# Patient Record
Sex: MALE | Race: WHITE | Employment: UNEMPLOYED | URBAN - METROPOLITAN AREA
[De-identification: names, ages, dates, MRNs, and addresses within clinical notes are randomized per-mention and may not be internally consistent; named-entity substitution may affect disease eponyms.]

---

## 2021-03-26 ENCOUNTER — HOSPITAL ENCOUNTER (INPATIENT)
Age: 56
LOS: 12 days | Discharge: ANOTHER HOSPITAL | DRG: 862 | End: 2021-04-07
Attending: INTERNAL MEDICINE | Admitting: SURGERY
Payer: MEDICAID

## 2021-03-26 DIAGNOSIS — Z86.718 HISTORY OF DEEP VENOUS THROMBOSIS (DVT) OF DISTAL VEIN OF RIGHT LOWER EXTREMITY: ICD-10-CM

## 2021-03-26 DIAGNOSIS — I73.9 PERIPHERAL ARTERY DISEASE (HCC): Chronic | ICD-10-CM

## 2021-03-26 DIAGNOSIS — Z79.4 TYPE 2 DIABETES MELLITUS WITHOUT COMPLICATION, WITH LONG-TERM CURRENT USE OF INSULIN (HCC): Chronic | ICD-10-CM

## 2021-03-26 DIAGNOSIS — K59.00 CONSTIPATION, UNSPECIFIED CONSTIPATION TYPE: ICD-10-CM

## 2021-03-26 DIAGNOSIS — E11.9 TYPE 2 DIABETES MELLITUS WITHOUT COMPLICATION, WITHOUT LONG-TERM CURRENT USE OF INSULIN (HCC): Chronic | ICD-10-CM

## 2021-03-26 DIAGNOSIS — Z78.9 IMPAIRED MOBILITY AND ADLS: ICD-10-CM

## 2021-03-26 DIAGNOSIS — Z79.82 LONG TERM CURRENT USE OF ASPIRIN: Chronic | ICD-10-CM

## 2021-03-26 DIAGNOSIS — I11.0 HYPERTENSIVE HEART DISEASE WITH CHRONIC SYSTOLIC CONGESTIVE HEART FAILURE (HCC): Chronic | ICD-10-CM

## 2021-03-26 DIAGNOSIS — E78.2 MIXED HYPERLIPIDEMIA: Chronic | ICD-10-CM

## 2021-03-26 DIAGNOSIS — I42.9 CARDIOMYOPATHY, UNSPECIFIED TYPE (HCC): Chronic | ICD-10-CM

## 2021-03-26 DIAGNOSIS — Z89.511 STATUS POST BELOW KNEE AMPUTATION OF RIGHT LOWER EXTREMITY (HCC): Primary | ICD-10-CM

## 2021-03-26 DIAGNOSIS — E78.5 HYPERLIPIDEMIA, UNSPECIFIED HYPERLIPIDEMIA TYPE: Chronic | ICD-10-CM

## 2021-03-26 DIAGNOSIS — Z98.890 HISTORY OF EMBOLECTOMY: ICD-10-CM

## 2021-03-26 DIAGNOSIS — Z79.01 ANTICOAGULATED BY ANTICOAGULATION TREATMENT: Chronic | ICD-10-CM

## 2021-03-26 DIAGNOSIS — I10 ESSENTIAL HYPERTENSION: Chronic | ICD-10-CM

## 2021-03-26 DIAGNOSIS — Z74.09 IMPAIRED MOBILITY AND ADLS: ICD-10-CM

## 2021-03-26 DIAGNOSIS — Z79.899 ON STATIN THERAPY DUE TO RISK OF FUTURE CARDIOVASCULAR EVENT: Chronic | ICD-10-CM

## 2021-03-26 DIAGNOSIS — I25.2 HISTORY OF MYOCARDIAL INFARCTION: ICD-10-CM

## 2021-03-26 DIAGNOSIS — I50.22 HYPERTENSIVE HEART DISEASE WITH CHRONIC SYSTOLIC CONGESTIVE HEART FAILURE (HCC): Chronic | ICD-10-CM

## 2021-03-26 DIAGNOSIS — I50.22 CHRONIC SYSTOLIC HEART FAILURE (HCC): Chronic | ICD-10-CM

## 2021-03-26 DIAGNOSIS — E11.9 TYPE 2 DIABETES MELLITUS WITHOUT COMPLICATION, WITH LONG-TERM CURRENT USE OF INSULIN (HCC): Chronic | ICD-10-CM

## 2021-03-26 DIAGNOSIS — I70.229 CRITICAL ISCHEMIA OF LOWER EXTREMITY (HCC): ICD-10-CM

## 2021-03-26 DIAGNOSIS — I25.10 CORONARY ARTERY DISEASE INVOLVING NATIVE CORONARY ARTERY OF NATIVE HEART, ANGINA PRESENCE UNSPECIFIED: Chronic | ICD-10-CM

## 2021-03-26 PROBLEM — I51.89 GRADE I DIASTOLIC DYSFUNCTION: Status: ACTIVE | Noted: 2021-03-24

## 2021-03-26 PROBLEM — Z91.199 HISTORY OF NONCOMPLIANCE WITH MEDICAL TREATMENT: Status: ACTIVE | Noted: 2021-03-18

## 2021-03-26 PROBLEM — F32.9 MAJOR DEPRESSIVE DISORDER: Status: ACTIVE | Noted: 2021-03-24

## 2021-03-26 PROBLEM — Z20.822 COVID-19 RULED OUT BY LABORATORY TESTING: Status: ACTIVE | Noted: 2021-03-22

## 2021-03-26 PROBLEM — F32.9 MAJOR DEPRESSIVE DISORDER: Status: ACTIVE | Noted: 2021-03-25

## 2021-03-26 PROBLEM — D62 ACUTE BLOOD LOSS AS CAUSE OF POSTOPERATIVE ANEMIA: Status: ACTIVE | Noted: 2021-03-22

## 2021-03-26 LAB
GLUCOSE BLD STRIP.AUTO-MCNC: 195 MG/DL (ref 70–110)
GLUCOSE BLD STRIP.AUTO-MCNC: 204 MG/DL (ref 70–110)

## 2021-03-26 PROCEDURE — 74011250637 HC RX REV CODE- 250/637: Performed by: INTERNAL MEDICINE

## 2021-03-26 PROCEDURE — 77030040393 HC DRSG OPTIFOAM GENT MDII -B

## 2021-03-26 PROCEDURE — 65310000000 HC RM PRIVATE REHAB

## 2021-03-26 PROCEDURE — 99223 1ST HOSP IP/OBS HIGH 75: CPT | Performed by: INTERNAL MEDICINE

## 2021-03-26 PROCEDURE — 82962 GLUCOSE BLOOD TEST: CPT

## 2021-03-26 PROCEDURE — 74011636637 HC RX REV CODE- 636/637: Performed by: INTERNAL MEDICINE

## 2021-03-26 RX ORDER — INSULIN GLARGINE 100 [IU]/ML
26 INJECTION, SOLUTION SUBCUTANEOUS
Status: DISCONTINUED | OUTPATIENT
Start: 2021-03-26 | End: 2021-03-28

## 2021-03-26 RX ORDER — ATORVASTATIN CALCIUM 80 MG/1
80 TABLET, FILM COATED ORAL DAILY
Status: ON HOLD | COMMUNITY
End: 2021-04-28 | Stop reason: CLARIF

## 2021-03-26 RX ORDER — METHOCARBAMOL 500 MG/1
500 TABLET, FILM COATED ORAL 3 TIMES DAILY
Status: DISCONTINUED | OUTPATIENT
Start: 2021-03-26 | End: 2021-04-07 | Stop reason: HOSPADM

## 2021-03-26 RX ORDER — DULOXETIN HYDROCHLORIDE 30 MG/1
30 CAPSULE, DELAYED RELEASE ORAL DAILY
Status: DISCONTINUED | OUTPATIENT
Start: 2021-03-27 | End: 2021-04-07 | Stop reason: HOSPADM

## 2021-03-26 RX ORDER — ZOLPIDEM TARTRATE 5 MG/1
5 TABLET ORAL
Status: DISCONTINUED | OUTPATIENT
Start: 2021-03-26 | End: 2021-04-07 | Stop reason: HOSPADM

## 2021-03-26 RX ORDER — ACETAMINOPHEN 325 MG/1
650 TABLET ORAL 3 TIMES DAILY
Status: DISCONTINUED | OUTPATIENT
Start: 2021-03-27 | End: 2021-04-07 | Stop reason: HOSPADM

## 2021-03-26 RX ORDER — MAGNESIUM SULFATE 100 %
4 CRYSTALS MISCELLANEOUS AS NEEDED
Status: DISCONTINUED | OUTPATIENT
Start: 2021-03-26 | End: 2021-04-07 | Stop reason: HOSPADM

## 2021-03-26 RX ORDER — GUAIFENESIN 100 MG/5ML
81 LIQUID (ML) ORAL DAILY
Status: ON HOLD | COMMUNITY
End: 2021-04-28 | Stop reason: CLARIF

## 2021-03-26 RX ORDER — GABAPENTIN 100 MG/1
100 CAPSULE ORAL 2 TIMES DAILY
Status: DISCONTINUED | OUTPATIENT
Start: 2021-03-26 | End: 2021-04-07 | Stop reason: HOSPADM

## 2021-03-26 RX ORDER — DEXTROSE 50 % IN WATER (D50W) INTRAVENOUS SYRINGE
25-50 AS NEEDED
Status: DISCONTINUED | OUTPATIENT
Start: 2021-03-26 | End: 2021-04-07 | Stop reason: HOSPADM

## 2021-03-26 RX ORDER — ACETAMINOPHEN 325 MG/1
650 TABLET ORAL
Status: ON HOLD | COMMUNITY
End: 2021-04-28 | Stop reason: CLARIF

## 2021-03-26 RX ORDER — AMOXICILLIN AND CLAVULANATE POTASSIUM 875; 125 MG/1; MG/1
1 TABLET, FILM COATED ORAL 2 TIMES DAILY WITH MEALS
Status: COMPLETED | OUTPATIENT
Start: 2021-03-26 | End: 2021-04-05

## 2021-03-26 RX ORDER — AMOXICILLIN AND CLAVULANATE POTASSIUM 875; 125 MG/1; MG/1
1 TABLET, FILM COATED ORAL EVERY 12 HOURS
COMMUNITY
End: 2021-04-15

## 2021-03-26 RX ORDER — GABAPENTIN 100 MG/1
100 CAPSULE ORAL 2 TIMES DAILY
COMMUNITY
End: 2021-04-15

## 2021-03-26 RX ORDER — METFORMIN HYDROCHLORIDE 500 MG/1
500 TABLET ORAL 2 TIMES DAILY WITH MEALS
Status: DISCONTINUED | OUTPATIENT
Start: 2021-03-27 | End: 2021-04-07 | Stop reason: HOSPADM

## 2021-03-26 RX ORDER — LISINOPRIL 5 MG/1
5 TABLET ORAL DAILY
COMMUNITY
End: 2021-04-15

## 2021-03-26 RX ORDER — FUROSEMIDE 40 MG/1
40 TABLET ORAL DAILY
COMMUNITY
End: 2021-04-15

## 2021-03-26 RX ORDER — ACETAMINOPHEN 325 MG/1
650 TABLET ORAL
Status: DISCONTINUED | OUTPATIENT
Start: 2021-03-26 | End: 2021-03-26

## 2021-03-26 RX ORDER — CARVEDILOL 6.25 MG/1
6.25 TABLET ORAL 2 TIMES DAILY WITH MEALS
COMMUNITY
End: 2021-04-15

## 2021-03-26 RX ORDER — GUAIFENESIN 100 MG/5ML
81 LIQUID (ML) ORAL
Status: DISCONTINUED | OUTPATIENT
Start: 2021-03-27 | End: 2021-04-07 | Stop reason: HOSPADM

## 2021-03-26 RX ORDER — BLOOD-GLUCOSE METER
KIT MISCELLANEOUS SEE ADMIN INSTRUCTIONS
Status: ON HOLD | COMMUNITY
End: 2021-04-28 | Stop reason: CLARIF

## 2021-03-26 RX ORDER — OXYCODONE HYDROCHLORIDE 10 MG/1
10 TABLET ORAL
Status: ON HOLD | COMMUNITY
End: 2021-04-28 | Stop reason: CLARIF

## 2021-03-26 RX ORDER — LISINOPRIL 5 MG/1
5 TABLET ORAL DAILY
Status: DISCONTINUED | OUTPATIENT
Start: 2021-03-27 | End: 2021-04-07 | Stop reason: HOSPADM

## 2021-03-26 RX ORDER — FUROSEMIDE 40 MG/1
40 TABLET ORAL DAILY
Status: DISCONTINUED | OUTPATIENT
Start: 2021-03-27 | End: 2021-04-07 | Stop reason: HOSPADM

## 2021-03-26 RX ORDER — DOCUSATE SODIUM 100 MG/1
100 CAPSULE, LIQUID FILLED ORAL DAILY
Status: ON HOLD | COMMUNITY
End: 2021-04-28 | Stop reason: CLARIF

## 2021-03-26 RX ORDER — INSULIN LISPRO 100 [IU]/ML
INJECTION, SOLUTION INTRAVENOUS; SUBCUTANEOUS
Status: DISCONTINUED | OUTPATIENT
Start: 2021-03-26 | End: 2021-04-07 | Stop reason: HOSPADM

## 2021-03-26 RX ORDER — BISACODYL 5 MG
10 TABLET, DELAYED RELEASE (ENTERIC COATED) ORAL
Status: DISCONTINUED | OUTPATIENT
Start: 2021-03-26 | End: 2021-04-07 | Stop reason: HOSPADM

## 2021-03-26 RX ORDER — ZOLPIDEM TARTRATE 5 MG/1
5 TABLET ORAL
Status: ON HOLD | COMMUNITY
End: 2021-04-28 | Stop reason: CLARIF

## 2021-03-26 RX ORDER — OXYCODONE HYDROCHLORIDE 5 MG/1
5-10 TABLET ORAL
Status: DISCONTINUED | OUTPATIENT
Start: 2021-03-26 | End: 2021-04-07 | Stop reason: HOSPADM

## 2021-03-26 RX ORDER — ACETAMINOPHEN 325 MG/1
650 TABLET ORAL
Status: DISCONTINUED | OUTPATIENT
Start: 2021-03-26 | End: 2021-04-07 | Stop reason: HOSPADM

## 2021-03-26 RX ORDER — CALCIUM CARB/MAGNESIUM CARB 311-232MG
5 TABLET ORAL
Status: DISCONTINUED | OUTPATIENT
Start: 2021-03-26 | End: 2021-04-07 | Stop reason: HOSPADM

## 2021-03-26 RX ORDER — EZETIMIBE 10 MG/1
TABLET ORAL
Status: ON HOLD | COMMUNITY
End: 2021-04-28 | Stop reason: CLARIF

## 2021-03-26 RX ORDER — EZETIMIBE 10 MG/1
10 TABLET ORAL
Status: DISCONTINUED | OUTPATIENT
Start: 2021-03-26 | End: 2021-04-07 | Stop reason: HOSPADM

## 2021-03-26 RX ORDER — DOCUSATE SODIUM 100 MG/1
100 CAPSULE, LIQUID FILLED ORAL
Status: DISCONTINUED | OUTPATIENT
Start: 2021-03-27 | End: 2021-03-30

## 2021-03-26 RX ORDER — DULOXETIN HYDROCHLORIDE 30 MG/1
30 CAPSULE, DELAYED RELEASE ORAL DAILY
Status: ON HOLD | COMMUNITY
End: 2021-04-28 | Stop reason: CLARIF

## 2021-03-26 RX ORDER — ALOGLIPTIN 25 MG/1
25 TABLET, FILM COATED ORAL DAILY
Status: DISCONTINUED | OUTPATIENT
Start: 2021-03-27 | End: 2021-04-07 | Stop reason: HOSPADM

## 2021-03-26 RX ORDER — ATORVASTATIN CALCIUM 40 MG/1
80 TABLET, FILM COATED ORAL DAILY
Status: DISCONTINUED | OUTPATIENT
Start: 2021-03-27 | End: 2021-04-07 | Stop reason: HOSPADM

## 2021-03-26 RX ORDER — INSULIN GLARGINE 100 [IU]/ML
26 INJECTION, SOLUTION SUBCUTANEOUS
COMMUNITY
End: 2021-04-15

## 2021-03-26 RX ORDER — CARVEDILOL 6.25 MG/1
6.25 TABLET ORAL 2 TIMES DAILY WITH MEALS
Status: DISCONTINUED | OUTPATIENT
Start: 2021-03-26 | End: 2021-03-27

## 2021-03-26 RX ADMIN — INSULIN LISPRO 4 UNITS: 100 INJECTION, SOLUTION INTRAVENOUS; SUBCUTANEOUS at 17:18

## 2021-03-26 RX ADMIN — INSULIN GLARGINE 26 UNITS: 100 INJECTION, SOLUTION SUBCUTANEOUS at 20:56

## 2021-03-26 RX ADMIN — GABAPENTIN 100 MG: 100 CAPSULE ORAL at 17:17

## 2021-03-26 RX ADMIN — Medication 5 MG: at 20:55

## 2021-03-26 RX ADMIN — METHOCARBAMOL TABLETS 500 MG: 500 TABLET, COATED ORAL at 20:56

## 2021-03-26 RX ADMIN — EZETIMIBE 10 MG: 10 TABLET ORAL at 20:55

## 2021-03-26 RX ADMIN — CARVEDILOL 6.25 MG: 6.25 TABLET, FILM COATED ORAL at 17:17

## 2021-03-26 RX ADMIN — OXYCODONE HYDROCHLORIDE 5 MG: 5 TABLET ORAL at 17:41

## 2021-03-26 RX ADMIN — AMOXICILLIN AND CLAVULANATE POTASSIUM 1 TABLET: 875; 125 TABLET, FILM COATED ORAL at 20:56

## 2021-03-26 NOTE — ROUTINE PROCESS
SHIFT CHANGE NOTE FOR Marlene Kaplan Bedside and Verbal shift change report given to Gabrielle See RN (oncoming nurse) by Jose F Darden RN 
 (offgoing nurse). Report included the following information SBAR, Kardex, MAR and Recent Results. Situation: 
 Code Status: Full Code Reason for Admission: R BKA Hospital Day: 0 Problem List:  
Hospital Problems  Never Reviewed Codes Class Noted POA Peripheral artery disease (HCC) (Chronic) ICD-10-CM: I73.9 ICD-9-CM: 443.9  Unknown Yes Type 2 diabetes mellitus, with long-term current use of insulin (HCC) (Chronic) ICD-10-CM: E11.9, Z79.4 ICD-9-CM: 250.00, V58.67  Unknown Yes Overview Signed 3/26/2021  5:49 PM by Tereso Busch MD  
  HbA1c (3/6/2021) = 12.9 Factor V deficiency (HCC) (Chronic) ICD-10-CM: J73.4 ICD-9-CM: 286. 3  Unknown Yes Essential hypertension (Chronic) ICD-10-CM: I10 
ICD-9-CM: 401.9  Unknown Yes Long term current use of aspirin (Chronic) ICD-10-CM: A35.87 ICD-9-CM: V58.66  Unknown Yes On statin therapy due to risk of future cardiovascular event (Chronic) ICD-10-CM: O51.032 ICD-9-CM: V58.69  Unknown Yes Overview Signed 3/26/2021  4:34 PM by Tereso Busch MD  
  On Atorvastatin History of deep venous thrombosis (DVT) of distal vein of right lower extremity ICD-10-CM: X61.343 ICD-9-CM: V12.51  Unknown Yes Anticoagulated by anticoagulation treatment (Chronic) ICD-10-CM: Z79.01 
ICD-9-CM: V58.61  Unknown Yes Overview Signed 3/26/2021  4:36 PM by Tereso Busch MD  
  On Rivaroxaban Hyperlipidemia (Chronic) ICD-10-CM: I46.0 ICD-9-CM: 272.4  Unknown Yes History of embolectomy ICD-10-CM: Z98.890 ICD-9-CM: V45.89  Unknown Yes Overview Signed 3/26/2021  5:39 PM by Tereso Busch MD  
  S/P Right lower extremity arterial embolectomy * (Principal) Status post below knee amputation of right lower extremity ([facility] [address] ICD-10-CM: Y60.214 ICD-9-CM: V49.75  3/22/2021 Yes Overview Signed 3/26/2021  5:43 PM by Leann Bruner MD  
  S/P Right below-the-knee amputation (3/22/2021 - Dr. Wynne Opitz) Impaired mobility and ADLs ICD-10-CM: Z74.09, Z78.9 ICD-9-CM: V49.89  3/22/2021 Yes Critical ischemia of lower extremity ICD-10-CM: I99.8 ICD-9-CM: 459.9  3/22/2021 Yes Overview Signed 3/26/2021  5:42 PM by Leann Bruner MD  
  Right COVID-19 ruled out by laboratory testing ICD-10-CM: Z20.822 ICD-9-CM: V71.83  3/22/2021 Yes Overview Signed 3/26/2021  5:47 PM by Leann Bruner MD  
  SARS-CoV-2 ([facility]) (3/22/2021): Not detected Acute blood loss as cause of postoperative anemia ICD-10-CM: D62 
ICD-9-CM: 285.1  3/22/2021 Yes Background: 
 Past Medical History:  
Past Medical History:  
Diagnosis Date  Acute blood loss as cause of postoperative anemia 3/22/2021  Anticoagulated by anticoagulation treatment On Rivaroxaban  Coronary artery disease involving native coronary artery of native heart  COVID-19 ruled out by laboratory testing 3/22/2021 SARS-CoV-2 ([facility]) (3/22/2021): Not detected  Critical ischemia of lower extremity 3/22/2021 Right  Essential hypertension  Factor V deficiency ([address]  History of deep venous thrombosis (DVT) of distal vein of right lower extremity  History of epilepsy  History of head injury  History of myocardial infarction  Hyperlipidemia  Long term current use of aspirin  Migraine headache  Mitral valve prolapse  Obstructive sleep apnea  On statin therapy due to risk of future cardiovascular event On Atorvastatin  Peripheral artery disease ([address]  Type 2 diabetes mellitus, with long-term current use of insulin ([address] HbA1c (3/6/2021) = 95.3  
 Umbilical hernia  Wears glasses Patient taking anticoagulants yes  Patient has a defibrillator: no  
 Assessment: 
 Changes in Assessment throughout shift: none  Patient has central line: no Reasons if yes: Insertion date: Last dressing date: 
 Patient has Ellis Cath: no Reasons if yes: Insertion date: 
 
 Last Vitals: 
  
Vitals:  
 03/26/21 1558 03/26/21 1716 BP: 93/62 105/67 Pulse: 90 82 Resp: 18 18 Temp: 97.9 °F (36.6 °C) SpO2: 99% 96% Weight: 84.8 kg (187 lb) Height: 6' 2\" (1.88 m)  PAIN Pain Assessment Pain Intensity 1: 6 (03/26/21 1605) Pain Intensity 1: 2 (12/29/14 1105) Pain Location 1: Leg Pain Location 1: Abdomen Pain Intervention(s) 1: Medication (see MAR) Patient Stated Pain Goal: 2 Patient Stated Pain Goal: 0 
o Intervention effective: yes  
o Other actions taken for pain:  
 
 Skin Assessment Skin color Skin Color: Appropriate for ethnicity Condition/Temperature Skin Condition/Temp: Dry, Warm Integrity Skin Integrity: Abrasion(Buttocks, and scab at LLE) Turgor Turgor: Non-tenting Weekly Pressure Ulcer Documentation  Pressure  Injury Documentation: No Pressure Injury Noted-Pressure Ulcer Prevention Initiated Wound Prevention & Protection Methods Orientation of wound Orientation of Wound Prevention: Posterior Location of Prevention Location of Wound Prevention: Buttocks, Sacrum/Coccyx Dressing Present Dressing Present : Yes Dressing Status Dressing Status: Intact Wound Offloading Wound Offloading (Prevention Methods): Bed, pressure redistribution/air, Hydrocolloids, Repositioning, Pillows  INTAKE/OUPUT Date 03/25/21 0700 - 03/26/21 0659(Not Admitted) 03/26/21 0700 - 03/27/21 4048 Shift [id number] 8915-8689 24 Hour Total 3749-8980 5982-0044 24 Hour Total  
INTAKE Shift Total(mL/kg) OUTPUT Urine Urine Occurrence(s)    0 x  0 x Stool Stool Occurrence(s)    0 x  0 x Shift Total(mL/kg) NET Weight (kg)    84.8 84.8 84.8 Recommendations: 1.  Patient needs and requests: none at this time 2. Diet: card. Const. Carb 1800k 3. Pending tests/procedures: OT/PT 4. Functional Level/Equipment: walker, assist x 1 
 
5. Estimated Discharge Date: TBD Posted on Whiteboard in Patients Room: yes HEALS Safety Check A safety check occurred in the patient's room between off going nurse and oncoming nurse listed above. The safety check included the below items Area Items H High Alert Medications - Verify all high alert medication drips (heparin, PCA, etc.) E Equipment - Suction is set up for ALL patients (with faraz) - Red plugs utilized for all equipment (IV pumps, etc.) - WOWs wiped down at end of shift. 
- Room stocked with oxygen, suction, and other unit-specific supplies A Alarms - Bed alarm is set for fall risk patients - Ensure chair alarm is in place and activated if patient is up in a chair L Lines - Check IV for any infiltration - Ellis bag is empty if patient has a Ellis - Tubing and IV bags are labeled Cool Danitza Safety - Room is clean, patient is clean, and equipment is clean. - Hallways are clear from equipment besides carts. - Fall bracelet on for fall risk patients - Ensure room is clear and free of clutter - Suction is set up for ALL patients (with faraz) - Hallways are clear from equipment besides carts. - Isolation precautions followed, supplies available outside room, sign posted

## 2021-03-26 NOTE — ROUTINE PROCESS
Vincent Riggs is a 60 y.o.  male admitted on 3/26/2021 from [facility]. Report received from [facility]. Transportation was provided by ambulance, and the patient was transferred to his room via stretcher. Patient was assisted to bed by assist of 2. The patient was oriented to his room, and to the unit rules such as to call for help prior to an attempt at mobility. Four eyes nurse skin assessment was performed by Yoni Grover and Azra Walters LPN. Skin problems noted: NO Thomas Friend

## 2021-03-26 NOTE — H&P
[address]  [address] [place] [address], [address]     INPATIENT REHABILITATION  HISTORY AND PHYSICAL    Name: Marlene Kelly CSN: [id number]   Age: 60 y.o. MRN: [id number]   Sex: male Admit Date: 3/26/2021     PCP: None; Patient was assigned to [facility]      Primary Rehab Impairment Category (TINA): Amputation, lower extremity    Impairment Group Label: Unilateral lower extremity below the knee    Etiologic Diagnosis:   1. Critical ischemia of the right lower extremity  2. History of right lower extremity arterial embolectomy       Subjective:     Patient seen and examined. History of the Present Illness: The patient is a 33-year-old White male with multiple medical comorbidities who was admitted to [facility] on 3/22/2021 for an elective right below-the-knee amputation. WBC count (3/9/2021) = 14.0  Hgb/Hct (3/9/2021) = 11.7/37.5  BUN/Creatinine (3/9/2021) = 17/1.6     On 3/18/2021, the patient was seen at the office of Vascular Surgery (Dr. Humaira Tavarez) for a wound check after undergoing a right lower extremity arterial embolectomy last 3/5/2021. The right foot was noted to be ischemic and Dr. Leslee Mahmood recommended a right below-the-knee amputation. On 3/22/2021, the patient was admitted to [facility] under the service of Vascular Surgery (Dr. Humaira Tavarez). On 3/22/2021, the patient underwent a Right below-the-knee amputation done by Dr. Humaira Tavarez. The patient tolerated the procedure well with no intraoperative complications. The patient developed acute postoperative blood loss anemia but no blood transfusion was given. Rivaroxaban was used for DVT prophylaxis. Internal Medicine consult (Dr. Maxine Khan) was called on 3/22/2021 for medical management.     WBC count (3/23/2021) = 10.7  Hgb/Hct (3/23/2021) = 10.7/34.2  BUN/Creatinine (3/23/2021) = 16/1.2     Physical Medicine and Rehabilitation consult (Dr. Stephon Ortega) was called on 3/23/2021 for evaluation of rehabilitation needs. Dr. Chelsie Madden recommended IPR but since insurance does not contract with [facility], she recommended to consider [employer]. Cardiology consult (Dr. Abril Patterson) was called on 3/24/2021 for evaluation and comanagement. 2D echocardiogram (3/24/2021) showed EF 37%; global hypokinesis of left ventricle; grade I diastolic dysfunction    Psychiatry consult (Dr. Abdiel Gomez) was called on 3/24/2021 for evaluation and comanagement. Patient was diagnosed with major depressive disorder, severe, recurrent, without psychotic features and Caregiver burnout. The patient was determined to have the capacity to make decisions on his own. Patient was started on Duloxetine 30 mg PO once daily. WBC count (3/26/2021) = 13.2  Hgb/Hct (3/26/2021) = 10.1/31.3  BUN/Creatinine (3/26/2021) = 16/1.1     Pain was controlled with Morphine IV PRN and Oxycodone PO PRN. The patient had remained hemodynamically stable but due to the above events, the patient was noted to be generally weak and with impaired mobility and ADLs. Patient was felt to be a good candidate for acute inpatient rehabilitation. Upon evaluation by Physical Therapy and Occupational Therapy, the patient was recommended for acute inpatient rehabilitation. The patient was discharged and was subsequently admitted to the [facility] for intensive rehabilitation to help recover strength, function and mobility.       Past Medical History:  Past Medical History:   Diagnosis Date    Acute blood loss as cause of postoperative anemia 3/22/2021    Anticoagulated by anticoagulation treatment     On Rivaroxaban    Cardiomyopathy ([facility] [address] 12/17/2015    2D echocardiogram (3/24/2021) showed EF 37%; global hypokinesis of left ventricle; grade I diastolic dysfunction; 2D echocardiogram (12/17/2015) showed EF 35%; genealized hypokinesis more focally severe of the inferior and posterior segments; mild mitral regurgitation; trace tricuspud regurgitation; normal pulmonary artery pressure    Chronic systolic heart failure (HCC)     2D echocardiogram (3/24/2021) showed EF 37%; global hypokinesis of left ventricle; grade I diastolic dysfunction; 2D echocardiogram (12/17/2015) showed EF 35%; genealized hypokinesis more focally severe of the inferior and posterior segments; mild mitral regurgitation; trace tricuspud regurgitation; normal pulmonary artery pressure    Coronary artery disease involving native coronary artery of native heart     COVID-19 ruled out by laboratory testing 3/22/2021    SARS-CoV-2 ([employer] [facility] [address]) (3/22/2021):  Not detected     Critical ischemia of lower extremity 3/22/2021    Right    Factor V Leiden mutation ([facility]     Grade I diastolic dysfunction 52/09/7309    2D echocardiogram (3/24/2021) showed EF 37%; global hypokinesis of left ventricle; grade I diastolic dysfunction    History of deep venous thrombosis (DVT) of distal vein of right lower extremity     History of epilepsy     History of head injury     History of myocardial infarction     History of noncompliance with medical treatment 3/18/2021    Hypertensive heart disease with chronic systolic congestive heart failure ([facility] [address]     2D echocardiogram (3/24/2021) showed EF 37%; global hypokinesis of left ventricle; grade I diastolic dysfunction; 2D echocardiogram (12/17/2015) showed EF 35%; genealized hypokinesis more focally severe of the inferior and posterior segments; mild mitral regurgitation; trace tricuspud regurgitation; normal pulmonary artery pressure    Long term current use of aspirin     Major depressive disorder 03/24/2021    On Duloxetine    Migraine headache     Mitral valve prolapse     Mixed hyperlipidemia     Lipid profile (7/25/2018) showed , , HDL 27,     Obstructive sleep apnea     On statin therapy due to risk of future cardiovascular event     On Atorvastatin    Peripheral artery disease (HCC)     Type 2 diabetes mellitus, without long-term current use of insulin (HCC)     HbA1c (3/6/2021) = 97.6    Umbilical hernia     Wears glasses        Past Surgical History:  Past Surgical History:   Procedure Laterality Date    HX BELOW KNEE AMPUTATION Right 2021    S/P Right below-the-knee amputation (3/22/2021 - Dr. Ayah Whitney)    HX EMBOLECTOMY Right 2021    S/P Right lower extremity arterial embolectomy    HX IMPLANTABLE CARDIOVERTER DEFIBRILLATOR         Allergies: Allergies   Allergen Reactions    Nitroglycerin Other (comments)      BP drops rapidly when he takes SL Nitro          [place] Diarrhea and Rash    Cat Dander Cough    Codeine Rash    [place] Rash       Social History: The patient is single, lives with his mother in a 1-story house with a ramp in [place], [place]. He denied any tobacco, alcohol or illicit drug use. He has been unemployed since 3/2018 and has been the primary caregiver for his mother since then. Family History: Mother is alive. Father is . Family History   Problem Relation Age of Onset    Bleeding Prob Mother         Factor V Leiden mutation    Diabetes Mother     Stroke Mother     Hypertension Mother     Dementia Mother     Seizures Mother        Home Medications (from the H&P dated 3/22/2021 prepared by Dr. Herman Gibson): Outpatient Medications Marked as Taking for the 3/22/21 encounter [facility] Encounter)   Medication Sig Dispense Refill    acetaminophen (TYLENOL) 325 mg PO TABS Take 2 Tabs by Mouth Every 4 Hours As Needed. (Patient taking differently: Take 2 Tabs by Mouth Every 4 Hours As Needed for Pain. Indications: pain)    aspirin 81 mg PO CHEW Take 1 Tab by Mouth Once a Day. 30 Tab 2    atorvastatin (LIPITOR) 80 mg PO TABS Take 1 Tab by Mouth Once a Day for 30 days.  30 Tab 0    Blood Sugar Diagnostic (FREESTYLE LITE STRIPS) Misc STRP 1 Each by Misc. (Non-Drug; Combo Route) route 4 Times a Day Before Meals & at Bedtime for 30 days. Monitor blood sugar before meals & bedtime 3 Box 0    ezetimibe (ZETIA) 10 mg PO TABS Take 1 Tab by Mouth Every Night at Bedtime for 30 days. 30 Tab 0    furosemide (LASIX) 40 mg PO TABS Take 1 Tab by Mouth Once a Day for 30 days. 30 Tab 0    insulin glargine (LANTUS U-100 INSULIN) 100 unit/mL SC SOLN Inject 26 Units beneath the skin Once a Day. overstock (Patient taking differently: Inject 26 Units beneath the skin Every Night at Bedtime. overstock) 1 Vial 0    Insulin Pen Needles, Disposable, 31 gauge x 3/16\" Misc Ndle 1 Units by Combination route Every Night at Bedtime. 1 Box 11    lisinopriL (PRINIVIL) 5 mg PO TABS Take 1 Tab by Mouth Once a Day for 30 days. 30 Tab 0    oxyCODONE 10 mg PO TABS Take 10 mg by Mouth Every 6 Hours As Needed. 40 Tab 0    rivaroxaban (XARELTO) 20 mg PO TABS Take 1 Tab by Mouth Once a Day for 180 days. 30 Tab 5    silver sulfADIAZINE (SILVADENE) 1 % Top CREA Use 1 Application to affected area Twice Daily. 1 Bottle 0    SITagliptin (JANUVIA) 100 mg PO TABS Take 1 Tab by Mouth Once a Day. 30 Tab 3    zolpidem (AMBIEN) 5 mg PO TABS Take 1 Tab by Mouth nightly as needed. 30 Tab 0       Home Medications ( [x] Verbally confirmed with patient    [] From medication bottles brought by patient     [] From list provided by patient): None; Patient stated that he had not been taking any medications since he was unemployed in 3/2018; he had been prescribed medication over the past 2-3 years but he does not take them      Transfer Medications (from the Discharge Summary prepared by M. Ashlet Behm, PA-C):  Prior to Admission Medications   Prescriptions Last Dose Informant Patient Reported? Taking? DULoxetine (Cymbalta) 30 mg capsule   Yes Yes   Sig: Take 30 mg by mouth daily. SITagliptin (Januvia) 100 mg tablet   Yes Yes   Sig: Take 100 mg by mouth daily.    acetaminophen (TYLENOL) 325 mg tablet Yes Yes   Sig: Take 650 mg by mouth every four (4) hours as needed for Pain.   amoxicillin-clavulanate (AUGMENTIN) 875-125 mg per tablet   Yes Yes   Sig: Take 1 Tab by mouth every twelve (12) hours. For 10 doses   aspirin 81 mg chewable tablet   Yes Yes   Sig: Take 81 mg by mouth daily. atorvastatin (LIPITOR) 80 mg tablet   Yes Yes   Sig: Take 80 mg by mouth daily. For 30 days   carvediloL (COREG) 6.25 mg tablet   Yes Yes   Sig: Take 6.25 mg by mouth two (2) times daily (with meals). docusate sodium (Colace) 100 mg capsule   Yes Yes   Sig: Take 100 mg by mouth daily. ezetimibe (ZETIA) 10 mg tablet   Yes Yes   Sig: Take  by mouth nightly. furosemide (LASIX) 40 mg tablet   Yes Yes   Sig: Take 40 mg by mouth daily. For 30 days   gabapentin (NEURONTIN) 100 mg capsule   Yes Yes   Sig: Take 100 mg by mouth two (2) times a day. glucose blood VI test strips (FreeStyle Lite Strips) strip   Yes Yes   Sig: by Does Not Apply route See Admin Instructions. insulin glargine (LANTUS) 100 unit/mL injection   Yes Yes   Si Units by SubCUTAneous route nightly. lisinopriL (PRINIVIL, ZESTRIL) 5 mg tablet   Yes Yes   Sig: Take 5 mg by mouth daily. For 30 days   oxyCODONE IR (ROXICODONE) 10 mg tab immediate release tablet   Yes Yes   Sig: Take 10 mg by mouth every six (6) hours as needed for Pain.   rivaroxaban (XARELTO) 20 mg tab tablet   Yes Yes   Sig: Take 20 mg by mouth daily. For 180 days   zolpidem (AMBIEN) 5 mg tablet   Yes Yes   Sig: Take 5 mg by mouth nightly as needed for Sleep. Facility-Administered Medications: None       Review Of Systems:   CONSTITUTIONAL: No weight loss. EYES: No blurred vision and no eye discharge. ENT: No nasal discharge. No ear pain. CARDIOVASCULAR: No chest pain and no diaphoresis. RESPIRATORY: No cough, no hemoptysis. GI: No vomiting, no diarrhea   : No urinary frequency and no dysuria.    MUSCULOSKELETAL: Significant for acute postoperative musculoskeletal pain.  SKIN: No rashes. NEURO: No dizziness, no numbness. ENDOCRINE: No polyphagia and no polydipsia. HEMATOLOGY: Significant for acute postoperative blood loss anemia. Objective:     Vital Signs:  Patient Vitals for the past 24 hrs:   BP Temp Pulse Resp SpO2 Height Weight   03/26/21 2100 108/66 97 °F (36.1 °C) 80 18 99 %     03/26/21 1716 105/67  82 18 96 %     03/26/21 1558 93/62 97.9 °F (36.6 °C) 90 18 99 % 6' 2\" (1.88 m) 84.8 kg (187 lb)        Body mass index is 24.01 kg/m². Physical Examination:  GENERAL SURVEY: Patient is awake, alert, oriented x 3, laying comfortably on the bed, not in acute respiratory distress. HEENT: pink palpebral conjunctivae, anicteric sclerae, no nasoaural discharge, moist oral mucosa  NECK: supple, no jugular venous distention, no palpable lymph nodes  CHEST/LUNGS: symmetrical chest expansion, good air entry, clear breath sounds  HEART: adynamic precordium, good S1 S2, no S3, regular rhythm, no murmurs  ABDOMEN: flat, bowel sounds appreciated, soft, non-tender  EXTREMITIES: (+) right BKA stump in  and clamshell; pink nailbeds, no edema, full and equal pulses, no calf tenderness   NEUROLOGICAL EXAM: The patient is awake, alert and oriented x3, able to answer questions fairly appropriately, able to follow 1 and 2 step commands. Able to tell time from the wall clock. Cranial nerves II-XII are grossly intact. No gross sensory deficit. Motor strength is 4+/5 on BUE, 4/5 on the RLE, 4 to 4+/5 on the LLE.     Incision(s): covered, clean, dry, and intact       Current Medications:  Current Facility-Administered Medications   Medication Dose Route Frequency    bisacodyL (DULCOLAX) tablet 10 mg  10 mg Oral Q48H PRN    insulin lispro (HUMALOG) injection   SubCUTAneous TIDAC    amoxicillin-clavulanate (AUGMENTIN) 875-125 mg per tablet 1 Tab  1 Tab Oral BID WITH MEALS    [START ON 3/27/2021] L. acidophilus,casei,rhamnosus (BIO-K PLUS) capsule 1 Cap  1 Cap Oral DAILY    [START ON 3/27/2021] atorvastatin (LIPITOR) tablet 80 mg  80 mg Oral DAILY    [START ON 3/27/2021] aspirin chewable tablet 81 mg  81 mg Oral DAILY WITH BREAKFAST    carvediloL (COREG) tablet 6.25 mg  6.25 mg Oral BID WITH MEALS    [START ON 3/27/2021] docusate sodium (COLACE) capsule 100 mg  100 mg Oral DAILY AFTER BREAKFAST    [START ON 3/27/2021] DULoxetine (CYMBALTA) capsule 30 mg  30 mg Oral DAILY    ezetimibe (ZETIA) tablet 10 mg  10 mg Oral QHS    [START ON 3/27/2021] furosemide (LASIX) tablet 40 mg  40 mg Oral DAILY    gabapentin (NEURONTIN) capsule 100 mg  100 mg Oral BID    insulin glargine (LANTUS) injection 26 Units  26 Units SubCUTAneous QHS    [START ON 3/27/2021] lisinopriL (PRINIVIL, ZESTRIL) tablet 5 mg  5 mg Oral DAILY    oxyCODONE IR (ROXICODONE) tablet 5-10 mg  5-10 mg Oral Q4H PRN    [START ON 3/27/2021] acetaminophen (TYLENOL) tablet 650 mg  650 mg Oral TID    methocarbamoL (ROBAXIN) tablet 500 mg  500 mg Oral TID    [START ON 3/27/2021] rivaroxaban (XARELTO) tablet 20 mg  20 mg Oral DAILY WITH BREAKFAST    [START ON 3/27/2021] alogliptin (NESINA) tablet 25 mg  25 mg Oral DAILY    melatonin (rapid dissolve) tablet 5 mg  5 mg Oral QHS    zolpidem (AMBIEN) tablet 5 mg  5 mg Oral QHS PRN    glucose chewable tablet 16 g  4 Tab Oral PRN    glucagon (GLUCAGEN) injection 1 mg  1 mg IntraMUSCular PRN    dextrose (D50W) injection syrg 12.5-25 g  25-50 mL IntraVENous PRN    acetaminophen (TYLENOL) tablet 650 mg  650 mg Oral Q4H PRN       Assessment:     Primary Rehabilitation Diagnosis  1. Impaired Mobility and ADLs  2. S/P Right below-the-knee amputation (3/22/2021 - Dr. Alli Mark)  3. History of critical ischemia of the right lower extremity  4.  History of right lower extremity arterial embolectomy     Comorbidities  Patient Active Problem List   Diagnosis Code    Status post below knee amputation of right lower extremity (HCC) Z89.511    Impaired mobility and ADLs Z74.09, Z78.9    Critical ischemia of lower extremity I99.8    Peripheral artery disease (HCC) I73.9    Coronary artery disease involving native coronary artery of native heart I25.10    Type 2 diabetes mellitus, without long-term current use of insulin (HCC) E11.9    History of epilepsy Z86.69    Factor V Leiden mutation ([facility] D68.51    Migraine headache G43.909    Wears glasses X61.4    Umbilical hernia O10.6    Obstructive sleep apnea G47.33    ICD (implantable cardioverter-defibrillator) in place Z95.810    Mitral valve prolapse I34.1    History of head injury Z87.828    Hypertensive heart disease with chronic systolic congestive heart failure (HCC) I11.0, I50.22    History of myocardial infarction I25.2    Long term current use of aspirin Z79.82    On statin therapy due to risk of future cardiovascular event Z79.899    History of deep venous thrombosis (DVT) of distal vein of right lower extremity Z86.718    Anticoagulated by anticoagulation treatment Z79.01    Mixed hyperlipidemia E78.2    History of embolectomy Z98.890    COVID-19 ruled out by laboratory testing Z20.822    Acute blood loss as cause of postoperative anemia D62    Cardiomyopathy ([facility] I42.9    Chronic systolic heart failure (HCC) I50.22    History of noncompliance with medical treatment Z91.19    Major depressive disorder F32.9    Grade I diastolic dysfunction B13.1       Willingness to participate in the program: Good      Rehabilitation Potential: Good      Plan:     1. Medical Issues being followed closely:    [x]  Fall and safety precautions     [x]  Wound Care     [x]  Bowel and Bladder Function     [x]  Fluid Electrolyte and Nutrition Balance     [x]  Pain Control      2.  Issues that 24 hour rehabilitation nursing is following:    [x]  Fall and safety precautions     [x]  Wound Care     [x]  Bowel and Bladder Function     [x]  Fluid Electrolyte and Nutrition Balance     [x]  Pain Control      [x]  Assistance with and education on in-room safety with transfers to and from the bed, wheelchair, toilet and shower. 3. Acute rehabilitation plan of care:    [x]  Patient to be evaluated and treated by:           [x]  Physical Therapy           [x]  Occupational Therapy           []  Speech Therapy     []  Hold Rehab until further notice     5. Medications:    [x]  MAR Reviewed     [x]  Continue Present Medications     6. DVT Prophylaxis:      []  Enoxaparin     []  Unfractionated Heparin     []  Warfarin     [x]  NOAC     []  NEREYDA Stockings     []  Sequential Compression Device     []  None     7. Code status    [x]  Full code     []  Partial code     []  Do not intubate     []  Do not resuscitate     8. Rehabilitation program and expectations from patient, as well as medical issues discussed with the patient. MEDICAL PLAN:  > S/P Right below-the-knee amputation (3/22/2021 - Dr. Shakila Hernandez); History of critical ischemia of the right lower extremity;  History of right lower extremity arterial embolectomy   > Staples to be removed on 4/19/2021   > Amoxicillin-Clavulanate 875-125 1 tab PO BID with meals x 10 days   > Bio K Plus 1 cap PO once daily    > Acute Postoperative Blood Loss Anemia   > Hgb/Hct (3/26/2021) = 10.1/31.3   > CBC and Anemia work-up in AM    > Coronary artery disease; Peripheral artery disease    > Aspirin 81 mg PO once daily with breakfast   > Atorvastatin 80 mg PO once daily   > Carvedilol 6.25 mg PO BID with meals (8AM, 5PM)    > Hypertensive heart disease with chronic systolic heart failure; Cardiomyopathy; Chronic systolic heart failure; Grade I diastolic dysfunction   > 2D echocardiogram (12/17/2015) showed EF 35%; genealized hypokinesis more focally severe of the inferior and posterior segments; mild mitral regurgitation; trace tricuspud regurgitation; normal pulmonary artery pressure   > 2D echocardiogram (3/24/2021) showed EF 37%; global hypokinesis of left ventricle; grade I diastolic dysfunction   > Carvedilol 6.25 mg PO BID with meals (8AM, 5PM)   > Furosemide 40 mg PO once daily (9AM)   > Lisinopril 5 mg PO once daily (9AM)    > Factor V Leiden mutation; History of deep venous thrombosis (DVT) of right lower extremity; Anticoagulated on Rivaroxaban   > Rivaroxaban 20 mg PO once daily with breakfast    > Major depressive disorder   > Duloxetine 30 mg PO once daily    > Mixed hyperlipidemia   > Lipid profile (7/25/2018) showed , , HDL 27,    > Lipid profile in AM   > Atorvastatin 80 mg PO once daily   > Ezetimibe 10 mg PO q HS    > Type 2 diabetes mellitus, poorly controlled, without long-term current use of insulin   > HbA1c (3/6/2021) = 12.9    > Alogliptin 25 mg PO once daily   > Metformin 500 mg PO BID with meals   > Insulin glargine 26 units SC q HS   > Insulin lispro sliding scale SC TID AC only     > Difficulty sleeping   > Melatonin 5 mg PO q HS   > Zolpidem 5 mg PO q HS PRN for sleep    > COVID-19 ruled out by laboratory testing   > SARS-CoV-2 ([facility]) (3/22/2021): Not detected    > Analgesia   > Acetaminophen 650 mg PO TID (8AM, 12PM, 4PM)   > Acetaminophen 650 mg PO q 4 hr PRN for pain level 4/10 or lesser (from 8PM to 4AM only)   > Duloxetine 30 mg PO once daily   > Gabapentin 100 mg PO BID   > Methocarbamol 500 mg PO TID   > Oxycodone 5-10 mg PO q 4 hr PRN for pain level 5/10 or greater     > Diet:   > Specifications: Cardiac, diabetic, consistent carb 1800 kcal   > Solids (consistency): Regular    > Liquids (consistency): Thin    > Fluid restriction: None      PRECAUTIONS:   1. Safety/fall precautions. 2. Deep venous thrombosis precautions. POTENTIAL BARRIERS TO DISCHARGE:   1. Risk for falls. 2. 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 3. Family limited in ability to provide constant care. Personal Protective Equipment was used while interacting with patient including: goggles and KN95 face mask.  Patient was using a surgical mask. Total clinical care time is 75 minutes, including review of chart including all labs, radiology, past medical history, and discussion with patient. Greater than 50% of my time was spent in coordination of care and counseling.       Signed:    Van Kim MD    March 26, 2021

## 2021-03-27 LAB
ANION GAP SERPL CALC-SCNC: 5 MMOL/L (ref 3–18)
BASOPHILS # BLD: 0 K/UL (ref 0–0.1)
BASOPHILS NFR BLD: 0 % (ref 0–2)
BUN SERPL-MCNC: 16 MG/DL (ref 7–18)
BUN/CREAT SERPL: 14 (ref 12–20)
CALCIUM SERPL-MCNC: 9 MG/DL (ref 8.5–10.1)
CHLORIDE SERPL-SCNC: 101 MMOL/L (ref 100–111)
CHOLEST SERPL-MCNC: 135 MG/DL
CO2 SERPL-SCNC: 30 MMOL/L (ref 21–32)
CREAT SERPL-MCNC: 1.17 MG/DL (ref 0.6–1.3)
DIFFERENTIAL METHOD BLD: ABNORMAL
EOSINOPHIL # BLD: 0.4 K/UL (ref 0–0.4)
EOSINOPHIL NFR BLD: 4 % (ref 0–5)
ERYTHROCYTE [DISTWIDTH] IN BLOOD BY AUTOMATED COUNT: 14.3 % (ref 11.6–14.5)
FERRITIN SERPL-MCNC: 835 NG/ML (ref 8–388)
GLUCOSE BLD STRIP.AUTO-MCNC: 101 MG/DL (ref 70–110)
GLUCOSE BLD STRIP.AUTO-MCNC: 116 MG/DL (ref 70–110)
GLUCOSE BLD STRIP.AUTO-MCNC: 138 MG/DL (ref 70–110)
GLUCOSE BLD STRIP.AUTO-MCNC: 141 MG/DL (ref 70–110)
GLUCOSE BLD STRIP.AUTO-MCNC: 167 MG/DL (ref 70–110)
GLUCOSE SERPL-MCNC: 133 MG/DL (ref 74–99)
HCT VFR BLD AUTO: 33.9 % (ref 36–48)
HDLC SERPL-MCNC: 35 MG/DL (ref 40–60)
HDLC SERPL: 3.9 {RATIO} (ref 0–5)
HGB BLD-MCNC: 10.9 G/DL (ref 13–16)
IRON SATN MFR SERPL: 12 % (ref 20–50)
IRON SERPL-MCNC: 23 UG/DL (ref 50–175)
LDLC SERPL CALC-MCNC: 63 MG/DL (ref 0–100)
LIPID PROFILE,FLP: ABNORMAL
LYMPHOCYTES # BLD: 1.4 K/UL (ref 0.9–3.6)
LYMPHOCYTES NFR BLD: 11 % (ref 21–52)
MAGNESIUM SERPL-MCNC: 2 MG/DL (ref 1.6–2.6)
MCH RBC QN AUTO: 26.7 PG (ref 24–34)
MCHC RBC AUTO-ENTMCNC: 32.2 G/DL (ref 31–37)
MCV RBC AUTO: 82.9 FL (ref 74–97)
MONOCYTES # BLD: 1.2 K/UL (ref 0.05–1.2)
MONOCYTES NFR BLD: 10 % (ref 3–10)
NEUTS SEG # BLD: 9.3 K/UL (ref 1.8–8)
NEUTS SEG NFR BLD: 75 % (ref 40–73)
PLATELET # BLD AUTO: 545 K/UL (ref 135–420)
PMV BLD AUTO: 10.3 FL (ref 9.2–11.8)
POTASSIUM SERPL-SCNC: 4 MMOL/L (ref 3.5–5.5)
RBC # BLD AUTO: 4.09 M/UL (ref 4.7–5.5)
SODIUM SERPL-SCNC: 136 MMOL/L (ref 136–145)
TIBC SERPL-MCNC: 200 UG/DL (ref 250–450)
TRIGL SERPL-MCNC: 185 MG/DL (ref ?–150)
VLDLC SERPL CALC-MCNC: 37 MG/DL
WBC # BLD AUTO: 12.3 K/UL (ref 4.6–13.2)

## 2021-03-27 PROCEDURE — 36415 COLL VENOUS BLD VENIPUNCTURE: CPT

## 2021-03-27 PROCEDURE — 80061 LIPID PANEL: CPT

## 2021-03-27 PROCEDURE — 97110 THERAPEUTIC EXERCISES: CPT

## 2021-03-27 PROCEDURE — 97535 SELF CARE MNGMENT TRAINING: CPT

## 2021-03-27 PROCEDURE — 74011250637 HC RX REV CODE- 250/637: Performed by: INTERNAL MEDICINE

## 2021-03-27 PROCEDURE — 83540 ASSAY OF IRON: CPT

## 2021-03-27 PROCEDURE — 74011636637 HC RX REV CODE- 636/637: Performed by: INTERNAL MEDICINE

## 2021-03-27 PROCEDURE — 85025 COMPLETE CBC W/AUTO DIFF WBC: CPT

## 2021-03-27 PROCEDURE — 82728 ASSAY OF FERRITIN: CPT

## 2021-03-27 PROCEDURE — 97542 WHEELCHAIR MNGMENT TRAINING: CPT

## 2021-03-27 PROCEDURE — 97530 THERAPEUTIC ACTIVITIES: CPT

## 2021-03-27 PROCEDURE — 80048 BASIC METABOLIC PNL TOTAL CA: CPT

## 2021-03-27 PROCEDURE — 82962 GLUCOSE BLOOD TEST: CPT

## 2021-03-27 PROCEDURE — 65310000000 HC RM PRIVATE REHAB

## 2021-03-27 PROCEDURE — 97166 OT EVAL MOD COMPLEX 45 MIN: CPT

## 2021-03-27 PROCEDURE — 83735 ASSAY OF MAGNESIUM: CPT

## 2021-03-27 PROCEDURE — 97162 PT EVAL MOD COMPLEX 30 MIN: CPT

## 2021-03-27 RX ORDER — CARVEDILOL 6.25 MG/1
3.12 TABLET ORAL 2 TIMES DAILY WITH MEALS
Status: DISCONTINUED | OUTPATIENT
Start: 2021-03-27 | End: 2021-04-07 | Stop reason: HOSPADM

## 2021-03-27 RX ADMIN — METFORMIN HYDROCHLORIDE 500 MG: 500 TABLET ORAL at 17:05

## 2021-03-27 RX ADMIN — OXYCODONE HYDROCHLORIDE 5 MG: 5 TABLET ORAL at 23:42

## 2021-03-27 RX ADMIN — CARVEDILOL 6.25 MG: 6.25 TABLET, FILM COATED ORAL at 09:22

## 2021-03-27 RX ADMIN — ATORVASTATIN CALCIUM 80 MG: 40 TABLET, FILM COATED ORAL at 09:22

## 2021-03-27 RX ADMIN — INSULIN LISPRO 2 UNITS: 100 INJECTION, SOLUTION INTRAVENOUS; SUBCUTANEOUS at 12:46

## 2021-03-27 RX ADMIN — INSULIN GLARGINE 26 UNITS: 100 INJECTION, SOLUTION SUBCUTANEOUS at 21:26

## 2021-03-27 RX ADMIN — OXYCODONE HYDROCHLORIDE 10 MG: 5 TABLET ORAL at 17:04

## 2021-03-27 RX ADMIN — RIVAROXABAN 20 MG: 20 TABLET, FILM COATED ORAL at 09:22

## 2021-03-27 RX ADMIN — METHOCARBAMOL TABLETS 500 MG: 500 TABLET, COATED ORAL at 20:10

## 2021-03-27 RX ADMIN — ALOGLIPTIN 25 MG: 25 TABLET, FILM COATED ORAL at 09:22

## 2021-03-27 RX ADMIN — ACETAMINOPHEN 650 MG: 325 TABLET ORAL at 07:52

## 2021-03-27 RX ADMIN — EZETIMIBE 10 MG: 10 TABLET ORAL at 21:27

## 2021-03-27 RX ADMIN — Medication 5 MG: at 21:27

## 2021-03-27 RX ADMIN — METHOCARBAMOL TABLETS 500 MG: 500 TABLET, COATED ORAL at 09:22

## 2021-03-27 RX ADMIN — GABAPENTIN 100 MG: 100 CAPSULE ORAL at 17:05

## 2021-03-27 RX ADMIN — AMOXICILLIN AND CLAVULANATE POTASSIUM 1 TABLET: 875; 125 TABLET, FILM COATED ORAL at 17:00

## 2021-03-27 RX ADMIN — ACETAMINOPHEN 650 MG: 325 TABLET ORAL at 12:18

## 2021-03-27 RX ADMIN — FUROSEMIDE 40 MG: 40 TABLET ORAL at 09:22

## 2021-03-27 RX ADMIN — OXYCODONE HYDROCHLORIDE 10 MG: 5 TABLET ORAL at 12:17

## 2021-03-27 RX ADMIN — Medication 1 CAPSULE: at 09:22

## 2021-03-27 RX ADMIN — ASPIRIN 81 MG CHEWABLE TABLET 81 MG: 81 TABLET CHEWABLE at 09:22

## 2021-03-27 RX ADMIN — DULOXETINE HYDROCHLORIDE 30 MG: 30 CAPSULE, DELAYED RELEASE ORAL at 09:22

## 2021-03-27 RX ADMIN — METHOCARBAMOL TABLETS 500 MG: 500 TABLET, COATED ORAL at 15:08

## 2021-03-27 RX ADMIN — LISINOPRIL 5 MG: 5 TABLET ORAL at 09:22

## 2021-03-27 RX ADMIN — GABAPENTIN 100 MG: 100 CAPSULE ORAL at 09:22

## 2021-03-27 RX ADMIN — AMOXICILLIN AND CLAVULANATE POTASSIUM 1 TABLET: 875; 125 TABLET, FILM COATED ORAL at 09:22

## 2021-03-27 RX ADMIN — ACETAMINOPHEN 650 MG: 325 TABLET ORAL at 17:05

## 2021-03-27 RX ADMIN — OXYCODONE HYDROCHLORIDE 10 MG: 5 TABLET ORAL at 07:52

## 2021-03-27 RX ADMIN — METFORMIN HYDROCHLORIDE 500 MG: 500 TABLET ORAL at 09:22

## 2021-03-27 NOTE — ROUTINE PROCESS
SHIFT CHANGE NOTE FOR Don[facility] Pain Bedside and Verbal shift change report given to Elizabeth Proctor, RN (oncoming nurse) by Nathan Tavares RN 
 (offgoing nurse). Report included the following information SBAR, Kardex, MAR and Recent Results. Situation: 
 Code Status: Full Code Reason for Admission: R BKA Hospital Day: 1 Problem List:  
Hospital Problems  Never Reviewed Codes Class Noted POA Peripheral artery disease (HCC) (Chronic) ICD-10-CM: I73.9 ICD-9-CM: 443.9  Unknown Yes Type 2 diabetes mellitus, without long-term current use of insulin (HCC) (Chronic) ICD-10-CM: E11.9 ICD-9-CM: 250.00  Unknown Yes Overview Signed 3/26/2021  5:49 PM by Tom Jaffe MD  
  HbA1c (3/6/2021) = 12.9 Factor V Leiden mutation ([facility] [address] (Chronic) ICD-10-CM: F07.86 
ICD-9-CM: 289.81  Unknown Yes Hypertensive heart disease with chronic systolic congestive heart failure (HCC) (Chronic) ICD-10-CM: I11.0, I50.22 ICD-9-CM: 402.91, 428.22  Unknown Yes Overview Signed 3/26/2021 11:06 PM by Tom Jaffe MD  
  2D echocardiogram (12/17/2015) showed EF 35%; genealized hypokinesis more focally severe of the inferior and posterior segments; mild mitral regurgitation; trace tricuspud regurgitation; normal pulmonary artery pressure Long term current use of aspirin (Chronic) ICD-10-CM: D98.13 ICD-9-CM: V58.66  Unknown Yes On statin therapy due to risk of future cardiovascular event (Chronic) ICD-10-CM: K81.365 ICD-9-CM: V58.69  Unknown Yes Overview Signed 3/26/2021  4:34 PM by Tom Jaffe MD  
  On Atorvastatin History of deep venous thrombosis (DVT) of distal vein of right lower extremity ICD-10-CM: X69.293 ICD-9-CM: V12.51  Unknown Yes Anticoagulated by anticoagulation treatment (Chronic) ICD-10-CM: Z79.01 
ICD-9-CM: V58.61  Unknown Yes Overview Signed 3/26/2021  4:36 PM by Tom Jaffe MD  
  On Rivaroxaban  Mixed hyperlipidemia (Chronic) ICD-10-CM: L24.7 ICD-9-CM: 272.2  Unknown Yes Overview Signed 3/26/2021 11:07 PM by Nata Barragan MD  
  Lipid profile (7/25/2018) showed , , HDL 27,  History of embolectomy ICD-10-CM: Z98.890 ICD-9-CM: V45.89  Unknown Yes Overview Signed 3/26/2021  5:39 PM by Nata Barragan MD  
  S/P Right lower extremity arterial embolectomy Major depressive disorder ICD-10-CM: F32.9 ICD-9-CM: 296.20  3/24/2021 Yes Overview Signed 3/26/2021 11:31 PM by Nata Barragan MD  
  On Duloxetine Grade I diastolic dysfunction FKU-35-KM: I51.9 ICD-9-CM: 429.9  3/24/2021 Yes Overview Signed 3/26/2021 11:48 PM by Nata Barragan MD  
  2D echocardiogram (3/24/2021) showed EF 37%; global hypokinesis of left ventricle; grade I diastolic dysfunction * (Principal) Status post below knee amputation of right lower extremity ([address] ICD-10-CM: I24.362 ICD-9-CM: V49.75  3/22/2021 Yes Overview Signed 3/26/2021  5:43 PM by Nata Barragan MD  
  S/P Right below-the-knee amputation (3/22/2021 - Dr. Gustavo Medina) Impaired mobility and ADLs ICD-10-CM: Z74.09, Z78.9 ICD-9-CM: V49.89  3/22/2021 Yes Critical ischemia of lower extremity ICD-10-CM: I99.8 ICD-9-CM: 459.9  3/22/2021 Yes Overview Signed 3/26/2021  5:42 PM by Nata Barargan MD  
  Right COVID-19 ruled out by laboratory testing ICD-10-CM: Z20.822 ICD-9-CM: V71.83  3/22/2021 Yes Overview Signed 3/26/2021  5:47 PM by Nata Barragan MD  
  SARS-CoV-2 ([address] [place] [address]) (3/22/2021): Not detected Acute blood loss as cause of postoperative anemia ICD-10-CM: D62 
ICD-9-CM: 285.1  3/22/2021 Yes History of noncompliance with medical treatment ICD-10-CM: Z91.19 ICD-9-CM: V15.81  3/18/2021 Yes Background: 
 Past Medical History:  
Past Medical History:  
Diagnosis Date  Acute blood loss as cause of postoperative anemia 3/22/2021  Anticoagulated by anticoagulation treatment On Rivaroxaban  Cardiomyopathy ([facility] [address] 12/17/2015 2D echocardiogram (3/24/2021) showed EF 37%; global hypokinesis of left ventricle; grade I diastolic dysfunction; 2D echocardiogram (12/17/2015) showed EF 35%; genealized hypokinesis more focally severe of the inferior and posterior segments; mild mitral regurgitation; trace tricuspud regurgitation; normal pulmonary artery pressure  Chronic systolic heart failure ([address] 2D echocardiogram (3/24/2021) showed EF 37%; global hypokinesis of left ventricle; grade I diastolic dysfunction; 2D echocardiogram (12/17/2015) showed EF 35%; genealized hypokinesis more focally severe of the inferior and posterior segments; mild mitral regurgitation; trace tricuspud regurgitation; normal pulmonary artery pressure  Coronary artery disease involving native coronary artery of native heart  COVID-19 ruled out by laboratory testing 3/22/2021 SARS-CoV-2 ([facility]) (3/22/2021): Not detected  Critical ischemia of lower extremity 3/22/2021 Right  Factor V Leiden mutation ([facility] [address]  Grade I diastolic dysfunction 16/90/7670  
 2D echocardiogram (3/24/2021) showed EF 37%; global hypokinesis of left ventricle; grade I diastolic dysfunction  History of deep venous thrombosis (DVT) of distal vein of right lower extremity  History of epilepsy  History of head injury  History of myocardial infarction  History of noncompliance with medical treatment 3/18/2021  Hypertensive heart disease with chronic systolic congestive heart failure ([address] 2D echocardiogram (3/24/2021) showed EF 37%; global hypokinesis of left ventricle; grade I diastolic dysfunction; 2D echocardiogram (12/17/2015) showed EF 35%; genealized hypokinesis more focally severe of the inferior and posterior segments; mild mitral regurgitation; trace tricuspud regurgitation; normal pulmonary artery pressure  Long term current use of aspirin  Major depressive disorder 03/24/2021 On Duloxetine  Migraine headache  Mitral valve prolapse  Mixed hyperlipidemia Lipid profile (7/25/2018) showed , , HDL 27,   Obstructive sleep apnea  On statin therapy due to risk of future cardiovascular event On Atorvastatin  Peripheral artery disease ([facility] [address]  Type 2 diabetes mellitus, without long-term current use of insulin ([facility] HbA1c (3/6/2021) = 18.8  
 Umbilical hernia  Wears glasses Patient taking anticoagulants yes Patient has a defibrillator: no  
 
Assessment: 
 Changes in Assessment throughout shift: none  Patient has central line: no Reasons if yes: Insertion date: Last dressing date: 
 Patient has Ellis Cath: no Reasons if yes: Insertion date: 
 
 Last Vitals: 
  
Vitals:  
 03/26/21 1558 03/26/21 1716 03/26/21 2100 BP: 93/62 105/67 108/66 Pulse: 90 82 80 Resp: 18 18 18 Temp: 97.9 °F (36.6 °C)  97 °F (36.1 °C) SpO2: 99% 96% 99% Weight: 84.8 kg (187 lb) Height: 6' 2\" (1.88 m)  PAIN Pain Assessment Pain Intensity 1: 0 (03/27/21 0415) Pain Intensity 1: 2 (12/29/14 1105) Pain Location 1: Leg Pain Location 1: Abdomen Pain Intervention(s) 1: Medication (see MAR) Patient Stated Pain Goal: 0 Patient Stated Pain Goal: 0 
o Intervention effective: yes  
o Other actions taken for pain:  
 
 Skin Assessment Skin color Skin Color: Appropriate for ethnicity Condition/Temperature Skin Condition/Temp: Dry, Warm Integrity Skin Integrity: Abrasion(buttocks/scab lle) Turgor Turgor: Non-tenting Weekly Pressure Ulcer Documentation  Pressure  Injury Documentation: No Pressure Injury Noted-Pressure Ulcer Prevention Initiated Wound Prevention & Protection Methods Orientation of wound Orientation of Wound Prevention: Posterior Location of Prevention Location of Wound Prevention: Sacrum/Coccyx, Buttocks Dressing Present Dressing Present : Yes Dressing Status Dressing Status: Intact Wound Offloading Wound Offloading (Prevention Methods): Bed, pressure reduction mattress, Elevate heels, Pillows, Foam silicone  INTAKE/OUPUT Date 03/26/21 0700 - 03/27/21 0374 03/27/21 0700 - 03/28/21 3453 Shift [id number] [id number] 24 Hour Total [id number] 3522-6781 24 Hour Total  
INTAKE Shift Total(mL/kg) OUTPUT Urine(mL/kg/hr)  200(0.2) 200(0.1) Urine Voided  200 200 Urine Occurrence(s) 0 x 2 x 2 x Emesis/NG output Emesis Occurrence(s)  0 x 0 x Stool Stool Occurrence(s) 0 x 1 x 1 x Shift Total(mL/kg)  200(2.4) 200(2.4) NET  -200 -200 Weight (kg) 84.8 84.8 84.8 84.8 84.8 84.8 Recommendations: 1. Patient needs and requests: none at this time 2. Diet: card. Const. Carb 1800k 3. Pending tests/procedures: OT/PT 4. Functional Level/Equipment: walker, assist x 1 
 
5. Estimated Discharge Date: TBD Posted on Whiteboard in Patients Room: yes HEALS Safety Check A safety check occurred in the patient's room between off going nurse and oncoming nurse listed above. The safety check included the below items Area Items H High Alert Medications - Verify all high alert medication drips (heparin, PCA, etc.) E Equipment - Suction is set up for ALL patients (with yanker) - Red plugs utilized for all equipment (IV pumps, etc.) - WOWs wiped down at end of shift. 
- Room stocked with oxygen, suction, and other unit-specific supplies A Alarms - Bed alarm is set for fall risk patients - Ensure chair alarm is in place and activated if patient is up in a chair L Lines - Check IV for any infiltration - Ellis bag is empty if patient has a Ellis - Tubing and IV bags are labeled June Ramirez Safety - Room is clean, patient is clean, and equipment is clean. - Hallways are clear from equipment besides carts. - Fall bracelet on for fall risk patients - Ensure room is clear and free of clutter - Suction is set up for ALL patients (with farza) - Hallways are clear from equipment besides carts. - Isolation precautions followed, supplies available outside room, sign posted

## 2021-03-27 NOTE — PROGRESS NOTES
[address]  [address] [place] [address], [address]     INPATIENT REHABILITATION  DAILY PROGRESS NOTE     Date: 3/27/2021    Name: Pinky Ledezma Age / Sex: 60 y.o. / male   CSN: [id number] MRN: [id number]   [facility] Date: 3/26/2021 Length of Stay: 1 days     Primary Rehab Diagnosis: Impaired Mobility and ADLs secondary to:  1. S/P Right below-the-knee amputation (3/22/2021 - Dr. Kirstina López)  2. History of critical ischemia of the right lower extremity  3. History of right lower extremity arterial embolectomy       Subjective:     No new issues or problems reported. Blood pressure on the low side of normal.  Blood glucose controlled.       Objective:     Vital Signs:  Patient Vitals for the past 24 hrs:   BP Temp Pulse Resp SpO2 Height Weight   21 0750 111/73 97.9 °F (36.6 °C) 80 18 99 %     21 2100 108/66 97 °F (36.1 °C) 80 18 99 %     21 1716 105/67  82 18 96 %     21 1558 93/62 97.9 °F (36.6 °C) 90 18 99 % 6' 2\" (1.88 m) 84.8 kg (187 lb)        Current Medications:  Current Facility-Administered Medications   Medication Dose Route Frequency    bisacodyL (DULCOLAX) tablet 10 mg  10 mg Oral Q48H PRN    insulin lispro (HUMALOG) injection   SubCUTAneous TIDAC    amoxicillin-clavulanate (AUGMENTIN) 875-125 mg per tablet 1 Tab  1 Tab Oral BID WITH MEALS    L. acidophilus,casei,rhamnosus (BIO-K PLUS) capsule 1 Cap  1 Cap Oral DAILY    atorvastatin (LIPITOR) tablet 80 mg  80 mg Oral DAILY    aspirin chewable tablet 81 mg  81 mg Oral DAILY WITH BREAKFAST    carvediloL (COREG) tablet 6.25 mg  6.25 mg Oral BID WITH MEALS    docusate sodium (COLACE) capsule 100 mg  100 mg Oral DAILY AFTER BREAKFAST    DULoxetine (CYMBALTA) capsule 30 mg  30 mg Oral DAILY    ezetimibe (ZETIA) tablet 10 mg  10 mg Oral QHS    furosemide (LASIX) tablet 40 mg  40 mg Oral DAILY    gabapentin (NEURONTIN) capsule 100 mg  100 mg Oral BID    insulin glargine (LANTUS) injection 26 Units  26 Units SubCUTAneous QHS    lisinopriL (PRINIVIL, ZESTRIL) tablet 5 mg  5 mg Oral DAILY    oxyCODONE IR (ROXICODONE) tablet 5-10 mg  5-10 mg Oral Q4H PRN    acetaminophen (TYLENOL) tablet 650 mg  650 mg Oral TID    methocarbamoL (ROBAXIN) tablet 500 mg  500 mg Oral TID    rivaroxaban (XARELTO) tablet 20 mg  20 mg Oral DAILY WITH BREAKFAST    alogliptin (NESINA) tablet 25 mg  25 mg Oral DAILY    melatonin (rapid dissolve) tablet 5 mg  5 mg Oral QHS    zolpidem (AMBIEN) tablet 5 mg  5 mg Oral QHS PRN    glucose chewable tablet 16 g  4 Tab Oral PRN    glucagon (GLUCAGEN) injection 1 mg  1 mg IntraMUSCular PRN    dextrose (D50W) injection syrg 12.5-25 g  25-50 mL IntraVENous PRN    acetaminophen (TYLENOL) tablet 650 mg  650 mg Oral Q4H PRN    metFORMIN (GLUCOPHAGE) tablet 500 mg  500 mg Oral BID WITH MEALS       Allergies: Allergies   Allergen Reactions    Nitroglycerin Other (comments)      BP drops rapidly when he takes SL Nitro          [place] Diarrhea and Rash    Cat Dander Cough    Codeine Rash    [place] Rash       Lab/Data Review:  Recent Results (from the past 24 hour(s))   GLUCOSE, POC    Collection Time: 03/26/21  5:09 PM   Result Value Ref Range    Glucose (POC) 204 (H) 70 - 110 mg/dL   GLUCOSE, POC    Collection Time: 03/26/21  8:37 PM   Result Value Ref Range    Glucose (POC) 195 (H) 70 - 110 mg/dL   CBC WITH AUTOMATED DIFF    Collection Time: 03/27/21  6:32 AM   Result Value Ref Range    WBC 12.3 4.6 - 13.2 K/uL    RBC 4.09 (L) 4.70 - 5.50 M/uL    HGB 10.9 (L) 13.0 - 16.0 g/dL    HCT 33.9 (L) 36.0 - 48.0 %    MCV 82.9 74.0 - 97.0 FL    MCH 26.7 24.0 - 34.0 PG    MCHC 32.2 31.0 - 37.0 g/dL    RDW 14.3 11.6 - 14.5 %    PLATELET 647 (H) 297 - 420 K/uL    MPV 10.3 9.2 - 11.8 FL    NEUTROPHILS 75 (H) 40 - 73 %    LYMPHOCYTES 11 (L) 21 - 52 %    MONOCYTES 10 3 - 10 %    EOSINOPHILS 4 0 - 5 %    BASOPHILS 0 0 - 2 %    ABS. NEUTROPHILS 9.3 (H) 1.8 - 8.0 K/UL    ABS. LYMPHOCYTES 1.4 0.9 - 3.6 K/UL    ABS. MONOCYTES 1.2 0.05 - 1.2 K/UL    ABS. EOSINOPHILS 0.4 0.0 - 0.4 K/UL    ABS. BASOPHILS 0.0 0.0 - 0.1 K/UL    DF AUTOMATED     FERRITIN    Collection Time: 03/27/21  6:32 AM   Result Value Ref Range    Ferritin 835 (H) 8 - 388 NG/ML   IRON PROFILE    Collection Time: 03/27/21  6:32 AM   Result Value Ref Range    Iron 23 (L) 50 - 175 ug/dL    TIBC 200 (L) 250 - 450 ug/dL    Iron % saturation 12 (L) 20 - 50 %   MAGNESIUM    Collection Time: 03/27/21  6:32 AM   Result Value Ref Range    Magnesium 2.0 1.6 - 2.6 mg/dL   METABOLIC PANEL, BASIC    Collection Time: 03/27/21  6:32 AM   Result Value Ref Range    Sodium 136 136 - 145 mmol/L    Potassium 4.0 3.5 - 5.5 mmol/L    Chloride 101 100 - 111 mmol/L    CO2 30 21 - 32 mmol/L    Anion gap 5 3.0 - 18 mmol/L    Glucose 133 (H) 74 - 99 mg/dL    BUN 16 7.0 - 18 MG/DL    Creatinine 1.17 0.6 - 1.3 MG/DL    BUN/Creatinine ratio 14 12 - 20      GFR est AA >60 >60 ml/min/1.73m2    GFR est non-AA >60 >60 ml/min/1.73m2    Calcium 9.0 8.5 - 10.1 MG/DL   LIPID PANEL    Collection Time: 03/27/21  6:32 AM   Result Value Ref Range    LIPID PROFILE          Cholesterol, total 135 <200 MG/DL    Triglyceride 185 (H) <150 MG/DL    HDL Cholesterol 35 (L) 40 - 60 MG/DL    LDL, calculated 63 0 - 100 MG/DL    VLDL, calculated 37 MG/DL    CHOL/HDL Ratio 3.9 0 - 5.0     GLUCOSE, POC    Collection Time: 03/27/21  7:43 AM   Result Value Ref Range    Glucose (POC) 141 (H) 70 - 110 mg/dL       Estimated Glomerular Filtration Rate:  On admission, estimated GFR, based on a Creatinine of 1.17 mg/dl:   Using CKD-EPI = 68.8 mL/min/1.73m2   Using MDRD = 68.3 mL/min/1.73m2       Assessment:     Primary Rehab Diagnosis  1. Impaired Mobility and ADLs  2. S/P Right below-the-knee amputation (3/22/2021 - Dr. Lyssa Kate)  3. History of critical ischemia of the right lower extremity  4.  History of right lower extremity arterial embolectomy     Comorbidities  Patient Active Problem List   Diagnosis Code    Status post below knee amputation of right lower extremity ([facility]) Z89.511    Impaired mobility and ADLs Z74.09, Z78.9    Critical ischemia of lower extremity I99.8    Peripheral artery disease (HCC) I73.9    Coronary artery disease involving native coronary artery of native heart I25.10    Type 2 diabetes mellitus, without long-term current use of insulin ([facility]) E11.9    History of epilepsy Z86.69    Factor V Leiden mutation ([facility] D68.51    Migraine headache G43.909    Wears glasses C52.4    Umbilical hernia D84.4    Obstructive sleep apnea G47.33    ICD (implantable cardioverter-defibrillator) in place Z95.810    Mitral valve prolapse I34.1    History of head injury Z87.828    Hypertensive heart disease with chronic systolic congestive heart failure (HCC) I11.0, I50.22    History of myocardial infarction I25.2    Long term current use of aspirin Z79.82    On statin therapy due to risk of future cardiovascular event Z79.899    History of deep venous thrombosis (DVT) of distal vein of right lower extremity Z86.718    Anticoagulated by anticoagulation treatment Z79.01    Mixed hyperlipidemia E78.2    History of embolectomy Z98.890    COVID-19 ruled out by laboratory testing Z20.822    Acute blood loss as cause of postoperative anemia D62    Cardiomyopathy ([facility] I42.9    Chronic systolic heart failure (HCC) I50.22    History of noncompliance with medical treatment Z91.19    Major depressive disorder F32.9    Grade I diastolic dysfunction B26.6        Plan:     1. Justification for continued stay: Good progression towards established rehabilitation goals. 2. Medical Issues being followed closely:    [x]  Fall and safety precautions     [x]  Wound Care     [x]  Bowel and Bladder Function     [x]  Fluid Electrolyte and Nutrition Balance     [x]  Pain Control      3.  Issues that 24 hour rehabilitation nursing is following:    [x]  Fall and safety precautions     [x]  Wound Care     [x]  Bowel and Bladder Function     [x]  Fluid Electrolyte and Nutrition Balance     [x]  Pain Control      [x]  Assistance with and education on in-room safety with transfers to and from the bed, wheelchair, toilet and shower. 4. Acute rehabilitation plan of care:    [x]  Continue current care and rehab. [x]  Physical Therapy           [x]  Occupational Therapy           []  Speech Therapy     []  Hold Rehab until further notice     5. Medications:    [x]  MAR Reviewed     [x]  Continue Present Medications     6. DVT Prophylaxis:      []  Lovenox     []  Arixtra       []  Unfractionated Heparin     []  Coumadin     [x]  NOAC     []  NEREYDA Stockings     []  Sequential Compression Device     []  None     7. Code status    [x]  Full code     []  Partial code     []  Do not intubate     []  Do not resuscitate     8. Orders:   > S/P Right below-the-knee amputation (3/22/2021 - Dr. Neno Hernandez); History of critical ischemia of the right lower extremity;  History of right lower extremity arterial embolectomy   > Staples to be removed on 4/19/2021   > Continue:    > Amoxicillin-Clavulanate 875-125 1 tab PO BID with meals x 10 days    > Bio K Plus 1 cap PO once daily    > Acute Postoperative Blood Loss Anemia   > Hgb/Hct (3/26/2021) = 10.1/31.3   > Hgb/Hct (3/27/2021, on admission) = 10.9/33.9   > Anemia work-up showed serum iron 23, TIBC 200, iron % saturation 12, ferritin 835   > FeSO4 325 mg PO once daily with breakfast    > Ascorbic Acid 250 mg PO once daily with breakfast (to enhance the absorption of the FeSO4)     > Coronary artery disease; Peripheral artery disease    > Continue:    > Aspirin 81 mg PO once daily with breakfast    > Atorvastatin 80 mg PO once daily    > Carvedilol 6.25 mg PO BID with meals (8AM, 5PM)    > Hypertensive heart disease with chronic systolic heart failure; Cardiomyopathy; Chronic systolic heart failure; Grade I diastolic dysfunction   > 2D echocardiogram (12/17/2015) showed EF 35%; genealized hypokinesis more focally severe of the inferior and posterior segments; mild mitral regurgitation; trace tricuspud regurgitation; normal pulmonary artery pressure   > 2D echocardiogram (3/24/2021) showed EF 37%; global hypokinesis of left ventricle; grade I diastolic dysfunction   > Hold:    > Furosemide 40 mg PO once daily (9AM)    > Lisinopril 5 mg PO once daily (9AM)   > Decrease Carvedilol from 6.25 mg to 3.125 mg PO BID with meals (8AM, 5PM)    > Factor V Leiden mutation; History of deep venous thrombosis (DVT) of right lower extremity; Anticoagulated on Rivaroxaban   > Continue Rivaroxaban 20 mg PO once daily with breakfast    > Major depressive disorder   > Continue Duloxetine 30 mg PO once daily    > Mixed hyperlipidemia   > Lipid profile (7/25/2018) showed , , HDL 27,    > Lipid profile (3/27/2021) showed , , HDL 35, LDL 63   > Continue:    > Atorvastatin 80 mg PO once daily    > Ezetimibe 10 mg PO q HS    > Type 2 diabetes mellitus, poorly controlled, without long-term current use of insulin   > HbA1c (3/6/2021) = 12.9    > Start Metformin 500 mg PO BID with meals   > Continue:    > Alogliptin 25 mg PO once daily    > Insulin glargine 26 units SC q HS    > Insulin lispro sliding scale SC TID AC only     > Difficulty sleeping   > Continue:    > Melatonin 5 mg PO q HS    > Zolpidem 5 mg PO q HS PRN for sleep    > COVID-19 ruled out by laboratory testing   > SARS-CoV-2 ([facility]) (3/22/2021):  Not detected    > Analgesia   > Start Acetaminophen 650 mg PO TID (8AM, 12PM, 4PM)   > Continue:    > Acetaminophen 650 mg PO q 4 hr PRN for pain level 4/10 or lesser (from 8PM to 4AM only)    > Duloxetine 30 mg PO once daily    > Gabapentin 100 mg PO BID    > Methocarbamol 500 mg PO TID    > Oxycodone 5-10 mg PO q 4 hr PRN for pain level 5/10 or greater     > Diet:   > Specifications: Cardiac, diabetic, consistent carb 1800 kcal   > Solids (consistency): Regular    > Liquids (consistency):  Thin    > Fluid restriction: None      Signed:    Shahla Acevedo MD    March 27, 2021

## 2021-03-27 NOTE — PROGRESS NOTES
Problem: Mobility Impaired (Adult and Pediatric)  Goal: *Therapy Goal (Edit Goal, Insert Text)  Description: Physical Therapy Short Term Goals  Initiated 3/27/2021 and to be accomplished within 7 day(s) on 4/2/2021:  1. Patient will move from supine to sit and sit to supine , scoot up and down, and roll side to side in bed with independence. 2.  Patient will transfer from bed to chair and chair to bed with standby assistance using the least restrictive device. 3.  Patient will perform sit to stand with stand by assistance. 4.  Patient will ambulate with contact guard assist for 50 feet with the least restrictive device. 5.  Patient will perform w/c mobility for at least 250 ft at SBA level over even surfaces. Physical Therapy Long Term Goals  Initiated 3/27/2021 and to be accomplished within 14 day(s) on 4/9/2021:  1. Patient will transfer from bed to chair and chair to bed with modified independence using the least restrictive device. 2.  Patient will perform sit to stand with modified independence. 3.  Patient will ambulate with modified independence for 50 feet with the least restrictive device. 4.  Patient will perform w/c mobility for at least 250 ft distances, reporting <3/10 modified Sylvia RPE, at modified independent level over even surfaces and around doorways, obstacles, narrow spaces. 5.  Patient will ascend/descend w/c ramp with supervision for ease of entry/exit of home. Outcome: Progressing Towards Goal   PHYSICAL THERAPY EVALUATION    Patient: Yanet Hoff (89 y.o. male)  Date: 3/27/2021  Diagnosis: Status post below knee amputation of right lower extremity ([facility]) [Z89.511] Status post below knee amputation of right lower extremity ([facility] [address]  Precautions: Fall  Chart, physical therapy assessment, plan of care and goals were reviewed. Time in:1135  Time out:1236  Time In: 1330  Time Out: 1408    Patient seen for: Initial evaluation; Balance activities; Patient education;Transfer training;Gait training    Pain:  Patient reporting 7/10 pain initially, nurse notified and nurse provided pain medication during first session. Patient identified with name and : yes    SUBJECTIVE:     Patient stated I am doing fine.  Patient appropriate with therapist, able to recount circumstances leading up to current hospitalization. Patient reporting, \"It all started with a cough,\" which ultimately led to him seeking further medical care when he was having pain and difficulty with walking. Reporting that while in the hospital for a \"blood clot in the spleen,\" patient observed that his right foot was starting to turn purple. Patient reporting that he returned home to \"get things in order\" prior to coming into hospital to have amputation per his own decision, but states, \"I regret that, I should have had this done sooner because I was really hurting at home. \" Patient pleasant and cooperative with therapist.     Patient's Goal for Physical Therapy: Patient reports he wants to be able to take care of himself and \"get his life back. \"Patient educated regarding goals of therapy for his IRF stay and that he would continue to have therapy and consultation with prosthetist upon discharge.      OBJECTIVE DATA SUMMARY:     Past Medical History:   Diagnosis Date    Acute blood loss as cause of postoperative anemia 3/22/2021    Anticoagulated by anticoagulation treatment     On Rivaroxaban    Cardiomyopathy ([facility] [address] 2015    2D echocardiogram (3/24/2021) showed EF 37%; global hypokinesis of left ventricle; grade I diastolic dysfunction; 2D echocardiogram (2015) showed EF 35%; genealized hypokinesis more focally severe of the inferior and posterior segments; mild mitral regurgitation; trace tricuspud regurgitation; normal pulmonary artery pressure    Chronic systolic heart failure (HCC)     2D echocardiogram (3/24/2021) showed EF 37%; global hypokinesis of left ventricle; grade I diastolic dysfunction; 2D echocardiogram (12/17/2015) showed EF 35%; genealized hypokinesis more focally severe of the inferior and posterior segments; mild mitral regurgitation; trace tricuspud regurgitation; normal pulmonary artery pressure    Coronary artery disease involving native coronary artery of native heart     COVID-19 ruled out by laboratory testing 3/22/2021    SARS-CoV-2 ([facility]2000, [facility]) (3/22/2021):  Not detected     Critical ischemia of lower extremity 3/22/2021    Right    [facility]iden [facility]     Grade I diastolic dysfunction 05/71/6530    2D echocardiogram (3/24/2021) showed EF 37%; global hypokinesis of left ventricle; grade I diastolic dysfunction    History of deep venous thrombosis (DVT) of distal vein of right lower extremity     History of epilepsy     History of head injury     History of myocardial infarction     History of noncompliance with medical treatment 3/18/2021    Hypertensive heart disease with chronic systolic congestive heart failure ([facility] [address]     2D echocardiogram (3/24/2021) showed EF 37%; global hypokinesis of left ventricle; grade I diastolic dysfunction; 2D echocardiogram (12/17/2015) showed EF 35%; genealized hypokinesis more focally severe of the inferior and posterior segments; mild mitral regurgitation; trace tricuspud regurgitation; normal pulmonary artery pressure    Long term current use of aspirin     Major depressive disorder 03/24/2021    On Duloxetine    Migraine headache     Mitral valve prolapse     Mixed hyperlipidemia     Lipid profile (7/25/2018) showed , , HDL 27,     Obstructive sleep apnea     On statin therapy due to risk of future cardiovascular event     On Atorvastatin    Peripheral artery disease ([facility] [address]     Type 2 diabetes mellitus, without long-term current use of insulin ([facility])     HbA1c (3/9/0126) = 07.4    Umbilical hernia     Wears glasses      Past Surgical History:   Procedure Laterality Date    HX BELOW KNEE AMPUTATION Right 03/22/2021 S/P Right below-the-knee amputation (3/22/2021 - Dr. Dorota Ochoa)    HX EMBOLECTOMY Right 03/09/2021    S/P Right lower extremity arterial embolectomy    HX IMPLANTABLE CARDIOVERTER DEFIBRILLATOR  2013       Problem List:    Decreased strength B LE  [x]     Decreased strength trunk/core  [x]     Decreased AROM   [x]     Decreased PROM  [x]    Decreased endurance  [x]     Decreased balance sitting  [x]     Decreased balance standing  [x]     Pain   [x]     Slow ambulation velocity  [x]    Decreased coordination  []    Decreased safety awareness  [x]      Functional Limitations:   Decreased independence with bed mobility  [x]     Decreased independence with functional transfers  [x]     Decreased independence with ambulation  [x]     Decreased independence with stair negotiation  [x]       Previous Functional Level: Patient reports that prior to initial hospitalization, he was able to perform bed mobility, transfers, gait without AD independently. States he drove and was primary caregiver for his mother, who uses a w/c, hospital bed, w/c ramp. Just prior to his amputation, he was using a walker for limited mobility. Reports that he does not have right peripheral vision due to \"blood clot in the brain\" ~ 3 months ago.      Home Environment: Home Environment: Private residence  # Steps to Enter: 0  Wheelchair Ramp: Yes  One/Two Story Residence: One story  Living Alone: No  Support Systems: Family member(s), Parent  Patient Expects to be Discharged to[de-identified] Private residence  Current DME Used/Available at Home: gloria Ortiz, [address] [employer] chair  Tub or Shower Type: Tub/Shower combination    Barriers to Learning/Limitations: yes;  sensory deficits-vision/hearing/speech and physical  Compensate with: visual, verbal, tactile, kinesthetic cues/model         Outcome Measures: Functional measures     MMT Initial Assessment   Right Lower Extremity Left Lower Extremity   Hip Flexion (NT) 4-   Knee Extension (NT) 4+   Knee Flexion (NT) 4+   Ankle Dorsiflexion (N/A) 4+   0/5 No palpable muscle contraction  1/5 Palpable muscle contraction, no joint movement  2-/5 Less than full range of motion in gravity eliminated position  2/5 Able to complete full range of motion in gravity eliminated position  2+/5 Able to initiate movement against gravity  3-/5 More than half but not full range of motion against gravity  3/5 Able to complete full range of motion against gravity  3+/5 Completes full range of motion against gravity with minimal resistance  4-/5 Completes full range of motion against gravity with minimal-moderate resistance  4/5 Completes full range of motion against gravity with moderate resistance  4+/5 Completes full range of motion against gravity with moderate-maximum resistance  5/5 Completes full range of motion against gravity with maximum resistance        GROSS ASSESSMENT Initial Assessment 3/27/2021   AROM Generally decreased, functional(slightly decreased knee extension, hip ext right limb)   Strength Generally decreased, functional(right residual limb decreased s/p BKA; left slightly impaire)   Coordination Within functional limits   Tone Normal   Sensation Impaired(phantom sensation and pain right residual limb)   PROM Generally decreased, functional(slightly decreased knee extension, hip ext right limb)       POSTURE Initial Assessment 3/27/2021   Posture (WDL) Exceptions to [facility]   Posture Assessment Forward head       BALANCE Initial Assessment 3/27/2021    Sitting - Static: Good (unsupported)  Sitting - Dynamic: Fair (occasional)  Standing - Static: Fair(needing UE support steadying surface)  Standing - Dynamic : Impaired    Needing at least one UE support of steadying surface for standing balance and Cg/min A for steadying support.         BED/CHAIR/WHEELCHAIR TRANSFERS Initial Assessment 3/27/2021   Rolling Right 6 (Modified independent)   Rolling Left 6 (Modified independent)   Supine to Sit 5 (Supervision)   Sit to Stand Minimal assistance   Sit to Supine 5 (Supervision)   Transfer Assist Score 4 (Minimal assistance)   Transfer Type Other   Comments    Bed mobility performed with head of bed flat and without railings. Needing cues for safety with supine<->sit, needing min A for getting into prone position to allow for better hip extension of right residual limb. Transfers using stand step technique (hopping on right LE) using RW for safety. Needing steadying support of trunk, cues intermittently for safe hand placement. Car Transfer Not tested(to be further assessed)   Car Type N/A       WHEELCHAIR MOBILITY/MANAGEMENT Initial Assessment 3/27/2021   Able to Propel 250 feet(needing extra time)   Assist Level 4(min A for steering initially, using bilat UE and left LE)   Curbs/ramps assistance required 0 (Not tested)   Wheelchair set up assistance required 3 (Moderate assistance)   Wheelchair management Manages left brake, Manages right brake(needing brake extender)   Comments Cues for safety with steering straight. WALKING INDEPENDENCE Initial Assessment 3/27/2021   Assistive device Walker, rolling(rigid dressing right residual limb)   Ambulation assistance - level surface 4 (Minimal assistance)   Distance 20 Feet (ft)   Comments  Patient fatiguing quickly, only performing 20 ft before needing rest.    Ambulation assistance - unlevel surface (NT)       GAIT Initial Assessment 3/27/2021   Gait Description (WDL) Exceptions to WDL   Gait Abnormalities (\"hop to\" pattern s/p right BKA, slow gait speed)       STEPS/STAIRS Initial Assessment 3/27/2021   Steps/Stairs ambulated 0   Rail Use (NT)   Assistance Level 0 (Not tested)(NT due to safety concern)   Comments  NT due to safety concern   Curbs/Ramps (NT)       Therapeutic Exercises:   Prone lying for hip flexor stretch, cues for extending knee fully as well in prone. Patient performing knee flex right residual limb 2 x 10 reps.  Patient denied any difficulty with breathing and no increased pain indicated during prone positioning and exercises. Patient then came up into sitting position, and after ~ 2 minutes of sitting, patient indicated change in his vision. Reporting that he was having double vision and his left visual field was Nydia. \" Nurse notified by other staff member while PT stayed with patient to monitor him. Patient reporting that as he continued to sit, symptoms were resolving. Denied other s/s including headache, chest pain, shortness of breath, or dizziness/lightheadedness. Nursing staff came to assess patient and BP taken in sitting (BP 98/65, HR 84 bpm). Patient assisted back to room and back in supine in bed. Patient reported that all his visual symptoms had resolved. ASSESSMENT :  Based on the objective data described above, the patient presents with decreased strength, endurance, balance, pain, impaired sensation leading to difficulty participating in safe functional mobility. Patient will benefit from skilled intervention to address the above impairments. Patient's rehabilitation potential is considered to be Good  Factors which may influence rehabilitation potential include:   []         None noted  []         Mental ability/status  [x]         Medical condition  [x]         Home/family situation and support systems  [x]         Safety awareness  [x]         Pain tolerance/management  []         Other:      PLAN :  See STG and LTG above. Patient's goals to be updated as appropriate. Order received from MD for physical therapy services and chart reviewed. Pt to be seen 5 times per week for 3 hours of total therapy per day for 2 weeks. Thank you for the referral.    Pt would benefit from skilled physical therapy in order to improve independent functional mobility within the home.  Interventions may include range of motion (AROM, PROM B LE/trunk), motor function (B LE/trunk strengthening/coordination), activity tolerance (vitals, oxygen saturation levels), bed mobility training, balance activities, gait training (progressive ambulation program), wheelchair management and functional transfer training. Discharge Recommendations: Home Health  Further Equipment Recommendations for Discharge: RW, wheelchair most likely will be required at discharge for endurance. Activity Tolerance:   Fair initially, then poor with visual changes and lower BP observed. Please refer to the flowsheet for vital signs taken during this treatment. After treatment:   Patient left supine in bed with active bed alarm set, call button within reach, verbal handoff to nursing staff. COMMUNICATION/EDUCATION:   [x]         Fall prevention education was provided and the patient/caregiver indicated understanding. [x]         Patient/family have participated as able in goal setting and plan of care. [x]         Patient/family agree to work toward stated goals and plan of care. []         Patient understands intent and goals of therapy, but is neutral about his/her participation. []         Patient is unable to participate in goal setting and plan of care.     Thank you for this referral.  Analy Ellsworth, PT  3/27/2021

## 2021-03-27 NOTE — CONSULTS
Comprehensive Nutrition Assessment    Type and Reason for Visit: Initial, Positive nutrition screen, Consult    Nutrition Recommendations/Plan:  - Increase to 2200 kcal diet and add Magic Cup, ERICK once daily  - Monitor and encourage po intake as tolerated. Nutrition Assessment:  Admitted to ARU s/p hospitalization requiring right BKA on 3/22/21. Pt reports fair appetite & described 50% intake of breakfast today. Anticipate improvement in po intake throughout admission. Malnutrition Assessment:  Malnutrition Status: At risk for malnutrition (specify)(reported 40# wt loss but also s/p recent right BKA)      Nutrition History and Allergies: PMHx- chronic systolic heart failure, CAD, DVT, epilepsy, MI, major depressive disorder, HLD, PAD & DM. Pt reports a 40# wt loss x month but also underwent right BKA 3/22/21 also contributing to weight loss. Reports fair po intake usual intake of 2 meals/day with occasional snack- but variable intake over the past month. Allergy to almond and strawberry. Estimated Daily Nutrient Needs:  Energy (kcal): 7103-3955; Weight Used for Energy Requirements: Current(85 kg)  Protein (g): ; Weight Used for Protein Requirements: Current(0.8-1.2)  Fluid (ml/day): 4839-9760; Method Used for Fluid Requirements: 1 ml/kcal    Nutrition Related Findings:  BM 3/26-bowel regimen. Lasix, lantus, SSI, metformin. Recent BG levels 133-195 mg/dL      Wounds:    Surgical incision       Current Nutrition Therapies:  DIET CARDIAC Regular; Consistent Carb 1800kcal    Anthropometric Measures:  · Height:  6' 2\" (188 cm)  · Current Body Wt:  84.8 kg (186 lb 15.2 oz)   · Admission Body Wt:  186 lb 15.2 oz    · Usual Body Wt:  99.8 kg (220 lb)     · Ideal Body Wt:  190 lbs:  98.4 %   · Adjusted Body Weight:  198; Weight Adjustment for: Amputation   · Adjusted BMI:  25.4    · BMI Category:   Overweight (BMI 25.0-29.9)(adjusted BMI)       Nutrition Diagnosis:   · Predicted inadequate energy intake related to (appetite and insufficient calorie diet) as evidenced by intake 51-75%, weight loss    Nutrition Interventions:   Food and/or Nutrient Delivery: Start oral nutrition supplement, Modify current diet  Nutrition Education and Counseling: Education not indicated  Coordination of Nutrition Care: Continue to monitor while inpatient    Goals:  PO nutrition intake will meet >75% of patient estimated nutritional needs within the next 7 days.        Nutrition Monitoring and Evaluation:   Behavioral-Environmental Outcomes: None identified  Food/Nutrient Intake Outcomes: Food and nutrient intake, Supplement intake  Physical Signs/Symptoms Outcomes: Biochemical data, Nutrition focused physical findings    Discharge Planning:    Continue current diet     Electronically signed by Megan Oh RD on 3/27/2021 at 12:03 PM    Contact: [phone]

## 2021-03-27 NOTE — PROGRESS NOTES
Problem: Self Care Deficits Care Plan (Adult)  Goal: *Therapy Goal (Edit Goal, Insert Text)  Description: Occupational Therapy Goals   Long Term Goals  Initiated  and to be accomplished within 2 week(s)  1. Pt will perform self-feeding with mod I.  2. Pt will perform grooming with mod I.  3. Pt will perform UB bathing with mod I.  4. Pt will perform LB bathing with mod I.  5. Pt will perform tub/shower transfer with mod I.   6. Pt will perform UB dressing with mod I.  7. Pt will perform LB dressing with mod I.  8. Pt will perform toileting task with mod I.  9. Pt will perform toilet transfer with mod I. Short Term Goals   Initiated 3/27/2021 and to be accomplished within 7 day(s)  1. Pt will perform UB bathing with CGA. 2. Pt will perform LB bathing with CGA. 3. Pt will perform tub/shower transfer with CGA. 4. Pt will perform LB dressing with CGA. 5. Pt will perform toileting task with CGA. 6. Pt will perform toilet transfer with CGA. Outcome: Progressing Towards Goal    OCCUPATIONAL THERAPY EVALUATION    Patient: Eulis Comas 60 y.o.   Date: 3/27/2021  Primary Diagnosis: Status post below knee amputation of right lower extremity ([facility] [address] [Z89.511]    Patient identified with name and : Yes    Past Medical History:   Past Medical History:   Diagnosis Date    Acute blood loss as cause of postoperative anemia 3/22/2021    Anticoagulated by anticoagulation treatment     On Rivaroxaban    Cardiomyopathy ([address] 2015    2D echocardiogram (3/24/2021) showed EF 37%; global hypokinesis of left ventricle; grade I diastolic dysfunction; 2D echocardiogram (2015) showed EF 35%; genealized hypokinesis more focally severe of the inferior and posterior segments; mild mitral regurgitation; trace tricuspud regurgitation; normal pulmonary artery pressure    Chronic systolic heart failure ([address]     2D echocardiogram (3/24/2021) showed EF 37%; global hypokinesis of left ventricle; grade I diastolic dysfunction; 2D echocardiogram (12/17/2015) showed EF 35%; genealized hypokinesis more focally severe of the inferior and posterior segments; mild mitral regurgitation; trace tricuspud regurgitation; normal pulmonary artery pressure    Coronary artery disease involving native coronary artery of native heart     COVID-19 ruled out by laboratory testing 3/22/2021    SARS-CoV-2 ([facility]) (3/22/2021):  Not detected     Critical ischemia of lower extremity 3/22/2021    Right    Factor V Leiden mutation ([facility]     Grade I diastolic dysfunction 61/37/7556    2D echocardiogram (3/24/2021) showed EF 37%; global hypokinesis of left ventricle; grade I diastolic dysfunction    History of deep venous thrombosis (DVT) of distal vein of right lower extremity     History of epilepsy     History of head injury     History of myocardial infarction     History of noncompliance with medical treatment 3/18/2021    Hypertensive heart disease with chronic systolic congestive heart failure ([facility]     2D echocardiogram (3/24/2021) showed EF 37%; global hypokinesis of left ventricle; grade I diastolic dysfunction; 2D echocardiogram (12/17/2015) showed EF 35%; genealized hypokinesis more focally severe of the inferior and posterior segments; mild mitral regurgitation; trace tricuspud regurgitation; normal pulmonary artery pressure    Long term current use of aspirin     Major depressive disorder 03/24/2021    On Duloxetine    Migraine headache     Mitral valve prolapse     Mixed hyperlipidemia     Lipid profile (7/25/2018) showed , , HDL 27,     Obstructive sleep apnea     On statin therapy due to risk of future cardiovascular event     On Atorvastatin    Peripheral artery disease ([facility]     Type 2 diabetes mellitus, without long-term current use of insulin ([facility])     HbA1c (4/7/3791) = 99.5    Umbilical hernia     Wears glasses      Precautions: Fall    Time In: 800  Time Out[de-identified] 900    Pain:  Pt reports 7/10 pain or discomfort prior to treatment. Pt reports 5/10 pain or discomfort post treatment. Timed medication schedule and received prior to OT eval beginning. SUBJECTIVE:   Patient stated I just received my pain medication before you came in.    OBJECTIVE DATA SUMMARY:   OT evaluation completed - patient completed ADLs sitting edge of bed with basin bathing. Please see below for details. []  Right hand dominant   [x]  Left hand dominant    Therapy Diagnosis:   Difficulty with ADLs  [x]     Difficulty with functional transfers  [x]     Difficulty with ambulation  [x]     Difficulty with IADLs  [x]       Problem List:    Decreased strength B UE  [x]     Decreased strength trunk/core  [x]     Decreased AROM   []     Decreased endurance  [x]     Decreased balance sitting  []     Decreased balance standing  [x]     Pain   [x]     Decreased PROM  []       Functional Limitations:   Decreased independence with ADL  [x]     Decreased independence with functional transfers  [x]     Decreased independence with ambulation  [x]     Decreased independence with IADL  [x]       Previous Functional Level:   Patient resides with his mother and completes all ADLs/IADLs independently. Patient reports that his mother is in a SNF currently and will be in respite care until he is able to return home and care for her.     Home Environment: Home Situation  Home Environment: Private residence  # Steps to Enter: 0  Wheelchair Ramp: Yes  One/Two Story Residence: One story  Living Alone: No  Support Systems: Family member(s), Parent  Patient Expects to be Discharged to[de-identified] Private residence  Current DME Used/Available at Home: Uri Cantu, [address] bed, Shower chair  Tub or Shower Type: Tub/Shower combination    Barriers to Learning/Limitations: None  Compensate with: visual, verbal, tactile, kinesthetic cues/model    Outcome Measures:      MMT Initial Assessment   Right Upper Extremity  Left Upper Extremity    UE AROM WFL, 4/5 WFL, 4/5   Shoulder flexion     Shoulder extension     Shoulder ABDuction     Shoulder ADDUction     Elbow Flexion     Elbow Extension     Wrist Extension/Flexion          0/5 No palpable muscle contraction  1/5 Palpable muscle contraction, no joint movement  2-/5 Less than full range of motion in gravity eliminated position  2/5 Able to complete full range of motion in gravity eliminated position  2+/5 Able to initiate movement against gravity  3-/5 More than half but not full range of motion against gravity  3/5 Able to complete full range of motion against gravity  3+/5 Completes full range of motion against gravity with minimal resistance  4-/5 Completes full range of motion against gravity with minimal resistance  4/5 Completes full range of motion against gravity with moderate resistance  5/5 Completes full range of motion against gravity with maximum resistance    Sensation: WFL  Coordination: WFL    FIM SCORES Initial Assessment   Bladder - level of assist     Bladder - accident frequency score     Bowel - level of assist     Bowel - accident frequency score     Please see IRC Interdisciplinary Eval: Coordination/Balance Section for details regarding FIM score description.     COGNITION/PERCEPTION Initial Assessment   Reading Status     Writing Status     Arousal/Alertness     Orientation Level Oriented X4   Visual Fields     Praxis     Body Scheme       COMPREHENSION MODE Initial Assessment   Primary Mode of Comprehension Auditory   Hearing Aide None   Corrective Lenses     Score 7     EXPRESSION Initial Assessment   Primary Mode of Expression Verbal   Score 7   Comments       SOCIAL INTERACTION/PRAGMATICS Initial Assessment   Score 7   Comments       PROBLEM SOLVING Initial Assessment   Score 7   Comments       MEMORY Initial Assessment   Score 7   Comments       EATING Initial Assessment   Functional Level 5   Comments Set up of breakfast tray, requires no assist for feeding or opening packages/containers     GROOMING Initial Assessment   Functional Level 5    Oral Hygiene FIM:5   Tasks completed by patient Washed face   Comments Patient sitting EOB with basin bath and wash cloth, completes washing face without need for assist.     BATHING Initial Assessment   Functional Level 4   Body parts patient bathed Buttocks, Lower leg and foot, left   Comments Patient sitting EOB with basin bathing. Requires assistance for washing L LE and when in standing, due to decreased balance, requires assist for washing buttocks, back of thighs. TUB/SHOWER TRANSFER INDEPENDENCE Initial Assessment   Score 4   Comments Requires CGA due to decreased balance. UPPER BODY DRESSING/UNDRESSING Initial Assessment   Functional Level 5   Items applied/Steps completed Pullover (4 steps)   Comments Handed shirt and patient is able to don without difficulty while sitting EOB. LOWER BODY DRESSING/UNDRESSING Initial Assessment   Functional Level 4    Sock and/or Shoe Management FIM:4   Items applied/Steps completed Elastic waist pants (3 steps), Sock, left (1 step)   Comments Patient is able to don sock, pants however when standing requires min A for pulling pants over hips due to decreased balance. TOILETING Initial Assessment   Functional Level 4   Comments Requires assist for clothing management when pulling pants over hips, patient able to complete toileting hygiene and pulling pants down. TOILET TRANSFER INDEPENDENCE Initial Assessment   Transfer score 4   Comments Requires CGA due to decreased balance.      INSTRUMENTAL ADL Initial Assessment (PLOF)   Meal preparation Independent   Homemaking Independent   Medicine Management Independent   Financial Management Independent        ASSESSMENT :  Based on the objective data described above, the patient presents with decreased B UE strength, decreased trunk strength, decreased endurance, decreased standing balance, pain that inhibits his ability to complete ADLs/IADLs at his functional baseline. Pt would benefit from skilled occupational therapy in order to improve independent functional mobility/ADLs,/IADLs within the home. Interventions may include range of motion (AROM, PROM B UE), motor function (B UE/ strengthening/coordination), activity tolerance (vitals, oxygen saturation levels), balance training, ADL/IADL training and functional transfer training. Please see IRC; Interdisciplinary Eval, Care Plan, and Patient Education for further information regarding occupational therapy examination and plan of care. PLAN :  Recommendations and Planned Interventions:  [x]               Self Care Training                   [x]        Therapeutic Activities  [x]               Functional Mobility Training    []        Cognitive Retraining  [x]               Therapeutic Exercises            [x]        Endurance Activities  [x]               Balance Training                     [x]        Neuromuscular Re-Education  []               Visual/Perceptual Training      [x]   Home Safety Training  [x]               Patient Education                    [x]        Family Training/Education  []               Other (comment):    Frequency/Duration: Patient will be followed by occupational therapy 5 times a week to address goals. Discharge Recommendations: TBD  Further Equipment Recommendations for Discharge: TBD     Please refer to the flow sheet for vital signs taken during this treatment. After treatment:   [x] Patient left in no apparent distress sitting up in chair  [] Patient left in no apparent distress in bed  [x] Call bell left within reach  [] Nursing notified  [] Caregiver present  [] Bed alarm activated    COMMUNICATION/EDUCATION:   [] Home safety education was provided and the patient/caregiver indicated understanding. [] Patient/family have participated as able in goal setting and plan of care.   [x] Patient/family agree to work toward stated goals and plan of care.  [] Patient understands intent and goals of therapy, but is neutral about his/her participation. [] Patient is unable to participate in goal setting and plan of care. Order received from MD for occupational therapy services and chart reviewed. Pt to be seen 5 times per week for 3 hours of total therapy per day for 2 weeks. Thank you for the referral.    Thank you for this referral.  Yakov Rose.  Nino, OTR/L

## 2021-03-27 NOTE — ROUTINE PROCESS
SHIFT CHANGE NOTE FOR Trinh Arroyo Bedside and Verbal shift change report given to Latasha Acevedo RN (oncoming nurse) by Ledon Meckel, RN (offgoing nurse). Report included the following information SBAR, Kardex, MAR and Recent Results. Situation: 
 Code Status: Full Code Reason for Admission: R BKA Hospital Day: 1 Problem List:  
Hospital Problems  Date Reviewed: 3/27/2021 Codes Class Noted POA Peripheral artery disease (HCC) (Chronic) ICD-10-CM: I73.9 ICD-9-CM: 443.9  Unknown Yes Type 2 diabetes mellitus, without long-term current use of insulin (HCC) (Chronic) ICD-10-CM: E11.9 ICD-9-CM: 250.00  Unknown Yes Overview Signed 3/26/2021  5:49 PM by Edy Bobo MD  
  HbA1c (3/6/2021) = 12.9 Factor V Leiden mutation ([facility] [address] (Chronic) ICD-10-CM: E73.90 
ICD-9-CM: 289.81  Unknown Yes Hypertensive heart disease with chronic systolic congestive heart failure (HCC) (Chronic) ICD-10-CM: I11.0, I50.22 ICD-9-CM: 402.91, 428.22  Unknown Yes Overview Signed 3/26/2021 11:06 PM by Edy Bobo MD  
  2D echocardiogram (12/17/2015) showed EF 35%; genealized hypokinesis more focally severe of the inferior and posterior segments; mild mitral regurgitation; trace tricuspud regurgitation; normal pulmonary artery pressure Long term current use of aspirin (Chronic) ICD-10-CM: Z55.70 ICD-9-CM: V58.66  Unknown Yes On statin therapy due to risk of future cardiovascular event (Chronic) ICD-10-CM: C10.104 ICD-9-CM: V58.69  Unknown Yes Overview Signed 3/26/2021  4:34 PM by Edy Bobo MD  
  On Atorvastatin History of deep venous thrombosis (DVT) of distal vein of right lower extremity ICD-10-CM: P69.312 ICD-9-CM: V12.51  Unknown Yes Anticoagulated by anticoagulation treatment (Chronic) ICD-10-CM: Z79.01 
ICD-9-CM: V58.61  Unknown Yes Overview Signed 3/26/2021  4:36 PM by Edy Bobo MD  
  On Rivaroxaban  Mixed hyperlipidemia (Chronic) ICD-10-CM: Z98.2 ICD-9-CM: 272.2  Unknown Yes Overview Signed 3/26/2021 11:07 PM by Edy Bobo MD  
  Lipid profile (7/25/2018) showed , , HDL 27,  History of embolectomy ICD-10-CM: Z98.890 ICD-9-CM: V45.89  Unknown Yes Overview Signed 3/26/2021  5:39 PM by Edy Bobo MD  
  S/P Right lower extremity arterial embolectomy Major depressive disorder ICD-10-CM: F32.9 ICD-9-CM: 296.20  3/24/2021 Yes Overview Signed 3/26/2021 11:31 PM by Edy Bobo MD  
  On Duloxetine Grade I diastolic dysfunction SHEILA-63-FP: I51.9 ICD-9-CM: 429.9  3/24/2021 Yes Overview Signed 3/26/2021 11:48 PM by Edy Bobo MD  
  2D echocardiogram (3/24/2021) showed EF 37%; global hypokinesis of left ventricle; grade I diastolic dysfunction * (Principal) Status post below knee amputation of right lower extremity ([address] ICD-10-CM: Z25.653 ICD-9-CM: V49.75  3/22/2021 Yes Overview Signed 3/26/2021  5:43 PM by Edy Bobo MD  
  S/P Right below-the-knee amputation (3/22/2021 - Dr. Shakila Hernandez) Impaired mobility and ADLs ICD-10-CM: Z74.09, Z78.9 ICD-9-CM: V49.89  3/22/2021 Yes Critical ischemia of lower extremity ICD-10-CM: I99.8 ICD-9-CM: 459.9  3/22/2021 Yes Overview Signed 3/26/2021  5:42 PM by Edy Bobo MD  
  Right COVID-19 ruled out by laboratory testing ICD-10-CM: Z20.822 ICD-9-CM: V71.83  3/22/2021 Yes Overview Signed 3/26/2021  5:47 PM by Edy Bobo MD  
  SARS-CoV-2 ([address] [place] [address]) (3/22/2021): Not detected Acute blood loss as cause of postoperative anemia ICD-10-CM: D62 
ICD-9-CM: 285.1  3/22/2021 Yes History of noncompliance with medical treatment ICD-10-CM: Z91.19 ICD-9-CM: V15.81  3/18/2021 Yes Background: 
 Past Medical History:  
Past Medical History:  
Diagnosis Date  Acute blood loss as cause of postoperative anemia 3/22/2021  Anticoagulated by anticoagulation treatment On Rivaroxaban  Cardiomyopathy ([facility] [address] 12/17/2015 2D echocardiogram (3/24/2021) showed EF 37%; global hypokinesis of left ventricle; grade I diastolic dysfunction; 2D echocardiogram (12/17/2015) showed EF 35%; genealized hypokinesis more focally severe of the inferior and posterior segments; mild mitral regurgitation; trace tricuspud regurgitation; normal pulmonary artery pressure  Chronic systolic heart failure ([address] 2D echocardiogram (3/24/2021) showed EF 37%; global hypokinesis of left ventricle; grade I diastolic dysfunction; 2D echocardiogram (12/17/2015) showed EF 35%; genealized hypokinesis more focally severe of the inferior and posterior segments; mild mitral regurgitation; trace tricuspud regurgitation; normal pulmonary artery pressure  Coronary artery disease involving native coronary artery of native heart  COVID-19 ruled out by laboratory testing 3/22/2021 SARS-CoV-2 ([facility]) (3/22/2021): Not detected  Critical ischemia of lower extremity 3/22/2021 Right  Factor V Leiden mutation ([facility] [address]  Grade I diastolic dysfunction 10/50/7398  
 2D echocardiogram (3/24/2021) showed EF 37%; global hypokinesis of left ventricle; grade I diastolic dysfunction  History of deep venous thrombosis (DVT) of distal vein of right lower extremity  History of epilepsy  History of head injury  History of myocardial infarction  History of noncompliance with medical treatment 3/18/2021  Hypertensive heart disease with chronic systolic congestive heart failure ([address]  2D echocardiogram (3/24/2021) showed EF 37%; global hypokinesis of left ventricle; grade I diastolic dysfunction; 2D echocardiogram (12/17/2015) showed EF 35%; genealized hypokinesis more focally severe of the inferior and posterior segments; mild mitral regurgitation; trace tricuspud regurgitation; normal pulmonary artery pressure  Long term current use of aspirin  Major depressive disorder 03/24/2021 On Duloxetine  Migraine headache  Mitral valve prolapse  Mixed hyperlipidemia Lipid profile (7/25/2018) showed , , HDL 27,   Obstructive sleep apnea  On statin therapy due to risk of future cardiovascular event On Atorvastatin  Peripheral artery disease ([facility]  Type 2 diabetes mellitus, without long-term current use of insulin ([facility] HbA1c (3/6/2021) = 48.8  
 Umbilical hernia  Wears glasses Patient taking anticoagulants yes Patient has a defibrillator: no  
 
Assessment: 
 Changes in Assessment throughout shift: Episode of grayed vision on L side, returned to bed, MD aware, orders received, symptoms resolved.  Patient has central line: no Reasons if yes: Insertion date: Last dressing date: 
 Patient has Ellis Cath: no Reasons if yes: Insertion date: 
 
 Last Vitals: 
  
Vitals:  
 03/27/21 0750 03/27/21 1157 03/27/21 1404 03/27/21 1704 BP: 111/73  98/65 100/65 Pulse: 80  84 80 Resp: 18  16 18 Temp: 97.9 °F (36.6 °C)  97.8 °F (36.6 °C) 97.9 °F (36.6 °C) SpO2: 99%  98% 97% Weight:      
Height:  6' 2\" (1.88 m)  PAIN Pain Assessment Pain Intensity 1: 0 (03/27/21 1805) Pain Intensity 1: 2 (12/29/14 1105) Pain Location 1: Leg Pain Location 1: Abdomen Pain Intervention(s) 1: Medication (see MAR) Patient Stated Pain Goal: 0 Patient Stated Pain Goal: 0 
o Intervention effective: yes  
o Other actions taken for pain:  
 
 Skin Assessment Skin color Skin Color: Appropriate for ethnicity Condition/Temperature Skin Condition/Temp: Dry, Warm Integrity Skin Integrity: Abrasion(buttocks/scab lle) Turgor Turgor: Non-tenting Weekly Pressure Ulcer Documentation  Pressure  Injury Documentation: No Pressure Injury Noted-Pressure Ulcer Prevention Initiated Wound Prevention & Protection Methods Orientation of wound Orientation of Wound Prevention: Posterior Location of Prevention Location of Wound Prevention: Buttocks, Sacrum/Coccyx Dressing Present Dressing Present : Yes Dressing Status Dressing Status: Intact Wound Offloading Wound Offloading (Prevention Methods): Bed, pressure redistribution/air, Bed, pressure reduction mattress, Wheelchair  INTAKE/OUPUT Date 03/26/21 0700 - 03/27/21 9965 03/27/21 0700 - 03/28/21 3451 Shift 0700-1859 1900-0659 24 Hour Total 0700-1859 1900-0659 24 Hour Total  
INTAKE Shift Total(mL/kg) OUTPUT Urine(mL/kg/hr)  200(0.2) 200(0.1) 400  400 Urine Voided  200 200 400  400 Urine Occurrence(s) 0 x 2 x 2 x 3 x  3 x Emesis/NG output Emesis Occurrence(s)  0 x 0 x Stool Stool Occurrence(s) 0 x 1 x 1 x 0 x  0 x Shift Total(mL/kg)  200(2.4) 200(2.4) 400(4.7)  400(4.7) NET  -200 -200 -400  -400 Weight (kg) 84.8 84.8 84.8 84.8 84.8 84.8 Recommendations: 1. Patient needs and requests: water, pain medication 2. Diet: card. Const. Carb 1800k 3. Pending tests/procedures: OT/PT 4. Functional Level/Equipment: walker, assist x 1 
 
5. Estimated Discharge Date: TBD Posted on Whiteboard in Patients Room: yes HEALS Safety Check A safety check occurred in the patient's room between off going nurse and oncoming nurse listed above. The safety check included the below items Area Items H High Alert Medications - Verify all high alert medication drips (heparin, PCA, etc.) E Equipment - Suction is set up for ALL patients (with yanker) - Red plugs utilized for all equipment (IV pumps, etc.) - WOWs wiped down at end of shift. 
- Room stocked with oxygen, suction, and other unit-specific supplies A Alarms - Bed alarm is set for fall risk patients - Ensure chair alarm is in place and activated if patient is up in a chair L Lines - Check IV for any infiltration - Ellis bag is empty if patient has a Ellis - Tubing and IV bags are labeled Zach Bishop Safety - Room is clean, patient is clean, and equipment is clean. - Hallways are clear from equipment besides carts. - Fall bracelet on for fall risk patients - Ensure room is clear and free of clutter - Suction is set up for ALL patients (with yanker) - Hallways are clear from equipment besides carts. - Isolation precautions followed, supplies available outside room, sign posted

## 2021-03-28 LAB
GLUCOSE BLD STRIP.AUTO-MCNC: 157 MG/DL (ref 70–110)
GLUCOSE BLD STRIP.AUTO-MCNC: 80 MG/DL (ref 70–110)
GLUCOSE BLD STRIP.AUTO-MCNC: 89 MG/DL (ref 70–110)
GLUCOSE BLD STRIP.AUTO-MCNC: 96 MG/DL (ref 70–110)

## 2021-03-28 PROCEDURE — 65310000000 HC RM PRIVATE REHAB

## 2021-03-28 PROCEDURE — 74011250637 HC RX REV CODE- 250/637: Performed by: INTERNAL MEDICINE

## 2021-03-28 PROCEDURE — 82962 GLUCOSE BLOOD TEST: CPT

## 2021-03-28 PROCEDURE — 74011636637 HC RX REV CODE- 636/637: Performed by: INTERNAL MEDICINE

## 2021-03-28 RX ORDER — INSULIN GLARGINE 100 [IU]/ML
10 INJECTION, SOLUTION SUBCUTANEOUS
Status: DISCONTINUED | OUTPATIENT
Start: 2021-03-29 | End: 2021-03-28

## 2021-03-28 RX ORDER — GLIMEPIRIDE 1 MG/1
1 TABLET ORAL
Status: DISCONTINUED | OUTPATIENT
Start: 2021-03-28 | End: 2021-03-29

## 2021-03-28 RX ORDER — INSULIN GLARGINE 100 [IU]/ML
15 INJECTION, SOLUTION SUBCUTANEOUS
Status: DISCONTINUED | OUTPATIENT
Start: 2021-03-28 | End: 2021-03-28

## 2021-03-28 RX ADMIN — ACETAMINOPHEN 650 MG: 325 TABLET ORAL at 08:13

## 2021-03-28 RX ADMIN — DULOXETINE HYDROCHLORIDE 30 MG: 30 CAPSULE, DELAYED RELEASE ORAL at 08:12

## 2021-03-28 RX ADMIN — DOCUSATE SODIUM 100 MG: 100 CAPSULE, LIQUID FILLED ORAL at 08:13

## 2021-03-28 RX ADMIN — METHOCARBAMOL TABLETS 500 MG: 500 TABLET, COATED ORAL at 19:38

## 2021-03-28 RX ADMIN — CARVEDILOL 3.12 MG: 6.25 TABLET, FILM COATED ORAL at 08:12

## 2021-03-28 RX ADMIN — ACETAMINOPHEN 650 MG: 325 TABLET ORAL at 12:07

## 2021-03-28 RX ADMIN — METHOCARBAMOL TABLETS 500 MG: 500 TABLET, COATED ORAL at 08:13

## 2021-03-28 RX ADMIN — ATORVASTATIN CALCIUM 80 MG: 40 TABLET, FILM COATED ORAL at 08:13

## 2021-03-28 RX ADMIN — OXYCODONE HYDROCHLORIDE 10 MG: 5 TABLET ORAL at 12:07

## 2021-03-28 RX ADMIN — METFORMIN HYDROCHLORIDE 500 MG: 500 TABLET ORAL at 17:31

## 2021-03-28 RX ADMIN — METFORMIN HYDROCHLORIDE 500 MG: 500 TABLET ORAL at 08:11

## 2021-03-28 RX ADMIN — ZOLPIDEM TARTRATE 5 MG: 5 TABLET ORAL at 21:24

## 2021-03-28 RX ADMIN — GABAPENTIN 100 MG: 100 CAPSULE ORAL at 08:13

## 2021-03-28 RX ADMIN — AMOXICILLIN AND CLAVULANATE POTASSIUM 1 TABLET: 875; 125 TABLET, FILM COATED ORAL at 08:00

## 2021-03-28 RX ADMIN — ALOGLIPTIN 25 MG: 25 TABLET, FILM COATED ORAL at 08:13

## 2021-03-28 RX ADMIN — METHOCARBAMOL TABLETS 500 MG: 500 TABLET, COATED ORAL at 14:15

## 2021-03-28 RX ADMIN — AMOXICILLIN AND CLAVULANATE POTASSIUM 1 TABLET: 875; 125 TABLET, FILM COATED ORAL at 17:31

## 2021-03-28 RX ADMIN — EZETIMIBE 10 MG: 10 TABLET ORAL at 21:24

## 2021-03-28 RX ADMIN — INSULIN LISPRO 2 UNITS: 100 INJECTION, SOLUTION INTRAVENOUS; SUBCUTANEOUS at 12:07

## 2021-03-28 RX ADMIN — OXYCODONE HYDROCHLORIDE 10 MG: 5 TABLET ORAL at 17:31

## 2021-03-28 RX ADMIN — ACETAMINOPHEN 650 MG: 325 TABLET ORAL at 17:31

## 2021-03-28 RX ADMIN — Medication 1 CAPSULE: at 08:13

## 2021-03-28 RX ADMIN — GABAPENTIN 100 MG: 100 CAPSULE ORAL at 17:32

## 2021-03-28 RX ADMIN — ASPIRIN 81 MG CHEWABLE TABLET 81 MG: 81 TABLET CHEWABLE at 08:13

## 2021-03-28 RX ADMIN — OXYCODONE HYDROCHLORIDE 10 MG: 5 TABLET ORAL at 07:40

## 2021-03-28 RX ADMIN — OXYCODONE HYDROCHLORIDE 5 MG: 5 TABLET ORAL at 23:05

## 2021-03-28 RX ADMIN — RIVAROXABAN 20 MG: 20 TABLET, FILM COATED ORAL at 08:13

## 2021-03-28 RX ADMIN — Medication 5 MG: at 21:24

## 2021-03-28 RX ADMIN — GLIMEPIRIDE 1 MG: 1 TABLET ORAL at 10:32

## 2021-03-28 NOTE — ROUTINE PROCESS
SHIFT CHANGE NOTE FOR Maryln Bence Bedside and Verbal shift change report given to Arturo Eden (oncoming nurse) by No Yates, [facility] (offgoing nurse). Report included the following information SBAR, Kardex, MAR and Recent Results. Situation: 
 Code Status: Full Code Reason for Admission: R BKA Hospital Day: 2 Problem List:  
Hospital Problems  Date Reviewed: 3/27/2021 Codes Class Noted POA Peripheral artery disease (HCC) (Chronic) ICD-10-CM: I73.9 ICD-9-CM: 443.9  Unknown Yes Type 2 diabetes mellitus, without long-term current use of insulin (HCC) (Chronic) ICD-10-CM: E11.9 ICD-9-CM: 250.00  Unknown Yes Overview Signed 3/26/2021  5:49 PM by Pardeep Fenton MD  
  HbA1c (3/6/2021) = 12.9 Factor V Leiden mutation ([facility] [address] (Chronic) ICD-10-CM: L61.18 
ICD-9-CM: 289.81  Unknown Yes Hypertensive heart disease with chronic systolic congestive heart failure (HCC) (Chronic) ICD-10-CM: I11.0, I50.22 ICD-9-CM: 402.91, 428.22  Unknown Yes Overview Signed 3/26/2021 11:06 PM by Pardeep Fenton MD  
  2D echocardiogram (12/17/2015) showed EF 35%; genealized hypokinesis more focally severe of the inferior and posterior segments; mild mitral regurgitation; trace tricuspud regurgitation; normal pulmonary artery pressure Long term current use of aspirin (Chronic) ICD-10-CM: Z75.79 ICD-9-CM: V58.66  Unknown Yes On statin therapy due to risk of future cardiovascular event (Chronic) ICD-10-CM: N43.360 ICD-9-CM: V58.69  Unknown Yes Overview Signed 3/26/2021  4:34 PM by Pardeep Fenton MD  
  On Atorvastatin History of deep venous thrombosis (DVT) of distal vein of right lower extremity ICD-10-CM: I06.887 ICD-9-CM: V12.51  Unknown Yes Anticoagulated by anticoagulation treatment (Chronic) ICD-10-CM: Z79.01 
ICD-9-CM: V58.61  Unknown Yes Overview Signed 3/26/2021  4:36 PM by Pardeep Fenton MD  
  On Rivaroxaban  Mixed hyperlipidemia (Chronic) ICD-10-CM: Q19.8 ICD-9-CM: 272.2  Unknown Yes Overview Signed 3/26/2021 11:07 PM by Florentino Mead MD  
  Lipid profile (7/25/2018) showed , , HDL 27,  History of embolectomy ICD-10-CM: Z98.890 ICD-9-CM: V45.89  Unknown Yes Overview Signed 3/26/2021  5:39 PM by Florentino eMad MD  
  S/P Right lower extremity arterial embolectomy Major depressive disorder ICD-10-CM: F32.9 ICD-9-CM: 296.20  3/24/2021 Yes Overview Signed 3/26/2021 11:31 PM by Florentino Mead MD  
  On Duloxetine Grade I diastolic dysfunction [id number]-[id number]: I51.9 ICD-9-CM: 429.9  3/24/2021 Yes Overview Signed 3/26/2021 11:48 PM by Florentino Mead MD  
  2D echocardiogram (3/24/2021) showed EF 37%; global hypokinesis of left ventricle; grade I diastolic dysfunction * (Principal) Status post below knee amputation of right lower extremity ([address] ICD-10-CM: E01.361 ICD-9-CM: V49.75  3/22/2021 Yes Overview Signed 3/26/2021  5:43 PM by Florentino Mead MD  
  S/P Right below-the-knee amputation (3/22/2021 - Dr. Salinas Roberson) Impaired mobility and ADLs ICD-10-CM: Z74.09, Z78.9 ICD-9-CM: V49.89  3/22/2021 Yes Critical ischemia of lower extremity ICD-10-CM: I99.8 ICD-9-CM: 459.9  3/22/2021 Yes Overview Signed 3/26/2021  5:42 PM by Florentino Mead MD  
  Right COVID-19 ruled out by laboratory testing ICD-10-CM: Z20.822 ICD-9-CM: V71.83  3/22/2021 Yes Overview Signed 3/26/2021  5:47 PM by Florentino Mead MD  
  SARS-CoV-2 ([address] [place] [address] OF [place]) (3/22/2021): Not detected Acute blood loss as cause of postoperative anemia ICD-10-CM: D62 
ICD-9-CM: 285.1  3/22/2021 Yes History of noncompliance with medical treatment ICD-10-CM: Z91.19 ICD-9-CM: V15.81  3/18/2021 Yes Background: 
 Past Medical History:  
Past Medical History:  
Diagnosis Date  Acute blood loss as cause of postoperative anemia 3/22/2021  Anticoagulated by anticoagulation treatment On Rivaroxaban  Cardiomyopathy ([address] 12/17/2015 2D echocardiogram (3/24/2021) showed EF 37%; global hypokinesis of left ventricle; grade I diastolic dysfunction; 2D echocardiogram (12/17/2015) showed EF 35%; genealized hypokinesis more focally severe of the inferior and posterior segments; mild mitral regurgitation; trace tricuspud regurgitation; normal pulmonary artery pressure  Chronic systolic heart failure ([address] 2D echocardiogram (3/24/2021) showed EF 37%; global hypokinesis of left ventricle; grade I diastolic dysfunction; 2D echocardiogram (12/17/2015) showed EF 35%; genealized hypokinesis more focally severe of the inferior and posterior segments; mild mitral regurgitation; trace tricuspud regurgitation; normal pulmonary artery pressure  Coronary artery disease involving native coronary artery of native heart  COVID-19 ruled out by laboratory testing 3/22/2021 SARS-CoV-2 ([address] [place] [address]) (3/22/2021): Not detected  Critical ischemia of lower extremity 3/22/2021 Right  Factor V Leiden mutation ([facility] [address]  Grade I diastolic dysfunction 55/57/3672  
 2D echocardiogram (3/24/2021) showed EF 37%; global hypokinesis of left ventricle; grade I diastolic dysfunction  History of deep venous thrombosis (DVT) of distal vein of right lower extremity  History of epilepsy  History of head injury  History of myocardial infarction  History of noncompliance with medical treatment 3/18/2021  Hypertensive heart disease with chronic systolic congestive heart failure ([address]  2D echocardiogram (3/24/2021) showed EF 37%; global hypokinesis of left ventricle; grade I diastolic dysfunction; 2D echocardiogram (12/17/2015) showed EF 35%; genealized hypokinesis more focally severe of the inferior and posterior segments; mild mitral regurgitation; trace tricuspud regurgitation; normal pulmonary artery pressure  Long term current use of aspirin  Major depressive disorder 03/24/2021 On Duloxetine  Migraine headache  Mitral valve prolapse  Mixed hyperlipidemia Lipid profile (7/25/2018) showed , , HDL 27,   Obstructive sleep apnea  On statin therapy due to risk of future cardiovascular event On Atorvastatin  Peripheral artery disease ([facility] [address]  Type 2 diabetes mellitus, without long-term current use of insulin ([facility] HbA1c (3/6/2021) = 87.6  
 Umbilical hernia  Wears glasses Patient taking anticoagulants yes Patient has a defibrillator: no  
 
Assessment: 
 Changes in Assessment throughout shift: Episode of grayed vision on L side, returned to bed, MD aware, orders received, symptoms resolved.  Patient has central line: no Reasons if yes: Insertion date: Last dressing date: 
 Patient has Ellis Cath: no Reasons if yes: Insertion date: 
 
 Last Vitals: 
  
Vitals:  
 03/27/21 1157 03/27/21 1404 03/27/21 1704 03/27/21 2100 BP:  98/65 100/65 103/65 Pulse:  84 80 80 Resp:  16 18 18 Temp:  97.8 °F (36.6 °C) 97.9 °F (36.6 °C) (!) 96.7 °F (35.9 °C) SpO2:  98% 97% 97% Weight:      
Height: 6' 2\" (1.88 m)  PAIN Pain Assessment Pain Intensity 1: 0 (03/28/21 0400) Pain Intensity 1: 2 (12/29/14 1105) Pain Location 1: Leg Pain Location 1: Abdomen Pain Intervention(s) 1: Medication (see MAR) Pain Intervention(s) 1: Medication (see MAR) Patient Stated Pain Goal: 0 Patient Stated Pain Goal: 0 
o Intervention effective: yes  
o Other actions taken for pain:  
 
 Skin Assessment Skin color Skin Color: Appropriate for ethnicity Condition/Temperature Skin Condition/Temp: Dry, Warm Integrity Skin Integrity: Intact Turgor Turgor: Non-tenting Weekly Pressure Ulcer Documentation  Pressure  Injury Documentation: No Pressure Injury Noted-Pressure Ulcer Prevention Initiated Wound Prevention & Protection Methods Orientation of wound Orientation of Wound Prevention: Posterior Location of Prevention Location of Wound Prevention: Buttocks, Sacrum/Coccyx Dressing Present Dressing Present : Yes Dressing Status Dressing Status: Intact Wound Offloading Wound Offloading (Prevention Methods): Bed, pressure redistribution/air  INTAKE/OUPUT Date 03/27/21 0700 - 03/28/21 5757 03/28/21 0700 - 03/29/21 9349 Shift 0700-1859 1900-0659 24 Hour Total 0700-1859 1900-0659 24 Hour Total  
INTAKE Shift Total(mL/kg) OUTPUT Urine(mL/kg/hr) 400(0.4)  400(0.2) Urine Voided 400  400 Urine Occurrence(s) 3 x 1 x 4 x Stool Stool Occurrence(s) 0 x 0 x 0 x Shift Total(mL/kg) 400(4.7)  400(4.7) NET -400  -400 Weight (kg) 84.8 84.8 84.8 84.8 84.8 84.8 Recommendations: 1. Patient needs and requests: water, pain medication 2. Diet: card. Const. Carb 1800k 3. Pending tests/procedures: OT/PT 4. Functional Level/Equipment: walker, assist x 1 
 
5. Estimated Discharge Date: TBD Posted on Whiteboard in Patients Room: yes HEALS Safety Check A safety check occurred in the patient's room between off going nurse and oncoming nurse listed above. The safety check included the below items Area Items H High Alert Medications - Verify all high alert medication drips (heparin, PCA, etc.) E Equipment - Suction is set up for ALL patients (with yanker) - Red plugs utilized for all equipment (IV pumps, etc.) - WOWs wiped down at end of shift. 
- Room stocked with oxygen, suction, and other unit-specific supplies A Alarms - Bed alarm is set for fall risk patients - Ensure chair alarm is in place and activated if patient is up in a chair L Lines - Check IV for any infiltration - Ellis bag is empty if patient has a Ellis - Tubing and IV bags are labeled Génesis Kimball Safety - Room is clean, patient is clean, and equipment is clean.  
- Hallways are clear from equipment besides carts. - Fall bracelet on for fall risk patients - Ensure room is clear and free of clutter - Suction is set up for ALL patients (with faraz) - Hallways are clear from equipment besides carts. - Isolation precautions followed, supplies available outside room, sign posted

## 2021-03-28 NOTE — ROUTINE PROCESS
SHIFT CHANGE NOTE FOR Rowan Quiroz Bedside and Verbal shift change report given to Eveline Dunn RN (oncoming nurse) by Francy Lovelace RN (offgoing nurse). Report included the following information SBAR, Kardex, MAR and Recent Results. Situation: 
 Code Status: Full Code Reason for Admission: R BKA Hospital Day: 2 Problem List:  
Hospital Problems  Date Reviewed: 3/28/2021 Codes Class Noted POA Peripheral artery disease (HCC) (Chronic) ICD-10-CM: I73.9 ICD-9-CM: 443.9  Unknown Yes Type 2 diabetes mellitus, without long-term current use of insulin (HCC) (Chronic) ICD-10-CM: E11.9 ICD-9-CM: 250.00  Unknown Yes Overview Signed 3/26/2021  5:49 PM by Gabriella Payne MD  
  HbA1c (3/6/2021) = 12.9 Factor V Leiden mutation ([facility] [address] (Chronic) ICD-10-CM: V32.96 
ICD-9-CM: 289.81  Unknown Yes Hypertensive heart disease with chronic systolic congestive heart failure (HCC) (Chronic) ICD-10-CM: I11.0, I50.22 ICD-9-CM: 402.91, 428.22  Unknown Yes Overview Signed 3/26/2021 11:06 PM by Gabriella Payne MD  
  2D echocardiogram (12/17/2015) showed EF 35%; genealized hypokinesis more focally severe of the inferior and posterior segments; mild mitral regurgitation; trace tricuspud regurgitation; normal pulmonary artery pressure Long term current use of aspirin (Chronic) ICD-10-CM: P91.76 ICD-9-CM: V58.66  Unknown Yes On statin therapy due to risk of future cardiovascular event (Chronic) ICD-10-CM: K80.106 ICD-9-CM: V58.69  Unknown Yes Overview Signed 3/26/2021  4:34 PM by Gabriella Payne MD  
  On Atorvastatin History of deep venous thrombosis (DVT) of distal vein of right lower extremity ICD-10-CM: T44.519 ICD-9-CM: V12.51  Unknown Yes Anticoagulated by anticoagulation treatment (Chronic) ICD-10-CM: Z79.01 
ICD-9-CM: V58.61  Unknown Yes Overview Signed 3/26/2021  4:36 PM by Gabriella Payne MD  
  On Rivaroxaban  Mixed hyperlipidemia (Chronic) ICD-10-CM: F05.4 ICD-9-CM: 272.2  Unknown Yes Overview Signed 3/26/2021 11:07 PM by Gabriella Payne MD  
  Lipid profile (7/25/2018) showed , , HDL 27,  History of embolectomy ICD-10-CM: Z98.890 ICD-9-CM: V45.89  Unknown Yes Overview Signed 3/26/2021  5:39 PM by Gabriella Payne MD  
  S/P Right lower extremity arterial embolectomy Major depressive disorder ICD-10-CM: F32.9 ICD-9-CM: 296.20  3/24/2021 Yes Overview Signed 3/26/2021 11:31 PM by Gabriella Payne MD  
  On Duloxetine Grade I diastolic dysfunction ABL-74-DESTINY: I51.9 ICD-9-CM: 429.9  3/24/2021 Yes Overview Signed 3/26/2021 11:48 PM by Gabriella Payne MD  
  2D echocardiogram (3/24/2021) showed EF 37%; global hypokinesis of left ventricle; grade I diastolic dysfunction * (Principal) Status post below knee amputation of right lower extremity ([address] ICD-10-CM: L11.496 ICD-9-CM: V49.75  3/22/2021 Yes Overview Signed 3/26/2021  5:43 PM by Gabriella Payne MD  
  S/P Right below-the-knee amputation (3/22/2021 - Dr. Marilee Garay) Impaired mobility and ADLs ICD-10-CM: Z74.09, Z78.9 ICD-9-CM: V49.89  3/22/2021 Yes Critical ischemia of lower extremity ICD-10-CM: I99.8 ICD-9-CM: 459.9  3/22/2021 Yes Overview Signed 3/26/2021  5:42 PM by Gabriella Payne MD  
  Right COVID-19 ruled out by laboratory testing ICD-10-CM: Z20.822 ICD-9-CM: V71.83  3/22/2021 Yes Overview Signed 3/26/2021  5:47 PM by Gabriella Payne MD  
  SARS-CoV-2 ([address] [place], [place] [address]) (3/22/2021): Not detected Acute blood loss as cause of postoperative anemia ICD-10-CM: D62 
ICD-9-CM: 285.1  3/22/2021 Yes History of noncompliance with medical treatment ICD-10-CM: Z91.19 ICD-9-CM: V15.81  3/18/2021 Yes Background: 
 Past Medical History:  
Past Medical History:  
Diagnosis Date  Acute blood loss as cause of postoperative anemia 3/22/2021  Anticoagulated by anticoagulation treatment On Rivaroxaban  Cardiomyopathy ([address] 12/17/2015 2D echocardiogram (3/24/2021) showed EF 37%; global hypokinesis of left ventricle; grade I diastolic dysfunction; 2D echocardiogram (12/17/2015) showed EF 35%; genealized hypokinesis more focally severe of the inferior and posterior segments; mild mitral regurgitation; trace tricuspud regurgitation; normal pulmonary artery pressure  Chronic systolic heart failure ([address] 2D echocardiogram (3/24/2021) showed EF 37%; global hypokinesis of left ventricle; grade I diastolic dysfunction; 2D echocardiogram (12/17/2015) showed EF 35%; genealized hypokinesis more focally severe of the inferior and posterior segments; mild mitral regurgitation; trace tricuspud regurgitation; normal pulmonary artery pressure  Coronary artery disease involving native coronary artery of native heart  COVID-19 ruled out by laboratory testing 3/22/2021 SARS-CoV-2 (Artemio [facility]) (3/22/2021): Not detected  Critical ischemia of lower extremity 3/22/2021 Right  Factor V Leiden mutation ([address]  Grade I diastolic dysfunction 65/87/9497  
 2D echocardiogram (3/24/2021) showed EF 37%; global hypokinesis of left ventricle; grade I diastolic dysfunction  History of deep venous thrombosis (DVT) of distal vein of right lower extremity  History of epilepsy  History of head injury  History of myocardial infarction  History of noncompliance with medical treatment 3/18/2021  Hypertensive heart disease with chronic systolic congestive heart failure ([address]  2D echocardiogram (3/24/2021) showed EF 37%; global hypokinesis of left ventricle; grade I diastolic dysfunction; 2D echocardiogram (12/17/2015) showed EF 35%; genealized hypokinesis more focally severe of the inferior and posterior segments; mild mitral regurgitation; trace tricuspud regurgitation; normal pulmonary artery pressure  Long term current use of aspirin  Major depressive disorder 03/24/2021 On Duloxetine  Migraine headache  Mitral valve prolapse  Mixed hyperlipidemia Lipid profile (7/25/2018) showed , , HDL 27,   Obstructive sleep apnea  On statin therapy due to risk of future cardiovascular event On Atorvastatin  Per[facility] ([facility]  Type 2 diabetes mellitus, without long-term current use of insulin ([facility] HbA1c (3/6/2021) = 45.2  
 Umbilical hernia  Wears glasses Patient taking anticoagulants yes Patient has a defibrillator: no  
 
Assessment: 
 Changes in Assessment throughout shift: Patient served almonds for dinner, patient recognized food on tray and notified nursing staff, did not eat.  Patient has central line: no Reasons if yes: Insertion date: Last dressing date: 
 Patient has Ellis Cath: no Reasons if yes: Insertion date: 
 
 Last Vitals: 
  
Vitals:  
 03/27/21 1704 03/27/21 2100 03/28/21 0733 03/28/21 1554 BP: 100/65 103/65 111/71 106/71 Pulse: 80 80 76 83 Resp: 18 18 16 17 Temp: 97.9 °F (36.6 °C) (!) 96.7 °F (35.9 °C) 97.1 °F (36.2 °C) 97 °F (36.1 °C) SpO2: 97% 97% 98% 99% Weight:      
Height:      
 
 
 PAIN Pain Assessment Pain Intensity 1: 7 (03/28/21 1733) Pain Intensity 1: 2 (12/29/14 1105) Pain Location 1: Leg Pain Location 1: Abdomen Pain Intervention(s) 1: Medication (see MAR) Pain Intervention(s) 1: Medication (see MAR) Patient Stated Pain Goal: 3 Patient Stated Pain Goal: 0 
o Intervention effective: yes  
o Other actions taken for pain:  
 
 Skin Assessment Skin color Skin Color: Appropriate for ethnicity Condition/Temperature Skin Condition/Temp: Dry, Warm Integrity Skin Integrity: Intact Turgor Turgor: Non-tenting Weekly Pressure Ulcer Documentation  Pressure  Injury Documentation: No Pressure Injury Noted-Pressure Ulcer Prevention Initiated Wound Prevention & Protection Methods Orientation of wound Orientation of Wound Prevention: Posterior Location of Prevention Location of Wound Prevention: Buttocks, Sacrum/Coccyx Dressing Present Dressing Present : Yes Dressing Status Dressing Status: Intact Wound Offloading Wound Offloading (Prevention Methods): Bed, pressure redistribution/air, Bed, pressure reduction mattress, Wheelchair  INTAKE/OUPUT Date 03/27/21 0700 - 03/28/21 7093 03/28/21 0700 - 03/29/21 9892 Shift 0700-1859 1900-0659 24 Hour Total 0700-1859 1900-0659 24 Hour Total  
INTAKE Shift Total(mL/kg) OUTPUT Urine(mL/kg/hr) 400(0.4)  400(0.2) Urine Voided 400  400 Urine Occurrence(s) 3 x 1 x 4 x 2 x  2 x Stool Stool Occurrence(s) 0 x 0 x 0 x 0 x  0 x Shift Total(mL/kg) 400(4.7)  400(4.7) NET -400  -400 Weight (kg) 84.8 84.8 84.8 84.8 84.8 84.8 Recommendations: 1. Patient needs and requests: water, pain medication 2. Diet: card. Const. Carb 1800k 3. Pending tests/procedures: OT/PT 4. Functional Level/Equipment: walker, assist x 1 
 
5. Estimated Discharge Date: TBD Posted on Whiteboard in Patients Room: yes HEALS Safety Check A safety check occurred in the patient's room between off going nurse and oncoming nurse listed above. The safety check included the below items Area Items H High Alert Medications - Verify all high alert medication drips (heparin, PCA, etc.) E Equipment - Suction is set up for ALL patients (with yanker) - Red plugs utilized for all equipment (IV pumps, etc.) - WOWs wiped down at end of shift. 
- Room stocked with oxygen, suction, and other unit-specific supplies A Alarms - Bed alarm is set for fall risk patients - Ensure chair alarm is in place and activated if patient is up in a chair L Lines - Check IV for any infiltration - Ellis bag is empty if patient has a Ellis - Tubing and IV bags are labeled Paige Yuri Safety  - Room is clean, patient is clean, and equipment is clean. - Hallways are clear from equipment besides carts. - Fall bracelet on for fall risk patients - Ensure room is clear and free of clutter - Suction is set up for ALL patients (with faraz) - Hallways are clear from equipment besides carts. - Isolation precautions followed, supplies available outside room, sign posted

## 2021-03-28 NOTE — PROGRESS NOTES
[address]  [address] [place] [address], [address]     INPATIENT REHABILITATION  DAILY PROGRESS NOTE     Date: 3/28/2021    Name: Amber Allen Age / Sex: 60 y.o. / male   CSN: [id number] MRN: [id number]   [facility] Date: 3/26/2021 Length of Stay: 2 days     Primary Rehab Diagnosis: Impaired Mobility and ADLs secondary to:  1. S/P Right below-the-knee amputation (3/22/2021 - Dr. Harlan Guerra)  2. History of critical ischemia of the right lower extremity  3. History of right lower extremity arterial embolectomy       Subjective:     No new issues or problems reported. Blood pressure on the low side of normal.  Blood glucose controlled.       Objective:     Vital Signs:  Patient Vitals for the past 24 hrs:   BP Temp Pulse Resp SpO2 Height   03/28/21 0733 111/71 97.1 °F (36.2 °C) 76 16 98 %    03/27/21 2100 103/65 (!) 96.7 °F (35.9 °C) 80 18 97 %    03/27/21 1704 100/65 97.9 °F (36.6 °C) 80 18 97 %    03/27/21 1404 98/65 97.8 °F (36.6 °C) 84 16 98 %    03/27/21 1157      6' 2\" (1.88 m)        Current Medications:  Current Facility-Administered Medications   Medication Dose Route Frequency    carvediloL (COREG) tablet 3.125 mg  3.125 mg Oral BID WITH MEALS    bisacodyL (DULCOLAX) tablet 10 mg  10 mg Oral Q48H PRN    insulin lispro (HUMALOG) injection   SubCUTAneous TIDAC    amoxicillin-clavulanate (AUGMENTIN) 875-125 mg per tablet 1 Tab  1 Tab Oral BID WITH MEALS    L. acidophilus,casei,rhamnosus (BIO-K PLUS) capsule 1 Cap  1 Cap Oral DAILY    atorvastatin (LIPITOR) tablet 80 mg  80 mg Oral DAILY    aspirin chewable tablet 81 mg  81 mg Oral DAILY WITH BREAKFAST    docusate sodium (COLACE) capsule 100 mg  100 mg Oral DAILY AFTER BREAKFAST    DULoxetine (CYMBALTA) capsule 30 mg  30 mg Oral DAILY    ezetimibe (ZETIA) tablet 10 mg  10 mg Oral QHS    [Held by provider] furosemide (LASIX) tablet 40 mg  40 mg Oral DAILY    gabapentin (NEURONTIN) capsule 100 mg  100 mg Oral BID    insulin glargine (LANTUS) injection 26 Units  26 Units SubCUTAneous QHS    [Held by provider] lisinopriL (PRINIVIL, ZESTRIL) tablet 5 mg  5 mg Oral DAILY    oxyCODONE IR (ROXICODONE) tablet 5-10 mg  5-10 mg Oral Q4H PRN    acetaminophen (TYLENOL) tablet 650 mg  650 mg Oral TID    methocarbamoL (ROBAXIN) tablet 500 mg  500 mg Oral TID    rivaroxaban (XARELTO) tablet 20 mg  20 mg Oral DAILY WITH BREAKFAST    alogliptin (NESINA) tablet 25 mg  25 mg Oral DAILY    melatonin (rapid dissolve) tablet 5 mg  5 mg Oral QHS    zolpidem (AMBIEN) tablet 5 mg  5 mg Oral QHS PRN    glucose chewable tablet 16 g  4 Tab Oral PRN    glucagon (GLUCAGEN) injection 1 mg  1 mg IntraMUSCular PRN    dextrose (D50W) injection syrg 12.5-25 g  25-50 mL IntraVENous PRN    acetaminophen (TYLENOL) tablet 650 mg  650 mg Oral Q4H PRN    metFORMIN (GLUCOPHAGE) tablet 500 mg  500 mg Oral BID WITH MEALS       Allergies: Allergies   Allergen Reactions    Nitroglycerin Other (comments)      BP drops rapidly when he takes SL Nitro          [place] Diarrhea and Rash    Cat Dander Cough    Codeine Rash    Howell Rash       Lab/Data Review:  Recent Results (from the past 24 hour(s))   GLUCOSE, POC    Collection Time: 03/27/21 12:11 PM   Result Value Ref Range    Glucose (POC) 167 (H) 70 - 110 mg/dL   GLUCOSE, POC    Collection Time: 03/27/21  2:26 PM   Result Value Ref Range    Glucose (POC) 101 70 - 110 mg/dL   GLUCOSE, POC    Collection Time: 03/27/21  4:46 PM   Result Value Ref Range    Glucose (POC) 116 (H) 70 - 110 mg/dL   GLUCOSE, POC    Collection Time: 03/27/21  9:03 PM   Result Value Ref Range    Glucose (POC) 138 (H) 70 - 110 mg/dL   GLUCOSE, POC    Collection Time: 03/28/21  7:26 AM   Result Value Ref Range    Glucose (POC) 96 70 - 110 mg/dL       Assessment:     Primary Rehab Diagnosis  1. Impaired Mobility and ADLs  2. S/P Right below-the-knee amputation (3/22/2021 - Dr. Venessa Marrufo)  3.  History of critical ischemia of the right lower extremity  4. History of right lower extremity arterial embolectomy     Comorbidities  Patient Active Problem List   Diagnosis Code    Status post below knee amputation of right lower extremity (HCC) Z89.511    Impaired mobility and ADLs Z74.09, Z78.9    Critical ischemia of lower extremity I99.8    Peripheral artery disease ([facility]) I73.9    Coronary artery disease involving native coronary artery of native heart I25.10    Type 2 diabetes mellitus, without long-term current use of insulin ([facility]) E11.9    History of epilepsy Z86.69    Factor V Leiden mutation ([facility] D68.51    Migraine headache G43.909    Wears glasses N14.1    Umbilical hernia B59.4    Obstructive sleep apnea G47.33    ICD (implantable cardioverter-defibrillator) in place Z95.810    Mitral valve prolapse I34.1    History of head injury Z87.828    Hypertensive heart disease with chronic systolic congestive heart failure (HCC) I11.0, I50.22    History of myocardial infarction I25.2    Long term current use of aspirin Z79.82    On statin therapy due to risk of future cardiovascular event Z79.899    History of deep venous thrombosis (DVT) of distal vein of right lower extremity Z86.718    Anticoagulated by anticoagulation treatment Z79.01    Mixed hyperlipidemia E78.2    History of embolectomy Z98.890    COVID-19 ruled out by laboratory testing Z20.822    Acute blood loss as cause of postoperative anemia D62    Cardiomyopathy ([facility] I42.9    Chronic systolic heart failure (HCC) I50.22    History of noncompliance with medical treatment Z91.19    Major depressive disorder F32.9    Grade I diastolic dysfunction B63.8        Plan:     1. Justification for continued stay: Good progression towards established rehabilitation goals.     2. Medical Issues being followed closely:    [x]  Fall and safety precautions     [x]  Wound Care     [x]  Bowel and Bladder Function     [x]  Fluid Electrolyte and Nutrition Balance     [x]  Pain Control      3. Issues that 24 hour rehabilitation nursing is following:    [x]  Fall and safety precautions     [x]  Wound Care     [x]  Bowel and Bladder Function     [x]  Fluid Electrolyte and Nutrition Balance     [x]  Pain Control      [x]  Assistance with and education on in-room safety with transfers to and from the bed, wheelchair, toilet and shower. 4. Acute rehabilitation plan of care:    [x]  Continue current care and rehab. [x]  Physical Therapy           [x]  Occupational Therapy           []  Speech Therapy     []  Hold Rehab until further notice     5. Medications:    [x]  MAR Reviewed     [x]  Continue Present Medications     6. DVT Prophylaxis:      []  Lovenox     []  Arixtra       []  Unfractionated Heparin     []  Coumadin     [x]  NOAC     []  NEREYDA Stockings     []  Sequential Compression Device     []  None     7. Code status    [x]  Full code     []  Partial code     []  Do not intubate     []  Do not resuscitate     8. Orders:   > S/P Right below-the-knee amputation (3/22/2021 - Dr. Humaira Tavarez); History of critical ischemia of the right lower extremity;  History of right lower extremity arterial embolectomy   > Staples to be removed on 4/19/2021   > Continue:    > Amoxicillin-Clavulanate 875-125 1 tab PO BID with meals x 10 days (STOP DATE: 4/5/2021)    > Bio K Plus 1 cap PO once daily    > Acute Postoperative Blood Loss Anemia   > Hgb/Hct (3/26/2021) = 10.1/31.3   > Hgb/Hct (3/27/2021, on admission) = 10.9/33.9   > Anemia work-up showed serum iron 23, TIBC 200, iron % saturation 12, ferritin 835   > No iron supplement for now    > Coronary artery disease; Peripheral artery disease    > Continue:    > Aspirin 81 mg PO once daily with breakfast    > Atorvastatin 80 mg PO once daily    > Carvedilol 6.25 mg PO BID with meals (8AM, 5PM)    > Hypertensive heart disease with chronic systolic heart failure; Cardiomyopathy; Chronic systolic heart failure; Grade I diastolic dysfunction   > 2D echocardiogram (12/17/2015) showed EF 35%; genealized hypokinesis more focally severe of the inferior and posterior segments; mild mitral regurgitation; trace tricuspud regurgitation; normal pulmonary artery pressure   > 2D echocardiogram (3/24/2021) showed EF 37%; global hypokinesis of left ventricle; grade I diastolic dysfunction   > On 3/27/2021, decreased Carvedilol from 6.25 mg to 3.125 mg PO BID with meals (8AM, 5PM)   > Hold:    > Furosemide 40 mg PO once daily (9AM)    > Lisinopril 5 mg PO once daily (9AM)   > Continue Carvedilol 3.125 mg PO BID with meals (8AM, 5PM)    > Factor V Leiden mutation; History of deep venous thrombosis (DVT) of right lower extremity; Anticoagulated on Rivaroxaban   > Continue Rivaroxaban 20 mg PO once daily with breakfast    > Major depressive disorder   > Continue Duloxetine 30 mg PO once daily    > Mixed hyperlipidemia   > Lipid profile (7/25/2018) showed , , HDL 27,    > Lipid profile (3/27/2021) showed , , HDL 35, LDL 63   > Continue:    > Atorvastatin 80 mg PO once daily    > Ezetimibe 10 mg PO q HS    > Type 2 diabetes mellitus, poorly controlled, without long-term current use of insulin   > HbA1c (3/6/2021) = 12.9    > On 3/27/2021, started Metformin 500 mg PO BID with meals   > Start Glimepiride 1 mg PO once daily   > Decrease Insulin glargine from 26 units to 15 units SC q HS --> Discontinue   > Continue:    > Alogliptin 25 mg PO once daily    > Metformin 500 mg PO BID with meals    > Insulin lispro sliding scale SC TID AC only     > Difficulty sleeping   > Continue:    > Melatonin 5 mg PO q HS    > Zolpidem 5 mg PO q HS PRN for sleep    > COVID-19 ruled out by laboratory testing   > SARS-CoV-2 ([employer] m2000[facility]) (3/22/2021):  Not detected    > Analgesia   > On 3/27/2021, started Acetaminophen 650 mg PO TID (8AM, 12PM, 4PM)   > Continue:    > Acetaminophen 650 mg PO TID (8AM, 12PM, 4PM)    > Acetaminophen 650 mg PO q 4 hr PRN for pain level 4/10 or lesser (from 8PM to 4AM only)    > Duloxetine 30 mg PO once daily    > Gabapentin 100 mg PO BID    > Methocarbamol 500 mg PO TID    > Oxycodone 5-10 mg PO q 4 hr PRN for pain level 5/10 or greater     > Diet:   > Specifications: Cardiac, diabetic, consistent carb 1800 kcal   > Solids (consistency): Regular    > Liquids (consistency):  Thin    > Fluid restriction: None      Signed:    Lucinda Grier MD    March 28, 2021

## 2021-03-28 NOTE — ROUTINE PROCESS
Mr. Geralynn Cushing is allergic to almonds, allergy noted in chart, on menu with \"nuts\". He selected cranberry, spinach salad for dinner (almonds not listed as ingredient). Salad arrived with almonds. Patient was able to identify almonds and notify staff. Salad removed from room, alternate option provided. Kitchen staff attempted to reach via phone, not answering. Spoke with dietary aide when trays collected for evening.

## 2021-03-28 NOTE — REHAB NOTE
[facility] PHYSICAL REHABILITATION  [address] [place] [address] 99 OF CARE    Name: Charisma Srivastava CSN: [id number]   Age: 60 y.o. MRN: [id number]   Sex: male Admit Date: 3/26/2021     Primary Rehab Diagnosis  1. Impaired Mobility and ADLs  2. S/P Right below-the-knee amputation (3/22/2021 - Dr. Ronan Bro)  3. History of critical ischemia of the right lower extremity  4.  History of right lower extremity arterial embolectomy     Comorbidities  Patient Active Problem List   Diagnosis Code    Status post below knee amputation of right lower extremity (HCC) Z89.511    Impaired mobility and ADLs Z74.09, Z78.9    Critical ischemia of lower extremity I99.8    Peripheral artery disease (HCC) I73.9    Coronary artery disease involving native coronary artery of native heart I25.10    Type 2 diabetes mellitus, without long-term current use of insulin ([facility]) E11.9    History of epilepsy Z86.69    Factor V Leiden mutation ([facility] D68.51    Migraine headache G43.909    Wears glasses G99.6    Umbilical hernia T57.9    Obstructive sleep apnea G47.33    ICD (implantable cardioverter-defibrillator) in place Z95.810    Mitral valve prolapse I34.1    History of head injury Z87.828    Hypertensive heart disease with chronic systolic congestive heart failure (HCC) I11.0, I50.22    History of myocardial infarction I25.2    Long term current use of aspirin Z79.82    On statin therapy due to risk of future cardiovascular event Z79.899    History of deep venous thrombosis (DVT) of distal vein of right lower extremity Z86.718    Anticoagulated by anticoagulation treatment Z79.01    Mixed hyperlipidemia E78.2    History of embolectomy Z98.890    COVID-19 ruled out by laboratory testing Z20.822    Acute blood loss as cause of postoperative anemia D62    Cardiomyopathy ([facility] I42.9    Chronic systolic heart failure (HCC) I50.22    History of noncompliance with medical treatment Z91.19    Major depressive disorder F32.9    Grade I diastolic dysfunction L35.9          ANTICIPATED INTERVENTIONS THAT SUPPORT THE MEDICAL NECESSITY OF THIS ADMISSION:    I. Physical Therapy              A. Intensity: 1.5 hour per day              B. Frequency: 5 times per week              C. Duration: 2 weeks              D. Long Term Goals:    1. Patient will transfer from bed to chair and chair to bed with modified independence using the least restrictive device. 2.  Patient will perform sit to stand with modified independence. 3.  Patient will ambulate with modified independence for 50 feet with the least restrictive device. 4.  Patient will perform w/c mobility for at least 250 ft distances, reporting <3/10 modified Sylvia RPE, at modified independent level over even surfaces and around doorways, obstacles, narrow spaces. 5.  Patient will ascend/descend w/c ramp with supervision for ease of entry/exit of home. E. Interventions: Interventions may include range of motion (AROM, PROM B LE/trunk), motor function (B LE/trunk strengthening/coordination), activity tolerance (vitals, oxygen saturation levels), bed mobility training, balance activities, gait training (progressive ambulation program), wheelchair management and functional transfer training. II. Occupational Therapy  21 . Intensity: 1.5 hour per day              B. Frequency: 5 times per week              C. Duration: 2 weeks              D. Long Term Goals:    1.  Pt will perform self-feeding with mod I.    2. Pt will perform grooming with mod I.    3. Pt will perform UB bathing with mod I.    4. Pt will perform LB bathing with mod I.    5. Pt will perform tub/shower transfer with mod I.     6. Pt will perform UB dressing with mod I.    7. Pt will perform LB dressing with mod I.    8. Pt will perform toileting task with mod I.    9. Pt will perform toilet transfer with mod I.   E. Interventions: Interventions may include range of motion (AROM, PROM B UE), motor function (B UE/ strengthening/coordination), activity tolerance (vitals, oxygen saturation levels), balance training, ADL/IADL training and functional transfer training. PHYSICIAN'S ASSESSMENT OF FINDINGS:    Are the established goals sufficient for achieving the optimal level of function? [x]  Yes      []  No    What changes would you recommend to the goals as written? None      Are the interventions noted sufficient for achieving the optimal level of function? [x]  Yes      []  No    What changes would you recommend to the interventions noted? If therapy staff is unable to provide 3 hr of total therapy per day in 5 days due to medical issues or decreased patient tolerance, may modify treatment schedule to 15 hr/week.       Estimated length of stay: 2 weeks      Medical rehabilitation prognosis:    []  Excellent     [x]  Good     []  Fair     []  Guarded       Discharge Destination:     [x]  Home     []  [address]     []  [facility]     []  [address]     []  [place]     []  Other:       Signed:    Laura Baldwin MD    March 28, 2021

## 2021-03-29 LAB
ANION GAP SERPL CALC-SCNC: 9 MMOL/L (ref 3–18)
BUN SERPL-MCNC: 20 MG/DL (ref 7–18)
BUN/CREAT SERPL: 16 (ref 12–20)
CALCIUM SERPL-MCNC: 8.8 MG/DL (ref 8.5–10.1)
CHLORIDE SERPL-SCNC: 101 MMOL/L (ref 100–111)
CO2 SERPL-SCNC: 27 MMOL/L (ref 21–32)
CREAT SERPL-MCNC: 1.27 MG/DL (ref 0.6–1.3)
GLUCOSE BLD STRIP.AUTO-MCNC: 476 MG/DL (ref 70–110)
GLUCOSE BLD STRIP.AUTO-MCNC: 67 MG/DL (ref 70–110)
GLUCOSE BLD STRIP.AUTO-MCNC: 69 MG/DL (ref 70–110)
GLUCOSE BLD STRIP.AUTO-MCNC: 73 MG/DL (ref 70–110)
GLUCOSE BLD STRIP.AUTO-MCNC: 74 MG/DL (ref 70–110)
GLUCOSE BLD STRIP.AUTO-MCNC: 95 MG/DL (ref 70–110)
GLUCOSE BLD STRIP.AUTO-MCNC: 96 MG/DL (ref 70–110)
GLUCOSE BLD STRIP.AUTO-MCNC: 98 MG/DL (ref 70–110)
GLUCOSE SERPL-MCNC: 75 MG/DL (ref 74–99)
HCT VFR BLD AUTO: 33.9 % (ref 36–48)
HGB BLD-MCNC: 10.7 G/DL (ref 13–16)
POTASSIUM SERPL-SCNC: 4.8 MMOL/L (ref 3.5–5.5)
SODIUM SERPL-SCNC: 137 MMOL/L (ref 136–145)

## 2021-03-29 PROCEDURE — 82962 GLUCOSE BLOOD TEST: CPT

## 2021-03-29 PROCEDURE — 97110 THERAPEUTIC EXERCISES: CPT

## 2021-03-29 PROCEDURE — 97530 THERAPEUTIC ACTIVITIES: CPT

## 2021-03-29 PROCEDURE — 36415 COLL VENOUS BLD VENIPUNCTURE: CPT

## 2021-03-29 PROCEDURE — 80048 BASIC METABOLIC PNL TOTAL CA: CPT

## 2021-03-29 PROCEDURE — 85018 HEMOGLOBIN: CPT

## 2021-03-29 PROCEDURE — 65310000000 HC RM PRIVATE REHAB

## 2021-03-29 PROCEDURE — 97112 NEUROMUSCULAR REEDUCATION: CPT

## 2021-03-29 PROCEDURE — 74011250637 HC RX REV CODE- 250/637: Performed by: INTERNAL MEDICINE

## 2021-03-29 PROCEDURE — 97116 GAIT TRAINING THERAPY: CPT

## 2021-03-29 PROCEDURE — 99232 SBSQ HOSP IP/OBS MODERATE 35: CPT | Performed by: INTERNAL MEDICINE

## 2021-03-29 RX ADMIN — OXYCODONE HYDROCHLORIDE 10 MG: 5 TABLET ORAL at 10:56

## 2021-03-29 RX ADMIN — METHOCARBAMOL TABLETS 500 MG: 500 TABLET, COATED ORAL at 13:55

## 2021-03-29 RX ADMIN — ZOLPIDEM TARTRATE 5 MG: 5 TABLET ORAL at 21:16

## 2021-03-29 RX ADMIN — AMOXICILLIN AND CLAVULANATE POTASSIUM 1 TABLET: 875; 125 TABLET, FILM COATED ORAL at 08:27

## 2021-03-29 RX ADMIN — GABAPENTIN 100 MG: 100 CAPSULE ORAL at 17:34

## 2021-03-29 RX ADMIN — METFORMIN HYDROCHLORIDE 500 MG: 500 TABLET ORAL at 08:25

## 2021-03-29 RX ADMIN — Medication 5 MG: at 21:17

## 2021-03-29 RX ADMIN — ACETAMINOPHEN 650 MG: 325 TABLET ORAL at 08:24

## 2021-03-29 RX ADMIN — METFORMIN HYDROCHLORIDE 500 MG: 500 TABLET ORAL at 17:33

## 2021-03-29 RX ADMIN — CARVEDILOL 3.12 MG: 6.25 TABLET, FILM COATED ORAL at 17:33

## 2021-03-29 RX ADMIN — ALOGLIPTIN 25 MG: 25 TABLET, FILM COATED ORAL at 08:27

## 2021-03-29 RX ADMIN — METHOCARBAMOL TABLETS 500 MG: 500 TABLET, COATED ORAL at 19:37

## 2021-03-29 RX ADMIN — RIVAROXABAN 20 MG: 20 TABLET, FILM COATED ORAL at 08:26

## 2021-03-29 RX ADMIN — EZETIMIBE 10 MG: 10 TABLET ORAL at 21:17

## 2021-03-29 RX ADMIN — AMOXICILLIN AND CLAVULANATE POTASSIUM 1 TABLET: 875; 125 TABLET, FILM COATED ORAL at 17:33

## 2021-03-29 RX ADMIN — ACETAMINOPHEN 650 MG: 325 TABLET ORAL at 15:35

## 2021-03-29 RX ADMIN — ACETAMINOPHEN 650 MG: 325 TABLET ORAL at 12:10

## 2021-03-29 RX ADMIN — GLIMEPIRIDE 1 MG: 1 TABLET ORAL at 08:28

## 2021-03-29 RX ADMIN — DULOXETINE HYDROCHLORIDE 30 MG: 30 CAPSULE, DELAYED RELEASE ORAL at 08:29

## 2021-03-29 RX ADMIN — OXYCODONE HYDROCHLORIDE 5 MG: 5 TABLET ORAL at 06:22

## 2021-03-29 RX ADMIN — ASPIRIN 81 MG CHEWABLE TABLET 81 MG: 81 TABLET CHEWABLE at 08:25

## 2021-03-29 RX ADMIN — METHOCARBAMOL TABLETS 500 MG: 500 TABLET, COATED ORAL at 08:25

## 2021-03-29 RX ADMIN — ATORVASTATIN CALCIUM 80 MG: 40 TABLET, FILM COATED ORAL at 08:29

## 2021-03-29 RX ADMIN — GABAPENTIN 100 MG: 100 CAPSULE ORAL at 08:27

## 2021-03-29 RX ADMIN — Medication 1 CAPSULE: at 08:29

## 2021-03-29 RX ADMIN — CARVEDILOL 3.12 MG: 6.25 TABLET, FILM COATED ORAL at 08:26

## 2021-03-29 RX ADMIN — DOCUSATE SODIUM 100 MG: 100 CAPSULE, LIQUID FILLED ORAL at 08:28

## 2021-03-29 RX ADMIN — OXYCODONE HYDROCHLORIDE 5 MG: 5 TABLET ORAL at 16:10

## 2021-03-29 NOTE — PROGRESS NOTES
Problem: Mobility Impaired (Adult and Pediatric)  Goal: *Therapy Goal (Edit Goal, Insert Text)  Description: Physical Therapy Short Term Goals  Initiated 3/27/2021 and to be accomplished within 7 day(s) on 4/2/2021:  1. Patient will move from supine to sit and sit to supine , scoot up and down, and roll side to side in bed with independence. 2.  Patient will transfer from bed to chair and chair to bed with standby assistance using the least restrictive device. 3.  Patient will perform sit to stand with stand by assistance. 4.  Patient will ambulate with contact guard assist for 50 feet with the least restrictive device. 5.  Patient will perform w/c mobility for at least 250 ft at SBA level over even surfaces. Physical Therapy Long Term Goals  Initiated 3/27/2021 and to be accomplished within 14 day(s) on 4/9/2021:  1. Patient will transfer from bed to chair and chair to bed with modified independence using the least restrictive device. 2.  Patient will perform sit to stand with modified independence. 3.  Patient will ambulate with modified independence for 50 feet with the least restrictive device. 4.  Patient will perform w/c mobility for at least 250 ft distances, reporting <3/10 modified Sylvia RPE, at modified independent level over even surfaces and around doorways, obstacles, narrow spaces. 5.  Patient will ascend/descend w/c ramp with supervision for ease of entry/exit of home. Outcome: Progressing Towards Goal    PHYSICAL THERAPY TREATMENT    Patient: Karl Bridges (51 y.o. male)  Date: 3/29/2021  Diagnosis: Status post below knee amputation of right lower extremity ([facility] [Z89.511] Status post below knee amputation of right lower extremity ([facility]  Precautions: Fall  Chart, physical therapy assessment, plan of care and goals were reviewed. Time In:0930  Time Out:1100    Patient seen for: Gait training;Patient education; Therapeutic exercise;Transfer training; Wheelchair mobility    Pain:  Pt pain was reported as 6/10 pre-treatment. Pt pain was reported as 8/10 post-treatment. Intervention: pt asked RN for pain meds at completion of session    Patient identified with name and : YES    SUBJECTIVE:      Patient reports he got a good workout today and would like pain meds. OBJECTIVE DATA SUMMARY:    Objective:     GROSS ASSESSMENT Daily Assessment     AROM: Generally decreased, functional  PROM: Generally decreased, functional  Strength: Generally decreased, functional  Coordination: Within functional limits  Tone: Normal  Sensation: Impaired      BED/MAT MOBILITY Daily Assessment    Patient is Mod (I) with rolling and SPV for supine<>sit requiring education and demo of supine to sidelying to sit and reverse as he struggled to sit up without rocking for momentum and pulling on bed rails with UEs. Rolling Right : 6 (Modified independent)  Rolling Left : 6 (Modified independent)  Supine to Sit : 5 (Supervision)  Sit to Supine : 5 (Supervision)      TRANSFERS Daily Assessment    Patient demonstrates sit to stands and stand step/hop transfer from bed<>wc and w/c<>mat with S for safety. Transfer Type: Other  Other: stand step with RW  Transfer Assistance : 5 (Supervision/setup)  Sit to Stand Assistance: Supervision  Car Transfers: (NT)  Car Type: (NT)        GAIT Daily Assessment    Gait Description (WDL) Exceptions to WDL    Gait Abnormalities (\"hop to\" pattern s/p right BKA, slow gait speed)    Assistive device Gait belt;Walker, rolling    Ambulation assistance - level surface 5 (Supervision/setup)    Distance 20 Feet (ft)    Ambulation assistance- uneven surface (NT)    Comments Patient ambulates with hop to pattern using RW with S for balance and safety. He ambulated 15 ft x 2, 20 feet x 2 and 25 ft today with seated rest breaks between trials d/t fatigue.  He demonstrates decreased step length/clearance and slow pace, requiring VCs not to hop too close to the front bar of the RW for safety. BALANCE Daily Assessment     Sitting - Static: Good (unsupported)  Sitting - Dynamic: Fair (occasional)  Standing - Static: Fair  Standing - Dynamic : Impaired        WHEELCHAIR MOBILITY Daily Assessment    Patient propels his w/c using BUEs and left LE with S. Able to Propel (ft): 250 feet  Functional Level: 5  Curbs/Ramps Assist Required (FIM Score): 0 (Not tested)  Wheelchair Setup Assist Required : 5 (Supervision/setup)  Wheelchair Management: Manages left brake;Manages right brake(with brake extenders)        THERAPEUTIC EXERCISES Daily Assessment    Patient performed supine and seated therex to increase core, glut and BLE strength to improve transfers and ambulation. He required Min A for sidelying therex to prevent posterior roll. Patient educated on prone lying to prevent hip flexor and knee flexion contractures. Prone Lying x 5'  Prone knee flexion (right) 2 x 10 Extremity: Right  Exercise Type #1: Supine lower extremity strengthening(bridging, GS, TrA, right hip ext, right hip/knee flex, SAQ)  Sets Performed: 2  Reps Performed: 10  Level of Assist: Minimal assistance  Exercise Type #2: Seated lower extremity strengthening(LAQ, Hip Add Squeeze, RTB Hip Abd)  Sets Performed: 2  Reps Performed: 10  Level of Assist: Supervision  Exercise Type #3: (Left Sidelying: right hip ext, right hip ext with abd)  Sets Performed: 2  Reps Performed: 10  Level of Assist: Minimal assistance         ASSESSMENT:  Patient is making good progress towards his goals, having progressed from [address] A to S already with sit to stands and transfers. He requires moderate cuing for form with therex and safety cuing during ambulation. He demonstrates decreased activity tolerance overall and requires frequent short rest breaks.    Progression toward goals:  [x]      Improving appropriately and progressing toward goals  []      Improving slowly and progressing toward goals  []      Not making progress toward goals and plan of care will be adjusted      PLAN:  Patient continues to benefit from skilled intervention to address the above impairments. Continue treatment per established plan of care. Discharge Recommendations:  Home Health  Further Equipment Recommendations for Discharge:  rolling walker, wheelchair 18 inch       Estimated Discharge Date: 4/8/21    Activity Tolerance:   Fair  Please refer to the flowsheet for vital signs taken during this treatment. After treatment:   Patient left sitting in his w/c in his room, all needs met and call bell in reach.       Ayana December, [email or web address]

## 2021-03-29 NOTE — CONSULTS
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 (HCC) Z89.511    Impaired mobility and ADLs Z74.09, Z78.9    Critical ischemia of lower extremity I99.8    Peripheral artery disease (HCC) I73.9    Coronary artery disease involving native coronary artery of native heart I25.10    Type 2 diabetes mellitus, without long-term current use of insulin (HCC) E11.9    History of epilepsy Z86.69    Factor V Leiden mutation ([facility] D68.51    Migraine headache G43.909    Wears glasses I11.0    Umbilical hernia N56.2    Obstructive sleep apnea G47.33    ICD (implantable cardioverter-defibrillator) in place Z95.810    Mitral valve prolapse I34.1    History of head injury Z87.828    Hypertensive heart disease with chronic systolic congestive heart failure (HCC) I11.0, I50.22    History of myocardial infarction I25.2    Long term current use of aspirin Z79.82    On statin therapy due to risk of future cardiovascular event Z79.899    History of deep venous thrombosis (DVT) of distal vein of right lower extremity Z86.718    Anticoagulated by anticoagulation treatment Z79.01    Mixed hyperlipidemia E78.2    History of embolectomy Z98.890    COVID-19 ruled out by laboratory testing Z20.822    Acute blood loss as cause of postoperative anemia D62    Cardiomyopathy ([facility] I42.9    Chronic systolic heart failure (HCC) I50.22    History of noncompliance with medical treatment Z91.19    Major depressive disorder F32.9    Grade I diastolic dysfunction F87.0       Patient was just diagnosed (with hospitalization for amputation) with Major Depressive Disorder without Psychosis and has begun Cymbalta, which will continue for him on ARU as per Dr. Rachelle Blanton.   He also reports history of significant sleep disturbance and is currently Rx Melatonin and just recently Ambien, though he still describes seemingly disturbance sleep pattern. There is no report of substance use nor dependency for him. OBJECTIVE  GENERAL OBSERVATIONS  Willingness to participate in program: [x] good   [] fair [] indifferent [] poor    General Appearance:  Patient appears casually and appropriately dressed for rehabilitation and sitting upright in wheelchair in bedroom, not appearing to be in any distress. He is able to voluntarily move all extremities. Sensory Impairments:  Patient is wearing eyeglasses for correction. He presents with satisfactory auditory reception and comprehension and responds to all inquiry with intelligibility. Patient acknowledges incurring a head injury at age [de-identified] old resulting in skull fracture, supposedly with resolution; and record  Indicates history for epilepsy.     M[employer]on Affiliation:  Unknown    Admission Assessment  Discharge Status   [x] alert  [] lethargic  [] difficult to arouse  [] fluctuating  [] other: Level of Consciousness [x] alert  [] lethargic  [] difficult to arouse  [] fluctuating  [] other:   [x] person  [x] place  [x] time  [x] situation Oriented [x] person  [x] place  [x] time  [x] situation   [x] within normal limits  [] impaired       [] mild        [] moderate        [] severe Attention [x] within normal limits  [] impaired       [] mild        [] moderate        [] severe   [x] within normal limits  [] impaired       [] mild        [] moderate        [] severe Memory [x] within normal limits  [] impaired       [] mild        [] moderate        [] severe   [x] appropriate to situation  [] depressed  [] anxious  [] angry   [] fearful  [] emotionally labile  [x] other: Patient seems somewhat guarded and possibly withholding; but, overall, he is responsive to inquiry and maintains calm and composure Mood [] appropriate to situation  [] depressed  [x] anxious  [] angry   [] fearful  [] emotionally labile  [x] other: Patient is obviously stressed by circumstances unfolding but not forthcoming in his discussion about the extent of his feeling upset   [x] appropriate  [] flat  [] inappropriate to content of speech Affect [] appropriate  [x] flat  [] inappropriate to content of speech   [x] appropriate  [] aggressive/agitated  [] withdrawn  [] inappropriate  [x] other: Patient is cooperative in spite of presenting as guarded in what he reveals Behavior [] appropriate  [] aggressive/agitated  [x] withdrawn  [] inappropriate  [] other:   [] good  [x] limited  [] denial  [] none Insight Into Illness [] good  [x] limited  [] denial  [] none   [x] intact  [] impaired       [] mild        [] moderate        [] severe       Describe:  Cognition [x] intact  [] impaired       [] mild        [] moderate        [] severe       Describe:    [x] coping  [] demonstrates poor adjustment  [] undetermined       As evidenced by: He admits to stress with traumatic loss of limb but is not presenting with symptoms of acute emotional distress, and certainly not symptoms of Major Depressive Disorder that are creating gross limits for him Patient Adjustment to Disability [] coping  [x] demonstrates poor adjustment  [] undetermined       As evidenced by: Patient is obviously stressed by acute medical circumstances that have interrupted his planned to discharge to home, actually this day   [] coping  [] demonstrates poor adjustment  [x] undetermined      As evidenced by: Not available on evaluation Family Adjustment to Disability [] coping  [] demonstrates poor adjustment  [x] undetermined      As evidenced by:      ASSESSMENT  Clinical Impression:  Patient is a 60year old, single, unemployed,  male. He has been residing in [facility] that is one level with ramp in place and has been primary caretaker for his mother.  Patient has a sister in [place] and another in [facility], and the latter has only recently become involved in assisting with their mother's care; however, at this time, parent is in SNF. Patient reports that she is S/P LE amputation herself and has suffered multiple strokes. Patient acknowledges that he had not been medically attentive nor compliant and was surprised by sudden decision that he required the amputation. In all fairness, with recent history of employment problems and insurance difficulties, patient was also burdened by worry about same, contributing to his non compliance. Parenthetically, insurance issues were initially related to his problems in long term employment with [employer] which eventually resulted in him ending a more than twenty year career with the company. He now seems to be motivated to improve his post surgery situation and is aware that he will be a candidate for a prosthesis. He was informed that prosthetic would not be provided until he was further along in his residual limb recovery. Emotionally, patient was seen by Psychiatry during recent hospitalization prior to transfer to ARU and diagnosed with Major Depressive Disorder without Psychosis, and begun on Cymbalta for stabilization. \"Caregiver Burnout\" was identified as a reason for much of his despair. This Rx will continue for him on ARU per Dr. Elizabeth Meng, and patient will be monitored for emotional and/or behavioral difficulties that might occur for him on ARU. At this time, while somewhat reserved and withholding, even guarded in his engagement, there was no evidence of acute emotional distress nor lability. Cognitively, patient seems to be functioning at his baseline of ability. He presented as alert and oriented, able to understand all inquiry, respond appropriately, and his immediate attention, concentration and carry over of information were all successful. Nonetheless, he will benefit from cues, prompts, repetition, redirection and reinforcement to maximize his efforts on ARU.         Patient Strengths:  Alert, oriented, cooperative and seemingly motivated to improve his status and regain independence    Patient Preferences:  Patient presumably expects to return to residence, though what he will be able to provide to his parent is uncertain at this time    Rehab Potential:  Patient has good potential and hopefully will maintain a high degree of motivation    Educational Needs: Under each heading list the specific items in which the patient or family will need education/training.  Example: hip precautions, use of walker, ADL equipment, neglect, judgment, adjustment, etc.     Special considerations or accommodations for teaching:  [x] Yes     [] No     [] NA  If Yes, explain: Mood instability and uncertain, sustained motivation Discharge Status    Completed Demonstrated/ Verbalized Understanding    Yes No Yes No   Info regarding disability: Limited insight  [x] [] [x] []   Adjustment: Forced dependency [x] [] [x] []   Cognition:  [] [] [] []   Other: Situational stressors and depression [x] [] [x] []   Other: [] [] [] []   If education not completed, explain: [] [] [] []     PLAN  Problem: Limited insight about all recovery  Long Term Goal: Maximize insight about recovery  Intervention: Patient education  At Discharge  LTG Achieved: [x] Yes [] No If not achieved, explain:    Problem: Dependency versus independence  Long Term Goal: Understand potential for return to independence  Intervention: Patient education and support   At Discharge  LTG Achieved: [x] Yes [] No If not achieved, explain:    Problem: Major Depressive Disorder  Long Term Goal: Maintain mood stability in recovery  Intervention: Rx and support   At Discharge  LTG Achieved: [x] Yes [] No If not achieved, explain:    Problem: Impaired self concept and self esteem  Long Term Goal: Improve self esteem and self concept  Intervention: Support  and ego support  At Discharge  LTG Achieved: [] Yes [x] No If not achieved, explain: Patient suffered setback with need for acute medical transfer for debridement of stump    Problem:   Long Term Goal:   Intervention:   At Discharge  LTG Achieved: [] Yes [] No If not achieved, explain:    Andria Fletcher, THE [facility]  3/29/2021 4:06 PM    DISCHARGE STATUS    Clinical Impressions: Patient's scheduled discharge to home was planned for this date, but emergent stump infection and need for cleaning and debridement obviously has caused cancellation of discharge and, instead, transfer to acute for medical care. Patient obviously stressed by immediate circumstances. While not appearing to be entirely, emotionally overwhelmed, he is stressed and appearing more withdrawn prior to transfer. Patient encouraged to persevere and focus on the efficacy of needed medical intervention for near term stability and further recovery.     Follow-up Services Recommended Purpose                 Andria Fletcher, PHD  Discharge Date/Time:

## 2021-03-29 NOTE — ROUTINE PROCESS
SHIFT CHANGE NOTE FOR Osmani Rousseau Bedside and Verbal shift change report given to Jannie Tinoco RN (oncoming nurse) by Kristian Braden RN (offgoing nurse). Report included the following information SBAR, Kardex, MAR and Recent Results. Situation: 
 Code Status: Full Code Reason for Admission: R BKA Hospital Day: 3 Problem List:  
Hospital Problems  Date Reviewed: 3/29/2021 Codes Class Noted POA Peripheral artery disease (HCC) (Chronic) ICD-10-CM: I73.9 ICD-9-CM: 443.9  Unknown Yes Type 2 diabetes mellitus, without long-term current use of insulin (HCC) (Chronic) ICD-10-CM: E11.9 ICD-9-CM: 250.00  Unknown Yes Overview Signed 3/26/2021  5:49 PM by Leann Bruner MD  
  HbA1c (3/6/2021) = 12.9 Factor V Leiden mutation ([facility] (Chronic) ICD-10-CM: N43.57 
ICD-9-CM: 289.81  Unknown Yes Hypertensive heart disease with chronic systolic congestive heart failure (HCC) (Chronic) ICD-10-CM: I11.0, I50.22 ICD-9-CM: 402.91, 428.22  Unknown Yes Overview Signed 3/26/2021 11:06 PM by Leann Bruner MD  
  2D echocardiogram (12/17/2015) showed EF 35%; genealized hypokinesis more focally severe of the inferior and posterior segments; mild mitral regurgitation; trace tricuspud regurgitation; normal pulmonary artery pressure Long term current use of aspirin (Chronic) ICD-10-CM: Y78.63 ICD-9-CM: V58.66  Unknown Yes On statin therapy due to risk of future cardiovascular event (Chronic) ICD-10-CM: W55.695 ICD-9-CM: V58.69  Unknown Yes Overview Signed 3/26/2021  4:34 PM by Leann Bruner MD  
  On Atorvastatin History of deep venous thrombosis (DVT) of distal vein of right lower extremity ICD-10-CM: N35.102 ICD-9-CM: V12.51  Unknown Yes Anticoagulated by anticoagulation treatment (Chronic) ICD-10-CM: Z79.01 
ICD-9-CM: V58.61  Unknown Yes Overview Signed 3/26/2021  4:36 PM by Leann Bruner MD  
  On Rivaroxaban  Mixed hyperlipidemia (Chronic) ICD-10-CM: B51.8 ICD-9-CM: 272.2  Unknown Yes Overview Signed 3/26/2021 11:07 PM by Tereso Busch MD  
  Lipid profile (7/25/2018) showed , , HDL 27,  History of embolectomy ICD-10-CM: Z98.890 ICD-9-CM: V45.89  Unknown Yes Overview Signed 3/26/2021  5:39 PM by Tereso Busch MD  
  S/P Right lower extremity arterial embolectomy Major depressive disorder ICD-10-CM: F32.9 ICD-9-CM: 296.20  3/24/2021 Yes Overview Signed 3/26/2021 11:31 PM by Tereso Busch MD  
  On Duloxetine Grade I diastolic dysfunction YEK-82-OH: I51.9 ICD-9-CM: 429.9  3/24/2021 Yes Overview Signed 3/26/2021 11:48 PM by Tereso Busch MD  
  2D echocardiogram (3/24/2021) showed EF 37%; global hypokinesis of left ventricle; grade I diastolic dysfunction * (Principal) Status post below knee amputation of right lower extremity ([address] ICD-10-CM: Y57.118 ICD-9-CM: V49.75  3/22/2021 Yes Overview Signed 3/26/2021  5:43 PM by Tereso Busch MD  
  S/P Right below-the-knee amputation (3/22/2021 - Dr. Chriss Min) Impaired mobility and ADLs ICD-10-CM: Z74.09, Z78.9 ICD-9-CM: V49.89  3/22/2021 Yes Critical ischemia of lower extremity ICD-10-CM: I99.8 ICD-9-CM: 459.9  3/22/2021 Yes Overview Signed 3/26/2021  5:42 PM by Tereso Busch MD  
  Right COVID-19 ruled out by laboratory testing ICD-10-CM: Z20.822 ICD-9-CM: V71.83  3/22/2021 Yes Overview Signed 3/26/2021  5:47 PM by Tereso Busch MD  
  SARS-CoV-2 ([address] [place] [address]) (3/22/2021): Not detected Acute blood loss as cause of postoperative anemia ICD-10-CM: D62 
ICD-9-CM: 285.1  3/22/2021 Yes History of noncompliance with medical treatment ICD-10-CM: Z91.19 ICD-9-CM: V15.81  3/18/2021 Yes Background: 
 Past Medical History:  
Past Medical History:  
Diagnosis Date  Acute blood loss as cause of postoperative anemia 3/22/2021  Anticoagulated by anticoagulation treatment On Rivaroxaban  Cardiomyopathy ([address] 12/17/2015 2D echocardiogram (3/24/2021) showed EF 37%; global hypokinesis of left ventricle; grade I diastolic dysfunction; 2D echocardiogram (12/17/2015) showed EF 35%; genealized hypokinesis more focally severe of the inferior and posterior segments; mild mitral regurgitation; trace tricuspud regurgitation; normal pulmonary artery pressure  Chronic systolic heart failure ([address] 2D echocardiogram (3/24/2021) showed EF 37%; global hypokinesis of left ventricle; grade I diastolic dysfunction; 2D echocardiogram (12/17/2015) showed EF 35%; genealized hypokinesis more focally severe of the inferior and posterior segments; mild mitral regurgitation; trace tricuspud regurgitation; normal pulmonary artery pressure  Coronary artery disease involving native coronary artery of native heart  COVID-19 ruled out by laboratory testing 3/22/2021 SARS-CoV-2 ([place]) (3/22/2021): Not detected  Critical ischemia of lower extremity 3/22/2021 Right  Factor V Leiden mutation ([address]  Grade I diastolic dysfunction 15/93/1975  
 2D echocardiogram (3/24/2021) showed EF 37%; global hypokinesis of left ventricle; grade I diastolic dysfunction  History of deep venous thrombosis (DVT) of distal vein of right lower extremity  History of epilepsy  History of head injury  History of myocardial infarction  History of noncompliance with medical treatment 3/18/2021  Hypertensive heart disease with chronic systolic congestive heart failure ([address]  2D echocardiogram (3/24/2021) showed EF 37%; global hypokinesis of left ventricle; grade I diastolic dysfunction; 2D echocardiogram (12/17/2015) showed EF 35%; genealized hypokinesis more focally severe of the inferior and posterior segments; mild mitral regurgitation; trace tricuspud regurgitation; normal pulmonary artery pressure  Long term current use of aspirin  Major depressive disorder 03/24/2021 On Duloxetine  Migraine headache  Mitral valve prolapse  Mixed hyperlipidemia Lipid profile (7/25/2018) showed , , HDL 27,   Obstructive sleep apnea  On statin therapy due to risk of future cardiovascular event On Atorvastatin  Peripheral artery disease ([facility]  Type 2 diabetes mellitus, without long-term current use of insulin ([facility] HbA1c (3/6/2021) = 88.0  
 Umbilical hernia  Wears glasses Patient taking anticoagulants yes Patient has a defibrillator: no  
 
Assessment: 
 Changes in Assessment throughout shift: Patient served almonds for dinner, patient recognized food on tray and notified nursing staff, did not eat.  Patient has central line: no Reasons if yes: Insertion date: Last dressing date: 
 Patient has Ellis Cath: no Reasons if yes: Insertion date: 
 
 Last Vitals: 
  
Vitals:  
 03/28/21 1554 03/28/21 2100 03/29/21 0728 03/29/21 1556 BP: 106/71 109/72 123/81 116/73 Pulse: 83 85 94 89 Resp: 17 18 14 16 Temp: 97 °F (36.1 °C) 97.3 °F (36.3 °C) 99.4 °F (37.4 °C) 97.5 °F (36.4 °C) SpO2: 99% 97% 97% 96% Weight:      
Height:      
 
 
 PAIN Pain Assessment Pain Intensity 1: 6 (03/29/21 1710) Pain Intensity 1: 2 (12/29/14 1105) Pain Location 1: Leg Pain Location 1: Abdomen Pain Intervention(s) 1: Medication (see MAR) Pain Intervention(s) 1: Medication (see MAR) Patient Stated Pain Goal: 0 Patient Stated Pain Goal: 0 
o Intervention effective: yes  
o Other actions taken for pain:  
 
 Skin Assessment Skin color Skin Color: Appropriate for ethnicity Condition/Temperature Skin Condition/Temp: Warm, Dry Integrity Skin Integrity: Incision (comment) Turgor Turgor: Non-tenting Weekly Pressure Ulcer Documentation  Pressure  Injury Documentation: No Pressure Injury Noted-Pressure Ulcer Prevention Initiated Wound Prevention & Protection Methods Orientation of wound Orientation of Wound Prevention: Posterior Location of Prevention Location of Wound Prevention: Buttocks, Heel, Sacrum/Coccyx Dressing Present Dressing Present : Yes Dressing Status Dressing Status: Intact Wound Offloading Wound Offloading (Prevention Methods): Adaptive equipment, Bed, pressure reduction mattress, Chair cushion, Elevate heels, Pillows, Repositioning, Turning, Walker, Wheelchair  INTAKE/OUPUT Date 03/28/21 0700 - 03/29/21 2652 03/29/21 0700 - 03/30/21 0073 Shift 0700-1859 1900-0659 24 Hour Total 0700-1859 1900-0659 24 Hour Total  
INTAKE Shift Total(mL/kg) OUTPUT Urine(mL/kg/hr) Urine Occurrence(s) 3 x 2 x 5 x 2 x  2 x Stool Stool Occurrence(s) 0 x 0 x 0 x 0 x  0 x Shift Total(mL/kg) NET Weight (kg) 84.8 84.8 84.8 84.8 84.8 84.8 Recommendations: 1. Patient needs and requests: water, pain medication 2. Diet: card. Const. Carb 1800k 3. Pending tests/procedures: OT/PT 4. Functional Level/Equipment: walker, assist x 1 
 
5. Estimated Discharge Date: TBD Posted on Whiteboard in Patients Room: yes HEALS Safety Check A safety check occurred in the patient's room between off going nurse and oncoming nurse listed above. The safety check included the below items Area Items H High Alert Medications - Verify all high alert medication drips (heparin, PCA, etc.) E Equipment - Suction is set up for ALL patients (with yanker) - Red plugs utilized for all equipment (IV pumps, etc.) - WOWs wiped down at end of shift. 
- Room stocked with oxygen, suction, and other unit-specific supplies A Alarms - Bed alarm is set for fall risk patients - Ensure chair alarm is in place and activated if patient is up in a chair L Lines - Check IV for any infiltration - Ellis bag is empty if patient has a Ellis - Tubing and IV bags are labeled Trudi Brittany Safety  - Room is clean, patient is clean, and equipment is clean. - Hallways are clear from equipment besides carts. - Fall bracelet on for fall risk patients - Ensure room is clear and free of clutter - Suction is set up for ALL patients (with faraz) - Hallways are clear from equipment besides carts. - Isolation precautions followed, supplies available outside room, sign posted

## 2021-03-29 NOTE — INTERDISCIPLINARY ROUNDS
[address] 
[address] [place] [address] [address] INPATIENT REHABILITATION 
DISCHARGE RECOMMENDATION SHEET Date: 3/29/2021 Name: Jonathan Tabares Age / Sex: 60 y.o. / male CSN: [id number] MRN: [id number] Admit Date: 3/26/2021 Discharge Date: 4/8/2021 WHEELCHAIR WALKING AIDS FOLLOW-UP SERVICES Height  []  Straight cane  [] DMV Handicap Placard  
 []  #14 ½ arnulfo height  []  Forearm crutches OUTPATIENT []  Arnulfo height  []  Axillary crutches  []  PT  
 [x]  Standard height  []  LBQC  []  OT  
 []  Reclining high back  []  SBQC  []  ST Weight  HOME HEALTH [x]  Standard weight WALKERS  [x]  PT  
 []  Lightweight  []  Standard height  [x]  OT  
 []  High-strength lightweight  []  Small adult  []  ST  
 []  Heavy Duty   []  Tall walker  []  Nursing  
 []  Patient is unable to self-propel a standard weight wheelchair  [x]  Rolling walker with 5 single fixed wheels  [x]  Wound care Location of wound: 
Specify needs: right leg incision assessment/wound care. [place] out 4/19/21 []  Anticoagulation management Width  []  Bariatric walker  []  Diabetes management  
 []  Narrow (16)   []  Insulin management  
 [x]  Standard (18) ACCESSORIES  []  No insulin management  
 []  Wide (20)  []  Rear sure glide brakes  []  BP management  
 []  Other  []  10 rear wheel brake  []  CHF Telehealth Arms  []  Tall leg extensions  []  Post-CABG care/monitoring  
 [x]  Standard  [x]  Other: right residual limb support  []  Colostomy care  
 []  Desk/Tray   []  Tube feeding  
 []  Removable BATHROOM EQUIPMENT  []  PICC line care Foot/Leg Rests  []  Bedside commode  []  Ellis catheter care [x]  Removable & right residual limb support  []  Extra wide bedside commode  []  Other:   
 []  Elevating  [x]  3:1 commode   []  Medical Social Worker    Other  [x]  Tub transfer bench  []  Aide  
 [x]  Brake extensions  []  Shower chair   
 []  Padded gloves  X  grab bar installed on wall next to toilet and inside tub-shower unit   
 []  Antitippers CUSHIONS OTHER [x]  Foam cushion  []  Seat belt   
 []  Gel cushion  []  Gait belt   
 []  Priscella Man II  []  Transfer board - Size:   
 []  Roho  []  Oxygen   
 []  Other  []  CPM   
  []  [facility] [address]  []  Ramp   
 []  Hospital bed  []  Hip kit   
 []  Special mattress  []  Reacher   
 []  Trapeze bar Preferred Local Pharmacy (not mail-order): [address] Physical Therapy Brittany Hairston, PT, DPT Occupational Therapy Aurora Hoffmann MS OTR/L Speech-Language Therapy Social Work Linnea Garcia, MSW Darrell Centeno, RN BSN

## 2021-03-29 NOTE — PROGRESS NOTES
Problem: Self Care Deficits Care Plan (Adult)  Goal: *Therapy Goal (Edit Goal, Insert Text)  Description: Occupational Therapy Goals   Long Term Goals  Initiated  and to be accomplished within 2 week(s)  1. Pt will perform self-feeding with mod I.  2. Pt will perform grooming with mod I.  3. Pt will perform UB bathing with mod I.  4. Pt will perform LB bathing with mod I.  5. Pt will perform tub/shower transfer with mod I.   6. Pt will perform UB dressing with mod I.  7. Pt will perform LB dressing with mod I.  8. Pt will perform toileting task with mod I.  9. Pt will perform toilet transfer with mod I. Short Term Goals   Initiated 3/27/2021 and to be accomplished within 7 day(s)  1. Pt will perform UB bathing with CGA. 2. Pt will perform LB bathing with CGA. 3. Pt will perform tub/shower transfer with CGA. 4. Pt will perform LB dressing with CGA. 5. Pt will perform toileting task with CGA. 6. Pt will perform toilet transfer with CGA. Outcome: Progressing Towards Goal   Occupational Therapy TREATMENT    Patient: Yanet Hoff   60 y.o. Patient identified with name and : yes    Date: 3/29/2021    First Tx Session  Time In: 5470  Time Out: 3673    Diagnosis: Status post below knee amputation of right lower extremity [facility]) [Z89.511]   Precautions: Fall  Chart, occupational therapy assessment, plan of care, and goals were reviewed. Pain:  Pt reports 6/10 pain or discomfort prior to treatment. Pt reports 6/10 pain or discomfort post treatment. Intervention Provided: nursing notified and pain meds administered      SUBJECTIVE:   Patient stated I live with my mom, I make all the meals.     OBJECTIVE DATA SUMMARY:     THERAPEUTIC ACTIVITY Daily Assessment         THERAPEUTIC EXERCISE Daily Assessment         IADL Daily Assessment         NMRE Daily Assessment         FEEDING/EATING Daily Assessment          GROOMING Daily Assessment          UPPER BODY BATHING Daily Assessment LOWER BODY BATHING Daily Assessment          TOILETING Daily Assessment          UPPER BODY DRESSING Daily Assessment          LOWER BODY DRESSING Daily Assessment          MOBILITY/TRANSFERS Daily Assessment            ASSESSMENT:  First tx session: Patient participated well with UE THERE EX to increase UB strength and functional activity tolerance for improved ADL performance and functional transfers. Patient will benefit from [employer]aband HEP for d/c home. Patient self propelled w/c to and from rehab gym using BUEs, pt sitting up in w/c at end of tx session to eat lunch w/ Mod I, call bell within reach & pt verbalized understanding to utilize for assist e.g. functional transfers in order to prevent falls. Second tx session: supine to sit edge of bed w/ Supv. Functional transfer: SBA sit to stand and SPT using RW from bed -> w/c. Toilet transfer: pt selfpropelled w/c into bathroom, SBA using grab bar for sit to stand and SPT from w/c <-> toilet, CGA clothing mgmt in stance using wall grab bar, supv toilet hygiene while seated on toilet. Washing hands Mod I seated w/c level at sink. Tub-shower transfer: following demonstration, pt SBA with return demonstration from w/c level using RW for SPT w/c <-> tub transfer bench, scooting in & out using grab bar. Functional transfer: SBA sit to stand from w/c and SPT using RW, vc's for hand placement for reaching back for bed surface with poor carryover with fast descent, pt able to self correct with trunk flexion to prevent posterior lean. Patient resting in bed, call bell within reach & pt verbalized understanding to utilize for assist e.g. functional transfers in order to prevent falls.      Second tx session:   Progression toward goals:  [x]          Improving appropriately and progressing toward goals  []          Improving slowly and progressing toward goals  []          Not making progress toward goals and plan of care will be adjusted     PLAN:  Patient continues to benefit from skilled intervention to address the above impairments. Continue treatment per established plan of care. Discharge Recommendations:  Home Health   Further Equipment Recommendations for Discharge: grab bar on wall next to toilet and inside tub-shower unit, tub transfer bench, rolling walker, wheelchair      Activity Tolerance:  fair  Estimated LOS: 2 weeks     Please refer to the flowsheet for vital signs taken during this treatment. After treatment:   [x]  Patient left in no apparent distress sitting up in chair   []  Patient left in no apparent distress in bed  [x]  Call bell left within reach  [x]  Nursing notified  []  Caregiver present  [x]  wheelchair alarm activated    COMMUNICATION/EDUCATION:   [x] Home safety education was provided and the patient/caregiver indicated understanding. [x] Patient/family have participated as able in goal setting and plan of care. [x] Patient/family agree to work toward stated goals and plan of care. [] Patient understands intent and goals of therapy, but is neutral about his/her participation. [] Patient is unable to participate in goal setting and plan of care.       Nina Brown

## 2021-03-29 NOTE — INTERDISCIPLINARY ROUNDS
[address] 
[address] [place] [address], [address] INPATIENT REHABILITATION 
PRE-TEAM CONFERENCE SUMMARY Date of Conference: 3/30/2021 Patient Information:  
 
  
Name: Concha Wu Age / Sex: 60 y.o. / male CSN: [id number] MRN: [id number] Admit Date: 3/26/2021 Length of Stay: 3 days Primary Rehab Diagnosis 1. Impaired Mobility and ADLs 2. S/P Right below-the-knee amputation (3/22/2021 - Dr. Ayah Whitney) 3. History of critical ischemia of the right lower extremity 4. History of right lower extremity arterial embolectomy  
 
Comorbidities Patient Active Problem List  
Diagnosis Code  Status post below knee amputation of right lower extremity ([facility] Z89.511  Impaired mobility and ADLs Z74.09, Z78.9  Critical ischemia of lower extremity I99.8  Peripheral artery disease (HCC) I73.9  Coronary artery disease involving native coronary artery of native heart I25.10  Type 2 diabetes mellitus, without long-term current use of insulin (HCC) E11.9  History of epilepsy Z80.75  
 Factor V Leiden mutation ([facility] D68.51  
 Migraine headache G43.909  Wears glasses Z97.3  Umbilical hernia B20.2  Obstructive sleep apnea G47.33  
 ICD (implantable cardioverter-defibrillator) in place Z95.810  
 Mitral valve prolapse I34.1  History of head injury Z87.828  
 Hypertensive heart disease with chronic systolic congestive heart failure (HCC) I11.0, I50.22  
 History of myocardial infarction I25.2  Long term current use of aspirin Z79.82  
 On statin therapy due to risk of future cardiovascular event Z79.899  
 History of deep venous thrombosis (DVT) of distal vein of right lower extremity Z86.718  Anticoagulated by anticoagulation treatment Z79.01  
 Mixed hyperlipidemia E78.2  History of embolectomy Z98.890  
 COVID-19 ruled out by laboratory testing Z20.822  Acute blood loss as cause of postoperative anemia D62  
 Cardiomyopathy ([facility] I42.9  Chronic systolic heart failure (HCC) I50.22  
 History of noncompliance with medical treatment Z91.19  
 Major depressive disorder F32.9  Grade I diastolic dysfunction V47.6 Therapy: FIM SCORES Initial Assessment Weekly Progress Assessment 3/29/2021 Eating Functional Level: 5 Comments: Set up of breakfast tray, requires no assist for feeding or opening packages/containers  6 Mod I Swallowing Grooming 5  5 SBA oral hygiene Bathing 4  4 min A UB & LB Upper Body Dressing Functional Level: 5 Items Applied/Steps Completed: Pullover (4 steps) Comments: Handed shirt and patient is able to don without difficulty while sitting EOB. 5 SBA UB w/ pullover shirt Lower Body Dressing Functional Level: 4 Items Applied/Steps Completed: Elastic waist pants (3 steps), Sock, left (1 step) Comments: Patient is able to don sock, pants however when standing requires min A for pulling pants over hips due to decreased balance. 4 Min A Toileting Functional Level: 4 Comments: Requires assist for clothing management when pulling pants over hips, patient able to complete toileting hygiene and pulling pants down. 4.5 clothing mgmt in stance using wall grab bar 5 supv toilet hygiene seated Bladder 0 0 Bowel  0 0 Toilet Transfer [place] Toilet Transfer Score: 4 Comments: Requires CGA due to decreased balance.  5 SBA from w/c level using grab bar Tub/Shower Transfer [place] Tub or Shower Type: Tub/Shower combination Tub/Shower Transfer Score: 4 Comments: Requires CGA due to decreased balance.  5 SBA from w/c level using RW for SPT from w/c <-> tub transfer bench, use of grab bar for scooting and elevating LLE in/out of tub Comprehension Primary Mode of Comprehension: Auditory Score: 7 Auditory 6 Expression Primary Mode of Expression: Verbal 
Score: 7 Verbal 
6 Social Interaction Score: 7 6 Problem Solving Score: 7 5 Memory Score: 7 6  
 
FIM SCORES Initial Assessment Weekly Progress Assessment 3/29/2021 Bed/Chair/Wheelchair Transfers Transfer Type: Other Other: step step with RW Transfer Assistance : 4 (Minimal assistance) Sit to Stand Assistance: Minimal assistance Car Transfers: Not tested(to be further assessed) Car Type: N/A Transfer Type: Other Other: stand step with RW Transfer Assistance : 5 (Supervision/setup) Sit to Stand Assistance: Supervision Car Transfers: (NT) Car Type: (NT) Bed Mobility Rolling Right 6 (Modified independent) Rolling Left 6 (Modified independent) Supine to Sit 5 (Supervision) Sit to Stand Minimal assistance Sit to Supine 5 (Supervision) Rolling Right 
 6 (Modified independent) Rolling Left 
 6 (Modified independent) Supine to Sit 
 5 (Supervision) Sit to Stand Supervision Sit to Supine 
 5 (Supervision) Locomotion (W/C) Able to Propel (ft): 250 feet(needing extra time) Functional Level: 4(min A for steering initially, using bilat UE and left LE) Curbs/Ramps Assist Required (FIM Score): 0 (Not tested) Wheelchair Setup Assist Required : 3 (Moderate assistance) Wheelchair Management: Manages left brake, Manages right brake(needing brake extender) Able to Propel (ft): 250 feet Functional Level: 5 Curbs/Ramps Assist Required (FIM Score): 0 (Not tested) Wheelchair Setup Assist Required : 5 (Supervision/setup) Wheelchair Management: Manages left brake;Manages right brake(with brake extenders) Locomotion (W/C distance) 250 feet 250 feet Locomotion (Walk) 4 (Minimal assistance) 5 (Supervision/setup) Gait belt;Walker, rolling Locomotion (Walk dist.) 20 Feet (ft) 20 Feet (ft) Steps/Stairs Steps/Stairs Ambulated (#): 0 Level of Assist : 0 (Not tested)(NT due to safety concern) Rail Use: (NT) Steps/Stairs Ambulated (#): 0 Level of Assist : 0 (Not tested)(NT due to safety concern) Rail Use: (NT)  
 
 
 
Nursing:  
 
Neuro:   AAA&O x   4 Respiratory:   [x] WNL [] O2 LPM:  
Other: 
Peripheral Vascular:   [] TEDS present   [] Edema present ____ Grade Cardiac:   [x] WNL   [] Other Genitourinary:   [x] continent   [] incontinent   [] nguyen  Abdominal __3/27/21_ LBM 
GI: _Diabetic/Cardiac_ Diet Nectar_ Liquids _____ tube feeds Musculoskeletal: ____ ROM Transfers _W/C, Gait belt_ Assistive Device Used _Mod assist x 2_ Level of Assistance Skin Integumentary:   [x] Intact   [] Not Intact   Fall/aspiration,infection/skin Preventative Measures/Details: Chair/bed alarm, hourly rounds, call bell, pt education, modified diet, vital signs. Pain: [x] Controlled   [] Not Controlled   Pain Meds:   [] Scheduled   [x] PRN Registered Dietitian / Nutrition:  
No data found. Pt reported good appetite, improving and good meal intake, eating ~90% of meals. Tolerating diet. Did not try the magic cup supplement yet. Denied having any nutrition concerns at time of visit Supplements:          [x] Yes: Magic Cup ERICK once daily   [] No     
Amount of supplement consumed:  Hasn't tried it yet No intake or output data in the 24 hours ending 03/29/21 1306 Last bowel movement: 3/27 Interdisciplinary Team Goals: 1. Discipline  Physical Therapy Goal  Patient will ambulate 50 feet with S using RW and no safety cuing. Barrier  Decreased activity tolerance, decreased BLE strength Intervention  TE, gait training with repetition and patient education Goal written by:   JULIANO Blanton 2. Discipline  Occupational Therapy Goal  increase strength and balance for increased independence with ADLs and functional transfers Barrier  pain in residual R AKA, decreased strength, balance and functional activity tolerance Intervention  THERE EX, THERE ACT, NMR and ADL retraining Goal written by:  Bailee Hopkins MS OTR/L  
 
3. Discipline  Speech Therapy Goal    
 Barrier Intervention Goal written by: 4. Discipline  Nursing Goal  R leg incision will heal without s/s of infection. Pt will be able to verbalize s/s of infection by discharge. Barrier  New amputee. Diabetes. Intervention  Pt education regarding s/s of infection. Wound assessments and wound care. Education on diabetes management. Goal written by:  Mark Alston RN BSN  
 
5. Discipline  Clinical Psychology Goal  Address increased dependency post amputation Barrier  Emotional sequelae secondary to loss of limb Intervention  Support  and patient education Goal written by:  Gerry Alonso, PhD  
 
6. Discipline  Nutrition / Dietetics Goal  - PO nutrition intake will meet >75% of patient estimated nutritional needs within the next 7 days. Barrier  appetite, now improving Intervention  continue po diet and nutrition supplements Goal written by:  Charisma Pressley RD Disposition / Discharge Planning: Follow-up services:  [x] Physical Therapy           
 [] Occupational Therapy     
 [] Speech Therapy         
 [] Skilled Nursing    
 [] Medical Social Worker 
 [] Aide        [] Outpatient      [] vs 
 [x] Home Health  [] vs 
     [] to progress to outpatient 
     [] with 24-hour supervision 
     [] with 24-hour assistance 
 [] [place] [facility] [place] [facility]:  18 inch w/c with residual limb support and RW, grab bar installed on wall next to toilet and inside tub-shower unit, tub transfer bench, 3 in 1 commmode Estimated discharge date:  4/8/2021 Discharge Location:  [] Home  [] versus  
 [] [facility] [] [address] [place] [address] [] Other:   
  
 
 
Electronic Signatures:  
 
 Signature Date Signed Physical Therapist 
  JULIANO Love, PT, DPT  3/29/2021 
 3/29/2021 Occupational Therapist 
  Mookie Li MS OTR/L  3/29/2021 Speech Therapist 
      
Recreational Therapist 
  Henrry Ramirez, [address] 3/29/2021 Nursing   Mark Alston, [employer] BSN 3/29/2021 Dietitian Enrique Blank RD   3/29/2021 Clinical Psychologist 
  Jose L Peacock, PhD  3/30/2021 Physician 
  Vikki Helton MD   3/29/2021  DARNELL Pinzon  3/29/2021 Opportunity to share with family/caregiver[] YES [] NO 
 
Relationship to patient____________________________________________________ The above information has been reviewed with the patient in a language that they can understand. Opportunity for comments and questions has been provided and a signed attestation has been scanned into the \"media tab\" of the EMR. Patient Signature: ______________________________________________________ Date Signed: __________________________________________________________

## 2021-03-29 NOTE — PROGRESS NOTES
Sw attempted to reach pt's friend, Elle Manzano ([phone]), regarding dc planning and family education. Sw left a message requesting a return call. Sahil will follow.

## 2021-03-29 NOTE — PROGRESS NOTES
[x] Psychology  [] Social Work [] Recreational Therapy    INTERVENTION  UNITS/TIME OF SERVICE   Assessment  March 29, 2021   Supportive Counseling    Orientation    Discharge Planning    Resource Linkage              Progress/Current Status    Patient seen for Psychological Evaluation as requested on admission to ARU by Dr. Andrea Marshall. Patient found sitting quietly and upright in wheelchair in bedroom and not appearing to be in any distress. He was immediately responsive to my contacts but proved to be somewhat reserved during interview, sometimes guarded and not elaborating information unless pressed. He reports that decision for amputation was sudden and unexpected and, naturally, he is only beginning to understand the ramifications for himself. 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 He has been Rx Cymbalta and this will continue on ARU as per Dr. Andrea Marshall. He also reports significant sleep disturbance in spite of medication for same. Patient will be monitored for emotional and/or behavioral difficulties while on ARU and be encouraged to identify realistic goals for himself in order to minimize frustration, as well as to persevere in his therapy effort. He is already aware of a tentative date for discharge next week.     Arely Campbell, [facility] 3/29/2021 4:01 PM

## 2021-03-29 NOTE — ROUTINE PROCESS
SHIFT CHANGE NOTE FOR Jovanni Huaann Bedside and Verbal shift change report given to Laurie Osman RN (oncoming nurse) by Oscar Osler, RN (offgoing nurse). Report included the following information SBAR, Kardex, MAR and Recent Results. Situation: 
 Code Status: Full Code Reason for Admission: R BKA Hospital Day: 3 Problem List:  
Hospital Problems  Date Reviewed: 3/28/2021 Codes Class Noted POA Peripheral artery disease (HCC) (Chronic) ICD-10-CM: I73.9 ICD-9-CM: 443.9  Unknown Yes Type 2 diabetes mellitus, without long-term current use of insulin (HCC) (Chronic) ICD-10-CM: E11.9 ICD-9-CM: 250.00  Unknown Yes Overview Signed 3/26/2021  5:49 PM by Courtney Umanzor MD  
  HbA1c (3/6/2021) = 12.9 Factor V Leiden mutation ([facility] [address] (Chronic) ICD-10-CM: I08.00 
ICD-9-CM: 289.81  Unknown Yes Hypertensive heart disease with chronic systolic congestive heart failure (HCC) (Chronic) ICD-10-CM: I11.0, I50.22 ICD-9-CM: 402.91, 428.22  Unknown Yes Overview Signed 3/26/2021 11:06 PM by Courtney Umanzor MD  
  2D echocardiogram (12/17/2015) showed EF 35%; genealized hypokinesis more focally severe of the inferior and posterior segments; mild mitral regurgitation; trace tricuspud regurgitation; normal pulmonary artery pressure Long term current use of aspirin (Chronic) ICD-10-CM: O86.20 ICD-9-CM: V58.66  Unknown Yes On statin therapy due to risk of future cardiovascular event (Chronic) ICD-10-CM: W35.485 ICD-9-CM: V58.69  Unknown Yes Overview Signed 3/26/2021  4:34 PM by Courtney Umanzor MD  
  On Atorvastatin History of deep venous thrombosis (DVT) of distal vein of right lower extremity ICD-10-CM: N35.318 ICD-9-CM: V12.51  Unknown Yes Anticoagulated by anticoagulation treatment (Chronic) ICD-10-CM: Z79.01 
ICD-9-CM: V58.61  Unknown Yes Overview Signed 3/26/2021  4:36 PM by Courtney Umanzor MD  
  On Rivaroxaban  Mixed hyperlipidemia (Chronic) ICD-10-CM: J78.0 ICD-9-CM: 272.2  Unknown Yes Overview Signed 3/26/2021 11:07 PM by Lashell Guillermo MD  
  Lipid profile (7/25/2018) showed , , HDL 27,  History of embolectomy ICD-10-CM: Z98.890 ICD-9-CM: V45.89  Unknown Yes Overview Signed 3/26/2021  5:39 PM by Lashell Guillermo MD  
  S/P Right lower extremity arterial embolectomy Major depressive disorder ICD-10-CM: F32.9 ICD-9-CM: 296.20  3/24/2021 Yes Overview Signed 3/26/2021 11:31 PM by Lashell Guillermo MD  
  On Duloxetine Grade I diastolic dysfunction KPN-31-YO: I51.9 ICD-9-CM: 429.9  3/24/2021 Yes Overview Signed 3/26/2021 11:48 PM by Lashell Guillermo MD  
  2D echocardiogram (3/24/2021) showed EF 37%; global hypokinesis of left ventricle; grade I diastolic dysfunction * (Principal) Status post below knee amputation of right lower extremity ([address] ICD-10-CM: N80.362 ICD-9-CM: V49.75  3/22/2021 Yes Overview Signed 3/26/2021  5:43 PM by Lashell Guillermo MD  
  S/P Right below-the-knee amputation (3/22/2021 - Dr. Ayana Basilio) Impaired mobility and ADLs ICD-10-CM: Z74.09, Z78.9 ICD-9-CM: V49.89  3/22/2021 Yes Critical ischemia of lower extremity ICD-10-CM: I99.8 ICD-9-CM: 459.9  3/22/2021 Yes Overview Signed 3/26/2021  5:42 PM by Lashell Guillermo MD  
  Right COVID-19 ruled out by laboratory testing ICD-10-CM: Z20.822 ICD-9-CM: V71.83  3/22/2021 Yes Overview Signed 3/26/2021  5:47 PM by Lashell Guillermo MD  
  SARS-CoV-2 ([address] [place] [address]) (3/22/2021): Not detected Acute blood loss as cause of postoperative anemia ICD-10-CM: D62 
ICD-9-CM: 285.1  3/22/2021 Yes History of noncompliance with medical treatment ICD-10-CM: Z91.19 ICD-9-CM: V15.81  3/18/2021 Yes Background: 
 Past Medical History:  
Past Medical History:  
Diagnosis Date  Acute blood loss as cause of postoperative anemia 3/22/2021  Anticoagulated by anticoagulation treatment On Rivaroxaban  Cardiomyopathy ([address] 12/17/2015 2D echocardiogram (3/24/2021) showed EF 37%; global hypokinesis of left ventricle; grade I diastolic dysfunction; 2D echocardiogram (12/17/2015) showed EF 35%; genealized hypokinesis more focally severe of the inferior and posterior segments; mild mitral regurgitation; trace tricuspud regurgitation; normal pulmonary artery pressure  Chronic systolic heart failure ([address] 2D echocardiogram (3/24/2021) showed EF 37%; global hypokinesis of left ventricle; grade I diastolic dysfunction; 2D echocardiogram (12/17/2015) showed EF 35%; genealized hypokinesis more focally severe of the inferior and posterior segments; mild mitral regurgitation; trace tricuspud regurgitation; normal pulmonary artery pressure  Coronary artery disease involving native coronary artery of native heart  COVID-19 ruled out by laboratory testing 3/22/2021 [facility] (3/22/2021): Not detected  Critical ischemia of lower extremity 3/22/2021 Right  Factor V Leiden mutation ([facility] [address]  Grade I diastolic dysfunction 40/67/3505  
 2D echocardiogram (3/24/2021) showed EF 37%; global hypokinesis of left ventricle; grade I diastolic dysfunction  History of deep venous thrombosis (DVT) of distal vein of right lower extremity  History of epilepsy  History of head injury  History of myocardial infarction  History of noncompliance with medical treatment 3/18/2021  Hypertensive heart disease with chronic systolic congestive heart failure ([address]  2D echocardiogram (3/24/2021) showed EF 37%; global hypokinesis of left ventricle; grade I diastolic dysfunction; 2D echocardiogram (12/17/2015) showed EF 35%; genealized hypokinesis more focally severe of the inferior and posterior segments; mild mitral regurgitation; trace tricuspud regurgitation; normal pulmonary artery pressure  Long term current use of aspirin  Major depressive disorder 03/24/2021 On Duloxetine  Migraine headache  Mitral valve prolapse  Mixed hyperlipidemia Lipid profile (7/25/2018) showed , , HDL 27,   Obstructive sleep apnea  On statin therapy due to risk of future cardiovascular event On Atorvastatin  Peripheral artery disease [facility]  Type 2 diabetes mellitus, without long-term current use of insulin ([facility] HbA1c (3/6/2021) = 36.7  
 Umbilical hernia  Wears glasses Patient taking anticoagulants yes Patient has a defibrillator: no  
 
Assessment: 
 Changes in Assessment throughout shift: Patient served almonds for dinner, patient recognized food on tray and notified nursing staff, did not eat.  Patient has central line: no Reasons if yes: Insertion date: Last dressing date: 
 Patient has Ellis Cath: no Reasons if yes: Insertion date: 
 
 Last Vitals: 
  
Vitals:  
 03/27/21 2100 03/28/21 0733 03/28/21 1554 03/28/21 2100 BP: 103/65 111/71 106/71 109/72 Pulse: 80 76 83 85 Resp: 18 16 17 18 Temp: (!) 96.7 °F (35.9 °C) 97.1 °F (36.2 °C) 97 °F (36.1 °C) 97.3 °F (36.3 °C) SpO2: 97% 98% 99% 97% Weight:      
Height:      
 
 
 PAIN Pain Assessment Pain Intensity 1: 7 (03/29/21 0720) Pain Intensity 1: 2 (12/29/14 1105) Pain Location 1: Leg Pain Location 1: Abdomen Pain Intervention(s) 1: Medication (see MAR) Pain Intervention(s) 1: Medication (see MAR) Patient Stated Pain Goal: 0 Patient Stated Pain Goal: 0 
o Intervention effective: yes  
o Other actions taken for pain:  
 
 Skin Assessment Skin color Skin Color: Appropriate for ethnicity Condition/Temperature Skin Condition/Temp: Dry, Warm Integrity Skin Integrity: Intact Turgor Turgor: Non-tenting Weekly Pressure Ulcer Documentation  Pressure  Injury Documentation: No Pressure Injury Noted-Pressure Ulcer Prevention Initiated Wound Prevention & Protection Methods Orientation of wound Orientation of Wound Prevention: Posterior Location of Prevention Location of Wound Prevention: Buttocks, Sacrum/Coccyx Dressing Present Dressing Present : Yes Dressing Status Dressing Status: Intact Wound Offloading Wound Offloading (Prevention Methods): Bed, pressure redistribution/air  INTAKE/OUPUT Date 03/28/21 0700 - 03/29/21 7416 03/29/21 0700 - 03/30/21 6084 Shift 0700-1859 1900-0659 24 Hour Total 0700-1859 1900-0659 24 Hour Total  
INTAKE Shift Total(mL/kg) OUTPUT Urine(mL/kg/hr) Urine Occurrence(s) 3 x 2 x 5 x Stool Stool Occurrence(s) 0 x 0 x 0 x Shift Total(mL/kg) NET Weight (kg) 84.8 84.8 84.8 84.8 84.8 84.8 Recommendations: 1. Patient needs and requests: water, pain medication 2. Diet: card. Const. Carb 1800k 3. Pending tests/procedures: OT/PT 4. Functional Level/Equipment: walker, assist x 1 
 
5. Estimated Discharge Date: TBD Posted on Whiteboard in Patients Room: yes HEALS Safety Check A safety check occurred in the patient's room between off going nurse and oncoming nurse listed above. The safety check included the below items Area Items H High Alert Medications - Verify all high alert medication drips (heparin, PCA, etc.) E Equipment - Suction is set up for ALL patients (with yanker) - Red plugs utilized for all equipment (IV pumps, etc.) - WOWs wiped down at end of shift. 
- Room stocked with oxygen, suction, and other unit-specific supplies A Alarms - Bed alarm is set for fall risk patients - Ensure chair alarm is in place and activated if patient is up in a chair L Lines - Check IV for any infiltration - Ellis bag is empty if patient has a Ellis - Tubing and IV bags are labeled Veleta Handsome Safety - Room is clean, patient is clean, and equipment is clean. - Hallways are clear from equipment besides carts.   
- Fall bracelet on for fall risk patients - Ensure room is clear and free of clutter - Suction is set up for ALL patients (with sendyker) - Hallways are clear from equipment besides carts. - Isolation precautions followed, supplies available outside room, sign posted

## 2021-03-29 NOTE — PROGRESS NOTES
Blood sugar - 80. Belvie Ross notified and Lantus was discontinued. Judah crackers and peanut butter offered but patient refused. Also refused juice but ate a pudding cup.

## 2021-03-29 NOTE — PROGRESS NOTES
SW received a referral. DANIELLE reviewed chart. SW met with pt. at bedside. SW introduced self and dicussed role. SW engaged pt in the initial SW assessment. Pt A&Ox4. Pt resides in a [address] [facility]. With a handicap ramp to enter. Pt lives with his mother, whom he is the primary caregiver for. His mother is currently in SNF. He does have a family friend named Filipe Navarrete, whom he names as the point of contact for participation in family education. Her number is [phone]. Pt has access to some DME because f his mother. He does own his own Juliet Castellani, and shower chair. Pt prior to admission was independent with ADL's, and IADL's. He currently wasn't receiving any care upon admission. He has a hx with [facility] as a caregiver due to support of his mother. He does name them as [address] shall home care is recommended. Pt pharmacy is [facility] [address]. He had a new provider PCP appt for tomorrow with Dr. Derrick Mcgee. DANIELLE coordinated with admin to assist with reschedule follow up. SW educated pt on the dcp process, codes, and team conferences. Family education was discussed at length, and the need to update his  had been reviewed and discussed. DANIELLE provided contact info. DANIELLE will continue to follow.      Brandi PATRICK, LCSW, CCM  Doctoral Candidate, Doctor of Social Work

## 2021-03-30 PROBLEM — K59.00 CONSTIPATION: Status: ACTIVE | Noted: 2021-03-29

## 2021-03-30 LAB
GLUCOSE BLD STRIP.AUTO-MCNC: 139 MG/DL (ref 70–110)
GLUCOSE BLD STRIP.AUTO-MCNC: 148 MG/DL (ref 70–110)
GLUCOSE BLD STRIP.AUTO-MCNC: 87 MG/DL (ref 70–110)
GLUCOSE BLD STRIP.AUTO-MCNC: 96 MG/DL (ref 70–110)

## 2021-03-30 PROCEDURE — 97535 SELF CARE MNGMENT TRAINING: CPT

## 2021-03-30 PROCEDURE — 65310000000 HC RM PRIVATE REHAB

## 2021-03-30 PROCEDURE — 99232 SBSQ HOSP IP/OBS MODERATE 35: CPT | Performed by: INTERNAL MEDICINE

## 2021-03-30 PROCEDURE — 97116 GAIT TRAINING THERAPY: CPT

## 2021-03-30 PROCEDURE — 97110 THERAPEUTIC EXERCISES: CPT

## 2021-03-30 PROCEDURE — 97530 THERAPEUTIC ACTIVITIES: CPT

## 2021-03-30 PROCEDURE — 97542 WHEELCHAIR MNGMENT TRAINING: CPT

## 2021-03-30 PROCEDURE — 82962 GLUCOSE BLOOD TEST: CPT

## 2021-03-30 PROCEDURE — 74011250637 HC RX REV CODE- 250/637: Performed by: INTERNAL MEDICINE

## 2021-03-30 RX ORDER — POLYETHYLENE GLYCOL 3350 17 G/17G
17 POWDER, FOR SOLUTION ORAL DAILY
Status: DISCONTINUED | OUTPATIENT
Start: 2021-03-31 | End: 2021-04-07 | Stop reason: HOSPADM

## 2021-03-30 RX ORDER — ONDANSETRON 4 MG/1
4 TABLET, FILM COATED ORAL
Status: DISCONTINUED | OUTPATIENT
Start: 2021-03-30 | End: 2021-04-07 | Stop reason: HOSPADM

## 2021-03-30 RX ORDER — AMOXICILLIN 250 MG
2 CAPSULE ORAL
Status: DISCONTINUED | OUTPATIENT
Start: 2021-03-30 | End: 2021-04-07 | Stop reason: HOSPADM

## 2021-03-30 RX ADMIN — METFORMIN HYDROCHLORIDE 500 MG: 500 TABLET ORAL at 08:15

## 2021-03-30 RX ADMIN — GABAPENTIN 100 MG: 100 CAPSULE ORAL at 08:22

## 2021-03-30 RX ADMIN — EZETIMIBE 10 MG: 10 TABLET ORAL at 21:52

## 2021-03-30 RX ADMIN — Medication 5 MG: at 21:52

## 2021-03-30 RX ADMIN — OXYCODONE HYDROCHLORIDE 10 MG: 5 TABLET ORAL at 13:24

## 2021-03-30 RX ADMIN — DOCUSATE SODIUM 100 MG: 100 CAPSULE, LIQUID FILLED ORAL at 08:20

## 2021-03-30 RX ADMIN — AMOXICILLIN AND CLAVULANATE POTASSIUM 1 TABLET: 875; 125 TABLET, FILM COATED ORAL at 08:00

## 2021-03-30 RX ADMIN — METHOCARBAMOL TABLETS 500 MG: 500 TABLET, COATED ORAL at 08:15

## 2021-03-30 RX ADMIN — DOCUSATE SODIUM 50MG AND SENNOSIDES 8.6MG 2 TABLET: 8.6; 5 TABLET, FILM COATED ORAL at 17:10

## 2021-03-30 RX ADMIN — ONDANSETRON HYDROCHLORIDE 4 MG: 4 TABLET, FILM COATED ORAL at 15:10

## 2021-03-30 RX ADMIN — CARVEDILOL 3.12 MG: 6.25 TABLET, FILM COATED ORAL at 17:09

## 2021-03-30 RX ADMIN — METHOCARBAMOL TABLETS 500 MG: 500 TABLET, COATED ORAL at 21:52

## 2021-03-30 RX ADMIN — BISACODYL 10 MG: 5 TABLET, COATED ORAL at 15:09

## 2021-03-30 RX ADMIN — CARVEDILOL 3.12 MG: 6.25 TABLET, FILM COATED ORAL at 08:12

## 2021-03-30 RX ADMIN — RIVAROXABAN 20 MG: 20 TABLET, FILM COATED ORAL at 08:18

## 2021-03-30 RX ADMIN — ALOGLIPTIN 25 MG: 25 TABLET, FILM COATED ORAL at 08:17

## 2021-03-30 RX ADMIN — AMOXICILLIN AND CLAVULANATE POTASSIUM 1 TABLET: 875; 125 TABLET, FILM COATED ORAL at 17:09

## 2021-03-30 RX ADMIN — DULOXETINE HYDROCHLORIDE 30 MG: 30 CAPSULE, DELAYED RELEASE ORAL at 08:18

## 2021-03-30 RX ADMIN — GABAPENTIN 100 MG: 100 CAPSULE ORAL at 17:11

## 2021-03-30 RX ADMIN — METHOCARBAMOL TABLETS 500 MG: 500 TABLET, COATED ORAL at 13:17

## 2021-03-30 RX ADMIN — ACETAMINOPHEN 650 MG: 325 TABLET ORAL at 08:19

## 2021-03-30 RX ADMIN — METFORMIN HYDROCHLORIDE 500 MG: 500 TABLET ORAL at 17:10

## 2021-03-30 RX ADMIN — ACETAMINOPHEN 650 MG: 325 TABLET ORAL at 15:10

## 2021-03-30 RX ADMIN — OXYCODONE HYDROCHLORIDE 5 MG: 5 TABLET ORAL at 03:14

## 2021-03-30 RX ADMIN — ATORVASTATIN CALCIUM 80 MG: 40 TABLET, FILM COATED ORAL at 08:17

## 2021-03-30 RX ADMIN — OXYCODONE HYDROCHLORIDE 5 MG: 5 TABLET ORAL at 08:16

## 2021-03-30 RX ADMIN — OXYCODONE HYDROCHLORIDE 10 MG: 5 TABLET ORAL at 21:52

## 2021-03-30 RX ADMIN — ASPIRIN 81 MG CHEWABLE TABLET 81 MG: 81 TABLET CHEWABLE at 08:15

## 2021-03-30 RX ADMIN — ACETAMINOPHEN 650 MG: 325 TABLET ORAL at 11:47

## 2021-03-30 RX ADMIN — Medication 1 CAPSULE: at 09:00

## 2021-03-30 NOTE — PROGRESS NOTES
[address]  [address] [place] [address], [address]     INPATIENT REHABILITATION  DAILY PROGRESS NOTE     Date: 3/30/2021    Name: Debra Arriola Age / Sex: 60 y.o. / male   CSN: [id number] MRN: [id number]   [facility] Date: 3/26/2021 Length of Stay: 4 days     Primary Rehab Diagnosis: Impaired Mobility and ADLs secondary to:  1. S/P Right below-the-knee amputation (3/22/2021 - Dr. Marilee Garay)  2. History of critical ischemia of the right lower extremity  3. History of right lower extremity arterial embolectomy       Subjective:     Patient seen and examined. Blood pressure controlled. Blood glucose on the lower side of normal. Team conference was held at bedside this PM.     Patient's Complaint:   Constipation  Nausea    Pain Control: stable, mild-to-moderate joint symptoms intermittently, reasonably well controlled by current meds      Objective:     Vital Signs:  Patient Vitals for the past 24 hrs:   BP Temp Pulse Resp SpO2   03/30/21 0744 121/81 98.9 °F (37.2 °C) 100 16 94 %   03/29/21 2100 117/72 97.5 °F (36.4 °C) 93 18 97 %   03/29/21 1556 116/73 97.5 °F (36.4 °C) 89 16 96 %        Physical Examination:  GENERAL SURVEY: Patient is awake, alert, oriented x 3, laying comfortably on the bed, not in acute respiratory distress. HEENT: pink palpebral conjunctivae, anicteric sclerae, no nasoaural discharge, moist oral mucosa  NECK: supple, no jugular venous distention, no palpable lymph nodes  CHEST/LUNGS: symmetrical chest expansion, good air entry, clear breath sounds  HEART: adynamic precordium, good S1 S2, no S3, regular rhythm, no murmurs  ABDOMEN: flat, bowel sounds appreciated, soft, non-tender  EXTREMITIES: (+) right BKA stump in  and clamshell; pink nailbeds, no edema, full and equal pulses, no calf tenderness   NEUROLOGICAL EXAM: The patient is awake, alert and oriented x3, able to answer questions fairly appropriately, able to follow 1 and 2 step commands.   Able to tell time from the wall clock. Cranial nerves II-XII are grossly intact. No gross sensory deficit.   Motor strength is 4+/5 on BUE, 4/5 on the RLE, 4 to 4+/5 on the LLE.     Incision(s): covered, clean, dry, and intact       Current Medications:  Current Facility-Administered Medications   Medication Dose Route Frequency    carvediloL (COREG) tablet 3.125 mg  3.125 mg Oral BID WITH MEALS    bisacodyL (DULCOLAX) tablet 10 mg  10 mg Oral Q48H PRN    insulin lispro (HUMALOG) injection   SubCUTAneous TIDAC    amoxicillin-clavulanate (AUGMENTIN) 875-125 mg per tablet 1 Tab  1 Tab Oral BID WITH MEALS    L. acidophilus,casei,rhamnosus (BIO-K PLUS) capsule 1 Cap  1 Cap Oral DAILY    atorvastatin (LIPITOR) tablet 80 mg  80 mg Oral DAILY    aspirin chewable tablet 81 mg  81 mg Oral DAILY WITH BREAKFAST    docusate sodium (COLACE) capsule 100 mg  100 mg Oral DAILY AFTER BREAKFAST    DULoxetine (CYMBALTA) capsule 30 mg  30 mg Oral DAILY    ezetimibe (ZETIA) tablet 10 mg  10 mg Oral QHS    [Held by provider] furosemide (LASIX) tablet 40 mg  40 mg Oral DAILY    gabapentin (NEURONTIN) capsule 100 mg  100 mg Oral BID    [Held by provider] lisinopriL (PRINIVIL, ZESTRIL) tablet 5 mg  5 mg Oral DAILY    oxyCODONE IR (ROXICODONE) tablet 5-10 mg  5-10 mg Oral Q4H PRN    acetaminophen (TYLENOL) tablet 650 mg  650 mg Oral TID    methocarbamoL (ROBAXIN) tablet 500 mg  500 mg Oral TID    rivaroxaban (XARELTO) tablet 20 mg  20 mg Oral DAILY WITH BREAKFAST    alogliptin (NESINA) tablet 25 mg  25 mg Oral DAILY    melatonin (rapid dissolve) tablet 5 mg  5 mg Oral QHS    zolpidem (AMBIEN) tablet 5 mg  5 mg Oral QHS PRN    glucose chewable tablet 16 g  4 Tab Oral PRN    glucagon (GLUCAGEN) injection 1 mg  1 mg IntraMUSCular PRN    dextrose (D50W) injection syrg 12.5-25 g  25-50 mL IntraVENous PRN    acetaminophen (TYLENOL) tablet 650 mg  650 mg Oral Q4H PRN    metFORMIN (GLUCOPHAGE) tablet 500 mg  500 mg Oral BID WITH MEALS       Allergies:   Allergies   Allergen Reactions    Nitroglycerin Other (comments)      BP drops rapidly when he takes SL Nitro          Flint Diarrhea and Rash    Cat Dander Cough    Codeine Rash    Strawberry Rash       Functional Progress:    OCCUPATIONAL THERAPY    ON ADMISSION MOST RECENT   Eating  Functional Level: 5   Eating  Functional Level: 5     Grooming  Functional Level: 5   Grooming  Functional Level: 5     Bathing  Functional Level: 4   Bathing  Functional Level: 4     Upper Body Dressing  Functional Level: 5   Upper Body Dressing  Functional Level: 5     Lower Body Dressing  Functional Level: 4   Lower Body Dressing  Functional Level: 4     Toileting  Functional Level: 4   Toileting  Functional Level: 5     Toilet Transfers  Toilet Transfer Score: 4   Toilet Transfers  Toilet Transfer Score: 4     Tub /Shower Transfers  Tub/Shower Transfer Score: 4   Tub/Shower Transfers  Tub/Shower Transfer Score: 4       Legend:   7 - Independent   6 - Modified Independent   5 - Standby Assistance / Supervision / Set-up   4 - Minimum Assistance / Contact Guard Assistance   3 - Moderate Assistance   2 - Maximum Assistance   1 - Total Assistance / Dependent       Lab/Data Review:  Recent Results (from the past 24 hour(s))   GLUCOSE, POC    Collection Time: 03/29/21  4:35 PM   Result Value Ref Range    Glucose (POC) 69 (L) 70 - 110 mg/dL   GLUCOSE, POC    Collection Time: 03/29/21  5:20 PM   Result Value Ref Range    Glucose (POC) 74 70 - 110 mg/dL   GLUCOSE, POC    Collection Time: 03/29/21  8:55 PM   Result Value Ref Range    Glucose (POC) 476 (HH) 70 - 110 mg/dL   GLUCOSE, POC    Collection Time: 03/29/21  8:57 PM   Result Value Ref Range    Glucose (POC) 95 70 - 110 mg/dL   GLUCOSE, POC    Collection Time: 03/29/21  8:58 PM   Result Value Ref Range    Glucose (POC) 98 70 - 110 mg/dL   GLUCOSE, POC    Collection Time: 03/30/21  8:08 AM   Result Value Ref Range    Glucose (POC) 87 70 - 110 mg/dL Assessment:     Primary Rehab Diagnosis  1. Impaired Mobility and ADLs  2. S/P Right below-the-knee amputation (3/22/2021 - Dr. Marilee Garay)  3. History of critical ischemia of the right lower extremity  4. History of right lower extremity arterial embolectomy     Comorbidities  Patient Active Problem List   Diagnosis Code    Status post below knee amputation of right lower extremity (HCC) Z89.511    Impaired mobility and ADLs Z74.09, Z78.9    Critical ischemia of lower extremity I99.8    Peripheral artery disease ([facility]) I73.9    Coronary artery disease involving native coronary artery of native heart I25.10    Type 2 diabetes mellitus, without long-term current use of insulin ([facility]) E11.9    History of epilepsy Z86.69    Factor V Leiden mutation ([facility] D68.51    Migraine headache G43.909    Wears glasses T56.6    Umbilical hernia I32.8    Obstructive sleep apnea G47.33    ICD (implantable cardioverter-defibrillator) in place Z95.810    Mitral valve prolapse I34.1    History of head injury Z87.828    Hypertensive heart disease with chronic systolic congestive heart failure (HCC) I11.0, I50.22    History of myocardial infarction I25.2    Long term current use of aspirin Z79.82    On statin therapy due to risk of future cardiovascular event Z79.899    History of deep venous thrombosis (DVT) of distal vein of right lower extremity Z86.718    Anticoagulated by anticoagulation treatment Z79.01    Mixed hyperlipidemia E78.2    History of embolectomy Z98.890    COVID-19 ruled out by laboratory testing Z20.822    Acute blood loss as cause of postoperative anemia D62    Cardiomyopathy ([facility] I42.9    Chronic systolic heart failure (HCC) I50.22    History of noncompliance with medical treatment Z91.19    Major depressive disorder F32.9    Grade I diastolic dysfunction Z71.0        Plan:     1. Justification for continued stay: Good progression towards established rehabilitation goals.     2. Medical Issues being followed closely:    [x]  Fall and safety precautions     [x]  Wound Care     [x]  Bowel and Bladder Function     [x]  Fluid Electrolyte and Nutrition Balance     [x]  Pain Control      3. Issues that 24 hour rehabilitation nursing is following:    [x]  Fall and safety precautions     [x]  Wound Care     [x]  Bowel and Bladder Function     [x]  Fluid Electrolyte and Nutrition Balance     [x]  Pain Control      [x]  Assistance with and education on in-room safety with transfers to and from the bed, wheelchair, toilet and shower. 4. Acute rehabilitation plan of care:    [x]  Continue current care and rehab. [x]  Physical Therapy           [x]  Occupational Therapy           []  Speech Therapy     []  Hold Rehab until further notice     5. Medications:    [x]  MAR Reviewed     [x]  Continue Present Medications     6. DVT Prophylaxis:      []  Enoxaparin     []  Unfractionated Heparin     []  Warfarin     [x]  NOAC     []  NEREYDA Stockings     []  Sequential Compression Device     []  None     7. Code status    [x]  Full code     []  Partial code     []  Do not intubate     []  Do not resuscitate     8. Orders:   > S/P Right below-the-knee amputation (3/22/2021 - Dr. Alli Mark); History of critical ischemia of the right lower extremity;  History of right lower extremity arterial embolectomy   > Staples to be removed on 4/19/2021   > Continue:    > Amoxicillin-Clavulanate 875-125 1 tab PO BID with meals x 10 days (STOP DATE: 4/5/2021)    > Bio K Plus 1 cap PO once daily    > Acute Postoperative Blood Loss Anemia   > Hgb/Hct (3/26/2021) = 10.1/31.3   > Hgb/Hct (3/27/2021, on admission) = 10.9/33.9   > Anemia work-up showed serum iron 23, TIBC 200, iron % saturation 12, ferritin 835   > Hgb/Hct (3/29/2021) = 10.7/33.9    > No iron supplement for now    > Coronary artery disease; Peripheral artery disease    > Continue:    > Aspirin 81 mg PO once daily with breakfast    > Atorvastatin 80 mg PO once daily    > Carvedilol 6.25 mg PO BID with meals (8AM, 5PM)    > Hypertensive heart disease with chronic systolic heart failure; Cardiomyopathy; Chronic systolic heart failure; Grade I diastolic dysfunction   > 2D echocardiogram (12/17/2015) showed EF 35%; genealized hypokinesis more focally severe of the inferior and posterior segments; mild mitral regurgitation; trace tricuspud regurgitation; normal pulmonary artery pressure   > 2D echocardiogram (3/24/2021) showed EF 37%; global hypokinesis of left ventricle; grade I diastolic dysfunction   > On 3/27/2021, decreased Carvedilol from 6.25 mg to 3.125 mg PO BID with meals (8AM, 5PM)   > On 3/28/2021, held:    > Furosemide 40 mg PO once daily (9AM)    > Lisinopril 5 mg PO once daily (9AM)   > Continue Carvedilol 3.125 mg PO BID with meals (8AM, 5PM)    > Factor V Leiden mutation; History of deep venous thrombosis (DVT) of right lower extremity;  Anticoagulated on Rivaroxaban   > Continue Rivaroxaban 20 mg PO once daily with breakfast    > Major depressive disorder   > Continue Duloxetine 30 mg PO once daily    > Mixed hyperlipidemia   > Lipid profile (7/25/2018) showed , , HDL 27,    > Lipid profile (3/27/2021) showed , , HDL 35, LDL 63   > Continue:    > Atorvastatin 80 mg PO once daily    > Ezetimibe 10 mg PO q HS    > Type 2 diabetes mellitus, poorly controlled, without long-term current use of insulin   > HbA1c (3/6/2021) = 12.9    > On 3/27/2021, started Metformin 500 mg PO BID with meals   > On 3/28/2021:    > Started Glimepiride 1 mg PO once daily    > Decreased Insulin glargine from 26 units to 15 units SC q HS --> Discontinued   > On 3/29/2021, discontinued Glimepiride 1 mg PO once daily   > Discontinue Alogliptin 25 mg PO once daily   > Continue:    > Metformin 500 mg PO BID with meals    > Insulin lispro sliding scale SC TID AC only     > Difficulty sleeping   > Continue:    > Melatonin 5 mg PO q HS    > Zolpidem 5 mg PO q HS PRN for sleep    > Constipation; Nausea   > Discontinue Docusate sodium 100 mg PO BID   > Start:    > Pericolace 2 tabs PO once daily after dinner    > Polyethylene glycol 17 grams in 8 oz water PO once daily    > Ondansetron 4 mg PO TID PRN for nausea/vomiting   > Continue Bisacodyl 10 mg PO q 48 hr PRN for constipation    > COVID-19 ruled out by laboratory testing   > SARS-CoV-2 ([employer]) (3/22/2021): Not detected    > Analgesia   > On 3/27/2021, started Acetaminophen 650 mg PO TID (8AM, 12PM, 4PM)   > Continue:    > Acetaminophen 650 mg PO TID (8AM, 12PM, 4PM)    > Acetaminophen 650 mg PO q 4 hr PRN for pain level 4/10 or lesser (from 8PM to 4AM only)    > Duloxetine 30 mg PO once daily    > Gabapentin 100 mg PO BID    > Methocarbamol 500 mg PO TID    > Oxycodone 5-10 mg PO q 4 hr PRN for pain level 5/10 or greater     > Diet:   > Specifications: Cardiac, diabetic, consistent carb 1800 kcal   > Solids (consistency): Regular    > Liquids (consistency): Thin    > Fluid restriction: None      9. Personal Protective Equipment was used while interacting with patient including: goggles and KN95 face mask. Patient was using a surgical mask. 10. Patient's progress in rehabilitation and medical issues discussed with the patient. All questions answered to the best of my ability. Care plan discussed with patient and nurse. 11. Total clinical care time is 30 minutes, including review of chart including all labs, radiology, past medical history, and discussion with patient and family. Greater than 50% of my time was spent in coordination of care and counseling.       Signed:    Kiya Sherman MD    March 30, 2021

## 2021-03-30 NOTE — PROGRESS NOTES
Problem: Self Care Deficits Care Plan (Adult)  Goal: *Therapy Goal (Edit Goal, Insert Text)  Description: Occupational Therapy Goals   Long Term Goals  Initiated  and to be accomplished within 2 week(s)  1. Pt will perform self-feeding with mod I.  2. Pt will perform grooming with mod I.  3. Pt will perform UB bathing with mod I.  4. Pt will perform LB bathing with mod I.  5. Pt will perform tub/shower transfer with mod I.   6. Pt will perform UB dressing with mod I.  7. Pt will perform LB dressing with mod I.  8. Pt will perform toileting task with mod I.  9. Pt will perform toilet transfer with mod I. Short Term Goals   Initiated 3/27/2021 and to be accomplished within 7 day(s)  1. Pt will perform UB bathing with CGA. 2. Pt will perform LB bathing with CGA. 3. Pt will perform tub/shower transfer with CGA. 4. Pt will perform LB dressing with CGA. 5. Pt will perform toileting task with CGA. 6. Pt will perform toilet transfer with CGA.       3/30/2021 1247 by Austyn Chaudhari  Outcome: Progressing Towards Goal    Occupational Therapy TREATMENT    Patient: [facility]   60 y.o. Patient identified with name and : yes    Date: 3/30/2021    First Tx Session  Time In: 1030  Time Out: 36    Second Tx Session  Time In: 1430  Time Out: 1500    Diagnosis: Status post below knee amputation of right lower extremity ([facility] [Z89.511]   Precautions: Fall  Chart, occupational therapy assessment, plan of care, and goals were reviewed. Pain:  Pt reports 0/10 pain or discomfort prior to treatment. Pt reports 0/10 pain or discomfort post treatment. Intervention Provided:       SUBJECTIVE:   Patient stated The washer is in the boiler room with one step down to enter and the dryer is in the kitchen and my sister can help out sometimes.     OBJECTIVE DATA SUMMARY:     THERAPEUTIC ACTIVITY Daily Assessment    Balloon batting with 2# wrist weights reaching overhead and out of base of support while seated w/c level to increase UB and core strength, sitting balance and functional activity tolerance for improved independence with ADL and IADL routines, functional transfers, pt tolerated well with rest breaks as needed. THERAPEUTIC EXERCISE Daily Assessment    SciFit seated w/c level with upright posture, BUEs at level 1 resistance x 10 mins, no rest breaks. Green Theraband seated w/c level with good form x 15 reps x 2 sets for shoulder horizontal abduction, shoulder flexion and triceps extension, pt tolerated well and agreeable to Theraband HEP to be initiated in upcoming OT tx session with visual handout to increase accuracy with carryover. IADL Daily Assessment         NMRE Daily Assessment         FEEDING/EATING Daily Assessment          GROOMING Daily Assessment          UPPER BODY BATHING Daily Assessment          LOWER BODY BATHING Daily Assessment          TOILETING Daily Assessment          UPPER BODY DRESSING Daily Assessment          LOWER BODY DRESSING Daily Assessment          MOBILITY/TRANSFERS Daily Assessment            ASSESSMENT:  First tx session: pt mod I self propelling w/c w/ BUEs to & from rehab gym. Patient reports kitchen at home large enough to accommodate w/c for meal prep. Patient agreeable to participate in laundry tasks for instruction for energy conservation and adaptive approach e.g. seated w/c level, use of reacher for item retrieval and in stance with RW. Patient reports  The washer is in the boiler room with one step down to enter and the dryer is in the kitchen and my sister can help out sometimes.  Patient sitting up in w/c to eat lunch at end of tx session, call bell within reach & pt verbalized understanding to utilize for assist e.g. functional transfers in order to prevent falls, w/c alarm intact.    Second tx session: Bed mobility: Supv supine to sit edge of bed, SBA sit to stand from bed surface, one loss of balance w/ pt able to correct using RW during SPT from bed -> w/c. Mod I self propelling w/c into bathroom, SBA toilet transfer from w/c level using grab bar, noted increase effort w/ sit to stand from toilet using grab bar with BUEs-issued 3 in 1 commode by rehab tech to grade easier, clothing mgmt w/ SBA using grab bar and leaning against wall, Supv hygiene seated on commode. UE Theraband (green) x 15 reps x 2 sets for shoulder abduction and bicep curls with good form and tolerance, nursing notified of pt report of nausea. Patient sitting up in w/c at end of tx session, call bell within reach & pt verbalized understanding to utilize for assist e.g. functional transfers in order to prevent falls, w/c alarm intact. Progression toward goals:  [x]          Improving appropriately and progressing toward goals  []          Improving slowly and progressing toward goals  []          Not making progress toward goals and plan of care will be adjusted     PLAN:  Patient continues to benefit from skilled intervention to address the above impairments. Continue treatment per established plan of care. Discharge Recommendations:  Home Health  Further Equipment Recommendations for Discharge:  transfer bench, rolling walker, wheelchair, 3 in 1 commode and grab bars on wall next to toilet and inside tub-shower unit     Activity Tolerance:  fair  Estimated LOS: 1 week    Please refer to the flowsheet for vital signs taken during this treatment. After treatment:   [x]  Patient left in no apparent distress sitting up in w/c  []  Patient left in no apparent distress in bed  [x]  Call bell left within reach  [x]  Nursing notified  []  Caregiver present  [x]  w/c alarm activated    COMMUNICATION/EDUCATION:   [x] Home safety education was provided and the patient/caregiver indicated understanding. [x] Patient/family have participated as able in goal setting and plan of care. [x] Patient/family agree to work toward stated goals and plan of care.   [] Patient understands intent and goals of therapy, but is neutral about his/her participation. [] Patient is unable to participate in goal setting and plan of care.       Jeannie Conteh

## 2021-03-30 NOTE — PROGRESS NOTES
Problem: Mobility Impaired (Adult and Pediatric)  Goal: *Therapy Goal (Edit Goal, Insert Text)  Description: Physical Therapy Short Term Goals  Initiated 3/27/2021 and to be accomplished within 7 day(s) on 2021:  1. Patient will move from supine to sit and sit to supine , scoot up and down, and roll side to side in bed with independence. 2.  Patient will transfer from bed to chair and chair to bed with standby assistance using the least restrictive device. 3.  Patient will perform sit to stand with stand by assistance. 4.  Patient will ambulate with contact guard assist for 50 feet with the least restrictive device. 5.  Patient will perform w/c mobility for at least 250 ft at SBA level over even surfaces. Physical Therapy Long Term Goals  Initiated 3/27/2021 and to be accomplished within 14 day(s) on 2021:  1. Patient will transfer from bed to chair and chair to bed with modified independence using the least restrictive device. 2.  Patient will perform sit to stand with modified independence. 3.  Patient will ambulate with modified independence for 50 feet with the least restrictive device. 4.  Patient will perform w/c mobility for at least 250 ft distances, reporting <3/10 modified Sylvia RPE, at modified independent level over even surfaces and around doorways, obstacles, narrow spaces. 5.  Patient will ascend/descend w/c ramp with supervision for ease of entry/exit of home. Outcome: Progressing Towards Goal   PHYSICAL THERAPY TREATMENT    Patient: Pinky Ledezma (61 y.o. male)  Date: 3/30/2021  Diagnosis: Status post below knee amputation of right lower extremity ([facility]) [Z89.511] Status post below knee amputation of right lower extremity ([facility] [address]  Precautions: Fall  Chart, physical therapy assessment, plan of care and goals were reviewed. Time CW:2931  Time Out:1055    Patient seen for: Gait training;Patient education; Therapeutic exercise;Transfer training; Wheelchair mobility    Pain:  Pt pain was reported as 5/10, R residual limb, pre-treatment. Pt pain was reported as 6/10 post-treatment. Intervention: pt had received pain meds prior to session; repositioning    Patient identified with name and : yes    SUBJECTIVE:      Pt agreeable to participate in PT. Pt stated \"I'm tired today and my energy level seems less today. I'm sorry, but I'll try to do what I can\"    OBJECTIVE DATA SUMMARY:    Objective:     GROSS ASSESSMENT Daily Assessment     AROM: Generally decreased, functional(slightly decreased knee extension, hip ext right limb)  PROM: Generally decreased, functional(slightly decreased knee extension, hip ext right limb)  Strength: Generally decreased, functional(right residual limb decreased s/p BKA; left slightly impaire)  Coordination: Within functional limits  Tone: Normal  Sensation: Impaired(phantom sensation and pain right residual limb)      BED/MAT MOBILITY Daily Assessment     Rolling Right : 6 (Modified independent)(on flat mat table w/out side rails)  Rolling Left : 6 (Modified independent)  Supine to Sit : 5 (Supervision)  Sit to Supine : 5 (Supervision)  Performed on flat mat table, without siderails, supervision for safety and energy conservation cues. TRANSFERS Daily Assessment     Transfer Type:  Other  Other: stand step w/RW  Transfer Assistance : 5 (Stand-by assistance)  Sit to Stand Assistance: Stand-by assistance  Car Transfers: (CGA into car; SBA out of car to w/c; with RW)  Car Type: car simulator in rehab        GAIT Daily Assessment    Gait Description (WDL) Exceptions to WDL    Gait Abnormalities Decreased step clearance(\"hop to\" pattern s/p right BKA, slow gait speed)    Assistive device Walker, rolling;Gait belt; rigid dressing R residual limb    Ambulation assistance - level surface 4 (Contact guard assistance)/Stand-by assistance    Distance 25 Feet (ft)(x2 trials)    Ambulation assistance- uneven surface (NT)    Comments Seated rest break between trials; varied from SBA to [address] today due to increased fatigue noted by pt today. STEPS/STAIRS Daily Assessment     Steps/Stairs ambulated 1(2\" curb platform, w/RW support; x 2 reps)    Assistance Required 4 (Contact guard assistance)    Rail Use (RW and gait belt)    Comments  Pt fatigued and declined further repetitions today. CGA to manage RW and for balance during hopping due to R BKA. Curbs/Ramps (NT)        BALANCE Daily Assessment     Sitting - Static: Good (unsupported)  Sitting - Dynamic: Fair (occasional)  Standing - Static: Fair(w/RW support)  Standing - Dynamic : Impaired        WHEELCHAIR MOBILITY Daily Assessment     Able to Propel (ft): 250 feet(extra time due to slow pace)  Functional Level: 5  Curbs/Ramps Assist Required (FIM Score): 0 (Not tested)  Wheelchair Setup Assist Required : 5 (Supervision/setup)  Wheelchair Management: Manages left brake;Manages right brake;Manages right footrest        THERAPEUTIC EXERCISES Daily Assessment     Extremity: Both(AROM against gravity)  Exercise Type #1: Supine lower extremity strengthening(SLR, bridges)  Sets Performed: 2  Reps Performed: 15  Level of Assist: Stand-by assistance  Exercise Type #2: Other (comment)(side-lying: hip abduction, hip extension (gravity eliminated)  Sets Performed: 2  Reps Performed: 15  Level of Assist: Contact guard assistance  Exercise Type #3: Other (comment)(prone: knee flex; positional stretch to hip flexors x 10 min)  Sets Performed: 2  Reps Performed: 15  Level of Assist: Contact guard assistance         ASSESSMENT:  Pt tolerated session with slower pace and frequent rest breaks. Pt stated he was \"tired today and my endurance is lower today\" but was willing to work. Due to increased fatigue level today, pt required SBA during transfers and CGA/SBA during gait using a hop technique. Pt able to initiate car transfer training today also. Pt cooperative and motivated with PT.    Progression toward goals:  [x]      Improving appropriately and progressing toward goals  []      Improving slowly and progressing toward goals  []      Not making progress toward goals and plan of care will be adjusted      PLAN:  Patient continues to benefit from skilled intervention to address the above impairments. Continue treatment per established plan of care. Discharge Recommendations:  Home Health  Further Equipment Recommendations for Discharge:  rolling walker and wheelchair       Estimated Discharge Date: 4/8/21    Activity Tolerance:   Fair +; pt without c/o pain during session, or c/o nausea, dizziness, or SOB. Pt did require occasional rest breaks  Please refer to the flowsheet for vital signs taken during this treatment. After treatment:   Patient left sitting up in w/c, propelling back to his room under his own power.        Livier Iniguez, PT

## 2021-03-30 NOTE — ROUTINE PROCESS
SHIFT CHANGE NOTE FOR Julissa Burn Bedside and Verbal shift change report given to Darlene Eagle RN (oncoming nurse) by Bishop Rosi RN (offgoing nurse). Report included the following information SBAR, Kardex, MAR and Recent Results. Situation: 
 Code Status: Full Code Reason for Admission: R BKA Hospital Day: 4 Problem List:  
Hospital Problems  Date Reviewed: 3/30/2021 Codes Class Noted POA Constipation ICD-10-CM: K59.00 ICD-9-CM: 564.00  3/29/2021 Yes Peripheral artery disease (HCC) (Chronic) ICD-10-CM: I73.9 ICD-9-CM: 443.9  Unknown Yes Type 2 diabetes mellitus, without long-term current use of insulin (HCC) (Chronic) ICD-10-CM: E11.9 ICD-9-CM: 250.00  Unknown Yes Overview Signed 3/26/2021  5:49 PM by Shelley Peacock MD  
  HbA1c (3/6/2021) = 12.9 Factor V Leiden mutation ([facility] (Chronic) ICD-10-CM: V30.97 
ICD-9-CM: 289.81  Unknown Yes Hypertensive heart disease with chronic systolic congestive heart failure (HCC) (Chronic) ICD-10-CM: I11.0, I50.22 ICD-9-CM: 402.91, 428.22  Unknown Yes Overview Signed 3/26/2021 11:06 PM by Shelley Peacock MD  
  2D echocardiogram (12/17/2015) showed EF 35%; genealized hypokinesis more focally severe of the inferior and posterior segments; mild mitral regurgitation; trace tricuspud regurgitation; normal pulmonary artery pressure Long term current use of aspirin (Chronic) ICD-10-CM: D87.67 ICD-9-CM: V58.66  Unknown Yes On statin therapy due to risk of future cardiovascular event (Chronic) ICD-10-CM: T61.864 ICD-9-CM: V58.69  Unknown Yes Overview Signed 3/26/2021  4:34 PM by Shelley Peacock MD  
  On Atorvastatin History of deep venous thrombosis (DVT) of distal vein of right lower extremity ICD-10-CM: J05.880 ICD-9-CM: V12.51  Unknown Yes Anticoagulated by anticoagulation treatment (Chronic) ICD-10-CM: Z79.01 
ICD-9-CM: V58.61  Unknown Yes  Overview Signed 3/26/2021 4:36 PM by Edy Bobo MD  
  On Rivaroxaban Mixed hyperlipidemia (Chronic) ICD-10-CM: C09.7 ICD-9-CM: 272.2  Unknown Yes Overview Signed 3/26/2021 11:07 PM by Edy Bobo MD  
  Lipid profile (7/25/2018) showed , , HDL 27,  History of embolectomy ICD-10-CM: Z98.890 ICD-9-CM: V45.89  Unknown Yes Overview Signed 3/26/2021  5:39 PM by Edy Bobo MD  
  S/P Right lower extremity arterial embolectomy Major depressive disorder ICD-10-CM: F32.9 ICD-9-CM: 296.20  3/24/2021 Yes Overview Signed 3/26/2021 11:31 PM by Edy Bobo MD  
  On Duloxetine Grade I diastolic dysfunction TUB-36-WY: I51.9 ICD-9-CM: 429.9  3/24/2021 Yes Overview Signed 3/26/2021 11:48 PM by Edy Bobo MD  
  2D echocardiogram (3/24/2021) showed EF 37%; global hypokinesis of left ventricle; grade I diastolic dysfunction * (Principal) Status post below knee amputation of right lower extremity ([address] ICD-10-CM: V60.291 ICD-9-CM: V49.75  3/22/2021 Yes Overview Signed 3/26/2021  5:43 PM by Edy Bobo MD  
  S/P Right below-the-knee amputation (3/22/2021 - Dr. Shakila Hernandez) Impaired mobility and ADLs ICD-10-CM: Z74.09, Z78.9 ICD-9-CM: V49.89  3/22/2021 Yes Critical ischemia of lower extremity ICD-10-CM: I99.8 ICD-9-CM: 459.9  3/22/2021 Yes Overview Signed 3/26/2021  5:42 PM by Edy Bobo MD  
  Right COVID-19 ruled out by laboratory testing ICD-10-CM: Z20.822 ICD-9-CM: V71.83  3/22/2021 Yes Overview Signed 3/26/2021  5:47 PM by Edy Bobo MD  
  SARS-CoV-2 ([facility]) (3/22/2021): Not detected Acute blood loss as cause of postoperative anemia ICD-10-CM: D62 
ICD-9-CM: 285.1  3/22/2021 Yes History of noncompliance with medical treatment ICD-10-CM: Z91.19 ICD-9-CM: V15.81  3/18/2021 Yes Background: 
 Past Medical History:  
Past Medical History:  
Diagnosis Date  Acute blood loss as cause of postoperative anemia 3/22/2021  Anticoagulated by anticoagulation treatment On Rivaroxaban  Cardiomyopathy ([address] 12/17/2015 2D echocardiogram (3/24/2021) showed EF 37%; global hypokinesis of left ventricle; grade I diastolic dysfunction; 2D echocardiogram (12/17/2015) showed EF 35%; genealized hypokinesis more focally severe of the inferior and posterior segments; mild mitral regurgitation; trace tricuspud regurgitation; normal pulmonary artery pressure  Chronic systolic heart failure ([address] 2D echocardiogram (3/24/2021) showed EF 37%; global hypokinesis of left ventricle; grade I diastolic dysfunction; 2D echocardiogram (12/17/2015) showed EF 35%; genealized hypokinesis more focally severe of the inferior and posterior segments; mild mitral regurgitation; trace tricuspud regurgitation; normal pulmonary artery pressure  Coronary artery disease involving native coronary artery of native heart  COVID-19 ruled out by laboratory testing 3/22/2021 [facility] (3/22/2021[facility] Not detected  Critical ischemia of lower extremity 3/22/2021 Right  Factor V Leiden mutation ([facility] [address]  Grade I diastolic dysfunction 06/94/9254  
 2D echocardiogram (3/24/2021) showed EF 37%; global hypokinesis of left ventricle; grade I diastolic dysfunction  History of deep venous thrombosis (DVT) of distal vein of right lower extremity  History of epilepsy  History of head injury  History of myocardial infarction  History of noncompliance with medical treatment 3/18/2021  Hypertensive heart disease with chronic systolic congestive heart failure ([address]  2D echocardiogram (3/24/2021) showed EF 37%; global hypokinesis of left ventricle; grade I diastolic dysfunction; 2D echocardiogram (12/17/2015) showed EF 35%; genealized hypokinesis more focally severe of the inferior and posterior segments; mild mitral regurgitation; trace tricuspud regurgitation; normal pulmonary artery pressure  Long term current use of aspirin  Major depressive disorder 03/24/2021 On Duloxetine  Migraine headache  Mitral valve prolapse  Mixed hyperlipidemia Lipid profile (7/25/2018) showed , , HDL 27,   Obstructive sleep apnea  On statin therapy due to risk of future cardiovascular event On Atorvastatin  Peripheral artery disease ([facility] [address]  Type 2 diabetes mellitus, without long-term current use of insulin ([facility] [address] HbA1c (3/6/2021) = 43.4  
 Umbilical hernia  Wears glasses Patient taking anticoagulants yes Patient has a defibrillator: no  
 
Assessment: 
 Changes in Assessment throughout shift: Patient served almonds for dinner, patient recognized food on tray and notified nursing staff, did not eat.  Patient has central line: no Reasons if yes: Insertion date: Last dressing date: 
 Patient has Ellis Cath: no Reasons if yes: Insertion date: 
 
 Last Vitals: 
  
Vitals:  
 03/29/21 1556 03/29/21 2100 03/30/21 0744 03/30/21 1528 BP: 116/73 117/72 121/81 111/68 Pulse: 89 93 100 96 Resp: 16 18 16 20 Temp: 97.5 °F (36.4 °C) 97.5 °F (36.4 °C) 98.9 °F (37.2 °C) 99 °F (37.2 °C) SpO2: 96% 97% 94% 98% Weight:      
Height:      
 
 
 PAIN Pain Assessment Pain Intensity 1: 6 (03/30/21 1528) Pain Intensity 1: 2 (12/29/14 1105) Pain Location 1: Leg Pain Location 1: Abdomen Pain Intervention(s) 1: Medication (see MAR) Pain Intervention(s) 1: Medication (see MAR) Patient Stated Pain Goal: 0 Patient Stated Pain Goal: 0 
o Intervention effective: yes  
o Other actions taken for pain:  
 
 Skin Assessment Skin color Skin Color: Appropriate for ethnicity Condition/Temperature Skin Condition/Temp: Warm, Dry Integrity Skin Integrity: Incision (comment) Turgor Turgor: Non-tenting Weekly Pressure Ulcer Documentation  Pressure  Injury Documentation: No Pressure Injury Noted-Pressure Ulcer Prevention Initiated Wound Prevention & Protection Methods Orientation of wound Orientation of Wound Prevention: Posterior Location of Prevention Location of Wound Prevention: Buttocks, Heel, Sacrum/Coccyx Dressing Present Dressing Present : Yes Dressing Status Dressing Status: Intact Wound Offloading Wound Offloading (Prevention Methods): Adaptive equipment, Bed, pressure reduction mattress, Chair cushion, Elevate heels, Pillows, Repositioning, Turning, Wheelchair, [place]  INTAKE/OUPUT Date 03/29/21 0700 - 03/30/21 3437 03/30/21 0700 - 03/31/21 8692 Shift [id number] 1379-1372 24 Hour Total [id number] 3319-5350 24 Hour Total  
INTAKE  
P.O.    360  360  
  P. O.    360  360 Shift Total(mL/kg)    360(4.2)  360(4.2) OUTPUT Urine(mL/kg/hr)  1525(1.5) 1525(0.7) Urine Voided  1525 1525 Urine Occurrence(s) 2 x 3 x 5 x 1 x  1 x Stool Stool Occurrence(s) 0 x 0 x 0 x 1 x  1 x Shift Total(mL/kg)  [id number]) [id number]) NET  [phone]  526 Weight (kg) 84.8 84.8 84.8 84.8 84.8 84.8 Recommendations: 1. Patient needs and requests: water, pain medication 2. Diet: card. Const. Carb 1800k 3. Pending tests/procedures: OT/PT 4. Functional Level/Equipment: walker, assist x 1 
 
5. Estimated Discharge Date: TBD Posted on Whiteboard in Patients Room: yes HEALS Safety Check A safety check occurred in the patient's room between off going nurse and oncoming nurse listed above. The safety check included the below items Area Items H High Alert Medications - Verify all high alert medication drips (heparin, PCA, etc.) E Equipment - Suction is set up for ALL patients (with yanker) - Red plugs utilized for all equipment (IV pumps, etc.) - WOWs wiped down at end of shift. 
- Room stocked with oxygen, suction, and other unit-specific supplies A Alarms - Bed alarm is set for fall risk patients - Ensure chair alarm is in place and activated if patient is up in a chair L Lines - Check IV for any infiltration - Ellis bag is empty if patient has a Ellis - Tubing and IV bags are labeled Avonne Olszewski Safety - Room is clean, patient is clean, and equipment is clean. - Hallways are clear from equipment besides carts. - Fall bracelet on for fall risk patients - Ensure room is clear and free of clutter - Suction is set up for ALL patients (with faraz) - Hallways are clear from equipment besides carts. - Isolation precautions followed, supplies available outside room, sign posted

## 2021-03-30 NOTE — ROUTINE PROCESS
SHIFT CHANGE NOTE FOR Carolyn Wolfe Bedside and Verbal shift change report given to Pastor Duncan RN (oncoming nurse) by Billy Savage RN (offgoing nurse). Report included the following information SBAR, Kardex, MAR and Recent Results. Situation: 
 Code Status: Full Code Reason for Admission: R BKA Hospital Day: 4 Problem List:  
Hospital Problems  Date Reviewed: 3/29/2021 Codes Class Noted POA Peripheral artery disease (HCC) (Chronic) ICD-10-CM: I73.9 ICD-9-CM: 443.9  Unknown Yes Type 2 diabetes mellitus, without long-term current use of insulin (HCC) (Chronic) ICD-10-CM: E11.9 ICD-9-CM: 250.00  Unknown Yes Overview Signed 3/26/2021  5:49 PM by Carlos Barajas MD  
  HbA1c (3/6/2021) = 12.9 Factor V Leiden mutation ([facility] [address] (Chronic) ICD-10-CM: S54.03 
ICD-9-CM: 289.81  Unknown Yes Hypertensive heart disease with chronic systolic congestive heart failure (HCC) (Chronic) ICD-10-CM: I11.0, I50.22 ICD-9-CM: 402.91, 428.22  Unknown Yes Overview Signed 3/26/2021 11:06 PM by Carlos Barajas MD  
  2D echocardiogram (12/17/2015) showed EF 35%; genealized hypokinesis more focally severe of the inferior and posterior segments; mild mitral regurgitation; trace tricuspud regurgitation; normal pulmonary artery pressure Long term current use of aspirin (Chronic) ICD-10-CM: L97.09 ICD-9-CM: V58.66  Unknown Yes On statin therapy due to risk of future cardiovascular event (Chronic) ICD-10-CM: Z70.549 ICD-9-CM: V58.69  Unknown Yes Overview Signed 3/26/2021  4:34 PM by Carlos Barajas MD  
  On Atorvastatin History of deep venous thrombosis (DVT) of distal vein of right lower extremity ICD-10-CM: I48.216 ICD-9-CM: V12.51  Unknown Yes Anticoagulated by anticoagulation treatment (Chronic) ICD-10-CM: Z79.01 
ICD-9-CM: V58.61  Unknown Yes Overview Signed 3/26/2021  4:36 PM by Carlos Barajas MD  
  On Rivaroxaban  Mixed hyperlipidemia (Chronic) ICD-10-CM: P14.9 ICD-9-CM: 272.2  Unknown Yes Overview Signed 3/26/2021 11:07 PM by Last Garcia MD  
  Lipid profile (7/25/2018) showed , , HDL 27,  History of embolectomy ICD-10-CM: Z98.890 ICD-9-CM: V45.89  Unknown Yes Overview Signed 3/26/2021  5:39 PM by Last Garcia MD  
  S/P Right lower extremity arterial embolectomy Major depressive disorder ICD-10-CM: F32.9 ICD-9-CM: 296.20  3/24/2021 Yes Overview Signed 3/26/2021 11:31 PM by Last Garcia MD  
  On Duloxetine Grade I diastolic dysfunction GSH-01-LP: I51.9 ICD-9-CM: 429.9  3/24/2021 Yes Overview Signed 3/26/2021 11:48 PM by Last Garcia MD  
  2D echocardiogram (3/24/2021) showed EF 37%; global hypokinesis of left ventricle; grade I diastolic dysfunction * (Principal) Status post below knee amputation of right lower extremity ([address] ICD-10-CM: L19.483 ICD-9-CM: V49.75  3/22/2021 Yes Overview Signed 3/26/2021  5:43 PM by Last Garcia MD  
  S/P Right below-the-knee amputation (3/22/2021 - Dr. Sulma Hernandez) Impaired mobility and ADLs ICD-10-CM: Z74.09, Z78.9 ICD-9-CM: V49.89  3/22/2021 Yes Critical ischemia of lower extremity ICD-10-CM: I99.8 ICD-9-CM: 459.9  3/22/2021 Yes Overview Signed 3/26/2021  5:42 PM by Last Garcia MD  
  Right COVID-19 ruled out by laboratory testing ICD-10-CM: Z20.822 ICD-9-CM: V71.83  3/22/2021 Yes Overview Signed 3/26/2021  5:47 PM by Last Garcia MD  
  SARS-CoV-2 ([facility]) (3/22/2021): Not detected Acute blood loss as cause of postoperative anemia ICD-10-CM: D62 
ICD-9-CM: 285.1  3/22/2021 Yes History of noncompliance with medical treatment ICD-10-CM: Z91.19 ICD-9-CM: V15.81  3/18/2021 Yes Background: 
 Past Medical History:  
Past Medical History:  
Diagnosis Date  Acute blood loss as cause of postoperative anemia 3/22/2021  Anticoagulated by anticoagulation treatment On Rivaroxaban  Cardiomyopathy ([address] 12/17/2015 2D echocardiogram (3/24/2021) showed EF 37%; global hypokinesis of left ventricle; grade I diastolic dysfunction; 2D echocardiogram (12/17/2015) showed EF 35%; genealized hypokinesis more focally severe of the inferior and posterior segments; mild mitral regurgitation; trace tricuspud regurgitation; normal pulmonary artery pressure  Chronic systolic heart failure ([address] 2D echocardiogram (3/24/2021) showed EF 37%; global hypokinesis of left ventricle; grade I diastolic dysfunction; 2D echocardiogram (12/17/2015) showed EF 35%; genealized hypokinesis more focally severe of the inferior and posterior segments; mild mitral regurgitation; trace tricuspud regurgitation; normal pulmonary artery pressure  Coronary artery disease involving native coronary artery of native heart  COVID-19 ruled out by laboratory testing 3/22/2021 SARS-CoV-2 ([facility]) (3/22/2021): Not detected  Critical ischemia of lower extremity 3/22/2021 Right  Factor V Leiden mutation ([facility] [address]  Grade I diastolic dysfunction 35/09/3422  
 2D echocardiogram (3/24/2021) showed EF 37%; global hypokinesis of left ventricle; grade I diastolic dysfunction  History of deep venous thrombosis (DVT) of distal vein of right lower extremity  History of epilepsy  History of head injury  History of myocardial infarction  History of noncompliance with medical treatment 3/18/2021  Hypertensive heart disease with chronic systolic congestive heart failure ([address]  2D echocardiogram (3/24/2021) showed EF 37%; global hypokinesis of left ventricle; grade I diastolic dysfunction; 2D echocardiogram (12/17/2015) showed EF 35%; genealized hypokinesis more focally severe of the inferior and posterior segments; mild mitral regurgitation; trace tricuspud regurgitation; normal pulmonary artery pressure  Long term current use of aspirin  Major depressive disorder 03/24/2021 On Duloxetine  Migraine headache  Mitral valve prolapse  Mixed hyperlipidemia Lipid profile (7/25/2018) showed , , HDL 27,   Obstructive sleep apnea  On statin therapy due to risk of future cardiovascular event On Atorvastatin  Peripheral artery disease ([facility] [address]  Type 2 diabetes mellitus, without long-term current use of insulin ([facility] HbA1c (3/6/2021) = 98.8  
 Umbilical hernia  Wears glasses Patient taking anticoagulants yes Patient has a defibrillator: no  
 
Assessment: 
 Changes in Assessment throughout shift: Patient served almonds for dinner, patient recognized food on tray and notified nursing staff, did not eat.  Patient has central line: no Reasons if yes: Insertion date: Last dressing date: 
 Patient has Ellis Cath: no Reasons if yes: Insertion date: 
 
 Last Vitals: 
  
Vitals:  
 03/28/21 2100 03/29/21 0728 03/29/21 1556 03/29/21 2100 BP: 109/72 123/81 116/73 117/72 Pulse: 85 94 89 93 Resp: 18 14 16 18 Temp: 97.3 °F (36.3 °C) 99.4 °F (37.4 °C) 97.5 °F (36.4 °C) 97.5 °F (36.4 °C) SpO2: 97% 97% 96% 97% Weight:      
Height:      
 
 
 PAIN Pain Assessment Pain Intensity 1: 6 (03/30/21 0415) Pain Intensity 1: 2 (12/29/14 1105) Pain Location 1: Leg Pain Location 1: Abdomen Pain Intervention(s) 1: Medication (see MAR) Pain Intervention(s) 1: Medication (see MAR) Patient Stated Pain Goal: 0 Patient Stated Pain Goal: 0 
o Intervention effective: yes  
o Other actions taken for pain:  
 
 Skin Assessment Skin color Skin Color: Appropriate for ethnicity Condition/Temperature Skin Condition/Temp: Dry, Warm Integrity Skin Integrity: Incision (comment) Turgor Turgor: Non-tenting Weekly Pressure Ulcer Documentation  Pressure  Injury Documentation: No Pressure Injury Noted-Pressure Ulcer Prevention Initiated Wound Prevention & Protection Methods Orientation of wound Orientation of Wound Prevention: Posterior Location of Prevention Location of Wound Prevention: Buttocks, Sacrum/Coccyx Dressing Present Dressing Present : Yes Dressing Status Dressing Status: Intact Wound Offloading Wound Offloading (Prevention Methods): Bed, pressure redistribution/air  INTAKE/OUPUT Date 03/29/21 0700 - 03/30/21 1236 03/30/21 0700 - 03/31/21 1858 Shift 0700-1859 1900-0659 24 Hour Total 0700-1859 1900-0659 24 Hour Total  
INTAKE Shift Total(mL/kg) OUTPUT Urine(mL/kg/hr)  1525(1.5) 1525(0.7) Urine Voided  1525 1525 Urine Occurrence(s) 2 x 3 x 5 x Stool Stool Occurrence(s) 0 x 0 x 0 x Shift Total(mL/kg)  [id number]) [id number]) NET  -4410 -8651 Weight (kg) 84.8 84.8 84.8 84.8 84.8 84.8 Recommendations: 1. Patient needs and requests: water, pain medication 2. Diet: card. Const. Carb 1800k 3. Pending tests/procedures: OT/PT 4. Functional Level/Equipment: walker, assist x 1 
 
5. Estimated Discharge Date: TBD Posted on Whiteboard in Patients Room: yes HEALS Safety Check A safety check occurred in the patient's room between off going nurse and oncoming nurse listed above. The safety check included the below items Area Items H High Alert Medications - Verify all high alert medication drips (heparin, PCA, etc.) E Equipment - Suction is set up for ALL patients (with yanker) - Red plugs utilized for all equipment (IV pumps, etc.) - WOWs wiped down at end of shift. 
- Room stocked with oxygen, suction, and other unit-specific supplies A Alarms - Bed alarm is set for fall risk patients - Ensure chair alarm is in place and activated if patient is up in a chair L Lines - Check IV for any infiltration - Ellis bag is empty if patient has a Ellis - Tubing and IV bags are labeled Trudi Brittany Safety  - Room is clean, patient is clean, and equipment is clean. 
- Hallways are clear from equipment besides carts. - Fall bracelet on for fall risk patients - Ensure room is clear and free of clutter - Suction is set up for ALL patients (with faraz) - Hallways are clear from equipment besides carts. - Isolation precautions followed, supplies available outside room, sign posted

## 2021-03-30 NOTE — PROGRESS NOTES
Sahil called pt's support caregiver, Para Surekha ([phone]), to schedule family education. Ms Savage Benitez states that she will check her work schedule and call back with her availability. Sahil will follow.

## 2021-03-31 LAB
GLUCOSE BLD STRIP.AUTO-MCNC: 134 MG/DL (ref 70–110)
GLUCOSE BLD STRIP.AUTO-MCNC: 141 MG/DL (ref 70–110)
GLUCOSE BLD STRIP.AUTO-MCNC: 141 MG/DL (ref 70–110)
GLUCOSE BLD STRIP.AUTO-MCNC: 158 MG/DL (ref 70–110)

## 2021-03-31 PROCEDURE — 97110 THERAPEUTIC EXERCISES: CPT

## 2021-03-31 PROCEDURE — 97116 GAIT TRAINING THERAPY: CPT

## 2021-03-31 PROCEDURE — 82962 GLUCOSE BLOOD TEST: CPT

## 2021-03-31 PROCEDURE — 97530 THERAPEUTIC ACTIVITIES: CPT

## 2021-03-31 PROCEDURE — 74011250637 HC RX REV CODE- 250/637: Performed by: INTERNAL MEDICINE

## 2021-03-31 PROCEDURE — 65310000000 HC RM PRIVATE REHAB

## 2021-03-31 PROCEDURE — 97535 SELF CARE MNGMENT TRAINING: CPT

## 2021-03-31 PROCEDURE — 99232 SBSQ HOSP IP/OBS MODERATE 35: CPT | Performed by: INTERNAL MEDICINE

## 2021-03-31 RX ADMIN — ATORVASTATIN CALCIUM 80 MG: 40 TABLET, FILM COATED ORAL at 08:00

## 2021-03-31 RX ADMIN — Medication 5 MG: at 21:11

## 2021-03-31 RX ADMIN — CARVEDILOL 3.12 MG: 6.25 TABLET, FILM COATED ORAL at 07:59

## 2021-03-31 RX ADMIN — OXYCODONE HYDROCHLORIDE 10 MG: 5 TABLET ORAL at 17:36

## 2021-03-31 RX ADMIN — AMOXICILLIN AND CLAVULANATE POTASSIUM 1 TABLET: 875; 125 TABLET, FILM COATED ORAL at 07:58

## 2021-03-31 RX ADMIN — ACETAMINOPHEN 650 MG: 325 TABLET ORAL at 08:00

## 2021-03-31 RX ADMIN — ASPIRIN 81 MG CHEWABLE TABLET 81 MG: 81 TABLET CHEWABLE at 07:59

## 2021-03-31 RX ADMIN — RIVAROXABAN 20 MG: 20 TABLET, FILM COATED ORAL at 08:00

## 2021-03-31 RX ADMIN — POLYETHYLENE GLYCOL 3350 17 G: 17 POWDER, FOR SOLUTION ORAL at 09:12

## 2021-03-31 RX ADMIN — METHOCARBAMOL TABLETS 500 MG: 500 TABLET, COATED ORAL at 13:32

## 2021-03-31 RX ADMIN — ACETAMINOPHEN 650 MG: 325 TABLET ORAL at 11:48

## 2021-03-31 RX ADMIN — DOCUSATE SODIUM 50MG AND SENNOSIDES 8.6MG 2 TABLET: 8.6; 5 TABLET, FILM COATED ORAL at 17:17

## 2021-03-31 RX ADMIN — METHOCARBAMOL TABLETS 500 MG: 500 TABLET, COATED ORAL at 21:11

## 2021-03-31 RX ADMIN — EZETIMIBE 10 MG: 10 TABLET ORAL at 21:11

## 2021-03-31 RX ADMIN — METFORMIN HYDROCHLORIDE 500 MG: 500 TABLET ORAL at 07:59

## 2021-03-31 RX ADMIN — GABAPENTIN 100 MG: 100 CAPSULE ORAL at 17:18

## 2021-03-31 RX ADMIN — ALOGLIPTIN 25 MG: 25 TABLET, FILM COATED ORAL at 08:00

## 2021-03-31 RX ADMIN — ACETAMINOPHEN 650 MG: 325 TABLET ORAL at 16:13

## 2021-03-31 RX ADMIN — GABAPENTIN 100 MG: 100 CAPSULE ORAL at 08:00

## 2021-03-31 RX ADMIN — DULOXETINE HYDROCHLORIDE 30 MG: 30 CAPSULE, DELAYED RELEASE ORAL at 08:00

## 2021-03-31 RX ADMIN — AMOXICILLIN AND CLAVULANATE POTASSIUM 1 TABLET: 875; 125 TABLET, FILM COATED ORAL at 16:13

## 2021-03-31 RX ADMIN — CARVEDILOL 3.12 MG: 6.25 TABLET, FILM COATED ORAL at 16:13

## 2021-03-31 RX ADMIN — METHOCARBAMOL TABLETS 500 MG: 500 TABLET, COATED ORAL at 08:00

## 2021-03-31 RX ADMIN — METFORMIN HYDROCHLORIDE 500 MG: 500 TABLET ORAL at 16:14

## 2021-03-31 RX ADMIN — Medication 1 CAPSULE: at 08:00

## 2021-03-31 RX ADMIN — OXYCODONE HYDROCHLORIDE 10 MG: 5 TABLET ORAL at 07:57

## 2021-03-31 RX ADMIN — OXYCODONE HYDROCHLORIDE 10 MG: 5 TABLET ORAL at 13:32

## 2021-03-31 NOTE — PROGRESS NOTES
Problem: Mobility Impaired (Adult and Pediatric)  Goal: *Therapy Goal (Edit Goal, Insert Text)  Description: Physical Therapy Short Term Goals  Initiated 3/27/2021 and to be accomplished within 7 day(s) on 4/2/2021:  1. Patient will move from supine to sit and sit to supine , scoot up and down, and roll side to side in bed with independence. 2.  Patient will transfer from bed to chair and chair to bed with standby assistance using the least restrictive device. 3.  Patient will perform sit to stand with stand by assistance. 4.  Patient will ambulate with contact guard assist for 50 feet with the least restrictive device. 5.  Patient will perform w/c mobility for at least 250 ft at SBA level over even surfaces. Physical Therapy Long Term Goals  Initiated 3/27/2021 and to be accomplished within 14 day(s) on 4/9/2021:  1. Patient will transfer from bed to chair and chair to bed with modified independence using the least restrictive device. 2.  Patient will perform sit to stand with modified independence. 3.  Patient will ambulate with modified independence for 50 feet with the least restrictive device. 4.  Patient will perform w/c mobility for at least 250 ft distances, reporting <3/10 modified Sylvia RPE, at modified independent level over even surfaces and around doorways, obstacles, narrow spaces. 5.  Patient will ascend/descend w/c ramp with supervision for ease of entry/exit of home. Outcome: Progressing Towards Goal    PHYSICAL THERAPY TREATMENT    Patient: Mila Parkinson (83 y.o. male)  Date: 3/31/2021  Diagnosis: Status post below knee amputation of right lower extremity ([facility]) [Z89.511] Status post below knee amputation of right lower extremity ([facility] [address]  Precautions: Fall  Chart, physical therapy assessment, plan of care and goals were reviewed. Time In:0930  Time Out:1100    Patient seen for: Balance activities;Gait training;Patient education; Therapeutic exercise;Transfer training; Wheelchair mobility; Other (see progress notes)    Pain:  Pt pain was reported as 6/10 pre-treatment. Pt pain was reported as 7/10 post-treatment. Intervention: N/A    Patient identified with name and : YES    SUBJECTIVE:      I am tired today. I feel dumb and weak, re: fatigue and requiring rest breaks. OBJECTIVE DATA SUMMARY:    Objective:     GROSS ASSESSMENT Daily Assessment     AROM: Generally decreased, functional  PROM: Generally decreased, functional  Strength: Generally decreased, functional  Coordination: Within functional limits  Tone: Normal  Sensation: Impaired      BED/MAT MOBILITY Daily Assessment    Patient performed bed mobility on mat in gym with Mod I.  Rolling Right : 6 (Modified independent)  Rolling Left : 6 (Modified independent)  Supine to Sit : 6 (Modified independent)  Sit to Supine : 6 (Modified independent)      TRANSFERS Daily Assessment    Patient demonstrates sit to stands and squat pivot transfers w/o AD at SBA level. He requires S for stand hop with RW transfer due to VCs for hand placement on RW. He required CGA for car transfer d/t near LOB with left SLS with only 1UE on RW while attempting to open the car simulator door. . Transfer Type: Other  Other: stand step with RW, squat pivot w/o AD  Transfer Assistance : 5 (Supervision/setup)(with RW and SBA with squat pivot)  Sit to Stand Assistance: Stand-by assistance  Car Transfers: (CGA)  Car Type: (car simulator)        GAIT Daily Assessment    Gait Description (WDL) Exceptions to WDL    Gait Abnormalities Decreased step clearance    Assistive device Gait belt;Walker, rolling    Ambulation assistance - level surface 4 (Contact guard assistance)(/SBA depending on level of fatigue)    Distance (20 ft x 2)    Ambulation assistance- uneven surface (NT)    Comments Patient performed gait training at end of session and was moderately fatigued.  He ambulated 20 feet x 2 trials with RW and varying between CGA/SBA before requesting to terminate due to fatigue. Minimal cuing for shorter hop to prevent getting too close to front bar of RW for safety. STEPS/STAIRS Daily Assessment     Steps/Stairs ambulated (2\" curb x 2)    Assistance Required 4 (Minimal assistance)(/CGA)    Rail Use (RW)    Comments Patient negotiated 2: curb x 2 with RW and CGA for balance and safety. Curbs/Ramps (2\" curb with RW - see stair training)        BALANCE Daily Assessment     Sitting - Static: Good (unsupported)  Sitting - Dynamic: Fair (occasional)  Standing - Static: Fair  Standing - Dynamic : Impaired        WHEELCHAIR MOBILITY Daily Assessment    Patient propels his w/c with BUE use at slow pace with 2 minimal pathway deviations to right hitting objects in his environment. Patient report he has a blind spot on his right side due to visual impairments. Able to Propel (ft): 263 feet(room<>gym)  Functional Level: 5  Curbs/Ramps Assist Required (FIM Score): 0 (Not tested)  Wheelchair Setup Assist Required : 5 (Supervision/setup)  Wheelchair Management: Manages left brake;Manages right brake(right residual limb rest)        THERAPEUTIC EXERCISES Daily Assessment    Patient performed supine, sidelying and prone therex to increase strength and ROM to allow him to perform functional ADLs and improve quality and safety of transfers and ambulation. Extremity: Right  Exercise Type #1: Supine lower extremity strengthening(SLR, bridge, QS, SAQ, hip ext w QS, windshield wiper)  Sets Performed: 2  Reps Performed: 15  Level of Assist: Supervision  Exercise Type #2: (Left SL: hip abd, hip ext)  Sets Performed: 2  Reps Performed: 15  Level of Assist: Supervision  Exercise Type #3: (prone: hip flexor S x 10', knee flexion, hip extension)  Sets Performed: 2  Reps Performed: 15  Level of Assist: Supervision         ASSESSMENT:  Patient is progressing slowly towards his goals voicing some frustration at his level of fatigue and weakness.  Education and encouragement provided on severity of hi recovery and need for patience as it takes time to regain strength and endurance. Patient requires frequent rest breaks, with prolonged breaks required after stair/curb training. Progression toward goals:  [x]      Improving appropriately and progressing toward goals  []      Improving slowly and progressing toward goals  []      Not making progress toward goals and plan of care will be adjusted      PLAN:  Patient continues to benefit from skilled intervention to address the above impairments. Continue treatment per established plan of care. Discharge Recommendations:  Home Health  Further Equipment Recommendations for Discharge:  rolling walker and wheelchair 18 inch      Estimated Discharge Date: 4/8/2021    Activity Tolerance:   Fair, seated rest breaks. Please refer to the flowsheet for vital signs taken during this treatment. After treatment:   Patient left semi-reclined in bed, all needs met and call [place] in reach.       JULIANO Sweet

## 2021-03-31 NOTE — ROUTINE PROCESS
SHIFT CHANGE NOTE FOR Maryln Bence Bedside and Verbal shift change report given to Nicole Mello RN (oncoming nurse) by Viola Jackson RN (offgoing nurse). Report included the following information SBAR, Kardex, MAR and Recent Results. Situation: 
 Code Status: Full Code Reason for Admission: R BKA Hospital Day: 5 Problem List:  
Hospital Problems  Date Reviewed: 3/30/2021 Codes Class Noted POA Constipation ICD-10-CM: K59.00 ICD-9-CM: 564.00  3/29/2021 Yes Peripheral artery disease (HCC) (Chronic) ICD-10-CM: I73.9 ICD-9-CM: 443.9  Unknown Yes Type 2 diabetes mellitus, without long-term current use of insulin (HCC) (Chronic) ICD-10-CM: E11.9 ICD-9-CM: 250.00  Unknown Yes Overview Signed 3/26/2021  5:49 PM by Pardeep Fenton MD  
  HbA1c (3/6/2021) = 12.9 Factor V Leiden mutation ([facility] (Chronic) ICD-10-CM: W98.20 
ICD-9-CM: 289.81  Unknown Yes Hypertensive heart disease with chronic systolic congestive heart failure (HCC) (Chronic) ICD-10-CM: I11.0, I50.22 ICD-9-CM: 402.91, 428.22  Unknown Yes Overview Signed 3/26/2021 11:06 PM by Pardeep Fenton MD  
  2D echocardiogram (12/17/2015) showed EF 35%; genealized hypokinesis more focally severe of the inferior and posterior segments; mild mitral regurgitation; trace tricuspud regurgitation; normal pulmonary artery pressure Long term current use of aspirin (Chronic) ICD-10-CM: W29.36 ICD-9-CM: V58.66  Unknown Yes On statin therapy due to risk of future cardiovascular event (Chronic) ICD-10-CM: T10.118 ICD-9-CM: V58.69  Unknown Yes Overview Signed 3/26/2021  4:34 PM by Pardeep Fenton MD  
  On Atorvastatin History of deep venous thrombosis (DVT) of distal vein of right lower extremity ICD-10-CM: K70.858 ICD-9-CM: V12.51  Unknown Yes Anticoagulated by anticoagulation treatment (Chronic) ICD-10-CM: Z79.01 
ICD-9-CM: V58.61  Unknown Yes  Overview Signed 3/26/2021  4:36 PM by Last Garcia MD  
  On Rivaroxaban Mixed hyperlipidemia (Chronic) ICD-10-CM: H99.3 ICD-9-CM: 272.2  Unknown Yes Overview Signed 3/26/2021 11:07 PM by Last Garcia MD  
  Lipid profile (7/25/2018) showed , , HDL 27,  History of embolectomy ICD-10-CM: Z98.890 ICD-9-CM: V45.89  Unknown Yes Overview Signed 3/26/2021  5:39 PM by Last Garcia MD  
  S/P Right lower extremity arterial embolectomy Major depressive disorder ICD-10-CM: F32.9 ICD-9-CM: 296.20  3/24/2021 Yes Overview Signed 3/26/2021 11:31 PM by Last Garcia MD  
  On Duloxetine Grade I diastolic dysfunction RGK-69-OP: I51.9 ICD-9-CM: 429.9  3/24/2021 Yes Overview Signed 3/26/2021 11:48 PM by Last Garcia MD  
  2D echocardiogram (3/24/2021) showed EF 37%; global hypokinesis of left ventricle; grade I diastolic dysfunction * (Principal) Status post below knee amputation of right lower extremity ([address] ICD-10-CM: G42.502 ICD-9-CM: V49.75  3/22/2021 Yes Overview Signed 3/26/2021  5:43 PM by Last Garcia MD  
  S/P Right below-the-knee amputation (3/22/2021 - Dr. Sulma Hernandez) Impaired mobility and ADLs ICD-10-CM: Z74.09, Z78.9 ICD-9-CM: V49.89  3/22/2021 Yes Critical ischemia of lower extremity ICD-10-CM: I99.8 ICD-9-CM: 459.9  3/22/2021 Yes Overview Signed 3/26/2021  5:42 PM by Last Garcia MD  
  Right COVID-19 ruled out by laboratory testing ICD-10-CM: Z20.822 ICD-9-CM: V71.83  3/22/2021 Yes Overview Signed 3/26/2021  5:47 PM by Last Garcia MD  
  SARS-CoV-2 ([address] [place] [address]) (3/22/2021): Not detected Acute blood loss as cause of postoperative anemia ICD-10-CM: D62 
ICD-9-CM: 285.1  3/22/2021 Yes History of noncompliance with medical treatment ICD-10-CM: Z91.19 ICD-9-CM: V15.81  3/18/2021 Yes Background: 
 Past Medical History:  
Past Medical History:  
Diagnosis Date  Acute blood loss as cause of postoperative anemia 3/22/2021  Anticoagulated by anticoagulation treatment On Rivaroxaban  Cardiomyopathy ([address] 12/17/2015 2D echocardiogram (3/24/2021) showed EF 37%; global hypokinesis of left ventricle; grade I diastolic dysfunction; 2D echocardiogram (12/17/2015) showed EF 35%; genealized hypokinesis more focally severe of the inferior and posterior segments; mild mitral regurgitation; trace tricuspud regurgitation; normal pulmonary artery pressure  Chronic systolic heart failure ([address] 2D echocardiogram (3/24/2021) showed EF 37%; global hypokinesis of left ventricle; grade I diastolic dysfunction; 2D echocardiogram (12/17/2015) showed EF 35%; genealized hypokinesis more focally severe of the inferior and posterior segments; mild mitral regurgitation; trace tricuspud regurgitation; normal pulmonary artery pressure  Coronary artery disease involving native coronary artery of native heart  COVID-19 ruled out by laboratory testing 3/22/2021 SARS-CoV-2 ([address] [place] [address]) (3/22/2021): Not detected  Critical ischemia of lower extremity 3/22/2021 Right  Factor V Leiden mutation ([facility]  Grade I diastolic dysfunction 79/74/1236  
 2D echocardiogram (3/24/2021) showed EF 37%; global hypokinesis of left ventricle; grade I diastolic dysfunction  History of deep venous thrombosis (DVT) of distal vein of right lower extremity  History of epilepsy  History of head injury  History of myocardial infarction  History of noncompliance with medical treatment 3/18/2021  Hypertensive heart disease with chronic systolic congestive heart failure ([address]  2D echocardiogram (3/24/2021) showed EF 37%; global hypokinesis of left ventricle; grade I diastolic dysfunction; 2D echocardiogram (12/17/2015) showed EF 35%; genealized hypokinesis more focally severe of the inferior and posterior segments; mild mitral regurgitation; trace tricuspud regurgitation; normal pulmonary artery pressure  Long term current use of aspirin  Major depressive disorder 03/24/2021 On Duloxetine  Migraine headache  Mitral valve prolapse  Mixed hyperlipidemia Lipid profile (7/25/2018) showed , , HDL 27,   Obstructive sleep apnea  On statin therapy due to risk of future cardiovascular event On Atorvastatin  Peripheral artery disease ([facility]  Type 2 diabetes mellitus, without long-term current use of insulin ([facility] HbA1c (3/6/2021) = 53.1  
 Umbilical hernia  Wears glasses Patient taking anticoagulants yes Patient has a defibrillator: no  
 
Assessment: 
 Changes in Assessment throughout shift: Patient served almonds for dinner, patient recognized food on tray and notified nursing staff, did not eat.  Patient has central line: no Reasons if yes: Insertion date: Last dressing date: 
 Patient has Ellis Cath: no Reasons if yes: Insertion date: 
 
 Last Vitals: 
  
Vitals:  
 03/30/21 0744 03/30/21 1528 03/30/21 2200 03/31/21 6992 BP: 121/81 111/68 122/72 118/74 Pulse: 100 96 94 92 Resp: 16 20 18 16 Temp: 98.9 °F (37.2 °C) 99 °F (37.2 °C) 98.5 °F (36.9 °C) 98.1 °F (36.7 °C) SpO2: 94% 98% 98% 98% Weight:      
Height:      
 
 
 PAIN Pain Assessment Pain Intensity 1: 7 (03/31/21 0736) Pain Intensity 1: 2 (12/29/14 1105) Pain Location 1: Leg Pain Location 1: Abdomen Pain Intervention(s) 1: Medication (see MAR) Pain Intervention(s) 1: Medication (see MAR) Patient Stated Pain Goal: 0 Patient Stated Pain Goal: 0 
o Intervention effective: yes  
o Other actions taken for pain:  
 
 Skin Assessment Skin color Skin Color: Appropriate for ethnicity Condition/Temperature Skin Condition/Temp: Dry Integrity Skin Integrity: Incision (comment) Turgor Turgor: Non-tenting Weekly Pressure Ulcer Documentation  Pressure  Injury Documentation: No Pressure Injury Noted-Pressure Ulcer Prevention Initiated Wound Prevention & Protection Methods Orientation of wound Orientation of Wound Prevention: Posterior Location of Prevention Location of Wound Prevention: Buttocks, Sacrum/Coccyx Dressing Present Dressing Present : Yes Dressing Status Dressing Status: Intact Wound Offloading Wound Offloading (Prevention Methods): Adaptive equipment, Bed, pressure reduction mattress, Chair cushion, Elevate heels, Pillows, Repositioning, Turning, Wheelchair, [place] Resides  INTAKE/OUPUT Date 03/30/21 0700 - 03/31/21 2894 03/31/21 0700 - 04/01/21 4819 Shift [id number] 5824-2543 24 Hour Total 2294-5550 9585-1165 24 Hour Total  
INTAKE  
P.O. 360  360     
  P. O. 360  360 Shift Total(mL/kg) 360(4.2)  360(4.2) OUTPUT Urine(mL/kg/hr) Urine Occurrence(s) 1 x 5 x 6 x 1 x  1 x Emesis/NG output Emesis Occurrence(s) 0 x 0 x 0 x 0 x  0 x Stool Stool Occurrence(s) 1 x 1 x 2 x 0 x  0 x Shift Total(mL/kg)   360 Weight (kg) 84.8 84.8 84.8 84.8 84.8 84.8 Recommendations: 1. Patient needs and requests: water, pain medication 2. Diet: card. Const. Carb 1800k 3. Pending tests/procedures: OT/PT 4. Functional Level/Equipment: walker, assist x 1 
 
5. Estimated Discharge Date: TBD Posted on Whiteboard in Patients Room: yes HEALS Safety Check A safety check occurred in the patient's room between off going nurse and oncoming nurse listed above. The safety check included the below items Area Items H High Alert Medications - Verify all high alert medication drips (heparin, PCA, etc.) E Equipment - Suction is set up for ALL patients (with yanker) - Red plugs utilized for all equipment (IV pumps, etc.) - WOWs wiped down at end of shift. 
- Room stocked with oxygen, suction, and other unit-specific supplies A Alarms - Bed alarm is set for fall risk patients - Ensure chair alarm is in place and activated if patient is up in a chair L Lines - Check IV for any infiltration - Ellis bag is empty if patient has a Ellis - Tubing and IV bags are labeled Haja Noble Safety - Room is clean, patient is clean, and equipment is clean. - Hallways are clear from equipment besides carts. - Fall bracelet on for fall risk patients - Ensure room is clear and free of clutter - Suction is set up for ALL patients (with sendyker) - Hallways are clear from equipment besides carts. - Isolation precautions followed, supplies available outside room, sign posted

## 2021-03-31 NOTE — PROGRESS NOTES
Problem: Self Care Deficits Care Plan (Adult)  Goal: *Therapy Goal (Edit Goal, Insert Text)  Description: Occupational Therapy Goals   Long Term Goals  Initiated  and to be accomplished within 2 week(s)  1. Pt will perform self-feeding with mod I.  2. Pt will perform grooming with mod I.  3. Pt will perform UB bathing with mod I.  4. Pt will perform LB bathing with mod I.  5. Pt will perform tub/shower transfer with mod I.   6. Pt will perform UB dressing with mod I.  7. Pt will perform LB dressing with mod I.  8. Pt will perform toileting task with mod I.  9. Pt will perform toilet transfer with mod I. Short Term Goals   Initiated 3/27/2021 and to be accomplished within 7 day(s)  1. Pt will perform UB bathing with CGA. 2. Pt will perform LB bathing with CGA. 3. Pt will perform tub/shower transfer with CGA. 4. Pt will perform LB dressing with CGA. 5. Pt will perform toileting task with CGA. 6. Pt will perform toilet transfer with CGA. Outcome: Progressing Towards Goal    Occupational Therapy TREATMENT    Patient: Mila Parkinson   60 y.o. Patient identified with name and : yes    Date: 3/31/2021    First Tx Session  Time In: 0800  Time Out[de-identified] 9001    Second Tx Session  Time In: 9395  Time Out[de-identified] 8273    Diagnosis: Status post below knee amputation of right lower extremity ([facility] [Z89.511]   Precautions: Fall  Chart, occupational therapy assessment, plan of care, and goals were reviewed. Pain:  First Tx session: Pt reports 7/10 pain or discomfort prior to treatment. Pt reports 5/10 pain or discomfort post treatment. Second tx session: Pt reports 7/10 pain or discomfort prior to treatment. Pt reports 7/10 pain or discomfort post treatment. Intervention Provided: N/A      SUBJECTIVE:   Patient stated I am in pain but I got medication from the nurse earlier.   \"I'm just tired, physical therapy took it out of me. \"    OBJECTIVE DATA SUMMARY:     THERAPEUTIC ACTIVITY Daily Assessment         THERAPEUTIC EXERCISE Daily Assessment    Green Theraband seated w/c level with good form x 15 reps x 2 sets for shoulder horizontal abduction, shoulder flexion and triceps extension. 2# wrist weights on BUE while playing a card game seated edge of bed for core stability and UE strengthening for functional transfers. 2# weighted dowel bicep curls, chest press, and shoulder flexion 2 sets of 10 seated edge of bed for core stability and UE strengthening for functional transfers. GROOMING Daily Assessment    Grooming  Grooming Assistance : 7 (Independent)  Comments: Washing face seated at w/c level. Brushing teeth seated at w/c level. UPPER BODY BATHING Daily Assessment    Upper Body Bathing  Bathing Assistance, Upper: 6 (Modified independent)  Position Performed: Other (comment)(Seated w/c level at sink. )  Comments: pt requested help to wash back. LOWER BODY BATHING Daily Assessment    Lower Body Bathing  Bathing Assistance, Lower : 7 (Independent)  Adaptive Equipment: Long handled sponge; Other(comment)(w/c )  Position Performed: Other (comment)(seated w/c level in shower room. )  Adaptive Equipment: Long handled sponge       UPPER BODY DRESSING Daily Assessment    Upper Body Dressing   Dressing Assistance : 7 (Independent)  Comments: pt don/doffed shirt independently seated w/c level. LOWER BODY DRESSING Daily Assessment    Lower Body Dressing   Position Performed: Seated edge of bed  Adaptive Equipment Used: Sock aid  Don/Doff Anti-Embolic Stockings: 4 (Minimal assistance)  Comments: pt used sock aid to don/doff sock. MOBILITY/TRANSFERS Daily Assessment     Transfer edge of bed to w/c stand pivot and CGA. ASSESSMENT:  Patient benefits from education on adaptive devices to increase ability to complete ADL's with independence.  Patient participated in and tolerated UE THERE EX , wrist weights, and weighted dowel exercises to increase UB strength  for improved ADL performance and functional transfers. Progression toward goals:  [x]          Improving appropriately and progressing toward goals  []          Improving slowly and progressing toward goals  []          Not making progress toward goals and plan of care will be adjusted     PLAN:  Patient continues to benefit from skilled intervention to address the above impairments. Continue treatment per established plan of care. Discharge Recommendations: Home Health  Further Equipment Recommendations for Discharge: grab bar on wall next to toilet and inside tub-shower unit, tub transfer bench, rolling walker, wheelchair      Activity Tolerance:  Fair       Estimated LOS: 1 week. Please refer to the flowsheet for vital signs taken during this treatment. After treatment:   [x]  Patient left in no apparent distress sitting up in wheel chair   []  Patient left in no apparent distress in bed  [x]  Call bell left within reach  []  Nursing notified  []  Caregiver present  [x]  w/c alarm activated    COMMUNICATION/EDUCATION:   [x] Home safety education was provided and the patient/caregiver indicated understanding. [x] Patient/family have participated as able in goal setting and plan of care. [] Patient/family agree to work toward stated goals and plan of care. [] Patient understands intent and goals of therapy, but is neutral about his/her participation. [] Patient is unable to participate in goal setting and plan of care.       SEBASTIEN Smyth/DOMINIC

## 2021-03-31 NOTE — PROGRESS NOTES
[address]  [address] [place] [address], [address]     INPATIENT REHABILITATION  DAILY PROGRESS NOTE     Date: 3/31/2021    Name: Ynu Johnston Age / Sex: 60 y.o. / male   CSN: [id number] MRN: [id number]   [facility] Date: 3/26/2021 Length of Stay: 5 days     Primary Rehab Diagnosis: Impaired Mobility and ADLs secondary to:  1. S/P Right below-the-knee amputation (3/22/2021 - Dr. Leopoldo Atkinson)  2. History of critical ischemia of the right lower extremity  3. History of right lower extremity arterial embolectomy       Subjective:     Patient seen and examined. Blood pressure controlled. Blood glucose controlled. Patient's Complaint:   No significant medical complaints    Pain Control: stable, mild-to-moderate joint symptoms intermittently, reasonably well controlled by current meds      Objective:     Vital Signs:  Patient Vitals for the past 24 hrs:   BP Temp Pulse Resp SpO2   03/31/21 0736 118/74 98.1 °F (36.7 °C) 92 16 98 %   03/30/21 2200 122/72 98.5 °F (36.9 °C) 94 18 98 %   03/30/21 1528 111/68 99 °F (37.2 °C) 96 20 98 %        Physical Examination:  GENERAL SURVEY: Patient is awake, alert, oriented x 3, laying comfortably on the bed, not in acute respiratory distress. HEENT: pink palpebral conjunctivae, anicteric sclerae, no nasoaural discharge, moist oral mucosa  NECK: supple, no jugular venous distention, no palpable lymph nodes  CHEST/LUNGS: symmetrical chest expansion, good air entry, clear breath sounds  HEART: adynamic precordium, good S1 S2, no S3, regular rhythm, no murmurs  ABDOMEN: flat, bowel sounds appreciated, soft, non-tender  EXTREMITIES: (+) right BKA stump in  and clamshell; pink nailbeds, no edema, full and equal pulses, no calf tenderness   NEUROLOGICAL EXAM: The patient is awake, alert and oriented x3, able to answer questions fairly appropriately, able to follow 1 and 2 step commands. Able to tell time from the wall clock.   Cranial nerves II-XII are grossly intact. No gross sensory deficit.   Motor strength is 4+/5 on BUE, 4/5 on the RLE, 4 to 4+/5 on the LLE.     Incision(s): covered, clean, dry, and intact       Current Medications:  Current Facility-Administered Medications   Medication Dose Route Frequency    polyethylene glycol (MIRALAX) packet 17 g  17 g Oral DAILY    ondansetron hcl (ZOFRAN) tablet 4 mg  4 mg Oral TID PRN    senna-docusate (PERICOLACE) 8.6-50 mg per tablet 2 Tab  2 Tab Oral PCD    carvediloL (COREG) tablet 3.125 mg  3.125 mg Oral BID WITH MEALS    bisacodyL (DULCOLAX) tablet 10 mg  10 mg Oral Q48H PRN    insulin lispro (HUMALOG) injection   SubCUTAneous TIDAC    amoxicillin-clavulanate (AUGMENTIN) 875-125 mg per tablet 1 Tab  1 Tab Oral BID WITH MEALS    L. acidophilus,casei,rhamnosus (BIO-K PLUS) capsule 1 Cap  1 Cap Oral DAILY    atorvastatin (LIPITOR) tablet 80 mg  80 mg Oral DAILY    aspirin chewable tablet 81 mg  81 mg Oral DAILY WITH BREAKFAST    DULoxetine (CYMBALTA) capsule 30 mg  30 mg Oral DAILY    ezetimibe (ZETIA) tablet 10 mg  10 mg Oral QHS    [Held by provider] furosemide (LASIX) tablet 40 mg  40 mg Oral DAILY    gabapentin (NEURONTIN) capsule 100 mg  100 mg Oral BID    [Held by provider] lisinopriL (PRINIVIL, ZESTRIL) tablet 5 mg  5 mg Oral DAILY    oxyCODONE IR (ROXICODONE) tablet 5-10 mg  5-10 mg Oral Q4H PRN    acetaminophen (TYLENOL) tablet 650 mg  650 mg Oral TID    methocarbamoL (ROBAXIN) tablet 500 mg  500 mg Oral TID    rivaroxaban (XARELTO) tablet 20 mg  20 mg Oral DAILY WITH BREAKFAST    alogliptin (NESINA) tablet 25 mg  25 mg Oral DAILY    melatonin (rapid dissolve) tablet 5 mg  5 mg Oral QHS    zolpidem (AMBIEN) tablet 5 mg  5 mg Oral QHS PRN    glucose chewable tablet 16 g  4 Tab Oral PRN    glucagon (GLUCAGEN) injection 1 mg  1 mg IntraMUSCular PRN    dextrose (D50W) injection syrg 12.5-25 g  25-50 mL IntraVENous PRN    acetaminophen (TYLENOL) tablet 650 mg  650 mg Oral Q4H PRN    metFORMIN (GLUCOPHAGE) tablet 500 mg  500 mg Oral BID WITH MEALS       Allergies:   Allergies   Allergen Reactions    Nitroglycerin Other (comments)      BP drops rapidly when he takes SL Nitro          [place] Diarrhea and Rash    Cat Dander Cough    Codeine Rash    Strawberry Rash       Functional Progress:    PHYSICAL THERAPY    ON ADMISSION MOST RECENT   Wheelchair Mobility/Management  Able to Propel (ft): 250 feet(needing extra time)  Functional Level: 4(min A for steering initially, using bilat UE and left LE)  Curbs/Ramps Assist Required (FIM Score): 0 (Not tested)  Wheelchair Setup Assist Required : 3 (Moderate assistance)  Wheelchair Management: Manages left brake, Manages right brake(needing brake extender) Wheelchair Mobility/Management  Able to Propel (ft): 263 feet(room<>gym)  Functional Level: 5  Curbs/Ramps Assist Required (FIM Score): 0 (Not tested)  Wheelchair Setup Assist Required : 5 (Supervision/setup)  Wheelchair Management: Manages left brake, Manages right brake(right residual limb rest)     Gait  Amount of Assistance: 4 (Minimal assistance)  Distance (ft): 20 Feet (ft)  Assistive Device: Walker, rolling(rigid dressing right residual limb) Gait  Amount of Assistance: 4 (Contact guard assistance)  Distance (ft): 25 Feet (ft)(x2 trials)  Assistive Device: Gait belt, Walker, rolling     Balance-Sitting/Standing  Sitting - Static: Good (unsupported)  Sitting - Dynamic: Fair (occasional)  Standing - Static: Fair(needing UE support steadying surface)  Standing - Dynamic : Impaired Balance-Sitting/Standing  Sitting - Static: Good (unsupported)  Sitting - Dynamic: Fair (occasional)  Standing - Static: Fair  Standing - Dynamic : Impaired     Bed/Mat Mobility  Rolling Right : 6 (Modified independent)  Rolling Left : 6 (Modified independent)  Supine to Sit : 5 (Supervision)  Sit to Supine : 5 (Supervision) Bed/Mat Mobility  Rolling Right : 6 (Modified independent)  Rolling Left : 6 (Modified independent)  Supine to Sit : 5 (Supervision)  Sit to Supine : 5 (Supervision)     Transfers  Transfer Type: Other  Other: step step with RW  Transfer Assistance : 4 (Minimal assistance)  Sit to Stand Assistance: Minimal assistance  Car Transfers: Not tested(to be further assessed)  Car Type: N/A Transfers  Transfer Type: Other  Other: stand step with RW, squat pivot w/o AD  Transfer Assistance : 5 (Stand-by assistance)  Sit to Stand Assistance: Stand-by assistance  Car Transfers: (CGA into car; SBA out of car to w/c; with RW)  Car Type: (car simulator)     Steps or Stairs  Steps/Stairs Ambulated (#): 0  Level of Assist : 0 (Not tested)(NT due to safety concern)  Rail Use: (NT) Steps or Stairs  Steps/Stairs Ambulated (#): (2\" curb)  Level of Assist : 4 (Contact guard assistance)  Rail Use: (RW)         Lab/Data Review:  Recent Results (from the past 24 hour(s))   GLUCOSE, POC    Collection Time: 03/30/21 11:55 AM   Result Value Ref Range    Glucose (POC) 96 70 - 110 mg/dL   GLUCOSE, POC    Collection Time: 03/30/21  4:55 PM   Result Value Ref Range    Glucose (POC) 139 (H) 70 - 110 mg/dL   GLUCOSE, POC    Collection Time: 03/30/21  9:20 PM   Result Value Ref Range    Glucose (POC) 148 (H) 70 - 110 mg/dL   GLUCOSE, POC    Collection Time: 03/31/21  7:41 AM   Result Value Ref Range    Glucose (POC) 141 (H) 70 - 110 mg/dL       Assessment:     Primary Rehab Diagnosis  1. Impaired Mobility and ADLs  2. S/P Right below-the-knee amputation (3/22/2021 - Dr. Alli Mark)  3. History of critical ischemia of the right lower extremity  4.  History of right lower extremity arterial embolectomy     Comorbidities  Patient Active Problem List   Diagnosis Code    Status post below knee amputation of right lower extremity (HCC) Z89.511    Impaired mobility and ADLs Z74.09, Z78.9    Critical ischemia of lower extremity I99.8    Peripheral artery disease (HCC) I73.9    Coronary artery disease involving native coronary artery of native heart I25.10    Type 2 diabetes mellitus, without long-term current use of insulin (HCC) E11.9    History of epilepsy Z86.69    Factor V Leiden mutation ([facility] D68.51    Migraine headache G43.909    Wears glasses T90.7    Umbilical hernia H82.4    Obstructive sleep apnea G47.33    ICD (implantable cardioverter-defibrillator) in place Z95.810    Mitral valve prolapse I34.1    History of head injury Z87.828    Hypertensive heart disease with chronic systolic congestive heart failure (HCC) I11.0, I50.22    History of myocardial infarction I25.2    Long term current use of aspirin Z79.82    On statin therapy due to risk of future cardiovascular event Z79.899    History of deep venous thrombosis (DVT) of distal vein of right lower extremity Z86.718    Anticoagulated by anticoagulation treatment Z79.01    Mixed hyperlipidemia E78.2    History of embolectomy Z98.890    COVID-19 ruled out by laboratory testing Z20.822    Acute blood loss as cause of postoperative anemia D62    Cardiomyopathy ([facility] I42.9    Chronic systolic heart failure (HCC) I50.22    History of noncompliance with medical treatment Z91.19    Major depressive disorder F32.9    Grade I diastolic dysfunction C63.3    Constipation K59.00        Plan:     1. Justification for continued stay: Good progression towards established rehabilitation goals. 2. Medical Issues being followed closely:    [x]  Fall and safety precautions     [x]  Wound Care     [x]  Bowel and Bladder Function     [x]  Fluid Electrolyte and Nutrition Balance     [x]  Pain Control      3. Issues that 24 hour rehabilitation nursing is following:    [x]  Fall and safety precautions     [x]  Wound Care     [x]  Bowel and Bladder Function     [x]  Fluid Electrolyte and Nutrition Balance     [x]  Pain Control      [x]  Assistance with and education on in-room safety with transfers to and from the bed, wheelchair, toilet and shower.       4. Acute rehabilitation plan of care:    [x]  Continue current care and rehab. [x]  Physical Therapy           [x]  Occupational Therapy           []  Speech Therapy     []  Hold Rehab until further notice     5. Medications:    [x]  MAR Reviewed     [x]  Continue Present Medications     6. DVT Prophylaxis:      []  Enoxaparin     []  Unfractionated Heparin     []  Warfarin     [x]  NOAC     []  NEREYDA Stockings     []  Sequential Compression Device     []  None     7. Code status    [x]  Full code     []  Partial code     []  Do not intubate     []  Do not resuscitate     8. Orders:   > S/P Right below-the-knee amputation (3/22/2021 - Dr. Mitchell Ferris); History of critical ischemia of the right lower extremity;  History of right lower extremity arterial embolectomy   > Staples to be removed on 4/19/2021   > Continue:    > Amoxicillin-Clavulanate 875-125 1 tab PO BID with meals x 10 days (STOP DATE: 4/5/2021)    > Bio K Plus 1 cap PO once daily    > Acute Postoperative Blood Loss Anemia   > Hgb/Hct (3/26/2021) = 10.1/31.3   > Hgb/Hct (3/27/2021, on admission) = 10.9/33.9   > Anemia work-up showed serum iron 23, TIBC 200, iron % saturation 12, ferritin 835   > Hgb/Hct (3/29/2021) = 10.7/33.9    > No iron supplement for now    > Coronary artery disease; Peripheral artery disease    > Continue:    > Aspirin 81 mg PO once daily with breakfast    > Atorvastatin 80 mg PO once daily    > Carvedilol 6.25 mg PO BID with meals (8AM, 5PM)    > Hypertensive heart disease with chronic systolic heart failure; Cardiomyopathy; Chronic systolic heart failure; Grade I diastolic dysfunction   > 2D echocardiogram (12/17/2015) showed EF 35%; genealized hypokinesis more focally severe of the inferior and posterior segments; mild mitral regurgitation; trace tricuspud regurgitation; normal pulmonary artery pressure   > 2D echocardiogram (3/24/2021) showed EF 37%; global hypokinesis of left ventricle; grade I diastolic dysfunction   > On 3/27/2021, decreased Carvedilol from 6.25 mg to 3.125 mg PO BID with meals (8AM, 5PM)   > On 3/28/2021, held:    > Furosemide 40 mg PO once daily (9AM)    > Lisinopril 5 mg PO once daily (9AM)   > Continue Carvedilol 3.125 mg PO BID with meals (8AM, 5PM)    > Factor V Leiden mutation; History of deep venous thrombosis (DVT) of right lower extremity;  Anticoagulated on Rivaroxaban   > Continue Rivaroxaban 20 mg PO once daily with breakfast    > Major depressive disorder   > Continue Duloxetine 30 mg PO once daily    > Mixed hyperlipidemia   > Lipid profile (7/25/2018) showed , , HDL 27,    > Lipid profile (3/27/2021) showed , , HDL 35, LDL 63   > Continue:    > Atorvastatin 80 mg PO once daily    > Ezetimibe 10 mg PO q HS    > Type 2 diabetes mellitus, poorly controlled, without long-term current use of insulin   > HbA1c (3/6/2021) = 12.9    > On 3/27/2021, started Metformin 500 mg PO BID with meals   > On 3/28/2021:    > Started Glimepiride 1 mg PO once daily    > Decreased Insulin glargine from 26 units to 15 units SC q HS --> Discontinued   > On 3/29/2021, discontinued Glimepiride 1 mg PO once daily   > On 3/30/2021, discontinued Alogliptin 25 mg PO once daily   > Continue:    > Metformin 500 mg PO BID with meals    > Insulin lispro sliding scale SC TID AC only     > Difficulty sleeping   > Continue:    > Melatonin 5 mg PO q HS    > Zolpidem 5 mg PO q HS PRN for sleep    > Constipation; Nausea   > On 3/30/2021:    > Discontinued Docusate sodium 100 mg PO BID    > Started:     > Pericolace 2 tabs PO once daily after dinner     > Polyethylene glycol 17 grams in 8 oz water PO once daily     > Ondansetron 4 mg PO TID PRN for nausea/vomiting   > Continue:    > Pericolace 2 tabs PO once daily after dinner    > Polyethylene glycol 17 grams in 8 oz water PO once daily    > Ondansetron 4 mg PO TID PRN for nausea/vomiting    > Bisacodyl 10 mg PO q 48 hr PRN for constipation    > COVID-19 ruled out by laboratory testing   > SARS-CoV-2 ([facility] m2000, [facility]) (3/22/2021): Not detected    > Analgesia   > On 3/27/2021, started Acetaminophen 650 mg PO TID (8AM, 12PM, 4PM)   > Continue:    > Acetaminophen 650 mg PO TID (8AM, 12PM, 4PM)    > Acetaminophen 650 mg PO q 4 hr PRN for pain level 4/10 or lesser (from 8PM to 4AM only)    > Duloxetine 30 mg PO once daily    > Gabapentin 100 mg PO BID    > Methocarbamol 500 mg PO TID    > Oxycodone 5-10 mg PO q 4 hr PRN for pain level 5/10 or greater     > Diet:   > Specifications: Cardiac, diabetic, consistent carb 1800 kcal   > Solids (consistency): Regular    > Liquids (consistency): Thin    > Fluid restriction: None      9. Personal Protective Equipment was used while interacting with patient including: goggles and KN95 face mask. Patient was using a surgical mask. 10. Patient's progress in rehabilitation and medical issues discussed with the patient. All questions answered to the best of my ability. Care plan discussed with patient and nurse. 11. Total clinical care time is 30 minutes, including review of chart including all labs, radiology, past medical history, and discussion with patient and family. Greater than 50% of my time was spent in coordination of care and counseling.       Signed:    Marbin Centeno MD    March 31, 2021

## 2021-03-31 NOTE — ROUTINE PROCESS
SHIFT CHANGE NOTE FOR [facility] Bedside and Verbal shift change report given to Cheri Mtz RN (oncoming nurse) by Raghavendra Lala (offgoing nurse). Report included the following information SBAR, Kardex, MAR and Recent Results. Situation: 
 Code Status: Full Code Reason for Admission: R BKA Hospital Day: 5 Problem List:  
Hospital Problems  Date Reviewed: 3/30/2021 Codes Class Noted POA Constipation ICD-10-CM: K59.00 ICD-9-CM: 564.00  3/29/2021 Yes Peripheral artery disease (HCC) (Chronic) ICD-10-CM: I73.9 ICD-9-CM: 443.9  Unknown Yes Type 2 diabetes mellitus, without long-term current use of insulin (HCC) (Chronic) ICD-10-CM: E11.9 ICD-9-CM: 250.00  Unknown Yes Overview Signed 3/26/2021  5:49 PM by Carlos Barajas MD  
  HbA1c (3/6/2021) = 12.9 Factor V Leiden mutation ([facility] (Chronic) ICD-10-CM: X80.79 
ICD-9-CM: 289.81  Unknown Yes Hypertensive heart disease with chronic systolic congestive heart failure (HCC) (Chronic) ICD-10-CM: I11.0, I50.22 ICD-9-CM: 402.91, 428.22  Unknown Yes Overview Signed 3/26/2021 11:06 PM by Carlos Barajas MD  
  2D echocardiogram (12/17/2015) showed EF 35%; genealized hypokinesis more focally severe of the inferior and posterior segments; mild mitral regurgitation; trace tricuspud regurgitation; normal pulmonary artery pressure Long term current use of aspirin (Chronic) ICD-10-CM: B91.52 ICD-9-CM: V58.66  Unknown Yes On statin therapy due to risk of future cardiovascular event (Chronic) ICD-10-CM: T74.678 ICD-9-CM: V58.69  Unknown Yes Overview Signed 3/26/2021  4:34 PM by Carlos Barajas MD  
  On Atorvastatin History of deep venous thrombosis (DVT) of distal vein of right lower extremity ICD-10-CM: L45.877 ICD-9-CM: V12.51  Unknown Yes Anticoagulated by anticoagulation treatment (Chronic) ICD-10-CM: Z79.01 
ICD-9-CM: V58.61  Unknown Yes  Overview Signed 3/26/2021  4:36 PM by Star Posada MD  
  On Rivaroxaban Mixed hyperlipidemia (Chronic) ICD-10-CM: H89.3 ICD-9-CM: 272.2  Unknown Yes Overview Signed 3/26/2021 11:07 PM by Star Posada MD  
  Lipid profile (7/25/2018) showed , , HDL 27,  History of embolectomy ICD-10-CM: Z98.890 ICD-9-CM: V45.89  Unknown Yes Overview Signed 3/26/2021  5:39 PM by Star Posada MD  
  S/P Right lower extremity arterial embolectomy Major depressive disorder ICD-10-CM: F32.9 ICD-9-CM: 296.20  3/24/2021 Yes Overview Signed 3/26/2021 11:31 PM by Star Posada MD  
  On Duloxetine Grade I diastolic dysfunction FZM-10-IV: I51.9 ICD-9-CM: 429.9  3/24/2021 Yes Overview Signed 3/26/2021 11:48 PM by Star Posada MD  
  2D echocardiogram (3/24/2021) showed EF 37%; global hypokinesis of left ventricle; grade I diastolic dysfunction * (Principal) Status post below knee amputation of right lower extremity ([address] ICD-10-CM: V35.289 ICD-9-CM: V49.75  3/22/2021 Yes Overview Signed 3/26/2021  5:43 PM by Star Posada MD  
  S/P Right below-the-knee amputation (3/22/2021 - Dr. Jenny Kramer) Impaired mobility and ADLs ICD-10-CM: Z74.09, Z78.9 ICD-9-CM: V49.89  3/22/2021 Yes Critical ischemia of lower extremity ICD-10-CM: I99.8 ICD-9-CM: 459.9  3/22/2021 Yes Overview Signed 3/26/2021  5:42 PM by Star Posada MD  
  Right COVID-19 ruled out by laboratory testing ICD-10-CM: Z20.822 ICD-9-CM: V71.83  3/22/2021 Yes Overview Signed 3/26/2021  5:47 PM by Star Posada MD  
  SARS-CoV-2 ([address] [place] [address]) (3/22/2021): Not detected Acute blood loss as cause of postoperative anemia ICD-10-CM: D62 
ICD-9-CM: 285.1  3/22/2021 Yes History of noncompliance with medical treatment ICD-10-CM: Z91.19 ICD-9-CM: V15.81  3/18/2021 Yes Background: 
 Past Medical History:  
Past Medical History: Diagnosis Date  Acute blood loss as cause of postoperative anemia 3/22/2021  Anticoagulated by anticoagulation treatment On Rivaroxaban  Cardiomyopathy ([facility] 12/17/2015 2D echocardiogram (3/24/2021) showed EF 37%; global hypokinesis of left ventricle; grade I diastolic dysfunction; 2D echocardiogram (12/17/2015) showed EF 35%; genealized hypokinesis more focally severe of the inferior and posterior segments; mild mitral regurgitation; trace tricuspud regurgitation; normal pulmonary artery pressure  Chronic systolic heart failure ([address] 2D echocardiogram (3/24/2021) showed EF 37%; global hypokinesis of left ventricle; grade I diastolic dysfunction; 2D echocardiogram (12/17/2015) showed EF 35%; genealized hypokinesis more focally severe of the inferior and posterior segments; mild mitral regurgitation; trace tricuspud regurgitation; normal pulmonary artery pressure  Coronary artery disease involving native coronary artery of native heart  COVID-19 ruled out by laboratory testing 3/22/2021 [facility] (3/22/[facility]21): Not detected  Critical ischemia of lower extremity 3/22/2021 Right  Factor V Leiden mutation ([facility]  Grade I diastolic dysfunction 29/10/0883  
 2D echocardiogram (3/24/2021) showed EF 37%; global hypokinesis of left ventricle; grade I diastolic dysfunction  History of deep venous thrombosis (DVT) of distal vein of right lower extremity  History of epilepsy  History of head injury  History of myocardial infarction  History of noncompliance with medical treatment 3/18/2021  Hypertensive heart disease with chronic systolic congestive heart failure ([facility]  2D echocardiogram (3/24/2021) showed EF 37%; global hypokinesis of left ventricle; grade I diastolic dysfunction; 2D echocardiogram (12/17/2015) showed EF 35%; genealized hypokinesis more focally severe of the inferior and posterior segments; mild mitral regurgitation; trace tricuspud regurgitation; normal pulmonary artery pressure  Long term current use of aspirin  Major depressive disorder 03/24/2021 On Duloxetine  Migraine headache  Mitral valve prolapse  Mixed hyperlipidemia Lipid profile (7/25/2018) showed , , HDL 27,   Obstructive sleep apnea  On statin therapy due to risk of future cardiovascular event On Atorvastatin  Peripheral artery disease ([facility]  Type 2 diabetes mellitus, without long-term current use of insulin ([facility] HbA1c (3/6/2021) = 57.4  
 Umbilical hernia  Wears glasses Patient taking anticoagulants yes Patient has a defibrillator: no  
 
Assessment: 
 Changes in Assessment throughout shift: Patient served almonds for dinner, patient recognized food on tray and notified nursing staff, did not eat.  Patient has central line: no Reasons if yes: Insertion date: Last dressing date: 
 Patient has Ellis Cath: no Reasons if yes: Insertion date: 
 
 Last Vitals: 
  
Vitals:  
 03/30/21 0744 03/30/21 1528 03/30/21 2200 03/31/21 8231 BP: 121/81 111/68 122/72 118/74 Pulse: 100 96 94 92 Resp: 16 20 18 16 Temp: 98.9 °F (37.2 °C) 99 °F (37.2 °C) 98.5 °F (36.9 °C) 98.1 °F (36.7 °C) SpO2: 94% 98% 98% 98% Weight:      
Height:      
 
 
 PAIN Pain Assessment Pain Intensity 1: 7 (03/31/21 0907) Pain Intensity 1: 2 (12/29/14 1105) Pain Location 1: Leg Pain Location 1: Abdomen Pain Intervention(s) 1: Medication (see MAR) Pain Intervention(s) 1: Medication (see MAR) Patient Stated Pain Goal: 0 Patient Stated Pain Goal: 0 
o Intervention effective: yes  
o Other actions taken for pain:  
 
 Skin Assessment Skin color Skin Color: Appropriate for ethnicity Condition/Temperature Skin Condition/Temp: Dry, Warm Integrity Skin Integrity: Incision (comment) Turgor Turgor: Non-tenting Weekly Pressure Ulcer Documentation  Pressure  Injury Documentation: No Pressure Injury Noted-Pressure Ulcer Prevention Initiated Wound Prevention & Protection Methods Orientation of wound Orientation of Wound Prevention: Posterior Location of Prevention Location of Wound Prevention: Sacrum/Coccyx Dressing Present Dressing Present : Yes Dressing Status Dressing Status: Intact Wound Offloading Wound Offloading (Prevention Methods): Bed, pressure redistribution/air, Hydrocolloids, Pillows, Repositioning  INTAKE/OUPUT Date 03/30/21 0700 - 03/31/21 8082 03/31/21 0700 - 04/01/21 8733 Shift 0700-1859 1900-0659 24 Hour Total 0700-1859 1900-0659 24 Hour Total  
INTAKE  
P.O[address] 360  360     
  P. O. 360  360 Shift Total(mL/kg) 360(4.2)  360(4.2) OUTPUT Urine(mL/kg/hr) Urine Occurrence(s) 1 x 5 x 6 x 2 x  2 x Emesis/NG output Emesis Occurrence(s) 0 x 0 x 0 x 0 x  0 x Stool Stool Occurrence(s) 1 x 1 x 2 x 0 x  0 x Shift Total(mL/kg)   360 Weight (kg) 84.8 84.8 84.8 84.8 84.8 84.8 Recommendations: 1. Patient needs and requests: water, pain medication 2. Diet: card. Const. Carb 1800k 3. Pending tests/procedures: OT/PT 4. Functional Level/Equipment: walker, assist x 1 
 
5. Estimated Discharge Date: TBD Posted on Whiteboard in Patients Room: yes HEALS Safety Check A safety check occurred in the patient's room between off going nurse and oncoming nurse listed above. The safety check included the below items Area Items H High Alert Medications - Verify all high alert medication drips (heparin, PCA, etc.) E Equipment - Suction is set up for ALL patients (with yanker) - Red plugs utilized for all equipment (IV pumps, etc.) - WOWs wiped down at end of shift. 
- Room stocked with oxygen, suction, and other unit-specific supplies A Alarms - Bed alarm is set for fall risk patients - Ensure chair alarm is in place and activated if patient is up in a chair L Lines - Check IV for any infiltration - Ellis bag is empty if patient has a Ellis - Tubing and IV bags are labeled Veleta Handsome Safety - Room is clean, patient is clean, and equipment is clean. - Hallways are clear from equipment besides carts. - Fall bracelet on for fall risk patients - Ensure room is clear and free of clutter - Suction is set up for ALL patients (with yanker) - Hallways are clear from equipment besides carts. - Isolation precautions followed, supplies available outside room, sign posted

## 2021-04-01 LAB
GLUCOSE BLD STRIP.AUTO-MCNC: 104 MG/DL (ref 70–110)
GLUCOSE BLD STRIP.AUTO-MCNC: 117 MG/DL (ref 70–110)
GLUCOSE BLD STRIP.AUTO-MCNC: 134 MG/DL (ref 70–110)
GLUCOSE BLD STRIP.AUTO-MCNC: 145 MG/DL (ref 70–110)
HCT VFR BLD AUTO: 33.2 % (ref 36–48)
HGB BLD-MCNC: 10.4 G/DL (ref 13–16)

## 2021-04-01 PROCEDURE — 97530 THERAPEUTIC ACTIVITIES: CPT

## 2021-04-01 PROCEDURE — 82962 GLUCOSE BLOOD TEST: CPT

## 2021-04-01 PROCEDURE — 36415 COLL VENOUS BLD VENIPUNCTURE: CPT

## 2021-04-01 PROCEDURE — 2709999900 HC NON-CHARGEABLE SUPPLY

## 2021-04-01 PROCEDURE — 99232 SBSQ HOSP IP/OBS MODERATE 35: CPT | Performed by: EMERGENCY MEDICINE

## 2021-04-01 PROCEDURE — 97116 GAIT TRAINING THERAPY: CPT

## 2021-04-01 PROCEDURE — 97110 THERAPEUTIC EXERCISES: CPT

## 2021-04-01 PROCEDURE — 74011250637 HC RX REV CODE- 250/637: Performed by: INTERNAL MEDICINE

## 2021-04-01 PROCEDURE — 85018 HEMOGLOBIN: CPT

## 2021-04-01 PROCEDURE — 65310000000 HC RM PRIVATE REHAB

## 2021-04-01 RX ADMIN — OXYCODONE HYDROCHLORIDE 10 MG: 5 TABLET ORAL at 06:34

## 2021-04-01 RX ADMIN — METFORMIN HYDROCHLORIDE 500 MG: 500 TABLET ORAL at 08:01

## 2021-04-01 RX ADMIN — ASPIRIN 81 MG CHEWABLE TABLET 81 MG: 81 TABLET CHEWABLE at 08:01

## 2021-04-01 RX ADMIN — METHOCARBAMOL TABLETS 500 MG: 500 TABLET, COATED ORAL at 13:21

## 2021-04-01 RX ADMIN — OXYCODONE HYDROCHLORIDE 5 MG: 5 TABLET ORAL at 20:15

## 2021-04-01 RX ADMIN — AMOXICILLIN AND CLAVULANATE POTASSIUM 1 TABLET: 875; 125 TABLET, FILM COATED ORAL at 08:01

## 2021-04-01 RX ADMIN — CARVEDILOL 3.12 MG: 6.25 TABLET, FILM COATED ORAL at 08:01

## 2021-04-01 RX ADMIN — DOCUSATE SODIUM 50MG AND SENNOSIDES 8.6MG 2 TABLET: 8.6; 5 TABLET, FILM COATED ORAL at 17:13

## 2021-04-01 RX ADMIN — ACETAMINOPHEN 650 MG: 325 TABLET ORAL at 17:08

## 2021-04-01 RX ADMIN — ATORVASTATIN CALCIUM 80 MG: 40 TABLET, FILM COATED ORAL at 08:01

## 2021-04-01 RX ADMIN — GABAPENTIN 100 MG: 100 CAPSULE ORAL at 17:08

## 2021-04-01 RX ADMIN — OXYCODONE HYDROCHLORIDE 10 MG: 5 TABLET ORAL at 13:21

## 2021-04-01 RX ADMIN — DULOXETINE HYDROCHLORIDE 30 MG: 30 CAPSULE, DELAYED RELEASE ORAL at 08:01

## 2021-04-01 RX ADMIN — ALOGLIPTIN 25 MG: 25 TABLET, FILM COATED ORAL at 08:01

## 2021-04-01 RX ADMIN — EZETIMIBE 10 MG: 10 TABLET ORAL at 21:21

## 2021-04-01 RX ADMIN — ACETAMINOPHEN 650 MG: 325 TABLET ORAL at 12:19

## 2021-04-01 RX ADMIN — METFORMIN HYDROCHLORIDE 500 MG: 500 TABLET ORAL at 17:08

## 2021-04-01 RX ADMIN — RIVAROXABAN 20 MG: 20 TABLET, FILM COATED ORAL at 08:02

## 2021-04-01 RX ADMIN — AMOXICILLIN AND CLAVULANATE POTASSIUM 1 TABLET: 875; 125 TABLET, FILM COATED ORAL at 17:08

## 2021-04-01 RX ADMIN — METHOCARBAMOL TABLETS 500 MG: 500 TABLET, COATED ORAL at 08:02

## 2021-04-01 RX ADMIN — METHOCARBAMOL TABLETS 500 MG: 500 TABLET, COATED ORAL at 20:11

## 2021-04-01 RX ADMIN — GABAPENTIN 100 MG: 100 CAPSULE ORAL at 08:01

## 2021-04-01 RX ADMIN — ACETAMINOPHEN 650 MG: 325 TABLET ORAL at 08:01

## 2021-04-01 RX ADMIN — ZOLPIDEM TARTRATE 5 MG: 5 TABLET ORAL at 21:21

## 2021-04-01 RX ADMIN — Medication 1 CAPSULE: at 08:01

## 2021-04-01 RX ADMIN — Medication 5 MG: at 21:21

## 2021-04-01 RX ADMIN — CARVEDILOL 3.12 MG: 6.25 TABLET, FILM COATED ORAL at 17:09

## 2021-04-01 NOTE — ROUTINE PROCESS
[facility] and Verbal shift change report given to [address] [place] [address] [address], RN (oncoming nurse) by Mary Lou Montana RN (offgoing nurse). Report included the following information SBAR, Kardex, MAR and Recent Results. Situation: 
 Code Status: Full Code Reason for Admission: R BKA Hospital Day: 6 Problem List:  
Hospital Problems  Date Reviewed: 3/31/2021 Codes Class Noted POA Constipation ICD-10-CM: K59.00 ICD-9-CM: 564.00  3/29/2021 Yes Peripheral artery disease (HCC) (Chronic) ICD-10-CM: I73.9 ICD-9-CM: 443.9  Unknown Yes Type 2 diabetes mellitus, without long-term current use of insulin (HCC) (Chronic) ICD-10-CM: E11.9 ICD-9-CM: 250.00  Unknown Yes Overview Signed 3/26/2021  5:49 PM by Lashell Guillermo MD  
  HbA1c (3/6/2021) = 12.9 Factor V Leiden mutation ([facility] (Chronic) ICD-10-CM: E89.10 
ICD-9-CM: 289.81  Unknown Yes Hypertensive heart disease with chronic systolic congestive heart failure (HCC) (Chronic) ICD-10-CM: I11.0, I50.22 ICD-9-CM: 402.91, 428.22  Unknown Yes Overview Signed 3/26/2021 11:06 PM by Lashell Guillermo MD  
  2D echocardiogram (12/17/2015) showed EF 35%; genealized hypokinesis more focally severe of the inferior and posterior segments; mild mitral regurgitation; trace tricuspud regurgitation; normal pulmonary artery pressure Long term current use of aspirin (Chronic) ICD-10-CM: A84.71 ICD-9-CM: V58.66  Unknown Yes On statin therapy due to risk of future cardiovascular event (Chronic) ICD-10-CM: M46.647 ICD-9-CM: V58.69  Unknown Yes Overview Signed 3/26/2021  4:34 PM by Lashell Guillermo MD  
  On Atorvastatin History of deep venous thrombosis (DVT) of distal vein of right lower extremity ICD-10-CM: V46.451 ICD-9-CM: V12.51  Unknown Yes Anticoagulated by anticoagulation treatment (Chronic) ICD-10-CM: Z79.01 
ICD-9-CM: V58.61  Unknown Yes  Overview Signed 3/26/2021  4:36 PM by Last Garcia MD  
  On Rivaroxaban Mixed hyperlipidemia (Chronic) ICD-10-CM: Z06.5 ICD-9-CM: 272.2  Unknown Yes Overview Signed 3/26/2021 11:07 PM by Last Garcia MD  
  Lipid profile (7/25/2018) showed , , HDL 27,  History of embolectomy ICD-10-CM: Z98.890 ICD-9-CM: V45.89  Unknown Yes Overview Signed 3/26/2021  5:39 PM by Last Garcia MD  
  S/P Right lower extremity arterial embolectomy Major depressive disorder ICD-10-CM: F32.9 ICD-9-CM: 296.20  3/24/2021 Yes Overview Signed 3/26/2021 11:31 PM by Last Garcia MD  
  On Duloxetine Grade I diastolic dysfunction XJF-30-CW: I51.9 ICD-9-CM: 429.9  3/24/2021 Yes Overview Signed 3/26/2021 11:48 PM by Last Garcia MD  
  2D echocardiogram (3/24/2021) showed EF 37%; global hypokinesis of left ventricle; grade I diastolic dysfunction * (Principal) Status post below knee amputation of right lower extremity ([facility] ICD-10-CM: C53.506 ICD-9-CM: V49.75  3/22/2021 Yes Overview Signed 3/26/2021  5:43 PM by Last Garcia MD  
  S/P Right below-the-knee amputation (3/22/2021 - Dr. Sulma Hernandez) Impaired mobility and ADLs ICD-10-CM: Z74.09, Z78.9 ICD-9-CM: V49.89  3/22/2021 Yes Critical ischemia of lower extremity ([address] ICD-10-CM: Z49.448 ICD-9-CM: 459.9  3/22/2021 Yes Overview Signed 3/26/2021  5:42 PM by Last Garcia MD  
  Right COVID-19 ruled out by laboratory testing ICD-10-CM: Z20.822 ICD-9-CM: V01.79  3/22/2021 Yes Overview Signed 3/26/2021  5:47 PM by Last Garcia MD  
  SARS-CoV-2 ([address] [place] [address]) (3/22/2021): Not detected Acute blood loss as cause of postoperative anemia ICD-10-CM: D62 
ICD-9-CM: 285.1  3/22/2021 Yes History of noncompliance with medical treatment ICD-10-CM: Z91.19 ICD-9-CM: V15.81  3/18/2021 Yes Background: 
 Past Medical History:  
Past Medical History:  
Diagnosis Date  Acute blood loss as cause of postoperative anemia 3/22/2021  Anticoagulated by anticoagulation treatment On Rivaroxaban  Cardiomyopathy ([address] 12/17/2015 2D echocardiogram (3/24/2021) showed EF 37%; global hypokinesis of left ventricle; grade I diastolic dysfunction; 2D echocardiogram (12/17/2015) showed EF 35%; genealized hypokinesis more focally severe of the inferior and posterior segments; mild mitral regurgitation; trace tricuspud regurgitation; normal pulmonary artery pressure  Chronic systolic heart failure ([address] 2D echocardiogram (3/24/2021) showed EF 37%; global hypokinesis of left ventricle; grade I diastolic dysfunction; 2D echocardiogram (12/17/2015) showed EF 35%; genealized hypokinesis more focally severe of the inferior and posterior segments; mild mitral regurgitation; trace tricuspud regurgitation; normal pulmonary artery pressure  Coronary artery disease involving native coronary artery of native heart  COVID-19 ruled out by laboratory testing 3/22/2021 SARS-CoV-2 ([address] [place] [address]) (3/22/2021): Not detected  Critical ischemia of lower extremity 3/22/2021 Right  Factor V Leiden mutation ([facility]  Grade I diastolic dysfunction 21/86/3814  
 2D echocardiogram (3/24/2021) showed EF 37%; global hypokinesis of left ventricle; grade I diastolic dysfunction  History of deep venous thrombosis (DVT) of distal vein of right lower extremity  History of epilepsy  History of head injury  History of myocardial infarction  History of noncompliance with medical treatment 3/18/2021  Hypertensive heart disease with chronic systolic congestive heart failure ([address]  2D echocardiogram (3/24/2021) showed EF 37%; global hypokinesis of left ventricle; grade I diastolic dysfunction; 2D echocardiogram (12/17/2015) showed EF 35%; genealized hypokinesis more focally severe of the inferior and posterior segments; mild mitral regurgitation; trace tricuspud regurgitation; normal pulmonary artery pressure  Long term current use of aspirin  Major depressive disorder 03/24/2021 On Duloxetine  Migraine headache  Mitral valve prolapse  Mixed hyperlipidemia Lipid profile (7/25/2018) showed , , HDL 27,   Obstructive sleep apnea  On statin therapy due to risk of future cardiovascular event On Atorvastatin  Peripheral artery disease ([facility]  Type 2 diabetes mellitus, without long-term current use of insulin ([facility] HbA1c (3/6/2021) = 51.6  
 Umbilical hernia  Wears glasses Patient taking anticoagulants yes Patient has a defibrillator: no  
 
Assessment: 
 Changes in Assessment throughout shift: Patient served almonds for dinner, patient recognized food on tray and notified nursing staff, did not eat.  Patient has central line: no Reasons if yes: Insertion date: Last dressing date: 
 Patient has Ellis Cath: no Reasons if yes: Insertion date: 
 
 Last Vitals: 
  
Vitals:  
 03/30/21 2200 03/31/21 0736 03/31/21 1544 03/31/21 2024 BP: 122/72 118/74 121/78 115/71 Pulse: 94 92 86 82 Resp: 18 16 14 18 Temp: 98.5 °F (36.9 °C) 98.1 °F (36.7 °C) 97.3 °F (36.3 °C) 97 °F (36.1 °C) SpO2: 98% 98% 95% 97% Weight:      
Height:      
 
 
 PAIN Pain Assessment Pain Intensity 1: 0 (04/01/21 0401) Pain Intensity 1: 2 (12/29/14 1105) Pain Location 1: Leg Pain Location 1: Abdomen Pain Intervention(s) 1: Medication (see MAR) Pain Intervention(s) 1: Medication (see MAR) Patient Stated Pain Goal: 0 Patient Stated Pain Goal: 0 
o Intervention effective: yes  
o Other actions taken for pain:  
 
 Skin Assessment Skin color Skin Color: Appropriate for ethnicity Condition/Temperature Skin Condition/Temp: Dry, Warm Integrity Skin Integrity: Incision (comment) Turgor Turgor: Non-tenting Weekly Pressure Ulcer Documentation  Pressure  Injury Documentation: No Pressure Injury Noted-Pressure Ulcer Prevention Initiated Wound Prevention & Protection Methods Orientation of wound Orientation of Wound Prevention: Posterior Location of Prevention Location of Wound Prevention: Sacrum/Coccyx Dressing Present Dressing Present : Yes Dressing Status Dressing Status: Intact Wound Offloading Wound Offloading (Prevention Methods): Bed, pressure reduction mattress, Chair cushion, Pillows, Walker, Wheelchair  INTAKE/OUPUT Date 03/31/21 0700 - 04/01/21 5032 04/01/21 0700 - 04/02/21 9966 Shift 0700-1859 1900-0659 24 Hour Total 0700-1859 1900-0659 24 Hour Total  
INTAKE Shift Total(mL/kg) OUTPUT Urine(mL/kg/hr) Urine Occurrence(s) 4 x 1 x 5 x Emesis/NG output Emesis Occurrence(s) 0 x 0 x 0 x Stool Stool Occurrence(s) 1 x 0 x 1 x Shift Total(mL/kg) NET Weight (kg) 84.8 84.8 84.8 84.8 84.8 84.8 Recommendations: 1. Patient needs and requests: water, pain medication 2. Diet: card. Const. Carb 1800k 3. Pending tests/procedures: OT/PT 4. Functional Level/Equipment: walker, assist x 1 
 
5. Estimated Discharge Date: TBD Posted on Whiteboard in Patients Room: yes HEALS Safety Check A safety check occurred in the patient's room between off going nurse and oncoming nurse listed above. The safety check included the below items Area Items H High Alert Medications - Verify all high alert medication drips (heparin, PCA, etc.) E Equipment - Suction is set up for ALL patients (with yanker) - Red plugs utilized for all equipment (IV pumps, etc.) - WOWs wiped down at end of shift. 
- Room stocked with oxygen, suction, and other unit-specific supplies A Alarms - Bed alarm is set for fall risk patients - Ensure chair alarm is in place and activated if patient is up in a chair L Lines - Check IV for any infiltration - Ellis bag is empty if patient has a Ellis - Tubing and IV bags are labeled Ashleigh Glass Safety - Room is clean, patient is clean, and equipment is clean. - Hallways are clear from equipment besides carts. - Fall bracelet on for fall risk patients - Ensure room is clear and free of clutter - Suction is set up for ALL patients (with sendyker) - Hallways are clear from equipment besides carts. - Isolation precautions followed, supplies available outside room, sign posted

## 2021-04-01 NOTE — PROGRESS NOTES
conducted an initial consultation and Spiritual Assessment for Cuba Munson, who is a 60 y. o.,male. Patients Primary Language is: [place]. According to the patients EMR Yazidism Affiliation is: No preference. The reason the Patient came to the hospital is:   Patient Active Problem List    Diagnosis Date Noted    Constipation 03/29/2021    Peripheral artery disease ([facility]     Coronary artery disease involving native coronary artery of native heart     Type 2 diabetes mellitus, without long-term current use of insulin ([address]     History of epilepsy     Factor V Leiden mutation ([facility]     Migraine headache     Wears glasses     Umbilical hernia     Obstructive sleep apnea     ICD (implantable cardioverter-defibrillator) in place     Mitral valve prolapse     History of head injury     Hypertensive heart disease with chronic systolic congestive heart failure (HCC)     History of myocardial infarction     Long term current use of aspirin     On statin therapy due to risk of future cardiovascular event     History of deep venous thrombosis (DVT) of distal vein of right lower extremity     Anticoagulated by anticoagulation treatment     Mixed hyperlipidemia     History of embolectomy     Chronic systolic heart failure (HCC)     Major depressive disorder 03/24/2021    Grade I diastolic dysfunction 17/78/6008    Status post below knee amputation of right lower extremity ([address] 03/22/2021    Impaired mobility and ADLs 03/22/2021    Critical ischemia of lower extremity ([facility] 03/22/2021    COVID-19 ruled out by laboratory testing 03/22/2021    Acute blood loss as cause of postoperative anemia 03/22/2021    History of noncompliance with medical treatment 03/18/2021    Cardiomyopathy ([facility] 12/17/2015        The  provided the following Interventions:  Initiated a relationship of care and support with patient in room 176 of the rehab unit.   Found patient in a wheelchair just inside the door to his room. Provided information about Spiritual Care Services. Offered prayer and assurance of continued prayers on patients behalf. The following outcomes were achieved:  Patient shared limited information about his medical narrative and beliefs. Patient processed feeling about current hospitalization. Patient expressed gratitude for pastoral care visit. Assessment:  Patient does not have any Samaritan/cultural needs that will affect patients preferences in health care. There are no further spiritual or [employer]aritan issues which require Spiritual Care Services interventions at this time. Plan:  Chaplains will continue to follow and will provide pastoral care on an as needed/requested basis    . Jaqueline Gaspar   Spiritual Care   [phone]

## 2021-04-01 NOTE — ROUTINE PROCESS
SHIFT CHANGE NOTE FOR Osmani Rousseau Bedside and Verbal shift change report given to Marlys Ryan RN (oncoming nurse) by Dee Ralph (offgoing nurse). Report included the following information SBAR, Kardex, MAR and Recent Results. Situation: 
 Code Status: Full Code Reason for Admission: R BKA Hospital Day: 6 Problem List:  
Hospital Problems  Date Reviewed: 3/31/2021 Codes Class Noted POA Constipation ICD-10-CM: K59.00 ICD-9-CM: 564.00  3/29/2021 Yes Peripheral artery disease (HCC) (Chronic) ICD-10-CM: I73.9 ICD-9-CM: 443.9  Unknown Yes Type 2 diabetes mellitus, without long-term current use of insulin (HCC) (Chronic) ICD-10-CM: E11.9 ICD-9-CM: 250.00  Unknown Yes Overview Signed 3/26/2021  5:49 PM by Leann Bruner MD  
  HbA1c (3/6/2021) = 12.9 Factor V Leiden mutation ([facility] [address] (Chronic) ICD-10-CM: K47.52 
ICD-9-CM: 289.81  Unknown Yes Hypertensive heart disease with chronic systolic congestive heart failure (HCC) (Chronic) ICD-10-CM: I11.0, I50.22 ICD-9-CM: 402.91, 428.22  Unknown Yes Overview Signed 3/26/2021 11:06 PM by Leann Bruner MD  
  2D echocardiogram (12/17/2015) showed EF 35%; genealized hypokinesis more focally severe of the inferior and posterior segments; mild mitral regurgitation; trace tricuspud regurgitation; normal pulmonary artery pressure Long term current use of aspirin (Chronic) ICD-10-CM: S75.23 ICD-9-CM: V58.66  Unknown Yes On statin therapy due to risk of future cardiovascular event (Chronic) ICD-10-CM: I00.408 ICD-9-CM: V58.69  Unknown Yes Overview Signed 3/26/2021  4:34 PM by Leann Bruner MD  
  On Atorvastatin History of deep venous thrombosis (DVT) of distal vein of right lower extremity ICD-10-CM: X02.335 ICD-9-CM: V12.51  Unknown Yes Anticoagulated by anticoagulation treatment (Chronic) ICD-10-CM: Z79.01 
ICD-9-CM: V58.61  Unknown Yes  Overview Signed 3/26/2021  4:36 PM by Courtney Umanzor MD  
  On Rivaroxaban Mixed hyperlipidemia (Chronic) ICD-10-CM: X90.7 ICD-9-CM: 272.2  Unknown Yes Overview Signed 3/26/2021 11:07 PM by Courtney Umanzor MD  
  Lipid profile (7/25/2018) showed , , HDL 27,  History of embolectomy ICD-10-CM: Z98.890 ICD-9-CM: V45.89  Unknown Yes Overview Signed 3/26/2021  5:39 PM by Courtney Umanzor MD  
  S/P Right lower extremity arterial embolectomy Major depressive disorder ICD-10-CM: F32.9 ICD-9-CM: 296.20  3/24/2021 Yes Overview Signed 3/26/2021 11:31 PM by Courtney Umanzor MD  
  On Duloxetine Grade I diastolic dysfunction CPF-13-GM: I51.9 ICD-9-CM: 429.9  3/24/2021 Yes Overview Signed 3/26/2021 11:48 PM by Courtney Umanzor MD  
  2D echocardiogram (3/24/2021) showed EF 37%; global hypokinesis of left ventricle; grade I diastolic dysfunction * (Principal) Status post below knee amputation of right lower extremity ([facility] [address] ICD-10-CM: W86.267 ICD-9-CM: V49.75  3/22/2021 Yes Overview Signed 3/26/2021  5:43 PM by Courtney Umanzor MD  
  S/P Right below-the-knee amputation (3/22/2021 - Dr. Janis Brian) Impaired mobility and ADLs ICD-10-CM: Z74.09, Z78.9 ICD-9-CM: V49.89  3/22/2021 Yes Critical ischemia of lower extremity ([facility] [address] ICD-10-CM: F68.033 ICD-9-CM: 459.9  3/22/2021 Yes Overview Signed 3/26/2021  5:42 PM by Courtney Umanozr MD  
  Right COVID-19 ruled out by laboratory testing ICD-10-CM: Z20.822 ICD-9-CM: V01.79  3/22/2021 Yes Overview Signed 3/26/2021  5:47 PM by Courtney Umanzor MD  
  SARS-CoV-2 ([facility]) (3/22/2021): Not detected Acute blood loss as cause of postoperative anemia ICD-10-CM: D62 
ICD-9-CM: 285.1  3/22/2021 Yes History of noncompliance with medical treatment ICD-10-CM: Z91.19 ICD-9-CM: V15.81  3/18/2021 Yes Background: 
 Past Medical History:  
Past Medical History:  
Diagnosis Date  Acute blood loss as cause of postoperative anemia 3/22/2021  Anticoagulated by anticoagulation treatment On Rivaroxaban  Cardiomyopathy ([facility] [address] 12/17/2015 2D echocardiogram (3/24/2021) showed EF 37%; global hypokinesis of left ventricle; grade I diastolic dysfunction; 2D echocardiogram (12/17/2015) showed EF 35%; genealized hypokinesis more focally severe of the inferior and posterior segments; mild mitral regurgitation; trace tricuspud regurgitation; normal pulmonary artery pressure  Chronic systolic heart failure ([address] 2D echocardiogram (3/24/2021) showed EF 37%; global hypokinesis of left ventricle; grade I diastolic dysfunction; 2D echocardiogram (12/17/2015) showed EF 35%; genealized hypokinesis more focally severe of the inferior and posterior segments; mild mitral regurgitation; trace tricuspud regurgitation; normal pulmonary artery pressure  Coronary artery disease involving native coronary artery of native heart  COVID-19 ruled out by laboratory testing 3/22/2021 SARS[facility]CoV-2 ([facility] (3/22/2021): Not detected  Critical ischemia of lower extremity 3/22/2021 Right  Factor V Leiden mutation ([facility] [address]  Grade I diastolic dysfunction 88/22/3150  
 2D echocardiogram (3/24/2021) showed EF 37%; global hypokinesis of left ventricle; grade I diastolic dysfunction  History of deep venous thrombosis (DVT) of distal vein of right lower extremity  History of epilepsy  History of head injury  History of myocardial infarction  History of noncompliance with medical treatment 3/18/2021  Hypertensive heart disease with chronic systolic congestive heart failure ([address]  2D echocardiogram (3/24/2021) showed EF 37%; global hypokinesis of left ventricle; grade I diastolic dysfunction; 2D echocardiogram (12/17/2015) showed EF 35%; genealized hypokinesis more focally severe of the inferior and posterior segments; mild mitral regurgitation; trace tricuspud regurgitation; normal pulmonary artery pressure  Long term current use of aspirin  Major depressive disorder 03/24/2021 On Duloxetine  Migraine headache  Mitral valve prolapse  Mixed hyperlipidemia Lipid profile (7/25/2018) showed , , HDL 27,   Obstructive sleep apnea  On statin therapy due to risk of future cardiovascular event On Atorvastatin  Peripheral artery disease ([facility] [address]  Type 2 diabetes mellitus, without long-term current use of insulin ([facility] [address] HbA1c (3/6/2021) = 72.7  
 Umbilical hernia  Wears glasses Patient taking anticoagulants yes Patient has a defibrillator: no  
 
Assessment: 
 Changes in Assessment throughout shift: Patient served almonds for dinner, patient recognized food on tray and notified nursing staff, did not eat.  Patient has central line: no Reasons if yes: Insertion date: Last dressing date: 
 Patient has Ellis Cath: no Reasons if yes: Insertion date: 
 
 Last Vitals: 
  
Vitals:  
 03/31/21 6391 03/31/21 1544 03/31/21 2024 04/01/21 5827 BP: 118/74 121/78 115/71 123/82 Pulse: 92 86 82 91 Resp: 16 14 18 20 Temp: 98.1 °F (36.7 °C) 97.3 °F (36.3 °C) 97 °F (36.1 °C) 98.6 °F (37 °C) SpO2: 98% 95% 97% 96% Weight:      
Height:      
 
 
 PAIN Pain Assessment Pain Intensity 1: 7 (04/01/21 1317) Pain Intensity 1: 2 (12/29/14 1105) Pain Location 1: Leg Pain Location 1: Abdomen Pain Intervention(s) 1: Medication (see MAR) Pain Intervention(s) 1: Medication (see MAR) Patient Stated Pain Goal: 0 Patient Stated Pain Goal: 0 
o Intervention effective: yes  
o Other actions taken for pain:  
 
 Skin Assessment Skin color Skin Color: Appropriate for ethnicity Condition/Temperature Skin Condition/Temp: Dry, Warm Integrity Skin Integrity: Incision (comment) Turgor Turgor: Non-tenting Weekly Pressure Ulcer Documentation  Pressure  Injury Documentation: No Pressure Injury Noted-Pressure Ulcer Prevention Initiated Wound Prevention & Protection Methods Orientation of wound Orientation of Wound Prevention: Posterior Location of Prevention Location of Wound Prevention: Sacrum/Coccyx Dressing Present Dressing Present : Yes Dressing Status Dressing Status: Intact Wound Offloading Wound Offloading (Prevention Methods): Bed, pressure redistribution/air  INTAKE/OUPUT Date 03/31/21 0700 - 04/01/21 5172 04/01/21 0700 - 04/02/21 7058 Shift 0700-1859 1900-0659 24 Hour Total 0700-1859 1900-0659 24 Hour Total  
INTAKE Shift Total(mL/kg) OUTPUT Urine(mL/kg/hr) Urine Occurrence(s) 4 x 1 x 5 x 1 x  1 x Emesis/NG output Emesis Occurrence(s) 0 x 0 x 0 x Stool Stool Occurrence(s) 1 x 1 x 2 x 1 x  1 x Shift Total(mL/kg) NET Weight (kg) 84.8 84.8 84.8 84.8 84.8 84.8 Recommendations: 1. Patient needs and requests: water, pain medication 2. Diet: card. Const. Carb 1800k 3. Pending tests/procedures: OT/PT 4. Functional Level/Equipment: walker, assist x 1 
 
5. Estimated Discharge Date: TBD Posted on Whiteboard in Patients Room: yes HEALS Safety Check A safety check occurred in the patient's room between off going nurse and oncoming nurse listed above. The safety check included the below items Area Items H High Alert Medications - Verify all high alert medication drips (heparin, PCA, etc.) E Equipment - Suction is set up for ALL patients (with yanker) - Red plugs utilized for all equipment (IV pumps, etc.) - WOWs wiped down at end of shift. 
- Room stocked with oxygen, suction, and other unit-specific supplies A Alarms - Bed alarm is set for fall risk patients - Ensure chair alarm is in place and activated if patient is up in a chair L Lines - Check IV for any infiltration - Ellis bag is empty if patient has a Ellis - Tubing and IV bags are labeled Haja Noble Safety  - Room is clean, patient is clean, and equipment is clean. - Hallways are clear from equipment besides carts. - Fall bracelet on for fall risk patients - Ensure room is clear and free of clutter - Suction is set up for ALL patients (with faraz) - Hallways are clear from equipment besides carts. - Isolation precautions followed, supplies available outside room, sign posted

## 2021-04-01 NOTE — PROGRESS NOTES
Problem: Mobility Impaired (Adult and Pediatric)  Goal: *Therapy Goal (Edit Goal, Insert Text)  Description: Physical Therapy Short Term Goals  Initiated 3/27/2021 and to be accomplished within 7 day(s) on 4/2/2021:  1. Patient will move from supine to sit and sit to supine , scoot up and down, and roll side to side in bed with independence. 2.  Patient will transfer from bed to chair and chair to bed with standby assistance using the least restrictive device. 3.  Patient will perform sit to stand with stand by assistance. 4.  Patient will ambulate with contact guard assist for 50 feet with the least restrictive device. 5.  Patient will perform w/c mobility for at least 250 ft at SBA level over even surfaces. Physical Therapy Long Term Goals  Initiated 3/27/2021 and to be accomplished within 14 day(s) on 4/9/2021:  1. Patient will transfer from bed to chair and chair to bed with modified independence using the least restrictive device. 2.  Patient will perform sit to stand with modified independence. 3.  Patient will ambulate with modified independence for 50 feet with the least restrictive device. 4.  Patient will perform w/c mobility for at least 250 ft distances, reporting <3/10 modified Sylvia RPE, at modified independent level over even surfaces and around doorways, obstacles, narrow spaces. 5.  Patient will ascend/descend w/c ramp with supervision for ease of entry/exit of home. Outcome: Progressing Towards Goal   PHYSICAL THERAPY TREATMENT    Patient: Jonathan Tabares (87 y.o. male)  Date: 4/1/2021  Diagnosis: Status post below knee amputation of right lower extremity ([facility] [address] [Z89.511] Status post below knee amputation of right lower extremity ([facility] [address]  Precautions: Fall  Chart, physical therapy assessment, plan of care and goals were reviewed. Time ZE:0215  Time Out:1105    Patient seen for: Gait training; Therapeutic exercise;Transfer training; Wheelchair mobility; Patient education;Balance activities    Pain:  Pt pain was reported as 6/10, R residual limb, pre-treatment. Pt pain was reported as 6/10 post-treatment. Intervention: pt had received pain meds from nursing prior to session; repositioning; mobility    Patient identified with name and : yes    SUBJECTIVE:      Pt agreeable to participate in PT. Pt stated \"I'm tired still, but I'm trying to work through it. Simone Borrego I have to do what I need to do. \"    Pt reports he lives alone in his apt and would \"have a little difficulty getting around in the w/c in some areas. \"    OBJECTIVE DATA SUMMARY:    Objective:     BED/MAT MOBILITY Daily Assessment     Rolling Right : 6 (Modified independent)  Rolling Left : 6 (Modified independent)  Supine to Sit : 6 (Modified independent)  Sit to Supine : 6 (Modified independent)      TRANSFERS Daily Assessment     Transfer Type: Other  Other: stand step w/RW; squat pivot w/out AD(x3 reps with each method)  Transfer Assistance : 5 (Stand-by assistance)(S for safety cues with squat pivot; SBA w/RW as pt fatigues)  Sit to Stand Assistance: Stand-by assistance(cues for safety; vc's for increased control w/descent)  Toilet transfer: SBA with use of grab bar; CGA during clothing management. GAIT Daily Assessment    Gait Description (WDL) Exceptions to WDL    Gait Abnormalities Decreased step clearance(\"hop\" technique due to R BKA; slow gait speed)    Assistive device Gait belt;Walker, rolling    Ambulation assistance - level surface 4 (Contact guard assistance)(SBA majority of time, but CGA at times when fatigued)    Distance 70 Feet (ft)(x 2 trials; at beginning of session when not fatigued)    Ambulation assistance- uneven surface (NT)    Comments Pt requires w/c follow during gait for safety due to fatigue; notes being \"Short of breath\" after gait training.          STEPS/STAIRS Daily Assessment     Steps/Stairs ambulated (4\" curb platform, with RW support; x 2 trials)    Assistance Required  4 (Minimal assistance)(1 episode of LOB posterior w/min A to correct when moving toward edge of curb to begin descending)    Rail Use (RW)    Comments  Steadying balance needed at trunk during transition of foot from floor to top of curb; as well as steadying of RW to prevent it from sliding forward. Curbs/Ramps (4\" curb w/RW- see stair training)        BALANCE Daily Assessment     Sitting - Static: Good (unsupported)  Sitting - Dynamic: Good (unsupported)  Standing - Static: Fair(with RW support)  Standing - Dynamic : Impaired        WHEELCHAIR MOBILITY Daily Assessment     Able to Propel (ft): 260 feet(level surface)  Functional Level: 5(pt rated SOB as 5/10 on modified Sylvia dyspnea scale)  Curbs/Ramps Assist Required (FIM Score): 0 (Not tested)(raining outside)  Wheelchair Setup Assist Required : 5 (Supervision/setup)  Wheelchair Management: Manages left brake;Manages right brake(manages right residual limb rest)        THERAPEUTIC EXERCISES Daily Assessment     Extremity: Both  Exercise Type #1: Supine lower extremity strengthening(AROM: SLR, hip abd, SAQ's)  Sets Performed: 2  Reps Performed: 15  Level of Assist: Supervision  Exercise Type #2: Other (comment)(side-lying: hip abd against gravity; hip ext)  Sets Performed: 2  Reps Performed: 15  Level of Assist: Supervision  Exercise Type #3: Other (comment)(prone: hip flexor stretch x 5 min; knee flex)  Sets Performed: 2  Reps Performed: 15  Level of Assist: Supervision           ASSESSMENT:  Pt continues to make steady progress, but continues to need SBA/CGA for safety and fatigues with longer periods of activity. Pt expresses a Sylvia rating of 5/10 on Modified Dyspnea Scale after w/c mobility of 260 ft.; and expresses SOB after gait training for 70 ft. Pt tolerance varies from 20-70 ft depending on level of fatigue. Pt would benefit from a wheelchair for mobility at time of d/c  due to balance impairments and decreased activity tolerance.   Pt is able to lore/doff rigid residual limb clam shell with modified independence (increased time). Pt would benefit from w/c training outside on uneven surfaces and negotiation of ramps in w/c. Progression toward goals:  []      Improving appropriately and progressing toward goals  [x]      Improving slowly and progressing toward goals  []      Not making progress toward goals and plan of care will be adjusted      PLAN:  Patient continues to benefit from skilled intervention to address the above impairments. Continue treatment per established plan of care. Discharge Recommendations:  Home Health, with supervision  Further Equipment Recommendations for Discharge:  rolling walker, wheelchair 18 inch      Estimated Discharge Date:4/8/21    Activity Tolerance:   Fair, pt without c/o's of increased pain, dizziness, or nausea. Pt does get SOB with exertion and needs rest breaks frequently  Please refer to the flowsheet for vital signs taken during this treatment.     After treatment:   Patient left sitting up in w/c in his room with call light and bedside table in reach      Ammy Bronson, PT

## 2021-04-01 NOTE — PROGRESS NOTES
Problem: Self Care Deficits Care Plan (Adult)  Goal: *Therapy Goal (Edit Goal, Insert Text)  Description: Occupational Therapy Goals   Long Term Goals  Initiated  and to be accomplished within 2 week(s)  1. Pt will perform self-feeding with mod I.  2. Pt will perform grooming with mod I.  3. Pt will perform UB bathing with mod I.  4. Pt will perform LB bathing with mod I.  5. Pt will perform tub/shower transfer with mod I.   6. Pt will perform UB dressing with mod I.  7. Pt will perform LB dressing with mod I.  8. Pt will perform toileting task with mod I.  9. Pt will perform toilet transfer with mod I. Short Term Goals   Initiated 3/27/2021 and to be accomplished within 7 day(s)  1. Pt will perform UB bathing with CGA. 2. Pt will perform LB bathing with CGA. 3. Pt will perform tub/shower transfer with CGA. 4. Pt will perform LB dressing with CGA. 5. Pt will perform toileting task with CGA. 6. Pt will perform toilet transfer with CGA. Outcome: Progressing Towards Goal  Goal: Interventions  Outcome: Progressing Towards Goal   Occupational Therapy TREATMENT    Patient: Tita Stockton   60 y.o. Patient identified with name and : yes    Date: 2021    First Tx Session  Time In: 1112  Time Out[de-identified] 200    Second Tx Session  Time In:   Time Out[de-identified]     Diagnosis: Status post below knee amputation of right lower extremity ([facility] [address] [Z89.511]   Precautions: Fall  Chart, occupational therapy assessment, plan of care, and goals were reviewed. Pain:  Pt reports 0/10 pain or discomfort prior to treatment. Pt reports 0/10 pain or discomfort post treatment. Intervention Provided:       SUBJECTIVE:   Patient stated I worked nights at [employer].     OBJECTIVE DATA SUMMARY:     THERAPEUTIC ACTIVITY Daily Assessment    Pt engaged in functional reach activity with 2# free weights grasping pegs and securing to vertical surface overhead. Pt secured 100 pegs to peg board with supervision and RB's prn. THERAPEUTIC EXERCISE Daily Assessment    Pt engaged in BUE strengthening using 3# dowel in all planes 6x15 reps, 2 sets with purpose of maximizing BUE strength and activity tolerance for self cares and functional mobility. Pt completed 10 continuous minutes on power  to increase pt activity tolerance and BUE strength. MOBILITY/TRANSFERS Daily Assessment     Pt engaged in transfer training from w/c with CGA (bed transfer, shower bench transfer and toilet transfer) for increased safety. ASSESSMENT:  Pt is making progress toward goals however demonstrates decreased strength and activity tolerance with functional tasks, mobility and self cares. Pt reported having episode of incontinence earlier this date while finishing PT and not getting to toilet in time. OTR provided education for use of bathroom in therapy gym if increased urgency and consulted with nursing aid for assistance with laundry. Progression toward goals:  [x]          Improving appropriately and progressing toward goals  []          Improving slowly and progressing toward goals  []          Not making progress toward goals and plan of care will be adjusted     PLAN:  Patient continues to benefit from skilled intervention to address the above impairments. Continue treatment per established plan of care. Discharge Recommendations:  Home Health  Further Equipment Recommendations for Discharge:  N/A     Activity Tolerance:  fair      Estimated LOS:TBD    Please refer to the flowsheet for vital signs taken during this treatment. After treatment:   []  Patient left in no apparent distress sitting up in chair   [x]  Patient left in no apparent distress in bed  [x]  Call bell left within reach  []  Nursing notified  []  Caregiver present  []  Bed alarm activated    COMMUNICATION/EDUCATION:   [x] Home safety education was provided and the patient/caregiver indicated understanding.   [] Patient/family have participated as able in goal setting and plan of care. [x] Patient/family agree to work toward stated goals and plan of care. [] Patient understands intent and goals of therapy, but is neutral about his/her participation. [] Patient is unable to participate in goal setting and plan of care.       Donis Zabala, OT

## 2021-04-02 LAB
APPEARANCE UR: CLEAR
BACTERIA URNS QL MICRO: NEGATIVE /HPF
BILIRUB UR QL: NEGATIVE
COLOR UR: YELLOW
EPITH CASTS URNS QL MICRO: NEGATIVE /LPF (ref 0–5)
GLUCOSE BLD STRIP.AUTO-MCNC: 117 MG/DL (ref 70–110)
GLUCOSE BLD STRIP.AUTO-MCNC: 136 MG/DL (ref 70–110)
GLUCOSE BLD STRIP.AUTO-MCNC: 143 MG/DL (ref 70–110)
GLUCOSE BLD STRIP.AUTO-MCNC: 155 MG/DL (ref 70–110)
GLUCOSE UR STRIP.AUTO-MCNC: NEGATIVE MG/DL
HGB UR QL STRIP: ABNORMAL
KETONES UR QL STRIP.AUTO: NEGATIVE MG/DL
LEUKOCYTE ESTERASE UR QL STRIP.AUTO: NEGATIVE
NITRITE UR QL STRIP.AUTO: NEGATIVE
PH UR STRIP: 5.5 [PH] (ref 5–8)
PROT UR STRIP-MCNC: 300 MG/DL
RBC #/AREA URNS HPF: NEGATIVE /HPF (ref 0–5)
SP GR UR REFRACTOMETRY: 1.02 (ref 1–1.03)
UROBILINOGEN UR QL STRIP.AUTO: 0.2 EU/DL (ref 0.2–1)
WBC URNS QL MICRO: NORMAL /HPF (ref 0–4)

## 2021-04-02 PROCEDURE — 81001 URINALYSIS AUTO W/SCOPE: CPT

## 2021-04-02 PROCEDURE — 99232 SBSQ HOSP IP/OBS MODERATE 35: CPT | Performed by: EMERGENCY MEDICINE

## 2021-04-02 PROCEDURE — 82962 GLUCOSE BLOOD TEST: CPT

## 2021-04-02 PROCEDURE — 87086 URINE CULTURE/COLONY COUNT: CPT

## 2021-04-02 PROCEDURE — 65310000000 HC RM PRIVATE REHAB

## 2021-04-02 PROCEDURE — 97530 THERAPEUTIC ACTIVITIES: CPT

## 2021-04-02 PROCEDURE — 97110 THERAPEUTIC EXERCISES: CPT

## 2021-04-02 PROCEDURE — 2709999900 HC NON-CHARGEABLE SUPPLY

## 2021-04-02 PROCEDURE — 74011636637 HC RX REV CODE- 636/637: Performed by: INTERNAL MEDICINE

## 2021-04-02 PROCEDURE — 74011250637 HC RX REV CODE- 250/637: Performed by: INTERNAL MEDICINE

## 2021-04-02 PROCEDURE — 97535 SELF CARE MNGMENT TRAINING: CPT

## 2021-04-02 PROCEDURE — 97542 WHEELCHAIR MNGMENT TRAINING: CPT

## 2021-04-02 PROCEDURE — 97116 GAIT TRAINING THERAPY: CPT

## 2021-04-02 RX ADMIN — AMOXICILLIN AND CLAVULANATE POTASSIUM 1 TABLET: 875; 125 TABLET, FILM COATED ORAL at 08:31

## 2021-04-02 RX ADMIN — Medication 5 MG: at 21:09

## 2021-04-02 RX ADMIN — DOCUSATE SODIUM 50MG AND SENNOSIDES 8.6MG 2 TABLET: 8.6; 5 TABLET, FILM COATED ORAL at 17:22

## 2021-04-02 RX ADMIN — POLYETHYLENE GLYCOL 3350 17 G: 17 POWDER, FOR SOLUTION ORAL at 08:34

## 2021-04-02 RX ADMIN — METFORMIN HYDROCHLORIDE 500 MG: 500 TABLET ORAL at 16:12

## 2021-04-02 RX ADMIN — GABAPENTIN 100 MG: 100 CAPSULE ORAL at 17:22

## 2021-04-02 RX ADMIN — ASPIRIN 81 MG CHEWABLE TABLET 81 MG: 81 TABLET CHEWABLE at 08:31

## 2021-04-02 RX ADMIN — RIVAROXABAN 20 MG: 20 TABLET, FILM COATED ORAL at 08:32

## 2021-04-02 RX ADMIN — AMOXICILLIN AND CLAVULANATE POTASSIUM 1 TABLET: 875; 125 TABLET, FILM COATED ORAL at 16:12

## 2021-04-02 RX ADMIN — INSULIN LISPRO 2 UNITS: 100 INJECTION, SOLUTION INTRAVENOUS; SUBCUTANEOUS at 17:11

## 2021-04-02 RX ADMIN — OXYCODONE HYDROCHLORIDE 5 MG: 5 TABLET ORAL at 06:43

## 2021-04-02 RX ADMIN — METFORMIN HYDROCHLORIDE 500 MG: 500 TABLET ORAL at 08:31

## 2021-04-02 RX ADMIN — DULOXETINE HYDROCHLORIDE 30 MG: 30 CAPSULE, DELAYED RELEASE ORAL at 08:32

## 2021-04-02 RX ADMIN — ACETAMINOPHEN 650 MG: 325 TABLET ORAL at 16:13

## 2021-04-02 RX ADMIN — METHOCARBAMOL TABLETS 500 MG: 500 TABLET, COATED ORAL at 13:23

## 2021-04-02 RX ADMIN — ATORVASTATIN CALCIUM 80 MG: 40 TABLET, FILM COATED ORAL at 08:31

## 2021-04-02 RX ADMIN — METHOCARBAMOL TABLETS 500 MG: 500 TABLET, COATED ORAL at 19:36

## 2021-04-02 RX ADMIN — ACETAMINOPHEN 650 MG: 325 TABLET ORAL at 12:18

## 2021-04-02 RX ADMIN — ALOGLIPTIN 25 MG: 25 TABLET, FILM COATED ORAL at 08:32

## 2021-04-02 RX ADMIN — GABAPENTIN 100 MG: 100 CAPSULE ORAL at 08:32

## 2021-04-02 RX ADMIN — OXYCODONE HYDROCHLORIDE 10 MG: 5 TABLET ORAL at 13:23

## 2021-04-02 RX ADMIN — Medication 1 CAPSULE: at 08:33

## 2021-04-02 RX ADMIN — EZETIMIBE 10 MG: 10 TABLET ORAL at 21:09

## 2021-04-02 RX ADMIN — CARVEDILOL 3.12 MG: 6.25 TABLET, FILM COATED ORAL at 08:31

## 2021-04-02 RX ADMIN — ACETAMINOPHEN 650 MG: 325 TABLET ORAL at 08:32

## 2021-04-02 RX ADMIN — CARVEDILOL 3.12 MG: 6.25 TABLET, FILM COATED ORAL at 16:13

## 2021-04-02 RX ADMIN — METHOCARBAMOL TABLETS 500 MG: 500 TABLET, COATED ORAL at 08:31

## 2021-04-02 NOTE — ROUTINE PROCESS
SHIFT CHANGE NOTE FOR [facility] Bedside and Verbal shift change report given to Shawna Billy RN (oncoming nurse) by Shelby Gaspar RN (offgoing nurse). Report included the following information SBAR, Kardex, MAR and Recent Results. Situation: 
 Code Status: Full Code Reason for Admission: R BKA Hospital Day: 7 Problem List:  
Hospital Problems  Date Reviewed: 3/31/2021 Codes Class Noted POA Constipation ICD-10-CM: K59.00 ICD-9-CM: 564.00  3/29/2021 Yes Peripheral artery disease (HCC) (Chronic) ICD-10-CM: I73.9 ICD-9-CM: 443.9  Unknown Yes Type 2 diabetes mellitus, without long-term current use of insulin (HCC) (Chronic) ICD-10-CM: E11.9 ICD-9-CM: 250.00  Unknown Yes Overview Signed 3/26/2021  5:49 PM by Roque Moscoso MD  
  HbA1c (3/6/2021) = 12.9 [facility] Leiden mutation ([facility] (Chronic) ICD-10-CM: Y06.61 
ICD-9-CM: 289.81  Unknown Yes Hypertensive heart disease with chronic systolic congestive heart failure (HCC) (Chronic) ICD-10-CM: I11.0, I50.22 ICD-9-CM: 402.91, 428.22  Unknown Yes Overview Signed 3/26/2021 11:06 PM by Roque Moscoso MD  
  2D echocardiogram (12/17/2015) showed EF 35%; genealized hypokinesis more focally severe of the inferior and posterior segments; mild mitral regurgitation; trace tricuspud regurgitation; normal pulmonary artery pressure Long term current use of aspirin (Chronic) ICD-10-CM: B60.17 ICD-9-CM: V58.66  Unknown Yes On statin therapy due to risk of future cardiovascular event (Chronic) ICD-10-CM: G23.050 ICD-9-CM: V58.69  Unknown Yes Overview Signed 3/26/2021  4:34 PM by Roque Moscoso MD  
  On Atorvastatin History of deep venous thrombosis (DVT) of distal vein of right lower extremity ICD-10-CM: D39.818 ICD-9-CM: V12.51  Unknown Yes Anticoagulated by anticoagulation treatment (Chronic) ICD-10-CM: Z79.01 
ICD-9-CM: V58.61  Unknown Yes  Overview Signed 3/26/2021  4:36 PM by Florentino Mead MD  
  On Rivaroxaban Mixed hyperlipidemia (Chronic) ICD-10-CM: O41.9 ICD-9-CM: 272.2  Unknown Yes Overview Signed 3/26/2021 11:07 PM by Florentino Mead MD  
  Lipid profile (7/25/2018) showed , , HDL 27,  History of embolectomy ICD-10-CM: Z98.890 ICD-9-CM: V45.89  Unknown Yes Overview Signed 3/26/2021  5:39 PM by Florentino Mead MD  
  S/P Right lower extremity arterial embolectomy Major depressive disorder ICD-10-CM: F32.9 ICD-9-CM: 296.20  3/24/2021 Yes Overview Signed 3/26/2021 11:31 PM by Florentino Mead MD  
  On Duloxetine Grade I diastolic dysfunction NZN-30-IR: I51.9 ICD-9-CM: 429.9  3/24/2021 Yes Overview Signed 3/26/2021 11:48 PM by Florentino Mead MD  
  2D echocardiogram (3/24/2021) showed EF 37%; global hypokinesis of left ventricle; grade I diastolic dysfunction * (Principal) Status post below knee amputation of right lower extremity ([address] ICD-10-CM: H31.025 ICD-9-CM: V49.75  3/22/2021 Yes Overview Signed 3/26/2021  5:43 PM by Florentino Mead MD  
  S/P Right below-the-knee amputation (3/22/2021 - Dr. Salinas Roberson) Impaired mobility and ADLs ICD-10-CM: Z74.09, Z78.9 ICD-9-CM: V49.89  3/22/2021 Yes Critical ischemia of lower extremity ([address] ICD-10-CM: K17.213 ICD-9-CM: 459.9  3/22/2021 Yes Overview Signed 3/26/2021  5:42 PM by Florentino Mead MD  
  Right COVID-19 ruled out by laboratory testing ICD-10-CM: Z20.822 ICD-9-CM: V01.79  3/22/2021 Yes Overview Signed 3/26/2021  5:47 PM by Florentino Mead MD  
  SARS-CoV-2 ([address] [place] [address]) (3/22/2021): Not detected Acute blood loss as cause of postoperative anemia ICD-10-CM: D62 
ICD-9-CM: 285.1  3/22/2021 Yes History of noncompliance with medical treatment ICD-10-CM: Z91.19 ICD-9-CM: V15.81  3/18/2021 Yes Background: 
 Past Medical History:  
Past Medical History:  
Diagnosis Date  Acute blood loss as cause of postoperative anemia 3/22/2021  Anticoagulated by anticoagulation treatment On Rivaroxaban  Cardiomyopathy ([address] 12/17/2015 2D echocardiogram (3/24/2021) showed EF 37%; global hypokinesis of left ventricle; grade I diastolic dysfunction; 2D echocardiogram (12/17/2015) showed EF 35%; genealized hypokinesis more focally severe of the inferior and posterior segments; mild mitral regurgitation; trace tricuspud regurgitation; normal pulmonary artery pressure  Chronic systolic heart failure ([address] 2D echocardiogram (3/24/2021) showed EF 37%; global hypokinesis of left ventricle; grade I diastolic dysfunction; 2D echocardiogram (12/17/2015) showed EF 35%; genealized hypokinesis more focally severe of the inferior and posterior segments; mild mitral regurgitation; trace tricuspud regurgitation; normal pulmonary artery pressure  Coronary artery disease involving native coronary artery of native heart  COVID-19 ruled out by laboratory testing 3/22/2021 SARS-CoV-2 ([facility]) (3/22/2021): Not detected  Critical ischemia of lower extremity 3/22/2021 Right  Factor V Leiden mutation ([facility]  Grade I diastolic dysfunction 63/86/4897  
 2D echocardiogram (3/24/2021) showed EF 37%; global hypokinesis of left ventricle; grade I diastolic dysfunction  History of deep venous thrombosis (DVT) of distal vein of right lower extremity  History of epilepsy  History of head injury  History of myocardial infarction  History of noncompliance with medical treatment 3/18/2021  Hypertensive heart disease with chronic systolic congestive heart failure ([address]  2D echocardiogram (3/24/2021) showed EF 37%; global hypokinesis of left ventricle; grade I diastolic dysfunction; 2D echocardiogram (12/17/2015) showed EF 35%; genealized hypokinesis more focally severe of the inferior and posterior segments; mild mitral regurgitation; trace tricuspud regurgitation; normal pulmonary artery pressure  Long term current use of aspirin  Major depressive disorder 03/24/2021 On Duloxetine  Migraine headache  Mitral valve prolapse  Mixed hyperlipidemia Lipid profile (7/25/2018) showed , , HDL 27,   Obstructive sleep apnea  On statin therapy due to risk of future cardiovascular event On Atorvastatin  Peripheral artery disease ([facility]  Type 2 diabetes mellitus, without long-term current use of insulin ([facility] HbA1c (3/6/2021) = 86.9  
 Umbilical hernia  Wears glasses Patient taking anticoagulants yes Patient has a defibrillator: no  
 
Assessment: 
 Changes in Assessment throughout shift: Patient served almonds for dinner, patient recognized food on tray and notified nursing staff, did not eat.  Patient has central line: no Reasons if yes: Insertion date: Last dressing date: 
 Patient has Ellis Cath: no Reasons if yes: Insertion date: 
 
 Last Vitals: 
  
Vitals:  
 04/01/21 1514 04/01/21 1707 04/01/21 2109 04/02/21 4804 BP: 124/82 117/78 114/73 134/84 Pulse: 97 97 92 94 Resp: 19 16 18 19 Temp: 98 °F (36.7 °C)  97.1 °F (36.2 °C) 98.9 °F (37.2 °C) SpO2: 97% 97% 98% 97% Weight:      
Height:      
 
 
 PAIN Pain Assessment Pain Intensity 1: 7 (04/02/21 0643) Pain Intensity 1: 2 (12/29/14 1105) Pain Location 1: Leg Pain Location 1: Abdomen Pain Intervention(s) 1: Medication (see MAR) Pain Intervention(s) 1: Medication (see MAR) Patient Stated Pain Goal: 0 Patient Stated Pain Goal: 0 
o Intervention effective: yes  
o Other actions taken for pain:  
 
 Skin Assessment Skin color Skin Color: Appropriate for ethnicity Condition/Temperature Skin Condition/Temp: Dry, Warm Integrity Skin Integrity: Incision (comment) Turgor Turgor: Non-tenting Weekly Pressure Ulcer Documentation  Pressure  Injury Documentation: No Pressure Injury Noted-Pressure Ulcer Prevention Initiated Wound Prevention & Protection Methods Orientation of wound Orientation of Wound Prevention: Posterior Location of Prevention Location of Wound Prevention: Buttocks, Sacrum/Coccyx Dressing Present Dressing Present : Yes Dressing Status Dressing Status: Intact Wound Offloading Wound Offloading (Prevention Methods): Bed, pressure redistribution/air  INTAKE/OUPUT Date 04/01/21 0700 - 04/02/21 7476 04/02/21 0700 - 04/03/21 2899 Shift 0700-1859 1900-0659 24 Hour Total 0700-1859 1900-0659 24 Hour Total  
INTAKE Shift Total(mL/kg) OUTPUT Urine(mL/kg/hr)  250(0.2) 250(0.1) Urine Voided  250 250 Urine Occurrence(s) 1 x 2 x 3 x Stool Stool Occurrence(s) 1 x 0 x 1 x Shift Total(mL/kg)  250(2.9) 250(2.9) NET  -250 -250 Weight (kg) 84.8 84.8 84.8 84.8 84.8 84.8 Recommendations: 1. Patient needs and requests: water, pain medication 2. Diet: card. Const. Carb 1800k 3. Pending tests/procedures: OT/PT 4. Functional Level/Equipment: walker, assist x 1 
 
5. Estimated Discharge Date: TBD Posted on Whiteboard in Patients Room: yes HEALS Safety Check A safety check occurred in the patient's room between off going nurse and oncoming nurse listed above. The safety check included the below items Area Items H High Alert Medications - Verify all high alert medication drips (heparin, PCA, etc.) E Equipment - Suction is set up for ALL patients (with yanker) - Red plugs utilized for all equipment (IV pumps, etc.) - WOWs wiped down at end of shift. 
- Room stocked with oxygen, suction, and other unit-specific supplies A Alarms - Bed alarm is set for fall risk patients - Ensure chair alarm is in place and activated if patient is up in a chair L Lines - Check IV for any infiltration - Ellis bag is empty if patient has a Ellis - Tubing and IV bags are labeled [facility] Safety  - Room is clean, patient is clean, and equipment is clean. - Hallways are clear from equipment besides carts. - Fall bracelet on for fall risk patients - Ensure room is clear and free of clutter - Suction is set up for ALL patients (with faraz) - Hallways are clear from equipment besides carts. - Isolation precautions followed, supplies available outside room, sign posted

## 2021-04-02 NOTE — PROGRESS NOTES
SHIFT CHANGE NOTE FOR [place]    Bedside and Verbal shift change report given to [id number] Eliseo Young (oncoming nurse) by Susana Simms (offgoing nurse). Report included the following information SBAR, Kardex, MAR and Recent Results.     Situation:   Code Status: Full Code   Reason for Admission: [address] Day: 7   Problem List:   Hospital Problems  Date Reviewed: 3/31/2021          Codes Class Noted POA    Constipation ICD-10-CM: K59.00  ICD-9-CM: 564.00  3/29/2021 Yes        Peripheral artery disease (HCC) (Chronic) ICD-10-CM: I73.9  ICD-9-CM: 443.9  Unknown Yes        Type 2 diabetes mellitus, without long-term current use of insulin (HCC) (Chronic) ICD-10-CM: E11.9  ICD-9-CM: 250.00  Unknown Yes    Overview Signed 3/26/2021  5:49 PM by Lilly Noriega MD     HbA1c (3/6/2021) = 12.9             Factor V Leiden mutation ([facility] (Chronic) ICD-10-CM: M72.35  ICD-9-CM: 289.81  Unknown Yes        Hypertensive heart disease with chronic systolic congestive heart failure (HCC) (Chronic) ICD-10-CM: I11.0, I50.22  ICD-9-CM: 402.91, 428.22  Unknown Yes    Overview Signed 3/26/2021 11:06 PM by Lilly Noriega MD     2D echocardiogram (12/17/2015) showed EF 35%; genealized hypokinesis more focally severe of the inferior and posterior segments; mild mitral regurgitation; trace tricuspud regurgitation; normal pulmonary artery pressure             Long term current use of aspirin (Chronic) ICD-10-CM: M57.34  ICD-9-CM: V58.66  Unknown Yes        On statin therapy due to risk of future cardiovascular event (Chronic) ICD-10-CM: Z39.623  ICD-9-CM: V58.69  Unknown Yes    Overview Signed 3/26/2021  4:34 PM by Lilly Noriega MD     On Atorvastatin             History of deep venous thrombosis (DVT) of distal vein of right lower extremity ICD-10-CM: Z86.718  ICD-9-CM: V12.51  Unknown Yes        Anticoagulated by anticoagulation treatment (Chronic) ICD-10-CM: Z79.01  ICD-9-CM: V58.61  Unknown Yes    Overview Signed 3/26/2021  4:36 PM by Lakesha Mary Rivera MD     On Rivaroxaban             Mixed hyperlipidemia (Chronic) ICD-10-CM: E78.2  ICD-9-CM: 272.2  Unknown Yes    Overview Signed 3/26/2021 11:07 PM by Tita Alvarez MD     Lipid profile (7/25/2018) showed , , HDL 27,              History of embolectomy ICD-10-CM: Z98.890  ICD-9-CM: V45.89  Unknown Yes    Overview Signed 3/26/2021  5:39 PM by Tita Alvarez MD     S/P Right lower extremity arterial embolectomy             Major depressive disorder ICD-10-CM: F32.9  ICD-9-CM: 296.20  3/24/2021 Yes    Overview Signed 3/26/2021 11:31 PM by Tita Alvarez MD     On Duloxetine             Grade I diastolic dysfunction U-84-TR: I51.9  ICD-9-CM: 429.9  3/24/2021 Yes    Overview Signed 3/26/2021 11:48 PM by Tita Alvarez MD     2D echocardiogram (3/24/2021) showed EF 37%; global hypokinesis of left ventricle; grade I diastolic dysfunction             * (Principal) Status post below knee amputation of right lower extremity ([facility] ICD-10-CM: Z89.511  ICD-9-CM: V49.75  3/22/2021 Yes    Overview Signed 3/26/2021  5:43 PM by Tita Alvarez MD     S/P Right below-the-knee amputation (3/22/2021 - Dr. Ana Luisa Marshall)             Impaired mobility and ADLs ICD-10-CM: Z74.09, Z78.9  ICD-9-CM: V49.89  3/22/2021 Yes        Critical ischemia of lower extremity ([address] ICD-10-CM: T79.497  ICD-9-CM: 459.9  3/22/2021 Yes    Overview Signed 3/26/2021  5:42 PM by Tita Alvarez MD     Right             COVID-19 ruled out by laboratory testing ICD-10-CM: Z20.822  ICD-9-CM: V01.79  3/22/2021 Yes    Overview Signed 3/26/2021  5:47 PM by Tita Alvarez MD     SARS-CoV-2 ([address] [place] [address]) (3/22/2021):  Not detected               Acute blood loss as cause of postoperative anemia ICD-10-CM: D62  ICD-9-CM: 285.1  3/22/2021 Yes        History of noncompliance with medical treatment ICD-10-CM: Z91.19  ICD-9-CM: V15.81  3/18/2021 Yes              Background:   Past Medical History:   Past Medical History: Diagnosis Date    Acute blood loss as cause of postoperative anemia 3/22/2021    Anticoagulated by anticoagulation treatment     On Rivaroxaban    Cardiomyopathy ([facility] 12/17/2015    2D echocardiogram (3/24/2021) showed EF 37%; global hypokinesis of left ventricle; grade I diastolic dysfunction; 2D echocardiogram (12/17/2015) showed EF 35%; genealized hypokinesis more focally severe of the inferior and posterior segments; mild mitral regurgitation; trace tricuspud regurgitation; normal pulmonary artery pressure    Chronic systolic heart failure (HCC)     2D echocardiogram (3/24/2021) showed EF 37%; global hypokinesis of left ventricle; grade I diastolic dysfunction; 2D echocardiogram (12/17/2015) showed EF 35%; genealized hypokinesis more focally severe of the inferior and posterior segments; mild mitral regurgitation; trace tricuspud regurgitation; normal pulmonary artery pressure    Coronary artery disease involving native coronary artery of native heart     COVID-19 ruled out by laboratory testing 3/22/2021    SARS-CoV-2 ([email or web address]) (3/22/2021):  Not detected     Critical ischemia of lower extremity 3/22/2021    Right    [facility]     Grade I diastolic dysfunction 83/93/5597    2D echocardiogram (3/24/2021) showed EF 37%; global hypokinesis of left ventricle; grade I diastolic dysfunction    History of deep venous thrombosis (DVT) of distal vein of right lower extremity     History of epilepsy     History of head injury     History of myocardial infarction     History of noncompliance with medical treatment 3/18/2021    Hypertensive heart disease with chronic systolic congestive heart failure ([facility]     2D echocardiogram (3/24/2021) showed EF 37%; global hypokinesis of left ventricle; grade I diastolic dysfunction; 2D echocardiogram (12/17/2015) showed EF 35%; genealized hypokinesis more focally severe of the inferior and posterior segments; mild mitral regurgitation; trace tricuspud regurgitation; normal pulmonary artery pressure    Long term current use of aspirin     Major depressive disorder 03/24/2021    On Duloxetine    Migraine headache     Mitral valve prolapse     Mixed hyperlipidemia     Lipid profile (7/25/2018) showed , , HDL 27,     Obstructive sleep apnea     On statin therapy due to risk of future cardiovascular event     On Atorvastatin    Peripheral artery disease ([facility]     Type 2 diabetes mellitus, without long-term current use of insulin ([facility])     HbA1c (3/6/2021) = 80.4    Umbilical hernia     Wears glasses       Patient taking anticoagulants yes    Patient has a defibrillator: no no  Assessment:  Changes in Assessment throughout shift: none   Patient has central line: no Reasons if yes: date:na Last dressing date:na  Patient has Ellis Cath: no Reasons na ast Vitals:     Vitals:    04/01/21 1707 04/01/21 2109 04/02/21 0712 04/02/21 1605   BP: 117/78 114/73 134/84 118/77   Pulse: 97 92 94 93   Resp: 16 18 19 16   Temp:  97.1 °F (36.2 °C) 98.9 °F (37.2 °C) 97.5 °F (36.4 °C)   SpO2: 97% 98% 97% 96%   Weight:       Height:            PAIN    Pain Assessment    Pain Intensity 1: 0 (04/02/21 1603) Pain Intensity 1: 2 (12/29/14 1105)    Pain Location 1: Leg Pain Location 1: Abdomen    Pain Intervention(s) 1: Medication (see MAR) Pain Intervention(s) 1: Medication (see MAR)  Patient Stated Pain Goal: 0 Patient Stated Pain Goal: 0  o Intervention effective:  yes   o Other actions taken for pain: pain med     Skin Assessment  Skin color Skin Color: Appropriate for ethnicity  Condition/Temperature Skin Condition/Temp: Dry, Warm  Integrity Skin Integrity: Incision (comment)  Turgor Turgor: Non-tenting  Weekly Pressure Ulcer Documentation  Pressure  Injury Documentation: No Pressure Injury Noted-Pressure Ulcer Prevention Initiated  Wound Prevention & Protection Methods  Orientation of wound Orientation of Wound Prevention: Posterior  Location of Prevention Location of Wound Prevention: Buttocks, Sacrum/Coccyx  Dressing Present Dressing Present : Yes  Dressing Status Dressing Status: Intact  Wound Offloading Wound Offloading (Prevention Methods): Bed, pressure reduction mattress     INTAKE/OUPUT  Date 04/01/21 0700 - 04/02/21 0659 04/02/21 0700 - 04/03/21 0659   Shift 0700-1859 2477-7859 24 Hour Total 0700-1859 1900-0659 24 Hour Total   INTAKE   Shift Total(mL/kg)         OUTPUT   Urine(mL/kg/hr)  250(0.2) 250(0.1)        Urine Voided  250 250        Urine Occurrence(s) 1 x 2 x 3 x 0 x  0 x   Emesis/NG output           Emesis Occurrence(s)    0 x  0 x   Stool           Stool Occurrence(s) 1 x 0 x 1 x 0 x  0 x   Shift Total(mL/kg)  250(2.9) 250(2.9)      NET  -250 -250      Weight (kg) 84.8 84.8 84.8 84.8 84.8 84.8       Recommendations:  1. Patient needs and requests: none @ present    2. Diet: cardiac     3. Pending tests/procedures: labs     4. Functional Level/Equipment: up with assist/WC    5. Estimated Discharge Date: none yet Posted on Whiteboard in Patients Room: [facility] Safety Check    A safety check occurred in the patient's room between off going nurse and oncoming nurse listed above. The safety check included the below items  Area Items   H  High Alert Medications - Verify all high alert medication drips (heparin, PCA, etc.)   E  Equipment - Suction is set up for ALL patients (with yanker)  - Red plugs utilized for all equipment (IV pumps, etc.)  - WOWs wiped down at end of shift.  - Room stocked with oxygen, suction, and other unit-specific supplies   A  Alarms - Bed alarm is set for fall risk patients  - Ensure chair alarm is in place and activated if patient is up in a chair   L  Lines - Check IV for any infiltration  - Ellis bag is empty if patient has a Ellis   - Tubing and IV bags are labeled   S  Safety   - Room is clean, patient is clean, and equipment is clean.   - Hallways are clear from equipment besides carts.   - Fall bracelet on for fall risk patients  - Ensure room is clear and free of clutter  - Suction is set up for ALL patients (with faraz)  - Hallways are clear from equipment besides carts. - Isolation precautions followed, supplies available outside room, sign posted   .

## 2021-04-02 NOTE — PROGRESS NOTES
DANIELLE spoke to pt family friend Tato Lawson for an in person discharge family education for Tue at 10:30 am. SW rounded on pt and provided updates on the discharge plan to include the discharge date. DANIELLE will continue to follow.      WILNER RobertsW, [facility]  Doctoral Candidate, Doctor of Social Work

## 2021-04-02 NOTE — ROUTINE PROCESS
SHIFT CHANGE NOTE FOR Roz Fong Bedside and Verbal shift change report given to UMU Perdomo, RN (oncoming nurse) by Elise aCstillo RN (offgoing nurse). Report included the following information SBAR, Kardex, MAR and Recent Results. Situation: 
 Code Status: Full Code Reason for Admission: R BKA Hospital Day: 7 Problem List:  
Hospital Problems  Date Reviewed: 3/31/2021 Codes Class Noted POA Constipation ICD-10-CM: K59.00 ICD-9-CM: 564.00  3/29/2021 Yes Peripheral artery disease (HCC) (Chronic) ICD-10-CM: I73.9 ICD-9-CM: 443.9  Unknown Yes Type 2 diabetes mellitus, without long-term current use of insulin (HCC) (Chronic) ICD-10-CM: E11.9 ICD-9-CM: 250.00  Unknown Yes Overview Signed 3/26/2021  5:49 PM by Keyur Hamm MD  
  HbA1c (3/6/2021) = 12.9 Factor V Leiden mutation ([facility] (Chronic) ICD-10-CM: O13.80 
ICD-9-CM: 289.81  Unknown Yes Hypertensive heart disease with chronic systolic congestive heart failure (HCC) (Chronic) ICD-10-CM: I11.0, I50.22 ICD-9-CM: 402.91, 428.22  Unknown Yes Overview Signed 3/26/2021 11:06 PM by Keyur Hamm MD  
  2D echocardiogram (12/17/2015) showed EF 35%; genealized hypokinesis more focally severe of the inferior and posterior segments; mild mitral regurgitation; trace tricuspud regurgitation; normal pulmonary artery pressure Long term current use of aspirin (Chronic) ICD-10-CM: Y75.53 ICD-9-CM: V58.66  Unknown Yes On statin therapy due to risk of future cardiovascular event (Chronic) ICD-10-CM: U41.763 ICD-9-CM: V58.69  Unknown Yes Overview Signed 3/26/2021  4:34 PM by Keyur Hamm MD  
  On Atorvastatin History of deep venous thrombosis (DVT) of distal vein of right lower extremity ICD-10-CM: F95.728 ICD-9-CM: V12.51  Unknown Yes Anticoagulated by anticoagulation treatment (Chronic) ICD-10-CM: Z79.01 
ICD-9-CM: V58.61  Unknown Yes  Overview Signed 3/26/2021  4:36 PM by Analy Poole MD  
  On Rivaroxaban Mixed hyperlipidemia (Chronic) ICD-10-CM: J21.0 ICD-9-CM: 272.2  Unknown Yes Overview Signed 3/26/2021 11:07 PM by Analy Poole MD  
  Lipid profile (7/25/2018) showed , , HDL 27,  History of embolectomy ICD-10-CM: Z98.890 ICD-9-CM: V45.89  Unknown Yes Overview Signed 3/26/2021  5:39 PM by Analy Poole MD  
  S/P Right lower extremity arterial embolectomy Major depressive disorder ICD-10-CM: F32.9 ICD-9-CM: 296.20  3/24/2021 Yes Overview Signed 3/26/2021 11:31 PM by Analy Poole MD  
  On Duloxetine Grade I diastolic dysfunction OIE-83-ZW: I51.9 ICD-9-CM: 429.9  3/24/2021 Yes Overview Signed 3/26/2021 11:48 PM by Analy Poole MD  
  2D echocardiogram (3/24/2021) showed EF 37%; global hypokinesis of left ventricle; grade I diastolic dysfunction * (Principal) Status post below knee amputation of right lower extremity ([facility] ICD-10-CM: A65.848 ICD-9-CM: V49.75  3/22/2021 Yes Overview Signed 3/26/2021  5:43 PM by Analy Poole MD  
  S/P Right below-the-knee amputation (3/22/2021 - Dr. Alli Mark) Impaired mobility and ADLs ICD-10-CM: Z74.09, Z78.9 ICD-9-CM: V49.89  3/22/2021 Yes Critical ischemia of lower extremity ([facility] ICD-10-CM: H93.283 ICD-9-CM: 459.9  3/22/2021 Yes Overview Signed 3/26/2021  5:42 PM by Analy Poole MD  
  Right COVID-19 ruled out by laboratory testing ICD-10-CM: Z20.822 ICD-9-CM: V01.79  3/22/2021 Yes Overview Signed 3/26/2021  5:47 PM by Analy Poole MD  
  SARS-CoV-2 ([address] [place] [address]) (3/22/2021): Not detected Acute blood loss as cause of postoperative anemia ICD-10-CM: D62 
ICD-9-CM: 285.1  3/22/2021 Yes History of noncompliance with medical treatment ICD-10-CM: Z91.19 ICD-9-CM: V15.81  3/18/2021 Yes Background: 
 Past Medical History:  
Past Medical History:  
Diagnosis Date  Acute blood loss as cause of postoperative anemia 3/22/2021  Anticoagulated by anticoagulation treatment On Rivaroxaban  Cardiomyopathy ([address] 12/17/2015 2D echocardiogram (3/24/2021) showed EF 37%; global hypokinesis of left ventricle; grade I diastolic dysfunction; 2D echocardiogram (12/17/2015) showed EF 35%; genealized hypokinesis more focally severe of the inferior and posterior segments; mild mitral regurgitation; trace tricuspud regurgitation; normal pulmonary artery pressure  Chronic systolic heart failure ([address] 2D echocardiogram (3/24/2021) showed EF 37%; global hypokinesis of left ventricle; grade I diastolic dysfunction; 2D echocardiogram (12/17/2015) showed EF 35%; genealized hypokinesis more focally severe of the inferior and posterior segments; mild mitral regurgitation; trace tricuspud regurgitation; normal pulmonary artery pressure  Coronary artery disease involving native coronary artery of native heart  COVID-19 ruled out by laboratory testing 3/22/2021 SARS-CoV-2 ([address] [place] [address]) (3/22/2021): Not detected  Critical ischemia of lower extremity 3/22/2021 Right  Factor V Leiden mutation ([facility]  Grade I diastolic dysfunction 62/21/0402  
 2D echocardiogram (3/24/2021) showed EF 37%; global hypokinesis of left ventricle; grade I diastolic dysfunction  History of deep venous thrombosis (DVT) of distal vein of right lower extremity  History of epilepsy  History of head injury  History of myocardial infarction  History of noncompliance with medical treatment 3/18/2021  Hypertensive heart disease with chronic systolic congestive heart failure ([address]  2D echocardiogram (3/24/2021) showed EF 37%; global hypokinesis of left ventricle; grade I diastolic dysfunction; 2D echocardiogram (12/17/2015) showed EF 35%; genealized hypokinesis more focally severe of the inferior and posterior segments; mild mitral regurgitation; trace tricuspud regurgitation; normal pulmonary artery pressure  Long term current use of aspirin  Major depressive disorder 03/24/2021 On Duloxetine  Migraine headache  Mitral valve prolapse  Mixed hyperlipidemia Lipid profile (7/25/2018) showed , , HDL 27,   Obstructive sleep apnea  On statin therapy due to risk of future cardiovascular event On Atorvastatin  Peripheral artery disease ([facility]  Type 2 diabetes mellitus, without long-term current use of insulin ([facility] HbA1c (3/6/2021) = 38.3  
 Umbilical hernia  Wears glasses Patient taking anticoagulants yes Patient has a defibrillator: no  
 
Assessment: 
 Changes in Assessment throughout shift: Patient served almonds for dinner, patient recognized food on tray and notified nursing staff, did not eat.  Patient has central line: no Reasons if yes: Insertion date: Last dressing date: 
 Patient has Ellis Cath: no Reasons if yes: Insertion date: 
 
 Last Vitals: 
  
Vitals:  
 04/01/21 1514 04/01/21 1707 04/01/21 2109 04/02/21 1167 BP: 124/82 117/78 114/73 134/84 Pulse: 97 97 92 94 Resp: 19 16 18 19 Temp: 98 °F (36.7 °C)  97.1 °F (36.2 °C) 98.9 °F (37.2 °C) SpO2: 97% 97% 98% 97% Weight:      
Height:      
 
 
 PAIN Pain Assessment Pain Intensity 1: 0 (04/02/21 1144) Pain Intensity 1: 2 (12/29/14 1105) Pain Location 1: Leg Pain Location 1: Abdomen Pain Intervention(s) 1: Medication (see MAR) Pain Intervention(s) 1: Medication (see MAR) Patient Stated Pain Goal: 0 Patient Stated Pain Goal: 0 
o Intervention effective: yes  
o Other actions taken for pain:  
 
 Skin Assessment Skin color Skin Color: Appropriate for ethnicity Condition/Temperature Skin Condition/Temp: Dry, Warm Integrity Skin Integrity: Incision (comment) Turgor Turgor: Non-tenting Weekly Pressure Ulcer Documentation  Pressure  Injury Documentation: No Pressure Injury Noted-Pressure Ulcer Prevention Initiated Wound Prevention & Protection Methods Orientation of wound Orientation of Wound Prevention: Posterior Location of Prevention Location of Wound Prevention: Buttocks, Sacrum/Coccyx Dressing Present Dressing Present : Yes Dressing Status Dressing Status: Intact Wound Offloading Wound Offloading (Prevention Methods): Bed, pressure reduction mattress  INTAKE/OUPUT Date 04/01/21 0700 - 04/02/21 6860 04/02/21 0700 - 04/03/21 7957 Shift 0700-1859 1900-0659 24 Hour Total 0700-1859 1900-0659 24 Hour Total  
INTAKE Shift Total(mL/kg) OUTPUT Urine(mL/kg/hr)  250(0.2) 250(0.1) Urine Voided  250 250 Urine Occurrence(s) 1 x 2 x 3 x 0 x  0 x Stool Stool Occurrence(s) 1 x 0 x 1 x 0 x  0 x Shift Total(mL/kg)  250(2.9) 250(2.9) NET  -250 -250 Weight (kg) 84.8 84.8 84.8 84.8 84.8 84.8 Recommendations: 1. Patient needs and requests: water, pain medication 2. Diet: card. Const. Carb 1800k 3. Pending tests/procedures: OT/PT 4. Functional Level/Equipment: walker, assist x 1 
 
5. Estimated Discharge Date: TBD Posted on Whiteboard in Patients Room: yes HEALS Safety Check A safety check occurred in the patient's room between off going nurse and oncoming nurse listed above. The safety check included the below items Area Items H High Alert Medications - Verify all high alert medication drips (heparin, PCA, etc.) E Equipment - Suction is set up for ALL patients (with yanker) - Red plugs utilized for all equipment (IV pumps, etc.) - WOWs wiped down at end of shift. 
- Room stocked with oxygen, suction, and other unit-specific supplies A Alarms - Bed alarm is set for fall risk patients - Ensure chair alarm is in place and activated if patient is up in a chair L Lines - Check IV for any infiltration - Ellis bag is empty if patient has a Ellis - Tubing and IV bags are labeled Bibi Factor Safety  - Room is clean, patient is clean, and equipment is clean. - Hallways are clear from equipment besides carts. - Fall bracelet on for fall risk patients - Ensure room is clear and free of clutter - Suction is set up for ALL patients (with faraz) - Hallways are clear from equipment besides carts. - Isolation precautions followed, supplies available outside room, sign posted

## 2021-04-02 NOTE — PROGRESS NOTES
[address]  [address] [place] [address], [address]     INPATIENT REHABILITATION  DAILY PROGRESS NOTE     Date: 4/2/2021    Name: Myrna Granado Age / Sex: 60 y.o. / male   CSN: [id number] MRN: [id number]   [facility] Date: 3/26/2021 Length of Stay: 7 days     Primary Rehab Diagnosis: Impaired Mobility and ADLs secondary to:  1. S/P Right below-the-knee amputation (3/22/2021 - Dr. Lesly Eaton)  2. History of critical ischemia of the right lower extremity  3. History of right lower extremity arterial embolectomy       Subjective:     Patient is sitting in bed in no apparent distress, awake and alert. Patient complains of some urinary frequency. No fevers. No abdominal pain.   No dysuria    Objective:     Vital Signs:  Patient Vitals for the past 24 hrs:   BP Temp Pulse Resp SpO2   04/02/21 1605 118/77 97.5 °F (36.4 °C) 93 16 96 %   04/02/21 0712 134/84 98.9 °F (37.2 °C) 94 19 97 %   04/01/21 2109 114/73 97.1 °F (36.2 °C) 92 18 98 %        Physical Examination:  General:  Awake, alert  Cardiovascular:  S1S2+, RRR  Pulmonary:  CTA b/l  GI:  Soft, BS+, NT, ND  Extremities: Right BKA noted        Current Medications:  Current Facility-Administered Medications   Medication Dose Route Frequency    polyethylene glycol (MIRALAX) packet 17 g  17 g Oral DAILY    ondansetron hcl (ZOFRAN) tablet 4 mg  4 mg Oral TID PRN    senna-docusate (PERICOLACE) 8.6-50 mg per tablet 2 Tab  2 Tab Oral PCD    carvediloL (COREG) tablet 3.125 mg  3.125 mg Oral BID WITH MEALS    bisacodyL (DULCOLAX) tablet 10 mg  10 mg Oral Q48H PRN    insulin lispro (HUMALOG) injection   SubCUTAneous TIDAC    amoxicillin-clavulanate (AUGMENTIN) 875-125 mg per tablet 1 Tab  1 Tab Oral BID WITH MEALS    L. acidophilus,casei,rhamnosus (BIO-K PLUS) capsule 1 Cap  1 Cap Oral DAILY    atorvastatin (LIPITOR) tablet 80 mg  80 mg Oral DAILY    aspirin chewable tablet 81 mg  81 mg Oral DAILY WITH BREAKFAST    DULoxetine (CYMBALTA) capsule 30 mg  30 mg Oral DAILY    ezetimibe (ZETIA) tablet 10 mg  10 mg Oral QHS    [Held by provider] furosemide (LASIX) tablet 40 mg  40 mg Oral DAILY    gabapentin (NEURONTIN) capsule 100 mg  100 mg Oral BID    [Held by provider] lisinopriL (PRINIVIL, ZESTRIL) tablet 5 mg  5 mg Oral DAILY    oxyCODONE IR (ROXICODONE) tablet 5-10 mg  5-10 mg Oral Q4H PRN    acetaminophen (TYLENOL) tablet 650 mg  650 mg Oral TID    methocarbamoL (ROBAXIN) tablet 500 mg  500 mg Oral TID    rivaroxaban (XARELTO) tablet 20 mg  20 mg Oral DAILY WITH BREAKFAST    alogliptin (NESINA) tablet 25 mg  25 mg Oral DAILY    melatonin (rapid dissolve) tablet 5 mg  5 mg Oral QHS    zolpidem (AMBIEN) tablet 5 mg  5 mg Oral QHS PRN    glucose chewable tablet 16 g  4 Tab Oral PRN    glucagon (GLUCAGEN) injection 1 mg  1 mg IntraMUSCular PRN    dextrose (D50W) injection syrg 12.5-25 g  25-50 mL IntraVENous PRN    acetaminophen (TYLENOL) tablet 650 mg  650 mg Oral Q4H PRN    metFORMIN (GLUCOPHAGE) tablet 500 mg  500 mg Oral BID WITH MEALS       Allergies:   Allergies   Allergen Reactions    Nitroglycerin Other (comments)      BP drops rapidly when he takes SL Nitro          Kevil Diarrhea and Rash    Cat Dander Cough    Codeine Rash    Strawberry Rash       Functional Progress:    PHYSICAL THERAPY    ON ADMISSION MOST RECENT   Wheelchair Mobility/Management  Able to Propel (ft): 250 feet(needing extra time)  Functional Level: 4(min A for steering initially, using bilat UE and left LE)  Curbs/Ramps Assist Required (FIM Score): 0 (Not tested)  Wheelchair Setup Assist Required : 3 (Moderate assistance)  Wheelchair Management: Manages left brake, Manages right brake(needing brake extender) Wheelchair Mobility/Management  Able to Propel (ft): 263 feet(level surface)  Functional Level: 5(pt rated SOB as 4/10 on Modified Sylvia dyspnea scale)  Curbs/Ramps Assist Required (FIM Score): 0 (Not tested)(too cold outside)  Wheelchair Setup Assist Required : 5 (Supervision/setup)  Wheelchair Management: Manages left brake, Manages right brake(manages right residual limb rest)     Gait  Amount of Assistance: 4 (Minimal assistance)  Distance (ft): 20 Feet (ft)  Assistive Device: Walker, rolling(rigid dressing right residual limb) Gait  Amount of Assistance: 5 (Stand-by assistance)  Distance (ft): 50 Feet (ft)(x2 trials; but rated dyspnea as 6/10 on Modified Sylvia scale)  Assistive Device: Gait belt, Walker, rolling     Balance-Sitting/Standing  Sitting - Static: Good (unsupported)  Sitting - Dynamic: Fair (occasional)  Standing - Static: Fair(needing UE support steadying surface)  Standing - Dynamic : Impaired Balance-Sitting/Standing  Sitting - Static: Good (unsupported)  Sitting - Dynamic: Good (unsupported)  Standing - Static: Fair(w/RW support)  Standing - Dynamic : Impaired     Bed/Mat Mobility  Rolling Right : 6 (Modified independent)  Rolling Left : 6 (Modified independent)  Supine to Sit : 5 (Supervision)  Sit to Supine : 5 (Supervision) Bed/Mat Mobility  Rolling Right : 7 (Independent)  Rolling Left : 7 (Independent)  Supine to Sit : 6 (Modified independent)  Sit to Supine : 6 (Modified independent)     Transfers  Transfer Type: Other  Other: step step with RW  Transfer Assistance : 4 (Minimal assistance)  Sit to Stand Assistance: Minimal assistance  Car Transfers: Not tested(to be further assessed)  Car Type: N/A Transfers  Transfer Type:  Other  Other: lateral squat pivot; stand step w/RW  Transfer Assistance : 5 (Stand-by assistance)(supervision squat pivot; SBA with RW)  Sit to Stand Assistance: Stand-by assistance(vc's for hand placement at times & for control w/descent)  Car Transfers: (CGA)  Car Type: (car simulator)     Steps or Stairs  Steps/Stairs Ambulated (#): 0  Level of Assist : 0 (Not tested)(NT due to safety concern)  Rail Use: (NT) Steps or Stairs  Steps/Stairs Ambulated (#): (4\" curb platform, with RW support; x 2 trials)  Level of Assist : 4 (Minimal assistance)(1 episode of LOB posterior w/min A to correct)  Rail Use: (RW)         Lab/Data Review:  Recent Results (from the past 24 hour(s))   GLUCOSE, POC    Collection Time: 04/01/21  8:20 PM   Result Value Ref Range    Glucose (POC) 134 (H) 70 - 110 mg/dL   GLUCOSE, POC    Collection Time: 04/02/21  7:14 AM   Result Value Ref Range    Glucose (POC) 117 (H) 70 - 110 mg/dL   GLUCOSE, POC    Collection Time: 04/02/21 11:40 AM   Result Value Ref Range    Glucose (POC) 136 (H) 70 - 110 mg/dL   GLUCOSE, POC    Collection Time: 04/02/21  4:57 PM   Result Value Ref Range    Glucose (POC) 155 (H) 70 - 110 mg/dL       Assessment:     Primary Rehab Diagnosis  1. Impaired Mobility and ADLs  2. S/P Right below-the-knee amputation (3/22/2021 - Dr. Shanon Jackson)  3. History of critical ischemia of the right lower extremity  4.  History of right lower extremity arterial embolectomy     Comorbidities  Patient Active Problem List   Diagnosis Code    Status post below knee amputation of right lower extremity (HCC) Z89.511    Impaired mobility and ADLs Z74.09, Z78.9    Critical ischemia of lower extremity ([facility]) I70.229    Peripheral artery disease ([facility]) I73.9    Coronary artery disease involving native coronary artery of native heart I25.10    Type 2 diabetes mellitus, without long-term current use of insulin ([facility]) E11.9    History of epilepsy Z86.69    [facility] Leiden [facility] D68.51    Migraine headache G43.909    Wears glasses B97.8    Umbilical hernia Z74.2    Obstructive sleep apnea G47.33    ICD (implantable cardioverter-defibrillator) in place Z95.810    Mitral valve prolapse I34.1    History of head injury Z87.828    Hypertensive heart disease with chronic systolic congestive heart failure (HCC) I11.0, I50.22    History of myocardial infarction I25.2    Long term current use of aspirin Z79.82    On statin therapy due to risk of future cardiovascular event Z79.899    History of deep venous thrombosis (DVT) of distal vein of right lower extremity Z86.718    Anticoagulated by anticoagulation treatment Z79.01    Mixed hyperlipidemia E78.2    History of embolectomy Z98.890    COVID-19 ruled out by laboratory testing Z20.822    Acute blood loss as cause of postoperative anemia D62    Cardiomyopathy ([facility] I42.9    Chronic systolic heart failure (HCC) I50.22    History of noncompliance with medical treatment Z91.19    Major depressive disorder F32.9    Grade I diastolic dysfunction Y82.4    Constipation K59.00        Plan:     1. Justification for continued stay: Good progression towards established rehabilitation goals. 2. Medical Issues being followed closely:    [x]  Fall and safety precautions     [x]  Wound Care     [x]  Bowel and Bladder Function     [x]  Fluid Electrolyte and Nutrition Balance     [x]  Pain Control      3. Issues that 24 hour rehabilitation nursing is following:    [x]  Fall and safety precautions     [x]  Wound Care     [x]  Bowel and Bladder Function     [x]  Fluid Electrolyte and Nutrition Balance     [x]  Pain Control      [x]  Assistance with and education on in-room safety with transfers to and from the bed, wheelchair, toilet and shower. 4. Acute rehabilitation plan of care:    [x]  Continue current care and rehab. [x]  Physical Therapy           [x]  Occupational Therapy           []  Speech Therapy     []  Hold Rehab until further notice     5. Medications:    [x]  MAR Reviewed     [x]  Continue Present Medications     6. DVT Prophylaxis:      []  Enoxaparin     []  Unfractionated Heparin     []  Warfarin     [x]  NOAC     []  NEREYDA Stockings     []  Sequential Compression Device     []  None     7. Code status    [x]  Full code     []  Partial code     []  Do not intubate     []  Do not resuscitate     8. Orders:   > S/P Right below-the-knee amputation (3/22/2021 - Dr. Felicia Cooper);  History of critical ischemia of the right lower extremity; History of right lower extremity arterial embolectomy   > Staples to be removed on 4/19/2021   > Continue Augmentin, continue probiotic  > Acute Postoperative Blood Loss Anemia   > Monitor  > Coronary artery disease; Peripheral artery disease    > On aspirin, statin, beta-blocker    > Hypertensive heart disease with chronic systolic heart failure; Cardiomyopathy; Chronic systolic heart failure; Grade I diastolic dysfunction   On Coreg    > Factor V Leiden mutation; History of deep venous thrombosis (DVT) of right lower extremity; Anticoagulated on Rivaroxaban   Continue Xarelto    > Major depressive disorder   > Continue Duloxetine 30 mg PO once daily    > Mixed hyperlipidemia   On atorvastatin and ezetimibe    > Type 2 diabetes mellitus, poorly controlled, without long-term current use of insulin   On alogliptin and Metformin and sliding scale insulin     > Difficulty sleeping   > Continue:    > Melatonin 5 mg PO q HS    > Zolpidem 5 mg PO q HS PRN for sleep    > Constipation; Bowel regimen  > COVID-19 ruled out by laboratory testing   > SARS-CoV-2 ([facility]) (3/22/2021): Not detected    > Analgesia   > Continue Tylenol, duloxetine, gabapentin, Robaxin, oxycodone as needed    > Diet:   Cardiac diabetic, regular consistency, thin liquids  Patient is complaining of some increased urinary frequency.   Will check UA and C&S  Discussed with patient      Signed:    Jamal Dominique MD      April 2, 2021

## 2021-04-02 NOTE — PROGRESS NOTES
[facility] PHYSICAL REHABILITATION  [address] [place] [address], [address]     INPATIENT REHABILITATION  DAILY PROGRESS NOTE     Date: 2021    Name: Pinky Ledezma Age / Sex: 60 y.o. / male   CSN: [id number] MRN: [id number]   [facility] Date: 3/26/2021 Length of Stay: 6 days     Primary Rehab Diagnosis: Impaired Mobility and ADLs secondary to:  1. S/P Right below-the-knee amputation (3/22/2021 - Dr. Kristina López)  2. History of critical ischemia of the right lower extremity  3.  History of right lower extremity arterial embolectomy       Subjective:   I personally saw and evaluated this patient on 2021  Patient is laying in bed in no apparent distress, awake and alert      Objective:     Vital Signs:  Patient Vitals for the past 24 hrs:   BP Temp Pulse Resp SpO2   21 1707 117/78  97 16 97 %   21 1514 124/82 98 °F (36.7 °C) 97 19 97 %   21 0759 123/82 98.6 °F (37 °C) 91 20 96 %        Physical Examination:  General:  Awake, alert  Cardiovascular:  S1S2+, RRR  Pulmonary:  CTA b/l  GI:  Soft, BS+, NT, ND  Extremities: Right BKA noted      Current Medications:  Current Facility-Administered Medications   Medication Dose Route Frequency    polyethylene glycol (MIRALAX) packet 17 g  17 g Oral DAILY    ondansetron hcl (ZOFRAN) tablet 4 mg  4 mg Oral TID PRN    senna-docusate (PERICOLACE) 8.6-50 mg per tablet 2 Tab  2 Tab Oral PCD    carvediloL (COREG) tablet 3.125 mg  3.125 mg Oral BID WITH MEALS    bisacodyL (DULCOLAX) tablet 10 mg  10 mg Oral Q48H PRN    insulin lispro (HUMALOG) injection   SubCUTAneous TIDAC    amoxicillin-clavulanate (AUGMENTIN) 875-125 mg per tablet 1 Tab  1 Tab Oral BID WITH MEALS    L. acidophilus,casei,rhamnosus (BIO-K PLUS) capsule 1 Cap  1 Cap Oral DAILY    atorvastatin (LIPITOR) tablet 80 mg  80 mg Oral DAILY    aspirin chewable tablet 81 mg  81 mg Oral DAILY WITH BREAKFAST    DULoxetine (CYMBALTA) capsule 30 mg  30 mg Oral DAILY    ezetimibe (ZETIA) tablet 10 mg  10 mg Oral QHS    [Held by provider] furosemide (LASIX) tablet 40 mg  40 mg Oral DAILY    gabapentin (NEURONTIN) capsule 100 mg  100 mg Oral BID    [Held by provider] lisinopriL (PRINIVIL, ZESTRIL) tablet 5 mg  5 mg Oral DAILY    oxyCODONE IR (ROXICODONE) tablet 5-10 mg  5-10 mg Oral Q4H PRN    acetaminophen (TYLENOL) tablet 650 mg  650 mg Oral TID    methocarbamoL (ROBAXIN) tablet 500 mg  500 mg Oral TID    rivaroxaban (XARELTO) tablet 20 mg  20 mg Oral DAILY WITH BREAKFAST    alogliptin (NESINA) tablet 25 mg  25 mg Oral DAILY    melatonin (rapid dissolve) tablet 5 mg  5 mg Oral QHS    zolpidem (AMBIEN) tablet 5 mg  5 mg Oral QHS PRN    glucose chewable tablet 16 g  4 Tab Oral PRN    glucagon (GLUCAGEN) injection 1 mg  1 mg IntraMUSCular PRN    dextrose (D50W) injection syrg 12.5-25 g  25-50 mL IntraVENous PRN    acetaminophen (TYLENOL) tablet 650 mg  650 mg Oral Q4H PRN    metFORMIN (GLUCOPHAGE) tablet 500 mg  500 mg Oral BID WITH MEALS       Allergies:   Allergies   Allergen Reactions    Nitroglycerin Other (comments)      BP drops rapidly when he takes SL Nitro          [place] Diarrhea and Rash    Cat Dander Cough    Codeine Rash    Strawberry Rash       Functional Progress:    PHYSICAL THERAPY    ON ADMISSION MOST RECENT   Wheelchair Mobility/Management  Able to Propel (ft): 250 feet(needing extra time)  Functional Level: 4(min A for steering initially, using bilat UE and left LE)  Curbs/Ramps Assist Required (FIM Score): 0 (Not tested)  Wheelchair Setup Assist Required : 3 (Moderate assistance)  Wheelchair Management: Manages left brake, Manages right brake(needing brake extender) Wheelchair Mobility/Management  Able to Propel (ft): 260 feet(level surface)  Functional Level: 5(pt rated SOB as 5/10 on modified Sylvia dyspnea scale)  Curbs/Ramps Assist Required (FIM Score): 0 (Not tested)(raining outside)  Wheelchair Setup Assist Required : 5 (Supervision/setup)  Wheelchair Management: Manages left brake, Manages right brake(manages right residual limb rest)     Gait  Amount of Assistance: 4 (Minimal assistance)  Distance (ft): 20 Feet (ft)  Assistive Device: Walker, rolling(rigid dressing right residual limb) Gait  Amount of Assistance: 4 (Contact guard assistance)(SBA majority of time, but CGA at times when fatigued)  Distance (ft): 70 Feet (ft)(x 2 trials; at beginning of session when not fatigued)  Assistive Device: Gait belt, Walker, rolling     Balance-Sitting/Standing  Sitting - Static: Good (unsupported)  Sitting - Dynamic: Fair (occasional)  Standing - Static: Fair(needing UE support steadying surface)  Standing - Dynamic : Impaired Balance-Sitting/Standing  Sitting - Static: Good (unsupported)  Sitting - Dynamic: Good (unsupported)  Standing - Static: Fair(with RW support)  Standing - Dynamic : Impaired     Bed/Mat Mobility  Rolling Right : 6 (Modified independent)  Rolling Left : 6 (Modified independent)  Supine to Sit : 5 (Supervision)  Sit to Supine : 5 (Supervision) Bed/Mat Mobility  Rolling Right : 6 (Modified independent)  Rolling Left : 6 (Modified independent)  Supine to Sit : 6 (Modified independent)  Sit to Supine : 6 (Modified independent)     Transfers  Transfer Type: Other  Other: step step with RW  Transfer Assistance : 4 (Minimal assistance)  Sit to Stand Assistance: Minimal assistance  Car Transfers: Not tested(to be further assessed)  Car Type: N/A Transfers  Transfer Type:  Other  Other: stand step w/RW; squat pivot w/out AD(x3 reps with each method)  Transfer Assistance : 5 (Stand-by assistance)(S for safety cues with squat pivot; SBA w/RW as pt fatigues)  Sit to Stand Assistance: Stand-by assistance(cues for safety; vc's for increased control w/descent)  Car Transfers: (CGA)  Car Type: (car simulator)     Steps or Stairs  Steps/Stairs Ambulated (#): 0  Level of Assist : 0 (Not tested)(NT due to safety concern)  Rail Use: (NT) Steps or Stairs  Steps/Stairs Ambulated (#): (4\" curb platform, with RW support; x 2 trials)  Level of Assist : 4 (Minimal assistance)(1 episode of LOB posterior w/min A to correct)  Rail Use: (RW)         Lab/Data Review:  Recent Results (from the past 24 hour(s))   HGB & HCT    Collection Time: 04/01/21  4:50 AM   Result Value Ref Range    HGB 10.4 (L) 13.0 - 16.0 g/dL    HCT 33.2 (L) 36.0 - 48.0 %   GLUCOSE, POC    Collection Time: 04/01/21  7:19 AM   Result Value Ref Range    Glucose (POC) 117 (H) 70 - 110 mg/dL   GLUCOSE, POC    Collection Time: 04/01/21 11:40 AM   Result Value Ref Range    Glucose (POC) 104 70 - 110 mg/dL   GLUCOSE, POC    Collection Time: 04/01/21  4:36 PM   Result Value Ref Range    Glucose (POC) 145 (H) 70 - 110 mg/dL   GLUCOSE, POC    Collection Time: 04/01/21  8:20 PM   Result Value Ref Range    Glucose (POC) 134 (H) 70 - 110 mg/dL       Assessment:     Primary Rehab Diagnosis  1. Impaired Mobility and ADLs  2. S/P Right below-the-knee amputation (3/22/2021 - Dr. Shakila Hernandez)  3. History of critical ischemia of the right lower extremity  4.  History of right lower extremity arterial embolectomy     Comorbidities  Patient Active Problem List   Diagnosis Code    Status post below knee amputation of right lower extremity ([facility]) Z89.511    Impaired mobility and ADLs Z74.09, Z78.9    Critical ischemia of lower extremity ([facility]) I70.229    Peripheral artery disease ([facility]) I73.9    Coronary artery disease involving native coronary artery of native heart I25.10    Type 2 diabetes mellitus, without long-term current use of insulin ([facility]) E11.9    History of epilepsy Z86.69    Factor V Leiden mutation ([facility] D68.51    Migraine headache G43.909    Wears glasses K58.7    Umbilical hernia E19.2    Obstructive sleep apnea G47.33    ICD (implantable cardioverter-defibrillator) in place Z95.810    Mitral valve prolapse I34.1    History of head injury Z87.828    Hypertensive heart disease with chronic systolic congestive heart failure (HCC) I11.0, I50.22    History of myocardial infarction I25.2    Long term current use of aspirin Z79.82    On statin therapy due to risk of future cardiovascular event Z79.899    History of deep venous thrombosis (DVT) of distal vein of right lower extremity Z86.718    Anticoagulated by anticoagulation treatment Z79.01    Mixed hyperlipidemia E78.2    History of embolectomy Z98.890    COVID-19 ruled out by laboratory testing Z20.822    Acute blood loss as cause of postoperative anemia D62    Cardiomyopathy ([address] I42.9    Chronic systolic heart failure (HCC) I50.22    History of noncompliance with medical treatment Z91.19    Major depressive disorder F32.9    Grade I diastolic dysfunction C68.9    Constipation K59.00        Plan:     1. Justification for continued stay: Good progression towards established rehabilitation goals. 2. Medical Issues being followed closely:    [x]  Fall and safety precautions     [x]  Wound Care     [x]  Bowel and Bladder Function     [x]  Fluid Electrolyte and Nutrition Balance     [x]  Pain Control      3. Issues that 24 hour rehabilitation nursing is following:    [x]  Fall and safety precautions     [x]  Wound Care     [x]  Bowel and Bladder Function     [x]  Fluid Electrolyte and Nutrition Balance     [x]  Pain Control      [x]  Assistance with and education on in-room safety with transfers to and from the bed, wheelchair, toilet and shower. 4. Acute rehabilitation plan of care:    [x]  Continue current care and rehab. [x]  Physical Therapy           [x]  Occupational Therapy           []  Speech Therapy     []  Hold Rehab until further notice     5. Medications:    [x]  MAR Reviewed     [x]  Continue Present Medications     6. DVT Prophylaxis:      []  Enoxaparin     []  Unfractionated Heparin     []  Warfarin     [x]  NOAC     []  NEREYDA Stockings     []  Sequential Compression Device     []  None     7.  Code status    [x]  Full code []  Partial code     []  Do not intubate     []  Do not resuscitate     8. Orders:   > S/P Right below-the-knee amputation (3/22/2021 - Dr. Ronan Bro); History of critical ischemia of the right lower extremity; History of right lower extremity arterial embolectomy   > Staples to be removed on 4/19/2021   > On Augmentinstop date is April 5, 2021. Continue probiotics    > Acute Postoperative Blood Loss Anemia   > Monitor  > Coronary artery disease; Peripheral artery disease    > On aspirin, statin, beta-blocker    > Hypertensive heart disease with chronic systolic heart failure; Cardiomyopathy; Chronic systolic heart failure; Grade I diastolic dysfunction   On Coreg    > Factor V Leiden mutation; History of deep venous thrombosis (DVT) of right lower extremity; Anticoagulated on Rivaroxaban   On Xarelto    > Major depressive disorder   > Continue Duloxetine 30 mg PO once daily    > Mixed hyperlipidemia   On atorvastatin and ezetimibe    > Type 2 diabetes mellitus, poorly controlled, without long-term current use of insulin   Continue Metformin, alogliptin and sliding scale insulin     > Difficulty sleeping   > Continue:    > Melatonin 5 mg PO q HS    > Zolpidem 5 mg PO q HS PRN for sleep    > Constipation;    Continue bowel regimen    > COVID-19 ruled out by laboratory testing   > SARS-CoV-2 ([facility]) (3/22/2021): Not detected    > Analgesia   > Continue Tylenol, duloxetine, gabapentin, Robaxin, oxycodone as needed    > Diet:   > Specifications: Cardiac, diabetic, consistent carb 1800 kcal   > Solids (consistency): Regular    > Liquids (consistency):  Thin    > Fluid restriction: None      Discussed with patient      Signed:    Catarino Platt MD      April 1, 2021

## 2021-04-02 NOTE — PROGRESS NOTES
Problem: Mobility Impaired (Adult and Pediatric)  Goal: *Therapy Goal (Edit Goal, Insert Text)  Description: Physical Therapy Short Term Goals  Initiated 3/27/2021, reviewed 4/2/21, and to be accomplished within 7 day(s) on 4/2/2021:  1. Patient will move from supine to sit and sit to supine , scoot up and down, and roll side to side in bed with independence. (met 4/2/21)  2. Patient will transfer from bed to chair and chair to bed with standby assistance using the least restrictive device. (Sup lateral squat pivot; SBA stand step w/RW)  3.  Patient will perform sit to stand with stand by assistance. (met 4/2/21)  4. Patient will ambulate with contact guard assist for 50 feet with the least restrictive device. (met 4/2/21)  5. Patient will perform w/c mobility for at least 250 ft at SBA level over even surfaces. (met 4/2/21)    Physical Therapy Long Term Goals  Initiated 3/27/2021, updated 4/2/21 and to be accomplished within 14 day(s) on 4/9/2021:  1. Patient will transfer from bed to chair and chair to bed with modified independence using the least restrictive device. 2.  Patient will perform sit to stand with modified independence. 3.  Patient will ambulate with modified independence for 50 feet with the least restrictive device. 4.  Patient will perform w/c mobility for at least 250 ft distances, reporting <3/10 modified Sylvia RPE, at modified independent level over even surfaces and around doorways, obstacles, narrow spaces. 5.  Patient will ascend/descend w/c ramp with supervision for ease of entry/exit of home. Outcome: Progressing Towards Goal   PHYSICAL THERAPY WEEKLY PROGRESS NOTE    Patient: Karl Bridges (44 y.o. male)  Date: 4/2/2021  Diagnosis: Status post below knee amputation of right lower extremity ([facility]) [Z89.511] Status post below knee amputation of right lower extremity ([address]  Precautions: Fall  Chart, physical therapy assessment, plan of care and goals were reviewed.     Time in: 905  Time out: 935  Patient seen for: Transfers training, bed mobility training, gait training; pt education    Time in:1145  Time out:1245  Patient seen for: Gait training;Patient education; Therapeutic exercise;Transfer training; Wheelchair mobility      Pain:  Pt pain was reported as  7/10 in R residual limb, pre-treatment. Pt pain was reported as 7/10 same location, post-treatment. Patient identified with name and : yes    SUBJECTIVE:     Pt stated he was having a \"rough morning\" and \"had peed on myself 3 times last night, I couldn't make it to the toilet in time\"  Pt states he can use a w/c in his house, except to get in the bathroom, and that the bathroom is too narrow to use a walker either. OBJECTIVE DATA SUMMARY:   During am session: PT spoke with pt about home environment and ability to use a wheelchair in the home. Pt explained and isabella a picture of bathroom set up and limitations to using a w/c or walker in bathroom. Pt states he has a standard height toilet and no grab bar by toilet. Also spoke with nursing manager about concerns regarding new onset of urinary urgency and incontinence yesterday and overnight. During pm session: Pt spoke with OT and full-time PT staff to try to brain-storm a safe solution to using toilet at home as pt lives alone.      GROSS ASSESSMENT Weekly Progress Assessment 2021   AROM Generally decreased, functional(slightly decreased knee extension, hip ext right limb)   Strength Generally decreased, functional   Coordination Within functional limits   Tone Normal   Sensation Impaired(phantom sensation and pain right residual limb)   PROM Generally decreased, functional(slightly decreased knee extension, hip ext right limb)       POSTURE Weekly Progress Assessment 2021   Posture (WDL) Exceptions to [facility]   Posture Assessment Forward head       BALANCE Weekly Progress Assessment 2021    Sitting - Static: Good (unsupported)  Sitting - Dynamic: Good (unsupported)  Standing - Static: Fair(w/RW support)  Standing - Dynamic : Impaired     BED/CHAIR/WHEELCHAIR TRANSFERS Initial Assessment Weekly Progress Assessment 4/2/2021   Rolling Right 6 (Modified independent) 7 (Independent)   Rolling Left 6 (Modified independent) 7 (Independent)   Supine to Sit 5 (Supervision) 6 (Modified independent)   Sit to Stand Minimal assistance Stand-by assistance(vc's for hand placement at times & for control w/descent)   Sit to Supine 5 (Supervision) 6 (Modified independent)   Transfer Type Other Other   Transfer Assistance Needed 4 (Minimal assistance) 5 (Stand-by assistance)(supervision squat pivot; SBA with RW)   Comments     pt demonstrates ability to set-up w/c for transfers and perform squat pivot with supervision safely; pt's stand step transfers with RW vary from SBA to CGA depending on level of fatigue. Pt is able to lore/doff rigid residual limb clam shell independently. Pt with 1 episode of LOB today requiring min A to prevent fall and pt  reached for furniture for support.      Car Transfer Not tested(to be further assessed)  CGA for balance while managing door and pivoting with RW   Car Type N/A  car simulator in rehab       [facility] MOBILITY/MANAGEMENT Initial Assessment Weekly Progress Assessment 4/2/2021   Able to Propel (dist) 250 feet(needing extra time) 263 feet(level surface)   Assistance Required 4(min A for steering initially, using bilat UE and left LE) 6 (modified independent), extra time   Curbs/ramps assistance required 0 (Not tested) 0 (Not tested)(too cold, or raining outside)   Wheelchair set up assistance required 3 (Moderate assistance)  5 (supervision)   Wheelchair management Manages left brake, Manages right brake(needing brake extender) Manages left brake;Manages right brake(manages right residual limb rest)   Comments  Pt able to negotiate this distance with B UE's and L LE, but pt fatigued after this and reported a 4/10 (\"somewhat severe\") SOB/exertion on Modified Sylvia dyspnea scale     WALKING INDEPENDENCE Initial Assessment Weekly Progress Assessment 4/2/2021   Assistive device Walker, rolling(rigid dressing right residual limb) Gait belt;Walker, rolling   Ambulation assistance - level surface 4 (Minimal assistance) 5 (Stand-by assistance)   Distance 20 Feet (ft) 50 Feet (ft)(x2 trials; but rated dyspnea as 6/10 on Modified Sylvia scale)   Comments   Pt varies from 20 ft the other day at end of session when fatigued, to up to 70 ft at beginning of session when not fatigued. However, pt requires SBA to CGA depending on fatigue level. Pt also notes being \"winded\" after gait this distance and rates it as a 6/10 (almost very severe) on the Modified Sylvia dyspnea scale. Ambulation assistance - unlevel surfaces (NT) (NT)       GAIT Weekly Progress Assessment 4/2/2021   Gait Description (WDL) Exceptions to WDL   Gait Abnormalities Decreased step clearance(\"hop\" technique due to R BKA; slow gait speed)     STEPS/STAIRS Initial Assessment Weekly Progress Assessment 4/2/2021   Steps/Stairs ambulated 0  1 (2\" and then 4\" curb platform w/RW support, x 2 trials)   Assistance Required   4 (Minimal assistance)(1 episode of LOB posterior w/min A to correct)   Rail Use (NT)  (RW)   Comments     pt needs Kobe to CGA to help with balance during transition of foot up curb as well as assistance to steady RW for support   Curbs/Ramps (NT) (4\" curb w/RW- see stair training)       Therapeutic Exercise:  Pt participates in therex with B LE's on mat table in supine, side-lying and prone positions  AROM; 2 sets x 15 reps each, with supervision. Supine: SLR, hip abd (windshield wipers), quad sets  Side-lying: hip abd, hip extension  Prone: hip flexor stretch x 5 to 10 mins; knee flexion against gravity; hip extension    ASSESSMENT:  Pt is progressing well and has met all STG's set at eval.  Pt has new onset of urinary urgency which was reported to nursing and MD to assess. Pt will be living alone again upon d/c home. Recommend w/c for mobility due to ongoing balance concerns as pt fatigues and also decreased activity tolerance as noted by scores reported on modified Sylvia dyspnea scale after gait training and longer w/c mobility distances. However, pt notes he can't use w/c or walker in his bathroom. This was discussed with OT and full-time PT staff member to brain-storm a safe recommendation for toileting at home. Pt will also need to continue with PT to address w/c mobility outside on uneven surfaces and negotiating ramp to enter/exit home. Progression toward goals:  [x]      Improving appropriately and progressing toward goals  []      Improving slowly and progressing toward goals  []      Not making progress toward goals and plan of care will be adjusted     PLAN:  Patient continues to benefit from skilled intervention to address the above impairments. Continue treatment per established plan of care. Discharge Recommendations:  Home Health  Further Equipment Recommendations for Discharge:  rolling walker and wheelchair (18\")     Estimated Discharge Date: 4/8/21    Activity Tolerance:   Fair +; pt with ongoing pain issues, SOB with exertion, and needs rest breaks during session; no c/o dizziness or nausea. Please refer to the flowsheet for vital signs taken during this treatment.   After treatment:   [] Patient left in no apparent distress in bed  [x] Patient left in no apparent distress sitting up in chair  [] Patient left in no apparent distress in w/c mobilizing under own power  [] Patient left in no apparent distress dining area  [] Patient left in no apparent distress mobilizing under own power  [x] Call bell left within reach  [] Nursing notified  [] Caregiver present  [x] w/c alarm activated       Arely Ovalle, PT  4/2/2021

## 2021-04-02 NOTE — PROGRESS NOTES
Patient with new onset urine urgency and incontinence. Elias Garcia RN aware. Dr. Courtney Caal was notified.      [facility] MSN, RN, [address] [place] [address] 71  [phone]

## 2021-04-03 LAB
GLUCOSE BLD STRIP.AUTO-MCNC: 108 MG/DL (ref 70–110)
GLUCOSE BLD STRIP.AUTO-MCNC: 121 MG/DL (ref 70–110)
GLUCOSE BLD STRIP.AUTO-MCNC: 122 MG/DL (ref 70–110)
GLUCOSE BLD STRIP.AUTO-MCNC: 125 MG/DL (ref 70–110)

## 2021-04-03 PROCEDURE — 65310000000 HC RM PRIVATE REHAB

## 2021-04-03 PROCEDURE — 82962 GLUCOSE BLOOD TEST: CPT

## 2021-04-03 PROCEDURE — 77030040392 HC DRSG OPTIFOAM MDII -A

## 2021-04-03 PROCEDURE — 74011250637 HC RX REV CODE- 250/637: Performed by: INTERNAL MEDICINE

## 2021-04-03 RX ADMIN — METFORMIN HYDROCHLORIDE 500 MG: 500 TABLET ORAL at 16:54

## 2021-04-03 RX ADMIN — METHOCARBAMOL TABLETS 500 MG: 500 TABLET, COATED ORAL at 13:31

## 2021-04-03 RX ADMIN — ACETAMINOPHEN 650 MG: 325 TABLET ORAL at 08:04

## 2021-04-03 RX ADMIN — POLYETHYLENE GLYCOL 3350 17 G: 17 POWDER, FOR SOLUTION ORAL at 08:04

## 2021-04-03 RX ADMIN — ATORVASTATIN CALCIUM 80 MG: 40 TABLET, FILM COATED ORAL at 08:04

## 2021-04-03 RX ADMIN — EZETIMIBE 10 MG: 10 TABLET ORAL at 21:15

## 2021-04-03 RX ADMIN — OXYCODONE HYDROCHLORIDE 5 MG: 5 TABLET ORAL at 04:42

## 2021-04-03 RX ADMIN — ACETAMINOPHEN 650 MG: 325 TABLET ORAL at 16:54

## 2021-04-03 RX ADMIN — ALOGLIPTIN 25 MG: 25 TABLET, FILM COATED ORAL at 08:05

## 2021-04-03 RX ADMIN — ASPIRIN 81 MG CHEWABLE TABLET 81 MG: 81 TABLET CHEWABLE at 08:04

## 2021-04-03 RX ADMIN — OXYCODONE HYDROCHLORIDE 5 MG: 5 TABLET ORAL at 13:38

## 2021-04-03 RX ADMIN — CARVEDILOL 3.12 MG: 6.25 TABLET, FILM COATED ORAL at 08:04

## 2021-04-03 RX ADMIN — DULOXETINE HYDROCHLORIDE 30 MG: 30 CAPSULE, DELAYED RELEASE ORAL at 09:00

## 2021-04-03 RX ADMIN — AMOXICILLIN AND CLAVULANATE POTASSIUM 1 TABLET: 875; 125 TABLET, FILM COATED ORAL at 16:54

## 2021-04-03 RX ADMIN — METHOCARBAMOL TABLETS 500 MG: 500 TABLET, COATED ORAL at 21:15

## 2021-04-03 RX ADMIN — GABAPENTIN 100 MG: 100 CAPSULE ORAL at 17:03

## 2021-04-03 RX ADMIN — METFORMIN HYDROCHLORIDE 500 MG: 500 TABLET ORAL at 08:05

## 2021-04-03 RX ADMIN — Medication 5 MG: at 21:15

## 2021-04-03 RX ADMIN — METHOCARBAMOL TABLETS 500 MG: 500 TABLET, COATED ORAL at 08:04

## 2021-04-03 RX ADMIN — AMOXICILLIN AND CLAVULANATE POTASSIUM 1 TABLET: 875; 125 TABLET, FILM COATED ORAL at 08:04

## 2021-04-03 RX ADMIN — ACETAMINOPHEN 650 MG: 325 TABLET ORAL at 12:15

## 2021-04-03 RX ADMIN — CARVEDILOL 3.12 MG: 6.25 TABLET, FILM COATED ORAL at 16:55

## 2021-04-03 RX ADMIN — RIVAROXABAN 20 MG: 20 TABLET, FILM COATED ORAL at 08:05

## 2021-04-03 RX ADMIN — GABAPENTIN 100 MG: 100 CAPSULE ORAL at 08:04

## 2021-04-03 RX ADMIN — Medication 1 CAPSULE: at 09:00

## 2021-04-03 RX ADMIN — OXYCODONE HYDROCHLORIDE 10 MG: 5 TABLET ORAL at 09:29

## 2021-04-03 NOTE — PROGRESS NOTES
SHIFT CHANGE NOTE FOR [facility]    Bedside and Verbal shift change report given to [facility] Morales (oncoming nurse) by Jose F Darden RN  (offgoing nurse). Report included the following information SBAR, Kardex, MAR and Recent Results.     Situation:   Code Status: Full Code   Reason for Admission: Asif Radford Day: 8   Problem List:   Hospital Problems  Date Reviewed: 3/31/2021          Codes Class Noted POA    Constipation ICD-10-CM: K59.00  ICD-9-CM: 564.00  3/29/2021 Yes        Peripheral artery disease (HCC) (Chronic) ICD-10-CM: I73.9  ICD-9-CM: 443.9  Unknown Yes        Type 2 diabetes mellitus, without long-term current use of insulin (HCC) (Chronic) ICD-10-CM: E11.9  ICD-9-CM: 250.00  Unknown Yes    Overview Signed 3/26/2021  5:49 PM by Tereso Busch MD     HbA1c (3/6/2021) = 12.9             [facility] (Chronic) ICD-10-CM: J36.97  ICD-9-CM: 289.81  Unknown Yes        Hypertensive heart disease with chronic systolic congestive heart failure (HCC) (Chronic) ICD-10-CM: I11.0, I50.22  ICD-9-CM: 402.91, 428.22  Unknown Yes    Overview Signed 3/26/2021 11:06 PM by Tereso Busch MD     2D echocardiogram (12/17/2015) showed EF 35%; genealized hypokinesis more focally severe of the inferior and posterior segments; mild mitral regurgitation; trace tricuspud regurgitation; normal pulmonary artery pressure             Long term current use of aspirin (Chronic) ICD-10-CM: N56.81  ICD-9-CM: V58.66  Unknown Yes        On statin therapy due to risk of future cardiovascular event (Chronic) ICD-10-CM: X45.509  ICD-9-CM: V58.69  Unknown Yes    Overview Signed 3/26/2021  4:34 PM by Tereso Busch MD     On Atorvastatin             History of deep venous thrombosis (DVT) of distal vein of right lower extremity ICD-10-CM: Z86.718  ICD-9-CM: V12.51  Unknown Yes        Anticoagulated by anticoagulation treatment (Chronic) ICD-10-CM: Z79.01  ICD-9-CM: V58.61  Unknown Yes    Overview Signed 3/26/2021  4:36 PM by Kitty Calderón MD     On Rivaroxaban             Mixed hyperlipidemia (Chronic) ICD-10-CM: E78.2  ICD-9-CM: 272.2  Unknown Yes    Overview Signed 3/26/2021 11:07 PM by Kitty Calderón MD     Lipid profile (7/25/2018) showed , , HDL 27,              History of embolectomy ICD-10-CM: Z98.890  ICD-9-CM: V45.89  Unknown Yes    Overview Signed 3/26/2021  5:39 PM by Kitty Calderón MD     S/P Right lower extremity arterial embolectomy             Major depressive disorder ICD-10-CM: F32.9  ICD-9-CM: 296.20  3/24/2021 Yes    Overview Signed 3/26/2021 11:31 PM by Kitty Calderón MD     On Duloxetine             Grade I diastolic dysfunction [id number]-[id number]: I51.9  ICD-9-CM: 429.9  3/24/2021 Yes    Overview Signed 3/26/2021 11:48 PM by Kitty Calderón MD     2D echocardiogram (3/24/2021) showed EF 37%; global hypokinesis of left ventricle; grade I diastolic dysfunction             * (Principal) Status post below knee amputation of right lower extremity ([facility] ICD-10-CM: Z89.511  ICD-9-CM: V49.75  3/22/2021 Yes    Overview Signed 3/26/2021  5:43 PM by Kitty Calderón MD     S/P Right below-the-knee amputation (3/22/2021 - Dr. Lesly Eaton)             Impaired mobility and ADLs ICD-10-CM: Z74.09, Z78.9  ICD-9-CM: V49.89  3/22/2021 Yes        Critical ischemia of lower extremity ([address] ICD-10-CM: N33.090  ICD-9-CM: 459.9  3/22/2021 Yes    Overview Signed 3/26/2021  5:42 PM by Kitty Calderón MD     Right             COVID-19 ruled out by laboratory testing ICD-10-CM: Z20.822  ICD-9-CM: V01.79  3/22/2021 Yes    Overview Signed 3/26/2021  5:47 PM by Kitty Calderón MD     SARS-CoV-2 ([facility]) (3/22/2021):  Not detected               Acute blood loss as cause of postoperative anemia ICD-10-CM: D62  ICD-9-CM: 285.1  3/22/2021 Yes        History of noncompliance with medical treatment ICD-10-CM: Z91.19  ICD-9-CM: V15.81  3/18/2021 Yes              Background:   Past Medical History:   Past Medical History:   Diagnosis Date    Acute blood loss as cause of postoperative anemia 3/22/2021    Anticoagulated by anticoagulation treatment     On Rivaroxaban    Cardiomyopathy ([facility] 12/17/2015    2D echocardiogram (3/24/2021) showed EF 37%; global hypokinesis of left ventricle; grade I diastolic dysfunction; 2D echocardiogram (12/17/2015) showed EF 35%; genealized hypokinesis more focally severe of the inferior and posterior segments; mild mitral regurgitation; trace tricuspud regurgitation; normal pulmonary artery pressure    Chronic systolic heart failure (HCC)     2D echocardiogram (3/24/2021) showed EF 37%; global hypokinesis of left ventricle; grade I diastolic dysfunction; 2D echocardiogram (12/17/2015) showed EF 35%; genealized hypokinesis more focally severe of the inferior and posterior segments; mild mitral regurgitation; trace tricuspud regurgitation; normal pulmonary artery pressure    Coronary artery disease involving native coronary artery of native heart     COVID-19 ruled out by laboratory testing 3/22/2021    SARS-CoV-2 ([facility]) (3/22/2021):  Not detected     Critical ischemia of lower extremity 3/22/2021    Right    [facility]     Grade I diastolic dysfunction 56/57/7587    2D echocardiogram (3/24/2021) showed EF 37%; global hypokinesis of left ventricle; grade I diastolic dysfunction    History of deep venous thrombosis (DVT) of distal vein of right lower extremity     History of epilepsy     History of head injury     History of myocardial infarction     History of noncompliance with medical treatment 3/18/2021    Hypertensive heart disease with chronic systolic congestive heart failure ([facility] [facility]     2D echocardiogram (3/24/2021) showed EF 37%; global hypokinesis of left ventricle; grade I diastolic dysfunction; 2D echocardiogram (12/17/2015) showed EF 35%; genealized hypokinesis more focally severe of the inferior and posterior segments; mild mitral regurgitation; trace tricuspud regurgitation; normal pulmonary artery pressure    Long term current use of aspirin     Major depressive disorder 03/24/2021    On Duloxetine    Migraine headache     Mitral valve prolapse     Mixed hyperlipidemia     Lipid profile (7/25/2018) showed , , HDL 27,     Obstructive sleep apnea     On statin therapy due to risk of future cardiovascular event     On Atorvastatin    Peripheral artery disease ([facility] [facility]     Type 2 diabetes mellitus, without long-term current use of insulin ([facility])     HbA1c (3/6/2021) = 09.4    Umbilical hernia     Wears glasses       Patient taking anticoagulants yes    Patient has a defibrillator: no no  Assessment:  Changes in Assessment throughout shift: none   Patient has central line: no Reasons if yes: date:na Last dressing date:na  Patient has Ellis Cath: no Reasons na ast Vitals:     Vitals:    04/02/21 0712 04/02/21 1605 04/02/21 2106 04/03/21 0732   BP: 134/84 118/77 126/83 124/80   Pulse: 94 93 86 97   Resp: 19 16 18 17   Temp: 98.9 °F (37.2 °C) 97.5 °F (36.4 °C) 97.3 °F (36.3 °C) 97.8 °F (36.6 °C)   SpO2: 97% 96% 97% 96%   Weight:       Height:            PAIN    Pain Assessment    Pain Intensity 1: 5 (04/03/21 1358) Pain Intensity 1: 2 (12/29/14 1105)    Pain Location 1: Leg Pain Location 1: Abdomen    Pain Intervention(s) 1: Medication (see MAR) Pain Intervention(s) 1: Medication (see MAR)  Patient Stated Pain Goal: 0 Patient Stated Pain Goal: 0  o Intervention effective:  yes   o Other actions taken for pain: pain med     Skin Assessment  Skin color Skin Color: Appropriate for ethnicity  Condition/Temperature Skin Condition/Temp: Dry, Warm  Integrity Skin Integrity: Incision (comment)  Turgor Turgor: Non-tenting  Weekly Pressure Ulcer Documentation  Pressure  Injury Documentation: No Pressure Injury Noted-Pressure Ulcer Prevention Initiated  Wound Prevention & Protection Methods  Orientation of wound Orientation of Wound Prevention: Posterior  Location of Prevention Location of Wound Prevention: Sacrum/Coccyx  Dressing Present Dressing Present : Changed  Dressing Status Dressing Status: Changed  Wound Offloading Wound Offloading (Prevention Methods): Bed, pressure redistribution/air, Hydrocolloids, Repositioning     INTAKE/OUPUT  Date 04/02/21 0700 - 04/03/21 0659 04/03/21 0700 - 04/04/21 0659   Shift 0700-1859 1900-0659 24 Hour Total 0700-1859 1900-0659 24 Hour Total   INTAKE   Shift Total(mL/kg)         OUTPUT   Urine(mL/kg/hr)  1950(1.9) 1950(1)        Urine Voided  1950 1950        Urine Occurrence(s) 0 x 3 x 3 x 1 x  1 x   Emesis/NG output           Emesis Occurrence(s) 0 x  0 x      Stool           Stool Occurrence(s) 0 x 0 x 0 x 0 x  0 x   Shift Total(mL/kg)  1950(23) 1950(23)      NET  -1950 -1950      Weight (kg) 84.8 84.8 84.8 84.8 84.8 84.8       Recommendations:  1. Patient needs and requests: none @ present    2. Diet: cardiac     3. Pending tests/procedures: labs     4. Functional Level/Equipment: up with assist/WC    5. Estimated Discharge Date: none yet Posted on Whiteboard in Patients Room: [facility] Safety Check    A safety check occurred in the patient's room between off going nurse and oncoming nurse listed above.     The safety check included the below items  Area Items   H  High Alert Medications - Verify all high alert medication drips (heparin, PCA, etc.)   E  Equipment - Suction is set up for ALL patients (with yanker)  - Red plugs utilized for all equipment (IV pumps, etc.)  - WOWs wiped down at end of shift.  - Room stocked with oxygen, suction, and other unit-specific supplies   A  Alarms - Bed alarm is set for fall risk patients  - Ensure chair alarm is in place and activated if patient is up in a chair   L  Lines - Check IV for any infiltration  - Ellis bag is empty if patient has a Ellis   - Tubing and IV bags are labeled   S  Safety   - Room is clean, patient is clean, and equipment is clean.  - Hallways are clear from equipment besides carts. - Fall bracelet on for fall risk patients  - Ensure room is clear and free of clutter  - Suction is set up for ALL patients (with faraz)  - Hallways are clear from equipment besides carts. - Isolation precautions followed, supplies available outside room, sign posted   .

## 2021-04-03 NOTE — PROGRESS NOTES
Progress Note    Patient: Mil Silva MRN: [id number]  CSN: [id number]    YOB: 1965  Age: 60 y.o. Sex: male    DOA: 3/26/2021 LOS:  LOS: 8 days                    Subjective:     Primary Rehab Diagnosis: Impaired Mobility and ADLs secondary to:  1. S/P Right below-the-knee amputation (3/22/2021 - Dr. Gustavo Medina)  2. History of critical ischemia of the right lower extremity  3. History of right lower extremity arterial embolectomy      Pt seen and examined discussed with pt   Review of systems  General: No fevers or chills. Cardiovascular: No chest pain or pressure. No palpitations. Pulmonary: No shortness of breath, cough or wheeze. Gastrointestinal: No abdominal pain, nausea, vomiting or diarrhea. Genitourinary: No urinary frequency, urgency, hesitancy or dysuria. Musculoskeletal:Rt AKA amputation pain med help his sx   Neurologic: No headache, generalized weakness    Objective:     Physical Exam:  Visit Vitals  /80 (BP 1 Location: Left upper arm, BP Patient Position: Sitting)   Pulse 97   Temp 97.8 °F (36.6 °C)   Resp 17   Ht 6' 2\" (1.88 m)   Wt 84.8 kg (187 lb)   SpO2 96%   BMI 24.01 kg/m²        General:         Alert,no acute distress    HEENT: NC, Atraumatic. PERRLA, anicteric sclerae. Lungs: CTA Bilaterally. No Wheezing/Rhonchi/Rales. Heart:  Regular  rhythm,  No murmur, No Rubs, No Gallops  Abdomen: Soft, Non distended, Non tender.  reducible umbilical hernia   Extremities: Rt AKA   Psych:           Not anxious or agitated. Neurologic:  CN 2-12 grossly intact, Alert and oriented X 3. No acute neurological                                 Deficits,     Intake and Output:  Current Shift:  No intake/output data recorded. Last three shifts:  04/01 1901 - 04/03 0700  In: -   Out: 2200 [Urine:2200]    Labs: Results:       Chemistry No results for input(s): GLU, NA, K, CL, CO2, BUN, CREA, CA, AGAP, BUCR, TBIL, AP, TP, ALB, GLOB, AGRAT in the last 72 hours.     No lab exists for component: GPT   CBC w/Diff Recent Labs     04/01/21  0450   HGB 10.4*   HCT 33.2*      Cardiac Enzymes No results for input(s): CPK, CKND1, TENZIN in the last 72 hours. No lab exists for component: CKRMB, TROIP   Coagulation No results for input(s): PTP, INR, APTT, INREXT in the last 72 hours. Lipid Panel Lab Results   Component Value Date/Time    Cholesterol, total 135 03/27/2021 06:32 AM    HDL Cholesterol 35 (L) 03/27/2021 06:32 AM    LDL, calculated 63 03/27/2021 06:32 AM    VLDL, calculated 37 03/27/2021 06:32 AM    Triglyceride 185 (H) 03/27/2021 06:32 AM    CHOL/HDL Ratio 3.9 03/27/2021 06:32 AM      BNP No results for input(s): BNPP in the last 72 hours. Liver Enzymes No results for input(s): TP, ALB, TBIL, AP in the last 72 hours.     No lab exists for component: SGOT, GPT, DBIL   Thyroid Studies No results found for: T4, T3U, TSH, TSHEXT       Procedures/imaging: see electronic medical records for all procedures/Xrays and details which were not copied into this note but were reviewed prior to creation of Plan    Medications:   Current Facility-Administered Medications   Medication Dose Route Frequency    polyethylene glycol (MIRALAX) packet 17 g  17 g Oral DAILY    ondansetron hcl (ZOFRAN) tablet 4 mg  4 mg Oral TID PRN    senna-docusate (PERICOLACE) 8.6-50 mg per tablet 2 Tab  2 Tab Oral PCD    carvediloL (COREG) tablet 3.125 mg  3.125 mg Oral BID WITH MEALS    bisacodyL (DULCOLAX) tablet 10 mg  10 mg Oral Q48H PRN    insulin lispro (HUMALOG) injection   SubCUTAneous TIDAC    amoxicillin-clavulanate (AUGMENTIN) 875-125 mg per tablet 1 Tab  1 Tab Oral BID WITH MEALS    L. acidophilus,casei,rhamnosus (BIO-K PLUS) capsule 1 Cap  1 Cap Oral DAILY    atorvastatin (LIPITOR) tablet 80 mg  80 mg Oral DAILY    aspirin chewable tablet 81 mg  81 mg Oral DAILY WITH BREAKFAST    DULoxetine (CYMBALTA) capsule 30 mg  30 mg Oral DAILY    ezetimibe (ZETIA) tablet 10 mg  10 mg Oral QHS    [Held by provider] furosemide (LASIX) tablet 40 mg  40 mg Oral DAILY    gabapentin (NEURONTIN) capsule 100 mg  100 mg Oral BID    [Held by provider] lisinopriL (PRINIVIL, ZESTRIL) tablet 5 mg  5 mg Oral DAILY    oxyCODONE IR (ROXICODONE) tablet 5-10 mg  5-10 mg Oral Q4H PRN    acetaminophen (TYLENOL) tablet 650 mg  650 mg Oral TID    methocarbamoL (ROBAXIN) tablet 500 mg  500 mg Oral TID    rivaroxaban (XARELTO) tablet 20 mg  20 mg Oral DAILY WITH BREAKFAST    alogliptin (NESINA) tablet 25 mg  25 mg Oral DAILY    melatonin (rapid dissolve) tablet 5 mg  5 mg Oral QHS    zolpidem (AMBIEN) tablet 5 mg  5 mg Oral QHS PRN    glucose chewable tablet 16 g  4 Tab Oral PRN    glucagon (GLUCAGEN) injection 1 mg  1 mg IntraMUSCular PRN    dextrose (D50W) injection syrg 12.5-25 g  25-50 mL IntraVENous PRN    acetaminophen (TYLENOL) tablet 650 mg  650 mg Oral Q4H PRN    metFORMIN (GLUCOPHAGE) tablet 500 mg  500 mg Oral BID WITH MEALS       Assessment/Plan     Principal Problem:    Status post below knee amputation of right lower extremity (HCC) (3/22/2021)      Overview: S/P Right below-the-knee amputation (3/22/2021 - Dr. Felicia Cooper)    Active Problems:    Impaired mobility and ADLs (3/22/2021)      Critical ischemia of lower extremity ([address] (3/22/2021)      Overview: Right      Peripheral artery disease ([facility]) ()      Type 2 diabetes mellitus, without long-term current use of insulin ([facility]) ()      Overview: HbA1c (3/6/2021) = 12.9      Factor V Leiden mutation ([facility]) ()      Hypertensive heart disease with chronic systolic congestive heart failure ([facility]) ()      Overview: 2D echocardiogram (12/17/2015) showed EF 35%; genealized hypokinesis more       focally severe of the inferior and posterior segments; mild mitral       regurgitation; trace tricuspud regurgitation; normal pulmonary artery       pressure      Long term current use of aspirin ()      On statin therapy due to risk of future cardiovascular event () Overview: On Atorvastatin      History of deep venous thrombosis (DVT) of distal vein of right lower extremity ()      Anticoagulated by anticoagulation treatment ()      Overview: On Rivaroxaban      Mixed hyperlipidemia ()      Overview: Lipid profile (7/25/2018) showed , , HDL 27,       History of embolectomy ()      Overview: S/P Right lower extremity arterial embolectomy      COVID-19 ruled out by laboratory testing (3/22/2021)      Overview: SARS-CoV-2 ([facility] [address]) (3/22/2021): Not detected            Acute blood loss as cause of postoperative anemia (3/22/2021)      History of noncompliance with medical treatment (3/18/2021)      Major depressive disorder (3/24/2021)      Overview: On Duloxetine      Grade I diastolic dysfunction (9/84/6820)      Overview: 2D echocardiogram (3/24/2021) showed EF 37%; global hypokinesis of left       ventricle; grade I diastolic dysfunction      Constipation (3/29/2021)      Plan  S/P Right below-the-knee amputation (3/22/2021 - Dr. Jeramie Mast); History of critical ischemia of the right lower extremity; History of right lower extremity   Staples to be removed on 4/19/2021  Amoxicillin-Clavulanate 875-125 1 tab PO BID with meals   Bio K Plus 1 cap PO once daily    Acute Postoperative Blood Loss Anemia  Anemia work-up showed serum iron 23, TIBC 200, iron % saturation 12, ferritin 835  No iron supplement for now    Coronary artery disease; Peripheral artery disease   Aspirin 81 mg PO once daily with breakfast   Atorvastatin 80 mg PO once daily   Carvedilol 6.25 mg PO BID with meals     Hypertensive heart disease with chronic systolic heart failure; Cardiomyopathy; Chronic systolic heart failure; Grade I diastolic dysfunction  Furosemide 40 mg PO once daily    Lisinopril 5 mg PO once daily  Continue Carvedilol 3.125 mg PO BID with meals     Factor V Leiden mutation; History of deep venous thrombosis (DVT) of right lower extremity;  Anticoagulated on Rivaroxaban  Rivaroxaban 20 mg PO once daily with breakfast    Major depressive disorder  Continue Duloxetine 30 mg PO once daily    Kuldeep Ritter MD  4/3/2021 2:08 PM

## 2021-04-03 NOTE — PROGRESS NOTES
SHIFT CHANGE NOTE FOR [facility]    Bedside and Verbal shift change report given to UMU Estrada (oncoming nurse) by Pancho Motley (offgoing nurse). Report included the following information SBAR, Kardex, MAR and Recent Results.     Situation:   Code Status: Full Code   Reason for Admission: Asif Radford Day: 8   Problem List:   Hospital Problems  Date Reviewed: 3/31/2021          Codes Class Noted POA    Constipation ICD-10-CM: K59.00  ICD-9-CM: 564.00  3/29/2021 Yes        Peripheral artery disease (HCC) (Chronic) ICD-10-CM: I73.9  ICD-9-CM: 443.9  Unknown Yes        Type 2 diabetes mellitus, without long-term current use of insulin (HCC) (Chronic) ICD-10-CM: E11.9  ICD-9-CM: 250.00  Unknown Yes    Overview Signed 3/26/2021  5:49 PM by Tereso Busch MD     HbA1c (3/6/2021) = 12.9             Factor V Leiden mutation ([facility] (Chronic) ICD-10-CM: J97.72  ICD-9-CM: 289.81  Unknown Yes        Hypertensive heart disease with chronic systolic congestive heart failure (HCC) (Chronic) ICD-10-CM: I11.0, I50.22  ICD-9-CM: 402.91, 428.22  Unknown Yes    Overview Signed 3/26/2021 11:06 PM by Tereso Busch MD     2D echocardiogram (12/17/2015) showed EF 35%; genealized hypokinesis more focally severe of the inferior and posterior segments; mild mitral regurgitation; trace tricuspud regurgitation; normal pulmonary artery pressure             Long term current use of aspirin (Chronic) ICD-10-CM: N76.45  ICD-9-CM: V58.66  Unknown Yes        On statin therapy due to risk of future cardiovascular event (Chronic) ICD-10-CM: U51.095  ICD-9-CM: V58.69  Unknown Yes    Overview Signed 3/26/2021  4:34 PM by Tereso Busch MD     On Atorvastatin             History of deep venous thrombosis (DVT) of distal vein of right lower extremity ICD-10-CM: Z86.718  ICD-9-CM: V12.51  Unknown Yes        Anticoagulated by anticoagulation treatment (Chronic) ICD-10-CM: Z79.01  ICD-9-CM: V58.61  Unknown Yes    Overview Signed 3/26/2021  4:36 PM by Royal Samuels MD     On Rivaroxaban             Mixed hyperlipidemia (Chronic) ICD-10-CM: E78.2  ICD-9-CM: 272.2  Unknown Yes    Overview Signed 3/26/2021 11:07 PM by Royal Samuels MD     Lipid profile (7/25/2018) showed , , HDL 27,              History of embolectomy ICD-10-CM: Z98.890  ICD-9-CM: V45.89  Unknown Yes    Overview Signed 3/26/2021  5:39 PM by Royal Samuels MD     S/P Right lower extremity arterial embolectomy             Major depressive disorder ICD-10-CM: F32.9  ICD-9-CM: 296.20  3/24/2021 Yes    Overview Signed 3/26/2021 11:31 PM by Royal Samuels MD     On Duloxetine             Grade I diastolic dysfunction [id number]: I51.9  ICD-9-CM: 429.9  3/24/2021 Yes    Overview Signed 3/26/2021 11:48 PM by Royal Samuels MD     2D echocardiogram (3/24/2021) showed EF 37%; global hypokinesis of left ventricle; grade I diastolic dysfunction             * (Principal) Status post below knee amputation of right lower extremity ([facility] ICD-10-CM: Z89.511  ICD-9-CM: V49.75  3/22/2021 Yes    Overview Signed 3/26/2021  5:43 PM by Royal Samuels MD     S/P Right below-the-knee amputation (3/22/2021 - Dr. Jeramie Mast)             Impaired mobility and ADLs ICD-10-CM: Z74.09, Z78.9  ICD-9-CM: V49.89  3/22/2021 Yes        Critical ischemia of lower extremity ([facility] ICD-10-CM: D93.566  ICD-9-CM: 459.9  3/22/2021 Yes    Overview Signed 3/26/2021  5:42 PM by Royal Samuels MD     Right             COVID-19 ruled out by laboratory testing ICD-10-CM: Z20.822  ICD-9-CM: V01.79  3/22/2021 Yes    Overview Signed 3/26/2021  5:47 PM by Royal Samuels MD     SARS-CoV-2 ([address] [place] [address]) (3/22/2021):  Not detected               Acute blood loss as cause of postoperative anemia ICD-10-CM: D62  ICD-9-CM: 285.1  3/22/2021 Yes        History of noncompliance with medical treatment ICD-10-CM: Z91.19  ICD-9-CM: V15.81  3/18/2021 Yes              Background:   Past Medical History:   Past Medical History:   Diagnosis Date    Acute blood loss as cause of postoperative anemia 3/22/2021    Anticoagulated by anticoagulation treatment     On Rivaroxaban    Cardiomyopathy ([facility] 12/17/2015    2D echocardiogram (3/24/2021) showed EF 37%; global hypokinesis of left ventricle; grade I diastolic dysfunction; 2D echocardiogram (12/17/2015) showed EF 35%; genealized hypokinesis more focally severe of the inferior and posterior segments; mild mitral regurgitation; trace tricuspud regurgitation; normal pulmonary artery pressure    Chronic systolic heart failure (HCC)     2D echocardiogram (3/24/2021) showed EF 37%; global hypokinesis of left ventricle; grade I diastolic dysfunction; 2D echocardiogram (12/17/2015) showed EF 35%; genealized hypokinesis more focally severe of the inferior and posterior segments; mild mitral regurgitation; trace tricuspud regurgitation; normal pulmonary artery pressure    Coronary artery disease involving native coronary artery of native heart     COVID-19 ruled out by laboratory testing 3/22/2021    SARS-CoV-2 ([employer] m2000, [facility]) (3/22/2021):  Not detected     Critical ischemia of lower extremity 3/22/2021    Right    [facility]     Grade I diastolic dysfunction 20/69/2452    2D echocardiogram (3/24/2021) showed EF 37%; global hypokinesis of left ventricle; grade I diastolic dysfunction    History of deep venous thrombosis (DVT) of distal vein of right lower extremity     History of epilepsy     History of head injury     History of myocardial infarction     History of noncompliance with medical treatment 3/18/2021    Hypertensive heart disease with chronic systolic congestive heart failure ([facility]     2D echocardiogram (3/24/2021) showed EF 37%; global hypokinesis of left ventricle; grade I diastolic dysfunction; 2D echocardiogram (12/17/2015) showed EF 35%; genealized hypokinesis more focally severe of the inferior and posterior segments; mild mitral regurgitation; trace tricuspud regurgitation; normal pulmonary artery pressure    Long term current use of aspirin     Major depressive disorder 03/24/2021    On Duloxetine    Migraine headache     Mitral valve prolapse     Mixed hyperlipidemia     Lipid profile (7/25/2018) showed , , HDL 27,     Obstructive sleep apnea     On statin therapy due to risk of future cardiovascular event     On Atorvastatin    Peripheral artery disease ([facility]     Type 2 diabetes mellitus, without long-term current use of insulin ([facility])     HbA1c (3/6/2021) = 22.8    Umbilical hernia     Wears glasses       Patient taking anticoagulants yes    Patient has a defibrillator: no no  Assessment:  Changes in Assessment throughout shift: none   Patient has central line: no Reasons if yes: date:na Last dressing date:na  Patient has Ellis Cath: no Reasons na ast Vitals:     Vitals:    04/01/21 2109 04/02/21 0712 04/02/21 1605 04/02/21 2106   BP: 114/73 134/84 118/77 126/83   Pulse: 92 94 93 86   Resp: 18 19 16 18   Temp: 97.1 °F (36.2 °C) 98.9 °F (37.2 °C) 97.5 °F (36.4 °C) 97.3 °F (36.3 °C)   SpO2: 98% 97% 96% 97%   Weight:       Height:            PAIN    Pain Assessment    Pain Intensity 1: 6 (04/03/21 0540) Pain Intensity 1: 2 (12/29/14 1105)    Pain Location 1: Leg Pain Location 1: Abdomen    Pain Intervention(s) 1: Medication (see MAR) Pain Intervention(s) 1: Medication (see MAR)  Patient Stated Pain Goal: 0 Patient Stated Pain Goal: 0  o Intervention effective:  yes   o Other actions taken for pain: pain med     Skin Assessment  Skin color Skin Color: Appropriate for ethnicity  Condition/Temperature Skin Condition/Temp: Dry, Warm  Integrity Skin Integrity: Incision (comment)  Turgor Turgor: Non-tenting  Weekly Pressure Ulcer Documentation  Pressure  Injury Documentation: No Pressure Injury Noted-Pressure Ulcer Prevention Initiated  Wound Prevention & Protection Methods  Orientation of wound Orientation of Wound Prevention: Posterior  Location of Prevention Location of Wound Prevention: Buttocks, Sacrum/Coccyx  Dressing Present Dressing Present : Yes  Dressing Status Dressing Status: Intact  Wound Offloading Wound Offloading (Prevention Methods): Bed, pressure redistribution/air     INTAKE/OUPUT  Date 04/02/21 0700 - 04/03/21 0659 04/03/21 0700 - 04/04/21 0659   Shift 9100-2573 9843-8513 24 Hour Total 0700-1859 1900-0659 24 Hour Total   INTAKE   Shift Total(mL/kg)         OUTPUT   Urine(mL/kg/hr)  1950(1.9) 1950(1)        Urine Voided  1950 1950        Urine Occurrence(s) 0 x 3 x 3 x      Emesis/NG output           Emesis Occurrence(s) 0 x  0 x      Stool           Stool Occurrence(s) 0 x 0 x 0 x      Shift Total(mL/kg)  1950(23) 1950(23)      NET  -1950 -1950      Weight (kg) 84.8 84.8 84.8 84.8 84.8 84.8       Recommendations:  1. Patient needs and requests: none @ present    2. Diet: cardiac     3. Pending tests/procedures: labs     4. Functional Level/Equipment: up with assist/WC    5. Estimated Discharge Date: none yet Posted on Whiteboard in Patients Room: no     [facility] Safety Check    A safety check occurred in the patient's room between off going nurse and oncoming nurse listed above. The safety check included the below items  Area Items   H  High Alert Medications - Verify all high alert medication drips (heparin, PCA, etc.)   E  Equipment - Suction is set up for ALL patients (with yanker)  - Red plugs utilized for all equipment (IV pumps, etc.)  - WOWs wiped down at end of shift.  - Room stocked with oxygen, suction, and other unit-specific supplies   A  Alarms - Bed alarm is set for fall risk patients  - Ensure chair alarm is in place and activated if patient is up in a chair   L  Lines - Check IV for any infiltration  - Ellis bag is empty if patient has a Ellis   - Tubing and IV bags are labeled   S  Safety   - Room is clean, patient is clean, and equipment is clean.   - Hallways are clear from equipment besides carts. - Fall bracelet on for fall risk patients  - Ensure room is clear and free of clutter  - Suction is set up for ALL patients (with faraz)  - Hallways are clear from equipment besides carts. - Isolation precautions followed, supplies available outside room, sign posted   .

## 2021-04-04 LAB
BACTERIA SPEC CULT: NORMAL
GLUCOSE BLD STRIP.AUTO-MCNC: 119 MG/DL (ref 70–110)
GLUCOSE BLD STRIP.AUTO-MCNC: 123 MG/DL (ref 70–110)
GLUCOSE BLD STRIP.AUTO-MCNC: 147 MG/DL (ref 70–110)
GLUCOSE BLD STRIP.AUTO-MCNC: 157 MG/DL (ref 70–110)
SERVICE CMNT-IMP: NORMAL

## 2021-04-04 PROCEDURE — 74011250637 HC RX REV CODE- 250/637: Performed by: INTERNAL MEDICINE

## 2021-04-04 PROCEDURE — 82962 GLUCOSE BLOOD TEST: CPT

## 2021-04-04 PROCEDURE — 65310000000 HC RM PRIVATE REHAB

## 2021-04-04 PROCEDURE — 74011636637 HC RX REV CODE- 636/637: Performed by: INTERNAL MEDICINE

## 2021-04-04 RX ADMIN — CARVEDILOL 3.12 MG: 6.25 TABLET, FILM COATED ORAL at 16:22

## 2021-04-04 RX ADMIN — METFORMIN HYDROCHLORIDE 500 MG: 500 TABLET ORAL at 08:00

## 2021-04-04 RX ADMIN — Medication 1 CAPSULE: at 08:00

## 2021-04-04 RX ADMIN — OXYCODONE HYDROCHLORIDE 10 MG: 5 TABLET ORAL at 08:00

## 2021-04-04 RX ADMIN — METHOCARBAMOL TABLETS 500 MG: 500 TABLET, COATED ORAL at 14:02

## 2021-04-04 RX ADMIN — ALOGLIPTIN 25 MG: 25 TABLET, FILM COATED ORAL at 08:01

## 2021-04-04 RX ADMIN — METFORMIN HYDROCHLORIDE 500 MG: 500 TABLET ORAL at 16:22

## 2021-04-04 RX ADMIN — CARVEDILOL 3.12 MG: 6.25 TABLET, FILM COATED ORAL at 08:01

## 2021-04-04 RX ADMIN — METHOCARBAMOL TABLETS 500 MG: 500 TABLET, COATED ORAL at 20:49

## 2021-04-04 RX ADMIN — Medication 5 MG: at 20:49

## 2021-04-04 RX ADMIN — ACETAMINOPHEN 650 MG: 325 TABLET ORAL at 08:57

## 2021-04-04 RX ADMIN — GABAPENTIN 100 MG: 100 CAPSULE ORAL at 08:01

## 2021-04-04 RX ADMIN — ATORVASTATIN CALCIUM 80 MG: 40 TABLET, FILM COATED ORAL at 08:00

## 2021-04-04 RX ADMIN — ACETAMINOPHEN 650 MG: 325 TABLET ORAL at 11:42

## 2021-04-04 RX ADMIN — ASPIRIN 81 MG CHEWABLE TABLET 81 MG: 81 TABLET CHEWABLE at 08:00

## 2021-04-04 RX ADMIN — RIVAROXABAN 20 MG: 20 TABLET, FILM COATED ORAL at 08:01

## 2021-04-04 RX ADMIN — ACETAMINOPHEN 650 MG: 325 TABLET ORAL at 16:23

## 2021-04-04 RX ADMIN — OXYCODONE HYDROCHLORIDE 10 MG: 5 TABLET ORAL at 03:50

## 2021-04-04 RX ADMIN — GABAPENTIN 100 MG: 100 CAPSULE ORAL at 17:21

## 2021-04-04 RX ADMIN — POLYETHYLENE GLYCOL 3350 17 G: 17 POWDER, FOR SOLUTION ORAL at 08:05

## 2021-04-04 RX ADMIN — AMOXICILLIN AND CLAVULANATE POTASSIUM 1 TABLET: 875; 125 TABLET, FILM COATED ORAL at 08:00

## 2021-04-04 RX ADMIN — INSULIN LISPRO 2 UNITS: 100 INJECTION, SOLUTION INTRAVENOUS; SUBCUTANEOUS at 11:42

## 2021-04-04 RX ADMIN — METHOCARBAMOL TABLETS 500 MG: 500 TABLET, COATED ORAL at 08:01

## 2021-04-04 RX ADMIN — AMOXICILLIN AND CLAVULANATE POTASSIUM 1 TABLET: 875; 125 TABLET, FILM COATED ORAL at 16:22

## 2021-04-04 RX ADMIN — OXYCODONE HYDROCHLORIDE 10 MG: 5 TABLET ORAL at 13:03

## 2021-04-04 RX ADMIN — DULOXETINE HYDROCHLORIDE 30 MG: 30 CAPSULE, DELAYED RELEASE ORAL at 08:01

## 2021-04-04 RX ADMIN — EZETIMIBE 10 MG: 10 TABLET ORAL at 20:49

## 2021-04-04 NOTE — PROGRESS NOTES
SHIFT CHANGE NOTE FOR [facility]    Bedside and Verbal shift change report given to Morales RN (oncoming nurse) by Donnell Bolden RN  (offgoing nurse). Report included the following information SBAR, Kardex, MAR and Recent Results.     Situation:   Code Status: Full Code   Reason for Admission: Asif Radford Day: 9   Problem List:   Hospital Problems  Date Reviewed: 3/31/2021          Codes Class Noted POA    Constipation ICD-10-CM: K59.00  ICD-9-CM: 564.00  3/29/2021 Yes        Peripheral artery disease (HCC) (Chronic) ICD-10-CM: I73.9  ICD-9-CM: 443.9  Unknown Yes        Type 2 diabetes mellitus, without long-term current use of insulin (HCC) (Chronic) ICD-10-CM: E11.9  ICD-9-CM: 250.00  Unknown Yes    Overview Signed 3/26/2021  5:49 PM by Meghna aNva MD     HbA1c (3/6/2021) = 12.9             [facility] Leiden [facility] (Chronic) ICD-10-CM: J03.87  ICD-9-CM: 289.81  Unknown Yes        Hypertensive heart disease with chronic systolic congestive heart failure (HCC) (Chronic) ICD-10-CM: I11.0, I50.22  ICD-9-CM: 402.91, 428.22  Unknown Yes    Overview Signed 3/26/2021 11:06 PM by Meghna Nava MD     2D echocardiogram (12/17/2015) showed EF 35%; genealized hypokinesis more focally severe of the inferior and posterior segments; mild mitral regurgitation; trace tricuspud regurgitation; normal pulmonary artery pressure             Long term current use of aspirin (Chronic) ICD-10-CM: F94.60  ICD-9-CM: V58.66  Unknown Yes        On statin therapy due to risk of future cardiovascular event (Chronic) ICD-10-CM: R14.783  ICD-9-CM: V58.69  Unknown Yes    Overview Signed 3/26/2021  4:34 PM by Meghna Nava MD     On Atorvastatin             History of deep venous thrombosis (DVT) of distal vein of right lower extremity ICD-10-CM: Z86.718  ICD-9-CM: V12.51  Unknown Yes        Anticoagulated by anticoagulation treatment (Chronic) ICD-10-CM: Z79.01  ICD-9-CM: V58.61  Unknown Yes    Overview Signed 3/26/2021  4:36 PM by Naun Vee MD     On Rivaroxaban             Mixed hyperlipidemia (Chronic) ICD-10-CM: E78.2  ICD-9-CM: 272.2  Unknown Yes    Overview Signed 3/26/2021 11:07 PM by Naun Vee MD     Lipid profile (7/25/2018) showed , , HDL 27,              History of embolectomy ICD-10-CM: Z98.890  ICD-9-CM: V45.89  Unknown Yes    Overview Signed 3/26/2021  5:39 PM by Naun Vee MD     S/P Right lower extremity arterial embolectomy             Major depressive disorder ICD-10-CM: F32.9  ICD-9-CM: 296.20  3/24/2021 Yes    Overview Signed 3/26/2021 11:31 PM by Naun Vee MD     On Duloxetine             Grade I diastolic dysfunction [id number]: I51.9  ICD-9-CM: 429.9  3/24/2021 Yes    Overview Signed 3/26/2021 11:48 PM by Naun Vee MD     2D echocardiogram (3/24/2021) showed EF 37%; global hypokinesis of left ventricle; grade I diastolic dysfunction             * (Principal) Status post below knee amputation of right lower extremity ([facility] ICD-10-CM: Z89.511  ICD-9-CM: V49.75  3/22/2021 Yes    Overview Signed 3/26/2021  5:43 PM by Naun Vee MD     S/P Right below-the-knee amputation (3/22/2021 - Dr. Verenice Kern)             Impaired mobility and ADLs ICD-10-CM: Z74.09, Z78.9  ICD-9-CM: V49.89  3/22/2021 Yes        Critical ischemia of lower extremity ([address] ICD-10-CM: Q59.152  ICD-9-CM: 459.9  3/22/2021 Yes    Overview Signed 3/26/2021  5:42 PM by Naun Vee MD     Right             COVID-19 ruled out by laboratory testing ICD-10-CM: Z20.822  ICD-9-CM: V01.79  3/22/2021 Yes    Overview Signed 3/26/2021  5:47 PM by Naun Vee MD     SARS-CoV-2 ([address] [place] [address]) (3/22/2021):  Not detected               Acute blood loss as cause of postoperative anemia ICD-10-CM: D62  ICD-9-CM: 285.1  3/22/2021 Yes        History of noncompliance with medical treatment ICD-10-CM: Z91.19  ICD-9-CM: V15.81  3/18/2021 Yes              Background:   Past Medical History:   Past Medical History:   Diagnosis Date    Acute blood loss as cause of postoperative anemia 3/22/2021    Anticoagulated by anticoagulation treatment     On Rivaroxaban    Cardiomyopathy ([facility] 12/17/2015    2D echocardiogram (3/24/2021) showed EF 37%; global hypokinesis of left ventricle; grade I diastolic dysfunction; 2D echocardiogram (12/17/2015) showed EF 35%; genealized hypokinesis more focally severe of the inferior and posterior segments; mild mitral regurgitation; trace tricuspud regurgitation; normal pulmonary artery pressure    Chronic systolic heart failure (HCC)     2D echocardiogram (3/24/2021) showed EF 37%; global hypokinesis of left ventricle; grade I diastolic dysfunction; 2D echocardiogram (12/17/2015) showed EF 35%; genealized hypokinesis more focally severe of the inferior and posterior segments; mild mitral regurgitation; trace tricuspud regurgitation; normal pulmonary artery pressure    Coronary artery disease involving native coronary artery of native heart     COVID-19 ruled out by laboratory testing 3/22/2021    SARS-CoV-2 ([employer] [facility]2000, [facility]) (3/22/2021):  Not detected     Critical ischemia of lower extremity 3/22/2021    Right    [facility]     Grade I diastolic dysfunction 20/68/1724    2D echocardiogram (3/24/2021) showed EF 37%; global hypokinesis of left ventricle; grade I diastolic dysfunction    History of deep venous thrombosis (DVT) of distal vein of right lower extremity     History of epilepsy     History of head injury     History of myocardial infarction     History of noncompliance with medical treatment 3/18/2021    Hypertensive heart disease with chronic systolic congestive heart failure ([facility]     2D echocardiogram (3/24/2021) showed EF 37%; global hypokinesis of left ventricle; grade I diastolic dysfunction; 2D echocardiogram (12/17/2015) showed EF 35%; genealized hypokinesis more focally severe of the inferior and posterior segments; mild mitral regurgitation; trace tricuspud regurgitation; normal pulmonary artery pressure    Long term current use of aspirin     Major depressive disorder 03/24/2021    On Duloxetine    Migraine headache     Mitral valve prolapse     Mixed hyperlipidemia     Lipid profile (7/25/2018) showed , , HDL 27,     Obstructive sleep apnea     On statin therapy due to risk of future cardiovascular event     On Atorvastatin    Peripheral artery disease ([facility]     Type 2 diabetes mellitus, without long-term current use of insulin ([facility])     HbA1c (3/6/2021) = 71.0    Umbilical hernia     Wears glasses       Patient taking anticoagulants yes    Patient has a defibrillator: no no  Assessment:  Changes in Assessment throughout shift: none   Patient has central line: no Reasons if yes: date:na Last dressing date:na  Patient has Ellis Cath: no Reasons na ast Vitals:     Vitals:    04/03/21 1534 04/03/21 1649 04/03/21 2118 04/04/21 0745   BP: 116/76 119/80 124/80 123/75   Pulse: 86 88 91 97   Resp: 18 18 18 18   Temp: 97.5 °F (36.4 °C)  97 °F (36.1 °C) 97 °F (36.1 °C)   SpO2: 98% 98% 98% 98%   Weight:       Height:            PAIN    Pain Assessment    Pain Intensity 1: 7 (04/04/21 0746) Pain Intensity 1: 2 (12/29/14 1105)    Pain Location 1: Leg Pain Location 1: Abdomen    Pain Intervention(s) 1: Medication (see MAR) Pain Intervention(s) 1: Medication (see MAR)  Patient Stated Pain Goal: 0 Patient Stated Pain Goal: 0  o Intervention effective:  yes   o Other actions taken for pain: pain med     Skin Assessment  Skin color Skin Color: Appropriate for ethnicity  Condition/Temperature Skin Condition/Temp: Dry, Warm  Integrity Skin Integrity: Incision (comment)  Turgor Turgor: Non-tenting  Weekly Pressure Ulcer Documentation  Pressure  Injury Documentation: No Pressure Injury Noted-Pressure Ulcer Prevention Initiated  Wound Prevention & Protection Methods  Orientation of wound Orientation of Wound Prevention: Posterior  Location of Prevention Location of Wound Prevention: Buttocks, Sacrum/Coccyx  Dressing Present Dressing Present : Changed  Dressing Status Dressing Status: Changed  Wound Offloading Wound Offloading (Prevention Methods): Bed, pressure redistribution/air     INTAKE/OUPUT  Date 04/03/21 0700 - 04/04/21 0659 04/04/21 0700 - 04/05/21 0659   Shift 3413-1823 1112-0440 24 Hour Total 8765-0119 9050-1266 24 Hour Total   INTAKE   Shift Total(mL/kg)         OUTPUT   Urine(mL/kg/hr)  1250(1.2) 1250(0.6)        Urine Voided  1250 1250        Urine Occurrence(s) 2 x 5 x 7 x 0 x  0 x   Stool           Stool Occurrence(s) 0 x 0 x 0 x 0 x  0 x   Shift Total(mL/kg)  1250(14.7) 1250(14.7)      NET  -1250 -1250      Weight (kg) 84.8 84.8 84.8 84.8 84.8 84.8       Recommendations:  1. Patient needs and requests: pain medication given    2. Diet: cardiac     3. Pending tests/procedures: labs     4. Functional Level/Equipment: up with assist/WC    5. Estimated Discharge Date: none yet Posted on Whiteboard in Patients Room: no     [facility] Safety Check    A safety check occurred in the patient's room between off going nurse and oncoming nurse listed above. The safety check included the below items  Area Items   H  High Alert Medications - Verify all high alert medication drips (heparin, PCA, etc.)   E  Equipment - Suction is set up for ALL patients (with yanker)  - Red plugs utilized for all equipment (IV pumps, etc.)  - WOWs wiped down at end of shift.  - Room stocked with oxygen, suction, and other unit-specific supplies   A  Alarms - Bed alarm is set for fall risk patients  - Ensure chair alarm is in place and activated if patient is up in a chair   L  Lines - Check IV for any infiltration  - Ellis bag is empty if patient has a Ellsi   - Tubing and IV bags are labeled   S  Safety   - Room is clean, patient is clean, and equipment is clean. - Hallways are clear from equipment besides carts.    - Fall bracelet on for fall risk patients  - Ensure room is clear and free of clutter  - Suction is set up for ALL patients (with yanker)  - Hallways are clear from equipment besides carts. - Isolation precautions followed, supplies available outside room, sign posted   .

## 2021-04-04 NOTE — PROGRESS NOTES
Progress Note    Patient: Vincent Riggs MRN: [id number]  CSN: [id number]    YOB: 1965  Age: 60 y.o. Sex: male    DOA: 3/26/2021 LOS:  LOS: 9 days                    Subjective:     Primary Rehab Diagnosis: Impaired Mobility and ADLs secondary to:  1. S/P Right below-the-knee amputation (3/22/2021 - Dr. Ayana Basilio)  2. History of critical ischemia of the right lower extremity  3. History of right lower extremity arterial embolectomy        Review of systems  General: No fevers or chills. Cardiovascular: No chest pain or pressure. Pulmonary: No shortness of breath, cough or wheeze. Gastrointestinal: No abdominal pain, nausea, vomiting or diarrhea. Genitourinary: No dysuria. Musculoskeletal:Rt AKA amputation pain med help his sx   Neurologic: No headache, generalized weakness    Objective:     Physical Exam:  Visit Vitals  /75 (BP 1 Location: Left upper arm, BP Patient Position: At rest;Supine)   Pulse 97   Temp 97 °F (36.1 °C)   Resp 18   Ht 6' 2\" (1.88 m)   Wt 84.8 kg (187 lb)   SpO2 98%   BMI 24.01 kg/m²        General:         Alert,no acute distress    HEENT: NC, Atraumatic. Lungs: CTA Bilaterally. No Wheezing/Rhonchi/Rales. Heart:  Regular  rhythm,  No murmur, No Rubs, No Gallops  Abdomen: Soft, Non distended, Non tender.  reducible umbilical hernia   Extremities: Rt AKA   Psych:           Not anxious or agitated. Neurologic:  CN 2-12 grossly intact, Alert and oriented X 3. Intake and Output:  Current Shift:  No intake/output data recorded. Last three shifts:  04/02 1901 - 04/04 0700  In: -   Out: 3200 [Urine:3200]    Labs: Results:       Chemistry No results for input(s): GLU, NA, K, CL, CO2, BUN, CREA, CA, AGAP, BUCR, TBIL, AP, TP, ALB, GLOB, AGRAT in the last 72 hours. No lab exists for component: GPT   CBC w/Diff No results for input(s): WBC, RBC, HGB, HCT, PLT, GRANS, LYMPH, EOS, HGBEXT, HCTEXT, PLTEXT, HGBEXT, HCTEXT, PLTEXT in the last 72 hours.    Cardiac Enzymes No results for input(s): CPK, CKND1, TENZIN in the last 72 hours. No lab exists for component: CKRMB, TROIP   Coagulation No results for input(s): PTP, INR, APTT, INREXT, INREXT in the last 72 hours. Lipid Panel Lab Results   Component Value Date/Time    Cholesterol, total 135 03/27/2021 06:32 AM    HDL Cholesterol 35 (L) 03/27/2021 06:32 AM    LDL, calculated 63 03/27/2021 06:32 AM    VLDL, calculated 37 03/27/2021 06:32 AM    Triglyceride 185 (H) 03/27/2021 06:32 AM    CHOL/HDL Ratio 3.9 03/27/2021 06:32 AM      BNP No results for input(s): BNPP in the last 72 hours. Liver Enzymes No results for input(s): TP, ALB, TBIL, AP in the last 72 hours.     No lab exists for component: SGOT, GPT, DBIL   Thyroid Studies No results found for: T4, T3U, TSH, TSHEXT, TSHEXT       Procedures/imaging: see electronic medical records for all procedures/Xrays and details which were not copied into this note but were reviewed prior to creation of Plan    Medications:   Current Facility-Administered Medications   Medication Dose Route Frequency    polyethylene glycol (MIRALAX) packet 17 g  17 g Oral DAILY    ondansetron hcl (ZOFRAN) tablet 4 mg  4 mg Oral TID PRN    senna-docusate (PERICOLACE) 8.6-50 mg per tablet 2 Tab  2 Tab Oral PCD    carvediloL (COREG) tablet 3.125 mg  3.125 mg Oral BID WITH MEALS    bisacodyL (DULCOLAX) tablet 10 mg  10 mg Oral Q48H PRN    insulin lispro (HUMALOG) injection   SubCUTAneous TIDAC    amoxicillin-clavulanate (AUGMENTIN) 875-125 mg per tablet 1 Tab  1 Tab Oral BID WITH MEALS    L. acidophilus,casei,rhamnosus (BIO-K PLUS) capsule 1 Cap  1 Cap Oral DAILY    atorvastatin (LIPITOR) tablet 80 mg  80 mg Oral DAILY    aspirin chewable tablet 81 mg  81 mg Oral DAILY WITH BREAKFAST    DULoxetine (CYMBALTA) capsule 30 mg  30 mg Oral DAILY    ezetimibe (ZETIA) tablet 10 mg  10 mg Oral QHS    [Held by provider] furosemide (LASIX) tablet 40 mg  40 mg Oral DAILY    gabapentin (NEURONTIN) capsule 100 mg  100 mg Oral BID    [Held by provider] lisinopriL (PRINIVIL, ZESTRIL) tablet 5 mg  5 mg Oral DAILY    oxyCODONE IR (ROXICODONE) tablet 5-10 mg  5-10 mg Oral Q4H PRN    acetaminophen (TYLENOL) tablet 650 mg  650 mg Oral TID    methocarbamoL (ROBAXIN) tablet 500 mg  500 mg Oral TID    rivaroxaban (XARELTO) tablet 20 mg  20 mg Oral DAILY WITH BREAKFAST    alogliptin (NESINA) tablet 25 mg  25 mg Oral DAILY    melatonin (rapid dissolve) tablet 5 mg  5 mg Oral QHS    zolpidem (AMBIEN) tablet 5 mg  5 mg Oral QHS PRN    glucose chewable tablet 16 g  4 Tab Oral PRN    glucagon (GLUCAGEN) injection 1 mg  1 mg IntraMUSCular PRN    dextrose (D50W) injection syrg 12.5-25 g  25-50 mL IntraVENous PRN    acetaminophen (TYLENOL) tablet 650 mg  650 mg Oral Q4H PRN    metFORMIN (GLUCOPHAGE) tablet 500 mg  500 mg Oral BID WITH MEALS       Assessment/Plan     Principal Problem:    Status post below knee amputation of right lower extremity (HCC) (3/22/2021)      Overview: S/P Right below-the-knee amputation (3/22/2021 - Dr. Neno Hernandez)    Active Problems:    Impaired mobility and ADLs (3/22/2021)      Critical ischemia of lower extremity ([address] (3/22/2021)      Overview: Right      Peripheral artery disease (HCC) ()      Type 2 diabetes mellitus, without long-term current use of insulin ([facility]) ()      Overview: HbA1c (3/6/2021) = 12.9      Factor V Leiden mutation ([facility]) ()      Hypertensive heart disease with chronic systolic congestive heart failure ([facility]) ()      Overview: 2D echocardiogram (12/17/2015) showed EF 35%; genealized hypokinesis more       focally severe of the inferior and posterior segments; mild mitral       regurgitation; trace tricuspud regurgitation; normal pulmonary artery       pressure      Long term current use of aspirin ()      On statin therapy due to risk of future cardiovascular event ()      Overview:  On Atorvastatin      History of deep venous thrombosis (DVT) of distal vein of right lower extremity ()      Anticoagulated by anticoagulation treatment ()      Overview: On Rivaroxaban      Mixed hyperlipidemia ()      Overview: Lipid profile (7/25/2018) showed , , HDL 27,       History of embolectomy ()      Overview: S/P Right lower extremity arterial embolectomy      COVID-19 ruled out by laboratory testing (3/22/2021)      Overview: SARS-CoV-2 ([facility] m2000, [facility]) (3/22/2021): Not detected            Acute blood loss as cause of postoperative anemia (3/22/2021)      History of noncompliance with medical treatment (3/18/2021)      Major depressive disorder (3/24/2021)      Overview: On Duloxetine      Grade I diastolic dysfunction (1/63/0659)      Overview: 2D echocardiogram (3/24/2021) showed EF 37%; global hypokinesis of left       ventricle; grade I diastolic dysfunction      Constipation (3/29/2021)      Plan  S/P Right below-the-knee amputation (3/22/2021 - Dr. Wynne Opitz); History of critical ischemia of the right lower extremity; History of right lower extremity   Staples to be removed on 4/19/2021  Amoxicillin-Clavulanate 875-125 1 tab PO BID with meals   Bio K Plus 1 cap PO once daily    Acute Postoperative Blood Loss Anemia  Anemia work-up showed serum iron 23, TIBC 200, iron % saturation 12, ferritin 835  No iron supplement for now    Coronary artery disease; Peripheral artery disease   Aspirin 81 mg PO once daily with breakfast   Atorvastatin 80 mg PO once daily   Carvedilol 6.25 mg PO BID with meals     Hypertensive heart disease with chronic systolic heart failure; Cardiomyopathy; Chronic systolic heart failure; Grade I diastolic dysfunction  Furosemide 40 mg PO once daily    Lisinopril 5 mg PO once daily  Continue Carvedilol 3.125 mg PO BID with meals     Factor V Leiden mutation; History of deep venous thrombosis (DVT) of right lower extremity;  Anticoagulated on Rivaroxaban  Rivaroxaban 20 mg PO once daily with breakfast    Major depressive disorder  Continue Duloxetine 30 mg PO once daily    Kuldeep Ritter MD  4/4/2021 3:11PM

## 2021-04-04 NOTE — PROGRESS NOTES
[facility]    Bedside and Verbal shift change report given to Cyn Ruelas RN (oncoming nurse) by Tawana Duverney RN  (offgoing nurse). Report included the following information SBAR, Kardex, MAR and Recent Results.     Situation:   Code Status: Full Code   Reason for Admission: Asif Radford Day: 9   Problem List:   Hospital Problems  Date Reviewed: 3/31/2021          Codes Class Noted POA    Constipation ICD-10-CM: K59.00  ICD-9-CM: 564.00  3/29/2021 Yes        Peripheral artery disease (HCC) (Chronic) ICD-10-CM: I73.9  ICD-9-CM: 443.9  Unknown Yes        Type 2 diabetes mellitus, without long-term current use of insulin (HCC) (Chronic) ICD-10-CM: E11.9  ICD-9-CM: 250.00  Unknown Yes    Overview Signed 3/26/2021  5:49 PM by Luzma Small MD     HbA1c (3/6/2021) = 12.9             Factor V Leiden mutation ([facility] (Chronic) ICD-10-CM: R30.72  ICD-9-CM: 289.81  Unknown Yes        Hypertensive heart disease with chronic systolic congestive heart failure (HCC) (Chronic) ICD-10-CM: I11.0, I50.22  ICD-9-CM: 402.91, 428.22  Unknown Yes    Overview Signed 3/26/2021 11:06 PM by Luzma Small MD     2D echocardiogram (12/17/2015) showed EF 35%; genealized hypokinesis more focally severe of the inferior and posterior segments; mild mitral regurgitation; trace tricuspud regurgitation; normal pulmonary artery pressure             Long term current use of aspirin (Chronic) ICD-10-CM: U04.81  ICD-9-CM: V58.66  Unknown Yes        On statin therapy due to risk of future cardiovascular event (Chronic) ICD-10-CM: I84.791  ICD-9-CM: V58.69  Unknown Yes    Overview Signed 3/26/2021  4:34 PM by Luzma Small MD     On Atorvastatin             History of deep venous thrombosis (DVT) of distal vein of right lower extremity ICD-10-CM: Z86.718  ICD-9-CM: V12.51  Unknown Yes        Anticoagulated by anticoagulation treatment (Chronic) ICD-10-CM: Z79.01  ICD-9-CM: V58.61  Unknown Yes    Overview Signed 3/26/2021  4:36 PM by Vivi Qiu MD     On Rivaroxaban             Mixed hyperlipidemia (Chronic) ICD-10-CM: E78.2  ICD-9-CM: 272.2  Unknown Yes    Overview Signed 3/26/2021 11:07 PM by Vivi Qiu MD     Lipid profile (7/25/2018) showed , , HDL 27,              History of embolectomy ICD-10-CM: Z98.890  ICD-9-CM: V45.89  Unknown Yes    Overview Signed 3/26/2021  5:39 PM by Vivi Qiu MD     S/P Right lower extremity arterial embolectomy             Major depressive disorder ICD-10-CM: F32.9  ICD-9-CM: 296.20  3/24/2021 Yes    Overview Signed 3/26/2021 11:31 PM by Vivi Qiu MD     On Duloxetine             Grade I diastolic dysfunction RVP-02-AT: I51.9  ICD-9-CM: 429.9  3/24/2021 Yes    Overview Signed 3/26/2021 11:48 PM by Vivi Qiu MD     2D echocardiogram (3/24/2021) showed EF 37%; global hypokinesis of left ventricle; grade I diastolic dysfunction             * (Principal) Status post below knee amputation of right lower extremity ([facility] ICD-10-CM: Z89.511  ICD-9-CM: V49.75  3/22/2021 Yes    Overview Signed 3/26/2021  5:43 PM by Vivi Qiu MD     S/P Right below-the-knee amputation (3/22/2021 - Dr. Brooklyn Campos)             Impaired mobility and ADLs ICD-10-CM: Z74.09, Z78.9  ICD-9-CM: V49.89  3/22/2021 Yes        Critical ischemia of lower extremity ([address] ICD-10-CM: U69.300  ICD-9-CM: 459.9  3/22/2021 Yes    Overview Signed 3/26/2021  5:42 PM by Vivi Qiu MD     Right             COVID-19 ruled out by laboratory testing ICD-10-CM: Z20.822  ICD-9-CM: V01.79  3/22/2021 Yes    Overview Signed 3/26/2021  5:47 PM by Vivi Qiu MD     SARS-CoV-2 ([address] [place] [address]) (3/22/2021):  Not detected               Acute blood loss as cause of postoperative anemia ICD-10-CM: D62  ICD-9-CM: 285.1  3/22/2021 Yes        History of noncompliance with medical treatment ICD-10-CM: Z91.19  ICD-9-CM: V15.81  3/18/2021 Yes              Background:   Past Medical History:   Past Medical History:   Diagnosis Date    Acute blood loss as cause of postoperative anemia 3/22/2021    Anticoagulated by anticoagulation treatment     On Rivaroxaban    Cardiomyopathy ([facility] 12/17/2015    2D echocardiogram (3/24/2021) showed EF 37%; global hypokinesis of left ventricle; grade I diastolic dysfunction; 2D echocardiogram (12/17/2015) showed EF 35%; genealized hypokinesis more focally severe of the inferior and posterior segments; mild mitral regurgitation; trace tricuspud regurgitation; normal pulmonary artery pressure    Chronic systolic heart failure (HCC)     2D echocardiogram (3/24/2021) showed EF 37%; global hypokinesis of left ventricle; grade I diastolic dysfunction; 2D echocardiogram (12/17/2015) showed EF 35%; genealized hypokinesis more focally severe of the inferior and posterior segments; mild mitral regurgitation; trace tricuspud regurgitation; normal pulmonary artery pressure    Coronary artery disease involving native coronary artery of native heart     COVID-19 ruled out by laboratory testing 3/22/2021    SARS-CoV-2 ([employer] m2000, [facility]) (3/22/2021):  Not detected     Critical ischemia of lower extremity 3/22/2021    Right    [facility]     Grade I diastolic dysfunction 91/06/3149    2D echocardiogram (3/24/2021) showed EF 37%; global hypokinesis of left ventricle; grade I diastolic dysfunction    History of deep venous thrombosis (DVT) of distal vein of right lower extremity     History of epilepsy     History of head injury     History of myocardial infarction     History of noncompliance with medical treatment 3/18/2021    Hypertensive heart disease with chronic systolic congestive heart failure ([facility]     2D echocardiogram (3/24/2021) showed EF 37%; global hypokinesis of left ventricle; grade I diastolic dysfunction; 2D echocardiogram (12/17/2015) showed EF 35%; genealized hypokinesis more focally severe of the inferior and posterior segments; mild mitral regurgitation; trace tricuspud regurgitation; normal pulmonary artery pressure    Long term current use of aspirin     Major depressive disorder 03/24/2021    On Duloxetine    Migraine headache     Mitral valve prolapse     Mixed hyperlipidemia     Lipid profile (7/25/2018) showed , , HDL 27,     Obstructive sleep apnea     On statin therapy due to risk of future cardiovascular event     On Atorvastatin    Peripheral artery disease ([facility]     Type 2 diabetes mellitus, without long-term current use of insulin ([facility])     HbA1c (3/6/2021) = 46.3    Umbilical hernia     Wears glasses       Patient taking anticoagulants yes    Patient has a defibrillator: no no  Assessment:  Changes in Assessment throughout shift: none   Patient has central line: no Reasons if yes: date:na Last dressing date:na  Patient has Ellis Cath: no Reasons na ast Vitals:     Vitals:    04/03/21 0732 04/03/21 1534 04/03/21 1649 04/03/21 2118   BP: 124/80 116/76 119/80 124/80   Pulse: 97 86 88 91   Resp: 17 18 18 18   Temp: 97.8 °F (36.6 °C) 97.5 °F (36.4 °C)  97 °F (36.1 °C)   SpO2: 96% 98% 98% 98%   Weight:       Height:            PAIN    Pain Assessment    Pain Intensity 1: 0 (04/04/21 0430) Pain Intensity 1: 2 (12/29/14 1105)    Pain Location 1: Leg Pain Location 1: Abdomen    Pain Intervention(s) 1: Medication (see MAR) Pain Intervention(s) 1: Medication (see MAR)  Patient Stated Pain Goal: 0 Patient Stated Pain Goal: 0  o Intervention effective:  yes   o Other actions taken for pain: pain med     Skin Assessment  Skin color Skin Color: Appropriate for ethnicity  Condition/Temperature Skin Condition/Temp: Dry, Warm  Integrity Skin Integrity: Incision (comment)  Turgor Turgor: Non-tenting  Weekly Pressure Ulcer Documentation  Pressure  Injury Documentation: No Pressure Injury Noted-Pressure Ulcer Prevention Initiated  Wound Prevention & Protection Methods  Orientation of wound Orientation of Wound Prevention: Posterior  Location of Prevention Location of Wound Prevention: Buttocks, Sacrum/Coccyx  Dressing Present Dressing Present : Changed  Dressing Status Dressing Status: Changed  Wound Offloading Wound Offloading (Prevention Methods): Bed, pressure redistribution/air     INTAKE/OUPUT  Date 04/03/21 0700 - 04/04/21 0659 04/04/21 0700 - 04/05/21 0659   Shift 9535-0325 7337-5933 24 Hour Total 8043-6173 6167-2992 24 Hour Total   INTAKE   Shift Total(mL/kg)         OUTPUT   Urine(mL/kg/hr)  1250 1250        Urine Voided  1250 1250        Urine Occurrence(s) 2 x 4 x 6 x      Stool           Stool Occurrence(s) 0 x 0 x 0 x      Shift Total(mL/kg)  1250(14.7) 1250(14.7)      NET  -1250 -1250      Weight (kg) 84.8 84.8 84.8 84.8 84.8 84.8       Recommendations:  1. Patient needs and requests: pain medication given    2. Diet: cardiac     3. Pending tests/procedures: labs     4. Functional Level/Equipment: up with assist/WC    5. Estimated Discharge Date: none yet Posted on Whiteboard in Patients Room: no     [facility] Safety Check    A safety check occurred in the patient's room between off going nurse and oncoming nurse listed above. The safety check included the below items  Area Items   H  High Alert Medications - Verify all high alert medication drips (heparin, PCA, etc.)   E  Equipment - Suction is set up for ALL patients (with yanker)  - Red plugs utilized for all equipment (IV pumps, etc.)  - WOWs wiped down at end of shift.  - Room stocked with oxygen, suction, and other unit-specific supplies   A  Alarms - Bed alarm is set for fall risk patients  - Ensure chair alarm is in place and activated if patient is up in a chair   L  Lines - Check IV for any infiltration  - Ellis bag is empty if patient has a Ellis   - Tubing and IV bags are labeled   S  Safety   - Room is clean, patient is clean, and equipment is clean. - Hallways are clear from equipment besides carts.    - Fall bracelet on for fall risk patients  - Ensure room is clear and free of clutter  - Suction is set up for ALL patients (with sendyker)  - Hallways are clear from equipment besides carts. - Isolation precautions followed, supplies available outside room, sign posted   .

## 2021-04-04 NOTE — PROGRESS NOTES
Problem: Falls - Risk of  Goal: *Absence of Falls  Description: Document Onrosalbae Gum Fall Risk and appropriate interventions in the flowsheet. Outcome: Progressing Towards Goal  Note: Fall Risk Interventions:  Mobility Interventions: Assess mobility with egress test, Bed/chair exit alarm, Patient to call before getting OOB, Utilize walker, cane, or other assistive device    Mentation Interventions: Adequate sleep, hydration, pain control, Bed/chair exit alarm, Door open when patient unattended, Familiar objects from home, Increase mobility, Update white board    Medication Interventions: Assess postural VS orthostatic hypotension, Bed/chair exit alarm, Patient to call before getting OOB, Teach patient to arise slowly    Elimination Interventions: Bed/chair exit alarm, Call light in reach, Patient to call for help with toileting needs, Stay With Me (per policy), Toilet paper/wipes in reach, Urinal in reach              Problem: Falls - Risk of  Goal: *Absence of Falls  Description: Document Marcie Gum Fall Risk and appropriate interventions in the flowsheet.   Outcome: Progressing Towards Goal  Note: Fall Risk Interventions:  Mobility Interventions: Assess mobility with egress test, Bed/chair exit alarm, Patient to call before getting OOB, Utilize walker, cane, or other assistive device    Mentation Interventions: Adequate sleep, hydration, pain control, Bed/chair exit alarm, Door open when patient unattended, Familiar objects from home, Increase mobility, Update white board    Medication Interventions: Assess postural VS orthostatic hypotension, Bed/chair exit alarm, Patient to call before getting OOB, Teach patient to arise slowly    Elimination Interventions: Bed/chair exit alarm, Call light in reach, Patient to call for help with toileting needs, Stay With Me (per policy), Toilet paper/wipes in reach, Urinal in reach              Problem: Patient Education: Go to Patient Education Activity  Goal: Patient/Family Education  Outcome: Progressing Towards Goal     Problem: Pain  Goal: *Control of Pain  Outcome: Progressing Towards Goal     Problem: Patient Education: Go to Patient Education Activity  Goal: Patient/Family Education  Outcome: Progressing Towards Goal     Problem: General Medical Care Plan  Goal: *Vital signs within specified parameters  Outcome: Progressing Towards Goal  Goal: *Labs within defined limits  Outcome: Progressing Towards Goal  Goal: *Absence of infection signs and symptoms  Outcome: Progressing Towards Goal  Goal: *Optimal pain control at patient's stated goal  Outcome: Progressing Towards Goal  Goal: *Skin integrity maintained  Outcome: Progressing Towards Goal  Goal: *Fluid volume balance  Outcome: Progressing Towards Goal  Goal: *Optimize nutritional status  Outcome: Progressing Towards Goal  Goal: *Anxiety reduced or absent  Outcome: Progressing Towards Goal     Problem: Patient Education: Go to Patient Education Activity  Goal: Patient/Family Education  Outcome: Progressing Towards Goal

## 2021-04-05 LAB
ANION GAP SERPL CALC-SCNC: 9 MMOL/L (ref 3–18)
BUN SERPL-MCNC: 17 MG/DL (ref 7–18)
BUN/CREAT SERPL: 17 (ref 12–20)
CALCIUM SERPL-MCNC: 9.4 MG/DL (ref 8.5–10.1)
CHLORIDE SERPL-SCNC: 99 MMOL/L (ref 100–111)
CO2 SERPL-SCNC: 23 MMOL/L (ref 21–32)
CREAT SERPL-MCNC: 0.98 MG/DL (ref 0.6–1.3)
GLUCOSE BLD STRIP.AUTO-MCNC: 144 MG/DL (ref 70–110)
GLUCOSE BLD STRIP.AUTO-MCNC: 154 MG/DL (ref 70–110)
GLUCOSE BLD STRIP.AUTO-MCNC: 160 MG/DL (ref 70–110)
GLUCOSE BLD STRIP.AUTO-MCNC: 161 MG/DL (ref 70–110)
GLUCOSE SERPL-MCNC: 123 MG/DL (ref 74–99)
HCT VFR BLD AUTO: 32.7 % (ref 36–48)
HGB BLD-MCNC: 10.5 G/DL (ref 13–16)
POTASSIUM SERPL-SCNC: 5 MMOL/L (ref 3.5–5.5)
SODIUM SERPL-SCNC: 131 MMOL/L (ref 136–145)

## 2021-04-05 PROCEDURE — 82962 GLUCOSE BLOOD TEST: CPT

## 2021-04-05 PROCEDURE — 97110 THERAPEUTIC EXERCISES: CPT

## 2021-04-05 PROCEDURE — 97530 THERAPEUTIC ACTIVITIES: CPT

## 2021-04-05 PROCEDURE — 36415 COLL VENOUS BLD VENIPUNCTURE: CPT

## 2021-04-05 PROCEDURE — 97535 SELF CARE MNGMENT TRAINING: CPT

## 2021-04-05 PROCEDURE — 2709999900 HC NON-CHARGEABLE SUPPLY

## 2021-04-05 PROCEDURE — 74011636637 HC RX REV CODE- 636/637: Performed by: INTERNAL MEDICINE

## 2021-04-05 PROCEDURE — 85018 HEMOGLOBIN: CPT

## 2021-04-05 PROCEDURE — 97542 WHEELCHAIR MNGMENT TRAINING: CPT

## 2021-04-05 PROCEDURE — 77030041076 HC DRSG AG OPTICELL MDII -A

## 2021-04-05 PROCEDURE — 80048 BASIC METABOLIC PNL TOTAL CA: CPT

## 2021-04-05 PROCEDURE — 99232 SBSQ HOSP IP/OBS MODERATE 35: CPT | Performed by: EMERGENCY MEDICINE

## 2021-04-05 PROCEDURE — 74011250637 HC RX REV CODE- 250/637: Performed by: INTERNAL MEDICINE

## 2021-04-05 PROCEDURE — 65310000000 HC RM PRIVATE REHAB

## 2021-04-05 PROCEDURE — 97116 GAIT TRAINING THERAPY: CPT

## 2021-04-05 RX ADMIN — METFORMIN HYDROCHLORIDE 500 MG: 500 TABLET ORAL at 17:09

## 2021-04-05 RX ADMIN — METHOCARBAMOL TABLETS 500 MG: 500 TABLET, COATED ORAL at 21:12

## 2021-04-05 RX ADMIN — METHOCARBAMOL TABLETS 500 MG: 500 TABLET, COATED ORAL at 13:27

## 2021-04-05 RX ADMIN — INSULIN LISPRO 2 UNITS: 100 INJECTION, SOLUTION INTRAVENOUS; SUBCUTANEOUS at 17:10

## 2021-04-05 RX ADMIN — INSULIN LISPRO 2 UNITS: 100 INJECTION, SOLUTION INTRAVENOUS; SUBCUTANEOUS at 12:30

## 2021-04-05 RX ADMIN — ACETAMINOPHEN 650 MG: 325 TABLET ORAL at 12:37

## 2021-04-05 RX ADMIN — Medication 5 MG: at 21:12

## 2021-04-05 RX ADMIN — EZETIMIBE 10 MG: 10 TABLET ORAL at 21:13

## 2021-04-05 RX ADMIN — METHOCARBAMOL TABLETS 500 MG: 500 TABLET, COATED ORAL at 08:10

## 2021-04-05 RX ADMIN — CARVEDILOL 3.12 MG: 6.25 TABLET, FILM COATED ORAL at 17:09

## 2021-04-05 RX ADMIN — DULOXETINE HYDROCHLORIDE 30 MG: 30 CAPSULE, DELAYED RELEASE ORAL at 08:10

## 2021-04-05 RX ADMIN — ACETAMINOPHEN 650 MG: 325 TABLET ORAL at 17:09

## 2021-04-05 RX ADMIN — OXYCODONE HYDROCHLORIDE 10 MG: 5 TABLET ORAL at 07:18

## 2021-04-05 RX ADMIN — Medication 1 CAPSULE: at 08:10

## 2021-04-05 RX ADMIN — CARVEDILOL 3.12 MG: 6.25 TABLET, FILM COATED ORAL at 08:10

## 2021-04-05 RX ADMIN — GABAPENTIN 100 MG: 100 CAPSULE ORAL at 17:09

## 2021-04-05 RX ADMIN — AMOXICILLIN AND CLAVULANATE POTASSIUM 1 TABLET: 875; 125 TABLET, FILM COATED ORAL at 08:10

## 2021-04-05 RX ADMIN — GABAPENTIN 100 MG: 100 CAPSULE ORAL at 08:10

## 2021-04-05 RX ADMIN — ALOGLIPTIN 25 MG: 25 TABLET, FILM COATED ORAL at 08:10

## 2021-04-05 RX ADMIN — OXYCODONE HYDROCHLORIDE 10 MG: 5 TABLET ORAL at 13:27

## 2021-04-05 RX ADMIN — DOCUSATE SODIUM 50MG AND SENNOSIDES 8.6MG 2 TABLET: 8.6; 5 TABLET, FILM COATED ORAL at 17:10

## 2021-04-05 RX ADMIN — ACETAMINOPHEN 650 MG: 325 TABLET ORAL at 08:10

## 2021-04-05 RX ADMIN — OXYCODONE HYDROCHLORIDE 10 MG: 5 TABLET ORAL at 18:30

## 2021-04-05 RX ADMIN — ASPIRIN 81 MG CHEWABLE TABLET 81 MG: 81 TABLET CHEWABLE at 08:10

## 2021-04-05 RX ADMIN — METFORMIN HYDROCHLORIDE 500 MG: 500 TABLET ORAL at 08:10

## 2021-04-05 RX ADMIN — ATORVASTATIN CALCIUM 80 MG: 40 TABLET, FILM COATED ORAL at 08:10

## 2021-04-05 RX ADMIN — RIVAROXABAN 20 MG: 20 TABLET, FILM COATED ORAL at 08:10

## 2021-04-05 NOTE — WOUND CARE
Physical Exam 
Musculoskeletal:  
     Legs: 
 
 
  
Focused assessment Rm 176 POA right stump incision, 2cm x 17cm x 0.2cm. wound bed with 5% slough, 10% eschar and 85% approximated. Moderate sanguinous drainage noted. Slight erythema noted but no increased tenderness or warmth. POA left lower extremity venous stasis ulcer, 2cm x 1.5cm. Scab covering opening, no active drainage noted. Epithelial tissue present surrounding wound. Patient reports wound is approx 3year old and the wound to the medial aspect has resolved, but started around the same time. POA stage 2 pressure injury. 2cm x 1cm x 0.2cm. Slough covered area to gluteal fold. No drainage noted. Periwound hyperpigmented and epithelialized, likely due to friction trauma to buttocks. Topical treatment protocol in place. Clean right stump with wound spray then apply Opticell Ag rope to incision line. Cover with dry dressing and wrap with kerlex. Apply stump sock to secure. Apply stump guard when out of bed. To left lower extremity; leave open to air, apply gripper socks. Silicone dressing to sacrum for stage 2 pressure injury. Change every 3 days and prn soilage. Care discussed with primary nurse, Linette SANZ. Care turned over to nursing staff at this time. Mariela Hugo RN, BSN, [address] [place] [address],[address]

## 2021-04-05 NOTE — PROGRESS NOTES
Problem: Self Care Deficits Care Plan (Adult)  Goal: *Therapy Goal (Edit Goal, Insert Text)  Description: Occupational Therapy Goals   Long Term Goals  Initiated  and to be accomplished within 2 week(s) (Updated  STGs=LTGs)  1. Pt will perform self-feeding with mod I.  2. Pt will perform grooming with mod I.  3. Pt will perform UB bathing with mod I.  4. Pt will perform LB bathing with mod I.  5. Pt will perform tub/shower transfer with mod I.   6. Pt will perform UB dressing with mod I.  7. Pt will perform LB dressing with mod I.  8. Pt will perform toileting task with mod I.  9. Pt will perform toilet transfer with mod I. Short Term Goals   Initiated 3/27/2021 and to be accomplished within 7 day(s)  1. Pt will perform UB bathing with CGA. ( 2021)  2. Pt will perform LB bathing with CGA. ( 2021)  3. Pt will perform tub/shower transfer with CGA. ( 2021)  4. Pt will perform LB dressing with CGA. ( 2021)  5. Pt will perform toileting task with CGA. ( 2021)  6. Pt will perform toilet transfer with CGA. ( 2021)       Outcome: Progressing Towards Goal   OT WEEKLY PROGRESS NOTE  Patient Jaci Moss   Time Spent With Patient  Time In: 1030  Time Out: 1153  Patient Seen For[de-identified] AM;ADLs  Time In: 4959  Time Out: 6648    Medical Diagnosis:  Status post below knee amputation of right lower extremity ([facility] [address] [Z89.511] Status post below knee amputation of right lower extremity ([facility])     Pain at start of tx:0/10 pain or discomfort. Pain at stop of tx:7/10 pain or discomfort. Patient identified with name and :yes  Subjective: Pt stated that he was feeling uneasy about going home soon. Using therapeutic use of self, assured pt he was making good progress with self-care and that he would probably feel much more comfortable in home environment and sleeping in his own bed. Objective: Pt participated in dexterity activity for carryover to self-care tasks.  Pt assembled ~25% of 100 piece puzzle, having difficulty staying focused on activity 2/2 increased feeling of uneasiness with returning home. For increased challenge during activity, 2# weights were attached to wrist. Pt tolerated weights for ~10 minutes before requesting that they be removed. Pt continues with activity without weights. Pt used PowerWeb (red) to perform fist press, flat-hand press and finger pulls (x10 each B hands).       Outcome Measures:      AROM: WFL BUE      COGNITION/PERCEPTION Initial Assessment Weekly Progress Assessment 4/5/2021   Premorbid Reading Status    Literate   Premorbid Writing Status       Arousal/Alertness       Orientation Level Oriented X4 Oriented X4   Visual Fields       Praxis       Body Scheme       COMPREHENSION MODE Initial Assessment Weekly Progress Assessment 4/5/2021   Primary Mode of Comprehension Auditory  Auditory   Hearing Aide None     Corrective Lenses       Score 7  7     EXPRESSION Initial Assessment Weekly Progress Assessment 4/5/2021   Primary Mode of Expression Verbal Verbal   Score 7 7   Comments         SOCIAL INTERACTION/ PRAGMATICS Initial Assessment Weekly Progress Assessment 4/5/2021   Score 7 7   Comments         PROBLEM SOLVING Initial Assessment Weekly Progress Assessment 4/5/2021   Score 7 7   Comments         MEMORY Initial Assessment Weekly Progress Assessment 4/5/2021   Score 7 7   Comments         EATING Initial Assessment Weekly Progress Assessment 4/5/2021   Functional Level 5 Feeding/Eating  Feeding/Eating Assistance: 6 (Modified independent)   Comments Set up of breakfast tray, requires no assist for feeding or opening packages/containers       GROOMING Initial Assessment Weekly Progress Assessment 4/5/2021   Functional Level 5 Grooming  Grooming Assistance : 6 (Modified independent)     Tasks completed by patient Washed face     Comments Patient sitting EOB with basin bath and wash cloth, completes washing face without need for assist. Comments: Pt performed oral hygiene seated w/c level at sink. Pt washed face, hair and beard seated on TTB in shower. BATHING Initial Assessment Weekly Progress Assessment 4/5/2021   Functional Level 4    Upper Body Bathing  Bathing Assistance, Upper: 6 (Modified independent)  Position Performed: Seated in chair  Adaptive Equipment: Tub bench    Lower Body Bathing  Bathing Assistance, Lower : 5 (Stand-by assistance)  Position Performed: Seated in chair  Adaptive Equipment: Tub bench     Body parts patient bathed Buttocks, Lower leg and foot, left     Comments Patient sitting EOB with basin bathing. Requires assistance for washing L LE and when in standing, due to decreased balance, requires assist for washing buttocks, back of thighs. Comments (UB): Pt washed all UB areas seated on TTB in shower. Comments (LB): Pt leaned forward to wash lower leg and foot. Pt leaned to side to wash buttocks. Right LE covered with plastic to allow to remain dry. TUB/SHOWER TRANSFER INDEPENDENCE Initial Assessment Weekly Progress Assessment 4/5/2021   Score 4 Functional Transfers    Tub or Shower Type: Shower  Amount of Assistance Required: 4 (Contact guard assistance)  Adaptive Equipment: Grab bars; Tub transfer bench   Comments Requires CGA due to decreased balance. UPPER BODY DRESSING/UNDRESSING Initial Assessment Weekly Progress Assessment 4/5/2021   Functional Level 5 Upper Body Dressing   Dressing Assistance : 6 (Modified independent)     Items applied/Steps completed Pullover (4 steps)     Comments Handed shirt and patient is able to don without difficulty while sitting EOB. Comments: Pt doffed/donned pullover shirt seated in chair.       LOWER BODY DRESSING/UNDRESSING Initial Assessment Weekly Progress Assessment 4/5/2021   Functional Level 4 Lower Body Dressing   Dressing Assistance : 4 (Contact guard assistance)  Leg Crossed Method Used: No  Position Performed: Seated in chair;Standing  Adaptive Equipment Used: Grab bar     Items applied/Steps completed Elastic waist pants (3 steps), Sock, left (1 step)     Comments Patient is able to don sock, pants however when standing requires min A for pulling pants over hips due to decreased balance. Comments: Pt leaned forward to lore sock. Pt threaded left foot into undergarment and shorts. Pt stood with CGA to pull up over hips/buttocks. TOILETING Initial Assessment Weekly Progress Assessment 4/5/2021   Functional Level 4 Toileting  Toileting Assistance (FIM Score): 4 (Minimal assistance)(CGA)   Comments Requires assist for clothing management when pulling pants over hips, patient able to complete toileting hygiene and pulling pants down. TOILET TRANSFER INDEPENDENCE Initial Assessment Weekly Progress Assessment 4/5/2021   Transfer score 4 Functional Transfers  Toilet Transfer : Stand pivot transfer without device  Amount of Assistance Required: 4 (Contact guard assistance)     Comments Requires CGA due to decreased balance. ASSESSMENT:  Pt making progress with independence in self-care tasks. Pt has achieved 6/6 STGs. Pt continues to fatigue easily during self-care tasks. Progression toward goals:  [x]          Improving appropriately and progressing toward goals  []          Improving slowly and progressing toward goals  []          Not making progress toward goals and plan of care will be adjusted     PLAN:  Patient continues to benefit from skilled intervention to address the above impairments. Continue treatment per established plan of care. Discharge Recommendations:  Home Health  Further Equipment Recommendations for Discharge:  bedside commode and transfer bench     Please refer to the flow sheet for vital signs taken during this treatment.   After treatment:   [x]  Patient left in no apparent distress sitting up in chair  []  Patient left in no apparent distress in bed  [x]  Call bell left within reach  []  Nursing notified  [] Caregiver present  []  Bed alarm activated    COMMUNICATION/EDUCATION:   [] Home safety education was provided and the patient/caregiver indicated understanding. [] Patient/family have participated as able in goal setting and plan of care. [x] Patient/family agree to work toward stated goals and plan of care. [] Patient understands intent and goals of therapy, but is neutral about his/her participation. [] Patient is unable to participate in goal setting and plan of care. Plan of Care: Please see Care Plan for updated STG/LTGs. Family Training:   To be scheduled  Estimated LOS: 4/7/2021    JARON Whitehead  4/5/2021

## 2021-04-05 NOTE — PROGRESS NOTES
Problem: Mobility Impaired (Adult and Pediatric)  Goal: *Therapy Goal (Edit Goal, Insert Text)  Description: Physical Therapy   Physical Therapy Short term goals = Long Term Goals  Initiated 3/27/2021, updated 21, and to be accomplished within 14 day(s) on 2021:  1. Patient will transfer from bed to chair and chair to bed with modified independence using the least restrictive device. 2.  Patient will perform sit to stand with modified independence. 3.  Patient will ambulate with modified independence for 50 feet with the least restrictive device. 4.  Patient will perform w/c mobility for at least 250 ft distances, reporting <3/10 modified Sylvia RPE, at modified independent level over even surfaces and around doorways, obstacles, narrow spaces. 5.  Patient will ascend/descend w/c ramp with supervision for ease of entry/exit of home. 2021 1354 by Martha Norton  Outcome: Progressing Towards Goal  2021 0906 by Martha Norton  Outcome: Progressing Towards Goal   PHYSICAL THERAPY TREATMENT    Patient: Marlene Kelly (68 y.o. male)  Date: 2021  Diagnosis: Status post below knee amputation of right lower extremity ([facility]) [Z89.511] Status post below knee amputation of right lower extremity ([facility] [address]  Precautions: Fall  Chart, physical therapy assessment, plan of care and goals were reviewed. Time In:0910  Time Out:1010    Patient seen for: AM;Gait training;Patient education; Therapeutic exercise;Transfer training; Wheelchair mobility    Pain:  Pt pain was reported as 7/10 pre-treatment. Pt pain was reported as 7/10 post-treatment. Intervention: moist heat applied to post knee for relief of increased pain; rest breaks    Patient identified with name and :yes    SUBJECTIVE:      Patient reports he is experiencing pain in the lower part of the right limb, below the knee. Reports increase in pain after stretches, elevated to 8-9/10 in the \"in the back kink of my knee\".     OBJECTIVE DATA SUMMARY:    Objective:     GROSS ASSESSMENT Daily Assessment     AROM: Generally decreased, functional  PROM: Generally decreased, functional  Strength: Generally decreased, functional  Coordination: Within functional limits  Tone: Normal  Sensation: Impaired      BED/MAT MOBILITY Daily Assessment     Rolling Right : 7 (Independent)  Rolling Left : 7 (Independent)  Supine to Sit : 6 (Modified independent)  Sit to Supine : 6 (Modified independent)      TRANSFERS Daily Assessment     Transfer Type: Other  Other: lateral squat pivot; stand step w/RW  Transfer Assistance : 5 (Stand-by assistance)  Sit to Stand Assistance: Contact guard assistance      BALANCE Daily Assessment     Sitting - Static: Good (unsupported)  Sitting - Dynamic: Good (unsupported)  Standing - Dynamic : Impaired        WHEELCHAIR MOBILITY Daily Assessment     Able to Propel (ft): (263)  Functional Level: (5)  Wheelchair Setup Assist Required : 5 (Supervision/setup)  Wheelchair Management: Manages left brake;Manages right brake;Manages right footrest      Therapeutic Exercise:   Seated: sit to stand 2x10  Supine: SLR, QS, bridges, hip abd (bilateral 2x15 each)  Sidelying: hip abd, ext (bilateral 2x15 each)  Prone: hip ext, knee flex (bilateral 2x15 each); prone lying for hip flex stretch 5 min    ASSESSMENT:  Patient demonstrates ability to transfer from w/c to bed with stand by assistance utilizing stand hop with CGA to SBA, no hesitation or LOB. Smooth transition with STS transfer and bed mobility mod I. Activity tolerance with STS decreased d/t increase in pain of posterior knee after prone lying. Progression toward goals:  [x]      Improving appropriately and progressing toward goals  []      Improving slowly and progressing toward goals  []      Not making progress toward goals and plan of care will be adjusted      PLAN:  Patient continues to benefit from skilled intervention to address the above impairments.   Continue treatment per established plan of care. Discharge Recommendations:  Home Health and Outpatient  Further Equipment Recommendations for Discharge:  gait belt, rolling walker, and wheelchair 18 inch      Estimated Discharge Date:04/07/2021    Activity Tolerance:   Initially tolerated well, however with increase in pain, tolerance began to decrease  Please refer to the flowsheet for vital signs taken during this treatment.     After treatment:   Patient left seated in w/c with call bell within reach      JULIANO Chen

## 2021-04-05 NOTE — INTERDISCIPLINARY ROUNDS
[address] 
[address] [place] [address], [address] INPATIENT REHABILITATION 
PRE-TEAM CONFERENCE SUMMARY Date of Conference: 4/6/2021 Patient Information:  
 
  
Name: Benigno Hilario Age / Sex: 60 y.o. / male CSN: [id number] MRN: [id number] Admit Date: 3/26/2021 Length of Stay: 10 days Primary Rehab Diagnosis: Impaired Mobility and ADLs secondary to: 
1. S/P Right below-the-knee amputation (3/22/2021 - Dr. Ruthe Phalen) 2. History of critical ischemia of the right lower extremity 3. History of right lower extremity arterial embolectomy  
  
Therapy: FIM SCORES Initial Assessment Weekly Progress Assessment 4/5/2021 Eating Functional Level: 5 Comments: Set up of breakfast tray, requires no assist for feeding or opening packages/containers Feeding/Eating Assistance: 6 (Modified independent) Swallowing Grooming 5 Grooming Assistance : 6 (Modified independent) Comments: Pt performed oral hygiene seated w/c level at sink. Pt washed face, hair and beard seated on TTB in shower. Bathing 4 Bathing Assistance, Upper: 6 (Modified independent) Position Performed: Seated in chair Adaptive Equipment: Tub bench Comments: Pt washed all UB areas seated on TTB ion shower. Bathing Assistance, Lower : 5 (Stand-by assistance) Position Performed: Seated in chair Adaptive Equipment: Tub bench Comments: Pt leaned forward to wash lower leg and foot. Pt leaned to side to wash buttocks. Upper Body Dressing Functional Level: 5 Items Applied/Steps Completed: Pullover (4 steps) Comments: Handed shirt and patient is able to don without difficulty while sitting EOB. Dressing Assistance : 6 (Modified independent) Comments: Pt doffed/donned pullover shirt seated in chair. Lower Body Dressing Functional Level: 4 Items Applied/Steps Completed: Elastic waist pants (3 steps), Sock, left (1 step) Comments: Patient is able to don sock, pants however when standing requires min A for pulling pants over hips due to decreased balance. Dressing Assistance : 4 (Contact guard assistance) Leg Crossed Method Used: No 
Position Performed: Seated in chair;Standing Adaptive Equipment Used: Grab bar Comments: Pt leaned forward to lore sock. Pt threaded left foot into undergarment and shorts. Pt stood with CGA to pull up over hips/buttocks. Toileting Functional Level: 4 Comments: Requires assist for clothing management when pulling pants over hips, patient able to complete toileting hygiene and pulling pants down. Toileting Assistance (FIM Score): 4 (Minimal assistance)(CGA) Bladder 0 0 Bowel  0 0 Toilet Transfer [place] Toilet Transfer Score: 4 Comments: Requires CGA due to decreased balance. 4 (Contact guard assistance) Tub/Shower Transfer [place] Tub or Shower Type: Tub/Shower combination Tub/Shower Transfer Score: 4 Comments: Requires CGA due to decreased balance. Shower 4 (Contact guard assistance) Comprehension Primary Mode of Comprehension: Auditory Score: 7  7 Expression Primary Mode of Expression: Verbal 
Score: 7   
 7 Social Interaction Score: 7  7 Problem Solving Score: 7  7 Memory Score: 7  7 FIM SCORES Initial Assessment Weekly Progress Assessment 4/5/2021 Bed/Chair/Wheelchair Transfers Transfer Type: Other Other: step step with RW Transfer Assistance : 4 (Minimal assistance) Sit to Stand Assistance: Minimal assistance Car Transfers: Not tested(to be further assessed) Car Type: N/A Transfer Type: Other Other: lateral squat pivot; stand step w/RW Transfer Assistance : 5 (Stand-by assistance) Bed Mobility Rolling Right 6 (Modified independent) Rolling Left 6 (Modified independent) Supine to Sit 5 (Supervision) Sit to Stand Minimal assistance Sit to Supine 5 (Supervision) Rolling Right 
 7 (Independent) Rolling Left 
 7 (Independent) Supine to Sit 
 6 (Modified independent) Sit to Stand CGA Sit to Supine 6 (Modified independent) Locomotion (W/C) Able to Propel (ft): 250 feet(needing extra time) Functional Level: 4(min A for steering initially, using bilat UE and left LE) Curbs/Ramps Assist Required (FIM Score): 0 (Not tested) Wheelchair Setup Assist Required : 3 (Moderate assistance) Wheelchair Management: Manages left brake, Manages right brake(needing brake extender) Function (5) Setup Assistance 
5 (Supervision/setup) Locomotion (W/C distance)   (263) Locomotion (Walk) 4 (Minimal assistance) NT today Locomotion (Walk dist.) 20 Feet (ft)  NT today Steps/Stairs Steps/Stairs Ambulated (#): 0 Level of Assist : 0 (Not tested)(NT due to safety concern) Rail Use: (NT)  NT  
 
 
 
Nursing:  
 
Neuro:   AAA&O x  4 Respiratory:   [x] WNL   [] O2 LPM:  
Other: 
Peripheral Vascular:   [] TEDS present   [] Edema present ____ Grade Cardiac:   [x] WNL   [] Other Genitourinary:   [x] continent   [] incontinent   [] nguyen  Abdominal _______ LBM 
GI: _Cardiac______ Diet __thin____ Liquids _____ tube feeds Musculoskeletal: _4x___ ROM Transfers __wheelchair___ Assistive Device Used __1__ Level of Assistance Skin Integumentary:   [] Intact   [x] Not Intact   ___silicone dressing_______Preventative Measures Details______________________________________________________________ Pain: [x] Controlled   [] Not Controlled   Pain Meds:   [x] Scheduled   [x] PRN Registered Dietitian / Nutrition:  
No data found. Pt reported good appetite/ meal intake usually, eating ~75% of most meals. Today fair intake, eating ~50% of meals. About to eat dinner. Tolerating diet. Dislikes Magic Cup supplements. Agreeable to trying Glucerna Shakes. Per RN, wound care nurse recommending pt receiving nutrition supplement due to pt s/p right BKA; verified that pt is receiving nutrition supplements already.   
 
 
Supplements:          [x] Yes: Magic Cup ERICK once daily    [] No     
Amount of supplement consumed:  0%, dislikes Intake/Output Summary (Last 24 hours) at 4/5/2021 1248 Last data filed at 4/5/2021 9104 Gross per 24 hour Intake  Output 300 ml Net -300 ml Last bowel movement: 4/4 Interdisciplinary Team Goals: 1. Discipline  Physical Therapy Goal  Patient will perform transfers at modified independent level using RW Barrier  increased pain in residual limb in standing, decreased strength, endurance, balance Intervention  TherEx, pain management Goal written by:   JULIANO Espinosa, PT, DPT  
 
2. Discipline  Occupational Therapy Goal  Pt to be Mod I in functional transfers. Barrier  Decreased activity tolerance; decreased standing balance Intervention  TherEx; transfer training Goal written by:  SEBASTIEN Nelson/DOMINIC [employer] MS OTR/L   
 
3. Discipline  Speech Therapy Goal    
 Barrier Intervention Goal written by: 4. Discipline  Nursing Goal  Pt will achieve timely wound healing and be free of infection. Barrier Diabetes; environmental exposures Intervention Monitor VS; assess wound for s/s of infection;maintain appropriate wound dressing as ordered. Goal written by: Trey Navarrete RN  
 
5. Discipline  Clinical Psychology Goal  Maintain mood stability and maximum treatment effort Barrier  Situational stress and frustration with circumstances Intervention  Support  and patient education Goal written by:  Eleonora Valero, PhD  
 
6. Discipline  Nutrition / Dietetics Goal  - PO nutrition intake will meet >75% of patient estimated nutritional needs within the next 7 days. Barrier  appetite Intervention  continue po diet. Modify supplement: change to Glucerna Shake BID. Encourage/ monitor po intake of meals and supplements Goal written by:  Kandis Escalante RD Disposition / Discharge Planning:   
 
Follow-up services:  [x] Physical Therapy [x] Occupational Therapy     
 [] Speech Therapy         
 [] Skilled Nursing    
 [] Medical Social Worker 
 [] Aide        [] Outpatient      [] vs 
 [x] Home Health  [] vs 
     [] to progress to outpatient [x] with 24-hour supervision 
     [x] with 24-hour assistance 
 [] [place] DME recommendations:  bedside commode w/ bedside commode liners, RW, w/c Estimated discharge date:    
Discharge Location:  [] Home  [] versus  
 [] [facility] [] [address] [] Other:   
  
 
 
Electronic Signatures:  
 
 Signature Date Signed Physical Therapist 
  Jaycob Thakkar LPTA Bonnie Parish, PT, DPT 4/5/2021 4/5/2021 Occupational Therapist 
  Janis García, CROWE/L 
Richelle Section MS OTR/L  4/5/2021 4/5/2021 Speech Therapist 
      
Recreational Therapist 
  Lamonte Hobbs, [address] 4/5/2021 Nursing Trey Navarrete RN 4/5/2021 Dietitian Kandis Escalante, [place]  4/5/2021 Clinical Psychologist 
  Eleonora Valero, PhD  4/5/2021 Physician Maurice Hanson MD  4/5/2021  Opportunity to share with family/caregiver[] YES [] NO 
 
Relationship to patient____________________________________________________ The above information has been reviewed with the patient in a language that they can understand. Opportunity for comments and questions has been provided and a signed attestation has been scanned into the \"media tab\" of the EMR. Patient Signature: ______________________________________________________ Date Signed: __________________________________________________________

## 2021-04-05 NOTE — PROGRESS NOTES
[facility]    Bedside and Verbal shift change report given to Mariposa Pitts RN (oncoming nurse) by Griffin Winters RN  (offgoing nurse). Report included the following information SBAR, Kardex, MAR and Recent Results.     Situation:   Code Status: Full Code   Reason for Admission: Asif Radford Day: 10   Problem List:   Hospital Problems  Date Reviewed: 3/31/2021          Codes Class Noted POA    Constipation ICD-10-CM: K59.00  ICD-9-CM: 564.00  3/29/2021 Yes        Peripheral artery disease (HCC) (Chronic) ICD-10-CM: I73.9  ICD-9-CM: 443.9  Unknown Yes        Type 2 diabetes mellitus, without long-term current use of insulin (HCC) (Chronic) ICD-10-CM: E11.9  ICD-9-CM: 250.00  Unknown Yes    Overview Signed 3/26/2021  5:49 PM by Royal Samuels MD     HbA1c (3/6/2021) = 12.9             [facility] (Chronic) ICD-10-CM: B06.61  ICD-9-CM: 289.81  Unknown Yes        Hypertensive heart disease with chronic systolic congestive heart failure (HCC) (Chronic) ICD-10-CM: I11.0, I50.22  ICD-9-CM: 402.91, 428.22  Unknown Yes    Overview Signed 3/26/2021 11:06 PM by Royal Samuels MD     2D echocardiogram (12/17/2015) showed EF 35%; genealized hypokinesis more focally severe of the inferior and posterior segments; mild mitral regurgitation; trace tricuspud regurgitation; normal pulmonary artery pressure             Long term current use of aspirin (Chronic) ICD-10-CM: J33.43  ICD-9-CM: V58.66  Unknown Yes        On statin therapy due to risk of future cardiovascular event (Chronic) ICD-10-CM: D94.260  ICD-9-CM: V58.69  Unknown Yes    Overview Signed 3/26/2021  4:34 PM by Royal Samuels MD     On Atorvastatin             History of deep venous thrombosis (DVT) of distal vein of right lower extremity ICD-10-CM: Z86.718  ICD-9-CM: V12.51  Unknown Yes        Anticoagulated by anticoagulation treatment (Chronic) ICD-10-CM: Z79.01  ICD-9-CM: V58.61  Unknown Yes    Overview Signed 3/26/2021  4:36 PM by Martha Hook MD     On Rivaroxaban             Mixed hyperlipidemia (Chronic) ICD-10-CM: E78.2  ICD-9-CM: 272.2  Unknown Yes    Overview Signed 3/26/2021 11:07 PM by Martha Hook MD     Lipid profile (7/25/2018) showed , , HDL 27,              History of embolectomy ICD-10-CM: Z98.890  ICD-9-CM: V45.89  Unknown Yes    Overview Signed 3/26/2021  5:39 PM by Martha Hook MD     S/P Right lower extremity arterial embolectomy             Major depressive disorder ICD-10-CM: F32.9  ICD-9-CM: 296.20  3/24/2021 Yes    Overview Signed 3/26/2021 11:31 PM by Martha Hook MD     On Duloxetine             Grade I diastolic dysfunction RNT-37-YP: I51.9  ICD-9-CM: 429.9  3/24/2021 Yes    Overview Signed 3/26/2021 11:48 PM by Martha Hook MD     2D echocardiogram (3/24/2021) showed EF 37%; global hypokinesis of left ventricle; grade I diastolic dysfunction             * (Principal) Status post below knee amputation of right lower extremity ([address] ICD-10-CM: Z89.511  ICD-9-CM: V49.75  3/22/2021 Yes    Overview Signed 3/26/2021  5:43 PM by Martha Hook MD     S/P Right below-the-knee amputation (3/22/2021 - Dr. Neno Hernandez)             Impaired mobility and ADLs ICD-10-CM: Z74.09, Z78.9  ICD-9-CM: V49.89  3/22/2021 Yes        Critical ischemia of lower extremity ([address] ICD-10-CM: L16.132  ICD-9-CM: 459.9  3/22/2021 Yes    Overview Signed 3/26/2021  5:42 PM by Martha Hook MD     Right             COVID-19 ruled out by laboratory testing ICD-10-CM: Z20.822  ICD-9-CM: V01.79  3/22/2021 Yes    Overview Signed 3/26/2021  5:47 PM by Martha Hook MD     SARS-CoV-2 ([address] [place] [address]) (3/22/2021):  Not detected               Acute blood loss as cause of postoperative anemia ICD-10-CM: D62  ICD-9-CM: 285.1  3/22/2021 Yes        History of noncompliance with medical treatment ICD-10-CM: Z91.19  ICD-9-CM: V15.81  3/18/2021 Yes              Background:   Past Medical History:   Past Medical History:   Diagnosis Date    Acute blood loss as cause of postoperative anemia 3/22/2021    Anticoagulated by anticoagulation treatment     On Rivaroxaban    Cardiomyopathy ([facility] 12/17/2015    2D echocardiogram (3/24/2021) showed EF 37%; global hypokinesis of left ventricle; grade I diastolic dysfunction; 2D echocardiogram (12/17/2015) showed EF 35%; genealized hypokinesis more focally severe of the inferior and posterior segments; mild mitral regurgitation; trace tricuspud regurgitation; normal pulmonary artery pressure    Chronic systolic heart failure (HCC)     2D echocardiogram (3/24/2021) showed EF 37%; global hypokinesis of left ventricle; grade I diastolic dysfunction; 2D echocardiogram (12/17/2015) showed EF 35%; genealized hypokinesis more focally severe of the inferior and posterior segments; mild mitral regurgitation; trace tricuspud regurgitation; normal pulmonary artery pressure    Coronary artery disease involving native coronary artery of native heart     COVID-19 ruled out by laboratory testing 3/22/2021    SARS-CoV-2 ([employer] [facility]2000, [facility]) (3/22/2021):  Not detected     Critical ischemia of lower extremity 3/22/2021    Right    [facility]     Grade I diastolic dysfunction 15/44/9156    2D echocardiogram (3/24/2021) showed EF 37%; global hypokinesis of left ventricle; grade I diastolic dysfunction    History of deep venous thrombosis (DVT) of distal vein of right lower extremity     History of epilepsy     History of head injury     History of myocardial infarction     History of noncompliance with medical treatment 3/18/2021    Hypertensive heart disease with chronic systolic congestive heart failure ([facility]     2D echocardiogram (3/24/2021) showed EF 37%; global hypokinesis of left ventricle; grade I diastolic dysfunction; 2D echocardiogram (12/17/2015) showed EF 35%; genealized hypokinesis more focally severe of the inferior and posterior segments; mild mitral regurgitation; trace tricuspud regurgitation; normal pulmonary artery pressure    Long term current use of aspirin     Major depressive disorder 03/24/2021    On Duloxetine    Migraine headache     Mitral valve prolapse     Mixed hyperlipidemia     Lipid profile (7/25/2018) showed , , HDL 27,     Obstructive sleep apnea     On statin therapy due to risk of future cardiovascular event     On Atorvastatin    Peripheral artery disease ([facility]     Type 2 diabetes mellitus, without long-term current use of insulin ([facility])     HbA1c (3/6/2021) = 00.6    Umbilical hernia     Wears glasses       Patient taking anticoagulants yes    Patient has a defibrillator: no no  Assessment:  Changes in Assessment throughout shift: none   Patient has central line: no Reasons if yes: date:na Last dressing date:na  Patient has Ellis Cath: no Reasons na ast Vitals:     Vitals:    04/03/21 2118 04/04/21 0745 04/04/21 1539 04/04/21 2100   BP: 124/80 123/75 119/77 121/78   Pulse: 91 97 92 89   Resp: 18 18 16 18   Temp: 97 °F (36.1 °C) 97 °F (36.1 °C) 97.3 °F (36.3 °C) 97.4 °F (36.3 °C)   SpO2: 98% 98% 96% 99%   Weight:       Height:            PAIN    Pain Assessment    Pain Intensity 1: 0 (04/05/21 0400) Pain Intensity 1: 2 (12/29/14 1105)    Pain Location 1: Leg Pain Location 1: Abdomen    Pain Intervention(s) 1: Medication (see MAR) Pain Intervention(s) 1: Medication (see MAR)  Patient Stated Pain Goal: 0 Patient Stated Pain Goal: 0  o Intervention effective:  yes   o Other actions taken for pain: pain med     Skin Assessment  Skin color Skin Color: Appropriate for ethnicity  Condition/Temperature Skin Condition/Temp: Dry, Warm  Integrity Skin Integrity: Incision (comment)  Turgor Turgor: Non-tenting  Weekly Pressure Ulcer Documentation  Pressure  Injury Documentation: No Pressure Injury Noted-Pressure Ulcer Prevention Initiated  Wound Prevention & Protection Methods  Orientation of wound Orientation of Wound Prevention: Posterior  Location of Prevention Location of Wound Prevention: Sacrum/Coccyx  Dressing Present Dressing Present : Yes  Dressing Status Dressing Status: Intact  Wound Offloading Wound Offloading (Prevention Methods): Bed, pressure redistribution/air     INTAKE/OUPUT  Date 04/04/21 0700 - 04/05/21 0659 04/05/21 0700 - 04/06/21 0659   Shift 1837-9602 7107-5809 24 Hour Total 0700-1859 1900-0659 24 Hour Total   INTAKE   Shift Total(mL/kg)         OUTPUT   Urine(mL/kg/hr)           Urine Occurrence(s) 1 x 2 x 3 x      Stool           Stool Occurrence(s) 1 x 0 x 1 x      Shift Total(mL/kg)         NET         Weight (kg) 84.8 84.8 84.8 84.8 84.8 84.8       Recommendations:  1. Patient needs and requests: none voice    2. Diet: cardiac     3. Pending tests/procedures: labs     4. Functional Level/Equipment: up with assist/WC    5. Estimated Discharge Date: none yet Posted on Whiteboard in Patients Room: [facility] Safety Check    A safety check occurred in the patient's room between off going nurse and oncoming nurse listed above. The safety check included the below items  Area Items   H  High Alert Medications - Verify all high alert medication drips (heparin, PCA, etc.)   E  Equipment - Suction is set up for ALL patients (with faraz)  - Red plugs utilized for all equipment (IV pumps, etc.)  - WOWs wiped down at end of shift.  - Room stocked with oxygen, suction, and other unit-specific supplies   A  Alarms - Bed alarm is set for fall risk patients  - Ensure chair alarm is in place and activated if patient is up in a chair   L  Lines - Check IV for any infiltration  - Ellis bag is empty if patient has a Ellis   - Tubing and IV bags are labeled   S  Safety   - Room is clean, patient is clean, and equipment is clean. - Hallways are clear from equipment besides carts.    - Fall bracelet on for fall risk patients  - Ensure room is clear and free of clutter  - Suction is set up for ALL patients (with faraz)  - Hallways are clear from equipment besides carts. - Isolation precautions followed, supplies available outside room, sign posted   .

## 2021-04-05 NOTE — PROGRESS NOTES
[address]  [address] [place] [address], [address]     INPATIENT REHABILITATION  DAILY PROGRESS NOTE     Date: 4/5/2021    Name: [facility] Age / Sex: 60 y.o. / male   CSN: [id number] MRN: [id number]   [facility] Date: 3/26/2021 Length of Stay: 10 days     Primary Rehab Diagnosis: Impaired Mobility and ADLs secondary to:  1. S/P Right below-the-knee amputation (3/22/2021 - Dr. Neno Hernandez)  2. History of critical ischemia of the right lower extremity  3. History of right lower extremity arterial embolectomy       Subjective:     Patient is sitting in bed in no apparent distress, awake and alert.   Denies any distress    Objective:     Vital Signs:  Patient Vitals for the past 24 hrs:   BP Temp Pulse Resp SpO2   04/05/21 1521 116/75 96.9 °F (36.1 °C) 95 16 98 %   04/05/21 0712 127/84 99.5 °F (37.5 °C) (!) 103 16 97 %   04/04/21 2100 121/78 97.4 °F (36.3 °C) 89 18 99 %        Physical Examination:  General:  Awake, alert  Cardiovascular:  S1S2+, RRR  Pulmonary:  CTA b/l  GI:  Soft, BS+, NT, ND  Extremities: Right BKA noted, pulses are 1+ left lower extremity          Current Medications:  Current Facility-Administered Medications   Medication Dose Route Frequency    polyethylene glycol (MIRALAX) packet 17 g  17 g Oral DAILY    ondansetron hcl (ZOFRAN) tablet 4 mg  4 mg Oral TID PRN    senna-docusate (PERICOLACE) 8.6-50 mg per tablet 2 Tab  2 Tab Oral PCD    carvediloL (COREG) tablet 3.125 mg  3.125 mg Oral BID WITH MEALS    bisacodyL (DULCOLAX) tablet 10 mg  10 mg Oral Q48H PRN    insulin lispro (HUMALOG) injection   SubCUTAneous TIDAC    L. acidophilus,casei,rhamnosus (BIO-K PLUS) capsule 1 Cap  1 Cap Oral DAILY    atorvastatin (LIPITOR) tablet 80 mg  80 mg Oral DAILY    aspirin chewable tablet 81 mg  81 mg Oral DAILY WITH BREAKFAST    DULoxetine (CYMBALTA) capsule 30 mg  30 mg Oral DAILY    ezetimibe (ZETIA) tablet 10 mg  10 mg Oral QHS    [Held by provider] furosemide (LASIX) tablet 40 mg  40 mg Oral DAILY    gabapentin (NEURONTIN) capsule 100 mg  100 mg Oral BID    [Held by provider] lisinopriL (PRINIVIL, ZESTRIL) tablet 5 mg  5 mg Oral DAILY    oxyCODONE IR (ROXICODONE) tablet 5-10 mg  5-10 mg Oral Q4H PRN    acetaminophen (TYLENOL) tablet 650 mg  650 mg Oral TID    methocarbamoL (ROBAXIN) tablet 500 mg  500 mg Oral TID    rivaroxaban (XARELTO) tablet 20 mg  20 mg Oral DAILY WITH BREAKFAST    alogliptin (NESINA) tablet 25 mg  25 mg Oral DAILY    melatonin (rapid dissolve) tablet 5 mg  5 mg Oral QHS    zolpidem (AMBIEN) tablet 5 mg  5 mg Oral QHS PRN    glucose chewable tablet 16 g  4 Tab Oral PRN    glucagon (GLUCAGEN) injection 1 mg  1 mg IntraMUSCular PRN    dextrose (D50W) injection syrg 12.5-25 g  25-50 mL IntraVENous PRN    acetaminophen (TYLENOL) tablet 650 mg  650 mg Oral Q4H PRN    metFORMIN (GLUCOPHAGE) tablet 500 mg  500 mg Oral BID WITH MEALS       Allergies:   Allergies   Allergen Reactions    Nitroglycerin Other (comments)      BP drops rapidly when he takes SL Nitro          [place] Diarrhea and Rash    Cat Dander Cough    Codeine Rash    Strawberry Rash       Functional Progress:    PHYSICAL THERAPY    ON ADMISSION MOST RECENT   Wheelchair Mobility/Management  Able to Propel (ft): 250 feet(needing extra time)  Functional Level: 4(min A for steering initially, using bilat UE and left LE)  Curbs/Ramps Assist Required (FIM Score): 0 (Not tested)  Wheelchair Setup Assist Required : 3 (Moderate assistance)  Wheelchair Management: Manages left brake, Manages right brake(needing brake extender) Wheelchair Mobility/Management  Able to Propel (ft): (263)  Functional Level: (5)  Curbs/Ramps Assist Required (FIM Score): 0 (Not tested)(too cold outside)  Wheelchair Setup Assist Required : 5 (Supervision/setup)  Wheelchair Management: Manages left brake, Manages right brake, Manages right footrest     Gait  Amount of Assistance: 4 (Minimal assistance)  Distance (ft): 20 Feet (ft)  Assistive Device: Walker, rolling(rigid dressing right residual limb) Gait  Amount of Assistance: 5 (Stand-by assistance)  Distance (ft): 50 Feet (ft)(x2 trials; but rated dyspnea as 6/10 on Modified Sylvia scale)  Assistive Device: Gait belt, Walker, rolling     Balance-Sitting/Standing  Sitting - Static: Good (unsupported)  Sitting - Dynamic: Fair (occasional)  Standing - Static: Fair(needing UE support steadying surface)  Standing - Dynamic : Impaired Balance-Sitting/Standing  Sitting - Static: Good (unsupported)  Sitting - Dynamic: Good (unsupported)  Standing - Static: Fair(w/RW support)  Standing - Dynamic : Impaired     Bed/Mat Mobility  Rolling Right : 6 (Modified independent)  Rolling Left : 6 (Modified independent)  Supine to Sit : 5 (Supervision)  Sit to Supine : 5 (Supervision) Bed/Mat Mobility  Rolling Right : 7 (Independent)  Rolling Left : 7 (Independent)  Supine to Sit : 6 (Modified independent)  Sit to Supine : 6 (Modified independent)     Transfers  Transfer Type: Other  Other: step step with RW  Transfer Assistance : 4 (Minimal assistance)  Sit to Stand Assistance: Minimal assistance  Car Transfers: Not tested(to be further assessed)  Car Type: N/A Transfers  Transfer Type:  Other  Other: lateral squat pivot; stand step w/RW  Transfer Assistance : 5 (Stand-by assistance)  Sit to Stand Assistance: Contact guard assistance  Car Transfers: (CGA)  Car Type: (car simulator)     Steps or Stairs  Steps/Stairs Ambulated (#): 0  Level of Assist : 0 (Not tested)(NT due to safety concern)  Rail Use: (NT) Steps or Stairs  Steps/Stairs Ambulated (#): (4\" curb platform, with RW support; x 2 trials)  Level of Assist : 4 (Minimal assistance)(1 episode of LOB posterior w/min A to correct)  Rail Use: (RW)         Lab/Data Review:  Recent Results (from the past 24 hour(s))   GLUCOSE, POC    Collection Time: 04/04/21  9:39 PM   Result Value Ref Range    Glucose (POC) 147 (H) 70 - 036 mg/dL   METABOLIC PANEL, BASIC    Collection Time: 04/05/21  4:27 AM   Result Value Ref Range    Sodium 131 (L) 136 - 145 mmol/L    Potassium 5.0 3.5 - 5.5 mmol/L    Chloride 99 (L) 100 - 111 mmol/L    CO2 23 21 - 32 mmol/L    Anion gap 9 3.0 - 18 mmol/L    Glucose 123 (H) 74 - 99 mg/dL    BUN 17 7.0 - 18 MG/DL    Creatinine 0.98 0.6 - 1.3 MG/DL    BUN/Creatinine ratio 17 12 - 20      GFR est AA >60 >60 ml/min/1.73m2    GFR est non-AA >60 >60 ml/min/1.73m2    Calcium 9.4 8.5 - 10.1 MG/DL   HGB & HCT    Collection Time: 04/05/21  4:27 AM   Result Value Ref Range    HGB 10.5 (L) 13.0 - 16.0 g/dL    HCT 32.7 (L) 36.0 - 48.0 %   GLUCOSE, POC    Collection Time: 04/05/21  7:38 AM   Result Value Ref Range    Glucose (POC) 144 (H) 70 - 110 mg/dL   GLUCOSE, POC    Collection Time: 04/05/21 11:31 AM   Result Value Ref Range    Glucose (POC) 160 (H) 70 - 110 mg/dL   GLUCOSE, POC    Collection Time: 04/05/21  4:28 PM   Result Value Ref Range    Glucose (POC) 161 (H) 70 - 110 mg/dL       Assessment:     Primary Rehab Diagnosis  1. Impaired Mobility and ADLs  2. S/P Right below-the-knee amputation (3/22/2021 - Dr. Shanon Jackson)  3. History of critical ischemia of the right lower extremity  4.  History of right lower extremity arterial embolectomy     Comorbidities  Patient Active Problem List   Diagnosis Code    Status post below knee amputation of right lower extremity (HCC) Z89.511    Impaired mobility and ADLs Z74.09, Z78.9    Critical ischemia of lower extremity ([facility]) I70.229    Peripheral artery disease ([facility]) I73.9    Coronary artery disease involving native coronary artery of native heart I25.10    Type 2 diabetes mellitus, without long-term current use of insulin ([facility]) E11.9    History of epilepsy Z86.69    Factor V Leiden mutation ([facility] D68.51    Migraine headache G43.909    Wears glasses Q69.6    Umbilical hernia J41.8    Obstructive sleep apnea G47.33    ICD (implantable cardioverter-defibrillator) in place Z95.810    Mitral valve prolapse I34.1    History of head injury Z87.828    Hypertensive heart disease with chronic systolic congestive heart failure (HCC) I11.0, I50.22    History of myocardial infarction I25.2    Long term current use of aspirin Z79.82    On statin therapy due to risk of future cardiovascular event Z79.899    History of deep venous thrombosis (DVT) of distal vein of right lower extremity Z86.718    Anticoagulated by anticoagulation treatment Z79.01    Mixed hyperlipidemia E78.2    History of embolectomy Z98.890    COVID-19 ruled out by laboratory testing Z20.822    Acute blood loss as cause of postoperative anemia D62    Cardiomyopathy ([facility] I42.9    Chronic systolic heart failure (HCC) I50.22    History of noncompliance with medical treatment Z91.19    Major depressive disorder F32.9    Grade I diastolic dysfunction X15.5    Constipation K59.00        Plan:     1. Justification for continued stay: Good progression towards established rehabilitation goals. 2. Medical Issues being followed closely:    [x]  Fall and safety precautions     [x]  Wound Care     [x]  Bowel and Bladder Function     [x]  Fluid Electrolyte and Nutrition Balance     [x]  Pain Control      3. Issues that 24 hour rehabilitation nursing is following:    [x]  Fall and safety precautions     [x]  Wound Care     [x]  Bowel and Bladder Function     [x]  Fluid Electrolyte and Nutrition Balance     [x]  Pain Control      [x]  Assistance with and education on in-room safety with transfers to and from the bed, wheelchair, toilet and shower. 4. Acute rehabilitation plan of care:    [x]  Continue current care and rehab. [x]  Physical Therapy           [x]  Occupational Therapy           []  Speech Therapy     []  Hold Rehab until further notice     5. Medications:    [x]  MAR Reviewed     [x]  Continue Present Medications     6.  DVT Prophylaxis:      []  Enoxaparin     []  Unfractionated Heparin     []  Warfarin     [x]  NOAC     []  NEREYDA Stockings     []  Sequential Compression Device     []  None     7. Code status    [x]  Full code     []  Partial code     []  Do not intubate     []  Do not resuscitate     8. Orders:   > S/P Right below-the-knee amputation (3/22/2021 - Dr. Neno Hernandez); History of critical ischemia of the right lower extremity; History of right lower extremity arterial embolectomy   > Staples to be removed on 4/19/2021   Complete course of Augmentin, probiotics  > Acute Postoperative Blood Loss Anemia   > Monitor  > Coronary artery disease; Peripheral artery disease    Continue aspirin, statin, beta-blocker    > Hypertensive heart disease with chronic systolic heart failure; Cardiomyopathy; Chronic systolic heart failure; Grade I diastolic dysfunction   Continue carvedilol    > Factor V Leiden mutation; History of deep venous thrombosis (DVT) of right lower extremity; Anticoagulated on Rivaroxaban   On Xarelto    > Major depressive disorder   > Continue Duloxetine 30 mg PO once daily    > Mixed hyperlipidemia   On Lipitor and Zetia    > Type 2 diabetes mellitus, poorly controlled, without long-term current use of insulin   Alogliptin, Metformin, sliding scale insulin     > Difficulty sleeping   > Continue:    > Melatonin 5 mg PO q HS    > Zolpidem 5 mg PO q HS PRN for sleep    > Constipation;    Continue bowel regimen    > COVID-19 ruled out by laboratory testing   > SARS-CoV-2 ([employer] [facility]2000, [facility]) (3/22/2021):  Not detected    > Analgesia   > Continue Tylenol, duloxetine, gabapentin, Robaxin, oxycodone as needed    Cussed with patient      Signed:    Ashleigh Steele MD      April 5, 2021

## 2021-04-06 ENCOUNTER — APPOINTMENT (OUTPATIENT)
Dept: GENERAL RADIOLOGY | Age: 56
DRG: 862 | End: 2021-04-06
Attending: EMERGENCY MEDICINE
Payer: MEDICAID

## 2021-04-06 LAB
ANION GAP SERPL CALC-SCNC: 12 MMOL/L (ref 3–18)
APPEARANCE UR: CLEAR
BACTERIA URNS QL MICRO: ABNORMAL /HPF
BASOPHILS # BLD: 0 K/UL (ref 0–0.1)
BASOPHILS NFR BLD: 0 % (ref 0–2)
BILIRUB UR QL: NEGATIVE
BUN SERPL-MCNC: 20 MG/DL (ref 7–18)
BUN/CREAT SERPL: 16 (ref 12–20)
CALCIUM SERPL-MCNC: 9.6 MG/DL (ref 8.5–10.1)
CHLORIDE SERPL-SCNC: 96 MMOL/L (ref 100–111)
CO2 SERPL-SCNC: 22 MMOL/L (ref 21–32)
COLOR UR: YELLOW
CREAT SERPL-MCNC: 1.26 MG/DL (ref 0.6–1.3)
DIFFERENTIAL METHOD BLD: ABNORMAL
EOSINOPHIL # BLD: 0 K/UL (ref 0–0.4)
EOSINOPHIL NFR BLD: 0 % (ref 0–5)
EPITH CASTS URNS QL MICRO: ABNORMAL /LPF (ref 0–5)
ERYTHROCYTE [DISTWIDTH] IN BLOOD BY AUTOMATED COUNT: 14.3 % (ref 11.6–14.5)
GLUCOSE BLD STRIP.AUTO-MCNC: 143 MG/DL (ref 70–110)
GLUCOSE BLD STRIP.AUTO-MCNC: 153 MG/DL (ref 70–110)
GLUCOSE BLD STRIP.AUTO-MCNC: 157 MG/DL (ref 70–110)
GLUCOSE BLD STRIP.AUTO-MCNC: 159 MG/DL (ref 70–110)
GLUCOSE SERPL-MCNC: 216 MG/DL (ref 74–99)
GLUCOSE UR STRIP.AUTO-MCNC: NEGATIVE MG/DL
GRAN CASTS URNS QL MICRO: ABNORMAL /LPF
HCT VFR BLD AUTO: 34.2 % (ref 36–48)
HGB BLD-MCNC: 10.8 G/DL (ref 13–16)
HGB UR QL STRIP: ABNORMAL
HYALINE CASTS URNS QL MICRO: ABNORMAL /LPF (ref 0–2)
KETONES UR QL STRIP.AUTO: NEGATIVE MG/DL
LACTATE SERPL-SCNC: 2.1 MMOL/L (ref 0.4–2)
LACTATE SERPL-SCNC: 2.1 MMOL/L (ref 0.4–2)
LEUKOCYTE ESTERASE UR QL STRIP.AUTO: NEGATIVE
LYMPHOCYTES # BLD: 0.9 K/UL (ref 0.9–3.6)
LYMPHOCYTES NFR BLD: 4 % (ref 21–52)
MCH RBC QN AUTO: 25.1 PG (ref 24–34)
MCHC RBC AUTO-ENTMCNC: 31.6 G/DL (ref 31–37)
MCV RBC AUTO: 79.5 FL (ref 74–97)
MONOCYTES # BLD: 1.4 K/UL (ref 0.05–1.2)
MONOCYTES NFR BLD: 6 % (ref 3–10)
NEUTS SEG # BLD: 21.3 K/UL (ref 1.8–8)
NEUTS SEG NFR BLD: 90 % (ref 40–73)
NITRITE UR QL STRIP.AUTO: NEGATIVE
PH UR STRIP: 5 [PH] (ref 5–8)
PLATELET # BLD AUTO: 661 K/UL (ref 135–420)
PLATELET COMMENTS,PCOM: ABNORMAL
PMV BLD AUTO: 10.1 FL (ref 9.2–11.8)
POTASSIUM SERPL-SCNC: 4.6 MMOL/L (ref 3.5–5.5)
PROT UR STRIP-MCNC: >1000 MG/DL
RBC # BLD AUTO: 4.3 M/UL (ref 4.35–5.65)
RBC #/AREA URNS HPF: ABNORMAL /HPF (ref 0–5)
RBC MORPH BLD: ABNORMAL
SODIUM SERPL-SCNC: 130 MMOL/L (ref 136–145)
SP GR UR REFRACTOMETRY: 1.02 (ref 1–1.03)
UROBILINOGEN UR QL STRIP.AUTO: 0.2 EU/DL (ref 0.2–1)
WBC # BLD AUTO: 23.6 K/UL (ref 4.6–13.2)
WBC URNS QL MICRO: ABNORMAL /HPF (ref 0–4)

## 2021-04-06 PROCEDURE — 83605 ASSAY OF LACTIC ACID: CPT

## 2021-04-06 PROCEDURE — 77030041076 HC DRSG AG OPTICELL MDII -A

## 2021-04-06 PROCEDURE — BW03ZZZ PLAIN RADIOGRAPHY OF CHEST: ICD-10-PCS | Performed by: INTERNAL MEDICINE

## 2021-04-06 PROCEDURE — 97530 THERAPEUTIC ACTIVITIES: CPT

## 2021-04-06 PROCEDURE — 74011000258 HC RX REV CODE- 258: Performed by: EMERGENCY MEDICINE

## 2021-04-06 PROCEDURE — 74011636637 HC RX REV CODE- 636/637: Performed by: INTERNAL MEDICINE

## 2021-04-06 PROCEDURE — 97535 SELF CARE MNGMENT TRAINING: CPT

## 2021-04-06 PROCEDURE — 87077 CULTURE AEROBIC IDENTIFY: CPT

## 2021-04-06 PROCEDURE — 87186 SC STD MICRODIL/AGAR DIL: CPT

## 2021-04-06 PROCEDURE — 71046 X-RAY EXAM CHEST 2 VIEWS: CPT

## 2021-04-06 PROCEDURE — 74011250636 HC RX REV CODE- 250/636: Performed by: EMERGENCY MEDICINE

## 2021-04-06 PROCEDURE — 81001 URINALYSIS AUTO W/SCOPE: CPT

## 2021-04-06 PROCEDURE — 99232 SBSQ HOSP IP/OBS MODERATE 35: CPT | Performed by: EMERGENCY MEDICINE

## 2021-04-06 PROCEDURE — 74011250637 HC RX REV CODE- 250/637: Performed by: INTERNAL MEDICINE

## 2021-04-06 PROCEDURE — 87040 BLOOD CULTURE FOR BACTERIA: CPT

## 2021-04-06 PROCEDURE — 36415 COLL VENOUS BLD VENIPUNCTURE: CPT

## 2021-04-06 PROCEDURE — 82962 GLUCOSE BLOOD TEST: CPT

## 2021-04-06 PROCEDURE — 87205 SMEAR GRAM STAIN: CPT

## 2021-04-06 PROCEDURE — 65310000000 HC RM PRIVATE REHAB

## 2021-04-06 PROCEDURE — 97542 WHEELCHAIR MNGMENT TRAINING: CPT

## 2021-04-06 PROCEDURE — 97110 THERAPEUTIC EXERCISES: CPT

## 2021-04-06 PROCEDURE — 2709999900 HC NON-CHARGEABLE SUPPLY

## 2021-04-06 PROCEDURE — 80048 BASIC METABOLIC PNL TOTAL CA: CPT

## 2021-04-06 PROCEDURE — 85025 COMPLETE CBC W/AUTO DIFF WBC: CPT

## 2021-04-06 RX ORDER — SODIUM CHLORIDE 9 MG/ML
75 INJECTION, SOLUTION INTRAVENOUS CONTINUOUS
Status: ACTIVE | OUTPATIENT
Start: 2021-04-06 | End: 2021-04-07

## 2021-04-06 RX ORDER — VANCOMYCIN 1.75 GRAM/500 ML IN 0.9 % SODIUM CHLORIDE INTRAVENOUS
1750 ONCE
Status: COMPLETED | OUTPATIENT
Start: 2021-04-06 | End: 2021-04-06

## 2021-04-06 RX ORDER — VANCOMYCIN HYDROCHLORIDE
1250 EVERY 12 HOURS
Status: DISCONTINUED | OUTPATIENT
Start: 2021-04-07 | End: 2021-04-07 | Stop reason: HOSPADM

## 2021-04-06 RX ADMIN — OXYCODONE HYDROCHLORIDE 5 MG: 5 TABLET ORAL at 19:28

## 2021-04-06 RX ADMIN — METHOCARBAMOL TABLETS 500 MG: 500 TABLET, COATED ORAL at 19:28

## 2021-04-06 RX ADMIN — INSULIN LISPRO 2 UNITS: 100 INJECTION, SOLUTION INTRAVENOUS; SUBCUTANEOUS at 11:59

## 2021-04-06 RX ADMIN — ATORVASTATIN CALCIUM 80 MG: 40 TABLET, FILM COATED ORAL at 08:23

## 2021-04-06 RX ADMIN — Medication 5 MG: at 20:44

## 2021-04-06 RX ADMIN — CARVEDILOL 3.12 MG: 6.25 TABLET, FILM COATED ORAL at 17:57

## 2021-04-06 RX ADMIN — DULOXETINE HYDROCHLORIDE 30 MG: 30 CAPSULE, DELAYED RELEASE ORAL at 08:27

## 2021-04-06 RX ADMIN — RIVAROXABAN 20 MG: 20 TABLET, FILM COATED ORAL at 08:26

## 2021-04-06 RX ADMIN — VANCOMYCIN HYDROCHLORIDE 1750 MG: 10 INJECTION, POWDER, LYOPHILIZED, FOR SOLUTION INTRAVENOUS at 17:01

## 2021-04-06 RX ADMIN — INSULIN LISPRO 2 UNITS: 100 INJECTION, SOLUTION INTRAVENOUS; SUBCUTANEOUS at 16:50

## 2021-04-06 RX ADMIN — PIPERACILLIN SODIUM AND TAZOBACTAM SODIUM 3.38 G: 3; .375 INJECTION, POWDER, LYOPHILIZED, FOR SOLUTION INTRAVENOUS at 12:55

## 2021-04-06 RX ADMIN — OXYCODONE HYDROCHLORIDE 10 MG: 5 TABLET ORAL at 02:07

## 2021-04-06 RX ADMIN — OXYCODONE HYDROCHLORIDE 10 MG: 5 TABLET ORAL at 15:12

## 2021-04-06 RX ADMIN — SODIUM CHLORIDE 75 ML/HR: 900 INJECTION, SOLUTION INTRAVENOUS at 11:10

## 2021-04-06 RX ADMIN — DOCUSATE SODIUM 50MG AND SENNOSIDES 8.6MG 2 TABLET: 8.6; 5 TABLET, FILM COATED ORAL at 17:15

## 2021-04-06 RX ADMIN — PIPERACILLIN SODIUM AND TAZOBACTAM SODIUM 3.38 G: 3; .375 INJECTION, POWDER, LYOPHILIZED, FOR SOLUTION INTRAVENOUS at 19:50

## 2021-04-06 RX ADMIN — ALOGLIPTIN 25 MG: 25 TABLET, FILM COATED ORAL at 08:24

## 2021-04-06 RX ADMIN — METFORMIN HYDROCHLORIDE 500 MG: 500 TABLET ORAL at 17:16

## 2021-04-06 RX ADMIN — ACETAMINOPHEN 650 MG: 325 TABLET ORAL at 11:20

## 2021-04-06 RX ADMIN — ACETAMINOPHEN 650 MG: 325 TABLET ORAL at 08:24

## 2021-04-06 RX ADMIN — EZETIMIBE 10 MG: 10 TABLET ORAL at 20:44

## 2021-04-06 RX ADMIN — METFORMIN HYDROCHLORIDE 500 MG: 500 TABLET ORAL at 08:25

## 2021-04-06 RX ADMIN — ACETAMINOPHEN 650 MG: 325 TABLET ORAL at 15:12

## 2021-04-06 RX ADMIN — OXYCODONE HYDROCHLORIDE 10 MG: 5 TABLET ORAL at 06:56

## 2021-04-06 RX ADMIN — GABAPENTIN 100 MG: 100 CAPSULE ORAL at 08:26

## 2021-04-06 RX ADMIN — POLYETHYLENE GLYCOL 3350 17 G: 17 POWDER, FOR SOLUTION ORAL at 08:22

## 2021-04-06 RX ADMIN — ASPIRIN 81 MG CHEWABLE TABLET 81 MG: 81 TABLET CHEWABLE at 08:24

## 2021-04-06 RX ADMIN — Medication 1 CAPSULE: at 08:27

## 2021-04-06 RX ADMIN — GABAPENTIN 100 MG: 100 CAPSULE ORAL at 18:00

## 2021-04-06 RX ADMIN — METHOCARBAMOL TABLETS 500 MG: 500 TABLET, COATED ORAL at 13:24

## 2021-04-06 RX ADMIN — METHOCARBAMOL TABLETS 500 MG: 500 TABLET, COATED ORAL at 08:25

## 2021-04-06 RX ADMIN — CARVEDILOL 3.12 MG: 6.25 TABLET, FILM COATED ORAL at 08:26

## 2021-04-06 NOTE — CONSULTS
History and Physical    Patient: Debra Arriola MRN: [id number]  SSN: [id number]    YOB: 1965  Age: 60 y.o. Sex: male      Subjective:      Debra Arriola is a 60 y.o. male who is seen in rehab after right BKA was noted to have drainage. Patient was sent to rehab on Augmentin and has finished his course of this. Now placed on Zosyn and Vancomycin. States that his pain is steady. He is currently afebrile. Past Medical History:   Diagnosis Date    Acute blood loss as cause of postoperative anemia 3/22/2021    Anticoagulated by anticoagulation treatment     On Rivaroxaban    [facility]2015    2D echocardiogram (3/24/2021) showed EF 37%; global hypokinesis of left ventricle; grade I diastolic dysfunction; 2D echocardiogram (12/17/2015) showed EF 35%; genealized hypokinesis more focally severe of the inferior and posterior segments; mild mitral regurgitation; trace tricuspud regurgitation; normal pulmonary artery pressure    Chronic systolic heart failure (HCC)     2D echocardiogram (3/24/2021) showed EF 37%; global hypokinesis of left ventricle; grade I diastolic dysfunction; 2D echocardiogram (12/17/2015) showed EF 35%; genealized hypokinesis more focally severe of the inferior and posterior segments; mild mitral regurgitation; trace tricuspud regurgitation; normal pulmonary artery pressure    Coronary artery disease involving native coronary artery of native heart     COVID-19 ruled out by laboratory testing 3/22/2021    SARS-CoV-2 ([facility]) (3/22/2021):  Not detected     Critical ischemia of lower extremity 3/22/2021    Right    Factor V Leiden mutation ([facility]     Grade I diastolic dysfunction 25/09/0468    2D echocardiogram (3/24/2021) showed EF 37%; global hypokinesis of left ventricle; grade I diastolic dysfunction    History of deep venous thrombosis (DVT) of distal vein of right lower extremity     History of epilepsy     History of head injury     History of myocardial infarction     History of noncompliance with medical treatment 3/18/2021    Hypertensive heart disease with chronic systolic congestive heart failure ([facility]     2D echocardiogram (3/24/2021) showed EF 37%; global hypokinesis of left ventricle; grade I diastolic dysfunction; 2D echocardiogram (12/17/2015) showed EF 35%; genealized hypokinesis more focally severe of the inferior and posterior segments; mild mitral regurgitation; trace tricuspud regurgitation; normal pulmonary artery pressure    Long term current use of aspirin     Major depressive disorder 03/24/2021    On Duloxetine    Migraine headache     Mitral valve prolapse     Mixed hyperlipidemia     Lipid profile (7/25/2018) showed , , HDL 27,     Obstructive sleep apnea     On statin therapy due to risk of future cardiovascular event     On Atorvastatin    Peripheral artery disease ([facility]     Type 2 diabetes mellitus, without long-term current use of insulin ([facility])     HbA1c (3/6/2021) = 89.7    Umbilical hernia     Wears glasses      Past Surgical History:   Procedure Laterality Date    HX BELOW KNEE AMPUTATION Right 03/22/2021    S/P Right below-the-knee amputation (3/22/2021 - Dr. Lyssa Kate)    HX EMBOLECTOMY Right 03/09/2021    S/P Right lower extremity arterial embolectomy    HX IMPLANTABLE CARDIOVERTER DEFIBRILLATOR  2013      Family History   Problem Relation Age of Onset    Bleeding Prob Mother         Factor V Leiden mutation    Diabetes Mother     Stroke Mother     Hypertension Mother     Dementia Mother     Seizures Mother      Social History     Tobacco Use    Smoking status: Not on file   Substance Use Topics    Alcohol use: Not on file      Prior to Admission medications    Medication Sig Start Date End Date Taking? Authorizing Provider   acetaminophen (TYLENOL) 325 mg tablet Take 650 mg by mouth every four (4) hours as needed for Pain.    Yes Provider, Historical amoxicillin-clavulanate (AUGMENTIN) 875-125 mg per tablet Take 1 Tab by mouth every twelve (12) hours. For 10 doses   Yes Provider, Historical   aspirin 81 mg chewable tablet Take 81 mg by mouth daily. Yes Provider, Historical   atorvastatin (LIPITOR) 80 mg tablet Take 80 mg by mouth daily. For 30 days   Yes Provider, Historical   carvediloL (COREG) 6.25 mg tablet Take 6.25 mg by mouth two (2) times daily (with meals). Yes Provider, Historical   docusate sodium (Colace) 100 mg capsule Take 100 mg by mouth daily. Yes Provider, Historical   DULoxetine (Cymbalta) 30 mg capsule Take 30 mg by mouth daily. Yes Provider, Historical   ezetimibe (ZETIA) 10 mg tablet Take  by mouth nightly. Yes Provider, Historical   glucose blood VI test strips (FreeStyle Lite Strips) strip by Does Not Apply route See Admin Instructions. Yes Provider, Historical   furosemide (LASIX) 40 mg tablet Take 40 mg by mouth daily. For 30 days   Yes Provider, Historical   gabapentin (NEURONTIN) 100 mg capsule Take 100 mg by mouth two (2) times a day. Yes Provider, Historical   insulin glargine (LANTUS) 100 unit/mL injection 26 Units by SubCUTAneous route nightly. Yes Provider, Historical   lisinopriL (PRINIVIL, ZESTRIL) 5 mg tablet Take 5 mg by mouth daily. For 30 days   Yes Provider, Historical   oxyCODONE IR (ROXICODONE) 10 mg tab immediate release tablet Take 10 mg by mouth every six (6) hours as needed for Pain. Yes Provider, Historical   rivaroxaban (XARELTO) 20 mg tab tablet Take 20 mg by mouth daily. For 180 days   Yes Provider, Historical   SITagliptin (Januvia) 100 mg tablet Take 100 mg by mouth daily. Yes Provider, Historical   zolpidem (AMBIEN) 5 mg tablet Take 5 mg by mouth nightly as needed for Sleep.    Yes Provider, Historical        Allergies   Allergen Reactions    Nitroglycerin Other (comments)      BP drops rapidly when he takes SL Nitro          Harmans Diarrhea and Rash    Cat Dander Cough    Codeine Rash  Strawberry Rash       Review of Systems:  Pertinent items are noted in the History of Present Illness. Objective:     Vitals:    04/05/21 1521 04/05/21 2119 04/06/21 0731 04/06/21 1502   BP: 116/75 118/75 128/83 119/73   Pulse: 95 92 (!) 104 93   Resp: 16 18 19 16   Temp: 96.9 °F (36.1 °C) 98.4 °F (36.9 °C) (!) 100.5 °F (38.1 °C) 97.7 °F (36.5 °C)   SpO2: 98% 99% 95% 97%   Weight:       Height:            Physical Exam:  GENERAL: alert, cooperative, no distress, appears stated age  EYE: negative  THROAT & NECK: normal and no erythema or exudates noted. LUNG: clear to auscultation bilaterally  HEART: regular rate and rhythm, S1, S2 normal, no murmur, click, rub or gallop  ABDOMEN: soft, non-tender. Bowel sounds normal. No masses,  no organomegaly  EXTREMITIES:  edema right below knee amputation stump. Mid-incision with erythema and purulent drainage. Medial incision with skin necrosis. Lateral incision with skin necrosis.      Assessment:     Hospital Problems  Date Reviewed: 3/31/2021          Codes Class Noted POA    Constipation ICD-10-CM: K59.00  ICD-9-CM: 564.00  3/29/2021 Yes        Peripheral artery disease (HCC) (Chronic) ICD-10-CM: I73.9  ICD-9-CM: 443.9  Unknown Yes        Type 2 diabetes mellitus, without long-term current use of insulin (HCC) (Chronic) ICD-10-CM: E11.9  ICD-9-CM: 250.00  Unknown Yes    Overview Signed 3/26/2021  5:49 PM by Mane Lew MD     HbA1c (3/6/2021) = 12.9             [facility] Leiden [facility] ([facility] (Chronic) ICD-10-CM: P26.67  ICD-9-CM: 289.81  Unknown Yes        Hypertensive heart disease with chronic systolic congestive heart failure (HCC) (Chronic) ICD-10-CM: I11.0, I50.22  ICD-9-CM: 402.91, 428.22  Unknown Yes    Overview Signed 3/26/2021 11:06 PM by Mane Lew MD     2D echocardiogram (12/17/2015) showed EF 35%; genealized hypokinesis more focally severe of the inferior and posterior segments; mild mitral regurgitation; trace tricuspud regurgitation; normal pulmonary artery pressure             Long term current use of aspirin (Chronic) ICD-10-CM: X28.57  ICD-9-CM: V58.66  Unknown Yes        On statin therapy due to risk of future cardiovascular event (Chronic) ICD-10-CM: T32.913  ICD-9-CM: V58.69  Unknown Yes    Overview Signed 3/26/2021  4:34 PM by Lashell Guillermo MD     On Atorvastatin             History of deep venous thrombosis (DVT) of distal vein of right lower extremity ICD-10-CM: U39.898  ICD-9-CM: V12.51  Unknown Yes        Anticoagulated by anticoagulation treatment (Chronic) ICD-10-CM: Z79.01  ICD-9-CM: V58.61  Unknown Yes    Overview Signed 3/26/2021  4:36 PM by Lashell Guillermo MD     On Rivaroxaban             Mixed hyperlipidemia (Chronic) ICD-10-CM: E78.2  ICD-9-CM: 272.2  Unknown Yes    Overview Signed 3/26/2021 11:07 PM by Lashell Guillermo MD     Lipid profile (7/25/2018) showed , , HDL 27,              History of embolectomy ICD-10-CM: Z98.890  ICD-9-CM: V45.89  Unknown Yes    Overview Signed 3/26/2021  5:39 PM by Lashell Guillermo MD     S/P Right lower extremity arterial embolectomy             Major depressive disorder ICD-10-CM: F32.9  ICD-9-CM: 296.20  3/24/2021 Yes    Overview Signed 3/26/2021 11:31 PM by Lashell Guillermo MD     On Duloxetine             Grade I diastolic dysfunction [id number]: I51.9  ICD-9-CM: 429.9  3/24/2021 Yes    Overview Signed 3/26/2021 11:48 PM by Lashell Guillermo MD     2D echocardiogram (3/24/2021) showed EF 37%; global hypokinesis of left ventricle; grade I diastolic dysfunction             * (Principal) Status post below knee amputation of right lower extremity ([facility] ICD-10-CM: Z89.511  ICD-9-CM: V49.75  3/22/2021 Yes    Overview Signed 3/26/2021  5:43 PM by Lashell Guillermo MD     S/P Right below-the-knee amputation (3/22/2021 - Dr. Ayana Basilio)             Impaired mobility and ADLs ICD-10-CM: Z74.09, Z78.9  ICD-9-CM: V49.89  3/22/2021 Yes        Critical ischemia of lower extremity [facility]) ICD-10-CM: O27.911  ICD-9-CM: 459.9  3/22/2021 Yes    Overview Signed 3/26/2021  5:42 PM by Meghna Nava MD     Right             COVID-19 ruled out by laboratory testing ICD-10-CM: Z20.822  ICD-9-CM: V01.79  3/22/2021 Yes    Overview Signed 3/26/2021  5:47 PM by Meghna Nava MD     SARS-CoV-2 ([facility]) (3/22/2021): Not detected               Acute blood loss as cause of postoperative anemia ICD-10-CM: D62  ICD-9-CM: 285.1  3/22/2021 Yes        History of noncompliance with medical treatment ICD-10-CM: Z91.19  ICD-9-CM: V15.81  3/18/2021 Yes            Infection of  Right below knee amputation stump. Plan: To OR on 4/7 for right below amputation washout and debridement with possible wound VAC placement. Procedure discussed with patient and he agrees to proceed. NPO after midnight except meds.      Signed By: Andrew Snider MD     April 6, 2021

## 2021-04-06 NOTE — PROGRESS NOTES
Problem: Mobility Impaired (Adult and Pediatric)  Goal: *Therapy Goal (Edit Goal, Insert Text)  Description: Physical Therapy   Physical Therapy Short term goals = Long Term Goals  Initiated 3/27/2021, updated 21, and to be accomplished within 14 day(s) on 2021:  1. Patient will transfer from bed to chair and chair to bed with modified independence using the least restrictive device. 2.  Patient will perform sit to stand with modified independence. 3.  Patient will ambulate with modified independence for 50 feet with the least restrictive device. 4.  Patient will perform w/c mobility for at least 250 ft distances, reporting <3/10 modified Sylvia RPE, at modified independent level over even surfaces and around doorways, obstacles, narrow spaces. 5.  Patient will ascend/descend w/c ramp with supervision for ease of entry/exit of home. Outcome: Progressing Towards Goal    PHYSICAL THERAPY TREATMENT    Patient: Can Bosch (4 y.o. male)  Date: 2021  Diagnosis: Status post below knee amputation of right lower extremity ([facility]) [Z89.511] Status post below knee amputation of right lower extremity ([facility]  Precautions: Fall  Chart, physical therapy assessment, plan of care and goals were reviewed. Time In:1130  Time Out:1210    Patient seen for: AM;Family training;Gait training;Patient education;Transfer training; Wheelchair mobility    Pain:  Pt pain was reported as 0/10 pre-treatment. Pt pain was reported as 0/10 post-treatment. Intervention: N/A    Patient identified with name and : YES    SUBJECTIVE:      Patient and his friend Tato Lawson present in his room for PT session and family training. Patient found to be in bed with new IV and reports he just found out he is septic and is getting an IV drip at noon. PTA spoke with pt's RN who states he can participate in therapy session. Patient agreeable to participate in limited session due to not feeling well.     Patient reports he is terrified to go home because he is fearful of falling. He will be home alone about 95% of the time. OBJECTIVE DATA SUMMARY:    Objective:     GROSS ASSESSMENT Daily Assessment     AROM: Generally decreased, functional  PROM: Generally decreased, functional  Strength: Generally decreased, functional  Coordination: Within functional limits  Tone: Normal  Sensation: Impaired      BED/MAT MOBILITY Daily Assessment    Patient remains Mod I - I with bed mobility. Rolling Right : 7 (Independent)  Rolling Left : 7 (Independent)  Supine to Sit : 6 (Modified independent)  Sit to Supine : 6 (Modified independent)      TRANSFERS Daily Assessment    Patient performed STS from bed and w/c with S and both SPT without AD and stand step with RW transfers with SBA. PTA assisted with managing IV tubing. Transfer Type: Other  Other: SPT without AD and stand step with RW  Transfer Assistance : 5 (Stand-by assistance)  Sit to Stand Assistance: Supervision  Car Transfers: Not tested  Car Type: (NT)      GAIT Daily Assessment    Gait Description (WDL) Exceptions to WDL    Gait Abnormalities Decreased step clearance    Assistive device Gait belt;Walker, rolling    Ambulation assistance - level surface 5 (Stand-by assistance)    Distance 20 Feet (ft)    Ambulation assistance- uneven surface (NT)    Comments Patient ambulated with step hop and RW with SBA 20 ft in his room demonstrating slightly decreased step clearance. BALANCE Daily Assessment     Sitting - Static: Good (unsupported)  Sitting - Dynamic: Good (unsupported)  Standing - Static: Poor;Constant support  Standing - Dynamic : Impaired      ASSESSMENT:  Patient and friend Inez Smith present for family training. Discussed safety concerns at home such as throw rugs, cords, pets, etc being cleared for patient to ambulate safely in his home. Demonstrated both SPT and stand step transfers as well as ambulation with RW. Educated on managing RW while using w/c d/t family concerns.  Educated on carrying cell phone with him and possibility of attaining a smart watch as well as techniques to assist if pt does fall at home, d/t pt and family concerns. Addressed concerns re: DME and Home Health, assured patient SW would be in contact with him to arrange all of this prior to discharge. Patient requires S/SBA for functional mobility at this time for balance and safety. Progression toward goals:  []      Improving appropriately and progressing toward goals  [x]      Improving slowly and progressing toward goals  []      Not making progress toward goals and plan of care will be adjusted      PLAN:  Patient continues to benefit from skilled intervention to address the above impairments. Continue treatment per established plan of care. Discharge Recommendations:  Home Health  Further Equipment Recommendations for Discharge:  rolling walker and wheelchair 18 inch (already ordered by DANIELLE)      Estimated Discharge Date: 4/7/21 - TBD d/t pt with new diagnosis of sepsis and requiring an IV    Activity Tolerance:   Fair +  Please refer to the flowsheet for vital signs taken during this treatment. After treatment:   Patient left sitting in w/c in his room, call [facility] in reach.       JULIANO Kee

## 2021-04-06 NOTE — H&P (VIEW-ONLY)
History and Physical    Patient: Yanet Hoff MRN: [id number]  SSN: [id number]    YOB: 1965  Age: 60 y.o. Sex: male      Subjective:      Yanet Hoff is a 60 y.o. male who is seen in rehab after right BKA was noted to have drainage. Patient was sent to rehab on Augmentin and has finished his course of this. Now placed on Zosyn and Vancomycin. States that his pain is steady. He is currently afebrile. Past Medical History:   Diagnosis Date    Acute blood loss as cause of postoperative anemia 3/22/2021    Anticoagulated by anticoagulation treatment     On Rivaroxaban    [facility]17[facility]2015    2D echocardiogram (3/24/2021) showed EF 37%; global hypokinesis of left ventricle; grade I diastolic dysfunction; 2D echocardiogram (12/17/2015) showed EF 35%; genealized hypokinesis more focally severe of the inferior and posterior segments; mild mitral regurgitation; trace tricuspud regurgitation; normal pulmonary artery pressure    Chronic systolic heart failure (HCC)     2D echocardiogram (3/24/2021) showed EF 37%; global hypokinesis of left ventricle; grade I diastolic dysfunction; 2D echocardiogram (12/17/2015) showed EF 35%; genealized hypokinesis more focally severe of the inferior and posterior segments; mild mitral regurgitation; trace tricuspud regurgitation; normal pulmonary artery pressure    Coronary artery disease involving native coronary artery of native heart     COVID-19 ruled out by laboratory testing 3/22/2021    SARS-CoV-2 ([place] m2000, [facility]) (3/22/2021):  Not detected     Critical ischemia of lower extremity 3/22/2021    Right    Factor V Leiden mutation ([facility]     Grade I diastolic dysfunction 30/53/7664    2D echocardiogram (3/24/2021) showed EF 37%; global hypokinesis of left ventricle; grade I diastolic dysfunction    History of deep venous thrombosis (DVT) of distal vein of right lower extremity     History of epilepsy     History of head injury     History of myocardial infarction     History of noncompliance with medical treatment 3/18/2021    Hypertensive heart disease with chronic systolic congestive heart failure ([facility]     2D echocardiogram (3/24/2021) showed EF 37%; global hypokinesis of left ventricle; grade I diastolic dysfunction; 2D echocardiogram (12/17/2015) showed EF 35%; genealized hypokinesis more focally severe of the inferior and posterior segments; mild mitral regurgitation; trace tricuspud regurgitation; normal pulmonary artery pressure    Long term current use of aspirin     Major depressive disorder 03/24/2021    On Duloxetine    Migraine headache     Mitral valve prolapse     Mixed hyperlipidemia     Lipid profile (7/25/2018) showed , , HDL 27,     Obstructive sleep apnea     On statin therapy due to risk of future cardiovascular event     On Atorvastatin    Peripheral artery disease ([facility]     Type 2 diabetes mellitus, without long-term current use of insulin ([facility])     HbA1c (3/6/2021) = 52.0    Umbilical hernia     Wears glasses      Past Surgical History:   Procedure Laterality Date    HX BELOW KNEE AMPUTATION Right 03/22/2021    S/P Right below-the-knee amputation (3/22/2021 - Dr. Lyssa Kate)    HX EMBOLECTOMY Right 03/09/2021    S/P Right lower extremity arterial embolectomy    HX IMPLANTABLE CARDIOVERTER DEFIBRILLATOR  2013      Family History   Problem Relation Age of Onset    Bleeding Prob Mother         Factor V Leiden mutation    Diabetes Mother     Stroke Mother     Hypertension Mother     Dementia Mother     Seizures Mother      Social History     Tobacco Use    Smoking status: Not on file   Substance Use Topics    Alcohol use: Not on file      Prior to Admission medications    Medication Sig Start Date End Date Taking? Authorizing Provider   acetaminophen (TYLENOL) 325 mg tablet Take 650 mg by mouth every four (4) hours as needed for Pain.    Yes Provider, Historical amoxicillin-clavulanate (AUGMENTIN) 875-125 mg per tablet Take 1 Tab by mouth every twelve (12) hours. For 10 doses   Yes Provider, Historical   aspirin 81 mg chewable tablet Take 81 mg by mouth daily. Yes Provider, Historical   atorvastatin (LIPITOR) 80 mg tablet Take 80 mg by mouth daily. For 30 days   Yes Provider, Historical   carvediloL (COREG) 6.25 mg tablet Take 6.25 mg by mouth two (2) times daily (with meals). Yes Provider, Historical   docusate sodium (Colace) 100 mg capsule Take 100 mg by mouth daily. Yes Provider, Historical   DULoxetine (Cymbalta) 30 mg capsule Take 30 mg by mouth daily. Yes Provider, Historical   ezetimibe (ZETIA) 10 mg tablet Take  by mouth nightly. Yes Provider, Historical   glucose blood VI test strips (FreeStyle Lite Strips) strip by Does Not Apply route See Admin Instructions. Yes Provider, Historical   furosemide (LASIX) 40 mg tablet Take 40 mg by mouth daily. For 30 days   Yes Provider, Historical   gabapentin (NEURONTIN) 100 mg capsule Take 100 mg by mouth two (2) times a day. Yes Provider, Historical   insulin glargine (LANTUS) 100 unit/mL injection 26 Units by SubCUTAneous route nightly. Yes Provider, Historical   lisinopriL (PRINIVIL, ZESTRIL) 5 mg tablet Take 5 mg by mouth daily. For 30 days   Yes Provider, Historical   oxyCODONE IR (ROXICODONE) 10 mg tab immediate release tablet Take 10 mg by mouth every six (6) hours as needed for Pain. Yes Provider, Historical   rivaroxaban (XARELTO) 20 mg tab tablet Take 20 mg by mouth daily. For 180 days   Yes Provider, Historical   SITagliptin (Januvia) 100 mg tablet Take 100 mg by mouth daily. Yes Provider, Historical   zolpidem (AMBIEN) 5 mg tablet Take 5 mg by mouth nightly as needed for Sleep.    Yes Provider, Historical        Allergies   Allergen Reactions    Nitroglycerin Other (comments)      BP drops rapidly when he takes SL Nitro          Rupert Diarrhea and Rash    Cat Dander Cough    Codeine Rash  Strawberry Rash       Review of Systems:  Pertinent items are noted in the History of Present Illness. Objective:     Vitals:    04/05/21 1521 04/05/21 2119 04/06/21 0731 04/06/21 1502   BP: 116/75 118/75 128/83 119/73   Pulse: 95 92 (!) 104 93   Resp: 16 18 19 16   Temp: 96.9 °F (36.1 °C) 98.4 °F (36.9 °C) (!) 100.5 °F (38.1 °C) 97.7 °F (36.5 °C)   SpO2: 98% 99% 95% 97%   Weight:       Height:            Physical Exam:  GENERAL: alert, cooperative, no distress, appears stated age  EYE: negative  THROAT & NECK: normal and no erythema or exudates noted. LUNG: clear to auscultation bilaterally  HEART: regular rate and rhythm, S1, S2 normal, no murmur, click, rub or gallop  ABDOMEN: soft, non-tender. Bowel sounds normal. No masses,  no organomegaly  EXTREMITIES:  edema right below knee amputation stump. Mid-incision with erythema and purulent drainage. Medial incision with skin necrosis. Lateral incision with skin necrosis.      Assessment:     Hospital Problems  Date Reviewed: 3/31/2021          Codes Class Noted POA    Constipation ICD-10-CM: K59.00  ICD-9-CM: 564.00  3/29/2021 Yes        Peripheral artery disease (HCC) (Chronic) ICD-10-CM: I73.9  ICD-9-CM: 443.9  Unknown Yes        Type 2 diabetes mellitus, without long-term current use of insulin (HCC) (Chronic) ICD-10-CM: E11.9  ICD-9-CM: 250.00  Unknown Yes    Overview Signed 3/26/2021  5:49 PM by Sai Cohn MD     HbA1c (3/6/2021) = 12.9             Factor V Leiden mutation ([facility] (Chronic) ICD-10-CM: P25.26  ICD-9-CM: 289.81  Unknown Yes        Hypertensive heart disease with chronic systolic congestive heart failure (HCC) (Chronic) ICD-10-CM: I11.0, I50.22  ICD-9-CM: 402.91, 428.22  Unknown Yes    Overview Signed 3/26/2021 11:06 PM by Sai Cohn MD     2D echocardiogram (12/17/2015) showed EF 35%; genealized hypokinesis more focally severe of the inferior and posterior segments; mild mitral regurgitation; trace tricuspud regurgitation; normal pulmonary artery pressure             Long term current use of aspirin (Chronic) ICD-10-CM: T99.73  ICD-9-CM: V58.66  Unknown Yes        On statin therapy due to risk of future cardiovascular event (Chronic) ICD-10-CM: I78.253  ICD-9-CM: V58.69  Unknown Yes    Overview Signed 3/26/2021  4:34 PM by Star Posada MD     On Atorvastatin             History of deep venous thrombosis (DVT) of distal vein of right lower extremity ICD-10-CM: A47.059  ICD-9-CM: V12.51  Unknown Yes        Anticoagulated by anticoagulation treatment (Chronic) ICD-10-CM: Z79.01  ICD-9-CM: V58.61  Unknown Yes    Overview Signed 3/26/2021  4:36 PM by Star Poasda MD     On Rivaroxaban             Mixed hyperlipidemia (Chronic) ICD-10-CM: E78.2  ICD-9-CM: 272.2  Unknown Yes    Overview Signed 3/26/2021 11:07 PM by Star Posada MD     Lipid profile (7/25/2018) showed , , HDL 27,              History of embolectomy ICD-10-CM: Z98.890  ICD-9-CM: V45.89  Unknown Yes    Overview Signed 3/26/2021  5:39 PM by Star Posada MD     S/P Right lower extremity arterial embolectomy             Major depressive disorder ICD-10-CM: F32.9  ICD-9-CM: 296.20  3/24/2021 Yes    Overview Signed 3/26/2021 11:31 PM by Star Posada MD     On Duloxetine             Grade I diastolic dysfunction BBI-12-PB: I51.9  ICD-9-CM: 429.9  3/24/2021 Yes    Overview Signed 3/26/2021 11:48 PM by Star Posada MD     2D echocardiogram (3/24/2021) showed EF 37%; global hypokinesis of left ventricle; grade I diastolic dysfunction             * (Principal) Status post below knee amputation of right lower extremity ([address] ICD-10-CM: Z89.511  ICD-9-CM: V49.75  3/22/2021 Yes    Overview Signed 3/26/2021  5:43 PM by Star Posada MD     S/P Right below-the-knee amputation (3/22/2021 - Dr. Jenny Kramer)             Impaired mobility and ADLs ICD-10-CM: Z74.09, Z78.9  ICD-9-CM: V49.89  3/22/2021 Yes        Critical ischemia of lower extremity [facility]) ICD-10-CM: J99.940  ICD-9-CM: 459.9  3/22/2021 Yes    Overview Signed 3/26/2021  5:42 PM by Kitty Calderón MD     Right             COVID-19 ruled out by laboratory testing ICD-10-CM: Z20.822  ICD-9-CM: V01.79  3/22/2021 Yes    Overview Signed 3/26/2021  5:47 PM by Kitty Calderón MD     SARS-CoV-2 ([place]) (3/22/2021): Not detected               Acute blood loss as cause of postoperative anemia ICD-10-CM: D62  ICD-9-CM: 285.1  3/22/2021 Yes        History of noncompliance with medical treatment ICD-10-CM: Z91.19  ICD-9-CM: V15.81  3/18/2021 Yes            Infection of  Right below knee amputation stump. Plan: To OR on 4/7 for right below amputation washout and debridement with possible wound VAC placement. Procedure discussed with patient and he agrees to proceed. NPO after midnight except meds.      Signed By: Mitch Schroeder MD     April 6, 2021

## 2021-04-06 NOTE — PROGRESS NOTES
[facility]    Bedside and Verbal shift change report given to Arcenio Gann LPN (oncoming nurse) by Rina Yusuf RN  (offgoing nurse). Report included the following information SBAR, Kardex, MAR and Recent Results.     Situation:   Code Status: Full Code   Reason for Admission: [address] Day: 11   Problem List:   Hospital Problems  Date Reviewed: 3/31/2021          Codes Class Noted POA    Constipation ICD-10-CM: K59.00  ICD-9-CM: 564.00  3/29/2021 Yes        Peripheral artery disease (HCC) (Chronic) ICD-10-CM: I73.9  ICD-9-CM: 443.9  Unknown Yes        Type 2 diabetes mellitus, without long-term current use of insulin (HCC) (Chronic) ICD-10-CM: E11.9  ICD-9-CM: 250.00  Unknown Yes    Overview Signed 3/26/2021  5:49 PM by Analy Poole MD     HbA1c (3/6/2021) = 12.9             Factor [facility] Leiden mutation ([facility] (Chronic) ICD-10-CM: Y95.14  ICD-9-CM: 289.81  Unknown Yes        Hypertensive heart disease with chronic systolic congestive heart failure (HCC) (Chronic) ICD-10-CM: I11.0, I50.22  ICD-9-CM: 402.91, 428.22  Unknown Yes    Overview Signed 3/26/2021 11:06 PM by Analy Poole MD     2D echocardiogram (12/17/2015) showed EF 35%; genealized hypokinesis more focally severe of the inferior and posterior segments; mild mitral regurgitation; trace tricuspud regurgitation; normal pulmonary artery pressure             Long term current use of aspirin (Chronic) ICD-10-CM: X29.90  ICD-9-CM: V58.66  Unknown Yes        On statin therapy due to risk of future cardiovascular event (Chronic) ICD-10-CM: N07.984  ICD-9-CM: V58.69  Unknown Yes    Overview Signed 3/26/2021  4:34 PM by Analy Poole MD     On Atorvastatin             History of deep venous thrombosis (DVT) of distal vein of right lower extremity ICD-10-CM: Z86.718  ICD-9-CM: V12.51  Unknown Yes        Anticoagulated by anticoagulation treatment (Chronic) ICD-10-CM: Z79.01  ICD-9-CM: V58.61  Unknown Yes    Overview Signed 3/26/2021  4:36 PM by Tom Jaffe MD     On Rivaroxaban             Mixed hyperlipidemia (Chronic) ICD-10-CM: E78.2  ICD-9-CM: 272.2  Unknown Yes    Overview Signed 3/26/2021 11:07 PM by Tom Jaffe MD     Lipid profile (7/25/2018) showed , , HDL 27,              History of embolectomy ICD-10-CM: Z98.890  ICD-9-CM: V45.89  Unknown Yes    Overview Signed 3/26/2021  5:39 PM by Tom Jaffe MD     S/P Right lower extremity arterial embolectomy             Major depressive disorder ICD-10-CM: F32.9  ICD-9-CM: 296.20  3/24/2021 Yes    Overview Signed 3/26/2021 11:31 PM by Tom Jaffe MD     On Duloxetine             Grade I diastolic dysfunction [id number]-[id number]: I51.9  ICD-9-CM: 429.9  3/24/2021 Yes    Overview Signed 3/26/2021 11:48 PM by Tom Jaffe MD     2D echocardiogram (3/24/2021) showed EF 37%; global hypokinesis of left ventricle; grade I diastolic dysfunction             * (Principal) Status post below knee amputation of right lower extremity ([facility] ICD-10-CM: Z89.511  ICD-9-CM: V49.75  3/22/2021 Yes    Overview Signed 3/26/2021  5:43 PM by Tom Jaffe MD     S/P Right below-the-knee amputation (3/22/2021 - Dr. Mirza Locke)             Impaired mobility and ADLs ICD-10-CM: Z74.09, Z78.9  ICD-9-CM: V49.89  3/22/2021 Yes        Critical ischemia of lower extremity ([facility] ICD-10-CM: V23.115  ICD-9-CM: 459.9  3/22/2021 Yes    Overview Signed 3/26/2021  5:42 PM by Tom Jaffe MD     Right             COVID-19 ruled out by laboratory testing ICD-10-CM: Z20.822  ICD-9-CM: V01.79  3/22/2021 Yes    Overview Signed 3/26/2021  5:47 PM by Tom Jaffe MD     SARS-CoV-2 ([address] [place] [address]) (3/22/2021):  Not detected               Acute blood loss as cause of postoperative anemia ICD-10-CM: D62  ICD-9-CM: 285.1  3/22/2021 Yes        History of noncompliance with medical treatment ICD-10-CM: Z91.19  ICD-9-CM: V15.81  3/18/2021 Yes              Background:   Past Medical History:   Past Medical History:   Diagnosis Date    Acute blood loss as cause of postoperative anemia 3/22/2021    Anticoagulated by anticoagulation treatment     On Rivaroxaban    Cardiomyopathy ([facility] 12/17/2015    2D echocardiogram (3/24/2021) showed EF 37%; global hypokinesis of left ventricle; grade I diastolic dysfunction; 2D echocardiogram (12/17/2015) showed EF 35%; genealized hypokinesis more focally severe of the inferior and posterior segments; mild mitral regurgitation; trace tricuspud regurgitation; normal pulmonary artery pressure    Chronic systolic heart failure (HCC)     2D echocardiogram (3/24/2021) showed EF 37%; global hypokinesis of left ventricle; grade I diastolic dysfunction; 2D echocardiogram (12/17/2015) showed EF 35%; genealized hypokinesis more focally severe of the inferior and posterior segments; mild mitral regurgitation; trace tricuspud regurgitation; normal pulmonary artery pressure    Coronary artery disease involving native coronary artery of native heart     COVID-19 ruled out by laboratory testing 3/22/2021    SARS-CoV-2 ([place] m2000, [facility]) (3/22/2021):  Not detected     Critical ischemia of lower extremity 3/22/2021    Right    [facility]     Grade I diastolic dysfunction 63/25/2066    2D echocardiogram (3/24/2021) showed EF 37%; global hypokinesis of left ventricle; grade I diastolic dysfunction    History of deep venous thrombosis (DVT) of distal vein of right lower extremity     History of epilepsy     History of head injury     History of myocardial infarction     History of noncompliance with medical treatment 3/18/2021    Hypertensive heart disease with chronic systolic congestive heart failure ([facility]     2D echocardiogram (3/24/2021) showed EF 37%; global hypokinesis of left ventricle; grade I diastolic dysfunction; 2D echocardiogram (12/17/2015) showed EF 35%; genealized hypokinesis more focally severe of the inferior and posterior segments; mild mitral regurgitation; trace tricuspud regurgitation; normal pulmonary artery pressure    Long term current use of aspirin     Major depressive disorder 03/24/2021    On Duloxetine    Migraine headache     Mitral valve prolapse     Mixed hyperlipidemia     Lipid profile (7/25/2018) showed , , HDL 27,     Obstructive sleep apnea     On statin therapy due to risk of future cardiovascular event     On Atorvastatin    Peripheral artery disease ([facility]     Type 2 diabetes mellitus, without long-term current use of insulin ([facility])     HbA1c (3/6/2021) = 93.5    Umbilical hernia     Wears glasses       Patient taking anticoagulants yes    Patient has a defibrillator: no no  Assessment:  Changes in Assessment throughout shift: none   Patient has central line: no Reasons if yes: date:na Last dressing date:na  Patient has Ellis Cath: no Reasons na ast Vitals:     Vitals:    04/04/21 2100 04/05/21 0712 04/05/21 1521 04/05/21 2119   BP: 121/78 127/84 116/75 118/75   Pulse: 89 (!) 103 95 92   Resp: 18 16 16 18   Temp: 97.4 °F (36.3 °C) 99.5 °F (37.5 °C) 96.9 °F (36.1 °C) 98.4 °F (36.9 °C)   SpO2: 99% 97% 98% 99%   Weight:       Height:            PAIN    Pain Assessment    Pain Intensity 1: 0 (04/06/21 0400) Pain Intensity 1: 2 (12/29/14 1105)    Pain Location 1: Leg Pain Location 1: Abdomen    Pain Intervention(s) 1: Medication (see MAR) Pain Intervention(s) 1: Medication (see MAR)  Patient Stated Pain Goal: 0 Patient Stated Pain Goal: 0  o Intervention effective:  yes   o Other actions taken for pain: pain med     Skin Assessment  Skin color Skin Color: Appropriate for ethnicity  Condition/Temperature Skin Condition/Temp: Dry, Warm  Integrity Skin Integrity: Incision (comment)  Turgor Turgor: Non-tenting  Weekly Pressure Ulcer Documentation  Pressure  Injury Documentation: No Pressure Injury Noted-Pressure Ulcer Prevention Initiated  Wound Prevention & Protection Methods  Orientation of wound Orientation of Wound Prevention: Posterior  Location of Prevention Location of Wound Prevention: Buttocks, Sacrum/Coccyx  Dressing Present Dressing Present : No  Dressing Status Dressing Status: Intact  Wound Offloading Wound Offloading (Prevention Methods): Bed, pressure redistribution/air     INTAKE/OUPUT  Date 04/05/21 0700 - 04/06/21 0659 04/06/21 0700 - 04/07/21 0659   Shift 0700-1859 1900-0659 24 Hour Total 0700-1859 1900-0659 24 Hour Total   INTAKE   Shift Total(mL/kg)         OUTPUT   Urine(mL/kg/hr) 300(0.3)  300        Urine Voided 300  300        Urine Occurrence(s) 3 x  3 x      Stool           Stool Occurrence(s) 0 x  0 x      Shift Total(mL/kg) 300(3.5)  300(3.5)      NET -300  -300      Weight (kg) 84.8 84.8 84.8 84.8 84.8 84.8       Recommendations:  1. Patient needs and requests: none voice    2. Diet: cardiac     3. Pending tests/procedures: labs     4. Functional Level/Equipment: up with assist/WC    5. Estimated Discharge Date: none yet Posted on Whiteboard in Patients Room: no     [facility]    A safety check occurred in the patient's room between off going nurse and oncoming nurse listed above. The safety check included the below items  Area Items   H  High Alert Medications - Verify all high alert medication drips (heparin, PCA, etc.)   E  Equipment - Suction is set up for ALL patients (with yanker)  - Red plugs utilized for all equipment (IV pumps, etc.)  - WOWs wiped down at end of shift.  - Room stocked with oxygen, suction, and other unit-specific supplies   A  Alarms - Bed alarm is set for fall risk patients  - Ensure chair alarm is in place and activated if patient is up in a chair   L  Lines - Check IV for any infiltration  - Ellis bag is empty if patient has a Ellis   - Tubing and IV bags are labeled   S  Safety   - Room is clean, patient is clean, and equipment is clean. - Hallways are clear from equipment besides carts.    - Fall bracelet on for fall risk patients  - Ensure room is clear and free of clutter  - Suction is set up for ALL patients (with faraz)  - Hallways are clear from equipment besides carts. - Isolation precautions followed, supplies available outside room, sign posted   .

## 2021-04-06 NOTE — PROGRESS NOTES
Problem: Self Care Deficits Care Plan (Adult)  Goal: *Therapy Goal (Edit Goal, Insert Text)  Description: Occupational Therapy Goals   Long Term Goals  Initiated  and to be accomplished within 2 week(s) (Updated  STGs=LTGs)  1. Pt will perform self-feeding with mod I.  2. Pt will perform grooming with mod I.  3. Pt will perform UB bathing with mod I.  4. Pt will perform LB bathing with mod I.  5. Pt will perform tub/shower transfer with mod I.   6. Pt will perform UB dressing with mod I.  7. Pt will perform LB dressing with mod I.  8. Pt will perform toileting task with mod I.  9. Pt will perform toilet transfer with mod I. Short Term Goals   Initiated 3/27/2021 and to be accomplished within 7 day(s)  1. Pt will perform UB bathing with CGA. ( 2021)  2. Pt will perform LB bathing with CGA. ( 2021)  3. Pt will perform tub/shower transfer with CGA. ( 2021)  4. Pt will perform LB dressing with CGA. ( 2021)  5. Pt will perform toileting task with CGA. ( 2021)  6. Pt will perform toilet transfer with CGA. ( 2021)       Outcome: Progressing Towards Goal   Occupational Therapy TREATMENT    Patient: Karl Bridges   60 y.o. Patient identified with name and : yes    Date: 2021    First Tx Session  Time In: 1030  Time Out[de-identified] 8010    Second Tx Session  Time In: 1330  Time Out[de-identified] 1430    Diagnosis: Status post below knee amputation of right lower extremity ([facility] [address] [Z89.511]   Precautions: Fall  Chart, occupational therapy assessment, plan of care, and goals were reviewed. Pain:  Pt reports 0/10 pain or discomfort prior to treatment. Pt reports 6/10 pain or discomfort post treatment. Intervention Provided: Nursing aware      SUBJECTIVE:   Patient stated [facility] discharge plan might be changing.     OBJECTIVE DATA SUMMARY:     THERAPEUTIC ACTIVITY Daily Assessment    During first session, family education performed with supportive friend present in pt room. Reviewed levels of assistance pt currently requires while performing self-care tasks. Educated friend on DME requirements. Provided information on receptacle bags available to place in [facility] for ease of pt to clean up until able to safely maneuver within bathroom in home environment. Educated friend to provide room throughout home that pt would need to maneuver with w/c. Friend verbally stated understanding of all information provided. THERAPEUTIC EXERCISE Daily Assessment    During second session, pt participated in BUE strengthening exercises for carryover to functional transfers. Pt used 3# dowel to perform shoulder press, chest press, bicep curls, forward rows, and backward rows (2 sets x 15 reps each exercise). Pt used 2# dowel to perform horizontal shoulder add-/abduction (2 sets x 15 reps). Pt used DigiFlex (red, green) to perform B hand strengthening exercises for carryover to functional tasks. Pt performed 2 sets x 20 reps each. ASSESSMENT:  Family education performed with friend during first treatment session. Pt demonstrates decreased BUE strength and decreased activity tolerance. Progression toward goals:  [x]          Improving appropriately and progressing toward goals  []          Improving slowly and progressing toward goals  []          Not making progress toward goals and plan of care will be adjusted     PLAN:  Patient continues to benefit from skilled intervention to address the above impairments. Continue treatment per established plan of care. Discharge Recommendations:  Home Health  Further Equipment Recommendations for Discharge:  bedside commode and transfer bench     Activity Tolerance:  Fair      Estimated LOS:4/7/2021    Please refer to the flowsheet for vital signs taken during this treatment.   After treatment:   []  Patient left in no apparent distress sitting up in chair   [x]  Patient left in no apparent distress in bed  [x]  Call bell left within reach  []  Nursing notified  []  Caregiver present  []  Bed alarm activated    COMMUNICATION/EDUCATION:   [] Home safety education was provided and the patient/caregiver indicated understanding. [] Patient/family have participated as able in goal setting and plan of care. [x] Patient/family agree to work toward stated goals and plan of care. [] Patient understands intent and goals of therapy, but is neutral about his/her participation. [] Patient is unable to participate in goal setting and plan of care.       SEBASTIEN iRos/DOMINIC

## 2021-04-06 NOTE — PROGRESS NOTES
[facility] PHYSICAL REHABILITATION  [address] [place] [address], [address]     INPATIENT REHABILITATION  DAILY PROGRESS NOTE     Date: 4/6/2021    Name: Mila Parkinson Age / Sex: 60 y.o. / male   CSN: [id number] MRN: [id number]   [facility] Date: 3/26/2021 Length of Stay: 11 days     Primary Rehab Diagnosis: Impaired Mobility and ADLs secondary to:  1. S/P Right below-the-knee amputation (3/22/2021 - Dr. Brooklyn Campos)  2. History of critical ischemia of the right lower extremity  3. History of right lower extremity arterial embolectomy       Subjective:     Patient is sitting in bed in no apparent distress, awake and alert    Objective:     Vital Signs:  Patient Vitals for the past 24 hrs:   BP Temp Pulse Resp SpO2   04/06/21 1502 119/73 97.7 °F (36.5 °C) 93 16 97 %   04/06/21 0731 128/83 (!) 100.5 °F (38.1 °C) (!) 104 19 95 %   04/05/21 2119 118/75 98.4 °F (36.9 °C) 92 18 99 %        Physical Examination:    General:  Awake, alert  Cardiovascular:  S1S2+, RRR  Pulmonary:  CTA b/l  GI:  Soft, BS+, NT, ND  Extremities: Right BKA noted            Current Medications:  Current Facility-Administered Medications   Medication Dose Route Frequency    0.9% sodium chloride infusion  75 mL/hr IntraVENous CONTINUOUS    piperacillin-tazobactam (ZOSYN) 3.375 g in 0.9% sodium chloride (MBP/ADV) 100 mL MBP  3.375 g IntraVENous Q6H    vancomycin (VANCOCIN) 1750 mg in  ml infusion  1,750 mg IntraVENous ONCE    [START ON 4/7/2021] vancomycin (VANCOCIN) 1250 mg in  ml infusion  1,250 mg IntraVENous Q12H    polyethylene glycol (MIRALAX) packet 17 g  17 g Oral DAILY    ondansetron hcl (ZOFRAN) tablet 4 mg  4 mg Oral TID PRN    senna-docusate (PERICOLACE) 8.6-50 mg per tablet 2 Tab  2 Tab Oral PCD    carvediloL (COREG) tablet 3.125 mg  3.125 mg Oral BID WITH MEALS    bisacodyL (DULCOLAX) tablet 10 mg  10 mg Oral Q48H PRN    insulin lispro (HUMALOG) injection   SubCUTAneous TIDAC    L. acidophilus,casei,rhamnosus (BIO-K PLUS) capsule 1 Cap  1 Cap Oral DAILY    atorvastatin (LIPITOR) tablet 80 mg  80 mg Oral DAILY    aspirin chewable tablet 81 mg  81 mg Oral DAILY WITH BREAKFAST    DULoxetine (CYMBALTA) capsule 30 mg  30 mg Oral DAILY    ezetimibe (ZETIA) tablet 10 mg  10 mg Oral QHS    [Held by provider] furosemide (LASIX) tablet 40 mg  40 mg Oral DAILY    gabapentin (NEURONTIN) capsule 100 mg  100 mg Oral BID    [Held by provider] lisinopriL (PRINIVIL, ZESTRIL) tablet 5 mg  5 mg Oral DAILY    oxyCODONE IR (ROXICODONE) tablet 5-10 mg  5-10 mg Oral Q4H PRN    acetaminophen (TYLENOL) tablet 650 mg  650 mg Oral TID    methocarbamoL (ROBAXIN) tablet 500 mg  500 mg Oral TID    rivaroxaban (XARELTO) tablet 20 mg  20 mg Oral DAILY WITH BREAKFAST    alogliptin (NESINA) tablet 25 mg  25 mg Oral DAILY    melatonin (rapid dissolve) tablet 5 mg  5 mg Oral QHS    zolpidem (AMBIEN) tablet 5 mg  5 mg Oral QHS PRN    glucose chewable tablet 16 g  4 Tab Oral PRN    glucagon (GLUCAGEN) injection 1 mg  1 mg IntraMUSCular PRN    dextrose (D50W) injection syrg 12.5-25 g  25-50 mL IntraVENous PRN    acetaminophen (TYLENOL) tablet 650 mg  650 mg Oral Q4H PRN    metFORMIN (GLUCOPHAGE) tablet 500 mg  500 mg Oral BID WITH MEALS       Allergies:   Allergies   Allergen Reactions    Nitroglycerin Other (comments)      BP drops rapidly when he takes SL Nitro          [place] Diarrhea and Rash    Cat Dander Cough    Codeine Rash    Strawberry Rash       Functional Progress:    PHYSICAL THERAPY    ON ADMISSION MOST RECENT   Wheelchair Mobility/Management  Able to Propel (ft): 250 feet(needing extra time)  Functional Level: 4(min A for steering initially, using bilat UE and left LE)  Curbs/Ramps Assist Required (FIM Score): 0 (Not tested)  Wheelchair Setup Assist Required : 3 (Moderate assistance)  Wheelchair Management: Manages left brake, Manages right brake(needing brake extender) Wheelchair Mobility/Management  Able to Propel (ft): (distance not challenged today)  Functional Level: 5  Curbs/Ramps Assist Required (FIM Score): 0 (Not tested)(pt with sepsis and IV today)  Wheelchair Setup Assist Required : 5 (Supervision/setup)  Wheelchair Management: Manages left brake, Manages right brake, Manages right footrest     Gait  Amount of Assistance: 4 (Minimal assistance)  Distance (ft): 20 Feet (ft)  Assistive Device: Walker, rolling(rigid dressing right residual limb) Gait  Amount of Assistance: 5 (Stand-by assistance)  Distance (ft): 20 Feet (ft)  Assistive Device: Gait belt, Walker, rolling     Balance-Sitting/Standing  Sitting - Static: Good (unsupported)  Sitting - Dynamic: Fair (occasional)  Standing - Static: Fair(needing UE support steadying surface)  Standing - Dynamic : Impaired Balance-Sitting/Standing  Sitting - Static: Good (unsupported)  Sitting - Dynamic: Good (unsupported)  Standing - Static: Poor, Constant support  Standing - Dynamic : Impaired     Bed/Mat Mobility  Rolling Right : 6 (Modified independent)  Rolling Left : 6 (Modified independent)  Supine to Sit : 5 (Supervision)  Sit to Supine : 5 (Supervision) Bed/Mat Mobility  Rolling Right : 7 (Independent)  Rolling Left : 7 (Independent)  Supine to Sit : 6 (Modified independent)  Sit to Supine : 6 (Modified independent)     Transfers  Transfer Type: Other  Other: step step with RW  Transfer Assistance : 4 (Minimal assistance)  Sit to Stand Assistance: Minimal assistance  Car Transfers: Not tested(to be further assessed)  Car Type: N/A Transfers  Transfer Type:  Other  Other: SPT without AD and stand step with RW  Transfer Assistance : 5 (Stand-by assistance)  Sit to Stand Assistance: Supervision  Car Transfers: Not tested  Car Type: (NT)     Steps or Stairs  Steps/Stairs Ambulated (#): 0  Level of Assist : 0 (Not tested)(NT due to safety concern)  Rail Use: (NT) Steps or Stairs  Steps/Stairs Ambulated (#): 0  Level of Assist : 0 (Not tested)  Rail Use: (NT)         Lab/Data Review:  Recent Results (from the past 24 hour(s))   GLUCOSE, POC    Collection Time: 04/05/21  8:38 PM   Result Value Ref Range    Glucose (POC) 154 (H) 70 - 110 mg/dL   GLUCOSE, POC    Collection Time: 04/06/21  7:28 AM   Result Value Ref Range    Glucose (POC) 143 (H) 70 - 110 mg/dL   LACTIC ACID    Collection Time: 04/06/21  8:50 AM   Result Value Ref Range    Lactic acid 2.1 (HH) 0.4 - 2.0 MMOL/L   METABOLIC PANEL, BASIC    Collection Time: 04/06/21  8:50 AM   Result Value Ref Range    Sodium 130 (L) 136 - 145 mmol/L    Potassium 4.6 3.5 - 5.5 mmol/L    Chloride 96 (L) 100 - 111 mmol/L    CO2 22 21 - 32 mmol/L    Anion gap 12 3.0 - 18 mmol/L    Glucose 216 (H) 74 - 99 mg/dL    BUN 20 (H) 7.0 - 18 MG/DL    Creatinine 1.26 0.6 - 1.3 MG/DL    BUN/Creatinine ratio 16 12 - 20      GFR est AA >60 >60 ml/min/1.73m2    GFR est non-AA 59 (L) >60 ml/min/1.73m2    Calcium 9.6 8.5 - 10.1 MG/DL   CBC WITH AUTOMATED DIFF    Collection Time: 04/06/21  8:50 AM   Result Value Ref Range    WBC 23.6 (H) 4.6 - 13.2 K/uL    RBC 4.30 (L) 4.35 - 5.65 M/uL    HGB 10.8 (L) 13.0 - 16.0 g/dL    HCT 34.2 (L) 36.0 - 48.0 %    MCV 79.5 74.0 - 97.0 FL    MCH 25.1 24.0 - 34.0 PG    MCHC 31.6 31.0 - 37.0 g/dL    RDW 14.3 11.6 - 14.5 %    PLATELET 294 (H) 709 - 420 K/uL    MPV 10.1 9.2 - 11.8 FL    NEUTROPHILS 90 (H) 40 - 73 %    LYMPHOCYTES 4 (L) 21 - 52 %    MONOCYTES 6 3 - 10 %    EOSINOPHILS 0 0 - 5 %    BASOPHILS 0 0 - 2 %    ABS. NEUTROPHILS 21.3 (H) 1.8 - 8.0 K/UL    ABS. LYMPHOCYTES 0.9 0.9 - 3.6 K/UL    ABS. MONOCYTES 1.4 (H) 0.05 - 1.2 K/UL    ABS. EOSINOPHILS 0.0 0.0 - 0.4 K/UL    ABS.  BASOPHILS 0.0 0.0 - 0.1 K/UL    DF MANUAL      PLATELET COMMENTS Increased Platelets      RBC COMMENTS NORMOCYTIC, NORMOCHROMIC     URINALYSIS W/ RFLX MICROSCOPIC    Collection Time: 04/06/21 10:28 AM   Result Value Ref Range    Color YELLOW      Appearance CLEAR      Specific gravity 1.016 1.005 - 1.030      pH (UA) 5.0 5.0 - 8.0      Protein >1,000 (A) NEG mg/dL    Glucose Negative NEG mg/dL    Ketone Negative NEG mg/dL    Bilirubin Negative NEG      Blood SMALL (A) NEG      Urobilinogen 0.2 0.2 - 1.0 EU/dL    Nitrites Negative NEG      Leukocyte Esterase Negative NEG     URINE MICROSCOPIC ONLY    Collection Time: 04/06/21 10:28 AM   Result Value Ref Range    WBC 0 to 1 0 - 4 /hpf    RBC 0 to 1 0 - 5 /hpf    Epithelial cells FEW 0 - 5 /lpf    Bacteria 1+ (A) NEG /hpf    Hyaline cast 0 to 1 0 - 2 /lpf    Granular cast 0 to 1 NEG /lpf   GLUCOSE, POC    Collection Time: 04/06/21 11:44 AM   Result Value Ref Range    Glucose (POC) 157 (H) 70 - 110 mg/dL       Assessment:     Primary Rehab Diagnosis  1. Impaired Mobility and ADLs  2. S/P Right below-the-knee amputation (3/22/2021 - Dr. Mirza Locke)  3. History of critical ischemia of the right lower extremity  4.  History of right lower extremity arterial embolectomy     Comorbidities  Patient Active Problem List   Diagnosis Code    Status post below knee amputation of right lower extremity (HCC) Z89.511    Impaired mobility and ADLs Z74.09, Z78.9    Critical ischemia of lower extremity ([facility]) I70.229    Peripheral artery disease ([facility]) I73.9    Coronary artery disease involving native coronary artery of native heart I25.10    Type 2 diabetes mellitus, without long-term current use of insulin ([facility]) E11.9    History of epilepsy Z86.69    [facility] D68.51    Migraine headache G43.909    Wears glasses O42.0    Umbilical hernia Q01.3    Obstructive sleep apnea G47.33    ICD (implantable cardioverter-defibrillator) in place Z95.810    Mitral valve prolapse I34.1    History of head injury Z87.828    Hypertensive heart disease with chronic systolic congestive heart failure ([facility]) I11.0, I50.22    History of myocardial infarction I25.2    Long term current use of aspirin Z79.82    On statin therapy due to risk of future cardiovascular event Z79.899    History of deep venous thrombosis (DVT) of distal vein of right lower extremity Z86.718    Anticoagulated by anticoagulation treatment Z79.01    Mixed hyperlipidemia E78.2    History of embolectomy Z98.890    COVID-19 ruled out by laboratory testing Z20.822    Acute blood loss as cause of postoperative anemia D62    Cardiomyopathy ([facility] [address] I42.9    Chronic systolic heart failure (HCC) I50.22    History of noncompliance with medical treatment Z91.19    Major depressive disorder F32.9    Grade I diastolic dysfunction M80.4    Constipation K59.00        Plan:     1. Justification for continued stay: Good progression towards established rehabilitation goals. 2. Medical Issues being followed closely:    [x]  Fall and safety precautions     [x]  Wound Care     [x]  Bowel and Bladder Function     [x]  Fluid Electrolyte and Nutrition Balance     [x]  Pain Control      3. Issues that 24 hour rehabilitation nursing is following:    [x]  Fall and safety precautions     [x]  Wound Care     [x]  Bowel and Bladder Function     [x]  Fluid Electrolyte and Nutrition Balance     [x]  Pain Control      [x]  Assistance with and education on in-room safety with transfers to and from the bed, wheelchair, toilet and shower. 4. Acute rehabilitation plan of care:    [x]  Continue current care and rehab. [x]  Physical Therapy           [x]  Occupational Therapy           []  Speech Therapy     []  Hold Rehab until further notice     5. Medications:    [x]  MAR Reviewed     [x]  Continue Present Medications     6. DVT Prophylaxis:      []  Enoxaparin     []  Unfractionated Heparin     []  Warfarin     [x]  NOAC     []  NEREYDA Stockings     []  Sequential Compression Device     []  None     7. Code status    [x]  Full code     []  Partial code     []  Do not intubate     []  Do not resuscitate     8. Orders:   > S/P Right below-the-knee amputation (3/22/2021 - Dr. Rohini Aguilar);  History of critical ischemia of the right lower extremity; History of right lower extremity arterial embolectomy   > Staples to be removed on 4/19/2021   Status post course of Augmentin, probiotics  > Acute Postoperative Blood Loss Anemia   > Monitor  > Coronary artery disease; Peripheral artery disease    Continue aspirin, statin, beta-blocker    > Hypertensive heart disease with chronic systolic heart failure; Cardiomyopathy; Chronic systolic heart failure; Grade I diastolic dysfunction   On Coreg    > Factor V Leiden mutation; History of deep venous thrombosis (DVT) of right lower extremity; Anticoagulated on Rivaroxaban   On Xarelto    > Major depressive disorder   > Continue Duloxetine 30 mg PO once daily    > Mixed hyperlipidemia   Atorvastatin and Zetia    > Type 2 diabetes mellitus, poorly controlled, without long-term current use of insulin   On alogliptin, Metformin, sliding scale     > Difficulty sleeping   > Continue:    > Melatonin 5 mg PO q HS    > Zolpidem 5 mg PO q HS PRN for sleep    > Constipation;    Continue bowel regimen    > COVID-19 ruled out by laboratory testing   > SARS-CoV-2 ([place] [facility], [facility]) (3/22/2021): Not detected    > Analgesia   > Continue Tylenol, duloxetine, gabapentin, Robaxin, oxycodone as needed    Patient had a low-grade fever this morning and was having some serosanguineous discharge from the BKA stump. I have started broad-spectrum antibiotics and sent cultures. Vascular surgery has been consulted.   Continue IV fluids  Discussed with RN    Discussed with patient      Signed:    Artemio Lara MD      April 6, 2021

## 2021-04-06 NOTE — INTERDISCIPLINARY ROUNDS
DANIELLE reviewed chart. SW received updates. Pt. Is undergoing a medical work up for. DME was sent over to [employer] [facility] to [facility]. DANIELLE requested for her to pause on processing his equipment order until medical clearance is provided and an updated discharge date is given. DANIELLE will continue to follow. Amber PATRICK, WILNERW, [facility] Doctoral Candidate, Doctor of Social Work

## 2021-04-06 NOTE — H&P (VIEW-ONLY)
History and Physical    Patient: Yanet Hoff MRN: [id number]  SSN: [id number]    YOB: 1965  Age: 60 y.o. Sex: male      Subjective:      Yanet Hoff is a 60 y.o. male who is seen in rehab after right BKA was noted to have drainage. Patient was sent to rehab on Augmentin and has finished his course of this. Now placed on Zosyn and Vancomycin. States that his pain is steady. He is currently afebrile. Past Medical History:   Diagnosis Date    Acute blood loss as cause of postoperative anemia 3/22/2021    Anticoagulated by anticoagulation treatment     On Rivaroxaban    [facility]2015    2D echocardiogram (3/24/2021) showed EF 37%; global hypokinesis of left ventricle; grade I diastolic dysfunction; 2D echocardiogram (12/17/2015) showed EF 35%; genealized hypokinesis more focally severe of the inferior and posterior segments; mild mitral regurgitation; trace tricuspud regurgitation; normal pulmonary artery pressure    Chronic systolic heart failure (HCC)     2D echocardiogram (3/24/2021) showed EF 37%; global hypokinesis of left ventricle; grade I diastolic dysfunction; 2D echocardiogram (12/17/2015) showed EF 35%; genealized hypokinesis more focally severe of the inferior and posterior segments; mild mitral regurgitation; trace tricuspud regurgitation; normal pulmonary artery pressure    Coronary artery disease involving native coronary artery of native heart     COVID-19 ruled out by laboratory testing 3/22/2021    SARS-CoV-2 ([facility]) (3/22/2021):  Not detected     Critical ischemia of lower extremity 3/22/2021    Right    Factor V Leiden mutation ([facility]     Grade I diastolic dysfunction 05/77/9180    2D echocardiogram (3/24/2021) showed EF 37%; global hypokinesis of left ventricle; grade I diastolic dysfunction    History of deep venous thrombosis (DVT) of distal vein of right lower extremity     History of epilepsy     History of head injury     History of myocardial infarction     History of noncompliance with medical treatment 3/18/2021    Hypertensive heart disease with chronic systolic congestive heart failure ([facility]     2D echocardiogram (3/24/2021) showed EF 37%; global hypokinesis of left ventricle; grade I diastolic dysfunction; 2D echocardiogram (12/17/2015) showed EF 35%; genealized hypokinesis more focally severe of the inferior and posterior segments; mild mitral regurgitation; trace tricuspud regurgitation; normal pulmonary artery pressure    Long term current use of aspirin     Major depressive disorder 03/24/2021    On Duloxetine    Migraine headache     Mitral valve prolapse     Mixed hyperlipidemia     Lipid profile (7/25/2018) showed , , HDL 27,     Obstructive sleep apnea     On statin therapy due to risk of future cardiovascular event     On Atorvastatin    Peripheral artery disease ([facility]     Type 2 diabetes mellitus, without long-term current use of insulin ([facility])     HbA1c (3/6/2021) = 51.3    Umbilical hernia     Wears glasses      Past Surgical History:   Procedure Laterality Date    HX BELOW KNEE AMPUTATION Right 03/22/2021    S/P Right below-the-knee amputation (3/22/2021 - Dr. Roberto Carlos Mar)    HX EMBOLECTOMY Right 03/09/2021    S/P Right lower extremity arterial embolectomy    HX IMPLANTABLE CARDIOVERTER DEFIBRILLATOR  2013      Family History   Problem Relation Age of Onset    Bleeding Prob Mother         Factor V Leiden mutation    Diabetes Mother     Stroke Mother     Hypertension Mother     Dementia Mother     Seizures Mother      Social History     Tobacco Use    Smoking status: Not on file   Substance Use Topics    Alcohol use: Not on file      Prior to Admission medications    Medication Sig Start Date End Date Taking? Authorizing Provider   acetaminophen (TYLENOL) 325 mg tablet Take 650 mg by mouth every four (4) hours as needed for Pain.    Yes Provider, Historical amoxicillin-clavulanate (AUGMENTIN) 875-125 mg per tablet Take 1 Tab by mouth every twelve (12) hours. For 10 doses   Yes Provider, Historical   aspirin 81 mg chewable tablet Take 81 mg by mouth daily. Yes Provider, Historical   atorvastatin (LIPITOR) 80 mg tablet Take 80 mg by mouth daily. For 30 days   Yes Provider, Historical   carvediloL (COREG) 6.25 mg tablet Take 6.25 mg by mouth two (2) times daily (with meals). Yes Provider, Historical   docusate sodium (Colace) 100 mg capsule Take 100 mg by mouth daily. Yes Provider, Historical   DULoxetine (Cymbalta) 30 mg capsule Take 30 mg by mouth daily. Yes Provider, Historical   ezetimibe (ZETIA) 10 mg tablet Take  by mouth nightly. Yes Provider, Historical   glucose blood VI test strips (FreeStyle Lite Strips) strip by Does Not Apply route See Admin Instructions. Yes Provider, Historical   furosemide (LASIX) 40 mg tablet Take 40 mg by mouth daily. For 30 days   Yes Provider, Historical   gabapentin (NEURONTIN) 100 mg capsule Take 100 mg by mouth two (2) times a day. Yes Provider, Historical   insulin glargine (LANTUS) 100 unit/mL injection 26 Units by SubCUTAneous route nightly. Yes Provider, Historical   lisinopriL (PRINIVIL, ZESTRIL) 5 mg tablet Take 5 mg by mouth daily. For 30 days   Yes Provider, Historical   oxyCODONE IR (ROXICODONE) 10 mg tab immediate release tablet Take 10 mg by mouth every six (6) hours as needed for Pain. Yes Provider, Historical   rivaroxaban (XARELTO) 20 mg tab tablet Take 20 mg by mouth daily. For 180 days   Yes Provider, Historical   SITagliptin (Januvia) 100 mg tablet Take 100 mg by mouth daily. Yes Provider, Historical   zolpidem (AMBIEN) 5 mg tablet Take 5 mg by mouth nightly as needed for Sleep.    Yes Provider, Historical        Allergies   Allergen Reactions    Nitroglycerin Other (comments)      BP drops rapidly when he takes SL Nitro          [place] Diarrhea and Rash    Cat Dander Cough    Codeine Rash  Strawberry Rash       Review of Systems:  Pertinent items are noted in the History of Present Illness. Objective:     Vitals:    04/05/21 1521 04/05/21 2119 04/06/21 0731 04/06/21 1502   BP: 116/75 118/75 128/83 119/73   Pulse: 95 92 (!) 104 93   Resp: 16 18 19 16   Temp: 96.9 °F (36.1 °C) 98.4 °F (36.9 °C) (!) 100.5 °F (38.1 °C) 97.7 °F (36.5 °C)   SpO2: 98% 99% 95% 97%   Weight:       Height:            Physical Exam:  GENERAL: alert, cooperative, no distress, appears stated age  EYE: negative  THROAT & NECK: normal and no erythema or exudates noted. LUNG: clear to auscultation bilaterally  HEART: regular rate and rhythm, S1, S2 normal, no murmur, click, rub or gallop  ABDOMEN: soft, non-tender. Bowel sounds normal. No masses,  no organomegaly  EXTREMITIES:  edema right below knee amputation stump. Mid-incision with erythema and purulent drainage. Medial incision with skin necrosis. Lateral incision with skin necrosis.      Assessment:     Hospital Problems  Date Reviewed: 3/31/2021          Codes Class Noted POA    Constipation ICD-10-CM: K59.00  ICD-9-CM: 564.00  3/29/2021 Yes        Peripheral artery disease (HCC) (Chronic) ICD-10-CM: I73.9  ICD-9-CM: 443.9  Unknown Yes        Type 2 diabetes mellitus, without long-term current use of insulin (HCC) (Chronic) ICD-10-CM: E11.9  ICD-9-CM: 250.00  Unknown Yes    Overview Signed 3/26/2021  5:49 PM by Lashell Guillermo MD     HbA1c (3/6/2021) = 12.9             Factor V Leiden mutation [facility] (Chronic) ICD-10-CM: C82.29  ICD-9-CM: 289.81  Unknown Yes        Hypertensive heart disease with chronic systolic congestive heart failure (HCC) (Chronic) ICD-10-CM: I11.0, I50.22  ICD-9-CM: 402.91, 428.22  Unknown Yes    Overview Signed 3/26/2021 11:06 PM by Lashell Guillermo MD     2D echocardiogram (12/17/2015) showed EF 35%; genealized hypokinesis more focally severe of the inferior and posterior segments; mild mitral regurgitation; trace tricuspud regurgitation; normal pulmonary artery pressure             Long term current use of aspirin (Chronic) ICD-10-CM: G49.30  ICD-9-CM: V58.66  Unknown Yes        On statin therapy due to risk of future cardiovascular event (Chronic) ICD-10-CM: Q24.065  ICD-9-CM: V58.69  Unknown Yes    Overview Signed 3/26/2021  4:34 PM by Tereso Busch MD     On Atorvastatin             History of deep venous thrombosis (DVT) of distal vein of right lower extremity ICD-10-CM: G18.273  ICD-9-CM: V12.51  Unknown Yes        Anticoagulated by anticoagulation treatment (Chronic) ICD-10-CM: Z79.01  ICD-9-CM: V58.61  Unknown Yes    Overview Signed 3/26/2021  4:36 PM by Tereso Busch MD     On Rivaroxaban             Mixed hyperlipidemia (Chronic) ICD-10-CM: E78.2  ICD-9-CM: 272.2  Unknown Yes    Overview Signed 3/26/2021 11:07 PM by Tereso Busch MD     Lipid profile (7/25/2018) showed , , HDL 27,              History of embolectomy ICD-10-CM: Z98.890  ICD-9-CM: V45.89  Unknown Yes    Overview Signed 3/26/2021  5:39 PM by Tereso Busch MD     S/P Right lower extremity arterial embolectomy             Major depressive disorder ICD-10-CM: F32.9  ICD-9-CM: 296.20  3/24/2021 Yes    Overview Signed 3/26/2021 11:31 PM by Tereso Busch MD     On Duloxetine             Grade I diastolic dysfunction DPE-33-BL: I51.9  ICD-9-CM: 429.9  3/24/2021 Yes    Overview Signed 3/26/2021 11:48 PM by Tereso Busch MD     2D echocardiogram (3/24/2021) showed EF 37%; global hypokinesis of left ventricle; grade I diastolic dysfunction             * (Principal) Status post below knee amputation of right lower extremity ([facility] ICD-10-CM: Z89.511  ICD-9-CM: V49.75  3/22/2021 Yes    Overview Signed 3/26/2021  5:43 PM by Tereso Busch MD     S/P Right below-the-knee amputation (3/22/2021 - Dr. Chriss Min)             Impaired mobility and ADLs ICD-10-CM: Z74.09, Z78.9  ICD-9-CM: V49.89  3/22/2021 Yes        Critical ischemia of lower extremity [facility]) ICD-10-CM: O40.132  ICD-9-CM: 459.9  3/22/2021 Yes    Overview Signed 3/26/2021  5:42 PM by Charly Bartlett MD     Right             COVID-19 ruled out by laboratory testing ICD-10-CM: Z20.822  ICD-9-CM: V01.79  3/22/2021 Yes    Overview Signed 3/26/2021  5:47 PM by Charly Bartlett MD     SARS-CoV-2 ([address] [place] [address]) (3/22/2021): Not detected               Acute blood loss as cause of postoperative anemia ICD-10-CM: D62  ICD-9-CM: 285.1  3/22/2021 Yes        History of noncompliance with medical treatment ICD-10-CM: Z91.19  ICD-9-CM: V15.81  3/18/2021 Yes            Infection of  Right below knee amputation stump. Plan: To OR on 4/7 for right below amputation washout and debridement with possible wound VAC placement. Procedure discussed with patient and he agrees to proceed. NPO after midnight except meds.      Signed By: Jerardo Perez MD     April 6, 2021

## 2021-04-06 NOTE — PROGRESS NOTES
Problem: Falls - Risk of  Goal: *Absence of Falls  Description: Document Herb Parker Fall Risk and appropriate interventions in the flowsheet.   Outcome: Progressing Towards Goal  Note: Fall Risk Interventions:  Mobility Interventions: Bed/chair exit alarm    Mentation Interventions: Bed/chair exit alarm, Evaluate medications/consider consulting pharmacy    Medication Interventions: Bed/chair exit alarm, Evaluate medications/consider consulting pharmacy, Patient to call before getting OOB    Elimination Interventions: Call light in reach, Bed/chair exit alarm, Patient to call for help with toileting needs              Problem: Patient Education: Go to Patient Education Activity  Goal: Patient/Family Education  Outcome: Progressing Towards Goal     Problem: Pain  Goal: *Control of Pain  Outcome: Progressing Towards Goal     Problem: Patient Education: Go to Patient Education Activity  Goal: Patient/Family Education  Outcome: Progressing Towards Goal     Problem: General Medical Care Plan  Goal: *Vital signs within specified parameters  Outcome: Progressing Towards Goal  Goal: *Labs within defined limits  Outcome: Progressing Towards Goal  Goal: *Absence of infection signs and symptoms  Outcome: Progressing Towards Goal  Goal: *Optimal pain control at patient's stated goal  Outcome: Progressing Towards Goal  Goal: *Skin integrity maintained  Outcome: Progressing Towards Goal  Goal: *Fluid volume balance  Outcome: Progressing Towards Goal  Goal: *Optimize nutritional status  Outcome: Progressing Towards Goal  Goal: *Anxiety reduced or absent  Outcome: Progressing Towards Goal  Goal: *Progressive mobility and function (eg: ADL's)  Outcome: Progressing Towards Goal     Problem: Patient Education: Go to Patient Education Activity  Goal: Patient/Family Education  Outcome: Progressing Towards Goal     Problem: Pressure Injury - Risk of  Goal: *Prevention of pressure injury  Description: Document Steve Scale and appropriate interventions in the flowsheet.   Outcome: Progressing Towards Goal  Note: Pressure Injury Interventions:  Sensory Interventions: Assess changes in LOC         Activity Interventions: Chair cushion, Increase time out of bed, Pressure redistribution bed/mattress(bed type)    Mobility Interventions: Chair cushion, HOB 30 degrees or less, Pressure redistribution bed/mattress (bed type)    Nutrition Interventions: Document food/fluid/supplement intake                     Problem: Patient Education: Go to Patient Education Activity  Goal: Patient/Family Education  Outcome: Progressing Towards Goal

## 2021-04-07 ENCOUNTER — HOSPITAL ENCOUNTER (INPATIENT)
Age: 56
LOS: 8 days | Discharge: NURSING, REHAB OR LONG-TERM CARE | DRG: 305 | End: 2021-04-15
Attending: SURGERY | Admitting: SURGERY
Payer: MEDICAID

## 2021-04-07 ENCOUNTER — ANESTHESIA EVENT (OUTPATIENT)
Dept: CARDIOTHORACIC SURGERY | Age: 56
DRG: 862 | End: 2021-04-07
Payer: MEDICAID

## 2021-04-07 ENCOUNTER — ANESTHESIA (OUTPATIENT)
Dept: CARDIOTHORACIC SURGERY | Age: 56
DRG: 862 | End: 2021-04-07
Payer: MEDICAID

## 2021-04-07 VITALS
DIASTOLIC BLOOD PRESSURE: 58 MMHG | BODY MASS INDEX: 24 KG/M2 | SYSTOLIC BLOOD PRESSURE: 87 MMHG | OXYGEN SATURATION: 100 % | HEART RATE: 80 BPM | TEMPERATURE: 97.2 F | RESPIRATION RATE: 20 BRPM | HEIGHT: 74 IN | WEIGHT: 187 LBS

## 2021-04-07 DIAGNOSIS — Z79.4 TYPE 2 DIABETES MELLITUS WITHOUT COMPLICATION, WITH LONG-TERM CURRENT USE OF INSULIN (HCC): Chronic | ICD-10-CM

## 2021-04-07 DIAGNOSIS — E11.9 TYPE 2 DIABETES MELLITUS WITHOUT COMPLICATION, WITH LONG-TERM CURRENT USE OF INSULIN (HCC): Chronic | ICD-10-CM

## 2021-04-07 DIAGNOSIS — T87.43 RIGHT BKA INFECTION (HCC): Primary | ICD-10-CM

## 2021-04-07 PROBLEM — T87.89 ISCHEMIA OF RIGHT BKA SITE (HCC): Status: ACTIVE | Noted: 2021-04-07

## 2021-04-07 PROBLEM — Z89.511 HX OF BKA, RIGHT (HCC): Status: ACTIVE | Noted: 2021-04-07

## 2021-04-07 PROBLEM — I99.8 ISCHEMIA OF RIGHT BKA SITE (HCC): Status: ACTIVE | Noted: 2021-04-07

## 2021-04-07 LAB
ANION GAP SERPL CALC-SCNC: 8 MMOL/L (ref 3–18)
BASOPHILS # BLD: 0 K/UL (ref 0–0.1)
BASOPHILS NFR BLD: 0 % (ref 0–2)
BUN SERPL-MCNC: 18 MG/DL (ref 7–18)
BUN/CREAT SERPL: 16 (ref 12–20)
CALCIUM SERPL-MCNC: 8.8 MG/DL (ref 8.5–10.1)
CHLORIDE SERPL-SCNC: 103 MMOL/L (ref 100–111)
CO2 SERPL-SCNC: 25 MMOL/L (ref 21–32)
COVID-19 RAPID TEST, COVR: NOT DETECTED
CREAT SERPL-MCNC: 1.16 MG/DL (ref 0.6–1.3)
DIFFERENTIAL METHOD BLD: ABNORMAL
EOSINOPHIL # BLD: 0.4 K/UL (ref 0–0.4)
EOSINOPHIL NFR BLD: 2 % (ref 0–5)
ERYTHROCYTE [DISTWIDTH] IN BLOOD BY AUTOMATED COUNT: 14.1 % (ref 11.6–14.5)
GLUCOSE BLD STRIP.AUTO-MCNC: 130 MG/DL (ref 70–110)
GLUCOSE BLD STRIP.AUTO-MCNC: 135 MG/DL (ref 70–110)
GLUCOSE BLD STRIP.AUTO-MCNC: 144 MG/DL (ref 70–110)
GLUCOSE SERPL-MCNC: 130 MG/DL (ref 74–99)
HCT VFR BLD AUTO: 30.4 % (ref 36–48)
HGB BLD-MCNC: 9.5 G/DL (ref 13–16)
LACTATE SERPL-SCNC: 0.8 MMOL/L (ref 0.4–2)
LYMPHOCYTES # BLD: 1.1 K/UL (ref 0.9–3.6)
LYMPHOCYTES NFR BLD: 5 % (ref 21–52)
MCH RBC QN AUTO: 25.4 PG (ref 24–34)
MCHC RBC AUTO-ENTMCNC: 31.3 G/DL (ref 31–37)
MCV RBC AUTO: 81.3 FL (ref 74–97)
MONOCYTES # BLD: 1.5 K/UL (ref 0.05–1.2)
MONOCYTES NFR BLD: 7 % (ref 3–10)
NEUTS SEG # BLD: 19 K/UL (ref 1.8–8)
NEUTS SEG NFR BLD: 86 % (ref 40–73)
PLATELET # BLD AUTO: 546 K/UL (ref 135–420)
PLATELET COMMENTS,PCOM: ABNORMAL
PMV BLD AUTO: 9.8 FL (ref 9.2–11.8)
POTASSIUM SERPL-SCNC: 4.6 MMOL/L (ref 3.5–5.5)
RBC # BLD AUTO: 3.74 M/UL (ref 4.35–5.65)
RBC MORPH BLD: ABNORMAL
SARS-COV-2, COV2: NORMAL
SARS-COV-2, COV2: NORMAL
SODIUM SERPL-SCNC: 136 MMOL/L (ref 136–145)
SOURCE, COVRS: NORMAL
WBC # BLD AUTO: 22 K/UL (ref 4.6–13.2)

## 2021-04-07 PROCEDURE — 77030018390 HC SPNG HEMSTAT2 J&J -B: Performed by: SURGERY

## 2021-04-07 PROCEDURE — 01404 ANES OPN/ARTHRS DISRTCJ KNEE: CPT | Performed by: ANESTHESIOLOGY

## 2021-04-07 PROCEDURE — 76060000033 HC ANESTHESIA 1 TO 1.5 HR: Performed by: SURGERY

## 2021-04-07 PROCEDURE — 80048 BASIC METABOLIC PNL TOTAL CA: CPT

## 2021-04-07 PROCEDURE — 74011250637 HC RX REV CODE- 250/637: Performed by: INTERNAL MEDICINE

## 2021-04-07 PROCEDURE — 77030002996 HC SUT SLK J&J -A: Performed by: SURGERY

## 2021-04-07 PROCEDURE — 85025 COMPLETE CBC W/AUTO DIFF WBC: CPT

## 2021-04-07 PROCEDURE — 87186 SC STD MICRODIL/AGAR DIL: CPT

## 2021-04-07 PROCEDURE — 87205 SMEAR GRAM STAIN: CPT

## 2021-04-07 PROCEDURE — 83605 ASSAY OF LACTIC ACID: CPT

## 2021-04-07 PROCEDURE — 87635 SARS-COV-2 COVID-19 AMP PRB: CPT

## 2021-04-07 PROCEDURE — 74011250636 HC RX REV CODE- 250/636: Performed by: NURSE ANESTHETIST, CERTIFIED REGISTERED

## 2021-04-07 PROCEDURE — 2709999900 HC NON-CHARGEABLE SUPPLY

## 2021-04-07 PROCEDURE — 77030008462 HC STPLR SKN PROX J&J -A: Performed by: SURGERY

## 2021-04-07 PROCEDURE — 76210000006 HC OR PH I REC 0.5 TO 1 HR: Performed by: SURGERY

## 2021-04-07 PROCEDURE — 76010000113 HC CV SURG 1 TO 1.5 HR: Performed by: SURGERY

## 2021-04-07 PROCEDURE — 74011250636 HC RX REV CODE- 250/636: Performed by: INTERNAL MEDICINE

## 2021-04-07 PROCEDURE — 87077 CULTURE AEROBIC IDENTIFY: CPT

## 2021-04-07 PROCEDURE — 01404 ANES OPN/ARTHRS DISRTCJ KNEE: CPT | Performed by: NURSE ANESTHETIST, CERTIFIED REGISTERED

## 2021-04-07 PROCEDURE — 88307 TISSUE EXAM BY PATHOLOGIST: CPT

## 2021-04-07 PROCEDURE — 77030040361 HC SLV COMPR DVT MDII -B: Performed by: SURGERY

## 2021-04-07 PROCEDURE — 2709999900 HC NON-CHARGEABLE SUPPLY: Performed by: SURGERY

## 2021-04-07 PROCEDURE — 77030027138 HC INCENT SPIROMETER -A

## 2021-04-07 PROCEDURE — 74011250637 HC RX REV CODE- 250/637: Performed by: SURGERY

## 2021-04-07 PROCEDURE — U0003 INFECTIOUS AGENT DETECTION BY NUCLEIC ACID (DNA OR RNA); SEVERE ACUTE RESPIRATORY SYNDROME CORONAVIRUS 2 (SARS-COV-2) (CORONAVIRUS DISEASE [COVID-19]), AMPLIFIED PROBE TECHNIQUE, MAKING USE OF HIGH THROUGHPUT TECHNOLOGIES AS DESCRIBED BY CMS-2020-01-R: HCPCS

## 2021-04-07 PROCEDURE — 82962 GLUCOSE BLOOD TEST: CPT

## 2021-04-07 PROCEDURE — 77030040922 HC BLNKT HYPOTHRM STRY -A: Performed by: SURGERY

## 2021-04-07 PROCEDURE — 65270000029 HC RM PRIVATE

## 2021-04-07 PROCEDURE — 87075 CULTR BACTERIA EXCEPT BLOOD: CPT

## 2021-04-07 PROCEDURE — 77030013708 HC HNDPC SUC IRR PULS STRY –B: Performed by: SURGERY

## 2021-04-07 PROCEDURE — 74011250636 HC RX REV CODE- 250/636: Performed by: EMERGENCY MEDICINE

## 2021-04-07 PROCEDURE — 88311 DECALCIFY TISSUE: CPT

## 2021-04-07 PROCEDURE — 36415 COLL VENOUS BLD VENIPUNCTURE: CPT

## 2021-04-07 PROCEDURE — 99223 1ST HOSP IP/OBS HIGH 75: CPT | Performed by: INTERNAL MEDICINE

## 2021-04-07 PROCEDURE — 74011000258 HC RX REV CODE- 258: Performed by: INTERNAL MEDICINE

## 2021-04-07 PROCEDURE — 74011000258 HC RX REV CODE- 258: Performed by: EMERGENCY MEDICINE

## 2021-04-07 PROCEDURE — 77030010509 HC AIRWY LMA MSK TELE -A: Performed by: ANESTHESIOLOGY

## 2021-04-07 PROCEDURE — 77030010512 HC APPL CLP LIG J&J -C: Performed by: SURGERY

## 2021-04-07 PROCEDURE — 74011000250 HC RX REV CODE- 250: Performed by: NURSE ANESTHETIST, CERTIFIED REGISTERED

## 2021-04-07 PROCEDURE — 77030018673: Performed by: SURGERY

## 2021-04-07 RX ORDER — HYDROMORPHONE HYDROCHLORIDE 2 MG/ML
0.5 INJECTION, SOLUTION INTRAMUSCULAR; INTRAVENOUS; SUBCUTANEOUS
Status: DISCONTINUED | OUTPATIENT
Start: 2021-04-07 | End: 2021-04-07

## 2021-04-07 RX ORDER — MAGNESIUM SULFATE 100 %
4 CRYSTALS MISCELLANEOUS AS NEEDED
Status: DISCONTINUED | OUTPATIENT
Start: 2021-04-07 | End: 2021-04-15 | Stop reason: HOSPADM

## 2021-04-07 RX ORDER — FENTANYL CITRATE 50 UG/ML
50 INJECTION, SOLUTION INTRAMUSCULAR; INTRAVENOUS
Status: CANCELLED | OUTPATIENT
Start: 2021-04-07

## 2021-04-07 RX ORDER — SODIUM CHLORIDE 0.9 % (FLUSH) 0.9 %
5-40 SYRINGE (ML) INJECTION EVERY 8 HOURS
Status: DISCONTINUED | OUTPATIENT
Start: 2021-04-07 | End: 2021-04-09

## 2021-04-07 RX ORDER — SODIUM CHLORIDE 0.9 % (FLUSH) 0.9 %
5-40 SYRINGE (ML) INJECTION EVERY 8 HOURS
Status: CANCELLED | OUTPATIENT
Start: 2021-04-07

## 2021-04-07 RX ORDER — CALCIUM CARB/MAGNESIUM CARB 311-232MG
5 TABLET ORAL
Status: DISCONTINUED | OUTPATIENT
Start: 2021-04-07 | End: 2021-04-15 | Stop reason: HOSPADM

## 2021-04-07 RX ORDER — HYDROMORPHONE HYDROCHLORIDE 2 MG/ML
0.25 INJECTION, SOLUTION INTRAMUSCULAR; INTRAVENOUS; SUBCUTANEOUS
Status: DISCONTINUED | OUTPATIENT
Start: 2021-04-07 | End: 2021-04-07

## 2021-04-07 RX ORDER — LIDOCAINE HYDROCHLORIDE 20 MG/ML
INJECTION, SOLUTION EPIDURAL; INFILTRATION; INTRACAUDAL; PERINEURAL AS NEEDED
Status: DISCONTINUED | OUTPATIENT
Start: 2021-04-07 | End: 2021-04-07 | Stop reason: HOSPADM

## 2021-04-07 RX ORDER — OXYCODONE HYDROCHLORIDE 5 MG/1
5 TABLET ORAL
Status: DISCONTINUED | OUTPATIENT
Start: 2021-04-07 | End: 2021-04-08

## 2021-04-07 RX ORDER — ZOLPIDEM TARTRATE 5 MG/1
5 TABLET ORAL
Status: DISCONTINUED | OUTPATIENT
Start: 2021-04-07 | End: 2021-04-15 | Stop reason: HOSPADM

## 2021-04-07 RX ORDER — SODIUM CHLORIDE 9 MG/ML
INJECTION, SOLUTION INTRAVENOUS
Status: DISCONTINUED | OUTPATIENT
Start: 2021-04-07 | End: 2021-04-07 | Stop reason: HOSPADM

## 2021-04-07 RX ORDER — ASPIRIN 81 MG/1
81 TABLET ORAL DAILY
Status: DISCONTINUED | OUTPATIENT
Start: 2021-04-08 | End: 2021-04-15 | Stop reason: HOSPADM

## 2021-04-07 RX ORDER — FENTANYL CITRATE 50 UG/ML
25 INJECTION, SOLUTION INTRAMUSCULAR; INTRAVENOUS AS NEEDED
Status: CANCELLED | OUTPATIENT
Start: 2021-04-07

## 2021-04-07 RX ORDER — MAGNESIUM SULFATE 100 %
4 CRYSTALS MISCELLANEOUS AS NEEDED
Status: CANCELLED | OUTPATIENT
Start: 2021-04-07

## 2021-04-07 RX ORDER — ONDANSETRON 2 MG/ML
8 INJECTION INTRAMUSCULAR; INTRAVENOUS
Status: DISCONTINUED | OUTPATIENT
Start: 2021-04-07 | End: 2021-04-15 | Stop reason: HOSPADM

## 2021-04-07 RX ORDER — GABAPENTIN 100 MG/1
100 CAPSULE ORAL 2 TIMES DAILY
Status: DISCONTINUED | OUTPATIENT
Start: 2021-04-07 | End: 2021-04-12

## 2021-04-07 RX ORDER — DEXTROSE 50 % IN WATER (D50W) INTRAVENOUS SYRINGE
25-50 AS NEEDED
Status: CANCELLED | OUTPATIENT
Start: 2021-04-07

## 2021-04-07 RX ORDER — DULOXETIN HYDROCHLORIDE 30 MG/1
30 CAPSULE, DELAYED RELEASE ORAL DAILY
Status: DISCONTINUED | OUTPATIENT
Start: 2021-04-08 | End: 2021-04-15 | Stop reason: HOSPADM

## 2021-04-07 RX ORDER — HYDROCODONE BITARTRATE AND ACETAMINOPHEN 5; 325 MG/1; MG/1
1 TABLET ORAL ONCE
Status: CANCELLED | OUTPATIENT
Start: 2021-04-07 | End: 2021-04-08

## 2021-04-07 RX ORDER — ACETAMINOPHEN 325 MG/1
650 TABLET ORAL EVERY 6 HOURS
Status: DISCONTINUED | OUTPATIENT
Start: 2021-04-07 | End: 2021-04-13

## 2021-04-07 RX ORDER — FENTANYL CITRATE 50 UG/ML
INJECTION, SOLUTION INTRAMUSCULAR; INTRAVENOUS AS NEEDED
Status: DISCONTINUED | OUTPATIENT
Start: 2021-04-07 | End: 2021-04-07 | Stop reason: HOSPADM

## 2021-04-07 RX ORDER — SODIUM CHLORIDE, SODIUM LACTATE, POTASSIUM CHLORIDE, CALCIUM CHLORIDE 600; 310; 30; 20 MG/100ML; MG/100ML; MG/100ML; MG/100ML
50 INJECTION, SOLUTION INTRAVENOUS CONTINUOUS
Status: CANCELLED | OUTPATIENT
Start: 2021-04-07

## 2021-04-07 RX ORDER — ONDANSETRON 2 MG/ML
4 INJECTION INTRAMUSCULAR; INTRAVENOUS
Status: DISCONTINUED | OUTPATIENT
Start: 2021-04-07 | End: 2021-04-07 | Stop reason: SDUPTHER

## 2021-04-07 RX ORDER — INSULIN LISPRO 100 [IU]/ML
INJECTION, SOLUTION INTRAVENOUS; SUBCUTANEOUS
Status: DISCONTINUED | OUTPATIENT
Start: 2021-04-07 | End: 2021-04-15 | Stop reason: HOSPADM

## 2021-04-07 RX ORDER — ONDANSETRON 2 MG/ML
4 INJECTION INTRAMUSCULAR; INTRAVENOUS ONCE
Status: CANCELLED | OUTPATIENT
Start: 2021-04-07 | End: 2021-04-07

## 2021-04-07 RX ORDER — SODIUM CHLORIDE 0.9 % (FLUSH) 0.9 %
5-40 SYRINGE (ML) INJECTION AS NEEDED
Status: CANCELLED | OUTPATIENT
Start: 2021-04-07

## 2021-04-07 RX ORDER — ONDANSETRON 2 MG/ML
INJECTION INTRAMUSCULAR; INTRAVENOUS AS NEEDED
Status: DISCONTINUED | OUTPATIENT
Start: 2021-04-07 | End: 2021-04-07 | Stop reason: HOSPADM

## 2021-04-07 RX ORDER — CARVEDILOL 3.12 MG/1
3.12 TABLET ORAL EVERY 12 HOURS
Status: DISCONTINUED | OUTPATIENT
Start: 2021-04-07 | End: 2021-04-15 | Stop reason: HOSPADM

## 2021-04-07 RX ORDER — DEXTROSE 50 % IN WATER (D50W) INTRAVENOUS SYRINGE
25-50 AS NEEDED
Status: DISCONTINUED | OUTPATIENT
Start: 2021-04-07 | End: 2021-04-12

## 2021-04-07 RX ORDER — INSULIN LISPRO 100 [IU]/ML
INJECTION, SOLUTION INTRAVENOUS; SUBCUTANEOUS ONCE
Status: CANCELLED | OUTPATIENT
Start: 2021-04-07 | End: 2021-04-08

## 2021-04-07 RX ORDER — PHENYLEPHRINE HCL IN 0.9% NACL 1 MG/10 ML
SYRINGE (ML) INTRAVENOUS AS NEEDED
Status: DISCONTINUED | OUTPATIENT
Start: 2021-04-07 | End: 2021-04-07 | Stop reason: HOSPADM

## 2021-04-07 RX ORDER — PROPOFOL 10 MG/ML
INJECTION, EMULSION INTRAVENOUS AS NEEDED
Status: DISCONTINUED | OUTPATIENT
Start: 2021-04-07 | End: 2021-04-07 | Stop reason: HOSPADM

## 2021-04-07 RX ORDER — SODIUM CHLORIDE 0.9 % (FLUSH) 0.9 %
5-40 SYRINGE (ML) INJECTION AS NEEDED
Status: DISCONTINUED | OUTPATIENT
Start: 2021-04-07 | End: 2021-04-15 | Stop reason: HOSPADM

## 2021-04-07 RX ORDER — HYDROMORPHONE HYDROCHLORIDE 2 MG/ML
0.25 INJECTION, SOLUTION INTRAMUSCULAR; INTRAVENOUS; SUBCUTANEOUS
Status: DISCONTINUED | OUTPATIENT
Start: 2021-04-07 | End: 2021-04-12

## 2021-04-07 RX ORDER — VANCOMYCIN HYDROCHLORIDE
1250 EVERY 12 HOURS
Status: DISCONTINUED | OUTPATIENT
Start: 2021-04-07 | End: 2021-04-10

## 2021-04-07 RX ORDER — HYDROMORPHONE HYDROCHLORIDE 2 MG/ML
0.5 INJECTION, SOLUTION INTRAMUSCULAR; INTRAVENOUS; SUBCUTANEOUS
Status: DISCONTINUED | OUTPATIENT
Start: 2021-04-07 | End: 2021-04-12

## 2021-04-07 RX ORDER — POLYETHYLENE GLYCOL 3350 17 G/17G
17 POWDER, FOR SOLUTION ORAL
Status: DISCONTINUED | OUTPATIENT
Start: 2021-04-07 | End: 2021-04-15 | Stop reason: HOSPADM

## 2021-04-07 RX ORDER — SODIUM CHLORIDE 9 MG/ML
50 INJECTION, SOLUTION INTRAVENOUS ONCE
Status: CANCELLED | OUTPATIENT
Start: 2021-04-07 | End: 2021-04-08

## 2021-04-07 RX ORDER — EZETIMIBE 10 MG/1
10 TABLET ORAL DAILY
Status: DISCONTINUED | OUTPATIENT
Start: 2021-04-08 | End: 2021-04-15 | Stop reason: HOSPADM

## 2021-04-07 RX ORDER — MIDAZOLAM HYDROCHLORIDE 1 MG/ML
INJECTION, SOLUTION INTRAMUSCULAR; INTRAVENOUS AS NEEDED
Status: DISCONTINUED | OUTPATIENT
Start: 2021-04-07 | End: 2021-04-07 | Stop reason: HOSPADM

## 2021-04-07 RX ORDER — ATORVASTATIN CALCIUM 40 MG/1
80 TABLET, FILM COATED ORAL
Status: DISCONTINUED | OUTPATIENT
Start: 2021-04-07 | End: 2021-04-15 | Stop reason: HOSPADM

## 2021-04-07 RX ADMIN — FENTANYL CITRATE 50 MCG: 50 INJECTION, SOLUTION INTRAMUSCULAR; INTRAVENOUS at 14:03

## 2021-04-07 RX ADMIN — PROPOFOL 50 MG: 10 INJECTION, EMULSION INTRAVENOUS at 14:20

## 2021-04-07 RX ADMIN — PIPERACILLIN SODIUM AND TAZOBACTAM SODIUM 3.38 G: 3; .375 INJECTION, POWDER, LYOPHILIZED, FOR SOLUTION INTRAVENOUS at 20:45

## 2021-04-07 RX ADMIN — ALOGLIPTIN 25 MG: 25 TABLET, FILM COATED ORAL at 09:01

## 2021-04-07 RX ADMIN — FENTANYL CITRATE 50 MCG: 50 INJECTION, SOLUTION INTRAMUSCULAR; INTRAVENOUS at 14:31

## 2021-04-07 RX ADMIN — METHOCARBAMOL TABLETS 500 MG: 500 TABLET, COATED ORAL at 09:00

## 2021-04-07 RX ADMIN — ONDANSETRON 4 MG: 2 INJECTION INTRAMUSCULAR; INTRAVENOUS at 13:54

## 2021-04-07 RX ADMIN — OXYCODONE HYDROCHLORIDE 10 MG: 5 TABLET ORAL at 10:07

## 2021-04-07 RX ADMIN — GABAPENTIN 100 MG: 100 CAPSULE ORAL at 20:45

## 2021-04-07 RX ADMIN — OXYCODONE HYDROCHLORIDE 5 MG: 5 TABLET ORAL at 22:08

## 2021-04-07 RX ADMIN — VANCOMYCIN HYDROCHLORIDE 1250 MG: 10 INJECTION, POWDER, LYOPHILIZED, FOR SOLUTION INTRAVENOUS at 22:14

## 2021-04-07 RX ADMIN — ATORVASTATIN CALCIUM 80 MG: 40 TABLET, FILM COATED ORAL at 09:01

## 2021-04-07 RX ADMIN — CARVEDILOL 3.12 MG: 6.25 TABLET, FILM COATED ORAL at 20:44

## 2021-04-07 RX ADMIN — Medication 100 MCG: at 14:33

## 2021-04-07 RX ADMIN — ACETAMINOPHEN 650 MG: 325 TABLET ORAL at 09:01

## 2021-04-07 RX ADMIN — ATORVASTATIN CALCIUM 80 MG: 40 TABLET, FILM COATED ORAL at 21:32

## 2021-04-07 RX ADMIN — Medication 5 MG: at 21:32

## 2021-04-07 RX ADMIN — PIPERACILLIN SODIUM AND TAZOBACTAM SODIUM 3.38 G: 3; .375 INJECTION, POWDER, LYOPHILIZED, FOR SOLUTION INTRAVENOUS at 11:42

## 2021-04-07 RX ADMIN — MIDAZOLAM HYDROCHLORIDE 2 MG: 2 INJECTION, SOLUTION INTRAMUSCULAR; INTRAVENOUS at 13:40

## 2021-04-07 RX ADMIN — METFORMIN HYDROCHLORIDE 500 MG: 500 TABLET ORAL at 09:01

## 2021-04-07 RX ADMIN — VANCOMYCIN HYDROCHLORIDE 1250 MG: 10 INJECTION, POWDER, LYOPHILIZED, FOR SOLUTION INTRAVENOUS at 06:50

## 2021-04-07 RX ADMIN — CARVEDILOL 3.12 MG: 6.25 TABLET, FILM COATED ORAL at 09:00

## 2021-04-07 RX ADMIN — ASPIRIN 81 MG CHEWABLE TABLET 81 MG: 81 TABLET CHEWABLE at 09:01

## 2021-04-07 RX ADMIN — DULOXETINE HYDROCHLORIDE 30 MG: 30 CAPSULE, DELAYED RELEASE ORAL at 09:01

## 2021-04-07 RX ADMIN — Medication 10 ML: at 20:46

## 2021-04-07 RX ADMIN — OXYCODONE HYDROCHLORIDE 5 MG: 5 TABLET ORAL at 05:54

## 2021-04-07 RX ADMIN — PIPERACILLIN SODIUM AND TAZOBACTAM SODIUM 3.38 G: 3; .375 INJECTION, POWDER, LYOPHILIZED, FOR SOLUTION INTRAVENOUS at 01:01

## 2021-04-07 RX ADMIN — FENTANYL CITRATE 50 MCG: 50 INJECTION, SOLUTION INTRAMUSCULAR; INTRAVENOUS at 14:22

## 2021-04-07 RX ADMIN — SODIUM CHLORIDE 75 ML/HR: 900 INJECTION, SOLUTION INTRAVENOUS at 05:45

## 2021-04-07 RX ADMIN — PIPERACILLIN SODIUM AND TAZOBACTAM SODIUM 3.38 G: 3; .375 INJECTION, POWDER, LYOPHILIZED, FOR SOLUTION INTRAVENOUS at 06:00

## 2021-04-07 RX ADMIN — PROPOFOL 150 MG: 10 INJECTION, EMULSION INTRAVENOUS at 13:46

## 2021-04-07 RX ADMIN — LIDOCAINE HYDROCHLORIDE 40 MG: 20 INJECTION, SOLUTION EPIDURAL; INFILTRATION; INTRACAUDAL; PERINEURAL at 13:46

## 2021-04-07 RX ADMIN — ACETAMINOPHEN 650 MG: 325 TABLET ORAL at 11:43

## 2021-04-07 RX ADMIN — SODIUM CHLORIDE: 900 INJECTION, SOLUTION INTRAVENOUS at 13:40

## 2021-04-07 RX ADMIN — POLYETHYLENE GLYCOL 3350 17 G: 17 POWDER, FOR SOLUTION ORAL at 09:03

## 2021-04-07 RX ADMIN — GABAPENTIN 100 MG: 100 CAPSULE ORAL at 09:02

## 2021-04-07 RX ADMIN — ACETAMINOPHEN 650 MG: 325 TABLET ORAL at 22:18

## 2021-04-07 RX ADMIN — HYDROMORPHONE HYDROCHLORIDE 0.5 MG: 2 INJECTION, SOLUTION INTRAMUSCULAR; INTRAVENOUS; SUBCUTANEOUS at 20:40

## 2021-04-07 RX ADMIN — FENTANYL CITRATE 50 MCG: 50 INJECTION, SOLUTION INTRAMUSCULAR; INTRAVENOUS at 13:49

## 2021-04-07 RX ADMIN — Medication 1 CAPSULE: at 09:02

## 2021-04-07 NOTE — OP NOTES
OPERATIVE NOTE     RIGHT BKA WASHOUT AND KNEE DISARTICULATION        Date of Service: 4/7/2021    Preoperative Diagnosis: infected right below knee amputation stump    Postoperative Diagnosis: Extensive infection of right below-knee amputation stump    Procedure Performed:   1. Washout of right below-knee amputation   2. Right knee disarticulation    Surgeon: Kim Florian MD PhD    Assistant: Lizette Garcia SA    Anesthesia: Lova Alfred, Dr. Phillip Sandifer    EBL: 200 ml    Findings: Extensive infection of the right BKA stump without viable muscle tissue remaining below the knee. Knee disarticulation performed. Stump was subsequently washed out with Pulsavac and left open    Disposition: PACU, in stable condition      INDICATION:   Mr. Geralynn Cushing is a 40-year-old gentleman with significant past medical history, including CHF, diabetes, cardiomyopathy, factor V Leiden and severe peripheral arterial disease. He underwent multiple vascular interventions on his right lower extremity which ultimately failed. He underwent a right below-knee amputation approximately 3 weeks ago at [facility]. He was transferred to rehabilitation and was found to have drainage from his amputation site yesterday. Upon physical examination the site was clearly infected and a washout possible revision was recommended. He now presents for this procedure. Risks and benefits were discussed and he opted to proceed. DESCRIPTION OF PROCEDURE IN DETAIL:   After informed consent was obtained, the patient was brought to the operating room and placed on the OR table in supine position. General anesthesia was induced. Perioperative antibiotics were not administered as the patient is on scheduled antibiotics. The patient was prepped with Betadine and draped in standard sterile fashion with the right leg exposed. A safety pause was performed with all necessary personnel and equipment in the room. Sutures and staples were removed from the incision. Immediately copious amounts of purulent drainage was identified. This was cultured. The posterior flap was essentially devitalized with no evidence of viable muscle tissue. The bone was exposed and bathed in pus. Necrotic tissue was excised using a #10 scalpel blade. However, upon further investigation it became clear that the posterior muscle groups below the knee were extensively infected and non-viable. At that point the decision was made to proceed with a right knee disarticulation. Circumferential incision was made overlying the joint space. This was carried through subcutaneous tissue using electrocautery. The joint space was entered, the lower leg transsected and removed from the operative field. This revealed viable tissue above the knee. Hemostasis was obtained using suture ligatures as well as electrocautery. The stump was then irrigated with Pulsavac. The wound was wrapped with Betadine gauze and then dressed with Kerlix. This was secured with Cem Real. The patient tolerated the procedure well. he was awakened from anesthesia, extubated and transported to the PACU in stable condition.          Daylin Liriano MD PhD  Vascular Surgery  Covering for [facility]

## 2021-04-07 NOTE — REHAB NOTE
TRANSFER - OUT REPORT:    Verbal report given to Priyank RN(name) on Maira Acharya  being transferred to Preop (unit) for ordered procedure       Report consisted of patients Situation, Background, Assessment and   Recommendations(SBAR). Information from the following report(s) SBAR, Kardex, MAR, Recent Results and Pre Procedure Checklist was reviewed with the receiving nurse. Lines:   Peripheral IV 04/06/21 Anterior; Left Forearm (Active)   Site Assessment Clean, dry, & intact 04/06/21 2000   Phlebitis Assessment 0 04/06/21 2000   Infiltration Assessment 0 04/06/21 2000   Dressing Status Clean, dry, & intact 04/06/21 2000   Dressing Type Transparent 04/06/21 2000   Hub Color/Line Status Blue; Infusing 04/06/21 2000   Alcohol Cap Used Yes 04/06/21 2000        Opportunity for questions and clarification was provided.       Patient transported with:   Pt addi

## 2021-04-07 NOTE — INTERVAL H&P NOTE
Update History & Physical    The Patient's History and Physical of April 6, 2021 was reviewed with the patient and I examined the patient. There was no change. The surgical site was confirmed by the patient and me. Plan:  The risk, benefits, expected outcome, and alternative to the recommended procedure have been discussed with the patient. Patient understands and wants to proceed with the procedure.     Electronically signed by Arvind Elizabeth MD on 4/7/2021 at 1:39 PM

## 2021-04-07 NOTE — PROGRESS NOTES
SHIFT CHANGE NOTE FOR [place]    Bedside and Verbal shift change report given to Adam Cortez (oncoming nurse) by Leigha Hsieh  (offgoing nurse). Report included the following information SBAR, Kardex, MAR and Recent Results.     Situation:   Code Status: Full Code   Reason for Admission: [address] Day: 12   Problem List:   Hospital Problems  Date Reviewed: 3/31/2021          Codes Class Noted POA    Constipation ICD-10-CM: K59.00  ICD-9-CM: 564.00  3/29/2021 Yes        Peripheral artery disease (HCC) (Chronic) ICD-10-CM: I73.9  ICD-9-CM: 443.9  Unknown Yes        Type 2 diabetes mellitus, without long-term current use of insulin (HCC) (Chronic) ICD-10-CM: E11.9  ICD-9-CM: 250.00  Unknown Yes    Overview Signed 3/26/2021  5:49 PM by Anusha Hillman MD     HbA1c (3/6/2021) = 12.9             Factor V Leiden mutation [facility] (Chronic) ICD-10-CM: J45.78  ICD-9-CM: 289.81  Unknown Yes        Hypertensive heart disease with chronic systolic congestive heart failure (HCC) (Chronic) ICD-10-CM: I11.0, I50.22  ICD-9-CM: 402.91, 428.22  Unknown Yes    Overview Signed 3/26/2021 11:06 PM by Anusha Hillman MD     2D echocardiogram (12/17/2015) showed EF 35%; genealized hypokinesis more focally severe of the inferior and posterior segments; mild mitral regurgitation; trace tricuspud regurgitation; normal pulmonary artery pressure             Long term current use of aspirin (Chronic) ICD-10-CM: D71.05  ICD-9-CM: V58.66  Unknown Yes        On statin therapy due to risk of future cardiovascular event (Chronic) ICD-10-CM: N32.362  ICD-9-CM: V58.69  Unknown Yes    Overview Signed 3/26/2021  4:34 PM by Anusha Hillman MD     On Atorvastatin             History of deep venous thrombosis (DVT) of distal vein of right lower extremity ICD-10-CM: Z86.718  ICD-9-CM: V12.51  Unknown Yes        Anticoagulated by anticoagulation treatment (Chronic) ICD-10-CM: Z79.01  ICD-9-CM: V58.61  Unknown Yes    Overview Signed 3/26/2021  4:36 PM by Nata Barragan MD     On Rivaroxaban             Mixed hyperlipidemia (Chronic) ICD-10-CM: E78.2  ICD-9-CM: 272.2  Unknown Yes    Overview Signed 3/26/2021 11:07 PM by Nata Barragan MD     Lipid profile (7/25/2018) showed , , HDL 27,              History of embolectomy ICD-10-CM: Z98.890  ICD-9-CM: V45.89  Unknown Yes    Overview Signed 3/26/2021  5:39 PM by Nata Barragan MD     S/P Right lower extremity arterial embolectomy             Major depressive disorder ICD-10-CM: F32.9  ICD-9-CM: 296.20  3/24/2021 Yes    Overview Signed 3/26/2021 11:31 PM by Nata Barragan MD     On Duloxetine             Grade I diastolic dysfunction [id number]: I51.9  ICD-9-CM: 429.9  3/24/2021 Yes    Overview Signed 3/26/2021 11:48 PM by Nata Barragan MD     2D echocardiogram (3/24/2021) showed EF 37%; global hypokinesis of left ventricle; grade I diastolic dysfunction             * (Principal) Status post below knee amputation of right lower extremity ([facility] ICD-10-CM: Z89.511  ICD-9-CM: V49.75  3/22/2021 Yes    Overview Signed 3/26/2021  5:43 PM by Nata Barragan MD     S/P Right below-the-knee amputation (3/22/2021 - Dr. Gustavo Medina)             Impaired mobility and ADLs ICD-10-CM: Z74.09, Z78.9  ICD-9-CM: V49.89  3/22/2021 Yes        Critical ischemia of lower extremity ([address] ICD-10-CM: Q81.910  ICD-9-CM: 459.9  3/22/2021 Yes    Overview Signed 3/26/2021  5:42 PM by Nata Barragan MD     Right             COVID-19 ruled out by laboratory testing ICD-10-CM: Z20.822  ICD-9-CM: V01.79  3/22/2021 Yes    Overview Signed 3/26/2021  5:47 PM by Nata Barragan MD     SARS-CoV-2 ([address] [place] [address]) (3/22/2021):  Not detected               Acute blood loss as cause of postoperative anemia ICD-10-CM: D62  ICD-9-CM: 285.1  3/22/2021 Yes        History of noncompliance with medical treatment ICD-10-CM: Z91.19  ICD-9-CM: V15.81  3/18/2021 Yes              Background:   Past Medical History:   Past Medical History:   Diagnosis Date    Acute blood loss as cause of postoperative anemia 3/22/2021    Anticoagulated by anticoagulation treatment     On Rivaroxaban    Cardiomyopathy ([facility] 12/17/2015    2D echocardiogram (3/24/2021) showed EF 37%; global hypokinesis of left ventricle; grade I diastolic dysfunction; 2D echocardiogram (12/17/2015) showed EF 35%; genealized hypokinesis more focally severe of the inferior and posterior segments; mild mitral regurgitation; trace tricuspud regurgitation; normal pulmonary artery pressure    Chronic systolic heart failure (HCC)     2D echocardiogram (3/24/2021) showed EF 37%; global hypokinesis of left ventricle; grade I diastolic dysfunction; 2D echocardiogram (12/17/2015) showed EF 35%; genealized hypokinesis more focally severe of the inferior and posterior segments; mild mitral regurgitation; trace tricuspud regurgitation; normal pulmonary artery pressure    Coronary artery disease involving native coronary artery of native heart     COVID-19 ruled out by laboratory testing 3/22/2021    SARS-CoV-2 ([place] [facility]) (3/22/2021):  Not detected     Critical ischemia of lower extremity 3/22/2021    Right    [facility]     Grade I diastolic dysfunction 11/98/8777    2D echocardiogram (3/24/2021) showed EF 37%; global hypokinesis of left ventricle; grade I diastolic dysfunction    History of deep venous thrombosis (DVT) of distal vein of right lower extremity     History of epilepsy     History of head injury     History of myocardial infarction     History of noncompliance with medical treatment 3/18/2021    Hypertensive heart disease with chronic systolic congestive heart failure ([facility]     2D echocardiogram (3/24/2021) showed EF 37%; global hypokinesis of left ventricle; grade I diastolic dysfunction; 2D echocardiogram (12/17/2015) showed EF 35%; genealized hypokinesis more focally severe of the inferior and posterior segments; mild mitral regurgitation; trace tricuspud regurgitation; normal pulmonary artery pressure    Long term current use of aspirin     Major depressive disorder 03/24/2021    On Duloxetine    Migraine headache     Mitral valve prolapse     Mixed hyperlipidemia     Lipid profile (7/25/2018) showed , , HDL 27,     Obstructive sleep apnea     On statin therapy due to risk of future cardiovascular event     On Atorvastatin    Peripheral artery disease ([facility]     Type 2 diabetes mellitus, without long-term current use of insulin ([facility])     HbA1c (3/6/2021) = 00.6    Umbilical hernia     Wears glasses       Patient taking anticoagulants yes    Patient has a defibrillator: no no  Assessment:  Changes in Assessment throughout shift: none   Patient has central line: no Reasons if yes: date:na Last dressing date:na  Patient has Ellis Cath: no Reasons na ast Vitals:     Vitals:    04/06/21 0731 04/06/21 1502 04/06/21 1757 04/06/21 2100   BP: 128/83 119/73 123/77 111/73   Pulse: (!) 104 93 89 87   Resp: 19 16  18   Temp: (!) 100.5 °F (38.1 °C) 97.7 °F (36.5 °C)  98.4 °F (36.9 °C)   SpO2: 95% 97%  98%   Weight:       Height:            PAIN    Pain Assessment    Pain Intensity 1: 6 (04/07/21 0650) Pain Intensity 1: 2 (12/29/14 1105)    Pain Location 1: Leg Pain Location 1: Abdomen    Pain Intervention(s) 1: Medication (see MAR) Pain Intervention(s) 1: Medication (see MAR)  Patient Stated Pain Goal: 0 Patient Stated Pain Goal: 0  o Intervention effective:  yes   o Other actions taken for pain: pain med     Skin Assessment  Skin color Skin Color: Appropriate for ethnicity  Condition/Temperature Skin Condition/Temp: Dry, Warm  Integrity Skin Integrity: Incision (comment)  Turgor Turgor: Non-tenting  Weekly Pressure Ulcer Documentation  Pressure  Injury Documentation: Pressure Injury Noted-See Wound LDA to Document  Wound Prevention & Protection Methods  Orientation of wound Orientation of Wound Prevention: Posterior  Location of Prevention Location of Wound Prevention: Buttocks, Sacrum/Coccyx  Dressing Present Dressing Present : Yes  Dressing Status Dressing Status: Intact  Wound Offloading Wound Offloading (Prevention Methods): Bed, pressure redistribution/air     INTAKE/OUPUT  Date 04/06/21 0700 - 04/07/21 0659 04/07/21 0700 - 04/08/21 0659   Shift 5721-4846 1805-0029 24 Hour Total 0700-1859 1900-0659 24 Hour Total   INTAKE   Shift Total(mL/kg)         OUTPUT   Urine(mL/kg/hr)  1875(1.8) 1875(0.9)        Urine Voided  1875 1875        Urine Occurrence(s) 5 x 3 x 8 x      Stool           Stool Occurrence(s) 0 x 0 x 0 x      Shift Total(mL/kg)  1875(22.1) 1875(22.1)      NET  -1875 -1875      Weight (kg) 84.8 84.8 84.8 84.8 84.8 84.8       Recommendations:  1. Patient needs and requests: none voice    2. Diet: cardiac     3. Pending tests/procedures: labs     4. Functional Level/Equipment: up with assist/WC    5. Estimated Discharge Date: none yet Posted on Whiteboard in Patients Room: no     [facility] Safety Check    A safety check occurred in the patient's room between off going nurse and oncoming nurse listed above. The safety check included the below items  Area Items   H  High Alert Medications - Verify all high alert medication drips (heparin, PCA, etc.)   E  Equipment - Suction is set up for ALL patients (with yanker)  - Red plugs utilized for all equipment (IV pumps, etc.)  - WOWs wiped down at end of shift.  - Room stocked with oxygen, suction, and other unit-specific supplies   A  Alarms - Bed alarm is set for fall risk patients  - Ensure chair alarm is in place and activated if patient is up in a chair   L  Lines - Check IV for any infiltration  - Ellis bag is empty if patient has a Ellis   - Tubing and IV bags are labeled   S  Safety   - Room is clean, patient is clean, and equipment is clean. - Hallways are clear from equipment besides carts.    - Fall bracelet on for fall risk patients  - Ensure room is clear and free of clutter  - Suction is set up for ALL patients (with faraz)  - Hallways are clear from equipment besides carts. - Isolation precautions followed, supplies available outside room, sign posted   .

## 2021-04-07 NOTE — ROUTINE PROCESS
TRANSFER - IN REPORT: 
 
Verbal report received from [facility] Garcia(name) on Karol Terrazas  being received from PACU(unit) for routine progression of care Report consisted of patients Situation, Background, Assessment and  
Recommendations(SBAR). Information from the following report(s) SBAR, Kardex and Procedure Summary was reviewed with the receiving nurse. Opportunity for questions and clarification was provided. Assessment completed upon patients arrival to unit and care assumed. Bedside shift change report given to Jose Maldonado RN (oncoming nurse) by Cora Rocha RN (offgoing nurse). Report included the following information SBAR, Kardex, Procedure Summary, Intake/Output, MAR and Recent Results. Opportunity for questions and clarification was provided.

## 2021-04-07 NOTE — PROGRESS NOTES
0730   Pt with temp of 100.5 and heart rate 104. MEWS 2. Notified Dr. Bhanu Romero. Sepsis bundle initiated. Labs obtained. IV started. #22 in LFA. IV Fluids [email or web address] NS. U/A sent. Wound culture completed prior to 1st dose of Zosyn. Pt uncomfortable after wound culture so given 10 mg of Josefina which he reported & it appeared to make him more at ease. (sleeping)  1701   1st dose of vanc given. Iv site pink/infusing well. Vascular surgery has been in to consult and planning surgery tomorrow to I & D wound. 1950   2nd dose of Zosyn started. IV site still pink and non edematous. Pt made aware of NPO after midnight except for meds. He said he was aware and that he was going to surgery tomorrow to fix his incision.

## 2021-04-07 NOTE — PROGRESS NOTES
[x] Psychology  [] Social Work [] Recreational Therapy    INTERVENTION  UNITS/TIME OF SERVICE   Assessment    Supportive Counseling  April 7, 2021   Orientation    Discharge Planning    Resource Linkage              Progress/Current Status    Patient seen for individual support  on ARU this morning, prior to his transfer to acute for intervention purposes secondary to residual limb infection. This contact actually was initiated yesterday when interrupted secondary to various medical needs. This morning, patient presents as pensive and obviously preoccupied in his thinking, perhaps also stressed by his NPO status. He stated that he had no information about when he would be taken for pre-OP, and this seemed to cause further distress for him. Patient is not emphasizing upset thoughts nor feelings about his situation, one way nor the other; he remains passive, subdued and basically preoccupied, but not verbalizing his specific concerns. Of course, he had expected to be discharged to home, today, so this acute problem is obviously causing him much disappointment. Effort was made to explain how intervention planned would allow for proper healing and recovery in order to return to home. At this time, it is not known whether he will be referred back to ARU for further therapy services.      Swati Daley, THE [facility] 4/7/2021 4:34 PM

## 2021-04-07 NOTE — H&P
Date of Admission: 4/7/2021      Assessment:   Infected Right BKA stump:    - s/p debridement and disarticulation of right knee   - 4/6 wound cx + GNR   - 4/7 intra-operative cultures pending  PAD:  S/p embolectomy 3/5/21; right BKA 3/25  HFpEF with grade 1 diastolic dysfunction:  EF 35% from March 2021: currently compensated  HTN  DM type 2 on insulin  Factor V leiden deficiency  Hyprelipidemia  Major depressive disorder:  Started on Duloxetine    Plan:   Discussed with vascular surgery-plans to take the patient back to surgery for higher level AKA on Saturday  Continue vancomycin and Zosyn for now:   -Taper antibiotics based off of culture data  Follow-up blood cultures from 4/6/2021-currently no growth  Follow-up wound cultures from 4/6/2021  Follow-up intraoperative cultures  Sliding-scale insulin  CBC, BMP in a.m. Hold Xarelto for now   -SQ heparin for DVT prophylaxis  Resume medications from ARU-hold parameters for blood pressure medications  Pain medications as per vascular surgery  Continue with PT and OT  Discussed with ARU physician-agree for admission to medical floor during this acute event    Rachael Appiah D.O. Internal Medicine and Infectious Diseases      Subjective:    Patient is a 60 y. o.male who is being evaluated for infected stump. Mr. Lacey Daniels is a 69-year-old gentleman with a past medical history of factor V Leiden mutation, diastolic dysfunction, prior head injury with seizure disorder, chronic systolic heart failure with ejection fraction of 35%, insulin-dependent diabetes who was admitted to ARU on 3/25 following right BKA at [facility] by Dr. Becerra Degree on 3/22/21. He was overall doing well kin ARU but developed fevers and increased pain to his right stump yesterday. He had recently completed course of Augmentin post-operatively. Vascular surgery was consulted to evaluate drainage from the stump incision.  He has undergone I&D and washout of the right stump on 4/7 with right knee disarticulation. Past Medical History:   Diagnosis Date    Acute blood loss as cause of postoperative anemia 3/22/2021    Anticoagulated by anticoagulation treatment     On Rivaroxaban    Cardiomyopathy ([facility] 12/17/2015    2D echocardiogram (3/24/2021) showed EF 37%; global hypokinesis of left ventricle; grade I diastolic dysfunction; 2D echocardiogram (12/17/2015) showed EF 35%; genealized hypokinesis more focally severe of the inferior and posterior segments; mild mitral regurgitation; trace tricuspud regurgitation; normal pulmonary artery pressure    Chronic systolic heart failure (HCC)     2D echocardiogram (3/24/2021) showed EF 37%; global hypokinesis of left ventricle; grade I diastolic dysfunction; 2D echocardiogram (12/17/2015) showed EF 35%; genealized hypokinesis more focally severe of the inferior and posterior segments; mild mitral regurgitation; trace tricuspud regurgitation; normal pulmonary artery pressure    Coronary artery disease involving native coronary artery of native heart     COVID-19 ruled out by laboratory testing 3/22/2021    SARS-CoV-2 ([place] m2000, [facility]) (3/22/2021):  Not detected     Critical ischemia of lower extremity ([facility] 3/22/2021    Right    [facility]     Grade I diastolic dysfunction 02/54/9745    2D echocardiogram (3/24/2021) showed EF 37%; global hypokinesis of left ventricle; grade I diastolic dysfunction    History of deep venous thrombosis (DVT) of distal vein of right lower extremity     History of epilepsy     History of head injury     History of myocardial infarction     History of noncompliance with medical treatment 3/18/2021    Hypertensive heart disease with chronic systolic congestive heart failure ([facility]     2D echocardiogram (3/24/2021) showed EF 37%; global hypokinesis of left ventricle; grade I diastolic dysfunction; 2D echocardiogram (12/17/2015) showed EF 35%; genealized hypokinesis more focally severe of the inferior and posterior segments; mild mitral regurgitation; trace tricuspud regurgitation; normal pulmonary artery pressure    Long term current use of aspirin     Major depressive disorder 03/24/2021    On Duloxetine    Migraine headache     Mitral valve prolapse     Mixed hyperlipidemia     Lipid profile (7/25/2018) showed , , HDL 27,     Obstructive sleep apnea     On statin therapy due to risk of future cardiovascular event     On Atorvastatin    Peripheral artery disease ([address]     Type 2 diabetes mellitus, without long-term current use of insulin ([facility])     HbA1c (3/6/2021) = 98.7    Umbilical hernia     Wears glasses      Past Surgical History:   Procedure Laterality Date    HX BELOW KNEE AMPUTATION Right 03/22/2021    S/P Right below-the-knee amputation (3/22/2021 - Dr. Ayah Whitney)    HX EMBOLECTOMY Right 03/09/2021    S/P Right lower extremity arterial embolectomy    HX IMPLANTABLE CARDIOVERTER DEFIBRILLATOR  2013     Family History   Problem Relation Age of Onset    Bleeding Prob Mother         Factor V Leiden mutation    Diabetes Mother     Stroke Mother     Hypertension Mother     Dementia Mother     Seizures Mother      Medications reviewed as below:   Current Facility-Administered Medications   Medication Dose Route Frequency Provider Last Rate Last Admin    sodium chloride (NS) flush 5-40 mL  5-40 mL IntraVENous Q8H Robert Maldonado MD        sodium chloride (NS) flush 5-40 mL  5-40 mL IntraVENous PRN Robert Maldonado MD        acetaminophen (TYLENOL) tablet 650 mg  650 mg Oral Q6H Robert Maldonado MD        oxyCODONE IR (ROXICODONE) tablet 5 mg  5 mg Oral Q4H PRN Robert Maldonado MD        ondansetron [facility]) injection 4 mg  4 mg IntraVENous Q6H PRN Robert Maldonado MD        HYDROmorphone (DILAUDID) injection 0.5 mg  0.5 mg IntraVENous Q3H PRN Alli Whitaker MD        HYDROmorphone (DILAUDID) injection 0.25 mg  0.25 mg IntraVENous Q3H PRN Marco Lanier MD         Allergies   Allergen Reactions    Nitroglycerin Other (comments)      BP drops rapidly when he takes SL Nitro          Story Diarrhea and Rash    Cat Dander Cough    Codeine Rash    [place] Rash     Social History     Socioeconomic History    Marital status: SINGLE     Spouse name: Not on file    Number of children: Not on file    Years of education: Not on file    Highest education level: Not on file   Occupational History    Not on file   Social Needs    Financial resource strain: Not on file    Food insecurity     Worry: Not on file     Inability: Not on file    Transportation needs     Medical: Not on file     Non-medical: Not on file   Tobacco Use    Smoking status: Never Smoker    Smokeless tobacco: Never Used   Substance and Sexual Activity    Alcohol use: Not Currently    Drug use: Never    Sexual activity: Not on file   Lifestyle    Physical activity     Days per week: Not on file     Minutes per session: Not on file    Stress: Not on file   Relationships    Social connections     Talks on phone: Not on file     Gets together: Not on file     Attends [employer] service: Not on file     Active member of club or organization: Not on file     Attends meetings of clubs or organizations: Not on file     Relationship status: Not on file    Intimate partner violence     Fear of current or ex partner: Not on file     Emotionally abused: Not on file     Physically abused: Not on file     Forced sexual activity: Not on file   Other Topics Concern    Not on file   Social History Narrative    Not on file        Review of Systems    Negative Unless BOLDED    General: fevers, chills, myalgias, arthralgias, unexplained weight loss, malaise, fatigue.   HEENT:  headaches,sinus pain or presure, recent URI, recent dental procedures;  tinnitus, hearing loss , visual changes, catarats, dizziness or blurred vision  PUlMONARY:  cough , shortness of breath, sputum production, hx of asthma or COPD. previous treatement for TB or PPD. Cardiovascular: chest pain, previous CAD/MI, vavlular heart disease,  murmurs  GI:   nausea, vomiting, diarrhea, abdominal pain, prior C.diff  :  urinary frequency, dysuria, hematuria, bladder incontinence. Neurologic:  seizures, syncope or prior CVA/TIA, confusion, memory impairment, neuropathy  Musculoskeletal:  myalgias arthralgias, joint pain/ swelling,  back pain  Skin:  Purities,  recurrent cellulitis,  chronic stasis ulcer, diabetic foot ulcers  Endocrine: polyuria, polydipsia, hair loss, weight gain  Psych:  Depression/ treatment by a psychiatrist/psycologist  Heme-Onc: prior DVT, easy bruising, fatigue, malignancy, bleeding disorder        Objective: There were no vitals taken for this visit. Temp (24hrs), Av.2 °F (36.8 °C), Min:97.2 °F (36.2 °C), Max:99.3 °F (37.4 °C)        General:   awake alert and oriented   Skin:   no rashes or skin lesions noted on limited exam   HEENT:  Normocephalic, atraumatic, PERRL, EOMI, no scleral icterus or pallor; no conjunctival hemmohage;  nasal and oral mucous are moist and without evidence of lesions. No thrush. Neck supple, no bruits. Lymph Nodes:   no cervical, axillary or inguinal adenopathy   Lungs:   non-labored, bilaterally clear to aspiration- no crackles wheezes rales or rhonchi   Heart:  RRR, s1 and s2; no murmurs rubs or gallops, no edema, + pedal pulses   Abdomen:  soft, non-distended, active bowel sounds, no hepatomegaly, no splenomegaly. Non-tender   Genitourinary:  deferred   Extremities:   no clubbing, cyanosis; no joint effusions or swelling; Full ROM of all large joints to the upper and lower extremities; muscle mass appropriate for age; right stump surgical site with clean immediate post-op dressings; Neurologic:  No gross focal sensory abnormalities; 5/5 muscle strength to upper and lower extremities. Speech appropirate.  Cranial nerves intact   Psychiatric:   flat affect Labs: Results:   Chemistry Recent Labs     04/07/21  0905 04/06/21  0850 04/05/21  0427   * 216* 123*    130* 131*   K 4.6 4.6 5.0    96* 99*   CO2 25 22 23   BUN 18 20* 17   CREA 1.16 1.26 0.98   CA 8.8 9.6 9.4   AGAP 8 12 9   BUCR 16 16 17      CBC w/Diff Recent Labs     04/07/21  0905 04/06/21  0850 04/05/21  0427   WBC 22.0* 23.6*  --    RBC 3.74* 4.30*  --    HGB 9.5* 10.8* 10.5*   HCT 30.4* 34.2* 32.7*   * 661*  --    GRANS 86* 90*  --    LYMPH 5* 4*  --    EOS 2 0  --             No results found for: SDES Lab Results   Component Value Date/Time    Culture result: HEAVY GRAM NEGATIVE RODS (A) 04/06/2021 01:41 PM    Culture result: NO GROWTH AFTER 20 HOURS 04/06/2021 09:00 AM    Culture result: NO GROWTH AFTER 20 HOURS 04/06/2021 08:50 AM    Culture result: No growth (<1,000 CFU/ML) 04/02/2021 08:00 PM          Imaging:      All imaging reviewed from Admission to present as per radiology interpretation in [address] [place] [address]

## 2021-04-07 NOTE — PERIOP NOTES
TRANSFER - OUT REPORT:    Telephone report given to Siomara(name) on Karol Terrazas  being transferred to [facility]unit) for routine post - op       Report consisted of patients Situation, Background, Assessment and   Recommendations(SBAR). Information from the following report(s) SBAR and OR Summary was reviewed with the receiving nurse. Lines:   Peripheral IV 04/06/21 Anterior; Left Forearm (Active)   Site Assessment Clean, dry, & intact 04/07/21 1454   Phlebitis Assessment 0 04/07/21 1454   Infiltration Assessment 0 04/07/21 1454   Dressing Status Clean, dry, & intact 04/07/21 1454   Dressing Type Tape;Transparent 04/07/21 1454   Hub Color/Line Status Blue; Infusing 04/07/21 1454   Alcohol Cap Used Yes 04/06/21 2000        Opportunity for questions and clarification was provided.       Patient transported with:   Registered Nurse

## 2021-04-07 NOTE — ANESTHESIA POSTPROCEDURE EVALUATION
Procedure(s):  RIGHT BELOW THE KNEE WASHOUT / RIGHT KNEE  DISARTICULATION.     general    Anesthesia Post Evaluation      Multimodal analgesia: multimodal analgesia used between 6 hours prior to anesthesia start to PACU discharge  Patient location during evaluation: PACU  Patient participation: complete - patient participated  Level of consciousness: awake and alert  Pain management: adequate  Airway patency: patent  Anesthetic complications: no  Cardiovascular status: acceptable  Respiratory status: acceptable  Hydration status: acceptable  Post anesthesia nausea and vomiting:  controlled  Final Post Anesthesia Temperature Assessment:  Normothermia (36.0-37.5 degrees C)      INITIAL Post-op Vital signs:   Vitals Value Taken Time   BP 87/58 04/07/21 1455   Temp 36.2 °C (97.2 °F) 04/07/21 1455   Pulse 80 04/07/21 1455   Resp 20 04/07/21 1455   SpO2 100 % 04/07/21 1455

## 2021-04-07 NOTE — PROGRESS NOTES
[facility]    Bedside and Verbal shift change report given to Ashleigh Warner RN (oncoming nurse) by Rina Yusuf RN  (offgoing nurse). Report included the following information SBAR, Kardex, MAR and Recent Results.     Situation:   Code Status: Full Code   Reason for Admission: Asif Radford Day: 11   Problem List:   Hospital Problems  Date Reviewed: 3/31/2021          Codes Class Noted POA    Constipation ICD-10-CM: K59.00  ICD-9-CM: 564.00  3/29/2021 Yes        Peripheral artery disease (HCC) (Chronic) ICD-10-CM: I73.9  ICD-9-CM: 443.9  Unknown Yes        Type 2 diabetes mellitus, without long-term current use of insulin (HCC) (Chronic) ICD-10-CM: E11.9  ICD-9-CM: 250.00  Unknown Yes    Overview Signed 3/26/2021  5:49 PM by Analy Poole MD     HbA1c (3/6/2021) = 12.9             Factor V Leiden mutation ([facility] (Chronic) ICD-10-CM: I04.06  ICD-9-CM: 289.81  Unknown Yes        Hypertensive heart disease with chronic systolic congestive heart failure (HCC) (Chronic) ICD-10-CM: I11.0, I50.22  ICD-9-CM: 402.91, 428.22  Unknown Yes    Overview Signed 3/26/2021 11:06 PM by Analy Poole MD     2D echocardiogram (12/17/2015) showed EF 35%; genealized hypokinesis more focally severe of the inferior and posterior segments; mild mitral regurgitation; trace tricuspud regurgitation; normal pulmonary artery pressure             Long term current use of aspirin (Chronic) ICD-10-CM: M29.66  ICD-9-CM: V58.66  Unknown Yes        On statin therapy due to risk of future cardiovascular event (Chronic) ICD-10-CM: A56.901  ICD-9-CM: V58.69  Unknown Yes    Overview Signed 3/26/2021  4:34 PM by Analy Poole MD     On Atorvastatin             History of deep venous thrombosis (DVT) of distal vein of right lower extremity ICD-10-CM: Z86.718  ICD-9-CM: V12.51  Unknown Yes        Anticoagulated by anticoagulation treatment (Chronic) ICD-10-CM: Z79.01  ICD-9-CM: V58.61  Unknown Yes    Overview Signed 3/26/2021 4:36 PM by Edy Bobo MD     On Rivaroxaban             Mixed hyperlipidemia (Chronic) ICD-10-CM: E78.2  ICD-9-CM: 272.2  Unknown Yes    Overview Signed 3/26/2021 11:07 PM by Edy Bobo MD     Lipid profile (7/25/2018) showed , , HDL 27,              History of embolectomy ICD-10-CM: Z98.890  ICD-9-CM: V45.89  Unknown Yes    Overview Signed 3/26/2021  5:39 PM by Edy Bobo MD     S/P Right lower extremity arterial embolectomy             Major depressive disorder ICD-10-CM: F32.9  ICD-9-CM: 296.20  3/24/2021 Yes    Overview Signed 3/26/2021 11:31 PM by Edy Bobo MD     On Duloxetine             Grade I diastolic dysfunction [id number]-[id number]: I51.9  ICD-9-CM: 429.9  3/24/2021 Yes    Overview Signed 3/26/2021 11:48 PM by Edy Bobo MD     2D echocardiogram (3/24/2021) showed EF 37%; global hypokinesis of left ventricle; grade I diastolic dysfunction             * (Principal) Status post below knee amputation of right lower extremity ([facility] ICD-10-CM: Z89.511  ICD-9-CM: V49.75  3/22/2021 Yes    Overview Signed 3/26/2021  5:43 PM by Edy Bobo MD     S/P Right below-the-knee amputation (3/22/2021 - Dr. Shakila Hernandez)             Impaired mobility and ADLs ICD-10-CM: Z74.09, Z78.9  ICD-9-CM: V49.89  3/22/2021 Yes        Critical ischemia of lower extremity ([facility] ICD-10-CM: N57.436  ICD-9-CM: 459.9  3/22/2021 Yes    Overview Signed 3/26/2021  5:42 PM by Edy Bobo MD     Right             COVID-19 ruled out by laboratory testing ICD-10-CM: Z20.822  ICD-9-CM: V01.79  3/22/2021 Yes    Overview Signed 3/26/2021  5:47 PM by Edy Bobo MD     SARS-CoV-2 ([address] [place] [address]) (3/22/2021):  Not detected               Acute blood loss as cause of postoperative anemia ICD-10-CM: D62  ICD-9-CM: 285.1  3/22/2021 Yes        History of noncompliance with medical treatment ICD-10-CM: Z91.19  ICD-9-CM: V15.81  3/18/2021 Yes              Background:   Past Medical History: Past Medical History:   Diagnosis Date    Acute blood loss as cause of postoperative anemia 3/22/2021    Anticoagulated by anticoagulation treatment     On Rivaroxaban    Cardiomyopathy ([facility] 12/17/2015    2D echocardiogram (3/24/2021) showed EF 37%; global hypokinesis of left ventricle; grade I diastolic dysfunction; 2D echocardiogram (12/17/2015) showed EF 35%; genealized hypokinesis more focally severe of the inferior and posterior segments; mild mitral regurgitation; trace tricuspud regurgitation; normal pulmonary artery pressure    Chronic systolic heart failure (HCC)     2D echocardiogram (3/24/2021) showed EF 37%; global hypokinesis of left ventricle; grade I diastolic dysfunction; 2D echocardiogram (12/17/2015) showed EF 35%; genealized hypokinesis more focally severe of the inferior and posterior segments; mild mitral regurgitation; trace tricuspud regurgitation; normal pulmonary artery pressure    Coronary artery disease involving native coronary artery of native heart     COVID-19 ruled out by laboratory testing 3/22/2021    SARS-CoV-2 ([place] m2000, [facility]) (3/22/2021):  Not detected     Critical ischemia of lower extremity 3/22/2021    Right    [facility]     Grade I diastolic dysfunction 51/38/1869    2D echocardiogram (3/24/2021) showed EF 37%; global hypokinesis of left ventricle; grade I diastolic dysfunction    History of deep venous thrombosis (DVT) of distal vein of right lower extremity     History of epilepsy     History of head injury     History of myocardial infarction     History of noncompliance with medical treatment 3/18/2021    Hypertensive heart disease with chronic systolic congestive heart failure ([facility]     2D echocardiogram (3/24/2021) showed EF 37%; global hypokinesis of left ventricle; grade I diastolic dysfunction; 2D echocardiogram (12/17/2015) showed EF 35%; genealized hypokinesis more focally severe of the inferior and posterior segments; mild mitral regurgitation; trace tricuspud regurgitation; normal pulmonary artery pressure    Long term current use of aspirin     Major depressive disorder 03/24/2021    On Duloxetine    Migraine headache     Mitral valve prolapse     Mixed hyperlipidemia     Lipid profile (7/25/2018) showed , , HDL 27,     Obstructive sleep apnea     On statin therapy due to risk of future cardiovascular event     On Atorvastatin    Peripheral artery disease ([address]     Type 2 diabetes mellitus, without long-term current use of insulin ([facility])     HbA1c (3/6/2021) = 95.2    Umbilical hernia     Wears glasses       Patient taking anticoagulants yes    Patient has a defibrillator: no no  Assessment:  Changes in Assessment throughout shift: none   Patient has central line: no Reasons if yes: date:na Last dressing date:na  Patient has Ellis Cath: no Reasons na ast Vitals:     Vitals:    04/05/21 2119 04/06/21 0731 04/06/21 1502 04/06/21 1757   BP: 118/75 128/83 119/73 123/77   Pulse: 92 (!) 104 93 89   Resp: 18 19 16    Temp: 98.4 °F (36.9 °C) (!) 100.5 °F (38.1 °C) 97.7 °F (36.5 °C)    SpO2: 99% 95% 97%    Weight:       Height:            PAIN    Pain Assessment    Pain Intensity 1: 6 (04/06/21 1716) Pain Intensity 1: 2 (12/29/14 1105)    Pain Location 1: Leg Pain Location 1: Abdomen    Pain Intervention(s) 1: Medication (see MAR) Pain Intervention(s) 1: Medication (see MAR)  Patient Stated Pain Goal: 0 Patient Stated Pain Goal: 0  o Intervention effective:  yes   o Other actions taken for pain: pain med     Skin Assessment  Skin color Skin Color: Appropriate for ethnicity  Condition/Temperature Skin Condition/Temp: Warm, Dry  Integrity Skin Integrity: Incision (comment)  Turgor Turgor: Non-tenting  Weekly Pressure Ulcer Documentation  Pressure  Injury Documentation: No Pressure Injury Noted-Pressure Ulcer Prevention Initiated  Wound Prevention & Protection Methods  Orientation of wound Orientation of Wound Prevention: Posterior  Location of Prevention Location of Wound Prevention: Buttocks, Heel, Sacrum/Coccyx  Dressing Present Dressing Present : No  Dressing Status Dressing Status: Intact  Wound Offloading Wound Offloading (Prevention Methods): Adaptive equipment, Bed, pressure reduction mattress, Chair cushion, Elevate heels, Pillows, Repositioning, Turning, Kenyatta Mohs, Wheelchair     INTAKE/OUPUT  Date 04/05/21 1900 - 04/06/21 0659 04/06/21 0700 - 04/07/21 0659   Shift 2932-7966 24 Hour Total 4400-9774 6620-4589 24 Hour Total   INTAKE   Shift Total(mL/kg)        OUTPUT   Urine(mL/kg/hr)  300        Urine Voided  300        Urine Occurrence(s) 2 x 5 x 5 x  5 x   Stool          Stool Occurrence(s) 0 x 0 x 0 x  0 x   Shift Total(mL/kg)  300(3.5)      NET  -300      Weight (kg) 84.8 84.8 84.8 84.8 84.8       Recommendations:  1. Patient needs and requests: none voice    2. Diet: cardiac     3. Pending tests/procedures: labs     4. Functional Level/Equipment: up with assist/WC    5. Estimated Discharge Date: none yet Posted on Whiteboard in Patients Room: no     [facility] Safety Check    A safety check occurred in the patient's room between off going nurse and oncoming nurse listed above. The safety check included the below items  Area Items   H  High Alert Medications - Verify all high alert medication drips (heparin, PCA, etc.)   E  Equipment - Suction is set up for ALL patients (with yanker)  - Red plugs utilized for all equipment (IV pumps, etc.)  - WOWs wiped down at end of shift.  - Room stocked with oxygen, suction, and other unit-specific supplies   A  Alarms - Bed alarm is set for fall risk patients  - Ensure chair alarm is in place and activated if patient is up in a chair   L  Lines - Check IV for any infiltration  - Ellis bag is empty if patient has a Ellis   - Tubing and IV bags are labeled   S  Safety   - Room is clean, patient is clean, and equipment is clean.   - Hallways are clear from equipment besides carts.   - Fall bracelet on for fall risk patients  - Ensure room is clear and free of clutter  - Suction is set up for ALL patients (with faraz)  - Hallways are clear from equipment besides carts. - Isolation precautions followed, supplies available outside room, sign posted   .

## 2021-04-07 NOTE — ANESTHESIA PREPROCEDURE EVALUATION
Relevant Problems   No relevant active problems       Anesthetic History   No history of anesthetic complications            Review of Systems / Medical History  Patient summary reviewed, nursing notes reviewed and pertinent labs reviewed    Pulmonary        Sleep apnea: CPAP        Comments: Tolerates cpap poorly   Neuro/Psych     seizures: well controlled    Psychiatric history     Cardiovascular          CHF  Dysrhythmias   Pacemaker, CAD, PAD and CABG    Exercise tolerance: <4 METS  Comments: Hx of MI x 2 2010, cardiogenic shock, stent x 2.  1 month later CABG x 6. All stents clotted. Now with diminished LV fxn. AICD, which has slipped to axilla! Echo EF 37%. Global, no valve abnormalities. GI/Hepatic/Renal     GERD: well controlled           Endo/Other    Diabetes: poorly controlled, using insulin    Blood dyscrasia     Other Findings   Comments: S/p R BKA. . infected wound. Hx of Factor 5 Leiden deficiency, on Xarelto. Recently on heparin drip.          Physical Exam    Airway  Mallampati: II  TM Distance: 4 - 6 cm  Neck ROM: normal range of motion   Mouth opening: Normal     Cardiovascular    Rhythm: regular  Rate: normal         Dental    Dentition: Poor dentition, Upper dentition intact and Lower partial plate     Pulmonary  Breath sounds clear to auscultation               Abdominal         Other Findings            Anesthetic Plan    ASA: 3  Anesthesia type: general          Induction: Intravenous  Anesthetic plan and risks discussed with: Patient

## 2021-04-07 NOTE — BRIEF OP NOTE
Brief Postoperative Note    Patient: Jacbo Scott  YOB: 1965  MRN: [id number]    Date of Procedure: 4/7/2021     Pre-Op Diagnosis: T87.43 INFECTION OF AMPUTATION STUMP, RIGHT    Post-Op Diagnosis: Same as preoperative diagnosis. Procedure(s):  RIGHT BELOW THE KNEE WASHOUT / RIGHT KNEE  DISARTICULATION    Surgeon(s):  Flaco Mckee MD    Surgical Assistant: Surg Asst-1: Alex Stuart    Anesthesia: General     Estimated Blood Loss (mL): 774     Complications: None    Specimens:   ID Type Source Tests Collected by Time Destination   1 : RIGHT LEG Preservative Leg, Right  Flaco Mckee MD 4/7/2021 1427 Pathology   1 : INFECTED AMPUTATION STUMP Wound Leg, Right CULTURE, ANAEROBIC, CULTURE, WOUND W Zeinab Gant MD 4/7/2021 1358 Microbiology        Implants: * No implants in log *    Drains: * No LDAs found *    Findings: extensive infection of the right BKA stump without viable muscle tissue below the knee. Knee disarticulation performed, stump washed out with pulsevac and left open . Plan to return for above knee amputation on Saturday.     Electronically Signed by Lisa Velázquez MD on 4/7/2021 at 2:48 PM

## 2021-04-07 NOTE — ROUTINE PROCESS
TRANSFER - IN REPORT: 
 
Verbal report received from [facility](name) on Maira Acharya  being received from PACU(unit) for routine post - op Report consisted of patients Situation, Background, Assessment and  
Recommendations(SBAR). Information from the following report(s) SBAR, Kardex, Procedure Summary, Intake/Output, MAR and Recent Results was reviewed with the receiving nurse. Opportunity for questions and clarification was provided. Assessment completed upon patients arrival to unit and care assumed. Primary Nurse Kanika Clarke RN and Brandi Renae RN performed a dual skin assessment on this patient Impairment noted- see wound doc flow sheet Steve score is 17 Bedside and Verbal shift change report given to Brandi Renae (oncoming nurse) by me (offgoing nurse). Report included the following information SBAR, Kardex, Procedure Summary, Intake/Output, MAR and Recent Results.

## 2021-04-07 NOTE — ROUTINE PROCESS
TRANSFER - IN REPORT: 
 
Verbal report received from [facility] [place] [address] RN(name) on Savannah Hernandez  being received from ARU(unit) for ordered procedure Report consisted of patients Situation, Background, Assessment and  
Recommendations(SBAR). Information from the following report(s) SBAR and Kardex was reviewed with the receiving nurse. Opportunity for questions and clarification was provided. Assessment completed upon patients arrival to unit and care assumed.

## 2021-04-07 NOTE — PROGRESS NOTES
Problem: Falls - Risk of  Goal: *Absence of Falls  Description: Document Lee Murphy Fall Risk and appropriate interventions in the flowsheet.   Outcome: Progressing Towards Goal  Note: Fall Risk Interventions:  Mobility Interventions: Patient to call before getting OOB    Mentation Interventions: Bed/chair exit alarm, Evaluate medications/consider consulting pharmacy    Medication Interventions: Bed/chair exit alarm, Evaluate medications/consider consulting pharmacy, Patient to call before getting OOB    Elimination Interventions: Call light in reach, Bed/chair exit alarm, Elevated toilet seat, Patient to call for help with toileting needs              Problem: Patient Education: Go to Patient Education Activity  Goal: Patient/Family Education  Outcome: Progressing Towards Goal     Problem: Pain  Goal: *Control of Pain  Outcome: Progressing Towards Goal  Goal: *PALLIATIVE CARE:  Alleviation of Pain  Outcome: Progressing Towards Goal     Problem: Patient Education: Go to Patient Education Activity  Goal: Patient/Family Education  Outcome: Progressing Towards Goal     Problem: General Medical Care Plan  Goal: *Vital signs within specified parameters  Outcome: Progressing Towards Goal  Goal: *Labs within defined limits  Outcome: Progressing Towards Goal  Goal: *Absence of infection signs and symptoms  Outcome: Progressing Towards Goal  Goal: *Optimal pain control at patient's stated goal  Outcome: Progressing Towards Goal  Goal: *Skin integrity maintained  Outcome: Progressing Towards Goal  Goal: *Fluid volume balance  Outcome: Progressing Towards Goal  Goal: *Optimize nutritional status  Outcome: Progressing Towards Goal  Goal: *Anxiety reduced or absent  Outcome: Progressing Towards Goal  Goal: *Progressive mobility and function (eg: ADL's)  Outcome: Progressing Towards Goal     Problem: Patient Education: Go to Patient Education Activity  Goal: Patient/Family Education  Outcome: Progressing Towards Goal     Problem: Pressure Injury - Risk of  Goal: *Prevention of pressure injury  Description: Document Steve Scale and appropriate interventions in the flowsheet.   Outcome: Progressing Towards Goal     Problem: Patient Education: Go to Patient Education Activity  Goal: Patient/Family Education  Outcome: Progressing Towards Goal

## 2021-04-08 ENCOUNTER — ANESTHESIA (OUTPATIENT)
Dept: SURGERY | Age: 56
DRG: 305 | End: 2021-04-08
Payer: MEDICAID

## 2021-04-08 ENCOUNTER — ANESTHESIA EVENT (OUTPATIENT)
Dept: SURGERY | Age: 56
DRG: 305 | End: 2021-04-08
Payer: MEDICAID

## 2021-04-08 LAB
ALBUMIN SERPL-MCNC: 1.9 G/DL (ref 3.4–5)
ALBUMIN/GLOB SERPL: 0.5 {RATIO} (ref 0.8–1.7)
ALP SERPL-CCNC: 217 U/L (ref 45–117)
ALT SERPL-CCNC: 43 U/L (ref 16–61)
ANION GAP SERPL CALC-SCNC: 7 MMOL/L (ref 3–18)
AST SERPL-CCNC: 37 U/L (ref 10–38)
BASOPHILS # BLD: 0 K/UL (ref 0–0.1)
BASOPHILS NFR BLD: 0 % (ref 0–2)
BILIRUB SERPL-MCNC: 0.6 MG/DL (ref 0.2–1)
BUN SERPL-MCNC: 17 MG/DL (ref 7–18)
BUN/CREAT SERPL: 15 (ref 12–20)
CALCIUM SERPL-MCNC: 8.6 MG/DL (ref 8.5–10.1)
CHLORIDE SERPL-SCNC: 105 MMOL/L (ref 100–111)
CO2 SERPL-SCNC: 26 MMOL/L (ref 21–32)
CREAT SERPL-MCNC: 1.12 MG/DL (ref 0.6–1.3)
DIFFERENTIAL METHOD BLD: ABNORMAL
EOSINOPHIL # BLD: 0.7 K/UL (ref 0–0.4)
EOSINOPHIL NFR BLD: 4 % (ref 0–5)
ERYTHROCYTE [DISTWIDTH] IN BLOOD BY AUTOMATED COUNT: 14.4 % (ref 11.6–14.5)
EST. AVERAGE GLUCOSE BLD GHB EST-MCNC: 235 MG/DL
GLOBULIN SER CALC-MCNC: 4 G/DL (ref 2–4)
GLUCOSE BLD STRIP.AUTO-MCNC: 143 MG/DL (ref 70–110)
GLUCOSE BLD STRIP.AUTO-MCNC: 166 MG/DL (ref 70–110)
GLUCOSE BLD STRIP.AUTO-MCNC: 236 MG/DL (ref 70–110)
GLUCOSE BLD STRIP.AUTO-MCNC: 239 MG/DL (ref 70–110)
GLUCOSE SERPL-MCNC: 131 MG/DL (ref 74–99)
HBA1C MFR BLD: 9.8 % (ref 4.2–5.6)
HCT VFR BLD AUTO: 21.5 % (ref 36–48)
HCT VFR BLD AUTO: 22.7 % (ref 36–48)
HCT VFR BLD AUTO: 25.4 % (ref 36–48)
HGB BLD-MCNC: 6.5 G/DL (ref 13–16)
HGB BLD-MCNC: 7 G/DL (ref 13–16)
HGB BLD-MCNC: 7.8 G/DL (ref 13–16)
HISTORY CHECKED?,CKHIST: NORMAL
LYMPHOCYTES # BLD: 0.2 K/UL (ref 0.9–3.6)
LYMPHOCYTES NFR BLD: 1 % (ref 21–52)
MCH RBC QN AUTO: 25.3 PG (ref 24–34)
MCHC RBC AUTO-ENTMCNC: 30.7 G/DL (ref 31–37)
MCV RBC AUTO: 82.5 FL (ref 74–97)
MONOCYTES # BLD: 1 K/UL (ref 0.05–1.2)
MONOCYTES NFR BLD: 6 % (ref 3–10)
NEUTS SEG # BLD: 15.3 K/UL (ref 1.8–8)
NEUTS SEG NFR BLD: 89 % (ref 40–73)
PLATELET # BLD AUTO: 507 K/UL (ref 135–420)
PLATELET COMMENTS,PCOM: ABNORMAL
PMV BLD AUTO: 9.9 FL (ref 9.2–11.8)
POTASSIUM SERPL-SCNC: 5.3 MMOL/L (ref 3.5–5.5)
PROT SERPL-MCNC: 5.9 G/DL (ref 6.4–8.2)
RBC # BLD AUTO: 3.08 M/UL (ref 4.35–5.65)
RBC MORPH BLD: ABNORMAL
SARS-COV-2, COV2NT: NOT DETECTED
SODIUM SERPL-SCNC: 138 MMOL/L (ref 136–145)
WBC # BLD AUTO: 17.2 K/UL (ref 4.6–13.2)

## 2021-04-08 PROCEDURE — 77030003028 HC SUT VCRL J&J -A: Performed by: SURGERY

## 2021-04-08 PROCEDURE — 76060000033 HC ANESTHESIA 1 TO 1.5 HR: Performed by: SURGERY

## 2021-04-08 PROCEDURE — 0Y3F0ZZ CONTROL BLEEDING IN RIGHT KNEE REGION, OPEN APPROACH: ICD-10-PCS | Performed by: SURGERY

## 2021-04-08 PROCEDURE — P9016 RBC LEUKOCYTES REDUCED: HCPCS

## 2021-04-08 PROCEDURE — L1830 KO IMMOB CANVAS LONG PRE OTS: HCPCS | Performed by: SURGERY

## 2021-04-08 PROCEDURE — 74011636637 HC RX REV CODE- 636/637: Performed by: INTERNAL MEDICINE

## 2021-04-08 PROCEDURE — 36415 COLL VENOUS BLD VENIPUNCTURE: CPT

## 2021-04-08 PROCEDURE — 77030006835 HC BLD SAW SAG STRY -B: Performed by: SURGERY

## 2021-04-08 PROCEDURE — 77030008462 HC STPLR SKN PROX J&J -A: Performed by: SURGERY

## 2021-04-08 PROCEDURE — 77030008683 HC TU ET CUF COVD -A: Performed by: ANESTHESIOLOGY

## 2021-04-08 PROCEDURE — 01404 ANES OPN/ARTHRS DISRTCJ KNEE: CPT | Performed by: NURSE ANESTHETIST, CERTIFIED REGISTERED

## 2021-04-08 PROCEDURE — 01404 ANES OPN/ARTHRS DISRTCJ KNEE: CPT | Performed by: ANESTHESIOLOGY

## 2021-04-08 PROCEDURE — 82962 GLUCOSE BLOOD TEST: CPT

## 2021-04-08 PROCEDURE — 74011250636 HC RX REV CODE- 250/636: Performed by: INTERNAL MEDICINE

## 2021-04-08 PROCEDURE — 77030026438 HC STYL ET INTUB CARD -A: Performed by: ANESTHESIOLOGY

## 2021-04-08 PROCEDURE — 77030022017 HC DRSG HEMO QCLOT ZMED -A: Performed by: SURGERY

## 2021-04-08 PROCEDURE — 76010000149 HC OR TIME 1 TO 1.5 HR: Performed by: SURGERY

## 2021-04-08 PROCEDURE — 85014 HEMATOCRIT: CPT

## 2021-04-08 PROCEDURE — 74011250637 HC RX REV CODE- 250/637: Performed by: INTERNAL MEDICINE

## 2021-04-08 PROCEDURE — 83036 HEMOGLOBIN GLYCOSYLATED A1C: CPT

## 2021-04-08 PROCEDURE — 74011250637 HC RX REV CODE- 250/637: Performed by: SURGERY

## 2021-04-08 PROCEDURE — 2709999900 HC NON-CHARGEABLE SUPPLY

## 2021-04-08 PROCEDURE — 74011000250 HC RX REV CODE- 250: Performed by: NURSE ANESTHETIST, CERTIFIED REGISTERED

## 2021-04-08 PROCEDURE — 74011250636 HC RX REV CODE- 250/636: Performed by: NURSE ANESTHETIST, CERTIFIED REGISTERED

## 2021-04-08 PROCEDURE — 77030002996 HC SUT SLK J&J -A: Performed by: SURGERY

## 2021-04-08 PROCEDURE — 86923 COMPATIBILITY TEST ELECTRIC: CPT

## 2021-04-08 PROCEDURE — 86901 BLOOD TYPING SEROLOGIC RH(D): CPT

## 2021-04-08 PROCEDURE — 65660000000 HC RM CCU STEPDOWN

## 2021-04-08 PROCEDURE — 99233 SBSQ HOSP IP/OBS HIGH 50: CPT | Performed by: INTERNAL MEDICINE

## 2021-04-08 PROCEDURE — 85025 COMPLETE CBC W/AUTO DIFF WBC: CPT

## 2021-04-08 PROCEDURE — 2709999900 HC NON-CHARGEABLE SUPPLY: Performed by: SURGERY

## 2021-04-08 PROCEDURE — 80053 COMPREHEN METABOLIC PANEL: CPT

## 2021-04-08 PROCEDURE — 74011000258 HC RX REV CODE- 258: Performed by: INTERNAL MEDICINE

## 2021-04-08 RX ORDER — FENTANYL CITRATE 50 UG/ML
INJECTION, SOLUTION INTRAMUSCULAR; INTRAVENOUS AS NEEDED
Status: DISCONTINUED | OUTPATIENT
Start: 2021-04-08 | End: 2021-04-08 | Stop reason: HOSPADM

## 2021-04-08 RX ORDER — SODIUM CHLORIDE 9 MG/ML
INJECTION, SOLUTION INTRAVENOUS
Status: DISCONTINUED | OUTPATIENT
Start: 2021-04-08 | End: 2021-04-08 | Stop reason: HOSPADM

## 2021-04-08 RX ORDER — SODIUM CHLORIDE, SODIUM LACTATE, POTASSIUM CHLORIDE, CALCIUM CHLORIDE 600; 310; 30; 20 MG/100ML; MG/100ML; MG/100ML; MG/100ML
INJECTION, SOLUTION INTRAVENOUS
Status: DISCONTINUED | OUTPATIENT
Start: 2021-04-08 | End: 2021-04-08 | Stop reason: HOSPADM

## 2021-04-08 RX ORDER — LIDOCAINE HYDROCHLORIDE 20 MG/ML
INJECTION, SOLUTION EPIDURAL; INFILTRATION; INTRACAUDAL; PERINEURAL AS NEEDED
Status: DISCONTINUED | OUTPATIENT
Start: 2021-04-08 | End: 2021-04-08 | Stop reason: HOSPADM

## 2021-04-08 RX ORDER — SODIUM CHLORIDE 0.9 % (FLUSH) 0.9 %
5-40 SYRINGE (ML) INJECTION AS NEEDED
Status: DISCONTINUED | OUTPATIENT
Start: 2021-04-08 | End: 2021-04-15 | Stop reason: HOSPADM

## 2021-04-08 RX ORDER — SODIUM CHLORIDE 9 MG/ML
250 INJECTION, SOLUTION INTRAVENOUS AS NEEDED
Status: DISCONTINUED | OUTPATIENT
Start: 2021-04-08 | End: 2021-04-15 | Stop reason: HOSPADM

## 2021-04-08 RX ORDER — KETAMINE HCL 50MG/ML(1)
SYRINGE (ML) INTRAVENOUS AS NEEDED
Status: DISCONTINUED | OUTPATIENT
Start: 2021-04-08 | End: 2021-04-08 | Stop reason: HOSPADM

## 2021-04-08 RX ORDER — SUCCINYLCHOLINE CHLORIDE 20 MG/ML
INJECTION INTRAMUSCULAR; INTRAVENOUS AS NEEDED
Status: DISCONTINUED | OUTPATIENT
Start: 2021-04-08 | End: 2021-04-08 | Stop reason: HOSPADM

## 2021-04-08 RX ORDER — ONDANSETRON 2 MG/ML
INJECTION INTRAMUSCULAR; INTRAVENOUS AS NEEDED
Status: DISCONTINUED | OUTPATIENT
Start: 2021-04-08 | End: 2021-04-08 | Stop reason: HOSPADM

## 2021-04-08 RX ORDER — SODIUM CHLORIDE 0.9 % (FLUSH) 0.9 %
5-40 SYRINGE (ML) INJECTION EVERY 8 HOURS
Status: DISCONTINUED | OUTPATIENT
Start: 2021-04-08 | End: 2021-04-15 | Stop reason: HOSPADM

## 2021-04-08 RX ORDER — OXYCODONE HYDROCHLORIDE 5 MG/1
5 TABLET ORAL
Status: DISCONTINUED | OUTPATIENT
Start: 2021-04-08 | End: 2021-04-13

## 2021-04-08 RX ORDER — ETOMIDATE 2 MG/ML
INJECTION INTRAVENOUS AS NEEDED
Status: DISCONTINUED | OUTPATIENT
Start: 2021-04-08 | End: 2021-04-08 | Stop reason: HOSPADM

## 2021-04-08 RX ADMIN — Medication 10 ML: at 05:37

## 2021-04-08 RX ADMIN — SODIUM CHLORIDE, SODIUM LACTATE, POTASSIUM CHLORIDE, AND CALCIUM CHLORIDE: 600; 310; 30; 20 INJECTION, SOLUTION INTRAVENOUS at 20:39

## 2021-04-08 RX ADMIN — GABAPENTIN 100 MG: 100 CAPSULE ORAL at 08:25

## 2021-04-08 RX ADMIN — INSULIN LISPRO 6 UNITS: 100 INJECTION, SOLUTION INTRAVENOUS; SUBCUTANEOUS at 13:25

## 2021-04-08 RX ADMIN — GABAPENTIN 100 MG: 100 CAPSULE ORAL at 17:39

## 2021-04-08 RX ADMIN — ONDANSETRON 4 MG: 2 INJECTION INTRAMUSCULAR; INTRAVENOUS at 20:49

## 2021-04-08 RX ADMIN — EZETIMIBE 10 MG: 10 TABLET ORAL at 09:00

## 2021-04-08 RX ADMIN — ACETAMINOPHEN 650 MG: 325 TABLET ORAL at 05:36

## 2021-04-08 RX ADMIN — PIPERACILLIN SODIUM AND TAZOBACTAM SODIUM 3.38 G: 3; .375 INJECTION, POWDER, LYOPHILIZED, FOR SOLUTION INTRAVENOUS at 16:43

## 2021-04-08 RX ADMIN — INSULIN LISPRO 2 UNITS: 100 INJECTION, SOLUTION INTRAVENOUS; SUBCUTANEOUS at 17:38

## 2021-04-08 RX ADMIN — SODIUM CHLORIDE: 9 INJECTION, SOLUTION INTRAVENOUS at 20:39

## 2021-04-08 RX ADMIN — Medication 50 MG: at 20:45

## 2021-04-08 RX ADMIN — OXYCODONE HYDROCHLORIDE 5 MG: 5 TABLET ORAL at 05:36

## 2021-04-08 RX ADMIN — Medication 10 ML: at 17:39

## 2021-04-08 RX ADMIN — Medication 81 MG: at 09:00

## 2021-04-08 RX ADMIN — ETOMIDATE 18 MG: 2 INJECTION, SOLUTION INTRAVENOUS at 20:45

## 2021-04-08 RX ADMIN — HYDROMORPHONE HYDROCHLORIDE 0.5 MG: 2 INJECTION, SOLUTION INTRAMUSCULAR; INTRAVENOUS; SUBCUTANEOUS at 00:00

## 2021-04-08 RX ADMIN — VANCOMYCIN HYDROCHLORIDE 1250 MG: 10 INJECTION, POWDER, LYOPHILIZED, FOR SOLUTION INTRAVENOUS at 13:25

## 2021-04-08 RX ADMIN — PIPERACILLIN SODIUM AND TAZOBACTAM SODIUM 3.38 G: 3; .375 INJECTION, POWDER, LYOPHILIZED, FOR SOLUTION INTRAVENOUS at 02:26

## 2021-04-08 RX ADMIN — HYDROMORPHONE HYDROCHLORIDE 0.5 MG: 2 INJECTION, SOLUTION INTRAMUSCULAR; INTRAVENOUS; SUBCUTANEOUS at 08:26

## 2021-04-08 RX ADMIN — PIPERACILLIN SODIUM AND TAZOBACTAM SODIUM 3.38 G: 3; .375 INJECTION, POWDER, LYOPHILIZED, FOR SOLUTION INTRAVENOUS at 08:25

## 2021-04-08 RX ADMIN — Medication 10 ML: at 22:00

## 2021-04-08 RX ADMIN — SUCCINYLCHOLINE CHLORIDE 120 MG: 20 INJECTION, SOLUTION INTRAMUSCULAR; INTRAVENOUS at 20:45

## 2021-04-08 RX ADMIN — ACETAMINOPHEN 650 MG: 325 TABLET ORAL at 13:25

## 2021-04-08 RX ADMIN — FENTANYL CITRATE 50 MCG: 50 INJECTION, SOLUTION INTRAMUSCULAR; INTRAVENOUS at 20:56

## 2021-04-08 RX ADMIN — DULOXETINE HYDROCHLORIDE 30 MG: 30 CAPSULE, DELAYED RELEASE ORAL at 09:00

## 2021-04-08 RX ADMIN — OXYCODONE HYDROCHLORIDE 5 MG: 5 TABLET ORAL at 13:35

## 2021-04-08 RX ADMIN — Medication 10 ML: at 23:00

## 2021-04-08 RX ADMIN — FENTANYL CITRATE 50 MCG: 50 INJECTION, SOLUTION INTRAMUSCULAR; INTRAVENOUS at 21:16

## 2021-04-08 RX ADMIN — LIDOCAINE HYDROCHLORIDE 80 MG: 20 INJECTION, SOLUTION EPIDURAL; INFILTRATION; INTRACAUDAL; PERINEURAL at 20:44

## 2021-04-08 RX ADMIN — CARVEDILOL 3.12 MG: 6.25 TABLET, FILM COATED ORAL at 08:28

## 2021-04-08 RX ADMIN — ACETAMINOPHEN 650 MG: 325 TABLET ORAL at 16:43

## 2021-04-08 NOTE — PROGRESS NOTES
Problem: Falls - Risk of  Goal: *Absence of Falls  Description: Document Alexandar Castro Fall Risk and appropriate interventions in the flowsheet. Outcome: Progressing Towards Goal  Note: Fall Risk Interventions:  Mobility Interventions: Strengthening exercises (ROM-active/passive), Utilize walker, cane, or other assistive device    Mentation Interventions: Adequate sleep, hydration, pain control    Medication Interventions: Bed/chair exit alarm    Elimination Interventions: Call light in reach              Problem: Patient Education: Go to Patient Education Activity  Goal: Patient/Family Education  Outcome: Progressing Towards Goal     Problem: Pressure Injury - Risk of  Goal: *Prevention of pressure injury  Description: Document Steve Scale and appropriate interventions in the flowsheet.   Outcome: Progressing Towards Goal  Note: Pressure Injury Interventions:  Sensory Interventions: Assess changes in LOC, Keep linens dry and wrinkle-free, Minimize linen layers         Activity Interventions: Pressure redistribution bed/mattress(bed type)    Mobility Interventions: PT/OT evaluation    Nutrition Interventions: Document food/fluid/supplement intake    Friction and Shear Interventions: Minimize layers                Problem: Patient Education: Go to Patient Education Activity  Goal: Patient/Family Education  Outcome: Progressing Towards Goal     Problem: Pain  Goal: *Control of Pain  Outcome: Progressing Towards Goal  Goal: *PALLIATIVE CARE:  Alleviation of Pain  Outcome: Progressing Towards Goal     Problem: Patient Education: Go to Patient Education Activity  Goal: Patient/Family Education  Outcome: Progressing Towards Goal     Problem: Amputation  Goal: *Progressive mobility and function  Outcome: Progressing Towards Goal  Goal: *Optimal pain control at patient's stated goal  Outcome: Progressing Towards Goal  Goal: *Absence of infection signs and symptoms  Outcome: Progressing Towards Goal  Goal: *Optimal residual limb healing  Outcome: Progressing Towards Goal     Problem: Patient Education: Go to Patient Education Activity  Goal: Patient/Family Education  Outcome: Progressing Towards Goal     Problem: Nutrition Deficit  Goal: *Optimize nutritional status  Outcome: Progressing Towards Goal

## 2021-04-08 NOTE — PROGRESS NOTES
Comprehensive Nutrition Assessment    Type and Reason for Visit: Reassess, Positive nutrition screen, Wound    Nutrition Recommendations/Plan:   - Continue diabetic diet and clarify flavor of Glucerna Shake, TID  - Monitor and encourage po intake as tolerated. - Order for nursing to obtain pt's weight. Nutrition Assessment:  Transferred to inpatient care from ARU for right BKA washout and right knee disarticulation 2/2 infection of amputation stump. Per RD assessment 4/6 fair to good appetite consuming 50%-75% of meal while admitted to ARU. Previously dislikes Magic Cup and changed to [employer]. Pt with poor po intake today and states vanilla Glucerna Shakes are okay.     Malnutrition Assessment:  Malnutrition Status: At risk for malnutrition (specify)(decreased po intake and appetite)      Nutrition History and Allergies: PMHx- chronic systolic heart failure, CAD, DVT, epilepsy, MI, major depressive disorder, HLD, PAD & DM. Noted from RD assessment 3/27/20 pt reported a 40# wt loss x month but also underwent right BKA 3/22/21 contributing to weight loss. Fair po intake usual intake of 2 meals/day with occasional snack- but variable intake over the past month. Allergy to almond and strawberry. Estimated Daily Nutrient Needs:  Energy (kcal): 9950-4169; Weight Used for Energy Requirements: Current(85 kg)  Protein (g): 102-128; Weight Used for Protein Requirements: Current(1.2-1.5)  Fluid (ml/day): 8366-0120; Method Used for Fluid Requirements: 1 ml/kcal      Nutrition Related Findings:  BM 4/4- miralax prn.  Recent BG levels 236-239 mg/dL- SSI      Wounds:    Pressure injury, Stage II, Venous stasis, Surgical incision       Current Nutrition Therapies:  DIET DIABETIC CONSISTENT CARB Regular  DIET NUTRITIONAL SUPPLEMENTS All Meals; Glucerna Shake    Anthropometric Measures:  · Height:  6' 2\" (188 cm)  · Current Body Wt:  84.8 kg (186 lb 15.2 oz)   · Admission Body Wt:  186 lb 15.2 oz    · Usual Body Wt: 84.8 kg (187 lb)(3/26/21)     · Ideal Body Wt:  190 lbs:  98.4 %   · Adjusted Body Weight:  198; Weight Adjustment for: Amputation   · Adjusted BMI:  25.4    · BMI Category: Overweight (BMI 25.0-29.9)(adjusted BMI)       Nutrition Diagnosis:   · Inadequate oral intake related to psychological cause or life stress, acute injury/trauma(appetite) as evidenced by intake 0-25%(previously fair in ARU)    Nutrition Interventions:   Food and/or Nutrient Delivery: Continue current diet, Modify oral nutrition supplement  Nutrition Education and Counseling: Education not indicated  Coordination of Nutrition Care: Continue to monitor while inpatient    Goals:  PO nutrition intake will meet >75% of patient estimated nutritional needs within the next 7 days.        Nutrition Monitoring and Evaluation:   Behavioral-Environmental Outcomes: None identified  Food/Nutrient Intake Outcomes: Food and nutrient intake, Supplement intake  Physical Signs/Symptoms Outcomes: Biochemical data, GI status, Meal time behavior, Nutrition focused physical findings    Discharge Planning:    Continue current diet, Too soon to determine     Electronically signed by Johnny Cheek RD on 4/8/2021 at 2:02 PM    Contact: [phone]

## 2021-04-08 NOTE — PROGRESS NOTES
Admit Date: 4/7/2021  Date of Service: 4/8/2021    Reason for follow-up: infected right BKA stump      Assessment:         Infected Right BKA stump:               - s/p debridement and disarticulation of right knee              - 4/6 wound cx + GNR, light strep spp              - 4/7 intra-operative cultures pending  Post-operative anemia:  H&H trending down; most recent Hb 7.0  PAD:  S/p embolectomy 3/5/21; right BKA 3/25  HFpEF with grade 1 diastolic dysfunction:  EF 35% from March 2021: currently compensated  HTN  DM type 2 on insulin  Factor V leiden deficiency  Hyprelipidemia  Major depressive disorder:  Started on Duloxetine    Plan:   Repeat H&H in afternoon- transfuse for Hb <7.0  Continue vancomycin and Zosyn for now:              -Taper antibiotics based off of culture data  Follow-up blood cultures from 4/6/2021-currently no growth  Follow-up wound cultures from 4/6/2021  Follow-up intraoperative cultures  Sliding-scale insulin  CBC, BMP in a.m. Hold Xarelto for now              -SQ heparin for DVT prophylaxis  Continue BP medications --hold parameters for blood pressure medications  Pain medications as per vascular surgery  Continue with PT and OT    Called Sha Camarena ([phone]) per patient request at 2:30pm. Left HIPPA compliant message. Dispo: return to ARU- needs auth again prior to return    Current Antibtiocs:   Vanc 4/6- present  Zosyn 4/6- present    Lines:   Peripheral    Case discussed with:  [x]Patient  []Family  [x]Nursing  [x]Case Management  DVT Prophylaxis:  []Lovenox  [x]Hep SQ  []SCDs  []Coumadin   []On Heparin gtt    I have independently examined the patient and reviewed all lab studies and imgaing as well as review of nursing notes and physican notes from the past 24 hours. Radha Webb D.O.   Pager [phone]      Allergies   Allergen Reactions    Nitroglycerin Other (comments)      BP drops rapidly when he takes SL Nitro          [place] Diarrhea and Rash    Cat Dander Cough    Codeine Rash    Strawberry Rash           Subjective:      Pt seen and examined. Has been talking to the  this morning which makes him feel a little bit better. He is overwhelmed by everything that is happening. And did not understand why all this is happening to him. Pain is overall controlled at this time. Objective:        Visit Vitals  /67   Pulse (!) 113   Temp 99.5 °F (37.5 °C)   Resp 18   Ht 6' 2\" (1.88 m)   SpO2 98%   BMI 24.01 kg/m²     Temp (24hrs), Av.5 °F (36.9 °C), Min:97.2 °F (36.2 °C), Max:100.6 °F (38.1 °C)        General:   awake alert and oriented, non-toxic   Skin:   no rashes or skin lesions noted on limited exam, dry and warm   HEENT:  No scleral icterus or pallor; oral mucosa moist, lips moist   Lymph Nodes:   not assessed today   Lungs:   non, labored; bilaterally clear to aspiration- no crackles wheezes rales or rhonchi   Heart:  RRR, s1 and s2; no murmurs rubs or gallops; no edema, + pedal pulses   Abdomen:  soft, non-distended, active bowel sounds, non-tender   Genitourinary:  deferred   Extremities:   average muscle tone; no contractures, no joint effusions; stump remains in post-surgical dressings- no blood or soiling noted   Neurologic:  No gross focal motor or sensory abnormalities; CN 2-12 intact; Follows commands.     Psychiatric:   flat affect, tearful       Labs: Results:   Chemistry Recent Labs     21  0921  0850   * 130* 216*    136 130*   K 5.3 4.6 4.6    103 96*   CO2 26 25 22   BUN 17 18 20*   CREA 1.12 1.16 1.26   CA 8.6 8.8 9.6   AGAP 7 8 12   BUCR 15 16 16   *  --   --    TP 5.9*  --   --    ALB 1.9*  --   --    GLOB 4.0  --   --    AGRAT 0.5*  --   --       CBC w/Diff Recent Labs     21  0940 21  02121  0905 21  0850   WBC  --  17.2* 22.0* 23.6*   RBC  --  3.08* 3.74* 4.30*   HGB 7.0* 7.8* 9.5* 10.8*   HCT 22.7* 25.4* 30.4* 34.2*   PLT  --  507* 546* 661* GRANS  --  89* 86* 90*   LYMPH  --  1* 5* 4*   EOS  --  4 2 0        No results found for: SDES Lab Results   Component Value Date/Time    Culture result: PENDING 04/07/2021 01:58 PM    Culture result: HEAVY GRAM NEGATIVE RODS (A) 04/06/2021 01:41 PM    Culture result: LIGHT PROBABLE STREPTOCOCCUS SPECIES (A) 04/06/2021 01:41 PM    Culture result: NO GROWTH 2 DAYS 04/06/2021 09:00 AM    Culture result: NO GROWTH 2 DAYS 04/06/2021 08:50 AM        Results     Procedure Component Value Units Date/Time    CULTURE, Nieves Bolaños STAIN [[id number]] Collected: 04/07/21 1358    Order Status: Completed Specimen: Leg, Right Updated: 04/07/21 2310     Special Requests: RIGHT LEG        GRAM STAIN RARE WBCS SEEN         NO ORGANISMS SEEN        Culture result: PENDING    CULTURE, ANAEROBIC [[id number]] Collected: 04/07/21 1358    Order Status: Completed Updated: 04/07/21 2309    COVID-19 RAPID TEST [[id number]] Collected: 04/07/21 0945    Order Status: Completed Specimen: Nasopharyngeal Updated: 04/07/21 1017     Specimen source Nasopharyngeal        COVID-19 rapid test Not detected        Comment: Rapid Abbott ID Now       Rapid NAAT:  The specimen is NEGATIVE for SARS-CoV-2, the novel coronavirus associated with COVID-19. Negative results should be treated as presumptive and, if inconsistent with clinical signs and symptoms or necessary for patient management, should be tested with an alternative molecular assay. Negative results do not preclude SARS-CoV-2 infection and should not be used as the sole basis for patient management decisions. This test has been authorized by the FDA under an Emergency Use Authorization (EUA) for use by authorized laboratories.    Fact sheet for Healthcare Providers: ConventionUpdate.co.nz  Fact sheet for Patients: ConventionUpdate.co.nz       Methodology: Isothermal Nucleic Acid Amplification         COVID-19 RAPID TEST [[id number]] Collected: 04/07/21 0741    Order Status: Canceled     CULTURE, Elroy Jossie STAIN [[id number]]  (Abnormal) Collected: 04/06/21 1341    Order Status: Completed Specimen: Wound Drainage Updated: 04/08/21 1252     Special Requests: NO SPECIAL REQUESTS        GRAM STAIN OCCASIONAL WBCS SEEN         3+ GRAM NEGATIVE RODS        Culture result: HEAVY GRAM NEGATIVE RODS               LIGHT PROBABLE STREPTOCOCCUS SPECIES          CULTURE, BLOOD [[id number]] Collected: 04/06/21 0900    Order Status: Completed Specimen: Blood Updated: 04/08/21 0726     Special Requests: NO SPECIAL REQUESTS        Culture result: NO GROWTH 2 DAYS       CULTURE, BLOOD [[id number]] Collected: 04/06/21 0850    Order Status: Completed Specimen: Blood Updated: 04/08/21 0726     Special Requests: NO SPECIAL REQUESTS        Culture result: NO GROWTH 2 DAYS       CULTURE, BLOOD, PAIRED [[id number]] Collected: 04/06/21 0800    Order Status: Canceled Specimen: Blood     CULTURE, URINE [[id number]] Collected: 04/02/21 2000    Order Status: Completed Specimen: Clean catch Updated: 04/04/21 1101     Special Requests: NO SPECIAL REQUESTS        Culture result: No growth (<1,000 CFU/ML)             Imaging:     No results found.

## 2021-04-08 NOTE — PROGRESS NOTES
Spoke with Bozena Dacosta at [facility], patient was admitted to hospital from ARU but, new authorization will need to be approved prior to patient returning. ARU will need PT/OT evals after surgery before they can start auth. Jose Evans will continue to follow case. CM will continue to follow up.      Farhan Sprague RN, BSN   Care Management  [phone]

## 2021-04-08 NOTE — PROGRESS NOTES
Problem: Falls - Risk of  Goal: *Absence of Falls  Description: Document Rosa M Woo Fall Risk and appropriate interventions in the flowsheet. Outcome: Progressing Towards Goal  Note: Fall Risk Interventions:  Mobility Interventions: Bed/chair exit alarm    Mentation Interventions: Adequate sleep, hydration, pain control, Bed/chair exit alarm, Door open when patient unattended, Evaluate medications/consider consulting pharmacy, Eyeglasses and hearing aids, More frequent rounding, Room close to nurse's station, Toileting rounds, Update white board    Medication Interventions: Assess postural VS orthostatic hypotension, Bed/chair exit alarm, Evaluate medications/consider consulting pharmacy    Elimination Interventions: Bed/chair exit alarm, Call light in reach, Patient to call for help with toileting needs, Toilet paper/wipes in reach, Toileting schedule/hourly rounds, Urinal in reach              Problem: Patient Education: Go to Patient Education Activity  Goal: Patient/Family Education  Outcome: Progressing Towards Goal     Problem: Pressure Injury - Risk of  Goal: *Prevention of pressure injury  Description: Document Steve Scale and appropriate interventions in the flowsheet.   Outcome: Progressing Towards Goal  Note: Pressure Injury Interventions:  Sensory Interventions: Assess changes in LOC, Assess need for specialty bed, Keep linens dry and wrinkle-free, Maintain/enhance activity level, Minimize linen layers, Monitor skin under medical devices, Pad between skin to skin, Pressure redistribution bed/mattress (bed type)         Activity Interventions: Pressure redistribution bed/mattress(bed type)    Mobility Interventions: Assess need for specialty bed, HOB 30 degrees or less, Pressure redistribution bed/mattress (bed type)    Nutrition Interventions: Document food/fluid/supplement intake    Friction and Shear Interventions: Foam dressings/transparent film/skin sealants, HOB 30 degrees or less, Minimize layers, Transferring/repositioning devices                Problem: Patient Education: Go to Patient Education Activity  Goal: Patient/Family Education  Outcome: Progressing Towards Goal     Problem: Pain  Goal: *Control of Pain  Outcome: Progressing Towards Goal  Goal: *PALLIATIVE CARE:  Alleviation of Pain  Outcome: Progressing Towards Goal     Problem: Patient Education: Go to Patient Education Activity  Goal: Patient/Family Education  Outcome: Progressing Towards Goal     Problem: Amputation  Goal: *Progressive mobility and function  Outcome: Progressing Towards Goal  Goal: *Optimal pain control at patient's stated goal  Outcome: Progressing Towards Goal  Goal: *Absence of infection signs and symptoms  Outcome: Progressing Towards Goal  Goal: *Optimal residual limb healing  Outcome: Progressing Towards Goal     Problem: Patient Education: Go to Patient Education Activity  Goal: Patient/Family Education  Outcome: Progressing Towards Goal

## 2021-04-08 NOTE — PROGRESS NOTES
1930    Bedside, Verbal, and Written shift change report given to [address] [place] [address] (oncoming nurse) by Cate Escalante RN (offgoing nurse). Report included the following information SBAR, Kardex, and MAR.     1949  Vitals done, patient was taken off the unit to OR.

## 2021-04-08 NOTE — PROGRESS NOTES
PROGRESS NOTE    Patient: Tita Stockton MRN: [id number]  SSN: [id number]    YOB: 1965  Age: 60 y.o. Sex: male      Date: 4/8/2021    [facility]: [place] [address] Day: 1    Plan:   Continue antibiotics. . Wound change in the am. Plan for closure of stump on Saturday 4/10    Assessment:   POD#1 s/p open knee disarticulation secondary to infected BKA stump right leg. Complains of severe pain. Subjective:   Severe right leg pain. Not required    Objective:   Admit weight:    Last recorded weight:      Visit Vitals  /67   Pulse (!) 113   Temp 99.5 °F (37.5 °C)   Resp 18   Ht 6' 2\" (1.88 m)   SpO2 98%   BMI 24.01 kg/m²       Intake/Output Summary (Last 24 hours) at 4/8/2021 1552  Last data filed at 4/8/2021 1311  Gross per 24 hour   Intake 950 ml   Output 1900 ml   Net -950 ml       Physical exam was negative except for: Right leg wrapped with bulky dressing. Patient sleepy      Labs:     Recent Labs     04/08/21  0940 04/08/21  0210 04/07/21  0905 04/06/21  0850   WBC  --  17.2* 22.0* 23.6*   HGB 7.0* 7.8* 9.5* 10.8*   HCT 22.7* 25.4* 30.4* 34.2*   PLT  --  507* 546* 661*   RDW  --  14.4 14.1 14.3     Recent Labs     04/08/21  0210 04/07/21  0905 04/06/21  0850    136 130*   K 5.3 4.6 4.6    103 96*   CO2 26 25 22   * 130* 216*   BUN 17 18 20*   CREA 1.12 1.16 1.26   CA 8.6 8.8 9.6   ALB 1.9*  --   --    ALT 43  --   --      No results for input(s): PH, PCO2, PO2, HCO3, FIO2 in the last 72 hours.     Suad Kern MD, FACS

## 2021-04-08 NOTE — PROGRESS NOTES
Reason for Admission:  Right BKA infection ([facility] [T87.43]  Hx of BKA, right ([facility] [Z89.511]                 RUR Score:    12%            Plan for utilizing home health:    None, ARU                      Likelihood of Readmission:   LOW                         Transition of Care Plan:              Initial assessment completed with patient. Cognitive status of patient: oriented to time, place, person and situation. Face sheet information confirmed:  yes. The patient to participate in his discharge plan and to receive any needed information. This patient lives in a single family home alone. Patient is not able to navigate steps as needed. Prior to hospitalization, patient was considered to be independent with ADLs/IADLS : no . If not independent,  patient needs assist with : dressing, bathing, food preparation, cooking, toileting and grooming    Patient has a current ACP document on file: no      Healthcare Decision Maker:     Click here to complete [address] including selection of the Healthcare Decision Maker Relationship (ie \"Primary\")    Discharge transportation tbd, patient likely to go to ARU vs SNF and will need medicaid transport. The patient already has [employer] chair, [facility] medical equipment available in the home. Patient is not currently active with home health. Patient has stayed in a skilled nursing facility or rehab. Was  stay within last 60 days : yes. This patient is on dialysis :no    Currently, the discharge plan is Acute Rehab. The patient states that he can obtain his medications from the pharmacy, and take his medications as directed. Patient's current insurance is [employer] medicaid       Care Management Interventions  PCP Verified by CM: Yes  Mode of Transport at Discharge: Other (see comment)(tbd)  Transition of Care Consult (CM Consult):  Other(ARU)  Discharge Durable Medical Equipment: No  Physical Therapy Consult: Yes  Occupational Therapy Consult: Yes  Speech Therapy Consult: No  Current Support Network: Lives Alone  Confirm Follow Up Transport: Other (see comment)(tbd)  Discharge Location  Discharge Placement: Rehab hospital/unit acute    Readmission Assessment  Number of days since last admission?: 8-30 days  Previous disposition: Acute Rehab  Who is being interviewed?: Patient  What was the patient's/caregiver's perception as to why they think they needed to return back to the hospital?: Other (Comment)(infection)  Did you visit your Primary Care Physician after you left the hospital, before you returned this time?: No  Why weren't you able to visit your PCP?: Other (Comment)(was in ARU)  Did you see a specialist, such as Cardiac, Pulmonary, Orthopedic Physician, etc. after you left the hospital?: Yes  Who advised the patient to return to the hospital?: Acute Rehab  Does the patient report anything that got in the way of taking their medications?: No  In our efforts to provide the best possible care to you and others like you, can you think of anything that we could have done to help you after you left the hospital the first time, so that you might not have needed to return so soon?: Other (Comment)(nothing)      Claudette Santa RN, BSN   Care Management  [phone]

## 2021-04-08 NOTE — PROGRESS NOTES
Patient assessed at bedside after concern of bleeding from stump was raised by hospitalist.     Right leg with significant hemorrhage from amputation site. Blood pooled in dressing. Per nursing, this has been ongoing since previous night but vascular surgery was just notified. PM hgb 6. 5. pRBC ordered, still pending. OR mobilized for emergent exploration of stump for hemorrhage. Consent obtained from patient.

## 2021-04-08 NOTE — PROGRESS NOTES
conducted an initial consultation and Spiritual Assessment for Marlenejayson Kelly, who is a 60 y. o.,male. Patient's Primary Language is: [place]. According to the patient's EMR Latter day Affiliation is: No preference.      The reason the Patient came to the hospital is:   Patient Active Problem List    Diagnosis Date Noted    Ischemia of right BKA site [facility]) 04/07/2021    Right BKA infection ([facility] 04/07/2021    Hx of BKA, right ([facility] 04/07/2021    Constipation 03/29/2021    Peripheral artery disease ([facility]     Coronary artery disease involving native coronary artery of native heart     Type 2 diabetes mellitus, without long-term current use of insulin (HCC)     History of epilepsy     Factor V Leiden mutation ([facility]     Migraine headache     Wears glasses     Umbilical hernia     Obstructive sleep apnea     ICD (implantable cardioverter-defibrillator) in place     Mitral valve prolapse     History of head injury     Hypertensive heart disease with chronic systolic congestive heart failure (HCC)     History of myocardial infarction     Long term current use of aspirin     On statin therapy due to risk of future cardiovascular event     History of deep venous thrombosis (DVT) of distal vein of right lower extremity     Anticoagulated by anticoagulation treatment     Mixed hyperlipidemia     History of embolectomy     Chronic systolic heart failure (HCC)     Major depressive disorder 03/24/2021    Grade I diastolic dysfunction 89/28/6598    Status post below knee amputation of right lower extremity ([address] 03/22/2021    Impaired mobility and ADLs 03/22/2021    Critical ischemia of lower extremity ([address] 03/22/2021    COVID-19 ruled out by laboratory testing 03/22/2021    Acute blood loss as cause of postoperative anemia 03/22/2021    History of noncompliance with medical treatment 03/18/2021    Cardiomyopathy ([address] 12/17/2015        The  provided the following Interventions:  Initiated a relationship of care and support. Explored issues of jaren and spirituality while hospitalized. Listened empathically. The following outcomes where achieved:  Patient shared limited information about both their medical narrative and spiritual journey. Patient processed feeling about current hospitalization. Patient expressed gratitude for 's visit. Assessment:  Patient does not have any Rastafarian/cultural needs that will affect patient's preferences in health care. There are no spiritual or Rastafarian issues which require intervention at this time. Plan:  Chaplains will continue to follow and will provide pastoral care on an as needed/requested basis.  recommends bedside caregivers page  on duty if patient shows signs of acute spiritual or emotional distress.     [address] [facility]   [phone]

## 2021-04-09 LAB
ANION GAP SERPL CALC-SCNC: 8 MMOL/L (ref 3–18)
BACTERIA SPEC CULT: ABNORMAL
BACTERIA SPEC CULT: ABNORMAL
BACTERIA SPEC CULT: NORMAL
BUN SERPL-MCNC: 22 MG/DL (ref 7–18)
BUN/CREAT SERPL: 16 (ref 12–20)
CALCIUM SERPL-MCNC: 8.4 MG/DL (ref 8.5–10.1)
CHLORIDE SERPL-SCNC: 106 MMOL/L (ref 100–111)
CO2 SERPL-SCNC: 22 MMOL/L (ref 21–32)
CREAT SERPL-MCNC: 1.35 MG/DL (ref 0.6–1.3)
ERYTHROCYTE [DISTWIDTH] IN BLOOD BY AUTOMATED COUNT: 14 % (ref 11.6–14.5)
GLUCOSE BLD STRIP.AUTO-MCNC: 194 MG/DL (ref 70–110)
GLUCOSE BLD STRIP.AUTO-MCNC: 206 MG/DL (ref 70–110)
GLUCOSE BLD STRIP.AUTO-MCNC: 231 MG/DL (ref 70–110)
GLUCOSE BLD STRIP.AUTO-MCNC: 279 MG/DL (ref 70–110)
GLUCOSE SERPL-MCNC: 165 MG/DL (ref 74–99)
GRAM STN SPEC: ABNORMAL
GRAM STN SPEC: ABNORMAL
HCT VFR BLD AUTO: 22.7 % (ref 36–48)
HGB BLD-MCNC: 7.4 G/DL (ref 13–16)
MCH RBC QN AUTO: 26.5 PG (ref 24–34)
MCHC RBC AUTO-ENTMCNC: 32.6 G/DL (ref 31–37)
MCV RBC AUTO: 81.4 FL (ref 74–97)
PLATELET # BLD AUTO: 431 K/UL (ref 135–420)
PMV BLD AUTO: 10.2 FL (ref 9.2–11.8)
POTASSIUM SERPL-SCNC: 4.4 MMOL/L (ref 3.5–5.5)
RBC # BLD AUTO: 2.79 M/UL (ref 4.35–5.65)
SERVICE CMNT-IMP: ABNORMAL
SERVICE CMNT-IMP: NORMAL
SODIUM SERPL-SCNC: 136 MMOL/L (ref 136–145)
WBC # BLD AUTO: 18.6 K/UL (ref 4.6–13.2)

## 2021-04-09 PROCEDURE — 65660000004 HC RM CVT STEPDOWN

## 2021-04-09 PROCEDURE — 74011250637 HC RX REV CODE- 250/637: Performed by: INTERNAL MEDICINE

## 2021-04-09 PROCEDURE — 74011250636 HC RX REV CODE- 250/636: Performed by: INTERNAL MEDICINE

## 2021-04-09 PROCEDURE — 77030019905 HC CATH URETH INTMIT MDII -A

## 2021-04-09 PROCEDURE — 77030040830 HC CATH URETH FOL MDII -A

## 2021-04-09 PROCEDURE — 74011000258 HC RX REV CODE- 258: Performed by: INTERNAL MEDICINE

## 2021-04-09 PROCEDURE — 80048 BASIC METABOLIC PNL TOTAL CA: CPT

## 2021-04-09 PROCEDURE — 74011636637 HC RX REV CODE- 636/637: Performed by: INTERNAL MEDICINE

## 2021-04-09 PROCEDURE — 99233 SBSQ HOSP IP/OBS HIGH 50: CPT | Performed by: INTERNAL MEDICINE

## 2021-04-09 PROCEDURE — 82962 GLUCOSE BLOOD TEST: CPT

## 2021-04-09 PROCEDURE — 85027 COMPLETE CBC AUTOMATED: CPT

## 2021-04-09 PROCEDURE — 36415 COLL VENOUS BLD VENIPUNCTURE: CPT

## 2021-04-09 PROCEDURE — 74011250637 HC RX REV CODE- 250/637: Performed by: SURGERY

## 2021-04-09 RX ADMIN — GABAPENTIN 100 MG: 100 CAPSULE ORAL at 17:36

## 2021-04-09 RX ADMIN — ACETAMINOPHEN 650 MG: 325 TABLET ORAL at 23:32

## 2021-04-09 RX ADMIN — PIPERACILLIN SODIUM AND TAZOBACTAM SODIUM 3.38 G: 3; .375 INJECTION, POWDER, LYOPHILIZED, FOR SOLUTION INTRAVENOUS at 04:58

## 2021-04-09 RX ADMIN — Medication 10 ML: at 21:46

## 2021-04-09 RX ADMIN — ATORVASTATIN CALCIUM 80 MG: 40 TABLET, FILM COATED ORAL at 21:46

## 2021-04-09 RX ADMIN — PIPERACILLIN SODIUM AND TAZOBACTAM SODIUM 3.38 G: 3; .375 INJECTION, POWDER, LYOPHILIZED, FOR SOLUTION INTRAVENOUS at 09:31

## 2021-04-09 RX ADMIN — OXYCODONE HYDROCHLORIDE 5 MG: 5 TABLET ORAL at 09:34

## 2021-04-09 RX ADMIN — Medication 10 ML: at 06:00

## 2021-04-09 RX ADMIN — POLYETHYLENE GLYCOL 3350 17 G: 17 POWDER, FOR SOLUTION ORAL at 13:50

## 2021-04-09 RX ADMIN — CARVEDILOL 3.12 MG: 6.25 TABLET, FILM COATED ORAL at 09:31

## 2021-04-09 RX ADMIN — INSULIN LISPRO 2 UNITS: 100 INJECTION, SOLUTION INTRAVENOUS; SUBCUTANEOUS at 22:01

## 2021-04-09 RX ADMIN — ACETAMINOPHEN 650 MG: 325 TABLET ORAL at 09:34

## 2021-04-09 RX ADMIN — CARVEDILOL 3.12 MG: 6.25 TABLET, FILM COATED ORAL at 21:46

## 2021-04-09 RX ADMIN — Medication 81 MG: at 09:31

## 2021-04-09 RX ADMIN — ACETAMINOPHEN 650 MG: 325 TABLET ORAL at 17:36

## 2021-04-09 RX ADMIN — VANCOMYCIN HYDROCHLORIDE 1250 MG: 10 INJECTION, POWDER, LYOPHILIZED, FOR SOLUTION INTRAVENOUS at 13:40

## 2021-04-09 RX ADMIN — PIPERACILLIN SODIUM AND TAZOBACTAM SODIUM 3.38 G: 3; .375 INJECTION, POWDER, LYOPHILIZED, FOR SOLUTION INTRAVENOUS at 19:46

## 2021-04-09 RX ADMIN — GABAPENTIN 100 MG: 100 CAPSULE ORAL at 09:31

## 2021-04-09 RX ADMIN — HYDROMORPHONE HYDROCHLORIDE 0.5 MG: 2 INJECTION, SOLUTION INTRAMUSCULAR; INTRAVENOUS; SUBCUTANEOUS at 20:41

## 2021-04-09 RX ADMIN — OXYCODONE HYDROCHLORIDE 5 MG: 5 TABLET ORAL at 17:36

## 2021-04-09 RX ADMIN — HYDROMORPHONE HYDROCHLORIDE 0.25 MG: 2 INJECTION, SOLUTION INTRAMUSCULAR; INTRAVENOUS; SUBCUTANEOUS at 10:03

## 2021-04-09 RX ADMIN — PIPERACILLIN SODIUM AND TAZOBACTAM SODIUM 3.38 G: 3; .375 INJECTION, POWDER, LYOPHILIZED, FOR SOLUTION INTRAVENOUS at 15:24

## 2021-04-09 RX ADMIN — Medication 10 ML: at 13:50

## 2021-04-09 RX ADMIN — Medication 5 MG: at 21:46

## 2021-04-09 RX ADMIN — INSULIN LISPRO 6 UNITS: 100 INJECTION, SOLUTION INTRAVENOUS; SUBCUTANEOUS at 12:07

## 2021-04-09 RX ADMIN — HYDROMORPHONE HYDROCHLORIDE 0.5 MG: 2 INJECTION, SOLUTION INTRAMUSCULAR; INTRAVENOUS; SUBCUTANEOUS at 01:49

## 2021-04-09 RX ADMIN — OXYCODONE HYDROCHLORIDE 5 MG: 5 TABLET ORAL at 13:42

## 2021-04-09 RX ADMIN — DULOXETINE HYDROCHLORIDE 30 MG: 30 CAPSULE, DELAYED RELEASE ORAL at 09:31

## 2021-04-09 RX ADMIN — HYDROMORPHONE HYDROCHLORIDE 0.5 MG: 2 INJECTION, SOLUTION INTRAMUSCULAR; INTRAVENOUS; SUBCUTANEOUS at 13:42

## 2021-04-09 RX ADMIN — HYDROMORPHONE HYDROCHLORIDE 0.5 MG: 2 INJECTION, SOLUTION INTRAMUSCULAR; INTRAVENOUS; SUBCUTANEOUS at 17:37

## 2021-04-09 RX ADMIN — INSULIN LISPRO 4 UNITS: 100 INJECTION, SOLUTION INTRAVENOUS; SUBCUTANEOUS at 17:03

## 2021-04-09 RX ADMIN — EZETIMIBE 10 MG: 10 TABLET ORAL at 09:30

## 2021-04-09 NOTE — PROGRESS NOTES
Called by [address] [facility] Vascular surgery about patient being taken to OR for exploration for postoperative wound bleed and with mild hypotension. She was asking for higher level of care- post procedure. Asked her to call me after surgery with updated status. Received call at 9.53 pm from Dr. Le Espana -found a collateral bleeder 600 ml blood drained,washed  with control. 2 units PRBC, 1 liter crystalloid given. Hemodynamically stable  and agreed does not need IcU level of care and step down acceptable. ICU team available if clinical status changes. Hospitalist team will follow.     Kathie Louise

## 2021-04-09 NOTE — ROUTINE PROCESS
Verbal shift change report given to Olga Lidia Angel RN (oncoming nurse) by Yoni Davis (offgoing nurse). Report included the following information SBAR, Kardex, Intake/Output and Recent Results. >pt still in ICU at this time

## 2021-04-09 NOTE — ROUTINE PROCESS
TRANSFER - IN REPORT: 
 
Verbal report received from OSIRIS Medina RN(name) on Mil Silva  being received from ICU(unit) for routine progression of care Report consisted of patients Situation, Background, Assessment and  
Recommendations(SBAR). Information from the following report(s) SBAR, Kardex, Intake/Output and Recent Results was reviewed with the receiving nurse. Opportunity for questions and clarification was provided. Assessment completed upon patients arrival to unit and care assumed.

## 2021-04-09 NOTE — ANESTHESIA POSTPROCEDURE EVALUATION
Procedure(s):  EXPLORATION RIGHT ABOVE KNEE AMPUTATION. general    Anesthesia Post Evaluation      Multimodal analgesia: multimodal analgesia used between 6 hours prior to anesthesia start to PACU discharge  Patient location during evaluation: bedside  Patient participation: complete - patient cannot participate  Level of consciousness: awake  Pain management: adequate  Airway patency: patent  Anesthetic complications: no  Cardiovascular status: hemodynamically stable  Respiratory status: acceptable  Hydration status: acceptable  Post anesthesia nausea and vomiting:  none      INITIAL Post-op Vital signs:   Vitals Value Taken Time   BP 87/70 04/08/21 2245   Temp 36.7 °C (98.1 °F) 04/08/21 2209   Pulse 102 04/08/21 2250   Resp 23 04/08/21 2250   SpO2 99 % 04/08/21 2250   Vitals shown include unvalidated device data.

## 2021-04-09 NOTE — PROGRESS NOTES
End of Shift Note     Bedside and verbal shift change report given to Erica Ruiz RN (On coming nurse) by Jeniffer Juarez RN (Off going nurse).   Report included the following information:      --Procedure Summary     --MAR,     --Recent Results     --Med Rec Status

## 2021-04-09 NOTE — ROUTINE PROCESS
TRANSFER - OUT REPORT: 
 
Verbal report given to Tita SANZ(name) on Karol Terrazas  being transferred to [facility](unit) for routine progression of care Report consisted of patients Situation, Background, Assessment and  
Recommendations(SBAR). Information from the following report(s) SBAR, ED Summary, OR Summary, Procedure Summary, Intake/Output, MAR, Recent Results and Cardiac Rhythm SR was reviewed with the receiving nurse. Lines:  
Peripheral IV 04/08/21 Distal;Right Cephalic (Active) Site Assessment Clean, dry, & intact 04/09/21 0400 Phlebitis Assessment 0 04/09/21 0400 Infiltration Assessment 0 04/09/21 0400 Dressing Status Clean, dry, & intact 04/09/21 0400 Dressing Type Transparent;Tape 04/09/21 0400 Hub Color/Line Status Blue;Flushed;Patent 04/09/21 0400 Action Taken Open ports on tubing capped 04/09/21 0400 Alcohol Cap Used Yes 04/09/21 0400 Peripheral IV 04/08/21 Left Antecubital (Active) Site Assessment Clean, dry, & intact 04/09/21 0400 Phlebitis Assessment 0 04/09/21 0400 Infiltration Assessment 0 04/09/21 0400 Dressing Status Clean, dry, & intact 04/09/21 0400 Dressing Type Transparent;Tape 04/09/21 0400 Hub Color/Line Status Green;Patent; Flushed; Infusing 04/09/21 0400 Action Taken Open ports on tubing capped 04/09/21 0400 Alcohol Cap Used Yes 04/09/21 0400 Opportunity for questions and clarification was provided.

## 2021-04-09 NOTE — ANESTHESIA PREPROCEDURE EVALUATION
Relevant Problems   No relevant active problems       Anesthetic History   No history of anesthetic complications            Review of Systems / Medical History  Patient summary reviewed, nursing notes reviewed and pertinent labs reviewed    Pulmonary        Sleep apnea: CPAP        Comments: Tolerates cpap poorly   Neuro/Psych     seizures: well controlled    Psychiatric history     Cardiovascular          CHF  Dysrhythmias   Pacemaker, CAD, PAD and CABG    Exercise tolerance: <4 METS  Comments: Hx of MI x 2 2010, cardiogenic shock, stent x 2.  1 month later CABG x 6. All stents clotted. Now with diminished LV fxn. AICD, which has slipped to axilla! Echo EF 37%. Global, no valve abnormalities. Hypotensive, symptomatic for blood loss from bleeding stump. Will transfuse as we explore. GI/Hepatic/Renal     GERD: well controlled           Endo/Other    Diabetes: poorly controlled, using insulin    Blood dyscrasia     Other Findings   Comments: S/p R BKA. . infected wound. Hx of Factor 5 Leiden deficiency, on Xarelto. Recently on heparin drip.            Physical Exam    Airway  Mallampati: II  TM Distance: 4 - 6 cm  Neck ROM: normal range of motion   Mouth opening: Normal     Cardiovascular    Rhythm: regular  Rate: normal         Dental    Dentition: Poor dentition, Upper dentition intact and Lower partial plate     Pulmonary  Breath sounds clear to auscultation               Abdominal         Other Findings            Anesthetic Plan    ASA: 3  Anesthesia type: general          Induction: Intravenous and RSI  Anesthetic plan and risks discussed with: Patient

## 2021-04-09 NOTE — PROGRESS NOTES
1308  Reinforced dressing. Noted large amount of serosanguinous drainage and clots around the old dressing. Updated primary nurse about patient's dressing status. [facility] from primary nurse      1800  Reinforced dressing with another nurse, Noted a lot of drainage and clots    1806    Hospitalist notified. MD informed outgoing nurse and current nurse that vascular surgeon will come see and assess patient.

## 2021-04-09 NOTE — PROGRESS NOTES
Was paged by ICU that patient does not need ICU admission postoperatively since he appears to be stable and will be transferred to stepdown. Patient was being followed by the hospitalist service prior to surgery, we will continue to follow on stepdown.

## 2021-04-09 NOTE — OP NOTES
OPERATIVE NOTE      AMPUTATION EXPLORATION        Date of Service: 4/8/2021    Preoperative Diagnosis: Hemorrhage from previous guillotine amputation    Postoperative Diagnosis: Same    Procedure Performed: Exploration of right knee disarticulation and control of hemorrhage. Surgeon: Kyleigh Carter MD PhD    Anesthesia: Dr. Richie Paniagua    EBL: 600 mL    IVF: 1 L crystalloid, 2 units packed red blood cells    Findings: Approximately 600 mL of blood was found in the dressing. There was a small posterior collateral artery that was bleeding in a pulsatile fashion. This was controlled with suture ligature. The stump was further washed out and the area of cruciate ligament was also found to be oozing. This was excised. Disposition: PACU, in stable condition      INDICATION:   Mr. Kip Shine is a 67-year-old gentleman with extensive past medical history including severe right lower extremity peripheral arterial disease status post multiple interventions. Most recently he underwent a right below knee amputation which unfortunately became floridly infected. He underwent a right knee disarticulation yesterday afternoon. Vascular surgery was called this evening, i.e. 24 hours after his initial procedure, with concern for bleeding through the dressing. Upon bedside examination he was found to be hemorrhaging significantly with blood pooling in the dressing. The decision was made to take him emergently to the operating room to explore the operative site and control hemorrhage. Risks and benefits of the procedure were explained to the patient and he opted to proceed. 2 units of packed red blood cells were transfused before and during the procedure. DESCRIPTION OF PROCEDURE IN DETAIL:   After informed consent was obtained, the patient was brought to the operating room and placed on the OR table in supine position. General anesthesia was induced and the patient was intubated without difficulty.   The dressing was removed, approximately 600 mL of blood was found pooled in the dressing. There was a posterior collateral artery that was bleeding in pulsatile fashion. A figure-of-eight stitch was placed for immediate hemorrhage control. The patient was prepped with Betadine and draped in standard sterile fashion with the right leg exposed. A safety pause was performed with all necessary personnel and equipment in the room. The wound was copiously irrigated with saline solution. There were 2 further areas of mild oozing involving the posterior muscle groups. These areas were cauterized or controlled with figures-of-eight. The cruciate ligament was also found to be slowly bleeding. This was excised and cauterized. Overall, there was not much further bleeding detected after control of the posterior collateral.    The wound was again irrigated well and then dressed with quick clot, 4 x 4's, Kerlix, Coban and Ioban. The patient tolerated the procedure well. His blood pressure and tachycardia improved, he was awakened from anesthesia, extubated and transported to the PACU in stable condition. He will be upgraded to the stepdown unit for higher level of care.       Terri Zhang MD PhD  Vascular Surgery  Covering for [facility] vein and vascular specialists

## 2021-04-09 NOTE — PROGRESS NOTES
Physician Progress Note      PATIENT:               Renaldo Al  CSN #:                  [id number]  :                       1965  ADMIT DATE:       2021 3:57 PM  [address] [place] DATE:  RESPONDING  PROVIDER #:        Pamella OTERO DO          QUERY TEXT:    Pt admitted with infection of amputation stump. Sx on -  Right knee disarticulation and washout.  IM pn-  Post-operative anemia:  H&H trending down; most recent Hb 7.0  If possible, please document in progress notes and discharge summary further specificity regarding the acuity and type of anemia:    The medical record reflects the following:  Risk Factors: surgery as noted above  Clinical Indicators:  Vasc sx- Hemorrhage from previous guillotine amputation. Approximately 600 mL of blood was found in the dressing. There was a small posterior collateral artery that was bleeding in a pulsatile fashion. This was controlled with suture ligature. The stump was further washed out and the area of cruciate ligament was also found to be oozing. This was excised. H/H 6.5/21.5  Treatment: return to surgery, 2 u prbc    Thank you Dr. Brian Lopez,  [facility] RN, [address] [place] [address] Rd  Options provided:  -- Anemia due to postoperative blood loss  -- Anemia due to acute blood loss  -- Other - I will add my own diagnosis  -- Disagree - Not applicable / Not valid  -- Disagree - Clinically unable to determine / Unknown  -- Refer to Clinical Documentation Reviewer    PROVIDER RESPONSE TEXT:    This patient has anemia due to postoperative blood loss. Query created by:  Frederick Garcia on 2021 3:07 PM      Electronically signed by:  Gildardo Kennedy DO 2021 5:12 PM

## 2021-04-09 NOTE — PROGRESS NOTES
Problem: Falls - Risk of  Goal: *Absence of Falls  Description: Document Alla Mohan Fall Risk and appropriate interventions in the flowsheet. Outcome: Progressing Towards Goal  Note: Fall Risk Interventions:  Mobility Interventions: Bed/chair exit alarm    Mentation Interventions: Bed/chair exit alarm    Medication Interventions: Teach patient to arise slowly    Elimination Interventions: Call light in reach              Problem: Patient Education: Go to Patient Education Activity  Goal: Patient/Family Education  Outcome: Progressing Towards Goal     Problem: Patient Education: Go to Patient Education Activity  Goal: Patient/Family Education  Outcome: Progressing Towards Goal     Problem: Pain  Goal: *Control of Pain  Outcome: Progressing Towards Goal  Goal: *PALLIATIVE CARE:  Alleviation of Pain  Outcome: Progressing Towards Goal     Problem: Patient Education: Go to Patient Education Activity  Goal: Patient/Family Education  Outcome: Progressing Towards Goal     Problem: Amputation  Goal: *Progressive mobility and function  Outcome: Progressing Towards Goal  Goal: *Optimal pain control at patient's stated goal  Outcome: Progressing Towards Goal  Goal: *Absence of infection signs and symptoms  Outcome: Progressing Towards Goal  Goal: *Optimal residual limb healing  Outcome: Progressing Towards Goal     Problem: Patient Education: Go to Patient Education Activity  Goal: Patient/Family Education  Outcome: Progressing Towards Goal     Problem: Nutrition Deficit  Goal: *Optimize nutritional status  Outcome: Progressing Towards Goal     Problem: Diabetes Self-Management  Goal: *Disease process and treatment process  Description: Define diabetes and identify own type of diabetes; list 3 options for treating diabetes.   Outcome: Progressing Towards Goal  Goal: *Incorporating nutritional management into lifestyle  Description: Describe effect of type, amount and timing of food on blood glucose; list 3 methods for planning meals. Outcome: Progressing Towards Goal  Goal: *Incorporating physical activity into lifestyle  Description: State effect of exercise on blood glucose levels. Outcome: Progressing Towards Goal  Goal: *Developing strategies to promote health/change behavior  Description: Define the ABC's of diabetes; identify appropriate screenings, schedule and personal plan for screenings. Outcome: Progressing Towards Goal  Goal: *Using medications safely  Description: State effect of diabetes medications on diabetes; name diabetes medication taking, action and side effects. Outcome: Progressing Towards Goal  Goal: *Monitoring blood glucose, interpreting and using results  Description: Identify recommended blood glucose targets  and personal targets. Outcome: Progressing Towards Goal  Goal: *Prevention, detection, treatment of acute complications  Description: List symptoms of hyper- and hypoglycemia; describe how to treat low blood sugar and actions for lowering  high blood glucose level. Outcome: Progressing Towards Goal  Goal: *Prevention, detection and treatment of chronic complications  Description: Define the natural course of diabetes and describe the relationship of blood glucose levels to long term complications of diabetes.   Outcome: Progressing Towards Goal  Goal: *Developing strategies to address psychosocial issues  Description: Describe feelings about living with diabetes; identify support needed and support network  Outcome: Progressing Towards Goal  Goal: *Insulin pump training  Outcome: Progressing Towards Goal  Goal: *Sick day guidelines  Outcome: Progressing Towards Goal  Goal: *Patient Specific Goal (EDIT GOAL, INSERT TEXT)  Outcome: Progressing Towards Goal     Problem: Patient Education: Go to Patient Education Activity  Goal: Patient/Family Education  Outcome: Progressing Towards Goal

## 2021-04-09 NOTE — PROGRESS NOTES
Admit Date: 4/7/2021  Date of Service: 4/9/2021    Reason for follow-up: infected right BKA stump      Assessment:         Infected Right BKA stump:               - s/p debridement and disarticulation of right knee              - 4/6 wound cx + E.clocae, light strep spp              - 4/7 intra-operative cultures pending  Post-operative blood loss anemia:  S/p 2 units PRBC; currently stable; no further bleeding noted    - taken back to OR 4/8 with findings of bleeding collateral artery and oozing from posterior muscle  PAD:  S/p embolectomy 3/5/21; right BKA 3/25  HFpEF with grade 1 diastolic dysfunction:  EF 35% from March 2021: currently compensated  HTN  DM type 2 on insulin  Factor V leiden deficiency  Hyprelipidemia  Major depressive disorder:  Started on Duloxetine    Plan:   Repeat H&H in afternoon- transfuse for Hb <7.0  Continue Zosyn for now:              -d/c vancomycin  Follow-up blood cultures from 4/6/2021-currently no growth    Sliding-scale insulin  CBC, BMP in a.m. Hold Xarelto for now              -SQ heparin for DVT prophylaxis  Continue BP medications --hold parameters for blood pressure medications  Pain medications as per vascular surgery  Continue with PT and OT    Called Hugh Ramesh ([phone]) in patient's room during my visit today. All questions answered to the best of my ability. Dispo: return to ARU- needs auth again prior to return    Current Antibtiocs:   Vanc 4/6- present  Zosyn 4/6- present    Lines:   Peripheral    Case discussed with:  [x]Patient  []Family  [x]Nursing  [x]Case Management  DVT Prophylaxis:  []Lovenox  [x]Hep SQ  []SCDs  []Coumadin   []On Heparin gtt    I have independently examined the patient and reviewed all lab studies and imgaing as well as review of nursing notes and physican notes from the past 24 hours. Amelie Shrestha D.O.   Pager [phone]      Allergies   Allergen Reactions    Nitroglycerin Other (comments)      BP drops rapidly when he takes SL Nitro          [place] Diarrhea and Rash    Cat Dander Cough    Codeine Rash    Strawberry Rash           Subjective:      Pt seen and examined. OVerall feeling better. No more bleeding noted. Pain currently controlled. Notes that he occasionally has some twitching in his stump. Objective:        Visit Vitals  /64 (BP 1 Location: Left arm, BP Patient Position: At rest)   Pulse 94   Temp 98.1 °F (36.7 °C)   Resp 29   Ht 6' 2\" (1.88 m)   Wt 84 kg (185 lb 3 oz)   SpO2 99%   BMI 23.78 kg/m²     Temp (24hrs), Av.1 °F (36.7 °C), Min:97.8 °F (36.6 °C), Max:98.2 °F (36.8 °C)        General:   awake alert and oriented, non-toxic   Skin:   no rashes or skin lesions noted on limited exam, dry and warm   HEENT:  No scleral icterus or pallor; oral mucosa moist, lips moist   Lymph Nodes:   not assessed today   Lungs:   non, labored; bilaterally clear to aspiration- no crackles wheezes rales or rhonchi   Heart:  RRR, s1 and s2; no murmurs rubs or gallops; no edema, + pedal pulses   Abdomen:  soft, non-distended, active bowel sounds, non-tender   Genitourinary:  deferred   Extremities:   average muscle tone; no contractures, no joint effusions; stump remains in post-surgical dressings- no blood or soiling noted   Neurologic:  No gross focal motor or sensory abnormalities; CN 2-12 intact; Follows commands.     Psychiatric:   flat affect, tearful       Labs: Results:   Chemistry Recent Labs     21  0453 21  0210 21  0905   * 131* 130*    138 136   K 4.4 5.3 4.6    105 103   CO2 22 26 25   BUN 22* 17 18   CREA 1.35* 1.12 1.16   CA 8.4* 8.6 8.8   AGAP 8 7 8   BUCR 16 15 16   AP  --  217*  --    TP  --  5.9*  --    ALB  --  1.9*  --    GLOB  --  4.0  --    AGRAT  --  0.5*  --       CBC w/Diff Recent Labs     21  0453 21  1602 21  0940 21  0210 21  0905   WBC 18.6*  --   --  17.2* 22.0*   RBC 2.79*  --   --  3.08* 3.74*   HGB 7.4* 6.5* 7.0* 7.8* 9.5* HCT 22.7* 21.5* 22.7* 25.4* 30.4*   *  --   --  507* 546*   GRANS  --   --   --  89* 86*   LYMPH  --   --   --  1* 5*   EOS  --   --   --  4 2        No results found for: SDES Lab Results   Component Value Date/Time    Culture result: (A) 04/07/2021 01:58 PM     LIGHT GRAM NEGATIVE RODS IDENTIFICATION AND SUSCEPTIBILITY TO FOLLOW    Culture result: FEW MIXED SKIN MEDHAT ISOLATED 04/07/2021 01:58 PM    Culture result: NO ANAEROBES ISOLATED 04/07/2021 01:58 PM    Culture result: HEAVY ENTEROBACTER CLOACAE COMPLEX (A) 04/06/2021 01:41 PM    Culture result: LIGHT MIXED SKIN MEDHAT ISOLATED 04/06/2021 01:41 PM        Results     Procedure Component Value Units Date/Time    CULTURE, Elroy Jossie STAIN [[id number]]  (Abnormal) Collected: 04/07/21 1358    Order Status: Completed Specimen: Leg, Right Updated: 04/09/21 1300     Special Requests: RIGHT LEG        GRAM STAIN RARE WBCS SEEN         NO ORGANISMS SEEN        Culture result:       LIGHT GRAM NEGATIVE RODS IDENTIFICATION AND SUSCEPTIBILITY TO FOLLOW                  FEW MIXED SKIN MEDHAT ISOLATED          CULTURE, ANAEROBIC [[id number]] Collected: 04/07/21 1358    Order Status: Completed Specimen: Leg, Right Updated: 04/09/21 1257     Special Requests: RIGHT LEG        Culture result: NO ANAEROBES ISOLATED       COVID-19 RAPID TEST [[id number]] Collected: 04/07/21 0945    Order Status: Completed Specimen: Nasopharyngeal Updated: 04/07/21 1017     Specimen source Nasopharyngeal        COVID-19 rapid test Not detected        Comment: Rapid Abbott ID Now       Rapid NAAT:  The specimen is NEGATIVE for SARS-CoV-2, the novel coronavirus associated with COVID-19. Negative results should be treated as presumptive and, if inconsistent with clinical signs and symptoms or necessary for patient management, should be tested with an alternative molecular assay.   Negative results do not preclude SARS-CoV-2 infection and should not be used as the sole basis for patient management decisions. This test has been authorized by the FDA under an Emergency Use Authorization (EUA) for use by authorized laboratories.    Fact sheet for Healthcare Providers: [employer].co.nz  Fact sheet for Patients: [employer].co.nz       Methodology: Isothermal Nucleic Acid Amplification         COVID-19 RAPID TEST [[id number]] Collected: 04/07/21 0741    Order Status: Canceled     CULTURE, Donnell Vegantemiliano STAIN [[id number]]  (Abnormal)  (Susceptibility) Collected: 04/06/21 1341    Order Status: Completed Specimen: Wound Drainage Updated: 04/09/21 0946     Special Requests: NO SPECIAL REQUESTS        GRAM STAIN OCCASIONAL WBCS SEEN         3+ GRAM NEGATIVE RODS        Culture result:       HEAVY ENTEROBACTER CLOACAE COMPLEX                  LIGHT MIXED SKIN MEDHAT ISOLATED          Susceptibility      Enterobacter cloacae complex     USMAN     Amikacin ($) Susceptible     Cefazolin ($) Resistant     Cefepime ($$) Susceptible     Cefoxitin Resistant     Ceftazidime ($) Susceptible     Ceftriaxone ($) Susceptible     Ciprofloxacin ($) Susceptible     Gentamicin ($) Susceptible     Levofloxacin ($) Susceptible     Meropenem ($$) Susceptible     Piperacillin/Tazobac ($) Susceptible     Tobramycin ($) Susceptible     Trimeth/Sulfa Susceptible                    CULTURE, BLOOD [[id number]] Collected: 04/06/21 0900    Order Status: Completed Specimen: Blood Updated: 04/09/21 0650     Special Requests: NO SPECIAL REQUESTS        Culture result: NO GROWTH 3 DAYS       CULTURE, BLOOD [[id number]] Collected: 04/06/21 0850    Order Status: Completed Specimen: Blood Updated: 04/09/21 0650     Special Requests: NO SPECIAL REQUESTS        Culture result: NO GROWTH 3 DAYS       CULTURE, BLOOD, PAIRED [[id number]] Collected: 04/06/21 0800    Order Status: Canceled Specimen: Blood     CULTURE, URINE [[id number]] Collected: 04/02/21 2000    Order Status: Completed Specimen: Clean catch Updated: 04/04/21 1101     Special Requests: NO SPECIAL REQUESTS        Culture result: No growth (<1,000 CFU/ML)             Imaging:     No results found.

## 2021-04-10 ENCOUNTER — ANESTHESIA EVENT (OUTPATIENT)
Dept: CARDIOTHORACIC SURGERY | Age: 56
DRG: 305 | End: 2021-04-10
Payer: MEDICAID

## 2021-04-10 LAB
ANION GAP SERPL CALC-SCNC: 7 MMOL/L (ref 3–18)
BACTERIA SPEC CULT: ABNORMAL
BACTERIA SPEC CULT: ABNORMAL
BUN SERPL-MCNC: 15 MG/DL (ref 7–18)
BUN/CREAT SERPL: 13 (ref 12–20)
CALCIUM SERPL-MCNC: 7.9 MG/DL (ref 8.5–10.1)
CHLORIDE SERPL-SCNC: 109 MMOL/L (ref 100–111)
CO2 SERPL-SCNC: 23 MMOL/L (ref 21–32)
CREAT SERPL-MCNC: 1.16 MG/DL (ref 0.6–1.3)
ERYTHROCYTE [DISTWIDTH] IN BLOOD BY AUTOMATED COUNT: 14.5 % (ref 11.6–14.5)
GLUCOSE BLD STRIP.AUTO-MCNC: 170 MG/DL (ref 70–110)
GLUCOSE BLD STRIP.AUTO-MCNC: 174 MG/DL (ref 70–110)
GLUCOSE BLD STRIP.AUTO-MCNC: 236 MG/DL (ref 70–110)
GLUCOSE BLD STRIP.AUTO-MCNC: 265 MG/DL (ref 70–110)
GLUCOSE SERPL-MCNC: 127 MG/DL (ref 74–99)
GRAM STN SPEC: ABNORMAL
GRAM STN SPEC: ABNORMAL
HCT VFR BLD AUTO: 20 % (ref 36–48)
HCT VFR BLD AUTO: 23.8 % (ref 36–48)
HGB BLD-MCNC: 6.3 G/DL (ref 13–16)
HGB BLD-MCNC: 7.7 G/DL (ref 13–16)
MCH RBC QN AUTO: 25.9 PG (ref 24–34)
MCHC RBC AUTO-ENTMCNC: 31.5 G/DL (ref 31–37)
MCV RBC AUTO: 82.3 FL (ref 74–97)
PLATELET # BLD AUTO: 404 K/UL (ref 135–420)
PMV BLD AUTO: 10 FL (ref 9.2–11.8)
POTASSIUM SERPL-SCNC: 4.4 MMOL/L (ref 3.5–5.5)
RBC # BLD AUTO: 2.43 M/UL (ref 4.35–5.65)
SERVICE CMNT-IMP: ABNORMAL
SODIUM SERPL-SCNC: 139 MMOL/L (ref 136–145)
WBC # BLD AUTO: 14.1 K/UL (ref 4.6–13.2)

## 2021-04-10 PROCEDURE — 36430 TRANSFUSION BLD/BLD COMPNT: CPT

## 2021-04-10 PROCEDURE — 80048 BASIC METABOLIC PNL TOTAL CA: CPT

## 2021-04-10 PROCEDURE — 74011250637 HC RX REV CODE- 250/637: Performed by: INTERNAL MEDICINE

## 2021-04-10 PROCEDURE — 85027 COMPLETE CBC AUTOMATED: CPT

## 2021-04-10 PROCEDURE — 74011636637 HC RX REV CODE- 636/637: Performed by: INTERNAL MEDICINE

## 2021-04-10 PROCEDURE — 74011000258 HC RX REV CODE- 258: Performed by: INTERNAL MEDICINE

## 2021-04-10 PROCEDURE — 85018 HEMOGLOBIN: CPT

## 2021-04-10 PROCEDURE — 74011250636 HC RX REV CODE- 250/636: Performed by: INTERNAL MEDICINE

## 2021-04-10 PROCEDURE — 99233 SBSQ HOSP IP/OBS HIGH 50: CPT | Performed by: INTERNAL MEDICINE

## 2021-04-10 PROCEDURE — 82962 GLUCOSE BLOOD TEST: CPT

## 2021-04-10 PROCEDURE — 74011250637 HC RX REV CODE- 250/637: Performed by: SURGERY

## 2021-04-10 PROCEDURE — 36415 COLL VENOUS BLD VENIPUNCTURE: CPT

## 2021-04-10 PROCEDURE — P9016 RBC LEUKOCYTES REDUCED: HCPCS

## 2021-04-10 PROCEDURE — 2709999900 HC NON-CHARGEABLE SUPPLY

## 2021-04-10 PROCEDURE — 65660000004 HC RM CVT STEPDOWN

## 2021-04-10 RX ORDER — SODIUM CHLORIDE 9 MG/ML
250 INJECTION, SOLUTION INTRAVENOUS AS NEEDED
Status: DISCONTINUED | OUTPATIENT
Start: 2021-04-10 | End: 2021-04-15 | Stop reason: HOSPADM

## 2021-04-10 RX ADMIN — INSULIN LISPRO 2 UNITS: 100 INJECTION, SOLUTION INTRAVENOUS; SUBCUTANEOUS at 18:04

## 2021-04-10 RX ADMIN — PIPERACILLIN SODIUM AND TAZOBACTAM SODIUM 3.38 G: 3; .375 INJECTION, POWDER, LYOPHILIZED, FOR SOLUTION INTRAVENOUS at 01:58

## 2021-04-10 RX ADMIN — HYDROMORPHONE HYDROCHLORIDE 0.5 MG: 2 INJECTION, SOLUTION INTRAMUSCULAR; INTRAVENOUS; SUBCUTANEOUS at 16:01

## 2021-04-10 RX ADMIN — Medication 5 MG: at 21:25

## 2021-04-10 RX ADMIN — VANCOMYCIN HYDROCHLORIDE 1250 MG: 10 INJECTION, POWDER, LYOPHILIZED, FOR SOLUTION INTRAVENOUS at 01:40

## 2021-04-10 RX ADMIN — INSULIN LISPRO 4 UNITS: 100 INJECTION, SOLUTION INTRAVENOUS; SUBCUTANEOUS at 22:00

## 2021-04-10 RX ADMIN — ACETAMINOPHEN 650 MG: 325 TABLET ORAL at 22:17

## 2021-04-10 RX ADMIN — ACETAMINOPHEN 650 MG: 325 TABLET ORAL at 18:05

## 2021-04-10 RX ADMIN — HYDROMORPHONE HYDROCHLORIDE 0.5 MG: 2 INJECTION, SOLUTION INTRAMUSCULAR; INTRAVENOUS; SUBCUTANEOUS at 21:25

## 2021-04-10 RX ADMIN — INSULIN LISPRO 2 UNITS: 100 INJECTION, SOLUTION INTRAVENOUS; SUBCUTANEOUS at 09:23

## 2021-04-10 RX ADMIN — ATORVASTATIN CALCIUM 80 MG: 40 TABLET, FILM COATED ORAL at 21:25

## 2021-04-10 RX ADMIN — OXYCODONE HYDROCHLORIDE 5 MG: 5 TABLET ORAL at 12:55

## 2021-04-10 RX ADMIN — CARVEDILOL 3.12 MG: 6.25 TABLET, FILM COATED ORAL at 09:25

## 2021-04-10 RX ADMIN — HYDROMORPHONE HYDROCHLORIDE 0.5 MG: 2 INJECTION, SOLUTION INTRAMUSCULAR; INTRAVENOUS; SUBCUTANEOUS at 09:30

## 2021-04-10 RX ADMIN — GABAPENTIN 100 MG: 100 CAPSULE ORAL at 18:05

## 2021-04-10 RX ADMIN — Medication 10 ML: at 05:43

## 2021-04-10 RX ADMIN — CARVEDILOL 3.12 MG: 6.25 TABLET, FILM COATED ORAL at 21:15

## 2021-04-10 RX ADMIN — ACETAMINOPHEN 650 MG: 325 TABLET ORAL at 05:43

## 2021-04-10 RX ADMIN — Medication 10 ML: at 21:32

## 2021-04-10 RX ADMIN — PIPERACILLIN SODIUM AND TAZOBACTAM SODIUM 3.38 G: 3; .375 INJECTION, POWDER, LYOPHILIZED, FOR SOLUTION INTRAVENOUS at 16:03

## 2021-04-10 RX ADMIN — ZOLPIDEM TARTRATE 5 MG: 5 TABLET ORAL at 23:12

## 2021-04-10 RX ADMIN — PIPERACILLIN SODIUM AND TAZOBACTAM SODIUM 3.38 G: 3; .375 INJECTION, POWDER, LYOPHILIZED, FOR SOLUTION INTRAVENOUS at 21:15

## 2021-04-10 RX ADMIN — ACETAMINOPHEN 650 MG: 325 TABLET ORAL at 11:57

## 2021-04-10 RX ADMIN — EZETIMIBE 10 MG: 10 TABLET ORAL at 09:25

## 2021-04-10 RX ADMIN — Medication 81 MG: at 09:26

## 2021-04-10 RX ADMIN — DULOXETINE HYDROCHLORIDE 30 MG: 30 CAPSULE, DELAYED RELEASE ORAL at 09:25

## 2021-04-10 RX ADMIN — PIPERACILLIN SODIUM AND TAZOBACTAM SODIUM 3.38 G: 3; .375 INJECTION, POWDER, LYOPHILIZED, FOR SOLUTION INTRAVENOUS at 09:25

## 2021-04-10 RX ADMIN — GABAPENTIN 100 MG: 100 CAPSULE ORAL at 09:26

## 2021-04-10 RX ADMIN — INSULIN LISPRO 6 UNITS: 100 INJECTION, SOLUTION INTRAVENOUS; SUBCUTANEOUS at 12:41

## 2021-04-10 RX ADMIN — Medication 10 ML: at 16:04

## 2021-04-10 NOTE — PROGRESS NOTES
Admit Date: 4/7/2021     Procedure:  Procedure(s):  POD 2 exploration of right knee disarticulation and control of hemorrhage  POD 3 washout of right BKA and right knee disarticulation       Subjective:     Patient doing well. Pain well controlled. Receiving blood currently. 2 PRBC planned for today    Objective:     Visit Vitals  /69 (BP 1 Location: Right upper arm, BP Patient Position: At rest)   Pulse (!) 101   Temp 98.7 °F (37.1 °C)   Resp 16   Ht 6' 2\" (1.88 m)   Wt 85 kg (187 lb 8 oz)   SpO2 99%   BMI 24.07 kg/m²       Physical Exam:    GEN: Awake, alert   NECK: Full Range of motion, no cervical adenopathy, no carotid bruit and no jugular vein distension  LUNGS: Clear to auscultation bilaterally  HEART: Regular rate and rhythm, no murmurs  ABDOMEN: Soft, non-tender,Bowel sounds normal, No masses, No organomegaly  EXTREMITIES: right dressing in place. Clean, dry, and intact. SKIN: Skin color, texture, turgor normal. No rashes or lesions  NEUROLOGIC: CNII-XII intact.  Normal strength, sensation and reflexes throughout, mood affect within normal limits    Labs:   Recent Results (from the past 12 hour(s))   METABOLIC PANEL, BASIC    Collection Time: 04/10/21  6:40 AM   Result Value Ref Range    Sodium 139 136 - 145 mmol/L    Potassium 4.4 3.5 - 5.5 mmol/L    Chloride 109 100 - 111 mmol/L    CO2 23 21 - 32 mmol/L    Anion gap 7 3.0 - 18 mmol/L    Glucose 127 (H) 74 - 99 mg/dL    BUN 15 7.0 - 18 MG/DL    Creatinine 1.16 0.6 - 1.3 MG/DL    BUN/Creatinine ratio 13 12 - 20      GFR est AA >60 >60 ml/min/1.73m2    GFR est non-AA >60 >60 ml/min/1.73m2    Calcium 7.9 (L) 8.5 - 10.1 MG/DL   CBC W/O DIFF    Collection Time: 04/10/21  6:40 AM   Result Value Ref Range    WBC 14.1 (H) 4.6 - 13.2 K/uL    RBC 2.43 (L) 4.35 - 5.65 M/uL    HGB 6.3 (L) 13.0 - 16.0 g/dL    HCT 20.0 (L) 36.0 - 48.0 %    MCV 82.3 74.0 - 97.0 FL    MCH 25.9 24.0 - 34.0 PG    MCHC 31.5 31.0 - 37.0 g/dL    RDW 14.5 11.6 - 14.5 %    PLATELET 180 135 - 420 K/uL    MPV 10.0 9.2 - 11.8 FL   GLUCOSE, POC    Collection Time: 04/10/21  7:26 AM   Result Value Ref Range    Glucose (POC) 170 (H) 70 - 110 mg/dL   GLUCOSE, POC    Collection Time: 04/10/21 11:00 AM   Result Value Ref Range    Glucose (POC) 265 (H) 70 - 110 mg/dL       Data Review images and reports reviewed    Assessment:     Patient Active Problem List    Diagnosis Date Noted    Ischemia of right BKA site ([facility] 04/07/2021    Right BKA infection ([address] 04/07/2021    [facility], [facility] 04/07/[facility]    Constipation 03/29/2021    Peripheral artery disease [facility]     Coronary artery disease involving native coronary artery of native heart     Type 2 diabetes mellitus, without long-term current use of insulin (HCC)     History of epilepsy     Factor V Leiden mutation [facility]     Migraine headache     Wears glasses     Umbilical hernia     Obstructive sleep apnea     ICD (implantable cardioverter-defibrillator) in place     Mitral valve prolapse     History of head injury     Hypertensive heart disease with chronic systolic congestive heart failure (HCC)     History of myocardial infarction     Long term current use of aspirin     On statin therapy due to risk of future cardiovascular event     History of deep venous thrombosis (DVT) of distal vein of right lower extremity     Anticoagulated by anticoagulation treatment     Mixed hyperlipidemia     History of embolectomy     Chronic systolic heart failure (HCC)     Major depressive disorder 03/24/2021    Grade I diastolic dysfunction 12/77/5684    Status post below knee amputation of right lower extremity ([facility] [address] 03/22/2021    Impaired mobility and ADLs 03/22/2021    Critical ischemia of lower extremity ([facility] [address] 03/22/2021    COVID-19 ruled out by laboratory testing 03/22/2021    Acute blood loss as cause of postoperative anemia 03/22/2021    History of noncompliance with medical treatment 03/18/2021    Cardiomyopathy ([address] 2015          Plan/Recommendations/Medical Decision Makin. Receiving transfusion, 2 units of PRBC today  2. On zosyn  3. Pain controlled  4. NPO at midnight  5. OR tomorrow for right AKA. Consent signed and in chart. All questions were answered to his satisfaction.          Alli Velasquez, DO  Vascular Surgery

## 2021-04-10 NOTE — PROGRESS NOTES
0715-Bedside and Verbal shift change [facility] Report included the following information SBAR, Kardex, MAR and Recent Results. Assumed care,fall prevention program maintained,call bell and bedside table within reach. Will continue care. 0820-Explained to patient the upcoming surgery for tommorrow,made aware that after MN tonight he will not be able to eat & drink,except some sips for medications patient verbalizes understanding. 0926-Pain medication given. 0930-Made aware that MD ordered that he will be given 2 units of PRBC due to his H&H level   Ref.  Range 4/10/2021 06:40   HGB Latest Ref Range: 13.0 - 16.0 g/dL 6.3 (L)   HCT Latest Ref Range: 36.0 - 48.0 % 20.0 (L)

## 2021-04-10 NOTE — PROGRESS NOTES
Admit Date: 4/7/2021  Date of Service: 4/10/2021    Reason for follow-up: infected right BKA stump      Assessment:         Infected Right BKA stump:               - s/p debridement and disarticulation of right knee              - 4/6 wound cx + E.clocae, light strep spp  Post-operative blood loss anemia:  S/p 2 units PRBC 4/8; repeat Hb 6.2 this am; no further bleeding noted on dressings   - taken back to OR 4/8 with findings of bleeding collateral artery and oozing from posterior muscle  PAD:  S/p embolectomy 3/5/21; right BKA 3/25  HFpEF with grade 1 diastolic dysfunction:  EF 35% from March 2021: currently compensated  HTN  DM type 2 on insulin  Factor V leiden deficiency  Hyprelipidemia  Major depressive disorder:  Started on Duloxetine    Plan:   Repeat H&H in afternoon- transfuse for Hb <7.0   - will give 2 additional units today  Continue Zosyn for now:         Sliding-scale insulin  CBC, BMP in a.m. Hold Xarelto for now              -SQ heparin for DVT prophylaxis  Continue BP medications --hold parameters for blood pressure medications  Pain medications as per vascular surgery  Continue with PT and OT    NPO after mindnight   TO OR tomorrow for AKA    Dispo: return to ARU- needs auth again prior to return    Current Antibtiocs:   Vanc 4/6- present  Zosyn 4/6- present    Lines:   Peripheral    Case discussed with:  [x]Patient  []Family  [x]Nursing  [x]Case Management  DVT Prophylaxis:  []Lovenox  [x]Hep SQ  []SCDs  []Coumadin   []On Heparin gtt    I have independently examined the patient and reviewed all lab studies and imgaing as well as review of nursing notes and physican notes from the past 24 hours. Amelie Shrestha D.O. Pager [phone]      Allergies   Allergen Reactions    Nitroglycerin Other (comments)      BP drops rapidly when he takes SL Nitro          [place] Diarrhea and Rash    Cat Dander Cough    Codeine Rash    Strawberry Rash           Subjective:      Pt seen and examined. OVerall feeling better that he has in quite some time. No more bleeding noted. Pain currently controlled. Notes that he occasionally has some phantom pain and itching. Objective:        Visit Vitals  /67 (BP 1 Location: Right upper arm, BP Patient Position: At rest;Sitting)   Pulse 99   Temp 98.4 °F (36.9 °C)   Resp 18   Ht 6' 2\" (1.88 m)   Wt 85 kg (187 lb 8 oz)   SpO2 99%   BMI 24.07 kg/m²     Temp (24hrs), Av.4 °F (36.9 °C), Min:98.1 °F (36.7 °C), Max:98.8 °F (37.1 °C)        General:   awake alert and oriented, non-toxic   Skin:   no rashes or skin lesions noted on limited exam, dry and warm   HEENT:  No scleral icterus or pallor; oral mucosa moist, lips moist   Lymph Nodes:   not assessed today   Lungs:   non, labored; bilaterally clear to aspiration- no crackles wheezes rales or rhonchi   Heart:  RRR, s1 and s2; no murmurs rubs or gallops; no edema, + pedal pulses   Abdomen:  soft, non-distended, active bowel sounds, non-tender   Genitourinary:  deferred   Extremities:   average muscle tone; no contractures, no joint effusions; stump remains in post-surgical dressings- no blood or soiling noted   Neurologic:  No gross focal motor or sensory abnormalities; CN 2-12 intact; Follows commands.     Psychiatric:   flat affect, tearful       Labs: Results:   Chemistry Recent Labs     04/10/21  0640 21  0453 21  0210   * 165* 131*    136 138   K 4.4 4.4 5.3    106 105   CO2 23 22 26   BUN 15 22* 17   CREA 1.16 1.35* 1.12   CA 7.9* 8.4* 8.6   AGAP 7 8 7   BUCR 13 16 15   AP  --   --  217*   TP  --   --  5.9*   ALB  --   --  1.9*   GLOB  --   --  4.0   AGRAT  --   --  0.5*      CBC w/Diff Recent Labs     04/10/21  0640 21  0453 21  1602 21   WBC 14.1* 18.6*  --   --  17.2*   RBC 2.43* 2.79*  --   --  3.08*   HGB 6.3* 7.4* 6.5*   < > 7.8*   HCT 20.0* 22.7* 21.5*   < > 25.4*    431*  --   --  507*   GRANS  --   --   --   --  89* LYMPH  --   --   --   --  1*   EOS  --   --   --   --  4    < > = values in this interval not displayed.         No results found for: SDES Lab Results   Component Value Date/Time    Culture result: LIGHT ENTEROBACTER CLOACAE COMPLEX (A) 04/07/2021 01:58 PM    Culture result: FEW MIXED SKIN MEDHAT ISOLATED 04/07/2021 01:58 PM    Culture result: NO ANAEROBES ISOLATED 04/07/2021 01:58 PM    Culture result: HEAVY ENTEROBACTER CLOACAE COMPLEX (A) 04/06/2021 01:41 PM    Culture result: LIGHT MIXED SKIN MEDHAT ISOLATED 04/06/2021 01:41 PM        Results     Procedure Component Value Units Date/Time    CULTURE, WOUND Renford Dusky STAIN [[id number]]  (Abnormal)  (Susceptibility) Collected: 04/07/21 1358    Order Status: Completed Specimen: Leg, Right Updated: 04/10/21 1129     Special Requests: RIGHT LEG        GRAM STAIN RARE WBCS SEEN         NO ORGANISMS SEEN        Culture result:       LIGHT ENTEROBACTER CLOACAE COMPLEX                  FEW MIXED SKIN MEDHAT ISOLATED          Susceptibility      Enterobacter cloacae complex     USMAN     Amikacin ($) Susceptible     Cefazolin ($) Resistant     Cefepime ($$) Susceptible     Cefoxitin Resistant     Ceftazidime ($) Resistant     Ceftriaxone ($) Resistant     Ciprofloxacin ($) Susceptible     Gentamicin ($) Susceptible     Levofloxacin ($) Susceptible     Meropenem ($$) Susceptible     Piperacillin/Tazobac ($) Resistant     Tobramycin ($) Susceptible     Trimeth/Sulfa Susceptible                    CULTURE, ANAEROBIC [[id number]] Collected: 04/07/21 1358    Order Status: Completed Specimen: Leg, Right Updated: 04/09/21 1257     Special Requests: RIGHT LEG        Culture result: NO ANAEROBES ISOLATED       COVID-19 RAPID TEST [[id number]] Collected: 04/07/21 0945    Order Status: Completed Specimen: Nasopharyngeal Updated: 04/07/21 1017     Specimen source Nasopharyngeal        COVID-19 rapid test Not detected        Comment: Rapid Abbott ID Now       Rapid NAAT:  The specimen is NEGATIVE for SARS-CoV-2, the novel coronavirus associated with COVID-19. Negative results should be treated as presumptive and, if inconsistent with clinical signs and symptoms or necessary for patient management, should be tested with an alternative molecular assay. Negative results do not preclude SARS-CoV-2 infection and should not be used as the sole basis for patient management decisions. This test has been authorized by the FDA under an Emergency Use Authorization (EUA) for use by authorized laboratories.    Fact sheet for Healthcare Providers: [employer] Marketing[employer].co.nz  Fact sheet for Patients: [employer].co.nz       Methodology: Isothermal Nucleic Acid Amplification         COVID-19 RAPID TEST [[id number]] Collected: 04/07/21 0741    Order Status: Canceled     CULTURE, Jacqueline Ear STAIN [[id number]]  (Abnormal)  (Susceptibility) Collected: 04/06/21 1341    Order Status: Completed Specimen: Wound Drainage Updated: 04/09/21 0946     Special Requests: NO SPECIAL REQUESTS        GRAM STAIN OCCASIONAL WBCS SEEN         3+ GRAM NEGATIVE RODS        Culture result:       HEAVY ENTEROBACTER CLOACAE COMPLEX                  LIGHT MIXED SKIN MEDHAT ISOLATED          Susceptibility      Enterobacter cloacae complex     USMAN     Amikacin ($) Susceptible     Cefazolin ($) Resistant     Cefepime ($$) Susceptible     Cefoxitin Resistant     Ceftazidime ($) Susceptible     Ceftriaxone ($) Susceptible     Ciprofloxacin ($) Susceptible     Gentamicin ($) Susceptible     Levofloxacin ($) Susceptible     Meropenem ($$) Susceptible     Piperacillin/Tazobac ($) Susceptible     Tobramycin ($) Susceptible     Trimeth/Sulfa Susceptible                    CULTURE, BLOOD [[id number]] Collected: 04/06/21 0900    Order Status: Completed Specimen: Blood Updated: 04/10/21 0702     Special Requests: NO SPECIAL REQUESTS        Culture result: NO GROWTH 4 DAYS       CULTURE, BLOOD [[id number]] Collected: 04/06/21 0850    Order Status: Completed Specimen: Blood Updated: 04/10/21 0702     Special Requests: NO SPECIAL REQUESTS        Culture result: NO GROWTH 4 DAYS       CULTURE, BLOOD, PAIRED [[id number]] Collected: 04/06/21 0800    Order Status: Canceled Specimen: Blood     CULTURE, URINE [[id number]] Collected: 04/02/21 2000    Order Status: Completed Specimen: Clean catch Updated: 04/04/21 1101     Special Requests: NO SPECIAL REQUESTS        Culture result: No growth (<1,000 CFU/ML)             Imaging:     No results found.

## 2021-04-10 NOTE — PROGRESS NOTES
Problem: Falls - Risk of  Goal: *Absence of Falls  Description: Document Renata Douglass Fall Risk and appropriate interventions in the flowsheet.   Outcome: Progressing Towards Goal  Note: Fall Risk Interventions:  Mobility Interventions: Bed/chair exit alarm, Patient to call before getting OOB, Strengthening exercises (ROM-active/passive), Utilize walker, cane, or other assistive device    Mentation Interventions: Adequate sleep, hydration, pain control, Door open when patient unattended, Bed/chair exit alarm, More frequent rounding, Toileting rounds, Update white board, Evaluate medications/consider consulting pharmacy    Medication Interventions: Evaluate medications/consider consulting pharmacy, Patient to call before getting OOB, Teach patient to arise slowly    Elimination Interventions: Call light in reach, Patient to call for help with toileting needs, Toileting schedule/hourly rounds, Stay With Me (per policy)

## 2021-04-10 NOTE — PROGRESS NOTES
Problem: Falls - Risk of  Goal: *Absence of Falls  Description: Document Herb Parker Fall Risk and appropriate interventions in the flowsheet. Outcome: Progressing Towards Goal  Note: Fall Risk Interventions:  Mobility Interventions: Bed/chair exit alarm, Patient to call before getting OOB, Strengthening exercises (ROM-active/passive), Utilize walker, cane, or other assistive device    Mentation Interventions: Adequate sleep, hydration, pain control, Door open when patient unattended, Bed/chair exit alarm, More frequent rounding, Toileting rounds, Update white board, Evaluate medications/consider consulting pharmacy    Medication Interventions: Evaluate medications/consider consulting pharmacy, Patient to call before getting OOB, Teach patient to arise slowly    Elimination Interventions: Call light in reach, Patient to call for help with toileting needs, Toileting schedule/hourly rounds, Stay With Me (per policy)              Problem: Patient Education: Go to Patient Education Activity  Goal: Patient/Family Education  Outcome: Progressing Towards Goal     Problem: Pressure Injury - Risk of  Goal: *Prevention of pressure injury  Description: Document Steve Scale and appropriate interventions in the flowsheet.   Outcome: Progressing Towards Goal  Note: Pressure Injury Interventions:  Sensory Interventions: Check visual cues for pain, Assess changes in LOC, Keep linens dry and wrinkle-free, Pressure redistribution bed/mattress (bed type)    Moisture Interventions: Absorbent underpads, Internal/External urinary devices    Activity Interventions: Pressure redistribution bed/mattress(bed type)    Mobility Interventions: HOB 30 degrees or less, Pressure redistribution bed/mattress (bed type)    Nutrition Interventions: Document food/fluid/supplement intake, Offer support with meals,snacks and hydration    Friction and Shear Interventions: HOB 30 degrees or less, Foam dressings/transparent film/skin sealants

## 2021-04-10 NOTE — PROGRESS NOTES
Patient seen at bedside. Right lower extremity dressing change. Wound is clean and dry. No evidence of infection, including drainage or odor. Wet to dry dressing applied. Had an extensive discussion with the patient and his friend Mikayla Wilkinson regarding further management. Given his complication of hemorrhage with take back to the operating room last night, I would recommend holding off on closure until Sunday and allow him some recovery  time. He may benefit from another transfusion. Particularly given the fact that he is liable to incur further blood loss with the above knee amputation revision. Patient agrees with this plan. Discussed with Dr. Brice Brown Who is covering over the weekend.     - plan for OR for right above knee amputation on Sunday   NPO after midnight tomorrow  - ideally receive additional blood transfusion in anticipation of blood loss  during surgery      Arnaldo Correa MD PhD  Vascular Surgery    Covering for [facility]

## 2021-04-10 NOTE — PROGRESS NOTES
Bedside shift report received from [address]: Mabeline Hams, on room air, resting in bed watching tv; v/s checked; assessment done; right leg stump dressing clean, dry and intact; no external bleeding noted; instructed to call nurse for needs; no further complaints voiced    2041: Dilaudid 0.5 mg given IV as requested for pain- see flowsheet    2201: ; 2 unit Humalog given; dressing over right leg stump clean, dry and intact    0030: sleeping comfortably; nguyen draining well    0250: sleeping; ATB infusing well    0500: sleeping comfortably    0543: scheduled med given; no further complaints voiced    0700: sleeping; no external bleeding noted throughout the night    Bedside and Verbal shift change report given to Kathleen Calderon RN (oncoming nurse) by Rosina Calderón RN (offgoing nurse). Report included the following information SBAR, Kardex, Intake/Output, Recent Results and Cardiac Rhythm NSR.

## 2021-04-11 ENCOUNTER — ANESTHESIA (OUTPATIENT)
Dept: CARDIOTHORACIC SURGERY | Age: 56
DRG: 305 | End: 2021-04-11
Payer: MEDICAID

## 2021-04-11 LAB
ANION GAP SERPL CALC-SCNC: 7 MMOL/L (ref 3–18)
BUN SERPL-MCNC: 9 MG/DL (ref 7–18)
BUN/CREAT SERPL: 8 (ref 12–20)
CALCIUM SERPL-MCNC: 8.2 MG/DL (ref 8.5–10.1)
CHLORIDE SERPL-SCNC: 110 MMOL/L (ref 100–111)
CO2 SERPL-SCNC: 23 MMOL/L (ref 21–32)
CREAT SERPL-MCNC: 1.08 MG/DL (ref 0.6–1.3)
ERYTHROCYTE [DISTWIDTH] IN BLOOD BY AUTOMATED COUNT: 14.8 % (ref 11.6–14.5)
GLUCOSE BLD STRIP.AUTO-MCNC: 144 MG/DL (ref 70–110)
GLUCOSE BLD STRIP.AUTO-MCNC: 174 MG/DL (ref 70–110)
GLUCOSE BLD STRIP.AUTO-MCNC: 216 MG/DL (ref 70–110)
GLUCOSE BLD STRIP.AUTO-MCNC: 218 MG/DL (ref 70–110)
GLUCOSE SERPL-MCNC: 147 MG/DL (ref 74–99)
HCT VFR BLD AUTO: 24.4 % (ref 36–48)
HGB BLD-MCNC: 7.6 G/DL (ref 13–16)
HGB BLD-MCNC: 7.9 G/DL (ref 13–16)
MCH RBC QN AUTO: 26.6 PG (ref 24–34)
MCHC RBC AUTO-ENTMCNC: 32.4 G/DL (ref 31–37)
MCV RBC AUTO: 82.2 FL (ref 74–97)
PLATELET # BLD AUTO: 461 K/UL (ref 135–420)
PMV BLD AUTO: 10 FL (ref 9.2–11.8)
POTASSIUM SERPL-SCNC: 4.6 MMOL/L (ref 3.5–5.5)
RBC # BLD AUTO: 2.97 M/UL (ref 4.35–5.65)
SODIUM SERPL-SCNC: 140 MMOL/L (ref 136–145)
WBC # BLD AUTO: 16.5 K/UL (ref 4.6–13.2)

## 2021-04-11 PROCEDURE — 80048 BASIC METABOLIC PNL TOTAL CA: CPT

## 2021-04-11 PROCEDURE — 77030003029 HC SUT VCRL J&J -B

## 2021-04-11 PROCEDURE — 77030002996 HC SUT SLK J&J -A

## 2021-04-11 PROCEDURE — 74011250636 HC RX REV CODE- 250/636: Performed by: NURSE ANESTHETIST, CERTIFIED REGISTERED

## 2021-04-11 PROCEDURE — 99233 SBSQ HOSP IP/OBS HIGH 50: CPT | Performed by: INTERNAL MEDICINE

## 2021-04-11 PROCEDURE — 76010000112 HC CV SURG 0.5 TO 1 HR

## 2021-04-11 PROCEDURE — 74011250637 HC RX REV CODE- 250/637: Performed by: INTERNAL MEDICINE

## 2021-04-11 PROCEDURE — 85018 HEMOGLOBIN: CPT

## 2021-04-11 PROCEDURE — 2709999900 HC NON-CHARGEABLE SUPPLY

## 2021-04-11 PROCEDURE — 82962 GLUCOSE BLOOD TEST: CPT

## 2021-04-11 PROCEDURE — 74011636637 HC RX REV CODE- 636/637: Performed by: NURSE ANESTHETIST, CERTIFIED REGISTERED

## 2021-04-11 PROCEDURE — 88307 TISSUE EXAM BY PATHOLOGIST: CPT

## 2021-04-11 PROCEDURE — 76060000032 HC ANESTHESIA 0.5 TO 1 HR

## 2021-04-11 PROCEDURE — 74011250636 HC RX REV CODE- 250/636: Performed by: INTERNAL MEDICINE

## 2021-04-11 PROCEDURE — 27590 AMPUTATE LEG AT THIGH: CPT

## 2021-04-11 PROCEDURE — 74011250637 HC RX REV CODE- 250/637: Performed by: SURGERY

## 2021-04-11 PROCEDURE — 77030031139 HC SUT VCRL2 J&J -A

## 2021-04-11 PROCEDURE — 01232 ANES OPN PX UPR 2/3 FEM AMP: CPT | Performed by: ANESTHESIOLOGY

## 2021-04-11 PROCEDURE — 65660000004 HC RM CVT STEPDOWN

## 2021-04-11 PROCEDURE — 76210000006 HC OR PH I REC 0.5 TO 1 HR

## 2021-04-11 PROCEDURE — 0Y6C0Z2 DETACHMENT AT RIGHT UPPER LEG, MID, OPEN APPROACH: ICD-10-PCS

## 2021-04-11 PROCEDURE — 85027 COMPLETE CBC AUTOMATED: CPT

## 2021-04-11 PROCEDURE — 74011000258 HC RX REV CODE- 258: Performed by: INTERNAL MEDICINE

## 2021-04-11 PROCEDURE — 77030020753 HC CUF TRNQT 1BLA STRY -B

## 2021-04-11 PROCEDURE — 77030006835 HC BLD SAW SAG STRY -B

## 2021-04-11 PROCEDURE — 74011000258 HC RX REV CODE- 258: Performed by: NURSE ANESTHETIST, CERTIFIED REGISTERED

## 2021-04-11 PROCEDURE — 77030008462 HC STPLR SKN PROX J&J -A

## 2021-04-11 PROCEDURE — 74011636637 HC RX REV CODE- 636/637: Performed by: INTERNAL MEDICINE

## 2021-04-11 PROCEDURE — 01232 ANES OPN PX UPR 2/3 FEM AMP: CPT | Performed by: NURSE ANESTHETIST, CERTIFIED REGISTERED

## 2021-04-11 PROCEDURE — 74011250636 HC RX REV CODE- 250/636: Performed by: SURGERY

## 2021-04-11 PROCEDURE — 36415 COLL VENOUS BLD VENIPUNCTURE: CPT

## 2021-04-11 PROCEDURE — 77030012510 HC MSK AIRWY LMA TELE -B: Performed by: ANESTHESIOLOGY

## 2021-04-11 RX ORDER — ONDANSETRON 2 MG/ML
4 INJECTION INTRAMUSCULAR; INTRAVENOUS AS NEEDED
Status: CANCELLED | OUTPATIENT
Start: 2021-04-11

## 2021-04-11 RX ORDER — FENTANYL CITRATE 50 UG/ML
INJECTION, SOLUTION INTRAMUSCULAR; INTRAVENOUS AS NEEDED
Status: DISCONTINUED | OUTPATIENT
Start: 2021-04-11 | End: 2021-04-11 | Stop reason: HOSPADM

## 2021-04-11 RX ORDER — BISACODYL 5 MG
10 TABLET, DELAYED RELEASE (ENTERIC COATED) ORAL DAILY
Status: CANCELLED | OUTPATIENT
Start: 2021-04-11

## 2021-04-11 RX ORDER — MIDAZOLAM HYDROCHLORIDE 1 MG/ML
INJECTION, SOLUTION INTRAMUSCULAR; INTRAVENOUS AS NEEDED
Status: DISCONTINUED | OUTPATIENT
Start: 2021-04-11 | End: 2021-04-11 | Stop reason: HOSPADM

## 2021-04-11 RX ORDER — HYDROMORPHONE HYDROCHLORIDE 1 MG/ML
0.5 INJECTION, SOLUTION INTRAMUSCULAR; INTRAVENOUS; SUBCUTANEOUS ONCE
Status: COMPLETED | OUTPATIENT
Start: 2021-04-11 | End: 2021-04-11

## 2021-04-11 RX ORDER — HYDROMORPHONE HYDROCHLORIDE 2 MG/ML
INJECTION, SOLUTION INTRAMUSCULAR; INTRAVENOUS; SUBCUTANEOUS AS NEEDED
Status: DISCONTINUED | OUTPATIENT
Start: 2021-04-11 | End: 2021-04-11 | Stop reason: HOSPADM

## 2021-04-11 RX ORDER — DEXTROSE 50 % IN WATER (D50W) INTRAVENOUS SYRINGE
25-50 AS NEEDED
Status: DISCONTINUED | OUTPATIENT
Start: 2021-04-11 | End: 2021-04-15 | Stop reason: HOSPADM

## 2021-04-11 RX ORDER — SODIUM CHLORIDE 0.9 % (FLUSH) 0.9 %
5-40 SYRINGE (ML) INJECTION EVERY 8 HOURS
Status: CANCELLED | OUTPATIENT
Start: 2021-04-11

## 2021-04-11 RX ORDER — SODIUM CHLORIDE 0.9 % (FLUSH) 0.9 %
5-40 SYRINGE (ML) INJECTION AS NEEDED
Status: CANCELLED | OUTPATIENT
Start: 2021-04-11

## 2021-04-11 RX ORDER — SODIUM CHLORIDE, SODIUM LACTATE, POTASSIUM CHLORIDE, CALCIUM CHLORIDE 600; 310; 30; 20 MG/100ML; MG/100ML; MG/100ML; MG/100ML
25 INJECTION, SOLUTION INTRAVENOUS CONTINUOUS
Status: DISCONTINUED | OUTPATIENT
Start: 2021-04-11 | End: 2021-04-12

## 2021-04-11 RX ORDER — MAGNESIUM SULFATE 100 %
4 CRYSTALS MISCELLANEOUS AS NEEDED
Status: DISCONTINUED | OUTPATIENT
Start: 2021-04-11 | End: 2021-04-12

## 2021-04-11 RX ORDER — HYDROMORPHONE HYDROCHLORIDE 1 MG/ML
INJECTION, SOLUTION INTRAMUSCULAR; INTRAVENOUS; SUBCUTANEOUS
Status: DISPENSED
Start: 2021-04-11 | End: 2021-04-12

## 2021-04-11 RX ORDER — INSULIN LISPRO 100 [IU]/ML
INJECTION, SOLUTION INTRAVENOUS; SUBCUTANEOUS ONCE
Status: COMPLETED | OUTPATIENT
Start: 2021-04-11 | End: 2021-04-11

## 2021-04-11 RX ORDER — NALOXONE HYDROCHLORIDE 0.4 MG/ML
0.1 INJECTION, SOLUTION INTRAMUSCULAR; INTRAVENOUS; SUBCUTANEOUS AS NEEDED
Status: CANCELLED | OUTPATIENT
Start: 2021-04-11

## 2021-04-11 RX ORDER — FENTANYL CITRATE-0.9 % NACL/PF 900MCG/30
PATIENT CONTROLLED ANALGESIA VIAL INJECTION
Status: CANCELLED | OUTPATIENT
Start: 2021-04-11

## 2021-04-11 RX ORDER — FENTANYL CITRATE 50 UG/ML
50 INJECTION, SOLUTION INTRAMUSCULAR; INTRAVENOUS
Status: DISCONTINUED | OUTPATIENT
Start: 2021-04-11 | End: 2021-04-12

## 2021-04-11 RX ORDER — SODIUM CHLORIDE, SODIUM LACTATE, POTASSIUM CHLORIDE, CALCIUM CHLORIDE 600; 310; 30; 20 MG/100ML; MG/100ML; MG/100ML; MG/100ML
INJECTION, SOLUTION INTRAVENOUS
Status: DISCONTINUED | OUTPATIENT
Start: 2021-04-11 | End: 2021-04-11 | Stop reason: HOSPADM

## 2021-04-11 RX ORDER — PROPOFOL 10 MG/ML
INJECTION, EMULSION INTRAVENOUS AS NEEDED
Status: DISCONTINUED | OUTPATIENT
Start: 2021-04-11 | End: 2021-04-11 | Stop reason: HOSPADM

## 2021-04-11 RX ORDER — HYDROMORPHONE HYDROCHLORIDE 1 MG/ML
0.5 INJECTION, SOLUTION INTRAMUSCULAR; INTRAVENOUS; SUBCUTANEOUS AS NEEDED
Status: DISCONTINUED | OUTPATIENT
Start: 2021-04-11 | End: 2021-04-12

## 2021-04-11 RX ORDER — HYDROMORPHONE HYDROCHLORIDE 1 MG/ML
INJECTION, SOLUTION INTRAMUSCULAR; INTRAVENOUS; SUBCUTANEOUS AS NEEDED
Status: DISCONTINUED | OUTPATIENT
Start: 2021-04-11 | End: 2021-04-11

## 2021-04-11 RX ADMIN — PIPERACILLIN SODIUM AND TAZOBACTAM SODIUM 3.38 G: 3; .375 INJECTION, POWDER, LYOPHILIZED, FOR SOLUTION INTRAVENOUS at 17:44

## 2021-04-11 RX ADMIN — ACETAMINOPHEN 650 MG: 325 TABLET ORAL at 17:19

## 2021-04-11 RX ADMIN — INSULIN LISPRO 4 UNITS: 100 INJECTION, SOLUTION INTRAVENOUS; SUBCUTANEOUS at 21:39

## 2021-04-11 RX ADMIN — HYDROMORPHONE HYDROCHLORIDE 0.5 MG: 1 INJECTION, SOLUTION INTRAMUSCULAR; INTRAVENOUS; SUBCUTANEOUS at 18:30

## 2021-04-11 RX ADMIN — GABAPENTIN 100 MG: 100 CAPSULE ORAL at 10:51

## 2021-04-11 RX ADMIN — Medication 10 ML: at 21:42

## 2021-04-11 RX ADMIN — Medication 81 MG: at 10:51

## 2021-04-11 RX ADMIN — GABAPENTIN 100 MG: 100 CAPSULE ORAL at 17:19

## 2021-04-11 RX ADMIN — EZETIMIBE 10 MG: 10 TABLET ORAL at 17:19

## 2021-04-11 RX ADMIN — MIDAZOLAM HYDROCHLORIDE 2 MG: 2 INJECTION, SOLUTION INTRAMUSCULAR; INTRAVENOUS at 07:43

## 2021-04-11 RX ADMIN — DULOXETINE HYDROCHLORIDE 30 MG: 30 CAPSULE, DELAYED RELEASE ORAL at 10:51

## 2021-04-11 RX ADMIN — HYDROMORPHONE HYDROCHLORIDE 0.5 MG: 2 INJECTION, SOLUTION INTRAMUSCULAR; INTRAVENOUS; SUBCUTANEOUS at 06:11

## 2021-04-11 RX ADMIN — Medication 10 ML: at 06:13

## 2021-04-11 RX ADMIN — HYDROMORPHONE HYDROCHLORIDE 0.5 MG: 1 INJECTION, SOLUTION INTRAMUSCULAR; INTRAVENOUS; SUBCUTANEOUS at 14:23

## 2021-04-11 RX ADMIN — OXYCODONE HYDROCHLORIDE 5 MG: 5 TABLET ORAL at 23:17

## 2021-04-11 RX ADMIN — ACETAMINOPHEN 650 MG: 325 TABLET ORAL at 10:51

## 2021-04-11 RX ADMIN — HYDROMORPHONE HYDROCHLORIDE 1 MG: 2 INJECTION INTRAMUSCULAR; INTRAVENOUS; SUBCUTANEOUS at 08:06

## 2021-04-11 RX ADMIN — SODIUM CHLORIDE, SODIUM LACTATE, POTASSIUM CHLORIDE, AND CALCIUM CHLORIDE: 600; 310; 30; 20 INJECTION, SOLUTION INTRAVENOUS at 07:45

## 2021-04-11 RX ADMIN — ZOLPIDEM TARTRATE 5 MG: 5 TABLET ORAL at 23:21

## 2021-04-11 RX ADMIN — HYDROMORPHONE HYDROCHLORIDE 0.5 MG: 2 INJECTION, SOLUTION INTRAMUSCULAR; INTRAVENOUS; SUBCUTANEOUS at 12:24

## 2021-04-11 RX ADMIN — INSULIN LISPRO 3 UNITS: 100 INJECTION, SOLUTION INTRAVENOUS; SUBCUTANEOUS at 09:21

## 2021-04-11 RX ADMIN — PIPERACILLIN SODIUM AND TAZOBACTAM SODIUM 3.38 G: 3; .375 INJECTION, POWDER, LYOPHILIZED, FOR SOLUTION INTRAVENOUS at 11:00

## 2021-04-11 RX ADMIN — ACETAMINOPHEN 650 MG: 325 TABLET ORAL at 23:17

## 2021-04-11 RX ADMIN — OXYCODONE HYDROCHLORIDE 5 MG: 5 TABLET ORAL at 13:20

## 2021-04-11 RX ADMIN — Medication 5 MG: at 21:39

## 2021-04-11 RX ADMIN — PIPERACILLIN SODIUM AND TAZOBACTAM SODIUM 3.38 G: 3; .375 INJECTION, POWDER, LYOPHILIZED, FOR SOLUTION INTRAVENOUS at 23:17

## 2021-04-11 RX ADMIN — SODIUM CHLORIDE, SODIUM LACTATE, POTASSIUM CHLORIDE, AND CALCIUM CHLORIDE 25 ML/HR: 600; 310; 30; 20 INJECTION, SOLUTION INTRAVENOUS at 10:56

## 2021-04-11 RX ADMIN — PIPERACILLIN SODIUM AND TAZOBACTAM SODIUM 3.38 G: 3; .375 INJECTION, POWDER, LYOPHILIZED, FOR SOLUTION INTRAVENOUS at 07:58

## 2021-04-11 RX ADMIN — PROPOFOL 150 MG: 10 INJECTION, EMULSION INTRAVENOUS at 07:54

## 2021-04-11 RX ADMIN — ACETAMINOPHEN 650 MG: 325 TABLET ORAL at 06:11

## 2021-04-11 RX ADMIN — Medication 10 ML: at 13:46

## 2021-04-11 RX ADMIN — HYDROMORPHONE HYDROCHLORIDE 1 MG: 2 INJECTION INTRAMUSCULAR; INTRAVENOUS; SUBCUTANEOUS at 08:16

## 2021-04-11 RX ADMIN — ATORVASTATIN CALCIUM 80 MG: 40 TABLET, FILM COATED ORAL at 21:39

## 2021-04-11 RX ADMIN — ONDANSETRON 4 MG: 2 INJECTION INTRAMUSCULAR; INTRAVENOUS at 08:19

## 2021-04-11 RX ADMIN — INSULIN LISPRO 4 UNITS: 100 INJECTION, SOLUTION INTRAVENOUS; SUBCUTANEOUS at 17:20

## 2021-04-11 RX ADMIN — FENTANYL CITRATE 100 MCG: 50 INJECTION, SOLUTION INTRAMUSCULAR; INTRAVENOUS at 07:47

## 2021-04-11 RX ADMIN — HYDROMORPHONE HYDROCHLORIDE 0.5 MG: 1 INJECTION, SOLUTION INTRAMUSCULAR; INTRAVENOUS; SUBCUTANEOUS at 17:28

## 2021-04-11 RX ADMIN — PIPERACILLIN SODIUM AND TAZOBACTAM SODIUM 3.38 G: 3; .375 INJECTION, POWDER, LYOPHILIZED, FOR SOLUTION INTRAVENOUS at 01:13

## 2021-04-11 RX ADMIN — CARVEDILOL 3.12 MG: 6.25 TABLET, FILM COATED ORAL at 10:51

## 2021-04-11 RX ADMIN — CARVEDILOL 3.12 MG: 6.25 TABLET, FILM COATED ORAL at 21:39

## 2021-04-11 NOTE — ANESTHESIA POSTPROCEDURE EVALUATION
Procedure(s):  RIGHT ABOVE KNEE AMPUTATION (AKA). general    Anesthesia Post Evaluation      Multimodal analgesia: multimodal analgesia used between 6 hours prior to anesthesia start to PACU discharge  Patient location during evaluation: bedside  Patient participation: complete - patient participated  Level of consciousness: awake  Pain management: adequate  Airway patency: patent  Anesthetic complications: no  Cardiovascular status: stable  Respiratory status: acceptable  Hydration status: acceptable  Post anesthesia nausea and vomiting:  controlled      INITIAL Post-op Vital signs:   Vitals Value Taken Time   /73 04/11/21 0936   Temp 36.8 °C (98.3 °F) 04/11/21 0915   Pulse 99 04/11/21 0942   Resp 13 04/11/21 0942   SpO2 97 % 04/11/21 0942   Vitals shown include unvalidated device data.

## 2021-04-11 NOTE — PERIOP NOTES
TRANSFER - OUT REPORT:    Verbal report given to [facility] Stringer(name) on [facility]  being transferred to T SD(unit) for routine post - op       Report consisted of patients Situation, Background, Assessment and   Recommendations(SBAR). Information from the following report(s) SBAR, OR Summary, Procedure Summary, Intake/Output and MAR was reviewed with the receiving nurse. Lines:   Peripheral IV 04/08/21 Distal;Right Cephalic (Active)   Site Assessment Clean, dry, & intact 04/11/21 0846   Phlebitis Assessment 0 04/11/21 0846   Infiltration Assessment 0 04/11/21 0846   Dressing Status Clean, dry, & intact 04/11/21 0846   Dressing Type Transparent 04/11/21 0846   Hub Color/Line Status Blue;Flushed 04/11/21 0846   Action Taken Open ports on tubing capped 04/11/21 0400   Alcohol Cap Used Yes 04/11/21 0400       Peripheral IV 04/08/21 Left Antecubital (Active)   Site Assessment Clean, dry, & intact 04/11/21 0846   Phlebitis Assessment 0 04/11/21 0846   Infiltration Assessment 0 04/11/21 0846   Dressing Status Clean, dry, & intact 04/11/21 0846   Dressing Type Tape;Transparent 04/11/21 0846   Hub Color/Line Status Green; Infusing 04/11/21 0846   Action Taken Open ports on tubing capped 04/11/21 0400   Alcohol Cap Used Yes 04/11/21 0400        Opportunity for questions and clarification was provided.       Patient transported with:   Monitor  Registered Nurse  Tech

## 2021-04-11 NOTE — PROGRESS NOTES
Problem: Patient Education: Go to Patient Education Activity  Goal: Patient/Family Education  Outcome: Progressing Towards Goal     Problem: Pressure Injury - Risk of  Goal: *Prevention of pressure injury  Description: Document Steve Scale and appropriate interventions in the flowsheet.   Outcome: Progressing Towards Goal  Note: Pressure Injury Interventions:  Sensory Interventions: Assess changes in LOC, Check visual cues for pain, Discuss PT/OT consult with provider, Keep linens dry and wrinkle-free, Minimize linen layers, Pressure redistribution bed/mattress (bed type)    Moisture Interventions: Absorbent underpads, Internal/External urinary devices, Minimize layers    Activity Interventions: Increase time out of bed, Pressure redistribution bed/mattress(bed type), PT/OT evaluation    Mobility Interventions: Pressure redistribution bed/mattress (bed type), PT/OT evaluation    Nutrition Interventions: Document food/fluid/supplement intake    Friction and Shear Interventions: Foam dressings/transparent film/skin sealants, Minimize layers                Problem: Pain  Goal: *Control of Pain  Outcome: Progressing Towards Goal     Problem: Patient Education: Go to Patient Education Activity  Goal: Patient/Family Education  Outcome: Progressing Towards Goal

## 2021-04-11 NOTE — INTERVAL H&P NOTE
Update History & Physical    The Patient's History and Physical of April 11, 2021 was reviewed with the patient and I examined the patient. There was no change. The surgical site was confirmed by the patient and me. Plan:  Right Above knee amputation. The risk, benefits, expected outcome, and alternative to the recommended procedure have been discussed with the patient. Patient understands and wants to proceed with the procedure.     Electronically signed by Junior Echeverria DO on 4/11/2021 at 7:42 AM

## 2021-04-11 NOTE — ROUTINE PROCESS
TRANSFER - IN REPORT: 
 
Verbal report received from [facility] on Karol Terrazas  being received from [facility] for ordered procedure Report consisted of patients Situation, Background, Assessment and  
Recommendations(SBAR). Information from the following report(s) SBAR, KARDEX, I&O, RECENT RESULTS was reviewed with the receiving nurse. Opportunity for questions and clarification was provided. Assessment completed upon patients arrival to unit and care assumed.

## 2021-04-11 NOTE — PROGRESS NOTES
1900 - Bedside and Verbal shift change report given to Jarett Grant RN (oncoming nurse) by Rena Pretty RN (offgoing nurse). Report included the following information SBAR, Kardex, Procedure Summary, Intake/Output, MAR, Recent Results and Cardiac Rhythm SR.      0700 - Bedside and Verbal shift change report given to Rena Pretty RN (oncoming nurse) by Jarett Grant RN (offgoing nurse).  Report included the following information SBAR, Kardex, Intake/Output, MAR, Recent Results and Cardiac Rhythm SR.

## 2021-04-11 NOTE — PROGRESS NOTES
1900 - Bedside and Verbal shift change report given to Jarett Grant RN (oncoming nurse) by Eliseo Gerardo RN (offgoing nurse). Report included the following information SBAR, Kardex, Procedure Summary, Intake/Output, MAR, Recent Results and Cardiac Rhythm SR.     2000 - Most recent H/H resulted; 2nd unit PRBCs held d/t results of 7.7/23.8; will continue to monitor patient    2200 - Complete CHG bath completed with CHG bath packs; gown, bed pads and leads changed; tolerated well; will continue to monitor patient    0600 - Complete CHG bath complete with CHG bath packs; gown, linens, bed pads and leads changed; tolerated well; will continue to monitor patient    0700 - Bedside and Verbal shift change report given to Eliseo Gerardo RN (oncoming nurse) by Jarett Grant RN (offgoing nurse).  Report included the following information SBAR, Kardex, Intake/Output, MAR, Recent Results and Cardiac Rhythm SR.

## 2021-04-11 NOTE — ANESTHESIA PREPROCEDURE EVALUATION
Relevant Problems   No relevant active problems       Anesthetic History               Review of Systems / Medical History  Patient summary reviewed and pertinent labs reviewed    Pulmonary        Sleep apnea: No treatment           Neuro/Psych         Psychiatric history     Cardiovascular              CAD and PAD    Exercise tolerance: >4 METS     GI/Hepatic/Renal                Endo/Other    Diabetes: well controlled, type 2         Other Findings              Physical Exam    Airway  Mallampati: III  TM Distance: 4 - 6 cm  Neck ROM: decreased range of motion   Mouth opening: Diminished (comment)     Cardiovascular    Rhythm: regular  Rate: normal         Dental    Dentition: Poor dentition     Pulmonary  Breath sounds clear to auscultation               Abdominal  GI exam deferred       Other Findings            Anesthetic Plan    ASA: 3, emergent  Anesthesia type: general          Induction: Intravenous  Anesthetic plan and risks discussed with: Patient

## 2021-04-11 NOTE — PROGRESS NOTES
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 Hold Xarelto for now- re-start when ok with Vasc Sx              -SQ heparin for DVT prophylaxis  Continue BP medications --hold parameters for blood pressure medications  Pain medications as per vascular surgery  Continue with PT and OT    Dispo: return to ARU- needs auth again prior to return  Discussed over the phone with friend Inez Smith while in room with patient. Current Antibtiocs:   Vanc 4/6- 4/9  Zosyn 4/6- present    Lines:   Peripheral    Case discussed with:  [x]Patient  []Family  [x]Nursing  [x]Case Management  DVT Prophylaxis:  []Lovenox  [x]Hep SQ  []SCDs  []Coumadin   []On Heparin gtt    I have independently examined the patient and reviewed all lab studies and imgaing as well as review of nursing notes and physican notes from the past 24 hours. Courtney Montero D.O.   Pager [phone]      Allergies   Allergen Reactions    Nitroglycerin Other (comments)      BP drops rapidly when he takes SL Nitro          [place] Diarrhea and Rash    Cat Dander Cough    Codeine Rash    Strawberry Rash           Subjective:      Pt seen and examined. Had stump revision additional partial amputation this morning. Pain is currently controlled. Patient is in overall good spirits. No fevers or chills. No other concerns at this time. Objective:        Visit Vitals  /74 (BP 1 Location: Left upper arm, BP Patient Position: At rest)   Pulse 90   Temp 98.1 °F (36.7 °C)   Resp 17   Ht 6' 2\" (1.88 m)   Wt 86.2 kg (190 lb)   SpO2 100%   BMI 24.39 kg/m²     Temp (24hrs), Av °F (36.7 °C), Min:97 °F (36.1 °C), Max:98.6 °F (37 °C)        General:   awake alert and oriented, non-toxic   Skin:   no rashes or skin lesions noted on limited exam, dry and warm   HEENT:  No scleral icterus or pallor; oral mucosa moist, lips moist   Lymph Nodes:   not assessed today   Lungs:   non, labored; bilaterally clear to aspiration- no crackles wheezes rales or rhonchi   Heart:  RRR, s1 and s2; no murmurs rubs or gallops; no edema, + pedal pulses   Abdomen:  soft, non-distended, active bowel sounds, non-tender   Genitourinary:  deferred   Extremities:   average muscle tone; no contractures, no joint effusions; stump remains in post-surgical dressings- no blood or soiling noted   Neurologic:  No gross focal motor or sensory abnormalities; CN 2-12 intact; Follows commands.     Psychiatric:   flat affect, tearful       Labs: Results:   Chemistry Recent Labs     21  0650 04/10/21  0640 21  0453   * 127* 165*    139 136   K 4.6 4.4 4.4    109 106   CO2 23 23 22   BUN 9 15 22*   CREA 1.08 1.16 1.35*   CA 8.2* 7.9* 8.4*   AGAP 7 7 8   BUCR 8* 13 16      CBC w/Diff Recent Labs     21  1027 21  0650 04/10/21  1915 04/10/21  0640 21  0453   WBC  --  16.5*  --  14.1* 18.6*   RBC  --  2.97*  --  2.43* 2.79*   HGB 7.6* 7.9* 7.7* 6.3* 7.4*   HCT  --  24.4* 23.8* 20.0* 22.7*   PLT  --  461*  --  404 431*        No results found for: SDES Lab Results   Component Value Date/Time    Culture result: LIGHT ENTEROBACTER CLOACAE COMPLEX (A) 04/07/2021 01:58 PM    Culture result: FEW MIXED SKIN MEDHAT ISOLATED 04/07/2021 01:58 PM    Culture result: NO ANAEROBES ISOLATED 04/07/2021 01:58 PM    Culture result: HEAVY ENTEROBACTER CLOACAE COMPLEX (A) 04/06/2021 01:41 PM    Culture result: LIGHT MIXED SKIN MEDHAT ISOLATED 04/06/2021 01:41 PM        Results     Procedure Component Value Units Date/Time    CULTURE, WOUND Leverne Sayres STAIN [[id number]]  (Abnormal)  (Susceptibility) Collected: 04/07/21 1358    Order Status: Completed Specimen: Leg, Right Updated: 04/10/21 1129     Special Requests: RIGHT LEG        GRAM STAIN RARE WBCS SEEN         NO ORGANISMS SEEN        Culture result:       LIGHT ENTEROBACTER CLOACAE COMPLEX                  FEW MIXED SKIN MEDHAT ISOLATED          Susceptibility      Enterobacter cloacae complex     USMAN     Amikacin ($) Susceptible     Cefazolin ($) Resistant     Cefepime ($$) Susceptible     Cefoxitin Resistant     Ceftazidime ($) Resistant     Ceftriaxone ($) Resistant     Ciprofloxacin ($) Susceptible     Gentamicin ($) Susceptible     Levofloxacin ($) Susceptible     Meropenem ($$) Susceptible     Piperacillin/Tazobac ($) Resistant     Tobramycin ($) Susceptible     Trimeth/Sulfa Susceptible                    CULTURE, ANAEROBIC [[id number]] Collected: 04/07/21 1358    Order Status: Completed Specimen: Leg, Right Updated: 04/09/21 1257     Special Requests: RIGHT LEG        Culture result: NO ANAEROBES ISOLATED       COVID-19 RAPID TEST [[id number]] Collected: 04/07/21 0945    Order Status: Completed Specimen: Nasopharyngeal Updated: 04/07/21 1017     Specimen source Nasopharyngeal        COVID-19 rapid test Not detected        Comment: Rapid Abbott ID Now       Rapid NAAT:  The specimen is NEGATIVE for SARS-CoV-2, the novel coronavirus associated with COVID-19.        Negative results should be treated as presumptive and, if inconsistent with clinical signs and symptoms or necessary for patient management, should be tested with an alternative molecular assay. Negative results do not preclude SARS-CoV-2 infection and should not be used as the sole basis for patient management decisions. This test has been authorized by the FDA under an Emergency Use Authorization (EUA) for use by authorized laboratories.    Fact sheet for Healthcare Providers: ConventionUpdate.co.nz  Fact sheet for Patients: ConventionUpdate.co.nz       Methodology: Isothermal Nucleic Acid Amplification         COVID-19 RAPID TEST [[id number]] Collected: 04/07/21 0741    Order Status: Canceled     CULTURE, Reba Meuse STAIN [[id number]]  (Abnormal)  (Susceptibility) Collected: 04/06/21 1341    Order Status: Completed Specimen: Wound Drainage Updated: 04/09/21 0946     Special Requests: NO SPECIAL REQUESTS        GRAM STAIN OCCASIONAL WBCS SEEN         3+ GRAM NEGATIVE RODS        Culture result:       HEAVY ENTEROBACTER CLOACAE COMPLEX                  LIGHT MIXED SKIN MEDHAT ISOLATED          Susceptibility      Enterobacter cloacae complex     USMAN     Amikacin ($) Susceptible     Cefazolin ($) Resistant     Cefepime ($$) Susceptible     Cefoxitin Resistant     Ceftazidime ($) Susceptible     Ceftriaxone ($) Susceptible     Ciprofloxacin ($) Susceptible     Gentamicin ($) Susceptible     Levofloxacin ($) Susceptible     Meropenem ($$) Susceptible     Piperacillin/Tazobac ($) Susceptible     Tobramycin ($) Susceptible     Trimeth/Sulfa Susceptible                    CULTURE, BLOOD [[id number]] Collected: 04/06/21 0900    Order Status: Completed Specimen: Blood Updated: 04/11/21 0651     Special Requests: NO SPECIAL REQUESTS        Culture result: NO GROWTH 5 DAYS       CULTURE, BLOOD [[id number]] Collected: 04/06/21 0850    Order Status: Completed Specimen: Blood Updated: 04/11/21 0651     Special Requests: NO SPECIAL REQUESTS        Culture result: NO GROWTH 5 DAYS       CULTURE, BLOOD, PAIRED [[id number]] Collected: 04/06/21 0800    Order Status: Canceled Specimen: Blood     CULTURE, URINE [[id number]] Collected: 04/02/21 2000    Order Status: Completed Specimen: Clean catch Updated: 04/04/21 1101     Special Requests: NO SPECIAL REQUESTS        Culture result: No growth (<1,000 CFU/ML)             Imaging:     No results found.

## 2021-04-11 NOTE — OP NOTES
Vascular Specialist Brief Post-Op Note    Date of Surgery: 2021  [id number]  1965    Hospital: [facility]    Surgeon(s):  Alli Velasquez DO    Surgeon(s) and Role:     * Mitch Wild DO - Primary    Other OR Staff/Assistants:  Circ-1: Yomi Alvarez RN  Scrub Tech-1: Fabiola Lever  Surg Asst-1: Rah Arnold    Pre-Op Dx :  1. Right through knee amputation     Procedure:   1. Right above knee amputation     Post-Op Dx: same    Anesthesia Type: General     Fluid Replacement: 300 ml cyrstalloid    Findings : right through knee amputation     Comp: none    EBL : 100 ml    Drains: none       Specimens: Right knee      Pinky Ledezma is a 60 y.o. male with right open through knee amputation. It was discussed with the patient that he would need right above knee amputation. All the risk and benefits were discussed with the patient. All questions were answered to their satisfaction. Consent was obtained. The patient was brought to the operative suite and place in supine position. A time-out was made to verify correct patient, site of procedure, and the procedure type. After anesthesia monitoring lines were placed, induction of anesthesia performed. The patient was prepped and draped in a sterile manner. The patient had a preoperative antibiotic ordered. This was administered within one hour of the skin incision. Esmarch was wrapped around the limb from the knee to the proximal limb. And tourniquet was turned on. Esmarch was removed. Skin incision was made and carried through the soft tissues and muscles. Satisfactory bleeding and tissue color were noted. Neurovascular bundles individually identified and ligated. The femur transected with an oscillating saw. The level of the amputation was mid. A fishmouth flap was developed. Posterior muscle groups were divided and the specimen delivered from the operative field.   Additional hemostasis achieved with cautery or ligation of individual vessels. Wound was irrigated with saline solution. Tourniquet was taken down and hemostasis was controlled. Fascia reapproximated with 2-0 Vicryl. Skin reapproximated with skin staples. Sterile dressings applied. Patient otherwise tolerated the procedure well. All instruments were accounted for. The patient was then awakened, and taken to the recovery room in stable condition. The patient tolerated the procedure well.          Jayesh España DO  Vascular Surgery  4/11/2021 8:39 AM

## 2021-04-11 NOTE — PROGRESS NOTES
0712-Bedside and Verbal shift change report received from  [facility] (off going nurse). Report included the following information SBAR, Kardex, MAR and Recent Results. Assumed care,fall prevention program maintained,call bell and bedside table within reach. Will continue care. 0732-TRANSFER - OUT REPORT:  Verbal report given to Anai Young (Name) on Janusz Pap being transferred to [facility]-OR  (Unit) for ordered procedure  (amputation of the right leg). Report consisted of patient's Situation, Background, Assessment and   Information from the following report(s) SBAR, Kardex, Recent Results, Pre Procedure Checklist and Procedure Verification was reviewed with the receiving nurse. 0741-Patient transferred to [facility].  0948-Patient back from surgery,stump was checked no bleeding dressing dry & intact. No complaints of pain at this time. 1349-Patient still complaining of pain despite of 0.5 Dilaudid. Page MD. Provider responded w/ Telephone order :to give an extra 0.5 Dilaudid one time only. Order verified by read back.

## 2021-04-12 LAB
ABO + RH BLD: NORMAL
ANION GAP SERPL CALC-SCNC: 5 MMOL/L (ref 3–18)
BACTERIA SPEC CULT: NORMAL
BACTERIA SPEC CULT: NORMAL
BLD PROD TYP BPU: NORMAL
BLOOD GROUP ANTIBODIES SERPL: NORMAL
BPU ID: NORMAL
BUN SERPL-MCNC: 9 MG/DL (ref 7–18)
BUN/CREAT SERPL: 8 (ref 12–20)
CALCIUM SERPL-MCNC: 8.1 MG/DL (ref 8.5–10.1)
CALLED TO:,BCALL1: NORMAL
CALLED TO:,BCALL2: NORMAL
CHLORIDE SERPL-SCNC: 108 MMOL/L (ref 100–111)
CO2 SERPL-SCNC: 24 MMOL/L (ref 21–32)
CREAT SERPL-MCNC: 1.12 MG/DL (ref 0.6–1.3)
CROSSMATCH RESULT,%XM: NORMAL
ERYTHROCYTE [DISTWIDTH] IN BLOOD BY AUTOMATED COUNT: 15.4 % (ref 11.6–14.5)
GLUCOSE BLD STRIP.AUTO-MCNC: 136 MG/DL (ref 70–110)
GLUCOSE BLD STRIP.AUTO-MCNC: 143 MG/DL (ref 70–110)
GLUCOSE BLD STRIP.AUTO-MCNC: 225 MG/DL (ref 70–110)
GLUCOSE BLD STRIP.AUTO-MCNC: 297 MG/DL (ref 70–110)
GLUCOSE SERPL-MCNC: 128 MG/DL (ref 74–99)
HCT VFR BLD AUTO: 23 % (ref 36–48)
HGB BLD-MCNC: 7.3 G/DL (ref 13–16)
MCH RBC QN AUTO: 26.7 PG (ref 24–34)
MCHC RBC AUTO-ENTMCNC: 31.7 G/DL (ref 31–37)
MCV RBC AUTO: 84.2 FL (ref 74–97)
PLATELET # BLD AUTO: 461 K/UL (ref 135–420)
PMV BLD AUTO: 9.8 FL (ref 9.2–11.8)
POTASSIUM SERPL-SCNC: 4.9 MMOL/L (ref 3.5–5.5)
RBC # BLD AUTO: 2.73 M/UL (ref 4.35–5.65)
SERVICE CMNT-IMP: NORMAL
SERVICE CMNT-IMP: NORMAL
SODIUM SERPL-SCNC: 137 MMOL/L (ref 136–145)
SPECIMEN EXP DATE BLD: NORMAL
STATUS OF UNIT,%ST: NORMAL
UNIT DIVISION, %UDIV: 0
WBC # BLD AUTO: 17.2 K/UL (ref 4.6–13.2)

## 2021-04-12 PROCEDURE — 36415 COLL VENOUS BLD VENIPUNCTURE: CPT

## 2021-04-12 PROCEDURE — 99024 POSTOP FOLLOW-UP VISIT: CPT | Performed by: STUDENT IN AN ORGANIZED HEALTH CARE EDUCATION/TRAINING PROGRAM

## 2021-04-12 PROCEDURE — 82962 GLUCOSE BLOOD TEST: CPT

## 2021-04-12 PROCEDURE — 74011636637 HC RX REV CODE- 636/637: Performed by: HOSPITALIST

## 2021-04-12 PROCEDURE — 74011250636 HC RX REV CODE- 250/636: Performed by: HOSPITALIST

## 2021-04-12 PROCEDURE — 74011250637 HC RX REV CODE- 250/637: Performed by: INTERNAL MEDICINE

## 2021-04-12 PROCEDURE — 74011250637 HC RX REV CODE- 250/637: Performed by: SURGERY

## 2021-04-12 PROCEDURE — 99232 SBSQ HOSP IP/OBS MODERATE 35: CPT | Performed by: HOSPITALIST

## 2021-04-12 PROCEDURE — 74011250636 HC RX REV CODE- 250/636: Performed by: NURSE ANESTHETIST, CERTIFIED REGISTERED

## 2021-04-12 PROCEDURE — 2709999900 HC NON-CHARGEABLE SUPPLY

## 2021-04-12 PROCEDURE — 74011250636 HC RX REV CODE- 250/636: Performed by: INTERNAL MEDICINE

## 2021-04-12 PROCEDURE — 65660000000 HC RM CCU STEPDOWN

## 2021-04-12 PROCEDURE — 74011000258 HC RX REV CODE- 258: Performed by: INTERNAL MEDICINE

## 2021-04-12 PROCEDURE — 85027 COMPLETE CBC AUTOMATED: CPT

## 2021-04-12 PROCEDURE — 80048 BASIC METABOLIC PNL TOTAL CA: CPT

## 2021-04-12 PROCEDURE — 74011250637 HC RX REV CODE- 250/637: Performed by: HOSPITALIST

## 2021-04-12 RX ORDER — HYDROMORPHONE HYDROCHLORIDE 1 MG/ML
0.5 INJECTION, SOLUTION INTRAMUSCULAR; INTRAVENOUS; SUBCUTANEOUS
Status: DISCONTINUED | OUTPATIENT
Start: 2021-04-12 | End: 2021-04-13

## 2021-04-12 RX ORDER — HYDROMORPHONE HYDROCHLORIDE 1 MG/ML
0.5 INJECTION, SOLUTION INTRAMUSCULAR; INTRAVENOUS; SUBCUTANEOUS
Status: DISCONTINUED | OUTPATIENT
Start: 2021-04-12 | End: 2021-04-12

## 2021-04-12 RX ORDER — GABAPENTIN 100 MG/1
100 CAPSULE ORAL 3 TIMES DAILY
Status: DISCONTINUED | OUTPATIENT
Start: 2021-04-12 | End: 2021-04-15 | Stop reason: HOSPADM

## 2021-04-12 RX ORDER — ENOXAPARIN SODIUM 100 MG/ML
40 INJECTION SUBCUTANEOUS EVERY 24 HOURS
Status: DISCONTINUED | OUTPATIENT
Start: 2021-04-12 | End: 2021-04-13

## 2021-04-12 RX ORDER — HYDROMORPHONE HYDROCHLORIDE 1 MG/ML
1 INJECTION, SOLUTION INTRAMUSCULAR; INTRAVENOUS; SUBCUTANEOUS
Status: DISCONTINUED | OUTPATIENT
Start: 2021-04-12 | End: 2021-04-13

## 2021-04-12 RX ORDER — HYDROMORPHONE HYDROCHLORIDE 1 MG/ML
0.25 INJECTION, SOLUTION INTRAMUSCULAR; INTRAVENOUS; SUBCUTANEOUS
Status: DISCONTINUED | OUTPATIENT
Start: 2021-04-12 | End: 2021-04-12

## 2021-04-12 RX ADMIN — GABAPENTIN 100 MG: 100 CAPSULE ORAL at 18:35

## 2021-04-12 RX ADMIN — EZETIMIBE 10 MG: 10 TABLET ORAL at 09:03

## 2021-04-12 RX ADMIN — Medication 10 ML: at 18:48

## 2021-04-12 RX ADMIN — FENTANYL CITRATE 50 MCG: 50 INJECTION, SOLUTION INTRAMUSCULAR; INTRAVENOUS at 12:26

## 2021-04-12 RX ADMIN — Medication 10 ML: at 21:17

## 2021-04-12 RX ADMIN — INSULIN LISPRO 6 UNITS: 100 INJECTION, SOLUTION INTRAVENOUS; SUBCUTANEOUS at 21:27

## 2021-04-12 RX ADMIN — Medication 81 MG: at 09:03

## 2021-04-12 RX ADMIN — OXYCODONE HYDROCHLORIDE 5 MG: 5 TABLET ORAL at 21:16

## 2021-04-12 RX ADMIN — HYDROMORPHONE HYDROCHLORIDE 0.5 MG: 1 INJECTION, SOLUTION INTRAMUSCULAR; INTRAVENOUS; SUBCUTANEOUS at 18:37

## 2021-04-12 RX ADMIN — HYDROMORPHONE HYDROCHLORIDE 0.5 MG: 1 INJECTION, SOLUTION INTRAMUSCULAR; INTRAVENOUS; SUBCUTANEOUS at 03:16

## 2021-04-12 RX ADMIN — PIPERACILLIN SODIUM AND TAZOBACTAM SODIUM 3.38 G: 3; .375 INJECTION, POWDER, LYOPHILIZED, FOR SOLUTION INTRAVENOUS at 12:23

## 2021-04-12 RX ADMIN — Medication 10 ML: at 05:25

## 2021-04-12 RX ADMIN — ENOXAPARIN SODIUM 40 MG: 40 INJECTION SUBCUTANEOUS at 18:35

## 2021-04-12 RX ADMIN — Medication 5 MG: at 21:15

## 2021-04-12 RX ADMIN — HYDROMORPHONE HYDROCHLORIDE 0.5 MG: 2 INJECTION, SOLUTION INTRAMUSCULAR; INTRAVENOUS; SUBCUTANEOUS at 09:04

## 2021-04-12 RX ADMIN — OXYCODONE HYDROCHLORIDE 5 MG: 5 TABLET ORAL at 03:14

## 2021-04-12 RX ADMIN — HYDROMORPHONE HYDROCHLORIDE 0.5 MG: 1 INJECTION, SOLUTION INTRAMUSCULAR; INTRAVENOUS; SUBCUTANEOUS at 18:36

## 2021-04-12 RX ADMIN — GABAPENTIN 100 MG: 100 CAPSULE ORAL at 09:03

## 2021-04-12 RX ADMIN — INSULIN LISPRO 9 UNITS: 100 INJECTION, SOLUTION INTRAVENOUS; SUBCUTANEOUS at 12:42

## 2021-04-12 RX ADMIN — ATORVASTATIN CALCIUM 80 MG: 40 TABLET, FILM COATED ORAL at 21:16

## 2021-04-12 RX ADMIN — DULOXETINE HYDROCHLORIDE 30 MG: 30 CAPSULE, DELAYED RELEASE ORAL at 09:00

## 2021-04-12 RX ADMIN — PIPERACILLIN SODIUM AND TAZOBACTAM SODIUM 3.38 G: 3; .375 INJECTION, POWDER, LYOPHILIZED, FOR SOLUTION INTRAVENOUS at 18:44

## 2021-04-12 RX ADMIN — CARVEDILOL 3.12 MG: 6.25 TABLET, FILM COATED ORAL at 09:03

## 2021-04-12 RX ADMIN — OXYCODONE HYDROCHLORIDE 5 MG: 5 TABLET ORAL at 11:25

## 2021-04-12 RX ADMIN — GABAPENTIN 100 MG: 100 CAPSULE ORAL at 21:16

## 2021-04-12 RX ADMIN — SODIUM CHLORIDE, SODIUM LACTATE, POTASSIUM CHLORIDE, AND CALCIUM CHLORIDE 25 ML/HR: 600; 310; 30; 20 INJECTION, SOLUTION INTRAVENOUS at 05:22

## 2021-04-12 RX ADMIN — ACETAMINOPHEN 650 MG: 325 TABLET ORAL at 05:22

## 2021-04-12 RX ADMIN — CARVEDILOL 3.12 MG: 6.25 TABLET, FILM COATED ORAL at 21:16

## 2021-04-12 RX ADMIN — HYDROMORPHONE HYDROCHLORIDE 0.5 MG: 1 INJECTION, SOLUTION INTRAMUSCULAR; INTRAVENOUS; SUBCUTANEOUS at 14:04

## 2021-04-12 RX ADMIN — PIPERACILLIN SODIUM AND TAZOBACTAM SODIUM 3.38 G: 3; .375 INJECTION, POWDER, LYOPHILIZED, FOR SOLUTION INTRAVENOUS at 05:21

## 2021-04-12 NOTE — PROGRESS NOTES
0713-Bedside and Verbal shift change report received from  [facility] (off going nurse). Report included the following information SBAR, Kardex, MAR and Recent Results. Assumed care,fall prevention program maintained,call bell and bedside table within reach. Will continue care. 1126-Multiple pain medication given but still not being relieved. Informed Surgery MD regarding pain. Was advised to ask hospitalist if ok to put add Valium,increase Gabapentin to TID.  
1500-Discussed with  regarding pain

## 2021-04-12 NOTE — PROGRESS NOTES
conducted a Follow up consultation and Spiritual Assessment for Swetha Burgess, who is a 60 y. o.,male. The  provided the following Interventions:  Continued the relationship of care and support. Listened empathically. Patient shared that he is in so much pain after going through surgical procedure 2x. He expressed that he was taking care of his 79-yr. old mom who is in rehab now that he is also in the hospital. His sister is not really involved much in her care. He gets emotional support from a very good friend who currently went to [place].  provided compassionate support and was to able to bring him to identify his fears and uncertainties. I was about to spend more time with patient when his MD came into the room. Offered assurance of continued prayer on patient's behalf instead and told patient that I could be available to listen if he needs to talk more later on. Chart reviewed. The following outcomes were achieved:  Patient expressed gratitude for 's visit. Assessment:  Patient is with emotional and spiritual pains while at the same time trying to process everything going on with his medical issues. There are no further spiritual or Yarsanism issues which require Spiritual Care Services interventions at this time. Plan:  Chaplains will continue to follow and will provide pastoral care on an as needed/requested basis.  recommends bedside caregivers page  on duty if patient shows signs of acute spiritual or emotional distress.      Sandra 40, [address]   [phone]

## 2021-04-12 NOTE — PROGRESS NOTES
Problem: Falls - Risk of  Goal: *Absence of Falls  Description: Document Benjamin Yang Fall Risk and appropriate interventions in the flowsheet. Outcome: Progressing Towards Goal  Note: Fall Risk Interventions:  Mobility Interventions: Bed/chair exit alarm, Communicate number of staff needed for ambulation/transfer, OT consult for ADLs, Patient to call before getting OOB, PT Consult for mobility concerns, PT Consult for assist device competence    Mentation Interventions: Bed/chair exit alarm, Door open when patient unattended, Eyeglasses and hearing aids, Increase mobility    Medication Interventions: Bed/chair exit alarm, Patient to call before getting OOB, Teach patient to arise slowly    Elimination Interventions: Bed/chair exit alarm, Call light in reach, Patient to call for help with toileting needs, Toilet paper/wipes in reach              Problem: Pressure Injury - Risk of  Goal: *Prevention of pressure injury  Description: Document Steve Scale and appropriate interventions in the flowsheet.   Outcome: Progressing Towards Goal  Note: Pressure Injury Interventions:  Sensory Interventions: Assess changes in LOC, Check visual cues for pain, Keep linens dry and wrinkle-free, Minimize linen layers, Pressure redistribution bed/mattress (bed type)    Moisture Interventions: Absorbent underpads, Contain wound drainage, Internal/External urinary devices, Minimize layers    Activity Interventions: Increase time out of bed, Pressure redistribution bed/mattress(bed type), PT/OT evaluation    Mobility Interventions: Pressure redistribution bed/mattress (bed type), PT/OT evaluation    Nutrition Interventions: Document food/fluid/supplement intake    Friction and Shear Interventions: Foam dressings/transparent film/skin sealants, Minimize layers                Problem: Pain  Goal: *Control of Pain  Outcome: Progressing Towards Goal     Problem: Diabetes Self-Management  Goal: *Disease process and treatment process  Description: Define diabetes and identify own type of diabetes; list 3 options for treating diabetes.   Outcome: Progressing Towards Goal

## 2021-04-12 NOTE — PROGRESS NOTES
[facility] Hospitalist Group  Progress Note    Patient: Yanet Hoff Age: 60 y.o. : 1965 MR#: [id number] SSN: [id number]  Date/Time: 2021     Subjective: Patient still complains of significant pain in his right leg, 7/10 severity scale. Partially relieved with pain medications. Requesting for more pain medications. Family friend at the bedside. 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 Valerio Tye  Will adjust pain medications. Continue SSI  Will monitor H&H closely, discussed with vascular surgery for resuming Xarelto. Vascular surgery recommend to hold off Xarelto for 48 hours, okay to use Lovenox for DVT prophylaxis. Continue BP medications --hold parameters for blood pressure medications  Continue with PT and OT    Discussed with patient and also family friend at the bedside about my above plan of care.     Case discussed with:  [x]Patient  [x]Family  [x]Nursing  []Case Management  DVT Prophylaxis:  [x]Lovenox  []Hep SQ  []SCDs  []Coumadin   []Eliquis/Xarelto     Objective:   VS:   Visit Vitals  /70 (BP 1 Location: Left upper arm, BP Patient Position: At rest)   Pulse 92   Temp 98.8 °F (37.1 °C)   Resp 18   Ht 6' 2\" (1.88 m)   Wt 83.9 kg (185 lb)   SpO2 97%   BMI 23.75 kg/m²      Tmax/24hrs: Temp (24hrs), Av.3 °F (36.8 °C), Min:97.9 °F (36.6 °C), Max:98.8 °F (37.1 °C)  IOBRIEF    Intake/Output Summary (Last 24 hours) at 2021 1823  Last data filed at 2021 1500  Gross per 24 hour   Intake 640 ml   Output 2450 ml   Net -1810 ml       General:  Alert, cooperative, no acute distress    HEENT: PERRLA, anicteric sclerae. Pulmonary:  CTA Bilaterally. No Wheezing/Rales. Cardiovascular: Regular rate and Rhythm. GI:  Soft, Non distended, Non tender. + Bowel sounds. Extremities:  No edema. No calf tenderness. Right AKA dressing clean, no signs of bleeding. Psych: Good insight. Not anxious or agitated. Neurologic: Alert and oriented X 3. Moves all ext.   Additional:    Medications:   Current Facility-Administered Medications   Medication Dose Route Frequency    HYDROmorphone (DILAUDID) injection 1 mg  1 mg IntraVENous Q3H PRN    HYDROmorphone (DILAUDID) syringe 0.5 mg  0.5 mg IntraVENous Q3H PRN    gabapentin (NEURONTIN) capsule 100 mg  100 mg Oral TID    enoxaparin (LOVENOX) injection 40 mg  40 mg SubCUTAneous Q24H    dextrose (D50W) injection syrg 12.5-25 g  25-50 mL IntraVENous PRN    0.9% sodium chloride infusion 250 mL  250 mL IntraVENous PRN    0.9% sodium chloride infusion 250 mL  250 mL IntraVENous PRN    sodium chloride (NS) flush 5-40 mL  5-40 mL IntraVENous Q8H    sodium chloride (NS) flush 5-40 mL  5-40 mL IntraVENous PRN    oxyCODONE IR (ROXICODONE) tablet 5 mg  5 mg Oral Q3H PRN    sodium chloride (NS) flush 5-40 mL  5-40 mL IntraVENous PRN    acetaminophen (TYLENOL) tablet 650 mg  650 mg Oral Q6H    piperacillin-tazobactam (ZOSYN) 3.375 g in 0.9% sodium chloride (MBP/ADV) 100 mL MBP  3.375 g IntraVENous Q6H    insulin lispro (HUMALOG) injection   SubCUTAneous AC&HS    glucose chewable tablet 16 g  4 Tab Oral PRN    glucagon (GLUCAGEN) injection 1 mg  1 mg IntraMUSCular PRN    polyethylene glycol (MIRALAX) packet 17 g  17 g Oral DAILY PRN    carvediloL (COREG) tablet 3.125 mg  3.125 mg Oral Q12H    atorvastatin (LIPITOR) tablet 80 mg  80 mg Oral QHS    aspirin delayed-release tablet 81 mg  81 mg Oral DAILY    DULoxetine (CYMBALTA) capsule 30 mg  30 mg Oral DAILY    ezetimibe (ZETIA) tablet 10 mg  10 mg Oral DAILY    melatonin (rapid dissolve) tablet 5 mg  5 mg Oral QHS    zolpidem (AMBIEN) tablet 5 mg  5 mg Oral QHS PRN    ondansetron (ZOFRAN) injection 8 mg  8 mg IntraVENous Q6H PRN       Labs:    Recent Results (from the past 24 hour(s))   GLUCOSE, POC    Collection Time: 04/11/21  9:33 PM   Result Value Ref Range    Glucose (POC) 216 (H) 70 - 110 mg/dL   CBC W/O DIFF    Collection Time: 04/12/21  5:39 AM   Result Value Ref Range    WBC 17.2 (H) 4.6 - 13.2 K/uL    RBC 2.73 (L) 4.35 - 5.65 M/uL    HGB 7.3 (L) 13.0 - 16.0 g/dL    HCT 23.0 (L) 36.0 - 48.0 %    MCV 84.2 74.0 - 97.0 FL    MCH 26.7 24.0 - 34.0 PG    MCHC 31.7 31.0 - 37.0 g/dL    RDW 15.4 (H) 11.6 - 14.5 %    PLATELET 760 (H) 477 - 420 K/uL    MPV 9.8 9.2 - 14.7 FL   METABOLIC PANEL, BASIC    Collection Time: 04/12/21  5:39 AM   Result Value Ref Range    Sodium 137 136 - 145 mmol/L    Potassium 4.9 3.5 - 5.5 mmol/L    Chloride 108 100 - 111 mmol/L    CO2 24 21 - 32 mmol/L    Anion gap 5 3.0 - 18 mmol/L    Glucose 128 (H) 74 - 99 mg/dL    BUN 9 7.0 - 18 MG/DL    Creatinine 1.12 0.6 - 1.3 MG/DL    BUN/Creatinine ratio 8 (L) 12 - 20      GFR est AA >60 >60 ml/min/1.73m2    GFR est non-AA >60 >60 ml/min/1.73m2    Calcium 8.1 (L) 8.5 - 10.1 MG/DL   GLUCOSE, POC    Collection Time: 04/12/21  7:59 AM   Result Value Ref Range    Glucose (POC) 143 (H) 70 - 110 mg/dL   GLUCOSE, POC    Collection Time: 04/12/21 11:13 AM   Result Value Ref Range    Glucose (POC) 297 (H) 70 - 110 mg/dL   GLUCOSE, POC    Collection Time: 04/12/21  3:31 PM   Result Value Ref Range    Glucose (POC) 136 (H) 70 - 110 mg/dL       Signed By: Viola Mantilla MD     April 12, 2021      Disclaimer: Sections of this note are dictated using utilizing voice recognition software. Minor typographical errors may be present. If questions arise, please do not hesitate to contact me or call our department.

## 2021-04-12 NOTE — CONSULTS
POD1 s/p R AKA. Significant pain but in good spirits. Dressing intact. Placed order for orthotics for prosthetics discussion as patient was interested, but may need rehab first. Start PT/OT tomorrow. Might benefit from increasing dilaudid to 1mg and gabapentin dose to 300mg TID and adding valium PO for muscle spasms. Will not order in order to prevent dual/redundant ordering.     Gilford Husband, MD  Vascular Surgery [facility] CenterJaneen rosenberg [facility]

## 2021-04-12 NOTE — PROGRESS NOTES
Spoke with thong from ARU. Pt was pt there prior to returning for OR. They will need to start a new auth when ready.

## 2021-04-13 LAB
ANION GAP SERPL CALC-SCNC: 6 MMOL/L (ref 3–18)
BASOPHILS # BLD: 0.1 K/UL (ref 0–0.1)
BASOPHILS NFR BLD: 1 % (ref 0–2)
BUN SERPL-MCNC: 9 MG/DL (ref 7–18)
BUN/CREAT SERPL: 9 (ref 12–20)
CALCIUM SERPL-MCNC: 8.7 MG/DL (ref 8.5–10.1)
CHLORIDE SERPL-SCNC: 107 MMOL/L (ref 100–111)
CO2 SERPL-SCNC: 25 MMOL/L (ref 21–32)
CREAT SERPL-MCNC: 1.04 MG/DL (ref 0.6–1.3)
DIFFERENTIAL METHOD BLD: ABNORMAL
EOSINOPHIL # BLD: 0.7 K/UL (ref 0–0.4)
EOSINOPHIL NFR BLD: 5 % (ref 0–5)
ERYTHROCYTE [DISTWIDTH] IN BLOOD BY AUTOMATED COUNT: 16.1 % (ref 11.6–14.5)
GLUCOSE BLD STRIP.AUTO-MCNC: 147 MG/DL (ref 70–110)
GLUCOSE BLD STRIP.AUTO-MCNC: 163 MG/DL (ref 70–110)
GLUCOSE BLD STRIP.AUTO-MCNC: 169 MG/DL (ref 70–110)
GLUCOSE BLD STRIP.AUTO-MCNC: 233 MG/DL (ref 70–110)
GLUCOSE SERPL-MCNC: 120 MG/DL (ref 74–99)
HCT VFR BLD AUTO: 23.2 % (ref 36–48)
HGB BLD-MCNC: 7.3 G/DL (ref 13–16)
LYMPHOCYTES # BLD: 2.1 K/UL (ref 0.9–3.6)
LYMPHOCYTES NFR BLD: 14 % (ref 21–52)
MAGNESIUM SERPL-MCNC: 1.8 MG/DL (ref 1.6–2.6)
MCH RBC QN AUTO: 26.7 PG (ref 24–34)
MCHC RBC AUTO-ENTMCNC: 31.5 G/DL (ref 31–37)
MCV RBC AUTO: 85 FL (ref 74–97)
MONOCYTES # BLD: 1.4 K/UL (ref 0.05–1.2)
MONOCYTES NFR BLD: 10 % (ref 3–10)
NEUTS SEG # BLD: 9.8 K/UL (ref 1.8–8)
NEUTS SEG NFR BLD: 69 % (ref 40–73)
PHOSPHATE SERPL-MCNC: 3.4 MG/DL (ref 2.5–4.9)
PLATELET # BLD AUTO: 506 K/UL (ref 135–420)
PMV BLD AUTO: 9.7 FL (ref 9.2–11.8)
POTASSIUM SERPL-SCNC: 3.9 MMOL/L (ref 3.5–5.5)
RBC # BLD AUTO: 2.73 M/UL (ref 4.35–5.65)
SODIUM SERPL-SCNC: 138 MMOL/L (ref 136–145)
WBC # BLD AUTO: 14.3 K/UL (ref 4.6–13.2)

## 2021-04-13 PROCEDURE — 74011250637 HC RX REV CODE- 250/637: Performed by: HOSPITALIST

## 2021-04-13 PROCEDURE — 74011250636 HC RX REV CODE- 250/636: Performed by: INTERNAL MEDICINE

## 2021-04-13 PROCEDURE — 2709999900 HC NON-CHARGEABLE SUPPLY

## 2021-04-13 PROCEDURE — 97535 SELF CARE MNGMENT TRAINING: CPT

## 2021-04-13 PROCEDURE — 74011636637 HC RX REV CODE- 636/637: Performed by: HOSPITALIST

## 2021-04-13 PROCEDURE — 99232 SBSQ HOSP IP/OBS MODERATE 35: CPT | Performed by: HOSPITALIST

## 2021-04-13 PROCEDURE — 82962 GLUCOSE BLOOD TEST: CPT

## 2021-04-13 PROCEDURE — 97530 THERAPEUTIC ACTIVITIES: CPT

## 2021-04-13 PROCEDURE — 74011250637 HC RX REV CODE- 250/637: Performed by: SURGERY

## 2021-04-13 PROCEDURE — 85025 COMPLETE CBC W/AUTO DIFF WBC: CPT

## 2021-04-13 PROCEDURE — 74011250637 HC RX REV CODE- 250/637: Performed by: INTERNAL MEDICINE

## 2021-04-13 PROCEDURE — 84100 ASSAY OF PHOSPHORUS: CPT

## 2021-04-13 PROCEDURE — 74011250636 HC RX REV CODE- 250/636: Performed by: HOSPITALIST

## 2021-04-13 PROCEDURE — 36415 COLL VENOUS BLD VENIPUNCTURE: CPT

## 2021-04-13 PROCEDURE — 80048 BASIC METABOLIC PNL TOTAL CA: CPT

## 2021-04-13 PROCEDURE — 83735 ASSAY OF MAGNESIUM: CPT

## 2021-04-13 PROCEDURE — 97161 PT EVAL LOW COMPLEX 20 MIN: CPT

## 2021-04-13 PROCEDURE — 74011000258 HC RX REV CODE- 258: Performed by: INTERNAL MEDICINE

## 2021-04-13 PROCEDURE — 97165 OT EVAL LOW COMPLEX 30 MIN: CPT

## 2021-04-13 PROCEDURE — 65660000000 HC RM CCU STEPDOWN

## 2021-04-13 RX ORDER — OXYCODONE HYDROCHLORIDE 5 MG/1
10 TABLET ORAL
Status: DISCONTINUED | OUTPATIENT
Start: 2021-04-13 | End: 2021-04-15 | Stop reason: HOSPADM

## 2021-04-13 RX ORDER — ACETAMINOPHEN 325 MG/1
650 TABLET ORAL
Status: DISCONTINUED | OUTPATIENT
Start: 2021-04-13 | End: 2021-04-15 | Stop reason: HOSPADM

## 2021-04-13 RX ORDER — INSULIN GLARGINE 100 [IU]/ML
5 INJECTION, SOLUTION SUBCUTANEOUS DAILY
Status: DISCONTINUED | OUTPATIENT
Start: 2021-04-13 | End: 2021-04-14

## 2021-04-13 RX ADMIN — INSULIN GLARGINE 5 UNITS: 100 INJECTION, SOLUTION SUBCUTANEOUS at 13:27

## 2021-04-13 RX ADMIN — Medication 10 ML: at 15:39

## 2021-04-13 RX ADMIN — PIPERACILLIN SODIUM AND TAZOBACTAM SODIUM 3.38 G: 3; .375 INJECTION, POWDER, LYOPHILIZED, FOR SOLUTION INTRAVENOUS at 05:23

## 2021-04-13 RX ADMIN — GABAPENTIN 100 MG: 100 CAPSULE ORAL at 21:52

## 2021-04-13 RX ADMIN — OXYCODONE HYDROCHLORIDE 10 MG: 5 TABLET ORAL at 13:25

## 2021-04-13 RX ADMIN — DULOXETINE HYDROCHLORIDE 30 MG: 30 CAPSULE, DELAYED RELEASE ORAL at 08:20

## 2021-04-13 RX ADMIN — ACETAMINOPHEN 650 MG: 325 TABLET ORAL at 05:23

## 2021-04-13 RX ADMIN — OXYCODONE HYDROCHLORIDE 10 MG: 5 TABLET ORAL at 20:09

## 2021-04-13 RX ADMIN — GABAPENTIN 100 MG: 100 CAPSULE ORAL at 08:20

## 2021-04-13 RX ADMIN — PIPERACILLIN SODIUM AND TAZOBACTAM SODIUM 3.38 G: 3; .375 INJECTION, POWDER, LYOPHILIZED, FOR SOLUTION INTRAVENOUS at 17:10

## 2021-04-13 RX ADMIN — PIPERACILLIN SODIUM AND TAZOBACTAM SODIUM 3.38 G: 3; .375 INJECTION, POWDER, LYOPHILIZED, FOR SOLUTION INTRAVENOUS at 13:28

## 2021-04-13 RX ADMIN — PIPERACILLIN SODIUM AND TAZOBACTAM SODIUM 3.38 G: 3; .375 INJECTION, POWDER, LYOPHILIZED, FOR SOLUTION INTRAVENOUS at 22:01

## 2021-04-13 RX ADMIN — INSULIN LISPRO 3 UNITS: 100 INJECTION, SOLUTION INTRAVENOUS; SUBCUTANEOUS at 16:30

## 2021-04-13 RX ADMIN — ACETAMINOPHEN 650 MG: 325 TABLET ORAL at 13:25

## 2021-04-13 RX ADMIN — INSULIN LISPRO 3 UNITS: 100 INJECTION, SOLUTION INTRAVENOUS; SUBCUTANEOUS at 21:57

## 2021-04-13 RX ADMIN — RIVAROXABAN 20 MG: 20 TABLET, FILM COATED ORAL at 17:10

## 2021-04-13 RX ADMIN — OXYCODONE HYDROCHLORIDE 10 MG: 5 TABLET ORAL at 17:10

## 2021-04-13 RX ADMIN — HYDROMORPHONE HYDROCHLORIDE 1 MG: 1 INJECTION, SOLUTION INTRAMUSCULAR; INTRAVENOUS; SUBCUTANEOUS at 00:02

## 2021-04-13 RX ADMIN — Medication 10 ML: at 06:42

## 2021-04-13 RX ADMIN — GABAPENTIN 100 MG: 100 CAPSULE ORAL at 17:10

## 2021-04-13 RX ADMIN — CARVEDILOL 3.12 MG: 6.25 TABLET, FILM COATED ORAL at 20:08

## 2021-04-13 RX ADMIN — ATORVASTATIN CALCIUM 80 MG: 40 TABLET, FILM COATED ORAL at 21:52

## 2021-04-13 RX ADMIN — Medication 10 ML: at 21:59

## 2021-04-13 RX ADMIN — OXYCODONE HYDROCHLORIDE 10 MG: 5 TABLET ORAL at 23:38

## 2021-04-13 RX ADMIN — CARVEDILOL 3.12 MG: 6.25 TABLET, FILM COATED ORAL at 08:19

## 2021-04-13 RX ADMIN — ACETAMINOPHEN 650 MG: 325 TABLET ORAL at 00:02

## 2021-04-13 RX ADMIN — OXYCODONE HYDROCHLORIDE 5 MG: 5 TABLET ORAL at 04:38

## 2021-04-13 RX ADMIN — INSULIN LISPRO 6 UNITS: 100 INJECTION, SOLUTION INTRAVENOUS; SUBCUTANEOUS at 13:28

## 2021-04-13 RX ADMIN — EZETIMIBE 10 MG: 10 TABLET ORAL at 08:20

## 2021-04-13 RX ADMIN — Medication 5 MG: at 21:52

## 2021-04-13 RX ADMIN — Medication 81 MG: at 08:19

## 2021-04-13 RX ADMIN — PIPERACILLIN SODIUM AND TAZOBACTAM SODIUM 3.38 G: 3; .375 INJECTION, POWDER, LYOPHILIZED, FOR SOLUTION INTRAVENOUS at 00:03

## 2021-04-13 NOTE — ROUTINE PROCESS
Bedside shift change report given to Yousuf RN (oncoming nurse) by Imogene Goodpasture, RN (offgoing nurse).  Report included the following information SBAR, Kardex, Intake/Output, MAR, Recent Results and Cardiac Rhythm SR.

## 2021-04-13 NOTE — PROGRESS NOTES
OT orders received and chart reviewed. Patient is currently with active complete bedrest orders. Please update patients' activity level when appropriate prior to OT evaluation. Thank you.     Lucas Cano MS, OTR/L

## 2021-04-13 NOTE — PROGRESS NOTES
Discharge/Transition Planning    Notified Tato Kohler with ARU that PT/OT notes in and IV dilaudid stopped.  She will review chart and see about starting insurance Ayah Pruitt RN BSN  Outcomes Manager    Pager # [phone]

## 2021-04-13 NOTE — PROGRESS NOTES
[facility]    Patient: Yun Johnston Age: 60 y.o. : 1965 MR#: [id number] SSN: [id number]  Date/Time: 2021     Subjective: Patient feels lot better, sitting on the commode. States pain significantly better except when he moves. Discussed with the patient about cutting down IV pain medication and increasing p.o. meds, patient agrees. Assessment/Plan:   Infected Right BKA stump:               - s/p debridement and disarticulation of right knee              -  wound cx + E.clocae, light strep spp  Post-operative blood loss anemia:  S/p 2 units PRBC ; repeat Hb 6.2 this am; no further bleeding noted on dressings   - taken back to OR  with findings of bleeding collateral artery and oozing from posterior muscle  PAD:  S/p embolectomy 3/5/21; right BKA 3/25  HFpEF with grade 1 diastolic dysfunction:  EF 35% from 2021: currently compensated  HTN  DM type 2 on insulin  Factor V leiden deficiency  Hyprelipidemia  Major depressive disorder:  Started on Duloxetine    Plan: Will continue Zosyn, ID input noted, discussed with ID today and recommend okay to discontinue antibiotics if wound looks good. DC IV pain medications, increase p.o. medications. Continue SSI  H&H stable, no signs of bleeding. Discussed with vascular surgery okay to resume Xarelto. We will start Xarelto and discontinue Lovenox. Continue BP medications --hold parameters for blood pressure medications  Continue with PT and OT  Per vascular surgery wound looks clean, okay to stop antibiotics. Discussed with ID. Vascular surgery okay for transfer to rehab. Transferred back to acute rehab once bed available. Discussed with patient at the bedside about my above plan of care. Offered to talk to family but patient states he will update his family.     Case discussed with:  [x]Patient  [x]Family  [x]Nursing  []Case Management  DVT Prophylaxis:  [x]Lovenox  []Hep SQ []SCDs  []Coumadin   []Eliquis/Xarelto     Objective:   VS:   Visit Vitals  /80   Pulse 81   Temp 98.3 °F (36.8 °C)   Resp 18   Ht 6' 2\" (1.88 m)   Wt 83 kg (182 lb 15.7 oz)   SpO2 98%   BMI 23.49 kg/m²      Tmax/24hrs: Temp (24hrs), Av.5 °F (36.9 °C), Min:98.2 °F (36.8 °C), Max:98.9 °F (37.2 °C)  IOBRIEF    Intake/Output Summary (Last 24 hours) at 2021 1102  Last data filed at 2021 0959  Gross per 24 hour   Intake 1320 ml   Output 1470 ml   Net -150 ml       General:  Alert, cooperative, no acute distress    HEENT: PERRLA, anicteric sclerae. Pulmonary:  CTA Bilaterally. No Wheezing/Rales. Cardiovascular: Regular rate and Rhythm. GI:  Soft, Non distended, Non tender. + Bowel sounds. Extremities:  No edema. No calf tenderness. Right AKA dressing clean, no signs of bleeding. Psych: Good insight. Not anxious or agitated. Neurologic: Alert and oriented X 3. Moves all ext.   Additional:    Medications:   Current Facility-Administered Medications   Medication Dose Route Frequency    oxyCODONE IR (ROXICODONE) tablet 10 mg  10 mg Oral Q3H PRN    gabapentin (NEURONTIN) capsule 100 mg  100 mg Oral TID    enoxaparin (LOVENOX) injection 40 mg  40 mg SubCUTAneous Q24H    dextrose (D50W) injection syrg 12.5-25 g  25-50 mL IntraVENous PRN    0.9% sodium chloride infusion 250 mL  250 mL IntraVENous PRN    0.9% sodium chloride infusion 250 mL  250 mL IntraVENous PRN    sodium chloride (NS) flush 5-40 mL  5-40 mL IntraVENous Q8H    sodium chloride (NS) flush 5-40 mL  5-40 mL IntraVENous PRN    sodium chloride (NS) flush 5-40 mL  5-40 mL IntraVENous PRN    acetaminophen (TYLENOL) tablet 650 mg  650 mg Oral Q6H    piperacillin-tazobactam (ZOSYN) 3.375 g in 0.9% sodium chloride (MBP/ADV) 100 mL MBP  3.375 g IntraVENous Q6H    insulin lispro (HUMALOG) injection   SubCUTAneous AC&HS    glucose chewable tablet 16 g  4 Tab Oral PRN    glucagon (GLUCAGEN) injection 1 mg  1 mg IntraMUSCular PRN    polyethylene glycol (MIRALAX) packet 17 g  17 g Oral DAILY PRN    carvediloL (COREG) tablet 3.125 mg  3.125 mg Oral Q12H    atorvastatin (LIPITOR) tablet 80 mg  80 mg Oral QHS    aspirin delayed-release tablet 81 mg  81 mg Oral DAILY    DULoxetine (CYMBALTA) capsule 30 mg  30 mg Oral DAILY    ezetimibe (ZETIA) tablet 10 mg  10 mg Oral DAILY    melatonin (rapid dissolve) tablet 5 mg  5 mg Oral QHS    zolpidem (AMBIEN) tablet 5 mg  5 mg Oral QHS PRN    ondansetron (ZOFRAN) injection 8 mg  8 mg IntraVENous Q6H PRN       Labs:    Recent Results (from the past 24 hour(s))   GLUCOSE, POC    Collection Time: 04/12/21 11:13 AM   Result Value Ref Range    Glucose (POC) 297 (H) 70 - 110 mg/dL   GLUCOSE, POC    Collection Time: 04/12/21  3:31 PM   Result Value Ref Range    Glucose (POC) 136 (H) 70 - 110 mg/dL   GLUCOSE, POC    Collection Time: 04/12/21  9:26 PM   Result Value Ref Range    Glucose (POC) 225 (H) 70 - 371 mg/dL   METABOLIC PANEL, BASIC    Collection Time: 04/13/21  6:40 AM   Result Value Ref Range    Sodium 138 136 - 145 mmol/L    Potassium 3.9 3.5 - 5.5 mmol/L    Chloride 107 100 - 111 mmol/L    CO2 25 21 - 32 mmol/L    Anion gap 6 3.0 - 18 mmol/L    Glucose 120 (H) 74 - 99 mg/dL    BUN 9 7.0 - 18 MG/DL    Creatinine 1.04 0.6 - 1.3 MG/DL    BUN/Creatinine ratio 9 (L) 12 - 20      GFR est AA >60 >60 ml/min/1.73m2    GFR est non-AA >60 >60 ml/min/1.73m2    Calcium 8.7 8.5 - 10.1 MG/DL   CBC WITH AUTOMATED DIFF    Collection Time: 04/13/21  6:40 AM   Result Value Ref Range    WBC 14.3 (H) 4.6 - 13.2 K/uL    RBC 2.73 (L) 4.35 - 5.65 M/uL    HGB 7.3 (L) 13.0 - 16.0 g/dL    HCT 23.2 (L) 36.0 - 48.0 %    MCV 85.0 74.0 - 97.0 FL    MCH 26.7 24.0 - 34.0 PG    MCHC 31.5 31.0 - 37.0 g/dL    RDW 16.1 (H) 11.6 - 14.5 %    PLATELET 916 (H) 743 - 420 K/uL    MPV 9.7 9.2 - 11.8 FL    NEUTROPHILS 69 40 - 73 %    LYMPHOCYTES 14 (L) 21 - 52 %    MONOCYTES 10 3 - 10 %    EOSINOPHILS 5 0 - 5 %    BASOPHILS 1 0 - 2 %    ABS. NEUTROPHILS 9.8 (H) 1.8 - 8.0 K/UL    ABS. LYMPHOCYTES 2.1 0.9 - 3.6 K/UL    ABS. MONOCYTES 1.4 (H) 0.05 - 1.2 K/UL    ABS. EOSINOPHILS 0.7 (H) 0.0 - 0.4 K/UL    ABS. BASOPHILS 0.1 0.0 - 0.1 K/UL    DF AUTOMATED     PHOSPHORUS    Collection Time: 04/13/21  6:40 AM   Result Value Ref Range    Phosphorus 3.4 2.5 - 4.9 MG/DL   MAGNESIUM    Collection Time: 04/13/21  6:40 AM   Result Value Ref Range    Magnesium 1.8 1.6 - 2.6 mg/dL   GLUCOSE, POC    Collection Time: 04/13/21  7:31 AM   Result Value Ref Range    Glucose (POC) 147 (H) 70 - 110 mg/dL       Signed By: Anand Hinds MD     April 13, 2021      Disclaimer: Sections of this note are dictated using utilizing voice recognition software. Minor typographical errors may be present. If questions arise, please do not hesitate to contact me or call our department.

## 2021-04-13 NOTE — PROGRESS NOTES
PT order received and chart reviewed. Patient has active, complete bedrest order. Please discontinue to allow full participation in skilled PT evaluation/treatment.     Thank you for this referral.  Espinoza Caldwell PT, DPT

## 2021-04-13 NOTE — PROGRESS NOTES
0 - TRANSFER - OUT REPORT:    Verbal report given to Rodrigo Soria RN(name) on Justin Valdez  being transferred to [facility]  (unit) for routine progression of care       Report consisted of patients Situation, Background, Assessment and   Recommendations(SBAR). Information from the following report(s) SBAR, Kardex, Procedure Summary, Intake/Output, MAR, Recent Results and Cardiac Rhythm NSR was reviewed with the receiving nurse. Lines:   Peripheral IV 04/08/21 Distal;Right Cephalic (Active)   Site Assessment Clean, dry, & intact 04/13/21 0000   Phlebitis Assessment 0 04/13/21 0000   Infiltration Assessment 0 04/13/21 0000   Dressing Status Clean, dry, & intact 04/13/21 0000   Dressing Type Transparent 04/13/21 0000   Hub Color/Line Status Blue;Flushed;Patent;Capped 04/13/21 0000   Action Taken Open ports on tubing capped 04/13/21 0000   Alcohol Cap Used Yes 04/13/21 0000       Peripheral IV 04/08/21 Left Antecubital (Active)   Site Assessment Clean, dry, & intact 04/13/21 0000   Phlebitis Assessment 0 04/13/21 0000   Infiltration Assessment 0 04/13/21 0000   Dressing Status Clean, dry, & intact 04/13/21 0000   Dressing Type Transparent 04/13/21 0000   Hub Color/Line Status Green; Infusing;Patent;Capped 04/13/21 0000   Action Taken Open ports on tubing capped 04/13/21 0000   Alcohol Cap Used Yes 04/13/21 0000        Opportunity for questions and clarification was provided. Patient transported with:   Registered Nurse      8862 - bladder scanned pt d/t small amount of urine. Pt bladder scan: >999. Paged hospitalist.    8677 - spoke with Dr. Woody Chavez. Received orders for x1 straight cath. Ordered to remove no more than 800-900cc. 9208 - inserted straight cath. 0430 - removed straight cath. 900cc urine. 0430 - transfer pt to .

## 2021-04-13 NOTE — PROGRESS NOTES
Problem: Mobility Impaired (Adult and Pediatric)  Goal: *Acute Goals and Plan of Care (Insert Text)  Description: Physical Therapy Goals  Initiated 4/13/2021 and to be accomplished within 7 day(s)  1. Patient will move from supine to sit and sit to supine , scoot up and down, and roll side to side in bed with modified independence. 2.  Patient will transfer from bed to chair and chair to bed with modified independence using the least restrictive device. 3.  Patient will perform sit to stand with modified independence. 4.  Patient will ambulate with modified independence for 100 feet with the least restrictive device. 5.  Patient will perform LE exercises independently for 8 minutes to improve right LE ROM/strength in preparation for prosthesis. PLOF: Patient was independent with functional mobility prior to right BKA. Patient was in ARU at beginning of April and performing transfers/gait with SB/CGA. Outcome: Progressing Towards Goal    PHYSICAL THERAPY EVALUATION    Patient: Mil Silva (10 y.o. male)  Date: 4/13/2021  Primary Diagnosis: Right [facility] infection ([facility] [T87.43]  Hx of BKA, right ([facility]) [Z89.511]  Procedure(s) (LRB):  RIGHT ABOVE KNEE AMPUTATION (AKA) (Right) 2 Days Post-Op   Precautions:   Fall(R AKA)      ASSESSMENT :  Patient cleared by nursing to participate in PT/OT. Patient seen with OT to maximize safety and functional mobility. Patient received sitting up in bed and agreeable to PT evaluation. Based on the objective data described below, the patient presents with decreased strength, endurance, and balance, resulting in decreased independence with functional mobility. Educated patient on laying flat at least 3-5x day to stretch right hip flexors and LE there-ex in supine/sidelying in order to increase ROM/strength in prep for prosthesis. Patient performs bed mobility with SBA and sit <>Stand with CGA, though min A from low toilet.  He ambulates using RW and CGA with hop to gait and decreased left step clearance. Patient transfers to the chair and instructed to use call bell for assistance. Call bell in reach and needs addressed. Will continue to follow for skilled PT to address functional deficits and recommend Inpatient Rehab. Patient will benefit from skilled intervention to address the above impairments. Patient's rehabilitation potential is considered to be Good  Factors which may influence rehabilitation potential include:   [x]         None noted  []         Mental ability/status  []         Medical condition  []         Home/family situation and support systems  []         Safety awareness  []         Pain tolerance/management  []         Other:      PLAN :  Recommendations and Planned Interventions:   [x]           Bed Mobility Training             [x]    Neuromuscular Re-Education  [x]           Transfer Training                   []    Orthotic/Prosthetic Training  [x]           Gait Training                          [x]    Modalities  [x]           Therapeutic Exercises           [x]    Edema Management/Control  [x]           Therapeutic Activities            [x]    Family Training/Education  [x]           Patient Education  []           Other (comment):    Frequency/Duration: Patient will be followed by physical therapy 1-2 times per day/4-7 days per week to address goals.   Discharge Recommendations: Inpatient Rehab  Further Equipment Recommendations for Discharge: raised toilet seat, RW     SUBJECTIVE:   Patient stated My center of balance is different    OBJECTIVE DATA SUMMARY:     Past Medical History:   Diagnosis Date    Acute blood loss as cause of postoperative anemia 3/22/2021    Anticoagulated by anticoagulation treatment     On Rivaroxaban    Cardiomyopathy ([facility] 12/17/2015    2D echocardiogram (3/24/2021) showed EF 37%; global hypokinesis of left ventricle; grade I diastolic dysfunction; 2D echocardiogram (12/17/2015) showed EF 35%; genealized hypokinesis more focally severe of the inferior and posterior segments; mild mitral regurgitation; trace tricuspud regurgitation; normal pulmonary artery pressure    Chronic systolic heart failure (HCC)     2D echocardiogram (3/24/2021) showed EF 37%; global hypokinesis of left ventricle; grade I diastolic dysfunction; 2D echocardiogram (12/17/2015) showed EF 35%; genealized hypokinesis more focally severe of the inferior and posterior segments; mild mitral regurgitation; trace tricuspud regurgitation; normal pulmonary artery pressure    Coronary artery disease involving native coronary artery of native heart     COVID-19 ruled out by laboratory testing 3/22/2021    SARS-CoV-2 ([facility]) (3/22/2021):  Not detected     Critical ischemia of lower extremity ([facility] 3/22/2021    Right    Factor V Leiden mutation ([facility]     Grade I diastolic dysfunction 70/52/4845    2D echocardiogram (3/24/2021) showed EF 37%; global hypokinesis of left ventricle; grade I diastolic dysfunction    History of deep venous thrombosis (DVT) of distal vein of right lower extremity     History of epilepsy     History of head injury     History of myocardial infarction     History of noncompliance with medical treatment 3/18/2021    Hypertensive heart disease with chronic systolic congestive heart failure ([facility]     2D echocardiogram (3/24/2021) showed EF 37%; global hypokinesis of left ventricle; grade I diastolic dysfunction; 2D echocardiogram (12/17/2015) showed EF 35%; genealized hypokinesis more focally severe of the inferior and posterior segments; mild mitral regurgitation; trace tricuspud regurgitation; normal pulmonary artery pressure    Long term current use of aspirin     Major depressive disorder 03/24/2021    On Duloxetine    Migraine headache     Mitral valve prolapse     Mixed hyperlipidemia     Lipid profile (7/25/2018) showed , , HDL 27,     Obstructive sleep apnea     On statin therapy due to risk of future cardiovascular event     On Atorvastatin    Peripheral artery disease (HCC)     Type 2 diabetes mellitus, without long-term current use of insulin (HCC)     HbA1c (0/1/7894) = 31.3    Umbilical hernia     Wears glasses      Past Surgical History:   Procedure Laterality Date    HX BELOW KNEE AMPUTATION Right 03/22/2021    S/P Right below-the-knee amputation (3/22/2021 - Dr. Roberto Carlos Mar)    HX EMBOLECTOMY Right 03/09/2021    S/P Right lower extremity arterial embolectomy    HX IMPLANTABLE CARDIOVERTER DEFIBRILLATOR  2013     Barriers to Learning/Limitations: None  Compensate with: N/A  Home Situation:  Home Situation  Home Environment: Private residence  # Steps to Enter: 0  Wheelchair Ramp: Yes  One/Two Story Residence: One story  Living Alone: Yes  Support Systems: Friends \ neighbors  Patient Expects to be Discharged to[de-identified] Rehabilitation facility  Current DME Used/Available at Home: Glucometer  Tub or Shower Type: Tub/Shower combination  Critical Behavior:  Neurologic State: Alert  Orientation Level: Oriented X4  Cognition: Follows commands  Safety/Judgement: Awareness of environment  Psychosocial  Patient Behaviors: Calm  Needs Expressed: Educational  Purposeful Interaction: Yes  Pt Identified Daily Priority: Clinical issues (comment)  Caritas Process: Nurture loving kindness  Caring Interventions: Reassure; Therapeutic modalities  Reassure: Therapeutic listening; Informing; Acceptance;Quiet presence;Caring rounds  Therapeutic Modalities: Intentional therapeutic touch  Skin Condition/Temp: Warm     Skin Integrity: Abrasion  Skin Integumentary  Skin Color: Appropriate for ethnicity  Skin Condition/Temp: Warm  Skin Integrity: Abrasion  Turgor: Non-tenting  Hair Growth: Present  Varicosities: Absent  Nails: Within Defined Limits     Strength BLE:    Strength: Generally decreased, functional       Tone & Sensation BLE:   Tone: Normal  Sensation: Impaired(right residual limb)       Range Of Motion BLE:  AROM: Generally decreased, functional(right AKA)           Functional Mobility:  Bed Mobility:  Supine to Sit: Stand-by assistance  Scooting: Stand-by assistance    Transfers:  Sit to Stand: Contact guard assistance;Minimum assistance  Stand to Sit: Contact guard assistance           Balance:   Sitting: Impaired  Sitting - Static: Good (unsupported)  Sitting - Dynamic: Fair (occasional)(plus)  Standing: Impaired; With support  Standing - Static: Fair(plus)  Standing - Dynamic : Fair       Ambulation/Gait Training:  Distance (ft): 20 Feet (ft)  Assistive Device: Walker, rolling  Gait Abnormalities: Decreased step clearance      Level of assistance: Contact Guard assistance    Therapeutic Exercises:   5x sidelying right hip abduction and hip extension    Pain:  Pain level pre-treatment: 7/10 right leg  Pain level post-treatment: -/10   Pain Intervention(s) : Medication (see MAR); Rest, Ice, Repositioning  Response to intervention: Nurse notified, See doc flow    Activity Tolerance:   Fair +  Please refer to the flowsheet for vital signs taken during this treatment. After treatment:   [x]         Patient left in no apparent distress sitting up in chair  []         Patient left in no apparent distress in bed  [x]         Call bell left within reach  [x]         Nursing notified  []         Caregiver present  []         Bed alarm activated  []         SCDs applied    COMMUNICATION/EDUCATION:   [x]         Role of Physical Therapy in the acute care setting. [x]         Fall prevention education was provided and the patient/caregiver indicated understanding. [x]         Patient/family have participated as able in goal setting and plan of care. [x]         Patient/family agree to work toward stated goals and plan of care. []         Patient understands intent and goals of therapy, but is neutral about his/her participation. []         Patient is unable to participate in goal setting/plan of care: ongoing with therapy staff.   [] Other:    Thank you for this referral.  Anand Miller, PT   Time Calculation: 27 mins      Eval Complexity: History: LOW Complexity : Zero comorbidities / personal factors that will impact the outcome / POCExam:LOW Complexity : 1-2 Standardized tests and measures addressing body structure, function, activity limitation and / or participation in recreation  Presentation: LOW Complexity : Stable, uncomplicated  Clinical Decision Making:Low Complexity    Overall Complexity:LOW

## 2021-04-13 NOTE — CONSULTS
Infectious Disease Consultation Note        Reason: right BKA stump infection     Current abx Prior abx   Zosyn since 4/7/21      Lines:       Assessment :      78-year-old gentleman with a past medical history of factor V Leiden mutation, diastolic dysfunction, prior head injury with seizure disorder, chronic systolic heart failure with ejection fraction of 35%, uncontrolled type 2  diabetes (last hemoglobin A1c 9.8 on 4/8/2021) admitted to [facility] on 4/7/21 with right BKA stump infection. Clinical presentation consistent with infected right below-knee amputation stump   s/p debridement and disarticulation of right knee on 4/7              - 4/6 wound cx + E.clocae, light strep spp  Status post right above-knee amputation on 4/11/2021    Intra-Op findings noted. Clean margins obtained at surgery. No purulence. Satisfactory bleeding and tissue color noted. Recommendations:    1. Discontinue piperacillin/tazobactam if no signs of purulence or recurrent infection noted when postop dressing opened. 2.  Follow-up vascular surgery recommendations    Thank you for consultation request. Above plan was discussed in details with patient, and dr Lou Siddiqi. Please call me if any further questions or concerns. Will continue to participate in the care of this patient. HPI:    78-year-old gentleman with a past medical history of factor V Leiden mutation, diastolic dysfunction, prior head injury with seizure disorder, chronic systolic heart failure with ejection fraction of 35%, uncontrolled type 2  diabetes (last hemoglobin A1c 9.8 on 4/8/2021) admitted to [facility] on 4/7/21 with right BKA stump infection. He  was admitted to ARU on 3/25 following right BKA at [facility] by Dr. Jose Anand on 3/22/21. He was overall doing well kin ARU but developed fevers and increased pain to his right stump on 4/6. He had recently completed course of Augmentin post-operatively.  Vascular surgery was consulted to evaluate drainage from the stump incision. He was admitted to [facility] for further mx. s/p I&D and washout of the right stump on 4/7 with right knee disarticulation. S/p right AKA on 4/11/21. Patient was started on Zosyn since admission. Have been consulted for further recommendations. Patient denies any redness of the right thigh throughout this time. He states that there was purulent drainage from the right stump infection site. He  denies prior history of MRSA infection or colonization.         Past Medical History:   Diagnosis Date    Acute blood loss as cause of postoperative anemia 3/22/2021    Anticoagulated by anticoagulation treatment     On Rivaroxaban    Cardiomyopathy ([facility] 12/17/2015    2D echocardiogram (3/24/2021) showed EF 37%; global hypokinesis of left ventricle; grade I diastolic dysfunction; 2D echocardiogram (12/17/2015) showed EF 35%; genealized hypokinesis more focally severe of the inferior and posterior segments; mild mitral regurgitation; trace tricuspud regurgitation; normal pulmonary artery pressure    Chronic systolic heart failure (HCC)     2D echocardiogram (3/24/2021) showed EF 37%; global hypokinesis of left ventricle; grade I diastolic dysfunction; 2D echocardiogram (12/17/2015) showed EF 35%; genealized hypokinesis more focally severe of the inferior and posterior segments; mild mitral regurgitation; trace tricuspud regurgitation; normal pulmonary artery pressure    Coronary artery disease involving native coronary artery of native heart     COVID-19 ruled out by laboratory testing 3/22/2021    SARS-CoV-2 ([facility] [facility]) (3/22/2021):  Not detected     Critical ischemia of lower extremity ([facility] 3/22/2021    Right    [facility]     Grade I diastolic dysfunction 85/96/8789    2D echocardiogram (3/24/2021) showed EF 37%; global hypokinesis of left ventricle; grade I diastolic dysfunction    History of deep venous thrombosis (DVT) of distal vein of right lower extremity     History of epilepsy     History of head injury     History of myocardial infarction     History of noncompliance with medical treatment 3/18/2021    Hypertensive heart disease with chronic systolic congestive heart failure ([facility]     2D echocardiogram (3/24/2021) showed EF 37%; global hypokinesis of left ventricle; grade I diastolic dysfunction; 2D echocardiogram (12/17/2015) showed EF 35%; genealized hypokinesis more focally severe of the inferior and posterior segments; mild mitral regurgitation; trace tricuspud regurgitation; normal pulmonary artery pressure    Long term current use of aspirin     Major depressive disorder 03/24/2021    On Duloxetine    Migraine headache     Mitral valve prolapse     Mixed hyperlipidemia     Lipid profile (7/25/2018) showed , , HDL 27,     Obstructive sleep apnea     On statin therapy due to risk of future cardiovascular event     On Atorvastatin    Peripheral artery disease ([facility]     Type 2 diabetes mellitus, without long-term current use of insulin ([facility])     HbA1c (3/6/2021) = 19.3    Umbilical hernia     Wears glasses        Past Surgical History:   Procedure Laterality Date    HX BELOW KNEE AMPUTATION Right 03/22/2021    S/P Right below-the-knee amputation (3/22/2021 - Dr. Shakila Hernandez)    HX EMBOLECTOMY Right 03/09/2021    S/P Right lower extremity arterial embolectomy    HX IMPLANTABLE CARDIOVERTER DEFIBRILLATOR  2013       Current Discharge Medication List      CONTINUE these medications which have NOT CHANGED    Details   acetaminophen (TYLENOL) 325 mg tablet Take 650 mg by mouth every four (4) hours as needed for Pain.      amoxicillin-clavulanate (AUGMENTIN) 875-125 mg per tablet Take 1 Tab by mouth every twelve (12) hours. For 10 doses      aspirin 81 mg chewable tablet Take 81 mg by mouth daily. atorvastatin (LIPITOR) 80 mg tablet Take 80 mg by mouth daily.  For 30 days      carvediloL (COREG) 6.25 mg tablet Take 6.25 mg by mouth two (2) times daily (with meals). docusate sodium (Colace) 100 mg capsule Take 100 mg by mouth daily. DULoxetine (Cymbalta) 30 mg capsule Take 30 mg by mouth daily. ezetimibe (ZETIA) 10 mg tablet Take  by mouth nightly. glucose blood VI test strips (FreeStyle Lite Strips) strip by Does Not Apply route See Admin Instructions. furosemide (LASIX) 40 mg tablet Take 40 mg by mouth daily. For 30 days      gabapentin (NEURONTIN) 100 mg capsule Take 100 mg by mouth two (2) times a day. insulin glargine (LANTUS) 100 unit/mL injection 26 Units by SubCUTAneous route nightly. lisinopriL (PRINIVIL, ZESTRIL) 5 mg tablet Take 5 mg by mouth daily. For 30 days      oxyCODONE IR (ROXICODONE) 10 mg tab immediate release tablet Take 10 mg by mouth every six (6) hours as needed for Pain.      rivaroxaban (XARELTO) 20 mg tab tablet Take 20 mg by mouth daily. For 180 days      SITagliptin (Januvia) 100 mg tablet Take 100 mg by mouth daily. zolpidem (AMBIEN) 5 mg tablet Take 5 mg by mouth nightly as needed for Sleep.              Current Facility-Administered Medications   Medication Dose Route Frequency    HYDROmorphone (DILAUDID) injection 1 mg  1 mg IntraVENous Q3H PRN    HYDROmorphone (DILAUDID) syringe 0.5 mg  0.5 mg IntraVENous Q3H PRN    gabapentin (NEURONTIN) capsule 100 mg  100 mg Oral TID    enoxaparin (LOVENOX) injection 40 mg  40 mg SubCUTAneous Q24H    dextrose (D50W) injection syrg 12.5-25 g  25-50 mL IntraVENous PRN    0.9% sodium chloride infusion 250 mL  250 mL IntraVENous PRN    0.9% sodium chloride infusion 250 mL  250 mL IntraVENous PRN    sodium chloride (NS) flush 5-40 mL  5-40 mL IntraVENous Q8H    sodium chloride (NS) flush 5-40 mL  5-40 mL IntraVENous PRN    oxyCODONE IR (ROXICODONE) tablet 5 mg  5 mg Oral Q3H PRN    sodium chloride (NS) flush 5-40 mL  5-40 mL IntraVENous PRN    acetaminophen (TYLENOL) tablet 650 mg  650 mg Oral Q6H  piperacillin-tazobactam (ZOSYN) 3.375 g in 0.9% sodium chloride (MBP/ADV) 100 mL MBP  3.375 g IntraVENous Q6H    insulin lispro (HUMALOG) injection   SubCUTAneous AC&HS    glucose chewable tablet 16 g  4 Tab Oral PRN    glucagon (GLUCAGEN) injection 1 mg  1 mg IntraMUSCular PRN    polyethylene glycol (MIRALAX) packet 17 g  17 g Oral DAILY PRN    carvediloL (COREG) tablet 3.125 mg  3.125 mg Oral Q12H    atorvastatin (LIPITOR) tablet 80 mg  80 mg Oral QHS    aspirin delayed-release tablet 81 mg  81 mg Oral DAILY    DULoxetine (CYMBALTA) capsule 30 mg  30 mg Oral DAILY    ezetimibe (ZETIA) tablet 10 mg  10 mg Oral DAILY    melatonin (rapid dissolve) tablet 5 mg  5 mg Oral QHS    zolpidem (AMBIEN) tablet 5 mg  5 mg Oral QHS PRN    ondansetron (ZOFRAN) injection 8 mg  8 mg IntraVENous Q6H PRN       Allergies: Nitroglycerin, [place], Cat dander, Codeine, and Strawberry    Family History   Problem Relation Age of Onset    Bleeding Prob Mother         Factor V Leiden mutation    Diabetes Mother     Stroke Mother     Hypertension Mother     Dementia Mother     Seizures Mother      Social History     Socioeconomic History    Marital status: SINGLE     Spouse name: Not on file    Number of children: Not on file    Years of education: Not on file    Highest education level: Not on file   Occupational History    Not on file   Social Needs    Financial resource strain: Not on file    Food insecurity     Worry: Not on file     Inability: Not on file    Transportation needs     Medical: Not on file     Non-medical: Not on file   Tobacco Use    Smoking status: Never Smoker    Smokeless tobacco: Never Used   Substance and Sexual Activity    Alcohol use: Not Currently    Drug use: Never    Sexual activity: Not on file   Lifestyle    Physical activity     Days per week: Not on file     Minutes per session: Not on file    Stress: Not on file   Relationships    Social connections     Talks on phone: Not on file     Gets together: Not on file     Attends Caodaism service: Not on file     Active member of club or organization: Not on file     Attends meetings of clubs or organizations: Not on file     Relationship status: Not on file    Intimate partner violence     Fear of current or ex partner: Not on file     Emotionally abused: Not on file     Physically abused: Not on file     Forced sexual activity: Not on file   Other Topics Concern    Not on file   Social History Narrative    Not on file     Social History     Tobacco Use   Smoking Status Never Smoker   Smokeless Tobacco Never Used        Temp (24hrs), Av.5 °F (36.9 °C), Min:98.2 °F (36.8 °C), Max:98.9 °F (37.2 °C)    Visit Vitals  /80   Pulse 81   Temp 98.3 °F (36.8 °C)   Resp 18   Ht 6' 2\" (1.88 m)   Wt 83 kg (182 lb 15.7 oz)   SpO2 98%   BMI 23.49 kg/m²       ROS: 12 point ROS obtained in details. Pertinent positives as mentioned in HPI,   otherwise negative    Physical Exam:    General: Well developed, well nourished male laying on the bed/sitting on the  bed AAOx3 in no acute distress. General:   awake alert and oriented   HEENT:  Normocephalic, atraumatic,  EOMI, no scleral icterus or pallor; no conjunctival hemmohage;  nasal and oral mucous are moist and without evidence of lesions. Neck supple, no bruits. Lymph Nodes:   not examined   Lungs:   non-labored, bilaterally clear to auscultation- no crackles wheezes rales or rhonchi   Heart:  RRR, s1 and s2; no rubs or gallops, no edema, + pedal pulses   Abdomen:  soft, non-distended, active bowel sounds, no hepatomegaly, no splenomegaly. Non-tender   Genitourinary:  deferred   Extremities:   no clubbing, cyanosis; no joint effusions or swelling; Full ROM of all large joints to the upper and lower extremities; muscle mass appropriate for age; right AKA stump covered with dressing   Neurologic:  No gross focal sensory abnormalities; 5/5 muscle strength to upper and lower extremities. Speech appropriate. Cranial nerves intact                        Skin:  No rash or ulcers noted   Wound:  Right AKA stump covered with dressing      Back:  no spinal or paraspinal muscle tenderness or rigidity, no CVA tenderness     Psychiatric:  No suicidal or homicidal ideations, appropriate mood and affect         Labs: Results:   Chemistry Recent Labs     04/13/21  0640 04/12/21  0539 04/11/21  0650   * 128* 147*    137 140   K 3.9 4.9 4.6    108 110   CO2 25 24 23   BUN 9 9 9   CREA 1.04 1.12 1.08   CA 8.7 8.1* 8.2*   AGAP 6 5 7   BUCR 9* 8* 8*      CBC w/Diff Recent Labs     04/13/21  0640 04/12/21  0539 04/11/21  1027 04/11/21  0650   WBC 14.3* 17.2*  --  16.5*   RBC 2.73* 2.73*  --  2.97*   HGB 7.3* 7.3* 7.6* 7.9*   HCT 23.2* 23.0*  --  24.4*   * 461*  --  461*   GRANS 69  --   --   --    LYMPH 14*  --   --   --    EOS 5  --   --   --       Microbiology No results for input(s): CULT in the last 72 hours. RADIOLOGY:    All available imaging studies/reports in [facility] care for this admission were reviewed      Disclaimer: Sections of this note are dictated utilizing voice recognition software, which may have resulted in some phonetic based errors in grammar and contents. Even though attempts were made to correct all the mistakes, some may have been missed, and remained in the body of the document. If questions arise, please contact our department.     Dr. Rossy Albright, Infectious Disease Specialist  [phone]  April 13, 2021  9:31 AM

## 2021-04-13 NOTE — PROGRESS NOTES
Nutrition Assessment     Type and Reason for Visit: Reassess, Positive nutrition screen, Wound    Nutrition Recommendations/Plan:   - Continue cardiac diet with Glucerna Shake, TID.  - Monitor and encourage po intake as tolerated. Nutrition Assessment:  S/p right AKA on 4/11. Reports improvement in pain management resulting in improvement in appetite. Consumed 100% of breakfast and supplement this morning. Denies diet intolerance. 5 units of lantus started today for hyperglycemia. Malnutrition Assessment:  Malnutrition Status: At risk for malnutrition (specify)(decreased po intake and appetite)     Estimated Daily Nutrient Needs:  Energy (kcal):  2636-1708  Protein (g):  102-128       Fluid (ml/day):  3252-5980    Nutrition Related Findings:  BM 4/12- miralax prn (given 4/9). Recent BG levels 120-225 mg/dL      Current Nutrition Therapies:  DIET NUTRITIONAL SUPPLEMENTS All Meals, Breakfast, Lunch, Dinner; Glucerna Shake  DIET CARDIAC Regular    Anthropometric Measures:  · Height:  6' 2\" (188 cm)  · Current Body Wt:  83 kg (182 lb 15.7 oz)  · BMI: 23.5    Nutrition Diagnosis:   · Predicted inadequate energy intake related to psychological cause or life stress, acute injury/trauma(appetite) as evidenced by (s/p right AKA with variable po intake since admission)    Nutrition Intervention:  Food and/or Nutrient Delivery: Continue current diet, Continue oral nutrition supplement  Nutrition Education and Counseling: Education not indicated  Coordination of Nutrition Care: Continue to monitor while inpatient    Goals:  PO nutrition intake will meet >75% of patient estimated nutritional needs within the next 7 days.        Nutrition Monitoring and Evaluation:   Behavioral-Environmental Outcomes: None identified  Food/Nutrient Intake Outcomes: Food and nutrient intake, Supplement intake  Physical Signs/Symptoms Outcomes: Biochemical data, GI status, Meal time behavior, Nutrition focused physical findings    Discharge Planning:    Continue current diet     Electronically signed by Nicolette Boudreaux RD on 4/13/2021 at 11:43 AM    Contact Number: [phone]

## 2021-04-13 NOTE — DIABETES MGMT
Diabetes Plan of Care      T2DM with A1c of 9.8% (4/08/2021)  See separate notes entered, 4/13/2021, for assessment of home diabetes management and education. Patient reported history of non compliant with diabetes management and regret it. He is now willing to comply with treatment to help prevent potential complications of uncontrolled diabetes. Home diabetes medications: Patient reported on 4/13/2021 that he was not compliant in taking his diabetes medications but he is willing to take lantus and humalog insulin again. Patient was admitted from ARU to acute setting on 4/07/2021 after surgery. Noted the following assessment:  Status post right BKA on 3/25/2021  Extensive infection of right below knee amputation stump  Status post washout right below-knee amputation and right knee disarticulation on 4/07/2021  Taken back to OR on 4/08/2021 for bleeding  History of Factor V leiden deficiency  Major depressive disorder    4/13: Seen patient in [address] [place] [address]. Glycemic assessment:  POC BG 4/12: 297, 225  POC BG 4/13 at time of review: 147, 233  Lab FBG 4/13: 120    Recommendation:   1.) Obtained order to add basal lantus insulin 5 units daily starting today. 2.) cont to monitor BG and titrate lantus insulin dose as needed. 3.) change diet from cadiac regular to diabetic consistent carb regular. Most recent blood glucose values: Within target range : No.    Current A1c 9.8% (4/08/2021) which is equivalent to estimated average blood glucose of  235 mg/dL during the past 2-3 months    Adequate glycemic control PTA: No.    Current hospital diabetes medications:  Basal lantus insulin 5 units daily, first dose ordered 4/13/2021  Correctional lispro insulin ACHS.  Very resistant dose    TDD previous day 4/12:  Lispro: 15 units    Diet: Cardiac regular; nutritional supplement glucerna shake with lunch and dinner    Goals: Blood glucose will be within target of 70 - 180 mg/dl by: 4/16/2021    Education: __X___ Refer to Diabetes Education Record: 4/13/2021                       _____ Education not indicated at this time     Krystina Vieyra RN [facility]  Pager: [phone]

## 2021-04-13 NOTE — PROGRESS NOTES
ARU/IPR REFERRAL CONTACT NOTE  [address] [place] [address] for Physical Rehabilitation    RE: Mila Parkinson    Referral received to review this patient's case for admission to [id number] [place] [address] for Physical Rehabilitation. Current status reviewed with team and patient meets criteria for admission to [facility] for Physical Rehabilitation pending authorization from  [address] Medicaid. Case has been opened with [facility]; and is pending review. Writer will notify  of status/determination once obtained. Thank you for this referral.  Should you have any questions please do not hesitate to call. Sincerely,  Debi Thomas. [facility]  [address] [place] [address] for Physical Rehabilitation  [phone]

## 2021-04-13 NOTE — PROGRESS NOTES
ARU/IPR REFERRAL CONTACT NOTE  [address] [place] [address] Physical Rehabilitation    RE: Debra Arriola    Referral received to review this patient's case for admission to [address] [place] [address] Physical Rehabilitation. Current status reviewed.  When appropriate, will need PT/OT evaluation/treatment on this patient to complete the pre-admission evaluation.  Will continue to follow. Thank you for this referral.  Should you have any questions please do not hesitate to call. Sincerely,  Gay Lyle.  [address] [place] [address] Physical Rehabilitation  [phone]

## 2021-04-13 NOTE — PROGRESS NOTES
Problem: Self Care Deficits Care Plan (Adult)  Goal: *Acute Goals and Plan of Care (Insert Text)  Description: Occupational Therapy Goals  Initiated 4/13/2021 within 7 day(s). 1.  Patient will perform lower body dressing with modified independence including seated and std aspects. 2.  Patient will perform bathing with modified independence. 3.  Patient will perform toilet transfers with supervision. 4.  Patient will perform all aspects of toileting with modified independence. 5.  Patient will utilize energy conservation techniques during functional activities with verbal cues. Prior Level of Function: Pt reports being independent in ADLs and functional mobility prior to his initial surgery (BKA). Per ARU notes from beginning of April pt was able to perform functional txfrs with SBA-CGA, and required Set up-CGA for ADLs. Outcome: Progressing Towards Goal  OCCUPATIONAL THERAPY EVALUATION    Patient: Shun Armendariz (74 y.o. male)  Date: 4/13/2021  Primary Diagnosis: Right BKA infection ([facility] [T87.43]  Hx of BKA, right ([facility] [Z89.511]  Procedure(s) (LRB):  RIGHT ABOVE KNEE AMPUTATION (AKA) (Right) 2 Days Post-Op   Precautions:   Fall(R AKA)    ASSESSMENT :  Pt cleared to participate in OT evaluation by RN. Upon entering room, pt received in bed, alert, and agreeable to OT eval/treatment. Pt seen in conjunction with PT to maximize safety of patient and staff members. Based on the objective data described below, the patient presents with decreased standing balance, increased pain, decreased functional activity tolerance, limiting his participation and independence in ADLs. Pt is very motivated to participate in therapy, educated on adaptive strategies for UB/LB ADLs, pt reports being familiar with strategies from his therapy at ARU. Pt required CGA for std aspects of LB dressing and Min A for toilet txfr this session due to low toilet seat.  Educated pt on positioning techniques for residual limb to prevent joint stiffness and maximize rehab potential. Pt left comfortable sitting in recliner at the end of the session. Pt's nurse notified. Patient will benefit from skilled intervention to address the above impairments. Patient's rehabilitation potential is considered to be Good  Factors which may influence rehabilitation potential include:   []             None noted  []             Mental ability/status  [x]             Medical condition  [x]             Home/family situation and support systems  []             Safety awareness  [x]             Pain tolerance/management  []             Other:      PLAN :  Recommendations and Planned Interventions:  [x]               Self Care Training                  [x]      Therapeutic Activities  [x]               Functional Mobility Training   []      Cognitive Retraining  [x]               Therapeutic Exercises           [x]      Endurance Activities  [x]               Balance Training                    [x]      Neuromuscular Re-Education  []               Visual/Perceptual Training     [x]      Home Safety Training  [x]               Patient Education                   [x]      Family Training/Education  []               Other (comment):    Frequency/Duration: Patient will be followed by occupational therapy 1-2 times per day/4-7 days per week to address goals. Discharge Recommendations: Rehab  Further Equipment Recommendations for Discharge: raised toilet seat     SUBJECTIVE:   Patient stated I know it will be hard to get off this toilet.     OBJECTIVE DATA SUMMARY:     Past Medical History:   Diagnosis Date    Acute blood loss as cause of postoperative anemia 3/22/2021    Anticoagulated by anticoagulation treatment     On Rivaroxaban    Cardiomyopathy ([facility] 12/17/2015    2D echocardiogram (3/24/2021) showed EF 37%; global hypokinesis of left ventricle; grade I diastolic dysfunction; 2D echocardiogram (12/17/2015) showed EF 35%; genealized hypokinesis more focally severe of the inferior and posterior segments; mild mitral regurgitation; trace tricuspud regurgitation; normal pulmonary artery pressure    Chronic systolic heart failure (HCC)     2D echocardiogram (3/24/2021) showed EF 37%; global hypokinesis of left ventricle; grade I diastolic dysfunction; 2D echocardiogram (12/17/2015) showed EF 35%; genealized hypokinesis more focally severe of the inferior and posterior segments; mild mitral regurgitation; trace tricuspud regurgitation; normal pulmonary artery pressure    Coronary artery disease involving native coronary artery of native heart     COVID-19 ruled out by laboratory testing 3/22/2021    SARS-CoV-2 ([employer] [facility]) (3/22/2021):  Not detected     Critical ischemia of lower extremity ([facility] 3/22/2021    Right    Factor V Leiden mutation ([facility]     Grade I diastolic dysfunction 37/98/9319    2D echocardiogram (3/24/2021) showed EF 37%; global hypokinesis of left ventricle; grade I diastolic dysfunction    History of deep venous thrombosis (DVT) of distal vein of right lower extremity     History of epilepsy     History of head injury     History of myocardial infarction     History of noncompliance with medical treatment 3/18/2021    Hypertensive heart disease with chronic systolic congestive heart failure ([facility]     2D echocardiogram (3/24/2021) showed EF 37%; global hypokinesis of left ventricle; grade I diastolic dysfunction; 2D echocardiogram (12/17/2015) showed EF 35%; genealized hypokinesis more focally severe of the inferior and posterior segments; mild mitral regurgitation; trace tricuspud regurgitation; normal pulmonary artery pressure    Long term current use of aspirin     Major depressive disorder 03/24/2021    On Duloxetine    Migraine headache     Mitral valve prolapse     Mixed hyperlipidemia     Lipid profile (7/25/2018) showed , , HDL 27,     Obstructive sleep apnea     On statin therapy due to risk of future cardiovascular event     On Atorvastatin    Peripheral artery disease (HCC)     Type 2 diabetes mellitus, without long-term current use of insulin (HCC)     HbA1c (2/2/6942) = 55.9    Umbilical hernia     Wears glasses      Past Surgical History:   Procedure Laterality Date    HX BELOW KNEE AMPUTATION Right 03/22/2021    S/P Right below-the-knee amputation (3/22/2021 - Dr. Lesly Eaton)    HX EMBOLECTOMY Right 03/09/2021    S/P Right lower extremity arterial embolectomy    HX IMPLANTABLE CARDIOVERTER DEFIBRILLATOR  2013     Barriers to Learning/Limitations: None  Compensate with: visual, verbal, tactile, kinesthetic cues/model    Home Situation:   Home Situation  Home Environment: Private residence  # Steps to Enter: 0  Wheelchair Ramp: Yes  One/Two Story Residence: One story  Living Alone: Yes  Support Systems: Friends \ neighbors  Patient Expects to be Discharged to[de-identified] Rehabilitation facility  Current DME Used/Available at Home: Glucometer  Tub or Shower Type: Tub/Shower combination  []  Right hand dominant   [x]  Left hand dominant    Cognitive/Behavioral Status:  Neurologic State: Alert  Orientation Level: Oriented X4  Cognition: Follows commands  Safety/Judgement: Awareness of environment    Skin: visible skin intact  Edema: none noted    Vision/Perceptual:       Acuity: Able to read clock/calendar on wall without difficulty    Corrective Lenses: Glasses  Coordination: BUE  Coordination: Within functional limits  Fine Motor Skills-Upper: Left Intact; Right Intact    Gross Motor Skills-Upper: Left Intact; Right Intact  Balance:  Sitting: Impaired  Sitting - Static: Good (unsupported)  Sitting - Dynamic: Fair (occasional)(plus)  Standing: Impaired; With support  Standing - Static: Fair(plus)  Standing - Dynamic : Fair  Strength: BUE  Strength:  Within functional limits   Tone & Sensation: BUE  Tone: Normal  Sensation: Intact  Range of Motion: BUE  AROM: Within functional limits   Functional Mobility and Transfers for ADLs:  Bed Mobility: Supine to Sit: Stand-by assistance     Scooting: Stand-by assistance  Transfers:  Sit to Stand: Contact guard assistance;Minimum assistance  Stand to Sit: Contact guard assistance   Toilet Transfer : Minimum assistance    Bathroom Mobility: Contact guard assistance  ADL Assessment:   Feeding: Setup  Oral Facial Hygiene/Grooming: Setup  Bathing: Contact guard assistance  Upper Body Dressing: Supervision  Lower Body Dressing: Contact guard assistance  Toileting: Stand by assistance  ADL Intervention:     Upper Body Dressing Assistance  Shirt simulation with hospital gown: Set-up    Lower Body Dressing Assistance  Socks: Supervision    Toileting  Toileting Assistance: Stand-by assistance    Cognitive Retraining  Safety/Judgement: Awareness of environment    Pain:  Pain level pre-treatment: 7/10   Pain level post-treatment: 7/10   Activity Tolerance:   Good  Please refer to the flowsheet for vital signs taken during this treatment. After treatment:   [x] Patient left in no apparent distress sitting up in chair  [x] Patient left in no apparent distress in bed  [] Call bell left within reach  [x] Nursing notified  [] Caregiver present  [] Bed alarm activated    COMMUNICATION/EDUCATION:   [x] Role of Occupational Therapy in the acute care setting  [x] Home safety education was provided and the patient/caregiver indicated understanding. [x] Patient/family have participated as able in goal setting and plan of care. [x] Patient/family agree to work toward stated goals and plan of care. [] Patient understands intent and goals of therapy, but is neutral about his/her participation. [] Patient is unable to participate in goal setting and plan of care. Thank you for this referral.  Laurence Duckworth OTR/L  Time Calculation: 27 mins    Eval Complexity: History: LOW Complexity : Brief history review ;    Examination: LOW Complexity : 1-3 performance deficits relating to physical, cognitive , or psychosocial skils that result in activity limitations and / or participation restrictions ;    Decision Making:LOW Complexity : No comorbidities that affect functional and no verbal or physical assistance needed to complete eval tasks

## 2021-04-13 NOTE — PROGRESS NOTES
PROGRESS NOTE    Patient: Benigno Hilario MRN: [id number]  SSN: [id number]    YOB: 1965  Age: 60 y.o. Sex: male      Date: 4/13/2021    Hospital: [place] [address] Day: 2    Plan:   [address] [address] to transfer to rehab when approved. Pain control. Ok to stop abx  Ok to leave incision open to air. Keep clean and dry. Assessment:   POD#2 s/p right AKA. Still with significant pain. Incision clean and dry. Subjective: Moderate pain  Not required    Objective:   Admit weight: Weight: 184 lb 11.2 oz (83.8 kg)  Last recorded weight: Weight: 182 lb 15.7 oz (83 kg)    Visit Vitals  /67 (BP 1 Location: Left upper arm, BP Patient Position: At rest)   Pulse 93   Temp 98.6 °F (37 °C)   Resp 18   Ht 6' 2\" (1.88 m)   Wt 182 lb 15.7 oz (83 kg)   SpO2 98%   BMI 23.49 kg/m²       Intake/Output Summary (Last 24 hours) at 4/13/2021 1535  Last data filed at 4/13/2021 0959  Gross per 24 hour   Intake 1320 ml   Output 920 ml   Net 400 ml       Physical exam was negative except for: Right leg incision clean and dry. clean  dry  no drainage  no erythema    Labs:     Recent Labs     04/13/21  0640 04/12/21  0539 04/11/21  1027 04/11/21  0650   WBC 14.3* 17.2*  --  16.5*   HGB 7.3* 7.3* 7.6* 7.9*   HCT 23.2* 23.0*  --  24.4*   * 461*  --  461*   RDW 16.1* 15.4*  --  14.8*     Recent Labs     04/13/21  0640 04/12/21  0539 04/11/21  0650    137 140   K 3.9 4.9 4.6    108 110   CO2 25 24 23   * 128* 147*   BUN 9 9 9   CREA 1.04 1.12 1.08   CA 8.7 8.1* 8.2*   MG 1.8  --   --    PHOS 3.4  --   --      No results for input(s): PH, PCO2, PO2, HCO3, FIO2 in the last 72 hours.       Avery Nguyen MD, FACS

## 2021-04-13 NOTE — DIABETES MGMT
Diabetes Patient/Family Education Record  Factors That  May Influence Patients Ability  to Learn or  Comply with Recommendations   []   Language barrier    []   Cultural needs   []   Motivation    []   Cognitive limitation    []   Physical   [x]   Education    []   Physiological factors   []   Hearing/vision/speaking impairment   []   Church beliefs    []   Financial factors   []  Other:   []  No factors identified at this time. Person Instructed:   [x]   Patient   []   Family   []  Other     Preference for Learning:   [x]   Verbal   [x]   Written: diab educ packet   []  Demonstration     Level of Comprehension & Competence:    [x]  Good                                      [] Fair                                     []  Poor                             []  Needs Reinforcement   [x]  Teachback completed    Education Component:  Patient stated, \"I have not been compliant with my diabetes\" and he regret it. []  Medication management, including how to administer insulin (if appropriate) and potential medication interactions: Yes. Patient reported history of taking the following diabetes medications in the past:  Lantus insulin  Humalog insulin  He is willing/planning to take insulin and stated, \"I have to. \"  I offered to educate him on how to take pen insulin but patient stated, \"I already know how to use pen insulin. \"      [x]  Nutritional management -obtain usual meal pattern: Yes. Patient stated that he'll need to change to healthy eating habits to help control his diabetes. Assessed and educated patient on the following:  Eat 3 meals daily and consistent carb intake  Plate method for the recommended serving size/portion control of carbs (starches, fruits, dairy) and limiting intake of concentrated sweets including regular beverages and sugary snack. []  Exercise   [x]  Signs, symptoms, and treatment of hyperglycemia and hypoglycemia: Yes.  Patient verbalized understanding that it is important to take prescribed diabetes medications and healthy eating to help prevent high blood sugar. [x] Prevention, recognition and treatment of hyperglycemia and hypoglycemia: Yes. Assessed and he verbalized understanding about signs and symptoms of low blood sugar less than 70, how to treat, prevent, and when to call his doctor. [x]  Importance of blood glucose monitoring and how to obtain a blood glucose meter: Yes. Patient plan to monitor his blood sugar at home regularly. [x]  Instruction on use of the blood glucose meter: Yes. Encouraged patient to let me know if he need prescriptions for blood glucose meter and testing supplies. [x]  Discuss the importance of HbA1C monitoring: Yes. Patient reported high A1c level of 12% in the past. Discussed that his current A1c level is 9.8% (4/08/2021) and the recommended level is less than 7%. []  Sick day guidelines   []  Proper use and disposal of lancets, needles, syringes or insulin pens (if appropriate)   []  Potential long-term complications (retinopathy, kidney disease, neuropathy, foot care)   [x] Information about whom to contact in case of emergency or for more information: Yes. [x]  Goal:  Patient/family will demonstrate understanding of Diabetes Self Management Skills by: 4/20/2021  Plan for post-discharge education or self-management support:    [] Outpatient class schedule provided            [] Patient Declined    [] Scheduled for outpatient classes (date) _______  Verify:  Does patient understand how diabetes medications work? Yes. Does patient know what their most recent A1c is? Yes. Does patient monitor glucose at home? No. Educated. He is willing to resume home blood glucose monitoring. Does patient have difficulty obtaining diabetes medications or testing supplies?  No. Patient stated that he did not have problem or difficulty obtaining diabetes medications and testing supplies in the past.       Huy Cheryle Helms, RN [facility]  Pager: [phone]

## 2021-04-14 LAB
BASOPHILS # BLD: 0.1 K/UL (ref 0–0.1)
BASOPHILS NFR BLD: 1 % (ref 0–2)
COVID-19 RAPID TEST, COVR: NOT DETECTED
DIFFERENTIAL METHOD BLD: ABNORMAL
EOSINOPHIL # BLD: 0.6 K/UL (ref 0–0.4)
EOSINOPHIL NFR BLD: 5 % (ref 0–5)
ERYTHROCYTE [DISTWIDTH] IN BLOOD BY AUTOMATED COUNT: 16.3 % (ref 11.6–14.5)
GLUCOSE BLD STRIP.AUTO-MCNC: 153 MG/DL (ref 70–110)
GLUCOSE BLD STRIP.AUTO-MCNC: 191 MG/DL (ref 70–110)
GLUCOSE BLD STRIP.AUTO-MCNC: 239 MG/DL (ref 70–110)
GLUCOSE BLD STRIP.AUTO-MCNC: 252 MG/DL (ref 70–110)
HCT VFR BLD AUTO: 23.1 % (ref 36–48)
HGB BLD-MCNC: 7.2 G/DL (ref 13–16)
LYMPHOCYTES # BLD: 2 K/UL (ref 0.9–3.6)
LYMPHOCYTES NFR BLD: 17 % (ref 21–52)
MCH RBC QN AUTO: 26.4 PG (ref 24–34)
MCHC RBC AUTO-ENTMCNC: 31.2 G/DL (ref 31–37)
MCV RBC AUTO: 84.6 FL (ref 74–97)
MONOCYTES # BLD: 1.1 K/UL (ref 0.05–1.2)
MONOCYTES NFR BLD: 9 % (ref 3–10)
NEUTS SEG # BLD: 8.1 K/UL (ref 1.8–8)
NEUTS SEG NFR BLD: 67 % (ref 40–73)
PLATELET # BLD AUTO: 562 K/UL (ref 135–420)
PMV BLD AUTO: 9.6 FL (ref 9.2–11.8)
RBC # BLD AUTO: 2.73 M/UL (ref 4.35–5.65)
SARS-COV-2, COV2: NORMAL
SOURCE, COVRS: NORMAL
WBC # BLD AUTO: 12.2 K/UL (ref 4.6–13.2)

## 2021-04-14 PROCEDURE — 74011636637 HC RX REV CODE- 636/637: Performed by: HOSPITALIST

## 2021-04-14 PROCEDURE — 36415 COLL VENOUS BLD VENIPUNCTURE: CPT

## 2021-04-14 PROCEDURE — 99232 SBSQ HOSP IP/OBS MODERATE 35: CPT | Performed by: HOSPITALIST

## 2021-04-14 PROCEDURE — 65660000000 HC RM CCU STEPDOWN

## 2021-04-14 PROCEDURE — 74011250637 HC RX REV CODE- 250/637: Performed by: INTERNAL MEDICINE

## 2021-04-14 PROCEDURE — 85025 COMPLETE CBC W/AUTO DIFF WBC: CPT

## 2021-04-14 PROCEDURE — 74011250637 HC RX REV CODE- 250/637: Performed by: HOSPITALIST

## 2021-04-14 PROCEDURE — 82962 GLUCOSE BLOOD TEST: CPT

## 2021-04-14 PROCEDURE — 97530 THERAPEUTIC ACTIVITIES: CPT

## 2021-04-14 PROCEDURE — 74011250636 HC RX REV CODE- 250/636: Performed by: INTERNAL MEDICINE

## 2021-04-14 PROCEDURE — 87635 SARS-COV-2 COVID-19 AMP PRB: CPT

## 2021-04-14 PROCEDURE — 74011000258 HC RX REV CODE- 258: Performed by: INTERNAL MEDICINE

## 2021-04-14 PROCEDURE — 2709999900 HC NON-CHARGEABLE SUPPLY

## 2021-04-14 PROCEDURE — 97116 GAIT TRAINING THERAPY: CPT

## 2021-04-14 PROCEDURE — 97110 THERAPEUTIC EXERCISES: CPT

## 2021-04-14 RX ORDER — INSULIN GLARGINE 100 [IU]/ML
3 INJECTION, SOLUTION SUBCUTANEOUS ONCE
Status: COMPLETED | OUTPATIENT
Start: 2021-04-14 | End: 2021-04-14

## 2021-04-14 RX ORDER — INSULIN GLARGINE 100 [IU]/ML
8 INJECTION, SOLUTION SUBCUTANEOUS DAILY
Status: DISCONTINUED | OUTPATIENT
Start: 2021-04-15 | End: 2021-04-15 | Stop reason: HOSPADM

## 2021-04-14 RX ADMIN — GABAPENTIN 100 MG: 100 CAPSULE ORAL at 15:32

## 2021-04-14 RX ADMIN — INSULIN GLARGINE 5 UNITS: 100 INJECTION, SOLUTION SUBCUTANEOUS at 09:30

## 2021-04-14 RX ADMIN — OXYCODONE HYDROCHLORIDE 10 MG: 5 TABLET ORAL at 08:52

## 2021-04-14 RX ADMIN — EZETIMIBE 10 MG: 10 TABLET ORAL at 08:53

## 2021-04-14 RX ADMIN — OXYCODONE HYDROCHLORIDE 10 MG: 5 TABLET ORAL at 05:40

## 2021-04-14 RX ADMIN — Medication 5 MG: at 21:54

## 2021-04-14 RX ADMIN — OXYCODONE HYDROCHLORIDE 10 MG: 5 TABLET ORAL at 15:32

## 2021-04-14 RX ADMIN — ATORVASTATIN CALCIUM 80 MG: 40 TABLET, FILM COATED ORAL at 21:54

## 2021-04-14 RX ADMIN — Medication 10 ML: at 21:58

## 2021-04-14 RX ADMIN — INSULIN GLARGINE 3 UNITS: 100 INJECTION, SOLUTION SUBCUTANEOUS at 17:32

## 2021-04-14 RX ADMIN — CARVEDILOL 3.12 MG: 6.25 TABLET, FILM COATED ORAL at 08:53

## 2021-04-14 RX ADMIN — RIVAROXABAN 20 MG: 20 TABLET, FILM COATED ORAL at 08:53

## 2021-04-14 RX ADMIN — PIPERACILLIN SODIUM AND TAZOBACTAM SODIUM 3.38 G: 3; .375 INJECTION, POWDER, LYOPHILIZED, FOR SOLUTION INTRAVENOUS at 05:38

## 2021-04-14 RX ADMIN — OXYCODONE HYDROCHLORIDE 10 MG: 5 TABLET ORAL at 11:48

## 2021-04-14 RX ADMIN — GABAPENTIN 100 MG: 100 CAPSULE ORAL at 21:54

## 2021-04-14 RX ADMIN — Medication 81 MG: at 08:53

## 2021-04-14 RX ADMIN — INSULIN LISPRO 3 UNITS: 100 INJECTION, SOLUTION INTRAVENOUS; SUBCUTANEOUS at 17:34

## 2021-04-14 RX ADMIN — Medication 10 ML: at 15:33

## 2021-04-14 RX ADMIN — INSULIN LISPRO 9 UNITS: 100 INJECTION, SOLUTION INTRAVENOUS; SUBCUTANEOUS at 11:51

## 2021-04-14 RX ADMIN — CARVEDILOL 3.12 MG: 6.25 TABLET, FILM COATED ORAL at 20:09

## 2021-04-14 RX ADMIN — Medication 10 ML: at 05:39

## 2021-04-14 RX ADMIN — INSULIN LISPRO 6 UNITS: 100 INJECTION, SOLUTION INTRAVENOUS; SUBCUTANEOUS at 21:56

## 2021-04-14 RX ADMIN — INSULIN LISPRO 3 UNITS: 100 INJECTION, SOLUTION INTRAVENOUS; SUBCUTANEOUS at 08:53

## 2021-04-14 RX ADMIN — OXYCODONE HYDROCHLORIDE 10 MG: 5 TABLET ORAL at 20:09

## 2021-04-14 RX ADMIN — DULOXETINE HYDROCHLORIDE 30 MG: 30 CAPSULE, DELAYED RELEASE ORAL at 08:53

## 2021-04-14 RX ADMIN — GABAPENTIN 100 MG: 100 CAPSULE ORAL at 08:53

## 2021-04-14 RX ADMIN — OXYCODONE HYDROCHLORIDE 10 MG: 5 TABLET ORAL at 23:02

## 2021-04-14 NOTE — DIABETES MGMT
Diabetes Plan of Care      T2DM with A1c of 9.8% (4/08/2021)  See separate notes entered, 4/13/2021, for assessment of home diabetes management and education. Patient reported history of non compliant with diabetes management and regret it. He is now willing to comply with treatment to help prevent potential complications of uncontrolled diabetes. Home diabetes medications: Patient reported on 4/13/2021 that he was not compliant in taking his diabetes medications but he is willing to take lantus and humalog insulin again. Patient was admitted from ARU to acute setting on 4/07/2021 after surgery. Noted the following assessment:  Status post right BKA on 3/25/2021  Extensive infection of right below knee amputation stump  Status post washout right below-knee amputation and right knee disarticulation on 4/07/2021  Taken back to OR on 4/08/2021 for bleeding  History of Factor V leiden deficiency  Major depressive disorder    4/14: Seen patient this afternoon in [facility] unit. Glycemic assessment:  POC BG 4/13: 233  POC BG 4/14 at time of review: 153, 252  Patient reported eating at least 50% of meals. Recommendation:   1.) increased basal lantus insulin dose from 5 units daily to 8 units daily starting today. Order obtained. 2.) cont to monitor BG and titrate lantus insulin dose as needed. 3.) change diet from cadiac regular to diabetic consistent carb regular. Most recent blood glucose values: Within target range : No.    Current A1c 9.8% (4/08/2021) which is equivalent to estimated average blood glucose of  235 mg/dL during the past 2-3 months    Adequate glycemic control PTA: No.    Current hospital diabetes medications:  Basal lantus insulin 8 units daily  Correctional lispro insulin ACHS.  Very resistant dose    TDD previous day 4/13:  Lantus: 5 units  Lispro: 12 units  TDD insulin: 17 units    Diet: Cardiac regular; nutritional supplement glucerna shake with lunch and dinner    Goals: Blood glucose will be within target of 70 - 180 mg/dl by: 4/17/2021    Education:  __X___ Refer to Diabetes Education Record: 4/13/2021                       _____ Education not indicated at this time     Victorina Che RN [facility]  Pager: [phone]

## 2021-04-14 NOTE — PROGRESS NOTES
Progress Note    Patient: Brielle Buckner MRN: [id number]  SSN: [id number]    YOB: 1965  Age: 60 y.o. Sex: male      Admit Date: 4/7/2021    LOS: 7 days     Subjective:     No events overnight. Pain slightly better, appeared comfortable when I evaluated him. POD3 R AKA. Objective:     Vitals:    04/14/21 0412 04/14/21 0714 04/14/21 1051 04/14/21 1507   BP: 115/75 110/74 98/65 114/76   Pulse: 89 92 96 87   Resp: 20 18 18 18   Temp: 98.1 °F (36.7 °C) 97.9 °F (36.6 °C) 98.5 °F (36.9 °C) 98.3 °F (36.8 °C)   SpO2: 97% 99% 98% 100%   Weight:       Height:            Intake and Output:  Current Shift: No intake/output data recorded. Last three shifts: 04/12 1901 - 04/14 0700  In: 1320 [P.O.:720; I.V.:600]  Out: 4362 [Urine:1820]    Physical Exam:   R AKA incision c/d/i, no hematoma, nontender. No erythema    Lab/Data Review: All lab results for the last 24 hours reviewed. Assessment:     Active Problems:    Right BKA infection ([facility] (4/7/2021)      Hx of BKA, right ([place] [address] (4/7/2021)        Plan:     Dispo planning  Cont pain management. Can increase Neurontin, add valium PO if spasms but not likely at this point. WBC down trending  On Xarelto    Sutures out in 5-6 weeks. NWB until then.       Signed By: Nisreen Martinez MD     April 14, 2021

## 2021-04-14 NOTE — PROGRESS NOTES
Discharge/Transition Planning    Case Management following and chart reviewed. ARU submitted for auth with pt insurance yesterday. Followed up with Dewaine Cover with ARU and insurance have not called for clinicals yet. [employer]aine Cover will notify CM if auth received today.  Dr Abelardo Berger aware insurance is pending       Kacey Melchor RN BSN  Outcomes Manager    Pager # [phone]

## 2021-04-14 NOTE — PROGRESS NOTES
conducted a Follow up consultation and Spiritual Assessment for Newton Lema, who is a 60 y. o.,male. The  provided the following Interventions:  Continued the relationship of care and support. Patient's friend, Don Davidson, was by the bedside when I responded to the nurse's call because patient was having emotional crisis. I learned that patient was fine until today when he learned that he was being discharged to do rehab. His friend stated, \"I am not taking him home if his insurance does not approve of his rehab coverage. \" Patient expressed his frustration saying, \"I'd rather go home and put myself to sleep and not wake up. \"   Listened empathically. I learned that patient has two sisters and a brother. He is the eldest in the family and had been taking care of his mother until he had his most recent surgery. His sister who lives in [place] is stepping up for the first time looking out for their mom who is currently in [facility].  provided pastoral care by listening over their unique family dynamics. Because of his strained relationship with his siblings, the only person he could trust is his friend Don Davidson. He mentioned that he is taking some meds for his depression. AMD was discussed and offered and patient indicated he is interested to do one. Patient also is requesting to see a psychiatrist. RN was informed about this request.   Offered assurance of continued prayer on patient's behalf. Chart reviewed. The following outcomes were achieved:  Patient expressed gratitude for 's visit. AMD consult was provided and will be completed after patient is determined to have the capacity to decide for himself. Assessment:  There are no further spiritual or [employer] issues which require Spiritual Care Services interventions at this time. Plan:  Chaplains will continue to follow and will provide pastoral care on an as needed/requested basis.    recommends bedside caregivers page  on duty if patient shows signs of acute spiritual or emotional distress.      Sandra [address], [address]   [phone]

## 2021-04-14 NOTE — PROGRESS NOTES
Problem: Mobility Impaired (Adult and Pediatric)  Goal: *Acute Goals and Plan of Care (Insert Text)  Description: Physical Therapy Goals  Initiated 4/13/2021 and to be accomplished within 7 day(s)  1. Patient will move from supine to sit and sit to supine , scoot up and down, and roll side to side in bed with modified independence. 2.  Patient will transfer from bed to chair and chair to bed with modified independence using the least restrictive device. 3.  Patient will perform sit to stand with modified independence. 4.  Patient will ambulate with modified independence for 100 feet with the least restrictive device. 5.  Patient will perform LE exercises independently for 8 minutes to improve right LE ROM/strength in preparation for prosthesis. PLOF: Patient was independent with functional mobility prior to right BKA. Patient was in ARU at beginning of April and performing transfers/gait with SB/CGA. Outcome: Progressing Towards Goal   PHYSICAL THERAPY TREATMENT    Patient: Amber Allen (81 y.o. male)  Date: 4/14/2021  Diagnosis: Right BKA infection ([facility] [T87.43]  Hx of BKA, right ([facility] [Z89.511] <principal problem not specified>  Procedure(s) (LRB):  RIGHT ABOVE KNEE AMPUTATION (AKA) (Right) 3 Days Post-Op  Precautions: Fall(R AKA)  ASSESSMENT:  Agreeable to PT. Discharge orders in chart. Supervision for supine to sit. Reports compliant with home exercise program for bed level exercises in sidelying. Supervision for sit to stand to ww. Amb 15ft to bathroom with supervision and ww. Min A for stand to sit to low commode d/t no grab bars for BUE control of descent. Unsupported sitting good. Dynamic sitting balance with supervision for clean-up post toileting. Min A for sit to stand with two attempts and verbal cues for sequencing d/t low commode and no grab bars for BUE assist. Bedside commode placed as elevated toilet seat for improved safety with future commode transfers.  Amb 15ft with ww and min A to recliner. Seated in recliner with supervision for stand to sit. BLE elevated. Reviewed seated exercises for LLE to complete while in chair; verbalized understanding. Anxious with mobility and profusely apologizing during session. Offered therapeutic listening and encouragement of progress with mobility. Educated on need for RN assistance with mobility; verbalized understanding. Call bell in reach. Progression toward goals:   [x]      Improving appropriately and progressing toward goals  []      Improving slowly and progressing toward goals  []      Not making progress toward goals and plan of care will be adjusted     PLAN:  Patient continues to benefit from skilled intervention to address the above impairments. Continue treatment per established plan of care. Discharge Recommendations:  Inpatient Rehab  Further Equipment Recommendations for Discharge:  rolling walker     SUBJECTIVE:   Patient stated I'm waiting on insurance.     OBJECTIVE DATA SUMMARY:   Critical Behavior:  Neurologic State: Alert  Orientation Level: Oriented X4  Cognition: Follows commands     Psychosocial  Patient Behaviors: Anxious  Functional Mobility:  Bed Mobility:  Supine to Sit: Supervision  Transfers:  Sit to Stand: Supervision;Minimum assistance  Stand to Sit: Supervision;Minimum assistance  Balance:   Sitting: Impaired  Sitting - Static: Good (unsupported)  Sitting - Dynamic: Good (unsupported)  Standing: Impaired  Standing - Static: Good  Standing - Dynamic : Fair  Ambulation/Gait Training:  Distance (ft): (15ftx2)   Assistive Device: Walker, rolling  Ambulation - Level of Assistance: Supervision;Minimal assistance  Neuro Re-Education:  Unsupported sitting balance 5 minutes  Therapeutic Exercises:   Sit to stand x2  Seated BLE ankle pumps, knee flexion, straight leg raise, hip abduction; education    Pain:  Pain level pre-treatment: 7/10  Pain level post-treatment: 7/10     Activity Tolerance:   Good\    After treatment:   [x] Patient left in no apparent distress sitting up in chair  [] Patient left in no apparent distress in bed  [x] Call bell left within reach  [x] Nursing notified  [] Caregiver present  [] Bed alarm activated  [] SCDs applied      COMMUNICATION/EDUCATION:   [x]         Role of physical therapy in the acute care setting. [x]         Fall prevention education was provided and the patient/caregiver indicated understanding. [x]         Patient/family have participated as able in working toward goals and plan of care. [x]         Patient/family agree to work toward stated goals and plan of care. []         Patient understands intent and goals of therapy, but is neutral about his/her participation. []         Patient is unable to participate in stated goals/plan of care: ongoing with therapy staff.       Manuel Hackett, PT   Time Calculation: 24 mins

## 2021-04-14 NOTE — PROGRESS NOTES
Problem: Mobility Impaired (Adult and Pediatric)  Goal: *Acute Goals and Plan of Care (Insert Text)  Description: Physical Therapy Goals  Initiated 4/13/2021 and to be accomplished within 7 day(s)  1. Patient will move from supine to sit and sit to supine , scoot up and down, and roll side to side in bed with modified independence. 2.  Patient will transfer from bed to chair and chair to bed with modified independence using the least restrictive device. 3.  Patient will perform sit to stand with modified independence. 4.  Patient will ambulate with modified independence for 100 feet with the least restrictive device. 5.  Patient will perform LE exercises independently for 8 minutes to improve right LE ROM/strength in preparation for prosthesis. PLOF: Patient was independent with functional mobility prior to right BKA. Patient was in ARU at beginning of April and performing transfers/gait with SB/CGA. 4/14/2021 1316 by Jannette Justin, PT  Outcome: Progressing Towards Goal  PHYSICAL THERAPY TREATMENT    Patient: [facility] (75 y.o. male)  Date: 4/14/2021  Diagnosis: Right BKA infection ([facility] [T87.43]  Hx of BKA, right ([facility] [Z89.511] <principal problem not specified>  Procedure(s) (LRB):  RIGHT ABOVE KNEE AMPUTATION (AKA) (Right) 3 Days Post-Op  Precautions: Fall(R AKA)  ASSESSMENT:  2nd PT session per nursing request to assist patient from chair to bed. Supervision for sit to stand from recliner to ww. Supervision for stand pivot transfer to bed with ww. Supervision for stand to sit. Supervision for sit to supine. Able to scoot self up in bed with LLE assist. Seated in bed with HOB elevated. Educated on need for RN assistance with mobility; verbalized understanding. Call bell in reach.      Progression toward goals:   [x]      Improving appropriately and progressing toward goals  []      Improving slowly and progressing toward goals  []      Not making progress toward goals and plan of care will be adjusted     PLAN:  Patient continues to benefit from skilled intervention to address the above impairments. Continue treatment per established plan of care. Discharge Recommendations:  Inpatient Rehab  Further Equipment Recommendations for Discharge:  rolling walker     SUBJECTIVE:   Patient stated Sorry to take you away from other patients. I've called about 14 times.     OBJECTIVE DATA SUMMARY:   Critical Behavior:  Neurologic State: Alert  Orientation Level: Oriented X4  Cognition: Follows commands     Psychosocial  Patient Behaviors: Anxious  Functional Mobility:  Bed Mobility:  Supine to Sit: Supervision  Sit to Supine: Supervision  Scooting: Supervision  Transfers:  Sit to Stand: Supervision  Stand to Sit: Supervision  Bed to Chair: Supervision  Balance:   Sitting: Impaired  Sitting - Static: Good (unsupported)  Sitting - Dynamic: Good (unsupported)  Standing: Impaired  Standing - Static: Good  Standing - Dynamic : Fair    Pain:  Pain level pre-treatment: 7/10  Pain level post-treatment: 7/10     Activity Tolerance:   Good    After treatment:   [] Patient left in no apparent distress sitting up in chair  [x] Patient left in no apparent distress in bed  [x] Call bell left within reach  [x] Nursing notified  [] Caregiver present  [] Bed alarm activated  [] SCDs applied      COMMUNICATION/EDUCATION:   [x]         Role of physical therapy in the acute care setting. [x]         Fall prevention education was provided and the patient/caregiver indicated understanding. [x]         Patient/family have participated as able in working toward goals and plan of care. [x]         Patient/family agree to work toward stated goals and plan of care. []         Patient understands intent and goals of therapy, but is neutral about his/her participation. []         Patient is unable to participate in stated goals/plan of care: ongoing with therapy staff.       Anne Marie Le PT   Time Calculation: 8 mins

## 2021-04-14 NOTE — PROGRESS NOTES
Infectious Disease progress Note        Reason: right BKA stump infection     Current abx Prior abx   Zosyn since 4/7/21      Lines:       Assessment :      67-year-old gentleman with a past medical history of factor V Leiden mutation, diastolic dysfunction, prior head injury with seizure disorder, chronic systolic heart failure with ejection fraction of 35%, uncontrolled type 2  diabetes (last hemoglobin A1c 9.8 on 4/8/2021) admitted to [facility] on 4/7/21 with right BKA stump infection. Clinical presentation consistent with infected right below-knee amputation stump   s/p debridement and disarticulation of right knee on 4/7              - 4/6 wound cx + E.clocae, light strep spp  Status post right above-knee amputation on 4/11/2021    Intra-Op findings noted. Clean margins obtained at surgery. No purulence. Satisfactory bleeding and tissue color noted. Right BKA stump examined today-no erythema/drainage noted. Recommendations:    1. Discontinue piperacillin/tazobactam    2. Follow-up vascular surgery recommendations    Will sign off. Above plan was discussed in details with patient, and dr Dusty Corona. Please call me if any further questions or concerns. HPI:      Patient denies any redness of the right thigh throughout this time. He states that there was purulent drainage from the right stump infection site. He  denies prior history of MRSA infection or colonization.           home Medication List    Details   acetaminophen (TYLENOL) 325 mg tablet Take 650 mg by mouth every four (4) hours as needed for Pain.      amoxicillin-clavulanate (AUGMENTIN) 875-125 mg per tablet Take 1 Tab by mouth every twelve (12) hours. For 10 doses      aspirin 81 mg chewable tablet Take 81 mg by mouth daily. atorvastatin (LIPITOR) 80 mg tablet Take 80 mg by mouth daily. For 30 days      carvediloL (COREG) 6.25 mg tablet Take 6.25 mg by mouth two (2) times daily (with meals).       docusate sodium (Colace) 100 mg capsule Take 100 mg by mouth daily. DULoxetine (Cymbalta) 30 mg capsule Take 30 mg by mouth daily. ezetimibe (ZETIA) 10 mg tablet Take  by mouth nightly. glucose blood VI test strips (FreeStyle Lite Strips) strip by Does Not Apply route See Admin Instructions. furosemide (LASIX) 40 mg tablet Take 40 mg by mouth daily. For 30 days      gabapentin (NEURONTIN) 100 mg capsule Take 100 mg by mouth two (2) times a day. insulin glargine (LANTUS) 100 unit/mL injection 26 Units by SubCUTAneous route nightly. lisinopriL (PRINIVIL, ZESTRIL) 5 mg tablet Take 5 mg by mouth daily. For 30 days      oxyCODONE IR (ROXICODONE) 10 mg tab immediate release tablet Take 10 mg by mouth every six (6) hours as needed for Pain.      rivaroxaban (XARELTO) 20 mg tab tablet Take 20 mg by mouth daily. For 180 days      SITagliptin (Januvia) 100 mg tablet Take 100 mg by mouth daily. zolpidem (AMBIEN) 5 mg tablet Take 5 mg by mouth nightly as needed for Sleep.              Current Facility-Administered Medications   Medication Dose Route Frequency    oxyCODONE IR (ROXICODONE) tablet 10 mg  10 mg Oral Q3H PRN    insulin glargine (LANTUS) injection 5 Units  5 Units SubCUTAneous DAILY    rivaroxaban (XARELTO) tablet 20 mg  20 mg Oral DAILY    acetaminophen (TYLENOL) tablet 650 mg  650 mg Oral Q6H PRN    gabapentin (NEURONTIN) capsule 100 mg  100 mg Oral TID    dextrose (D50W) injection syrg 12.5-25 g  25-50 mL IntraVENous PRN    0.9% sodium chloride infusion 250 mL  250 mL IntraVENous PRN    0.9% sodium chloride infusion 250 mL  250 mL IntraVENous PRN    sodium chloride (NS) flush 5-40 mL  5-40 mL IntraVENous Q8H    sodium chloride (NS) flush 5-40 mL  5-40 mL IntraVENous PRN    sodium chloride (NS) flush 5-40 mL  5-40 mL IntraVENous PRN    insulin lispro (HUMALOG) injection   SubCUTAneous AC&HS    glucose chewable tablet 16 g  4 Tab Oral PRN    glucagon (GLUCAGEN) injection 1 mg  1 mg IntraMUSCular PRN  polyethylene glycol (MIRALAX) packet 17 g  17 g Oral DAILY PRN    carvediloL (COREG) tablet 3.125 mg  3.125 mg Oral Q12H    atorvastatin (LIPITOR) tablet 80 mg  80 mg Oral QHS    aspirin delayed-release tablet 81 mg  81 mg Oral DAILY    DULoxetine (CYMBALTA) capsule 30 mg  30 mg Oral DAILY    ezetimibe (ZETIA) tablet 10 mg  10 mg Oral DAILY    melatonin (rapid dissolve) tablet 5 mg  5 mg Oral QHS    zolpidem (AMBIEN) tablet 5 mg  5 mg Oral QHS PRN    ondansetron (ZOFRAN) injection 8 mg  8 mg IntraVENous Q6H PRN       Allergies: Nitroglycerin, [place], Cat dander, Codeine, and Strawberry    Temp (24hrs), Av.3 °F (36.8 °C), Min:97.9 °F (36.6 °C), Max:98.6 °F (37 °C)    Visit Vitals  /74 (BP 1 Location: Left upper arm, BP Patient Position: At rest)   Pulse 92   Temp 97.9 °F (36.6 °C)   Resp 18   Ht 6' 2\" (1.88 m)   Wt 83 kg (182 lb 15.7 oz)   SpO2 99%   BMI 23.49 kg/m²       ROS: 12 point ROS obtained in details. Pertinent positives as mentioned in HPI,   otherwise negative    Physical Exam:    General: Well developed, well nourished male laying on the bed/sitting on the  bed AAOx3 in no acute distress. General:   awake alert and oriented   HEENT:  Normocephalic, atraumatic,  EOMI, no scleral icterus or pallor; no conjunctival hemmohage;  nasal and oral mucous are moist and without evidence of lesions. Neck supple, no bruits. Lymph Nodes:   not examined   Lungs:   non-labored, bilaterally clear to auscultation- no crackles wheezes rales or rhonchi   Heart:  RRR, s1 and s2; no rubs or gallops, no edema, + pedal pulses   Abdomen:  soft, non-distended, active bowel sounds, no hepatomegaly, no splenomegaly. Non-tender   Genitourinary:  deferred   Extremities:   no clubbing, cyanosis; no joint effusions or swelling;  Full ROM of all large joints to the upper and lower extremities; muscle mass appropriate for age; right AKA stump covered with dressing   Neurologic:  No gross focal sensory abnormalities; 5/5 muscle strength to upper and lower extremities. Speech appropriate. Cranial nerves intact                        Skin:  No rash or ulcers noted   Wound:  Right AKA stump covered with dressing      Back:  no spinal or paraspinal muscle tenderness or rigidity, no CVA tenderness     Psychiatric:  No suicidal or homicidal ideations, appropriate mood and affect         Labs: Results:   Chemistry Recent Labs     04/13/21  0640 04/12/21  0539   * 128*    137   K 3.9 4.9    108   CO2 25 24   BUN 9 9   CREA 1.04 1.12   CA 8.7 8.1*   AGAP 6 5   BUCR 9* 8*      CBC w/Diff Recent Labs     04/14/21  0423 04/13/21  0640 04/12/21  0539   WBC 12.2 14.3* 17.2*   RBC 2.73* 2.73* 2.73*   HGB 7.2* 7.3* 7.3*   HCT 23.1* 23.2* 23.0*   * 506* 461*   GRANS 67 69  --    LYMPH 17* 14*  --    EOS 5 5  --       Microbiology No results for input(s): CULT in the last 72 hours. RADIOLOGY:    All available imaging studies/reports in [facility] care for this admission were reviewed      Disclaimer: Sections of this note are dictated utilizing voice recognition software, which may have resulted in some phonetic based errors in grammar and contents. Even though attempts were made to correct all the mistakes, some may have been missed, and remained in the body of the document. If questions arise, please contact our department.     Dr. Kevin Edward, Infectious Disease Specialist  [phone]  April 14, 2021  9:31 AM

## 2021-04-15 ENCOUNTER — HOSPITAL ENCOUNTER (INPATIENT)
Age: 56
LOS: 14 days | Discharge: HOME HEALTH | DRG: 862 | End: 2021-04-29
Attending: INTERNAL MEDICINE | Admitting: INTERNAL MEDICINE
Payer: MEDICAID

## 2021-04-15 VITALS
SYSTOLIC BLOOD PRESSURE: 108 MMHG | DIASTOLIC BLOOD PRESSURE: 68 MMHG | RESPIRATION RATE: 18 BRPM | WEIGHT: 178.2 LBS | OXYGEN SATURATION: 98 % | BODY MASS INDEX: 22.87 KG/M2 | HEART RATE: 91 BPM | TEMPERATURE: 99.1 F | HEIGHT: 74 IN

## 2021-04-15 DIAGNOSIS — I42.9 CARDIOMYOPATHY, UNSPECIFIED TYPE (HCC): Chronic | ICD-10-CM

## 2021-04-15 DIAGNOSIS — I11.0 HYPERTENSIVE HEART DISEASE WITH CHRONIC SYSTOLIC CONGESTIVE HEART FAILURE (HCC): Chronic | ICD-10-CM

## 2021-04-15 DIAGNOSIS — I50.22 CHRONIC SYSTOLIC HEART FAILURE (HCC): Chronic | ICD-10-CM

## 2021-04-15 DIAGNOSIS — D68.51 FACTOR V LEIDEN MUTATION (HCC): Chronic | ICD-10-CM

## 2021-04-15 DIAGNOSIS — Z79.82 LONG TERM CURRENT USE OF ASPIRIN: Chronic | ICD-10-CM

## 2021-04-15 DIAGNOSIS — Z79.899 ON STATIN THERAPY DUE TO RISK OF FUTURE CARDIOVASCULAR EVENT: Chronic | ICD-10-CM

## 2021-04-15 DIAGNOSIS — Z78.9 IMPAIRED MOBILITY AND ADLS: ICD-10-CM

## 2021-04-15 DIAGNOSIS — I50.22 HYPERTENSIVE HEART DISEASE WITH CHRONIC SYSTOLIC CONGESTIVE HEART FAILURE (HCC): Chronic | ICD-10-CM

## 2021-04-15 DIAGNOSIS — Z74.09 IMPAIRED MOBILITY AND ADLS: ICD-10-CM

## 2021-04-15 DIAGNOSIS — Z89.611 STATUS POST ABOVE-KNEE AMPUTATION OF RIGHT LOWER EXTREMITY (HCC): Primary | ICD-10-CM

## 2021-04-15 DIAGNOSIS — Z86.718 HISTORY OF DEEP VENOUS THROMBOSIS (DVT) OF DISTAL VEIN OF RIGHT LOWER EXTREMITY: ICD-10-CM

## 2021-04-15 DIAGNOSIS — Z79.01 ANTICOAGULATED BY ANTICOAGULATION TREATMENT: Chronic | ICD-10-CM

## 2021-04-15 DIAGNOSIS — E78.2 MIXED HYPERLIPIDEMIA: Chronic | ICD-10-CM

## 2021-04-15 DIAGNOSIS — E11.9 TYPE 2 DIABETES MELLITUS WITHOUT COMPLICATION, WITHOUT LONG-TERM CURRENT USE OF INSULIN (HCC): Chronic | ICD-10-CM

## 2021-04-15 DIAGNOSIS — F32.2 CURRENT SEVERE EPISODE OF MAJOR DEPRESSIVE DISORDER WITHOUT PSYCHOTIC FEATURES WITHOUT PRIOR EPISODE (HCC): ICD-10-CM

## 2021-04-15 DIAGNOSIS — I25.2 HISTORY OF MYOCARDIAL INFARCTION: ICD-10-CM

## 2021-04-15 DIAGNOSIS — D62 ACUTE BLOOD LOSS AS CAUSE OF POSTOPERATIVE ANEMIA: ICD-10-CM

## 2021-04-15 PROBLEM — Z98.890 HISTORY OF EMBOLECTOMY: Status: ACTIVE | Noted: 2021-03-05

## 2021-04-15 LAB
GLUCOSE BLD STRIP.AUTO-MCNC: 139 MG/DL (ref 70–110)
GLUCOSE BLD STRIP.AUTO-MCNC: 160 MG/DL (ref 70–110)
GLUCOSE BLD STRIP.AUTO-MCNC: 202 MG/DL (ref 70–110)
GLUCOSE BLD STRIP.AUTO-MCNC: 273 MG/DL (ref 70–110)
SARS-COV-2, COV2: NORMAL

## 2021-04-15 PROCEDURE — 97535 SELF CARE MNGMENT TRAINING: CPT

## 2021-04-15 PROCEDURE — 82962 GLUCOSE BLOOD TEST: CPT

## 2021-04-15 PROCEDURE — 65310000000 HC RM PRIVATE REHAB

## 2021-04-15 PROCEDURE — 97530 THERAPEUTIC ACTIVITIES: CPT

## 2021-04-15 PROCEDURE — U0003 INFECTIOUS AGENT DETECTION BY NUCLEIC ACID (DNA OR RNA); SEVERE ACUTE RESPIRATORY SYNDROME CORONAVIRUS 2 (SARS-COV-2) (CORONAVIRUS DISEASE [COVID-19]), AMPLIFIED PROBE TECHNIQUE, MAKING USE OF HIGH THROUGHPUT TECHNOLOGIES AS DESCRIBED BY CMS-2020-01-R: HCPCS

## 2021-04-15 PROCEDURE — 74011250637 HC RX REV CODE- 250/637: Performed by: HOSPITALIST

## 2021-04-15 PROCEDURE — 74011250637 HC RX REV CODE- 250/637: Performed by: INTERNAL MEDICINE

## 2021-04-15 PROCEDURE — 74011636637 HC RX REV CODE- 636/637: Performed by: INTERNAL MEDICINE

## 2021-04-15 PROCEDURE — 74011636637 HC RX REV CODE- 636/637: Performed by: HOSPITALIST

## 2021-04-15 PROCEDURE — 99239 HOSP IP/OBS DSCHRG MGMT >30: CPT | Performed by: HOSPITALIST

## 2021-04-15 RX ORDER — DULOXETIN HYDROCHLORIDE 30 MG/1
30 CAPSULE, DELAYED RELEASE ORAL DAILY
Status: DISCONTINUED | OUTPATIENT
Start: 2021-04-16 | End: 2021-04-26

## 2021-04-15 RX ORDER — INSULIN LISPRO 100 [IU]/ML
INJECTION, SOLUTION INTRAVENOUS; SUBCUTANEOUS
Status: DISCONTINUED | OUTPATIENT
Start: 2021-04-15 | End: 2021-04-29 | Stop reason: HOSPADM

## 2021-04-15 RX ORDER — GABAPENTIN 100 MG/1
100 CAPSULE ORAL 3 TIMES DAILY
Status: DISCONTINUED | OUTPATIENT
Start: 2021-04-15 | End: 2021-04-23

## 2021-04-15 RX ORDER — GUAIFENESIN 100 MG/5ML
81 LIQUID (ML) ORAL
Status: DISCONTINUED | OUTPATIENT
Start: 2021-04-16 | End: 2021-04-29 | Stop reason: HOSPADM

## 2021-04-15 RX ORDER — ACETAMINOPHEN 325 MG/1
650 TABLET ORAL 3 TIMES DAILY
Status: DISCONTINUED | OUTPATIENT
Start: 2021-04-16 | End: 2021-04-29 | Stop reason: HOSPADM

## 2021-04-15 RX ORDER — CALCIUM CARB/MAGNESIUM CARB 311-232MG
5 TABLET ORAL
Status: DISCONTINUED | OUTPATIENT
Start: 2021-04-15 | End: 2021-04-29 | Stop reason: HOSPADM

## 2021-04-15 RX ORDER — GABAPENTIN 100 MG/1
100 CAPSULE ORAL 3 TIMES DAILY
Qty: 90 CAP | Refills: 0 | Status: ON HOLD
Start: 2021-04-15 | End: 2021-04-28 | Stop reason: CLARIF

## 2021-04-15 RX ORDER — ZOLPIDEM TARTRATE 5 MG/1
5 TABLET ORAL
Status: DISCONTINUED | OUTPATIENT
Start: 2021-04-15 | End: 2021-04-29 | Stop reason: HOSPADM

## 2021-04-15 RX ORDER — CARVEDILOL 3.12 MG/1
3.12 TABLET ORAL EVERY 12 HOURS
Qty: 60 TAB | Refills: 0 | Status: ON HOLD
Start: 2021-04-15 | End: 2021-04-28 | Stop reason: CLARIF

## 2021-04-15 RX ORDER — INSULIN LISPRO 100 [IU]/ML
INJECTION, SOLUTION INTRAVENOUS; SUBCUTANEOUS
Qty: 1 VIAL | Refills: 0 | Status: ON HOLD
Start: 2021-04-15 | End: 2021-04-28 | Stop reason: CLARIF

## 2021-04-15 RX ORDER — BISACODYL 5 MG
10 TABLET, DELAYED RELEASE (ENTERIC COATED) ORAL
Status: DISCONTINUED | OUTPATIENT
Start: 2021-04-15 | End: 2021-04-29 | Stop reason: HOSPADM

## 2021-04-15 RX ORDER — CARVEDILOL 6.25 MG/1
3.12 TABLET ORAL 2 TIMES DAILY WITH MEALS
Status: DISCONTINUED | OUTPATIENT
Start: 2021-04-15 | End: 2021-04-27

## 2021-04-15 RX ORDER — INSULIN GLARGINE 100 [IU]/ML
5 INJECTION, SOLUTION SUBCUTANEOUS
Status: DISCONTINUED | OUTPATIENT
Start: 2021-04-15 | End: 2021-04-19

## 2021-04-15 RX ORDER — EZETIMIBE 10 MG/1
10 TABLET ORAL DAILY
Status: DISCONTINUED | OUTPATIENT
Start: 2021-04-16 | End: 2021-04-29 | Stop reason: HOSPADM

## 2021-04-15 RX ORDER — ACETAMINOPHEN 325 MG/1
650 TABLET ORAL
Status: DISCONTINUED | OUTPATIENT
Start: 2021-04-15 | End: 2021-04-29 | Stop reason: HOSPADM

## 2021-04-15 RX ORDER — INSULIN GLARGINE 100 [IU]/ML
8 INJECTION, SOLUTION SUBCUTANEOUS DAILY
Qty: 1 VIAL | Refills: 0 | Status: ON HOLD
Start: 2021-04-15 | End: 2021-04-28 | Stop reason: CLARIF

## 2021-04-15 RX ORDER — OXYCODONE HYDROCHLORIDE 5 MG/1
5-10 TABLET ORAL
Status: DISCONTINUED | OUTPATIENT
Start: 2021-04-15 | End: 2021-04-29 | Stop reason: HOSPADM

## 2021-04-15 RX ORDER — DOCUSATE SODIUM 100 MG/1
100 CAPSULE, LIQUID FILLED ORAL
Status: DISCONTINUED | OUTPATIENT
Start: 2021-04-16 | End: 2021-04-29 | Stop reason: HOSPADM

## 2021-04-15 RX ORDER — POLYETHYLENE GLYCOL 3350 17 G/17G
17 POWDER, FOR SOLUTION ORAL
Qty: 30 PACKET | Refills: 0 | Status: ON HOLD
Start: 2021-04-15 | End: 2021-04-28 | Stop reason: CLARIF

## 2021-04-15 RX ORDER — METFORMIN HYDROCHLORIDE 500 MG/1
500 TABLET ORAL 2 TIMES DAILY WITH MEALS
Status: DISCONTINUED | OUTPATIENT
Start: 2021-04-15 | End: 2021-04-21

## 2021-04-15 RX ORDER — ATORVASTATIN CALCIUM 40 MG/1
80 TABLET, FILM COATED ORAL DAILY
Status: DISCONTINUED | OUTPATIENT
Start: 2021-04-16 | End: 2021-04-29 | Stop reason: HOSPADM

## 2021-04-15 RX ADMIN — OXYCODONE HYDROCHLORIDE 10 MG: 5 TABLET ORAL at 16:15

## 2021-04-15 RX ADMIN — INSULIN GLARGINE 5 UNITS: 100 INJECTION, SOLUTION SUBCUTANEOUS at 21:24

## 2021-04-15 RX ADMIN — INSULIN LISPRO 2 UNITS: 100 INJECTION, SOLUTION INTRAVENOUS; SUBCUTANEOUS at 18:18

## 2021-04-15 RX ADMIN — CARVEDILOL 3.12 MG: 6.25 TABLET, FILM COATED ORAL at 10:01

## 2021-04-15 RX ADMIN — Medication 5 MG: at 21:24

## 2021-04-15 RX ADMIN — Medication 81 MG: at 10:01

## 2021-04-15 RX ADMIN — METFORMIN HYDROCHLORIDE 500 MG: 500 TABLET ORAL at 19:06

## 2021-04-15 RX ADMIN — GABAPENTIN 100 MG: 100 CAPSULE ORAL at 20:10

## 2021-04-15 RX ADMIN — Medication 10 ML: at 16:16

## 2021-04-15 RX ADMIN — GABAPENTIN 100 MG: 100 CAPSULE ORAL at 16:15

## 2021-04-15 RX ADMIN — CARVEDILOL 3.12 MG: 6.25 TABLET, FILM COATED ORAL at 19:07

## 2021-04-15 RX ADMIN — RIVAROXABAN 20 MG: 20 TABLET, FILM COATED ORAL at 10:02

## 2021-04-15 RX ADMIN — INSULIN GLARGINE 8 UNITS: 100 INJECTION, SOLUTION SUBCUTANEOUS at 10:04

## 2021-04-15 RX ADMIN — DULOXETINE HYDROCHLORIDE 30 MG: 30 CAPSULE, DELAYED RELEASE ORAL at 10:01

## 2021-04-15 RX ADMIN — EZETIMIBE 10 MG: 10 TABLET ORAL at 10:02

## 2021-04-15 RX ADMIN — OXYCODONE HYDROCHLORIDE 10 MG: 5 TABLET ORAL at 07:00

## 2021-04-15 RX ADMIN — OXYCODONE HYDROCHLORIDE 10 MG: 5 TABLET ORAL at 13:21

## 2021-04-15 RX ADMIN — GABAPENTIN 100 MG: 100 CAPSULE ORAL at 10:01

## 2021-04-15 RX ADMIN — OXYCODONE HYDROCHLORIDE 10 MG: 5 TABLET ORAL at 20:10

## 2021-04-15 RX ADMIN — OXYCODONE HYDROCHLORIDE 10 MG: 5 TABLET ORAL at 03:24

## 2021-04-15 RX ADMIN — INSULIN LISPRO 9 UNITS: 100 INJECTION, SOLUTION INTRAVENOUS; SUBCUTANEOUS at 12:10

## 2021-04-15 RX ADMIN — OXYCODONE HYDROCHLORIDE 10 MG: 5 TABLET ORAL at 10:01

## 2021-04-15 RX ADMIN — Medication 10 ML: at 06:04

## 2021-04-15 NOTE — PROGRESS NOTES
Discharge planning    Discharge order noted for today. Patient has been accepted to ARU. Confirmed with  Nikhil Rodgers  that bed is available today. Met with patient and  agreeable to the transition plan today. [employer] authorization has been obtained. ** Transport to facility/ roomhas been hospital [facility].  Patient's discharge summary has been forwarded to ARU via [facility] Bedside RN, [address], has been updated to the transition plan. Discharge information has been updated on the AVS.  Please call report to [phone] and room 191.     KCordell Phipps BSN, RN  Pager # [phone]  Care Manager

## 2021-04-15 NOTE — PROGRESS NOTES
ARU/IPR REFERRAL CONTACT NOTE  [id number] [place] [address] for Physical Rehabilitation          RE:  Brielle Buckner     Thank you for the opportunity to review this patient's case for admission to [address] [place] [address] for Physical Rehabilitation. Based on our pre-admission screening patient meets criteria for admission to [facility] for Physical Rehabilitation. Plan for admission today to room 191 at 4pm.  Will need d/c summary/med rec completed and report called to [phone] prior to patient's arrival.    Again, Thank you for this referral. Should you have any questions please do not hesitate to call. Sincerely,  Alon Ortiz.  [facility]  [phone]

## 2021-04-15 NOTE — DIABETES MGMT
Diabetes Plan of Care      T2DM with A1c of 9.8% (4/08/2021)  See separate notes entered, 4/13/2021, for assessment of home diabetes management and education. Patient reported history of non compliant with diabetes management and regret it. He is now willing to comply with treatment to help prevent potential complications of uncontrolled diabetes. Home diabetes medications: Patient reported on 4/13/2021 that he was not compliant in taking his diabetes medications but he is willing to take lantus and humalog insulin again. Patient was admitted from ARU to acute setting on 4/07/2021 after surgery. Noted the following assessment:  Status post right BKA on 3/25/2021  Extensive infection of right below knee amputation stump  Status post washout right below-knee amputation and right knee disarticulation on 4/07/2021  Taken back to OR on 4/08/2021 for bleeding  History of Factor V leiden deficiency  Major depressive disorder    4/15: Seen patient this morning in 2 [place] unit. Discussed improved blood glucose this morning after increasing his daily lantus dose yesterday. Glycemic assessment:  POC BG 4/12: 252, 191, 239. Increased daily lantus insulin dose fronm 5 units to 8 units. POC BG 4/15 at time of review: 139    Recommendation:   1.) cont on current insulin orders/dose: lantus and sliding scale lispro.  2.) change diet from cadiac regular to diabetic consistent carb regular. Most recent blood glucose values: Within target range : No.    Current A1c 9.8% (4/08/2021) which is equivalent to estimated average blood glucose of  235 mg/dL during the past 2-3 months    Adequate glycemic control PTA: No.    Current hospital diabetes medications:  Basal lantus insulin 8 units daily  Correctional lispro insulin ACHS.  Very resistant dose    TDD previous day 4/14:  Lantus: 8 units  Lispro: 21 units  TDD insulin: 29 units    Diet: Cardiac regular; nutritional supplement glucerna shake with lunch and dinner. RD following for nutrition.     Goals: Blood glucose will be within target of 70 - 180 mg/dl by: 4/18/2021    Education:  __X___ Refer to Diabetes Education Record: 4/13/2021                       _____ Education not indicated at this time     Giancarlo Lopez RN [facility]  Pager: [phone]

## 2021-04-15 NOTE — DISCHARGE SUMMARY
Transfer Summary    Patient: Swetha Burgess MRN: [id number]  CSN: [id number]    YOB: 1965  Age: 60 y.o. Sex: male    DOA: 4/7/2021 LOS:  LOS: 8 days   Discharge Date: 4/15/2021     Disposition: Transfer to acute rehab    Admission Diagnoses: Right BKA infection ([facility] [address] [T87.43]  Hx of BKA, right ([facility] [address] [Z89.511]    Discharge Diagnoses:    1. Infected Right BKA stump: s/p debridement and disarticulation of right knee  2. Post-operative blood loss anemia: S/p 2 units PRBC  3. PAD:  S/p embolectomy 3/5/21; right BKA 3/25  4. HFpEF with grade 1 diastolic dysfunction:  EF 35% from March 2021  5. HTN  6. DM type 2   7. Factor V leiden deficiency  8. Hyprelipidemia  9. Major depressive disorder    Discharge Condition: Stable    PHYSICAL EXAM  Visit Vitals  /68 (BP 1 Location: Left upper arm, BP Patient Position: At rest)   Pulse 91   Temp 99.1 °F (37.3 °C)   Resp 18   Ht 6' 2\" (1.88 m)   Wt 80.8 kg (178 lb 3.2 oz)   SpO2 98%   BMI 22.88 kg/m²       General:  Alert, cooperative, no acute distress    HEENT: PERRLA, anicteric sclerae. Pulmonary:  CTA Bilaterally. No Wheezing/Rales. Cardiovascular: Regular rate and Rhythm. GI:  Soft, Non distended, Non tender. + Bowel sounds. Extremities:  No edema. No calf tenderness. Right AKA suture line clean, no signs of bleeding or infection. Psych: Good insight. Not anxious or agitated. Neurologic: Alert and oriented X 3. Moves all ext. Hospital Course:   Mr. Ashlee Wright is a 42-year-old gentleman with a past medical history of factor V Leiden mutation, diastolic dysfunction, prior head injury with seizure disorder, chronic systolic heart failure with ejection fraction of 35%, insulin-dependent diabetes who was admitted to ARU on 3/25 following right BKA at [facility] by Dr. Ramo Cervantes on 3/22/21. He was overall doing well kin ARU but developed fevers and increased pain to his right stump yesterday.  He had recently completed course of Augmentin post-operatively. Vascular surgery was consulted to evaluate drainage from the stump incision. He has undergone I&D and washout of the right stump on 4/7 with right knee disarticulation. Patient was transferred to [facility] and was admitted under the hospitalist service was started on empiric antibiotics and vascular surgery on board. Postoperatively patient developed blood loss anemia requiring 2 units of blood transfusion. Patient was taken to the OR on 4/8/2021 and exploration of right knee articulation and hemorrhage control was done by vascular surgery. Patient was continued on empiric antibiotics and is hemoglobin hematocrit was monitored closely. Xarelto was held during the hospital course. After 48 hours after surgery and hemorrhage control patient was resumed back on Xarelto once cleared by vascular surgery. His multiple hematocrit remained stable, did not have any bleeding after starting Xarelto. ID was also consulted. Patient's white count and fever resolved. Patient had a difficult pain control but slowly improved. ID saw the patient and recommended no need for antibiotics if the wound looks good. Vascular surgery followed up on the patient, dressing was removed and wound looked clean so vascular agreed with no more antibiotics. Antibiotics were discontinued. Vascular cleared the patient for discharge. PT OT was seen recommended acute rehab. Patient has been currently accepted to acute rehab, will transfer today. Patient blood pressure was on the lower side in the hospital, his hypertensive medications are being resumed at a lower dose. Patient was euvolemic, his Lasix has been held in the hospital.  Please resume Lasix when needed. Patient's blood pressure medicine needs to be adjusted in the rehab. Patient is currently hemodynamically and medically stable for transfer to acute rehab today.     Discussed with the patient about his discharge plan, pain medications and follow-up appointments. Patient understood and agreed with my plan. Offered to talk to his family but patient states he will update his family. Procedures:   On 4/7/2021  1. Washout of right below-knee amputation   2. Right knee disarticulation    Exploration of right knee disarticulation and control of hemorrhage on 4/8/2021    Consults:   Dr. Annika King MD, vascular surgery    Imaging studies:   XR Results (most recent):  Results from Hospital Encounter encounter on 03/26/21   XR CHEST PA LAT    Narrative EXAM: XR CHEST PA LAT    INDICATION: R/O pneumonia    COMPARISON: None. FINDINGS: PA and lateral radiographs of the chest demonstrate patchy bibasilar  pleural-parenchymal opacity. Small left pleural opacity. . Status post previous  cardiac mediastinal intervention. . No acute bone findings. External stimulating  device noted. .       Impression Small patchy right basilar pleural parenchymal opacity and small left pleural  opacity. Follow-up can be obtained. Discharge Medications:     Current Discharge Medication List      START taking these medications    Details   insulin lispro (HUMALOG) 100 unit/mL injection SubCUTAneous route Before breakfast, lunch, dinner and at bedtime. For Blood Sugar (mg/dL) of:     Less than 150 =   0 units           150 -199 =   2 units  200 -249 =   4 units  250 -299 =   6 units  300 -349 =   8 units  350 and above =  10 units  Qty: 1 Vial, Refills: 0      polyethylene glycol (MIRALAX) 17 gram packet Take 1 Packet by mouth daily as needed for Constipation. Qty: 30 Packet, Refills: 0         CONTINUE these medications which have CHANGED    Details   carvediloL (COREG) 3.125 mg tablet Take 1 Tab by mouth every twelve (12) hours. Qty: 60 Tab, Refills: 0      gabapentin (NEURONTIN) 100 mg capsule Take 1 Cap by mouth three (3) times daily. Max Daily Amount: 300 mg.   Qty: 90 Cap, Refills: 0    Associated Diagnoses: Right BKA infection ([facility] [address]      insulin glargine (LANTUS) 100 unit/mL injection 8 Units by SubCUTAneous route daily. Qty: 1 Vial, Refills: 0         CONTINUE these medications which have NOT CHANGED    Details   acetaminophen (TYLENOL) 325 mg tablet Take 650 mg by mouth every four (4) hours as needed for Pain. aspirin 81 mg chewable tablet Take 81 mg by mouth daily. atorvastatin (LIPITOR) 80 mg tablet Take 80 mg by mouth daily. For 30 days      docusate sodium (Colace) 100 mg capsule Take 100 mg by mouth daily. DULoxetine (Cymbalta) 30 mg capsule Take 30 mg by mouth daily. ezetimibe (ZETIA) 10 mg tablet Take  by mouth nightly. glucose blood VI test strips (FreeStyle Lite Strips) strip by Does Not Apply route See Admin Instructions. oxyCODONE IR (ROXICODONE) 10 mg tab immediate release tablet Take 10 mg by mouth every six (6) hours as needed for Pain.      rivaroxaban (XARELTO) 20 mg tab tablet Take 20 mg by mouth daily. For 180 days      SITagliptin (Januvia) 100 mg tablet Take 100 mg by mouth daily. zolpidem (AMBIEN) 5 mg tablet Take 5 mg by mouth nightly as needed for Sleep. STOP taking these medications       amoxicillin-clavulanate (AUGMENTIN) 875-125 mg per tablet Comments:   Reason for Stopping:         furosemide (LASIX) 40 mg tablet Comments:   Reason for Stopping:         lisinopriL (PRINIVIL, ZESTRIL) 5 mg tablet Comments:   Reason for Stopping:               DIET:  Cardiac Diet and Diabetic Diet    ACTIVITY: Activity as tolerated  Patient needs to be on Fall, aspiration, decubitus precaution.   ·    PT/OT consult  ·  Wound care consult  ·  Accuchecks  QA and Q  ·             DVT prophylaxis     ADDITIONAL INFORMATION: If you experience any of the following symptoms but not limited to Fever, chills, nausea, vomiting, diarrhea, change in mentation, falling, bleeding, shortness of breath, chest pain, please call your primary care physician or return to the emergency room if you cannot get hold of your doctor:     FOLLOW UP CARE:  Follow-up with 1. Physician at SNF in 1-2 days with Cbc with diff, bmp, mg.                             2. Dr. Herman Billy, Vas Sx in 2 week                          Pt's PCP: TERRANCE Galeana. Minutes spent on discharge: >40 minutes spent coordinating this discharge (review instructions/follow-up, prescriptions, preparing report for sign off)    Caitlin Ceron MD  4/15/2021 2:55 PM    Disclaimer: Sections of this note are dictated using utilizing voice recognition software. Minor typographical errors may be present. If questions arise, please do not hesitate to contact me or call our department.

## 2021-04-15 NOTE — DISCHARGE INSTRUCTIONS
Patient Education        Kidney Disease and Diabetes: Care Instructions  Your Care Instructions     When you have diabetes, your body cannot make enough insulin or use it the way it should. Your body needs insulin to help sugar move from the blood to the cells. Without it, your blood sugar gets too high. High blood sugar damages your kidneys and makes it hard for them to filter blood. This causes fluid and waste to build up in your blood. If you have diabetes, it is very important to keep your blood sugar in your target range. There are many steps you can take to do this. If you can control your blood sugar, you will have the best chance to slow or stop damage to your kidneys. Follow-up care is a key part of your treatment and safety. Be sure to make and go to all appointments, and call your doctor if you are having problems. It's also a good idea to know your test results and keep a list of the medicines you take. How can you care for yourself at home? To control your diabetes and slow or stop damage to your kidneys  · Keep your blood sugar in your target range. The American Diabetes Association recommends a hemoglobin A1c (Hb A1c) target level of less than 7%. Talk to your doctor about your target. The lower your A1c, the better your chance of stopping kidney damage. · Keep your blood pressure in your target range. Doctors recommend specific types of blood pressure medicines for people who have diabetes and kidney disease. Examples include ACE inhibitors and angiotensin II receptor blockers (ARBs). Your doctor may have you take one of these even if you don't have high blood pressure. · Take all of your medicines. You may have several. For example, you may take medicines for diabetes, cholesterol, and blood pressure. It's very important to take all of them just as your doctor tells you to and to keep taking them. · Make good food choices.  Follow an eating plan that is best for your diabetes and your kidneys. You may want to work with a dietitian to make a plan. A good plan will make sure that you spread carbohydrate throughout the day. It will also make sure that you get the right amount of salt (sodium), fluids, and protein. · Stay at a healthy weight. If you need help to lose weight, talk to your doctor or dietitian. Even small changes can make a difference. Try to be aware of your portion sizes, eat more fruits and vegetables, and add some activity to your daily routine. · Exercise. Get at least 30 minutes of activity on most days of the week. Walking is a great exercise that most people can do. Being more active can help you control your blood sugar and stay at a healthy weight. It also can help you lower cholesterol and blood pressure. To improve your kidney health  · Lower your cholesterol. Keep your LDL less than 100 mg/dL and HDL levels more than 40 mg/dL for men and 50 mg/dL for women. If you have high cholesterol, your doctor may prescribe medicine. He or she may also tell you to eat less saturated fat. · Follow your treatment plan. Check your blood sugar as many times a day as your doctor recommends. Go to all of your follow-up appointments, and be sure to have all the tests your doctor orders. Call your doctor if you think you are having a problem with your medicines. · Take a low-dose aspirin every day if your doctor suggests it. Most doctors believe this can reduce the risk of heart disease and stroke. Your risk of these diseases is much greater than your risk of kidney failure. · Avoid tobacco. Do not smoke or use other tobacco products. If you need help quitting, talk to your doctor about stop-smoking programs and medicines. These can increase your chances of quitting for good. When should you call for help? Call 911 anytime you think you may need emergency care. For example, call if:    · You passed out (lost consciousness).    Call your doctor now or seek immediate medical care if:    · You have new or worse nausea and vomiting.     · You have much less urine than normal, or you have no urine.     · You are feeling confused or cannot think clearly.     · You have new or more blood in your urine.     · You have new swelling.     · You are dizzy or lightheaded, or you feel like you may faint. Watch closely for changes in your health, and be sure to contact your doctor if:    · You do not get better as expected. Where can you learn more? Go to http://www.gray.com/  Enter C726 in the search box to learn more about \"Kidney Disease and Diabetes: Care Instructions. \"  Current as of: December 17, 2020               Content Version: 12.8  © 2006-2021 [employer]. Care instructions adapted under license by [email or web address] (which disclaims liability or warranty for this information). If you have questions about a medical condition or this instruction, always ask your healthcare professional. [place] [address] any warranty or liability for your use of this information. Patient Education        Learning About Type 2 Diabetes  What is type 2 diabetes? Type 2 diabetes is a condition in which you have too much sugar (glucose) in your blood. Glucose is a type of sugar produced in your body when carbohydrates and other foods are digested. It provides energy to cells throughout the body. Normally, blood sugar levels increase after you eat a meal. When blood sugar rises, cells in the pancreas release insulin, which causes the body to absorb sugar from the blood and lowers the blood sugar level to normal.  When you have type 2 diabetes, sugar stays in the blood rather than entering the body's cells to be used for energy. This results in high blood sugar. It happens when your body can't use insulin the right way. Over time, high blood sugar can harm many parts of the body, such as your eyes, heart, blood vessels, nerves, and kidneys.  It can also increase your risk for other health problems (complications). What can you expect with type 2 diabetes? Elmer Margaret keep hearing about how important it is to keep your blood sugar within a target range. That's because over time, high blood sugar can lead to serious problems. It can:  · Harm your eyes, nerves, and kidneys. · Damage your blood vessels, leading to heart disease and stroke. · Reduce blood flow and cause nerve damage to parts of your body, especially your feet. This can cause slow healing and pain when you walk. · Make your immune system weak and less able to fight infections. When people hear the word \"diabetes,\" they often think of problems like these. But daily care and treatment can help prevent or delay these problems. The goal is to keep your blood sugar in a target range. That's the best way to reduce your chance of having more problems from diabetes. What are the symptoms? Some people who have type 2 diabetes may not have any symptoms early on. Many people with the disease don't even know they have it at first. But with time, diabetes starts to cause symptoms. You have most symptoms of type 2 diabetes when your blood sugar is either too high or too low. The most common symptoms of high blood sugar include:  · Thirst.  · Needing to urinate often. · Weight loss. · Blurry vision. The symptoms of low blood sugar include:  · Sweating. · Shakiness. · Weakness. · Hunger. · Confusion. You're not likely to get symptoms of low blood sugar unless you take insulin or use certain diabetes medicines that lower blood sugar. How can you help prevent type 2 diabetes? There are things you can do to help prevent type 2 diabetes. Stay at a healthy weight. Exercise regularly, and eat healthy foods. Even small changes can make a difference. If you have prediabetes, the medicine metformin can help prevent type 2 diabetes. How is type 2 diabetes treated?   Treatment for type 2 diabetes will change over time to meet your needs. But the focus of your treatment will usually be to keep your blood sugar levels in your target range. This will help prevent problems such as eye, kidney, heart, blood vessel, and nerve disease. Some people may need medicines to help their bodies make insulin or decrease insulin resistance. Some medicines slow down how quickly the body absorbs carbohydrates. Treatment to manage type 2 diabetes includes:  · Making healthy food choices and being active. · Losing weight, if you need to. · Seeing your doctor regularly. · Keeping your blood sugar in your target range. · Taking medicines, if you need them. · Quitting smoking, if you smoke. · Keeping your blood pressure and cholesterol under control. Follow-up care is a key part of your treatment and safety. Be sure to make and go to all appointments, and call your doctor if you are having problems. It's also a good idea to know your test results and keep a list of the medicines you take. Where can you learn more? Go to http://www.gray.com/  Enter [id number] in the search box to learn more about \"Learning About Type 2 Diabetes. \"  Current as of: August 31, 2020               Content Version: 12.8  © 2038-6512 [employer]. Care instructions adapted under license by [employer] (which disclaims liability or warranty for this information). If you have questions about a medical condition or this instruction, always ask your healthcare professional. [place] [address] any warranty or liability for your use of this information.          DISCHARGE SUMMARY from Nurse    PATIENT INSTRUCTIONS:    After general anesthesia or intravenous sedation, for 24 hours or while taking prescription Narcotics:  · Limit your activities  · Do not drive and operate hazardous machinery  · Do not make important personal or business decisions  · Do  not drink alcoholic beverages  · If you have not urinated within 8 hours after discharge, please contact your surgeon on call. Report the following to your surgeon:  · Excessive pain, swelling, redness or odor of or around the surgical area  · Temperature over 100.5  · Nausea and vomiting lasting longer than 4 hours or if unable to take medications  · Any signs of decreased circulation or nerve impairment to extremity: change in color, persistent  numbness, tingling, coldness or increase pain  · Any questions    What to do at Home:  Recommended activity: Activity as tolerated, rest as needed       If you experience any of the following symptoms Chest Pain, Shortness of breath, Fever greater 100.4  please follow up with  Primary Care Provider or go to the nearest Emergency Room. *  Please give a list of your current medications to your Primary Care Provider. *  Please update this list whenever your medications are discontinued, doses are      changed, or new medications (including over-the-counter products) are added. *  Please carry medication information at all times in case of emergency situations. These are general instructions for a healthy lifestyle:    No smoking/ No tobacco products/ Avoid exposure to second hand smoke  Surgeon General's Warning:  Quitting smoking now greatly reduces serious risk to your health. Obesity, smoking, and sedentary lifestyle greatly increases your risk for illness    A healthy diet, regular physical exercise & weight monitoring are important for maintaining a healthy lifestyle    You may be retaining fluid if you have a history of heart failure or if you experience any of the following symptoms:  Weight gain of 3 pounds or more overnight or 5 pounds in a week, increased swelling in our hands or feet or shortness of breath while lying flat in bed. Please call your doctor as soon as you notice any of these symptoms; do not wait until your next office visit.         The discharge information has been reviewed with the patient. The patient verbalized understanding. Discharge medications reviewed with the patient and appropriate educational materials and side effects teaching were provided.   _Patient armband removed and shredded  __________________________________________________________________________________________________________________________________

## 2021-04-15 NOTE — PROGRESS NOTES
Problem: Self Care Deficits Care Plan (Adult)  Goal: *Acute Goals and Plan of Care (Insert Text)  Description: Occupational Therapy Goals  Initiated 4/13/2021 within 7 day(s). 1.  Patient will perform lower body dressing with modified independence including seated and std aspects. 2.  Patient will perform bathing with modified independence. 3.  Patient will perform toilet transfers with supervision. 4.  Patient will perform all aspects of toileting with modified independence. 5.  Patient will utilize energy conservation techniques during functional activities with verbal cues. Prior Level of Function: Pt reports being independent in ADLs and functional mobility prior to his initial surgery (BKA). Per ARU notes from beginning of April pt was able to perform functional txfrs with SBA-CGA, and required Set up-CGA for ADLs. Outcome: Progressing Towards Goal   OCCUPATIONAL THERAPY TREATMENT    Patient: Jennifer Byrnes (75 y.o. male)  Date: 4/15/2021  Diagnosis: Right BKA infection ([facility] [address] [T87.43]  Hx of BKA, right ([facility] [address] [Z89.511] <principal problem not specified>  Procedure(s) (LRB):  RIGHT ABOVE KNEE AMPUTATION (AKA) (Right) 4 Days Post-Op  Precautions: Fall(R AKA)  PLOF: Pt reports being independent in ADLs and functional mobility prior to his initial surgery (BKA). Per ARU notes from beginning of April pt was able to perform functional txfrs with SBA-CGA, and required Set up-CGA for ADLs. Chart, occupational therapy assessment, plan of care, and goals were reviewed. ASSESSMENT:  Pt cleared by RN for OT tx at this time. Pt presented supine in bed upon therapist arrival and agreeable for tx at this time. Pt requesting to use BSC. He maneuvered to EOB with Supervision. Pt performed SPT to [facility] with Supervision. He completed toileting routine with Supervision while seated on commode. Pt demonstrated SPT back to EOB Supervision.  Once sitting EOB pt was educated on EC/WS techniques to increase safety and (I) during all ADL tasks, pt verbalized understanding. Pt returned to supine with Supervision and was left for comfort with HOB elevated, bed alarm active, and all needs left within reach. Progression toward goals:  []          Improving appropriately and progressing toward goals  [x]          Improving slowly and progressing toward goals  []          Not making progress toward goals and plan of care will be adjusted     PLAN:  Patient continues to benefit from skilled intervention to address the above impairments. Continue treatment per established plan of care. Discharge Recommendations:  Rehab  Further Equipment Recommendations for Discharge:  raised toilet seat     SUBJECTIVE:   Patient stated  The pain is slowly getting better. \"     OBJECTIVE DATA SUMMARY:   Cognitive/Behavioral Status:  Neurologic State: Alert  Orientation Level: Oriented X4  Cognition: Follows commands  Safety/Judgement: Awareness of environment    Functional Mobility and Transfers for ADLs:   Bed Mobility:     Supine to Sit: Supervision  Sit to Supine: Supervision  Scooting: Supervision   Transfers:  Sit to Stand: Supervision  Stand to Sit: Supervision      Toilet Transfer : Supervision([facility] to [facility])      Balance:  Sitting: Impaired  Sitting - Static: Good (unsupported)  Sitting - Dynamic: Good (unsupported)  Standing: Impaired  Standing - Static: Good  Standing - Dynamic : Fair    ADL Intervention:     Toileting  Toileting Assistance: Supervision  Bladder Hygiene: Supervision(while seated)    Pain:  Pain level pre-treatment: 3/10   Pain level post-treatment: 3/10  Pain Intervention(s): Medication (see MAR); Rest, Ice, Repositioning   Response to intervention: Nurse notified, See doc flow    Activity Tolerance:    Fair   Please refer to the flowsheet for vital signs taken during this treatment.   After treatment:   []  Patient left in no apparent distress sitting up in chair  [x]  Patient left in no apparent distress in bed  [x]  Call bell left within reach  [x]  Nursing notified  []  Caregiver present  [x]  Bed alarm activated    COMMUNICATION/EDUCATION:   [x] Role of Occupational Therapy in the acute care setting  [] Home safety education was provided and the patient/caregiver indicated understanding. [x] Patient/family have participated as able in working towards goals and plan of care. [x] Patient/family agree to work toward stated goals and plan of care. [] Patient understands intent and goals of therapy, but is neutral about his/her participation. [] Patient is unable to participate in goal setting and plan of care.       Thank you for this referral.  SEBASTIEN Espinoza  Time Calculation: 27 mins

## 2021-04-15 NOTE — H&P
[address]  [address] [place] [address], [address]     INPATIENT REHABILITATION  HISTORY AND PHYSICAL    Name: Tierney Casanova CSN: [id number]   Age: 60 y.o. MRN: [id number]   Sex: male Admit Date: 4/15/2021     PCP: None; Patient was assigned to [facility]      Primary Rehab Impairment Category (TINA): Amputation, lower extremity    Impairment Group Label: Unilateral lower extremity above the knee    Etiologic Diagnosis:   1. S/P Right above-the-knee amputation (4/11/2021 - Dr. Gordon Billy)  2. S/P Exploration of right knee disarticulation and control of hemorrhage (4/8/2021 - Dr. Gatito Bojorquez)  3. S/P Washout of right below-knee amputation; Right knee disarticulation (4/7/2021 - Dr. Gatito Bojorquez)  4. History of infection and ischemia of right BKA stump (4/7/2021)  5. History of Right below-the-knee amputation (3/22/2021 - Dr. Claribel Claudio)  6. History of critical ischemia of the right lower extremity  7. History of right lower extremity arterial embolectomy (3/5/2021 - Dr. Claribel Claudio)       Subjective:     Patient seen and examined. History of the Present Illness: The patient is a 77-year-old White male with multiple medical comorbidities who was admitted to [facility] on 3/22/2021 for an elective right below-the-knee amputation. WBC count (3/9/2021) = 14.0  Hgb/Hct (3/9/2021) = 11.7/37.5  BUN/Creatinine (3/9/2021) = 17/1.6     On 3/18/2021, the patient was seen at the office of Vascular Surgery (Dr. Claribel Claudio) for a wound check after undergoing a right lower extremity arterial embolectomy last 3/5/2021. The right foot was noted to be ischemic and Dr. Trish Alex recommended a right below-the-knee amputation. On 3/22/2021, the patient was admitted to [facility] under the service of Vascular Surgery (Dr. Claribel Claudio).     On 3/22/2021, the patient underwent a Right below-the-knee amputation done by Dr. Neno Hernandez. The patient tolerated the procedure well with no intraoperative complications. The patient developed acute postoperative blood loss anemia but no blood transfusion was given. Rivaroxaban was used for DVT prophylaxis. Internal Medicine consult (Dr. Kelly Oliveira) was called on 3/22/2021 for medical management. WBC count (3/23/2021) = 10.7  Hgb/Hct (3/23/2021) = 10.7/34.2  BUN/Creatinine (3/23/2021) = 16/1.2     Physical Medicine and Rehabilitation consult (Dr. Janay Eaton) was called on 3/23/2021 for evaluation of rehabilitation needs. Dr. Ke Becerra recommended IPR but since [facility], she recommended to consider [employer]. Cardiology consult (Dr. Rohit Alas) was called on 3/24/2021 for evaluation and comanagement. 2D echocardiogram (3/24/2021) showed EF 37%; global hypokinesis of left ventricle; grade I diastolic dysfunction    Psychiatry consult (Dr. Leonardo Kaye) was called on 3/24/2021 for evaluation and comanagement. Patient was diagnosed with major depressive disorder, severe, recurrent, without psychotic features and Caregiver burnout. The patient was determined to have the capacity to make decisions on his own. Patient was started on Duloxetine 30 mg PO once daily. WBC count (3/26/2021) = 13.2  Hgb/Hct (3/26/2021) = 10.1/31.3  BUN/Creatinine (3/26/2021) = 16/1.1     Pain was controlled with Morphine IV PRN and Oxycodone PO PRN. The patient had remained hemodynamically stable but due to the above events, the patient was noted to be generally weak and with impaired mobility and ADLs. Patient was felt to be a good candidate for acute inpatient rehabilitation. Upon evaluation by Physical Therapy and Occupational Therapy, the patient was recommended for acute inpatient rehabilitation.  The patient was discharged and was subsequently admitted to the [facility] for Physical Rehabilitation for intensive rehabilitation to help recover strength, function and mobility.    -    On 3/26/2021, the patient was admitted to the [facility] for Physical Rehabilitation for inpatient rehabilitation. On 4/5/2021, patient had completed the recommended 10-day treatment course of Amoxicillin-Clavulanate PO. On 4/6/2021, pain in the postoperative area had became steady. Patient was noted to have a low-grade fever and some serosanguinous discharge from the postoperative site. Cultures were sent. Patient was empirically started on Piperacillin-Tazobactam IV and Vancomycin IV. Vascular Surgery consult (Dr. Anu Man) was called on 4/6/2021 for evaluation and comanagement. Dr. Remedios Santacruz recommended right below amputation washout and debridement with possible wound VAC placement. On 4/7/2021, the patient was discharged from the [facility] for Physical Rehabilitation and the patient was brought to the operating room. -    S/P Washout of right below-knee amputation; Right knee disarticulation (4/7/2021 - Dr. Maynor Yusuf)    On 4/7/2021, the patient was admitted under the service of the [facility] (Dr. Naheed Soto). On 4/8/2021, Hgb/Hct was noted to be trending downwards (6.5/21.5 at 4:02 PM). Patient was transfused 2 units pRBC. S/P Exploration of right knee disarticulation and control of hemorrhage (4/8/2021 - Dr. Maynor Yusuf)    On 4/10/2021, Hgb/Hct was noted to be trending downwards (6.3/20.0 at 6:40 AM). Patient was transfused 2 units pRBC. S/P Right above-the-knee amputation (4/11/2021 - Dr. Niru Elise)    Infectious Disease (Dr. Robert Rivera) was called on 4/13/2021 for evaluation and comanagement. Piperacillin-Tazobactam IV was discontinued. Excerpt [facility]) from the Discharge Summary by Dr. Shola Zabala:  \"Mr. Ashlee Wright is a 28-year-old gentleman with a past medical history of factor V Leiden mutation, diastolic dysfunction, prior head injury with seizure disorder, chronic systolic heart failure with ejection fraction of 35%, insulin-dependent diabetes who was admitted to ARU on 3/25 following right BKA at [facility] by Dr. Jeevan Tna on 3/22/21. He was overall doing well kin ARU but developed fevers and increased pain to his right stump yesterday. He had recently completed course of Augmentin post-operatively. Vascular surgery was consulted to evaluate drainage from the stump incision. He has undergone I&D and washout of the right stump on 4/7 with right knee disarticulation. Patient was transferred to [facility] and was admitted under the hospitalist service was started on empiric antibiotics and vascular surgery on board. Postoperatively patient developed blood loss anemia requiring 2 units of blood transfusion. Patient was taken to the OR on 4/8/2021 and exploration of right knee articulation and hemorrhage control was done by vascular surgery. Patient was continued on empiric antibiotics and is hemoglobin hematocrit was monitored closely. Xarelto was held during the hospital course. After 48 hours after surgery and hemorrhage control patient was resumed back on Xarelto once cleared by vascular surgery. His multiple hematocrit remained stable, did not have any bleeding after starting Xarelto. ID was also consulted. Patient's white count and fever resolved. Patient had a difficult pain control but slowly improved. ID saw the patient and recommended no need for antibiotics if the wound looks good. Vascular surgery followed up on the patient, dressing was removed and wound looked clean so vascular agreed with no more antibiotics. Antibiotics were discontinued. Vascular cleared the patient for discharge. PT OT was seen recommended acute rehab.   Patient has been currently accepted to acute rehab, will transfer today.     Patient blood pressure was on the lower side in the hospital, his hypertensive medications are being resumed at a lower dose. Patient was euvolemic, his Lasix has been held in the hospital.  Please resume Lasix when needed. Patient's blood pressure medicine needs to be adjusted in the rehab. \"       CBC    04/14/21  0423 04/13/21  0640 04/12/21  0539 04/11/21  1027 04/11/21  0650 04/10/21  1915 04/10/21  0640 04/09/21  0453 04/08/21  1602 04/08/21  0940 04/08/21  0210 04/07/21  0905 04/06/21  0850   WBC 12.2 14.3* 17.2*  --  16.5*  --  14.1* 18.6*  --   --  17.2* 22.0* 23.6*   HGB 7.2* 7.3* 7.3* 7.6* 7.9* 7.7* 6.3* 7.4* 6.5* 7.0* 7.8* 9.5* 10.8*   HCT 23.1* 23.2* 23.0*  --  24.4* 23.8* 20.0* 22.7* 21.5* 22.7* 25.4* 30.4* 34.2*   * 506* 461*  --  461*  --  404 431*  --   --  507* 546* 661*       Electrolytes    04/13/21  0640 04/12/21  0539 04/11/21  0650 04/10/21  0640 04/09/21  0453 04/08/21  0210 04/07/21  0905 04/06/21  0850 04/05/21  0427    137 140 139 136 138 136 130* 131*   K 3.9 4.9 4.6 4.4 4.4 5.3 4.6 4.6 5.0    108 110 109 106 105 103 96* 99*   CA 8.7 8.1* 8.2* 7.9* 8.4* 8.6 8.8 9.6 9.4   PHOS 3.4  --   --   --   --   --   --   --   --    MG 1.8  --   --   --   --   --   --   --   --        Renal Function    04/13/21  0640 04/12/21  0539 04/11/21  0650 04/10/21  0640 04/09/21  0453 04/08/21  0210 04/07/21  0905 04/06/21  0850 04/05/21  0427   BUN 9 9 9 15 22* 17 18 20* 17   CREA 1.04 1.12 1.08 1.16 1.35* 1.12 1.16 1.26 0.98   CO2 25 24 23 23 22 26 25 22 23       Liver Function    04/08/21  0210   TBILI 0.6   TP 5.9*   ALB 1.9*   GLOB 4.0   ALT 43   AST 37   *       The patient had remained hemodynamically stable but due to the above events, the patient was noted to be generally weak and with impaired mobility and ADLs. Patient was felt to be a good candidate for acute inpatient rehabilitation.  Upon evaluation by Physical Therapy and Occupational Therapy, the patient was recommended for acute inpatient rehabilitation. The patient was discharged and was subsequently admitted to the [facility] for Physical Rehabilitation for intensive rehabilitation to help recover strength, function and mobility. Past Medical History:  Past Medical History:   Diagnosis Date    Acute blood loss as cause of postoperative anemia 3/22/2021    Anticoagulated by anticoagulation treatment     On Rivaroxaban    Cardiomyopathy ([facility] 12/17/2015    2D echocardiogram (3/24/2021) showed EF 37%; global hypokinesis of left ventricle; grade I diastolic dysfunction; 2D echocardiogram (12/17/2015) showed EF 35%; generalized hypokinesis more focally severe of the inferior and posterior segments; mild mitral regurgitation; trace tricuspud regurgitation; normal pulmonary artery pressure    Chronic systolic heart failure (HCC)     2D echocardiogram (3/24/2021) showed EF 37%; global hypokinesis of left ventricle; grade I diastolic dysfunction; 2D echocardiogram (12/17/2015) showed EF 35%; generalized hypokinesis more focally severe of the inferior and posterior segments; mild mitral regurgitation; trace tricuspud regurgitation; normal pulmonary artery pressure    Coronary artery disease involving native coronary artery of native heart     COVID-19 ruled out by laboratory testing 3/22/2021    SARS-CoV-2 ([facility]) (3/22/2021):  Not detected     [facility] 3/22/2021    Right    Factor V Leiden mutation ([facility]     Grade I diastolic dysfunction 37/59/4232    2D echocardiogram (3/24/2021) showed EF 37%; global hypokinesis of left ventricle; grade I diastolic dysfunction    History of deep venous thrombosis (DVT) of distal vein of right lower extremity     History of epilepsy     History of head injury     History of myocardial infarction     History of noncompliance with medical treatment 3/18/2021    Hypertensive heart disease with chronic systolic congestive heart failure ([facility]     2D echocardiogram (3/24/2021) showed EF 37%; global hypokinesis of left ventricle; grade I diastolic dysfunction; 2D echocardiogram (12/17/2015) showed EF 35%; genealized hypokinesis more focally severe of the inferior and posterior segments; mild mitral regurgitation; trace tricuspud regurgitation; normal pulmonary artery pressure    Infection of right below knee amputation ([facility] 4/7/2021    S/P Washout of right below-knee amputation; Right knee disarticulation (4/7/2021 - Dr. Arnaldo Correa); S/P Exploration of right knee disarticulation and control of hemorrhage (4/8/2021 - Dr. Arnaldo Correa)   [address] term current use of aspirin     Major depressive disorder 03/24/2021    On Duloxetine    Migraine headache     Mitral valve prolapse     Mixed hyperlipidemia     Lipid profile (7/25/2018) showed , , HDL 27,     Obstructive sleep apnea     On statin therapy due to risk of future cardiovascular event     On Atorvastatin    Peripheral artery disease ([email or web address]     Type 2 diabetes mellitus, without long-term current use of insulin ([facility])     HbA1c (4/8/2021) = 9.8; HbA1c (3/6/2021) = 79.8    Umbilical hernia     Wears glasses        Past Surgical History:  Past Surgical History:   Procedure Laterality Date    HX ABOVE KNEE AMPUTATION Right 04/11/2021    S/P Right above-the-knee amputation (4/11/2021 - Dr. Araceli Villalobos)    HX BELOW KNEE AMPUTATION Right 03/22/2021    S/P Right below-the-knee amputation (3/22/2021 - Dr. Lesly Eaton)    HX EMBOLECTOMY Right 03/05/2021    S/P Right lower extremity arterial embolectomy (3/5/2021 - Dr. Lesly Eaton)    HX IMPLANTABLE CARDIOVERTER DEFIBRILLATOR  2013    HX OTHER SURGICAL Right 04/08/2021    S/P Exploration of right knee disarticulation and control of hemorrhage (4/8/2021 - Dr. Arnaldo Correa)    HX OTHER SURGICAL Right 04/08/2021    S/P Washout of right below-knee amputation; Right knee disarticulation (4/7/2021 - Dr. Anamaria Jay)       Allergies: Allergies   Allergen Reactions    Nitroglycerin Other (comments)      BP drops rapidly when he takes SL Nitro          [place] Diarrhea and Rash    Cat Dander Cough    Codeine Rash    [place] Rash       Social History: The patient is single, lives with his mother in a 1-story house with a ramp in [place], [place]. He denied any tobacco, alcohol or illicit drug use. He has been unemployed since 3/2018 and has been the primary caregiver for his mother since then. Family History: Mother is alive. Father is . Family History   Problem Relation Age of Onset    Bleeding Prob Mother         Factor V Leiden mutation    Diabetes Mother     Stroke Mother     Hypertension Mother     Dementia Mother     Seizures Mother        Home Medications (from the H&P dated 3/22/2021 prepared by Dr. Cayla Luke): Outpatient Medications Marked as Taking for the 3/22/21 encounter [facility] Encounter)   Medication Sig Dispense Refill    acetaminophen (TYLENOL) 325 mg PO TABS Take 2 Tabs by Mouth Every 4 Hours As Needed. (Patient taking differently: Take 2 Tabs by Mouth Every 4 Hours As Needed for Pain. Indications: pain)    aspirin 81 mg PO CHEW Take 1 Tab by Mouth Once a Day. 30 Tab 2    atorvastatin (LIPITOR) 80 mg PO TABS Take 1 Tab by Mouth Once a Day for 30 days. 30 Tab 0    Blood Sugar Diagnostic (FREESTYLE LITE STRIPS) Misc STRP 1 Each by Misc. (Non-Drug; Combo Route) route 4 Times a Day Before Meals & at Bedtime for 30 days. Monitor blood sugar before meals & bedtime 3 Box 0    ezetimibe (ZETIA) 10 mg PO TABS Take 1 Tab by Mouth Every Night at Bedtime for 30 days. 30 Tab 0    furosemide (LASIX) 40 mg PO TABS Take 1 Tab by Mouth Once a Day for 30 days. 30 Tab 0    insulin glargine (LANTUS U-100 INSULIN) 100 unit/mL SC SOLN Inject 26 Units beneath the skin Once a Day. overstock (Patient taking differently: Inject 26 Units beneath the skin Every Night at Bedtime. overstock) 1 Vial 0    Insulin Pen Needles, Disposable, 31 gauge x 3/16\" Misc Ndle 1 Units by Combination route Every Night at Bedtime. 1 Box 11    lisinopriL (PRINIVIL) 5 mg PO TABS Take 1 Tab by Mouth Once a Day for 30 days. 30 Tab 0    oxyCODONE 10 mg PO TABS Take 10 mg by Mouth Every 6 Hours As Needed. 40 Tab 0    rivaroxaban (XARELTO) 20 mg PO TABS Take 1 Tab by Mouth Once a Day for 180 days. 30 Tab 5    silver sulfADIAZINE (SILVADENE) 1 % Top CREA Use 1 Application to affected area Twice Daily. 1 Bottle 0    SITagliptin (JANUVIA) 100 mg PO TABS Take 1 Tab by Mouth Once a Day. 30 Tab 3    zolpidem (AMBIEN) 5 mg PO TABS Take 1 Tab by Mouth nightly as needed. 30 Tab 0       Home Medications ( [x] Verbally confirmed with patient    [] From medication bottles brought by patient     [] From list provided by patient): None; Patient stated that he had not been taking any medications since he was unemployed in 3/2018; he had been prescribed medication over the past 2-3 years but he does not take them      Transfer Medications (from the Discharge Summary prepared by Dr. Young Mast):  Prior to Admission Medications   Prescriptions Last Dose Informant Patient Reported? Taking? DULoxetine (Cymbalta) 30 mg capsule   Yes No   Sig: Take 30 mg by mouth daily. SITagliptin (Januvia) 100 mg tablet   Yes No   Sig: Take 100 mg by mouth daily. acetaminophen (TYLENOL) 325 mg tablet   Yes No   Sig: Take 650 mg by mouth every four (4) hours as needed for Pain. aspirin 81 mg chewable tablet   Yes No   Sig: Take 81 mg by mouth daily. atorvastatin (LIPITOR) 80 mg tablet   Yes No   Sig: Take 80 mg by mouth daily. For 30 days   carvediloL (COREG) 3.125 mg tablet   No No   Sig: Take 1 Tab by mouth every twelve (12) hours. docusate sodium (Colace) 100 mg capsule   Yes No   Sig: Take 100 mg by mouth daily.    ezetimibe (ZETIA) 10 mg tablet   Yes No   Sig: Take  by mouth nightly.   gabapentin (NEURONTIN) 100 mg capsule   No No   Sig: Take 1 Cap by mouth three (3) times daily. Max Daily Amount: 300 mg.   glucose blood VI test strips (FreeStyle Lite Strips) strip   Yes No   Sig: by Does Not Apply route See Admin Instructions. insulin glargine (LANTUS) 100 unit/mL injection   No No   Si Units by SubCUTAneous route daily. insulin lispro (HUMALOG) 100 unit/mL injection   No No   Sig: SubCUTAneous route Before breakfast, lunch, dinner and at bedtime. For Blood Sugar (mg/dL) of:     Less than 150 =   0 units           150 -199 =   2 units  200 -249 =   4 units  250 -299 =   6 units  300 -349 =   8 units  350 and above =  10 units   oxyCODONE IR (ROXICODONE) 10 mg tab immediate release tablet   Yes No   Sig: Take 10 mg by mouth every six (6) hours as needed for Pain.   polyethylene glycol (MIRALAX) 17 gram packet   No No   Sig: Take 1 Packet by mouth daily as needed for Constipation. rivaroxaban (XARELTO) 20 mg tab tablet   Yes No   Sig: Take 20 mg by mouth daily. For 180 days   zolpidem (AMBIEN) 5 mg tablet   Yes No   Sig: Take 5 mg by mouth nightly as needed for Sleep. Facility-Administered Medications: None        Review Of Systems:   CONSTITUTIONAL: No weight loss. EYES: No blurred vision and no eye discharge. ENT: No nasal discharge. No ear pain. CARDIOVASCULAR: No chest pain and no diaphoresis. RESPIRATORY: No cough, no hemoptysis. GI: No vomiting, no diarrhea   : No urinary frequency and no dysuria. MUSCULOSKELETAL: Significant for acute postoperative musculoskeletal pain. SKIN: No rashes. NEURO: No dizziness, no numbness. ENDOCRINE: No polyphagia and no polydipsia. HEMATOLOGY: Significant for acute postoperative blood loss anemia.       Objective:     Vital Signs:  Patient Vitals for the past 24 hrs:   BP Temp Pulse Resp SpO2   21 0805 107/70 98.4 °F (36.9 °C) 88 18 98 %   04/15/21 2000 106/66 98.8 °F (37.1 °C) 93 18 99 %   04/15/21 1732 112/74 98.5 °F (36.9 °C) 94 18 98 % There is no height or weight on file to calculate BMI. Physical Examination:  GENERAL SURVEY: Patient is awake, alert, oriented x 3, laying comfortably on the bed, not in acute respiratory distress. HEENT: pale palpebral conjunctivae, anicteric sclerae, no nasoaural discharge, moist oral mucosa  NECK: supple, no jugular venous distention, no palpable lymph nodes  CHEST/LUNGS: symmetrical chest expansion, good air entry, clear breath sounds  HEART: adynamic precordium, good S1 S2, no S3, regular rhythm, no murmurs  ABDOMEN: flat, bowel sounds appreciated, soft, non-tender  EXTREMITIES: (+) right AKA stump; pale nailbeds, no edema, full and equal pulses, no calf tenderness   NEUROLOGICAL EXAM: The patient is awake, alert and oriented x3, able to answer questions fairly appropriately, able to follow 1 and 2 step commands. Able to tell time from the wall clock. Cranial nerves II-XII are grossly intact. No gross sensory deficit. Motor strength is 4+/5 on BUE, 4/5 on the RLE, 4 to 4+/5 on the LLE.     Incision(s): covered, clean, dry, and intact       Current Medications:  Current Facility-Administered Medications   Medication Dose Route Frequency    methocarbamoL (ROBAXIN) tablet 500 mg  500 mg Oral TID    acetaminophen (TYLENOL) tablet 650 mg  650 mg Oral Q4H PRN    bisacodyL (DULCOLAX) tablet 10 mg  10 mg Oral Q48H PRN    insulin lispro (HUMALOG) injection   SubCUTAneous TIDAC    carvediloL (COREG) tablet 3.125 mg  3.125 mg Oral BID WITH MEALS    gabapentin (NEURONTIN) capsule 100 mg  100 mg Oral TID    insulin glargine (LANTUS) injection 5 Units  5 Units SubCUTAneous QHS    aspirin chewable tablet 81 mg  81 mg Oral DAILY WITH BREAKFAST    atorvastatin (LIPITOR) tablet 80 mg  80 mg Oral DAILY    docusate sodium (COLACE) capsule 100 mg  100 mg Oral DAILY AFTER BREAKFAST    DULoxetine (CYMBALTA) capsule 30 mg  30 mg Oral DAILY    ezetimibe (ZETIA) tablet 10 mg  10 mg Oral DAILY    acetaminophen (TYLENOL) tablet 650 mg  650 mg Oral TID    oxyCODONE IR (ROXICODONE) tablet 5-10 mg  5-10 mg Oral Q4H PRN    rivaroxaban (XARELTO) tablet 20 mg  20 mg Oral DAILY    melatonin (rapid dissolve) tablet 5 mg  5 mg Oral QHS    zolpidem (AMBIEN) tablet 5 mg  5 mg Oral QHS PRN    metFORMIN (GLUCOPHAGE) tablet 500 mg  500 mg Oral BID WITH MEALS       Functional Assessment:     Occupational Therapy   Post-Admission Evaluation   Eating  Functional Level: 6   Grooming  Functional Level: 6   Upper Body Dressing  Functional Level: 6   Lower Body Dressing  Functional Level: 5   Toileting  Functional Level: 5        Physical Therapy   Post-Admission Evaluation   Gait  Amount of Assistance: 4 (Contact guard assistance)  Distance (ft): 15 Feet (ft)  Assistive Device: Walker, rolling   Bed/Mat Mobility  Rolling Right : 5 (Stand-by assistance)  Rolling Left : 5 (Stand-by assistance)  Supine to Sit : 5 (Stand-by assistance)  Sit to Supine : 5 (Supervision)   Bed/Mat Mobility  Rolling Right : 5 (Stand-by assistance)  Rolling Left : 5 (Stand-by assistance)  Supine to Sit : 5 (Stand-by assistance)  Sit to Supine : 5 (Supervision)   Bed/Mat Mobility  Rolling Right : 5 (Stand-by assistance)  Rolling Left : 5 (Stand-by assistance)  Supine to Sit : 5 (Stand-by assistance)  Sit to Supine : 5 (Supervision)   Transfers  Transfer Type: Lateral pivot  Other: stand step/hop with RW  Transfer Assistance : 4 (Minimal assistance)(steadying support for safety)  Sit to Stand Assistance: Minimal assistance(steadying support needing cues for safety)  Car Transfers: Not tested  Car Type: N/A   Toilet Transfers  Toilet Transfer Score: 5     Speech and Language Pathology  Post-Admission Evaluation     Comprehension (Native Language)  Primary Mode of Comprehension: Auditory  Score: 6     Expression (Native Language)  Primary Mode of Expression: Verbal  Score: 6     Social Interaction/Pragmatics  Score: 6     Problem Solving  Score: 6     Memory  Score: 6       Legend:   7 - Independent   6 - Modified Independent   5 - Standby Assistance / Supervision / Set-up   4 - Minimum Assistance / Contact Guard Assistance   3 - Moderate Assistance   2 - Maximum Assistance   1 - Total Assistance / Dependent       Labs on Admission:  Recent Results (from the past 24 hour(s))   GLUCOSE, POC    Collection Time: 04/15/21  5:35 PM   Result Value Ref Range    Glucose (POC) 160 (H) 70 - 110 mg/dL   GLUCOSE, POC    Collection Time: 04/15/21  8:10 PM   Result Value Ref Range    Glucose (POC) 202 (H) 70 - 110 mg/dL   CBC WITH AUTOMATED DIFF    Collection Time: 04/16/21  4:28 AM   Result Value Ref Range    WBC 14.8 (H) 4.6 - 13.2 K/uL    RBC 2.45 (L) 4.35 - 5.65 M/uL    HGB 6.6 (L) 13.0 - 16.0 g/dL    HCT 21.1 (L) 36.0 - 48.0 %    MCV 86.1 74.0 - 97.0 FL    MCH 26.9 24.0 - 34.0 PG    MCHC 31.3 31.0 - 37.0 g/dL    RDW 16.8 (H) 11.6 - 14.5 %    PLATELET 308 (H) 198 - 420 K/uL    MPV 9.2 9.2 - 11.8 FL    NEUTROPHILS 72 40 - 73 %    LYMPHOCYTES 14 (L) 21 - 52 %    MONOCYTES 9 3 - 10 %    EOSINOPHILS 4 0 - 5 %    BASOPHILS 0 0 - 2 %    ABS. NEUTROPHILS 10.6 (H) 1.8 - 8.0 K/UL    ABS. LYMPHOCYTES 2.0 0.9 - 3.6 K/UL    ABS. MONOCYTES 1.4 (H) 0.05 - 1.2 K/UL    ABS. EOSINOPHILS 0.6 (H) 0.0 - 0.4 K/UL    ABS.  BASOPHILS 0.1 0.0 - 0.1 K/UL    DF AUTOMATED     MAGNESIUM    Collection Time: 04/16/21  4:28 AM   Result Value Ref Range    Magnesium 2.1 1.6 - 2.6 mg/dL   METABOLIC PANEL, BASIC    Collection Time: 04/16/21  4:28 AM   Result Value Ref Range    Sodium 136 136 - 145 mmol/L    Potassium 4.8 3.5 - 5.5 mmol/L    Chloride 105 100 - 111 mmol/L    CO2 26 21 - 32 mmol/L    Anion gap 5 3.0 - 18 mmol/L    Glucose 106 (H) 74 - 99 mg/dL    BUN 15 7.0 - 18 MG/DL    Creatinine 1.07 0.6 - 1.3 MG/DL    BUN/Creatinine ratio 14 12 - 20      GFR est AA >60 >60 ml/min/1.73m2    GFR est non-AA >60 >60 ml/min/1.73m2    Calcium 8.8 8.5 - 10.1 MG/DL   FERRITIN    Collection Time: 04/16/21  4:28 AM   Result Value Ref Range    Ferritin 545 (H) 8 - 388 NG/ML   IRON PROFILE    Collection Time: 04/16/21  4:28 AM   Result Value Ref Range    Iron 22 (L) 50 - 175 ug/dL    TIBC 202 (L) 250 - 450 ug/dL    Iron % saturation 11 (L) 20 - 50 %   RETICULOCYTE COUNT    Collection Time: 04/16/21  4:28 AM   Result Value Ref Range    Reticulocyte count 5.1 (H) 0.5 - 2.5 %   GLUCOSE, POC    Collection Time: 04/16/21  8:08 AM   Result Value Ref Range    Glucose (POC) 114 (H) 70 - 110 mg/dL   RBC, ALLOCATE    Collection Time: 04/16/21 11:00 AM   Result Value Ref Range    HISTORY CHECKED? Historical check performed    GLUCOSE, POC    Collection Time: 04/16/21 11:54 AM   Result Value Ref Range    Glucose (POC) 196 (H) 70 - 110 mg/dL   TYPE & SCREEN    Collection Time: 04/16/21 12:23 PM   Result Value Ref Range    Crossmatch Expiration 04/19/2021,2359     ABO/Rh(D) B POSITIVE     Antibody screen NEG     CALLED TO: [facility] AT 13:33 ON 04/16/2021 BY KRUPA     Unit number [id number]     Blood component type RC LR     Unit division 00     Status of unit ISSUED     Crossmatch result Compatible        Estimated Glomerular Filtration Rate:  On admission, estimated GFR based on a Creatinine of 1.07 mg/dl:   Using CKD-EPI = 76.6 mL/min/1.73m2   Using MDRD = 75.7 mL/min/1.73m2      Assessment:     Primary Rehabilitation Diagnosis  1. Impaired Mobility and ADLs  2. S/P Right above-the-knee amputation (4/11/2021 - Dr. Gabbie Hearn)  3. S/P Exploration of right knee disarticulation and control of hemorrhage (4/8/2021 - Dr. Peter Courser)  4. S/P Washout of right below-knee amputation; Right knee disarticulation (4/7/2021 - Dr. Peter Courser)  5. History of infection and ischemia of right BKA stump (4/7/2021)  6. S/P Right below-the-knee amputation (3/22/2021 - Dr. Mitchell Ferris)  7. History of critical ischemia of the right lower extremity  8.  History of right lower extremity arterial embolectomy (3/5/2021 - Dr. Ever Candelaria Lukas)    Comorbidities  Patient Active Problem List   Diagnosis Code    History of right below knee amputation (HCC) Z89.511    Impaired mobility and ADLs Z74.09, Z78.9    Critical ischemia of lower extremity (HCC) I70.229    Peripheral artery disease ([facility]) I73.9    Coronary artery disease involving native coronary artery of native heart I25.10    Type 2 diabetes mellitus, without long-term current use of insulin (HCC) E11.9    History of epilepsy Z86.69    Factor V Leiden mutation ([facility] D68.51    Migraine headache G43.909    Wears glasses N24.1    Umbilical hernia A83.8    Obstructive sleep apnea G47.33    ICD (implantable cardioverter-defibrillator) in place Z95.810    Mitral valve prolapse I34.1    History of head injury Z87.828    Hypertensive heart disease with chronic systolic congestive heart failure (HCC) I11.0, I50.22    History of myocardial infarction I25.2    Long term current use of aspirin Z79.82    On statin therapy due to risk of future cardiovascular event Z79.899    History of deep venous thrombosis (DVT) of distal vein of right lower extremity Z86.718    Anticoagulated by anticoagulation treatment Z79.01    Mixed hyperlipidemia E78.2    History of embolectomy Z98.890    COVID-19 ruled out by laboratory testing Z20.822    Acute blood loss as cause of postoperative anemia D62    Cardiomyopathy ([facility] I42.9    Chronic systolic heart failure (HCC) I50.22    History of noncompliance with medical treatment Z91.19    Major depressive disorder F32.9    Grade I diastolic dysfunction P49.4    Constipation K59.00    Ischemia of right BKA site ([facility]) T87.89, I99.8    Infection of right below knee amputation ([facility]) T87.43    Status post above-knee amputation of right lower extremity ([facility] Z89.611       Willingness to participate in the program: Good      Rehabilitation Potential: Good      Plan:     1.  Medical Issues being followed closely:    [x]  Fall and safety precautions [x]  Wound Care     [x]  Bowel and Bladder Function     [x]  Fluid Electrolyte and Nutrition Balance     [x]  Pain Control      2. Issues that 24 hour rehabilitation nursing is following:    [x]  Fall and safety precautions     [x]  Wound Care     [x]  Bowel and Bladder Function     [x]  Fluid Electrolyte and Nutrition Balance     [x]  Pain Control      [x]  Assistance with and education on in-room safety with transfers to and from the bed, wheelchair, toilet and shower. 3. Acute rehabilitation plan of care:    [x]  Patient to be evaluated and treated by:           [x]  Physical Therapy           [x]  Occupational Therapy           []  Speech Therapy     []  Hold Rehab until further notice     5. Medications:    [x]  MAR Reviewed     [x]  Continue Present Medications     6. DVT Prophylaxis:      []  Enoxaparin     []  Unfractionated Heparin     []  Warfarin     [x]  NOAC     []  NEREYDA Stockings     []  Sequential Compression Device     []  None     7. Code status    [x]  Full code     []  Partial code     []  Do not intubate     []  Do not resuscitate     8. Rehabilitation program and expectations from patient, as well as medical issues discussed with the patient. MEDICAL PLAN:  > S/P Right above-the-knee amputation (4/11/2021 - Dr. Natalee Su); S/P Exploration of right knee disarticulation and control of hemorrhage (4/8/2021 - Dr. Jan Campos); S/P Washout of right below-knee amputation; Right knee disarticulation (4/7/2021 - Dr. Jan Campos); History of infection and ischemia of right BKA stump (4/7/2021); S/P Right below-the-knee amputation (3/22/2021 - Dr. Elaine Vargas); History of critical ischemia of the right lower extremity;  History of right lower extremity arterial embolectomy (3/5/2021 - Dr. Elaine Vargas)   > Staples to be removed on 5/19/2021    > Acute Postoperative Blood Loss Anemia   > Hgb/Hct (3/26/2021) = 10.1/31.3   > Hgb/Hct (3/27/2021, on admission to ARU) = 10. 9/33.9   > Anemia work-up showed serum iron 23, TIBC 200, iron % saturation 12, ferritin 835   > Hgb/Hct (3/29/2021) = 10.7/33.9       04/14/21  0423 04/13/21  0640 04/12/21  0539 04/11/21  1027 04/11/21  0650 04/10/21  1915 04/10/21  0640 04/09/21  0453 04/08/21  1602 04/08/21  0940 04/08/21  0210 04/07/21  0905 04/06/21  0850   HGB 7.2* 7.3* 7.3* 7.6* 7.9* 7.7* 6.3* 7.4* 6.5* 7.0* 7.8* 9.5* 10.8*   HCT 23.1* 23.2* 23.0*  --  24.4* 23.8* 20.0* 22.7* 21.5* 22.7* 25.4* 30.4* 34.2*      > Hgb/Hct (4/16/2021, on admission) = 6.6/21.1   > Anemia work-up (4/16/2021) showed serum iron 22, TIBC 202, iron % saturation 11, ferritin 545, reticulocyte count 5.1   > Transfuse 1 unit pRBC properly typed and crossmatched   > Premedication with Acetaminophen and Diphenhydramine   > Hgb/Hct in AM   > Ferrous gluconate 324 mg PO once daily with breakfast    > Ascorbic Acid 250 mg PO once daily with breakfast (to enhance the absorption of the FeSO4)     > Coronary artery disease; Peripheral artery disease    > On 3/27/2021, decreased Carvedilol from 6.25 mg to 3.125 mg PO BID with meals (8AM, 5PM)   > Aspirin 81 mg PO once daily with breakfast   > Atorvastatin 80 mg PO once daily   > Carvedilol 3.125 mg PO BID with meals (8AM, 5PM)    > Hypertensive heart disease with chronic systolic heart failure; Cardiomyopathy; Chronic systolic heart failure; Grade I diastolic dysfunction   > 2D echocardiogram (12/17/2015) showed EF 35%; genealized hypokinesis more focally severe of the inferior and posterior segments; mild mitral regurgitation; trace tricuspud regurgitation; normal pulmonary artery pressure   > 2D echocardiogram (3/24/2021) showed EF 37%; global hypokinesis of left ventricle; grade I diastolic dysfunction   > On 3/27/2021, decreased Carvedilol from 6.25 mg to 3.125 mg PO BID with meals (8AM, 5PM)   > On 3/28/2021, held:    > Furosemide 40 mg PO once daily (9AM)    > Lisinopril 5 mg PO once daily (9AM)   > Carvedilol 3.125 mg PO BID with meals (8AM, 5PM)    > Factor V Leiden mutation; History of deep venous thrombosis (DVT) of right lower extremity; Anticoagulated on Rivaroxaban   > Rivaroxaban 20 mg PO once daily with breakfast    > Major depressive disorder   > Duloxetine 30 mg PO once daily    > Mixed hyperlipidemia   > Lipid profile (7/25/2018) showed , , HDL 27,    > Lipid profile (3/27/2021) showed , , HDL 35, LDL 63   > Atorvastatin 80 mg PO once daily   > Ezetimibe 10 mg PO q HS    > Type 2 diabetes mellitus, poorly controlled, without long-term current use of insulin   > HbA1c (3/6/2021) = 12.9    > HbA1c (4/8/2021) = 9.8   > Metformin 500 mg PO BID with meals   > Insulin glargine 5 units SC q HS   > Insulin lispro sliding scale SC TID AC only     > Difficulty sleeping   > Melatonin 5 mg PO q HS   > Zolpidem 5 mg PO q HS PRN for sleep    > COVID-19 ruled out by laboratory testing   > SARS-CoV-2 ([facility]) (3/22/2021): Not detected   > SARS-CoV-2 ([facility], [facility]) (collected 4/7/2021, resulted 4/8/2021): Not detected    > COVID-19 rapid test ([facility], [facility]) (4/7/2021): Not detected   > COVID-19 rapid test ([facility], [facility]) (4/14/2021): Not detected   > SARS-CoV-2 ([facility], [facility]) (collected 4/15//2021, result as of 4/16/2021): PENDING   > Droplet Plus precautions until results come back negative     > Analgesia   > Acetaminophen 650 mg PO TID (8AM, 12PM, 4PM)   > Acetaminophen 650 mg PO q 4 hr PRN for pain level 4/10 or lesser (from 8PM to 4AM only)   > Duloxetine 30 mg PO once daily   > Gabapentin 100 mg PO TID   > Methocarbamol 500 mg PO TID   > Oxycodone 5-10 mg PO q 4 hr PRN for pain level 5/10 or greater     > Diet:   > Specifications: Cardiac, diabetic, consistent carb 1800 kcal   > Solids (consistency): Regular    > Liquids (consistency): Thin    > Fluid restriction: None      PRECAUTIONS:   1. Safety/fall precautions. 2. Deep venous thrombosis precautions.        POTENTIAL BARRIERS TO DISCHARGE:   1. Risk for falls. 2. 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 3. Family limited in ability to provide constant care. Personal Protective Equipment was used while interacting with patient including: goggles and [id number] face mask. Patient was using a surgical mask. Total clinical care time is 75 minutes, including review of chart including all labs, radiology, past medical history, and discussion with patient. Greater than 50% of my time was spent in coordination of care and counseling.       Signed:    Jose Raul Frederick MD    April 16, 2021

## 2021-04-16 ENCOUNTER — PATIENT OUTREACH (OUTPATIENT)
Dept: CASE MANAGEMENT | Age: 56
End: 2021-04-16

## 2021-04-16 LAB
ANION GAP SERPL CALC-SCNC: 5 MMOL/L (ref 3–18)
BASOPHILS # BLD: 0.1 K/UL (ref 0–0.1)
BASOPHILS NFR BLD: 0 % (ref 0–2)
BUN SERPL-MCNC: 15 MG/DL (ref 7–18)
BUN/CREAT SERPL: 14 (ref 12–20)
CALCIUM SERPL-MCNC: 8.8 MG/DL (ref 8.5–10.1)
CHLORIDE SERPL-SCNC: 105 MMOL/L (ref 100–111)
CO2 SERPL-SCNC: 26 MMOL/L (ref 21–32)
CREAT SERPL-MCNC: 1.07 MG/DL (ref 0.6–1.3)
DIFFERENTIAL METHOD BLD: ABNORMAL
EOSINOPHIL # BLD: 0.6 K/UL (ref 0–0.4)
EOSINOPHIL NFR BLD: 4 % (ref 0–5)
ERYTHROCYTE [DISTWIDTH] IN BLOOD BY AUTOMATED COUNT: 16.8 % (ref 11.6–14.5)
FERRITIN SERPL-MCNC: 545 NG/ML (ref 8–388)
GLUCOSE BLD STRIP.AUTO-MCNC: 114 MG/DL (ref 70–110)
GLUCOSE BLD STRIP.AUTO-MCNC: 142 MG/DL (ref 70–110)
GLUCOSE BLD STRIP.AUTO-MCNC: 169 MG/DL (ref 70–110)
GLUCOSE BLD STRIP.AUTO-MCNC: 196 MG/DL (ref 70–110)
GLUCOSE BLD STRIP.AUTO-MCNC: 289 MG/DL (ref 70–110)
GLUCOSE SERPL-MCNC: 106 MG/DL (ref 74–99)
HCT VFR BLD AUTO: 21.1 % (ref 36–48)
HGB BLD-MCNC: 6.6 G/DL (ref 13–16)
HISTORY CHECKED?,CKHIST: NORMAL
IRON SATN MFR SERPL: 11 % (ref 20–50)
IRON SERPL-MCNC: 22 UG/DL (ref 50–175)
LYMPHOCYTES # BLD: 2 K/UL (ref 0.9–3.6)
LYMPHOCYTES NFR BLD: 14 % (ref 21–52)
MAGNESIUM SERPL-MCNC: 2.1 MG/DL (ref 1.6–2.6)
MCH RBC QN AUTO: 26.9 PG (ref 24–34)
MCHC RBC AUTO-ENTMCNC: 31.3 G/DL (ref 31–37)
MCV RBC AUTO: 86.1 FL (ref 74–97)
MONOCYTES # BLD: 1.4 K/UL (ref 0.05–1.2)
MONOCYTES NFR BLD: 9 % (ref 3–10)
NEUTS SEG # BLD: 10.6 K/UL (ref 1.8–8)
NEUTS SEG NFR BLD: 72 % (ref 40–73)
PLATELET # BLD AUTO: 668 K/UL (ref 135–420)
PMV BLD AUTO: 9.2 FL (ref 9.2–11.8)
POTASSIUM SERPL-SCNC: 4.8 MMOL/L (ref 3.5–5.5)
RBC # BLD AUTO: 2.45 M/UL (ref 4.35–5.65)
RETICS/RBC NFR AUTO: 5.1 % (ref 0.5–2.5)
SODIUM SERPL-SCNC: 136 MMOL/L (ref 136–145)
TIBC SERPL-MCNC: 202 UG/DL (ref 250–450)
WBC # BLD AUTO: 14.8 K/UL (ref 4.6–13.2)

## 2021-04-16 PROCEDURE — 36430 TRANSFUSION BLD/BLD COMPNT: CPT

## 2021-04-16 PROCEDURE — 86923 COMPATIBILITY TEST ELECTRIC: CPT

## 2021-04-16 PROCEDURE — 83735 ASSAY OF MAGNESIUM: CPT

## 2021-04-16 PROCEDURE — 36415 COLL VENOUS BLD VENIPUNCTURE: CPT

## 2021-04-16 PROCEDURE — 65310000000 HC RM PRIVATE REHAB

## 2021-04-16 PROCEDURE — 97535 SELF CARE MNGMENT TRAINING: CPT

## 2021-04-16 PROCEDURE — 85045 AUTOMATED RETICULOCYTE COUNT: CPT

## 2021-04-16 PROCEDURE — P9016 RBC LEUKOCYTES REDUCED: HCPCS

## 2021-04-16 PROCEDURE — 74011250636 HC RX REV CODE- 250/636: Performed by: INTERNAL MEDICINE

## 2021-04-16 PROCEDURE — 2709999900 HC NON-CHARGEABLE SUPPLY

## 2021-04-16 PROCEDURE — 83540 ASSAY OF IRON: CPT

## 2021-04-16 PROCEDURE — 97165 OT EVAL LOW COMPLEX 30 MIN: CPT

## 2021-04-16 PROCEDURE — 80048 BASIC METABOLIC PNL TOTAL CA: CPT

## 2021-04-16 PROCEDURE — 74011636637 HC RX REV CODE- 636/637: Performed by: INTERNAL MEDICINE

## 2021-04-16 PROCEDURE — 74011250637 HC RX REV CODE- 250/637: Performed by: INTERNAL MEDICINE

## 2021-04-16 PROCEDURE — 82728 ASSAY OF FERRITIN: CPT

## 2021-04-16 PROCEDURE — 99223 1ST HOSP IP/OBS HIGH 75: CPT | Performed by: INTERNAL MEDICINE

## 2021-04-16 PROCEDURE — 97530 THERAPEUTIC ACTIVITIES: CPT

## 2021-04-16 PROCEDURE — 85025 COMPLETE CBC W/AUTO DIFF WBC: CPT

## 2021-04-16 PROCEDURE — 82962 GLUCOSE BLOOD TEST: CPT

## 2021-04-16 PROCEDURE — 97162 PT EVAL MOD COMPLEX 30 MIN: CPT

## 2021-04-16 PROCEDURE — 86901 BLOOD TYPING SEROLOGIC RH(D): CPT

## 2021-04-16 RX ORDER — ASCORBIC ACID 250 MG
250 TABLET ORAL
Status: DISCONTINUED | OUTPATIENT
Start: 2021-04-17 | End: 2021-04-29 | Stop reason: HOSPADM

## 2021-04-16 RX ORDER — FERROUS GLUCONATE 324(37.5)
324 TABLET ORAL
Status: DISCONTINUED | OUTPATIENT
Start: 2021-04-17 | End: 2021-04-29 | Stop reason: HOSPADM

## 2021-04-16 RX ORDER — DIPHENHYDRAMINE HCL 25 MG
25 CAPSULE ORAL
Status: DISCONTINUED | OUTPATIENT
Start: 2021-04-16 | End: 2021-04-29 | Stop reason: HOSPADM

## 2021-04-16 RX ORDER — ACETAMINOPHEN 325 MG/1
650 TABLET ORAL
Status: COMPLETED | OUTPATIENT
Start: 2021-04-16 | End: 2021-04-16

## 2021-04-16 RX ORDER — SODIUM CHLORIDE 9 MG/ML
250 INJECTION, SOLUTION INTRAVENOUS AS NEEDED
Status: DISCONTINUED | OUTPATIENT
Start: 2021-04-16 | End: 2021-04-29 | Stop reason: HOSPADM

## 2021-04-16 RX ORDER — METHOCARBAMOL 500 MG/1
500 TABLET, FILM COATED ORAL 3 TIMES DAILY
Status: DISCONTINUED | OUTPATIENT
Start: 2021-04-16 | End: 2021-04-29 | Stop reason: HOSPADM

## 2021-04-16 RX ORDER — FUROSEMIDE 10 MG/ML
20 INJECTION INTRAMUSCULAR; INTRAVENOUS
Status: COMPLETED | OUTPATIENT
Start: 2021-04-16 | End: 2021-04-16

## 2021-04-16 RX ADMIN — ASPIRIN 81 MG CHEWABLE TABLET 81 MG: 81 TABLET CHEWABLE at 09:15

## 2021-04-16 RX ADMIN — EZETIMIBE 10 MG: 10 TABLET ORAL at 09:15

## 2021-04-16 RX ADMIN — FUROSEMIDE 20 MG: 10 INJECTION, SOLUTION INTRAMUSCULAR; INTRAVENOUS at 18:46

## 2021-04-16 RX ADMIN — OXYCODONE HYDROCHLORIDE 10 MG: 5 TABLET ORAL at 16:34

## 2021-04-16 RX ADMIN — ZOLPIDEM TARTRATE 5 MG: 5 TABLET ORAL at 21:39

## 2021-04-16 RX ADMIN — SODIUM CHLORIDE 250 ML: 900 INJECTION, SOLUTION INTRAVENOUS at 14:12

## 2021-04-16 RX ADMIN — ACETAMINOPHEN 650 MG: 325 TABLET, FILM COATED ORAL at 12:24

## 2021-04-16 RX ADMIN — METHOCARBAMOL 500 MG: 500 TABLET ORAL at 20:29

## 2021-04-16 RX ADMIN — DULOXETINE HYDROCHLORIDE 30 MG: 30 CAPSULE, DELAYED RELEASE ORAL at 09:15

## 2021-04-16 RX ADMIN — CARVEDILOL 3.12 MG: 6.25 TABLET, FILM COATED ORAL at 18:46

## 2021-04-16 RX ADMIN — OXYCODONE HYDROCHLORIDE 10 MG: 5 TABLET ORAL at 08:29

## 2021-04-16 RX ADMIN — ATORVASTATIN CALCIUM 80 MG: 40 TABLET, FILM COATED ORAL at 09:15

## 2021-04-16 RX ADMIN — DIPHENHYDRAMINE HYDROCHLORIDE 25 MG: 25 CAPSULE ORAL at 14:13

## 2021-04-16 RX ADMIN — GABAPENTIN 100 MG: 100 CAPSULE ORAL at 09:16

## 2021-04-16 RX ADMIN — METHOCARBAMOL 500 MG: 500 TABLET ORAL at 09:15

## 2021-04-16 RX ADMIN — Medication 5 MG: at 20:29

## 2021-04-16 RX ADMIN — OXYCODONE HYDROCHLORIDE 10 MG: 5 TABLET ORAL at 00:11

## 2021-04-16 RX ADMIN — INSULIN LISPRO 2 UNITS: 100 INJECTION, SOLUTION INTRAVENOUS; SUBCUTANEOUS at 12:20

## 2021-04-16 RX ADMIN — INSULIN GLARGINE 5 UNITS: 100 INJECTION, SOLUTION SUBCUTANEOUS at 21:36

## 2021-04-16 RX ADMIN — GABAPENTIN 100 MG: 100 CAPSULE ORAL at 13:24

## 2021-04-16 RX ADMIN — ACETAMINOPHEN 650 MG: 325 TABLET, FILM COATED ORAL at 18:45

## 2021-04-16 RX ADMIN — OXYCODONE HYDROCHLORIDE 10 MG: 5 TABLET ORAL at 04:14

## 2021-04-16 RX ADMIN — ACETAMINOPHEN 650 MG: 325 TABLET, FILM COATED ORAL at 09:16

## 2021-04-16 RX ADMIN — RIVAROXABAN 20 MG: 20 TABLET, FILM COATED ORAL at 09:15

## 2021-04-16 RX ADMIN — DOCUSATE SODIUM 100 MG: 100 CAPSULE, LIQUID FILLED ORAL at 09:16

## 2021-04-16 RX ADMIN — METFORMIN HYDROCHLORIDE 500 MG: 500 TABLET ORAL at 09:15

## 2021-04-16 RX ADMIN — GABAPENTIN 100 MG: 100 CAPSULE ORAL at 20:29

## 2021-04-16 RX ADMIN — METHOCARBAMOL 500 MG: 500 TABLET ORAL at 13:24

## 2021-04-16 RX ADMIN — METFORMIN HYDROCHLORIDE 500 MG: 500 TABLET ORAL at 18:46

## 2021-04-16 RX ADMIN — ACETAMINOPHEN 650 MG: 325 TABLET ORAL at 14:22

## 2021-04-16 NOTE — ROUTINE PROCESS
0800 Pt. Awake sitting up in bed no change in assessment no signs of distress. 0930 Pt. Sitting up in chair eating breakfast. No complaints. 1200 pt. With therapy. 1330 able to transfer by wheelchair. 1440 no change in assessment. 1 unit of PRBC transfusion started as ordered by Dr. Hyacinth Andersen. 1500 no allergic reaction no complaints. Pt. Reported to be feeling fine. 1800 Pt. Sitting up in bed eating dinner. 1830 Blood Transfusion completed no  Allergic reactions.

## 2021-04-16 NOTE — PROGRESS NOTES
Problem: Mobility Impaired (Adult and Pediatric)  Goal: *Therapy Goal (Edit Goal, Insert Text)  Description: Physical Therapy Short Term Goals  Initiated 4/16/2021 and to be accomplished within 7 day(s) on 4/23/2021:  1. Patient will move from supine to sit and sit to supine , scoot up and down, and roll side to side in bed with modified independence. 2.  Patient will transfer from bed to chair and chair to bed with supervision/set-up using the least restrictive device. 3.  Patient will perform sit to stand with supervision/set-up. 4.  Patient will ambulate with supervision/set-up for 15 feet with the least restrictive device. 5.  Patient will perform w/c mobility for 150 ft at supervision level over even surfaces. Physical Therapy Long Term Goals  Initiated 4/16/2021 and to be accomplished within 14 day(s) on 4/30/2021:  1. Patient will move from supine to sit and sit to supine , scoot up and down, and roll side to side in bed with independence. 2.  Patient will transfer from bed to chair and chair to bed with modified independence using the least restrictive device. 3.  Patient will perform sit to stand with modified independence. 4.  Patient will ambulate with modified independence for 25 feet with the least restrictive device. 5.  Patient will perform w/c mobility for at least 150 ft at modified independent level. 6.  Patient will ascend/descend ramp for safe entry/exit of home using wheelchair with supervision. Outcome: Progressing Towards Goal   PHYSICAL THERAPY EVALUATION    Patient: Myrna Carrier (1 y.o. male)  Date: 4/16/2021  Diagnosis: Status post above-knee amputation of right lower extremity (HCC) [Z89.611] Status post above-knee amputation of right lower extremity (HCC)  Precautions: Fall(right AKA), low Hemoglobin (symptoms monitored, ok to participate in PT per nurse during AM session), droplet plus precautions pending COVID-19 rule out testing.   Chart, physical therapy assessment, plan of care and goals were reviewed. Time PW:[id number]  Time out:0900  *Patient not able to tolerate additional treatment in AM due to pain and fatigue, needing to eat breakfast. Patient re-attempted in PM for additional time around , however, patient getting blood transfusion and unable to participate in PT at this time. Patient seen for: Patient education;Transfer training; Therapeutic exercise    Pain:  Patient reporting 7/10 initially, increasing to 8/10 with donning LE garments for therapy, decreased to 7/10 pain at right residual limb by end of treatment. Nurse provided patient medication for pain during session. Patient identified with name and : yes    SUBJECTIVE:     Patient stated Stephani Acharya. .. they took me to get my wash out, and then they realized that the infection was going into my knee, regarding new AKA from right BKA. Patient reporting that he continues to be worried about discharge home alone with only incidental assistance. Reported that he had a \"tough time\" earlier during his admission in hospital following AKA resulting in him wanting to have his friend pick him up to go home. Patient agreeable to PT evaluation and treatment. Patient's Goal for Physical Therapy: Patient indicated wanting to work toward getting ready for prosthetic fitting.  PT educated patient regarding increased strength, endurance, and balance needed to work toward his goal as his incision continues to heal.     OBJECTIVE DATA SUMMARY:     Past Medical History:   Diagnosis Date    Acute blood loss as cause of postoperative anemia 3/22/2021    Anticoagulated by anticoagulation treatment     On Rivaroxaban    Cardiomyopathy ([facility] [address] 2015    2D echocardiogram (3/24/2021) showed EF 37%; global hypokinesis of left ventricle; grade I diastolic dysfunction; 2D echocardiogram (2015) showed EF 35%; generalized hypokinesis more focally severe of the inferior and posterior segments; mild mitral regurgitation; trace tricuspud regurgitation; normal pulmonary artery pressure    Chronic systolic heart failure (HCC)     2D echocardiogram (3/24/2021) showed EF 37%; global hypokinesis of left ventricle; grade I diastolic dysfunction; 2D echocardiogram (12/17/2015) showed EF 35%; generalized hypokinesis more focally severe of the inferior and posterior segments; mild mitral regurgitation; trace tricuspud regurgitation; normal pulmonary artery pressure    Coronary artery disease involving native coronary artery of native heart     COVID-19 ruled out by laboratory testing 3/22/2021    SARS-CoV-2 ([facility], [facility]) (3/22/2021):  Not detected     Critical ischemia of lower extremity ([facility] [address] 3/22/2021    Right    Factor V Leiden mutation ([facility] [address]     Grade I diastolic dysfunction 06/43/3070    2D echocardiogram (3/24/2021) showed EF 37%; global hypokinesis of left ventricle; grade I diastolic dysfunction    History of deep venous thrombosis (DVT) of distal vein of right lower extremity     History of epilepsy     History of head injury     History of myocardial infarction     History of noncompliance with medical treatment 3/18/2021    Hypertensive heart disease with chronic systolic congestive heart failure ([facility] [address]     2D echocardiogram (3/24/2021) showed EF 37%; global hypokinesis of left ventricle; grade I diastolic dysfunction; 2D echocardiogram (12/17/2015) showed EF 35%; genealized hypokinesis more focally severe of the inferior and posterior segments; mild mitral regurgitation; trace tricuspud regurgitation; normal pulmonary artery pressure    Infection of right below knee amputation ([facility] [address] 4/7/2021    S/P Washout of right below-knee amputation; Right knee disarticulation (4/7/2021  Dr. Jed George); S/P Exploration of right knee disarticulation and control of hemorrhage (4/8/2021  Dr. Jed George)    Long term current use of aspirin     Major depressive disorder 03/24/2021    On Duloxetine    Migraine headache     Mitral valve prolapse     Mixed hyperlipidemia     Lipid profile (7/25/2018) showed , , HDL 27,     Obstructive sleep apnea     On statin therapy due to risk of future cardiovascular event     On Atorvastatin    Peripheral artery disease ([address]     Type 2 diabetes mellitus, without long-term current use of insulin ([facility])     HbA1c (4/8/2021) = 9.8; HbA1c (3/6/2021) = 77.5    Umbilical hernia     Wears glasses      Past Surgical History:   Procedure Laterality Date    HX ABOVE KNEE AMPUTATION Right 04/11/2021    S/P Right above-the-knee amputation (4/11/2021 - Dr. Galina Jones)    HX BELOW KNEE AMPUTATION Right 03/22/2021    S/P Right below-the-knee amputation (3/22/2021 - Dr. Neno Hernandez)    HX EMBOLECTOMY Right 03/05/2021    S/P Right lower extremity arterial embolectomy    HX IMPLANTABLE CARDIOVERTER DEFIBRILLATOR  2013    HX OTHER SURGICAL Right 04/08/2021    S/P Exploration of right knee disarticulation and control of hemorrhage (4/8/2021 - Dr. Denise Covington)    HX OTHER SURGICAL Right 04/08/2021    S/P Washout of right below-knee amputation; Right knee disarticulation (4/7/2021 - Dr. Denise Covington)       Problem List:    Decreased strength B LE  [x]     Decreased strength trunk/core  [x]     Decreased AROM   [x]     Decreased PROM  [x]    Decreased endurance  [x]     Decreased balance sitting  [x]     Decreased balance standing  [x]     Pain   [x]     Slow ambulation velocity  [x]    Decreased coordination  []    Decreased safety awareness  [x]      Functional Limitations:   Decreased independence with bed mobility  [x]     Decreased independence with functional transfers  [x]     Decreased independence with ambulation  [x]     Decreased independence with stair negotiation  [x]       Previous Functional Level: Patient reports that prior to initial hospitalization, he was able to perform bed mobility, transfers, gait without AD independently.  States he drove and was primary caregiver for his mother, who uses a w/c, hospital bed, w/c ramp. Just prior to his amputation, he was using a walker for limited mobility. Reports that he does not have right peripheral vision due to \"blood clot in the brain\" ~ 3 months ago.      Home Environment: Home Environment: Private residence  # Steps to Enter: 0  Wheelchair Ramp: Yes  One/Two Story Residence: One story  Living Alone: Yes  Support Systems: Friends \ neighbors  Patient Expects to be Discharged to[de-identified] Rehabilitation facility  Current DME Used/Available at Home: Glucometer    Barriers to Learning/Limitations: yes;  emotional and physical  Compensate with: visual, verbal, tactile, kinesthetic cues/model         Outcome Measures: Functional measures on IRF-VISHAL     MMT Initial Assessment   Right Lower Extremity Left Lower Extremity   Hip Flexion (not tested s/p right AKA) 4   Knee Extension (N/A) 4+   Knee Flexion (N/A) 4+   Ankle Dorsiflexion (N/A) 5   0/5 No palpable muscle contraction  1/5 Palpable muscle contraction, no joint movement  2-/5 Less than full range of motion in gravity eliminated position  2/5 Able to complete full range of motion in gravity eliminated position  2+/5 Able to initiate movement against gravity  3-/5 More than half but not full range of motion against gravity  3/5 Able to complete full range of motion against gravity  3+/5 Completes full range of motion against gravity with minimal resistance  4-/5 Completes full range of motion against gravity with minimal-moderate resistance  4/5 Completes full range of motion against gravity with moderate resistance  4+/5 Completes full range of motion against gravity with moderate-maximum resistance  5/5 Completes full range of motion against gravity with maximum resistance        GROSS ASSESSMENT Initial Assessment 4/16/2021   AROM Generally decreased, functional(right AKA)   Strength Generally decreased, functional   Coordination Within functional limits Tone Normal   Sensation Impaired(phantom pain, decreased sensation)   PROM Generally decreased, functional    PT gently wrapped right residual limb with ace wrap during therapy for protection during mobility and exercises, removed at end of session as patient's wound to be open to air per nurse. POSTURE Initial Assessment 4/16/2021   Posture (WDL) Exceptions to [facility]   Posture Assessment Forward head       BALANCE Initial Assessment 4/16/2021    Sitting - Static: Good (unsupported)  Sitting - Dynamic: Good (unsupported)  Standing - Static: Good  Standing - Dynamic : Impaired       BED/CHAIR/WHEELCHAIR TRANSFERS Initial Assessment 4/16/2021   Rolling Right 5 (Stand-by assistance)   Rolling Left 5 (Stand-by assistance)   Supine to Sit 5 (Stand-by assistance)   Sit to Stand Minimal assistance(steadying support needing cues for safety)   Sit to Supine 5 (Supervision)   Transfer Assist Score 4 (Minimal assistance)(steadying support for safety)   Transfer Type Lateral pivot   Comments  Patient performing lateral pivot transfer Cg/min A for steadying support and cues for increased clearance of buttocks and completely getting to chair from bed. Also performing sit<->stand with RW, needing steadying support of trunk for safety, slight loss of balance to the right. Using RW to get to w/c needing steadying support for safety, able to clear right foot appropriately.     Car Transfer Not tested   Car Type N/A       WHEELCHAIR MOBILITY/MANAGEMENT Initial Assessment 4/16/2021   Able to Propel (to be further assessed)   Assist Level (to be further assessed)   Curbs/ramps assistance required 0 (Not tested)   Wheelchair set up assistance required 0 (Not tested)   Wheelchair management (NT)   Comments NT       WALKING INDEPENDENCE Initial Assessment 4/16/2021   Assistive device Walker, rolling   Ambulation assistance - level surface 4 (Contact guard assistance)   Distance 15 Feet (ft)   Comments  Patient performing hop step with RW, needing seated rest after 15 ft. Patient did not indicate shortness of breath or dizziness but was fatigued after this distance. Ambulation assistance - unlevel surface (NT)       GAIT Initial Assessment 4/16/2021   Gait Description (WDL) Exceptions to WDL   Gait Abnormalities Decreased step clearance(hop to step pattern s/p right AKA)       STEPS/STAIRS Initial Assessment 4/16/2021   Steps/Stairs ambulated 0   Rail Use (NT)   Assistance Level 0 (Not tested)   Comments  Not able to safely perform. Curbs/Ramps (NT)       Therapeutic Exercises:   Left LE knee extension 2 x 15 reps with 1-2 sec holds at end range. ASSESSMENT :  Based on the objective data described above, the patient presents with right residual pain, decreased strength, endurance, and impaired balance leading to difficulty performing safe functional mobility. Patient will benefit from skilled intervention to address the above impairments. Patient's rehabilitation potential is considered to be Good  Factors which may influence rehabilitation potential include:   []         None noted  []         Mental ability/status  [x]         Medical condition  [x]         Home/family situation and support systems  [x]         Safety awareness  [x]         Pain tolerance/management  []         Other:      PLAN :  See STG and LTG above. Order received from MD for physical therapy services and chart reviewed. Pt to be seen 5 times per week for 3 hours of total therapy per day for 2 weeks. Thank you for the referral.    Pt would benefit from skilled physical therapy in order to improve independent functional mobility within the home.  Interventions may include range of motion (AROM, PROM B LE/trunk), motor function (B LE/trunk strengthening/coordination), activity tolerance (vitals, oxygen saturation levels), bed mobility training, balance activities, gait training (progressive ambulation program), wheelchair management and functional transfer training. Discharge Recommendations: Home Health  Further Equipment Recommendations for Discharge: wheelchair, RW       Activity Tolerance:   Fair to poor due to pain and fatigue. After treatment:   Patient left seated in recliner chair, call button within reach and active chair alarm set . COMMUNICATION/EDUCATION:   [x]         Fall prevention education was provided and the patient/caregiver indicated understanding. [x]         Patient/family have participated as able in goal setting and plan of care. [x]         Patient/family agree to work toward stated goals and plan of care. []         Patient understands intent and goals of therapy, but is neutral about his/her participation. []         Patient is unable to participate in goal setting and plan of care.     Thank you for this referral.  Cipriano Ventura, PT  4/16/2021

## 2021-04-16 NOTE — ROUTINE PROCESS
1740 Pt. Admitted to Rehab from [address] [place] [address] alert and oriented x4 right AKA incision staples intact pt. Denied any discomfort. Dr. Linwood Hercules was notified and stated he will have admission orders. 1800 4 eyes assessment completed with Manpreet Rogel RN sacrum redness stage 1 mepilex applied for prevention. 1830 pt. Sitting up in bed eating dinner no complaints.

## 2021-04-16 NOTE — PROGRESS NOTES
[facility]    Bedside and Verbal shift change report given to Dannielle Car RN (oncoming nurse) by Leydi Borjas RN (offgoing nurse). Report included the following information SBAR, Kardex, MAR and Recent Results.     Situation:   Code Status: Full Code   Reason for Admission: right AKA  Hospital Day: 1   Problem List:   Hospital Problems  Date Reviewed: 3/31/2021          Codes Class Noted POA    * (Principal) Status post above-knee amputation of right lower extremity ([facility] ICD-10-CM: J02.968  ICD-9-CM: V49.76  4/11/2021 Yes    Overview Signed 4/15/2021  5:14 PM by Tereso Busch MD     S/P Right above-the-knee amputation (4/11/2021  Dr. Analisa Reeves)             Ischemia of right BKA site [facility]) ICD-10-CM: T87.89, I99.8  ICD-9-CM: 997.69, 459.9  4/7/2021 Yes        Infection of right below knee amputation [facility]) ICD-10-CM: T87.43  ICD-9-CM: 997.62  4/7/2021 Yes    Overview Signed 4/15/2021  5:17 PM by Tereso Busch MD     S/P Washout of right below-knee amputation; Right knee disarticulation (4/7/2021  Dr. Gifty Carrillo); S/P Exploration of right knee disarticulation and control of hemorrhage (4/8/2021  Dr. Gifty Carrillo)             Peripheral artery disease ([facility] (Chronic) ICD-10-CM: I73.9  ICD-9-CM: 443.9  Unknown Yes        Type 2 diabetes mellitus, without long-term current use of insulin (HCC) (Chronic) ICD-10-CM: E11.9  ICD-9-CM: 250.00  Unknown Yes    Overview Addendum 4/15/2021  5:40 PM by Tereso Busch MD     HbA1c (4/8/2021) = 9.8; HbA1c (3/6/2021) = 12.9             Factor V Leiden mutation ([facility] (Chronic) ICD-10-CM: M34.80  ICD-9-CM: 289.81  Unknown Yes        Hypertensive heart disease with chronic systolic congestive heart failure (HCC) (Chronic) ICD-10-CM: I11.0, I50.22  ICD-9-CM: 402.91, 428.22  Unknown Yes    Overview Signed 3/26/2021 11:06 PM by Tereso Busch MD     2D echocardiogram (12/17/2015) showed EF 35%; genealized hypokinesis more focally severe of the inferior and posterior segments; mild mitral regurgitation; trace tricuspud regurgitation; normal pulmonary artery pressure             Anticoagulated by anticoagulation treatment (Chronic) ICD-10-CM: Z79.01  ICD-9-CM: V58.61  Unknown Yes    Overview Signed 3/26/2021  4:36 PM by Roque Moscoso MD     On Rivaroxaban             Mixed hyperlipidemia (Chronic) ICD-10-CM: E78.2  ICD-9-CM: 272.2  Unknown Yes    Overview Signed 3/26/2021 11:07 PM by Roque Moscoso MD     Lipid profile (7/25/2018) showed , , HDL 27,              Major depressive disorder ICD-10-CM: F32.9  ICD-9-CM: 296.20  3/24/2021 Yes    Overview Signed 3/26/2021 11:31 PM by Roque Moscoso MD     On Duloxetine             History of right below knee amputation [facility]) ICD-10-CM: Z89.511  ICD-9-CM: V49.75  3/22/2021 Yes    Overview Signed 3/26/2021  5:43 PM by Roque Moscoso MD     S/P Right below-the-knee amputation (3/22/2021 - Dr. Kristina López)             Impaired mobility and ADLs ICD-10-CM: Z74.09, Z78.9  ICD-9-CM: V49.89  3/22/2021 Yes        Critical ischemia of lower extremity ([facility].) ICD-10-CM: J29.520  ICD-9-CM: 459.9  3/22/2021 Yes    Overview Signed 3/26/2021  5:42 PM by Roque Moscoso MD     Right             Acute blood loss as cause of postoperative anemia ICD-10-CM: D62  ICD-9-CM: 285.1  3/22/2021 Yes        History of embolectomy ICD-10-CM: Z98.890  ICD-9-CM: V45.89  3/5/2021 Yes    Overview Signed 3/26/2021  5:39 PM by Roque Moscoso MD     S/P Right lower extremity arterial embolectomy                   Background:   Past Medical History:   Past Medical History:   Diagnosis Date    Acute blood loss as cause of postoperative anemia 3/22/2021    Anticoagulated by anticoagulation treatment     On Rivaroxaban    Cardiomyopathy ([facility] 12/17/2015    2D echocardiogram (3/24/2021) showed EF 37%; global hypokinesis of left ventricle; grade I diastolic dysfunction; 2D echocardiogram (12/17/2015) showed EF 35%; generalized hypokinesis more focally severe of the inferior and posterior segments; mild mitral regurgitation; trace tricuspud regurgitation; normal pulmonary artery pressure    Chronic systolic heart failure (HCC)     2D echocardiogram (3/24/2021) showed EF 37%; global hypokinesis of left ventricle; grade I diastolic dysfunction; 2D echocardiogram (12/17/2015) showed EF 35%; generalized hypokinesis more focally severe of the inferior and posterior segments; mild mitral regurgitation; trace tricuspud regurgitation; normal pulmonary artery pressure    Coronary artery disease involving native coronary artery of native heart     COVID-19 ruled out by laboratory testing 3/22/2021    SARS-CoV-2 ([facility] m2000, [facility]) (3/22/2021):  Not detected     Critical ischemia of lower extremity ([facility] 3/22/2021    Right    Factor V Leiden mutation ([facility]     Grade I diastolic dysfunction 93/33/1752    2D echocardiogram (3/24/2021) showed EF 37%; global hypokinesis of left ventricle; grade I diastolic dysfunction    History of deep venous thrombosis (DVT) of distal vein of right lower extremity     History of epilepsy     History of head injury     History of myocardial infarction     History of noncompliance with medical treatment 3/18/2021    Hypertensive heart disease with chronic systolic congestive heart failure ([facility]     2D echocardiogram (3/24/2021) showed EF 37%; global hypokinesis of left ventricle; grade I diastolic dysfunction; 2D echocardiogram (12/17/2015) showed EF 35%; genealized hypokinesis more focally severe of the inferior and posterior segments; mild mitral regurgitation; trace tricuspud regurgitation; normal pulmonary artery pressure    Infection of right below knee amputation ([facility] 4/7/2021    S/P Washout of right below-knee amputation; Right knee disarticulation (4/7/2021  Dr. Kyleigh Carter); S/P Exploration of right knee disarticulation and control of hemorrhage (4/8/2021  Dr. Kyleigh Carter)   Damion Piedra Long term current use of aspirin     Major depressive disorder 03/24/2021    On Duloxetine    Migraine headache     Mitral valve prolapse     Mixed hyperlipidemia     Lipid profile (7/25/2018) showed , , HDL 27,     Obstructive sleep apnea     On statin therapy due to risk of future cardiovascular event     On Atorvastatin    Peripheral artery disease ([facility]     Type 2 diabetes mellitus, without long-term current use of insulin ([facility])     HbA1c (4/8/2021) = 9.8; HbA1c (3/6/2021) = 55.3    Umbilical hernia     Wears glasses       Patient taking anticoagulants yes    Patient has a defibrillator: no     Assessment:   Changes in Assessment throughout shift: no acute changes noted throughout the shift     Patient has central line: no Reasons if yes: n/a  Insertion date:n/a Last dressing date:n/a   Patient has Ellis Cath: no Reasons if yes: n/a   Insertion date:n/a     Last Vitals:     Vitals:    04/16/21 1515 04/16/21 1615 04/16/21 1715 04/16/21 1819   BP: 114/75 111/71 105/70 112/71   Pulse: 84 82 79 85   Resp: 18 16 16 16   Temp: 98 °F (36.7 °C) 97.8 °F (36.6 °C) 97.6 °F (36.4 °C) 98.3 °F (36.8 °C)   SpO2: 99%           PAIN    Pain Assessment    Pain Intensity 1: 0 (04/16/21 1715) Pain Intensity 1: 2 (12/29/14 1105)    Pain Location 1: Leg Pain Location 1: Abdomen    Pain Intervention(s) 1: Medication (see MAR) Pain Intervention(s) 1: Medication (see MAR)  Patient Stated Pain Goal: 0 Patient Stated Pain Goal: 0  o Intervention effective: yes    o Other actions taken for pain: repositioning      Skin Assessment  Skin color Skin Color: Appropriate for ethnicity  Condition/Temperature Skin Condition/Temp: Dry, Warm  Integrity Skin Integrity: Incision (comment)  Turgor Turgor: Non-tenting  Weekly Pressure Ulcer Documentation  Pressure  Injury Documentation: Pressure Injury Noted-See Wound LDA to Document  Wound Prevention & Protection Methods  Orientation of wound Orientation of Wound Prevention: Posterior  Location of Prevention Location of Wound Prevention: Sacrum/Coccyx  Dressing Present Dressing Present : Yes  Dressing Status Dressing Status: Intact  Wound Offloading Wound Offloading (Prevention Methods): Bed, pressure redistribution/air, Bed, pressure reduction mattress, Repositioning     INTAKE/OUPUT  Date 04/15/21 1900 - 04/16/21 0659 04/16/21 0700 - 04/17/21 0659   Shift 5381-7493 24 Hour Total 2455-2817 5461-3030 24 Hour Total   INTAKE   P.O.   1320  1320     P. O.   1320  1320   Shift Total   1320  1320   OUTPUT   Urine 400 400        Urine Voided 400 400        Urine Occurrence(s) 1 x 1 x 5 x  5 x   Stool          Stool Occurrence(s) 0 x 0 x 1 x  1 x   Shift Total 400 400      NET -400 -400 1320  1320   Weight (kg)            Recommendations:  1. Patient needs and requests: education     2. Diet: Cardiac consistent carb diet with thin liquids    3. Pending tests/procedures: AM labs     4. Functional Level/Equipment: 1 person assist     5. Estimated Discharge Date: TBD Posted on Whiteboard in Patients Room: no     HEALS Safety Check    A safety check occurred in the patient's room between off going nurse and oncoming nurse listed above. The safety check included the below items  Area Items   H  High Alert Medications - Verify all high alert medication drips (heparin, PCA, etc.)   E  Equipment - Suction is set up for ALL patients (with yanker)  - Red plugs utilized for all equipment (IV pumps, etc.)  - WOWs wiped down at end of shift.  - Room stocked with oxygen, suction, and other unit-specific supplies   A  Alarms - Bed alarm is set for fall risk patients  - Ensure chair alarm is in place and activated if patient is up in a chair   L  Lines - Check IV for any infiltration  - Ellis bag is empty if patient has a Ellis   - Tubing and IV bags are labeled   S  Safety   - Room is clean, patient is clean, and equipment is clean. - Hallways are clear from equipment besides carts. - Fall bracelet on for fall risk patients  - Ensure room is clear and free of clutter  - Suction is set up for ALL patients (with faraz)  - Hallways are clear from equipment besides carts.    - Isolation precautions followed, supplies available outside room, sign posted         Lindsey Addison RN

## 2021-04-16 NOTE — PROGRESS NOTES
4/16/2021 9:01 AM    CTN reviewed chart. Noted that patient was admitted to [facility] 4/7/2021 to 4/15/2021 for an infection to the above the knee amputation. He was also tested for COVID- the result was not detected. Patient was then transferred to [facility] inpatient rehab unit. Did not call today. Will postpone one week. Will follow.

## 2021-04-16 NOTE — PROGRESS NOTES
SHIFT CHANGE NOTE FOR [facility]    Bedside and Verbal shift change report given to Melissa Arambula, RN (oncoming nurse) by Elin Eduardo RN (offgoing nurse). Report included the following information SBAR, Kardex, MAR and Recent Results.     Situation:   Code Status: Full Code   Reason for Admission: right AKA  Hospital Day: 1   Problem List:   Hospital Problems  Date Reviewed: 3/31/2021          Codes Class Noted POA    * (Principal) Status post above-knee amputation of right lower extremity ([facility].) ICD-10-CM: C66.298  ICD-9-CM: V49.76  4/11/2021 Yes    Overview Signed 4/15/2021  5:14 PM by Meghna Nava MD     S/P Right above-the-knee amputation (4/11/2021  Dr. Barbie Sandoval)             Ischemia of right BKA site [facility]) ICD-10-CM: T87.89, I99.8  ICD-9-CM: 997.69, 459.9  4/7/2021 Yes        Infection of right below knee amputation [facility]) ICD-10-CM: T87.43  ICD-9-CM: 997.62  4/7/2021 Yes    Overview Signed 4/15/2021  5:17 PM by Meghna Nava MD     S/P Washout of right below-knee amputation; Right knee disarticulation (4/7/2021  Dr. Porfirio Barker); S/P Exploration of right knee disarticulation and control of hemorrhage (4/8/2021  Dr. Porfirio Barker)             Peripheral artery disease ([facility] (Chronic) ICD-10-CM: I73.9  ICD-9-CM: 443.9  Unknown Yes        Type 2 diabetes mellitus, without long-term current use of insulin (HCC) (Chronic) ICD-10-CM: E11.9  ICD-9-CM: 250.00  Unknown Yes    Overview Addendum 4/15/2021  5:40 PM by Meghna Nava MD     HbA1c (4/8/2021) = 9.8; HbA1c (3/6/2021) = 12.9             Factor V Leiden mutation ([facility] (Chronic) ICD-10-CM: F39.10  ICD-9-CM: 289.81  Unknown Yes        Hypertensive heart disease with chronic systolic congestive heart failure (HCC) (Chronic) ICD-10-CM: I11.0, I50.22  ICD-9-CM: 402.91, 428.22  Unknown Yes    Overview Signed 3/26/2021 11:06 PM by Meghna Nava MD     2D echocardiogram (12/17/2015) showed EF 35%; genealized hypokinesis more focally severe of the inferior and posterior segments; mild mitral regurgitation; trace tricuspud regurgitation; normal pulmonary artery pressure             Anticoagulated by anticoagulation treatment (Chronic) ICD-10-CM: Z79.01  ICD-9-CM: V58.61  Unknown Yes    Overview Signed 3/26/2021  4:36 PM by Gabriella Payne MD     On Rivaroxaban             Mixed hyperlipidemia (Chronic) ICD-10-CM: E78.2  ICD-9-CM: 272.2  Unknown Yes    Overview Signed 3/26/2021 11:07 PM by Gabriella Payne MD     Lipid profile (7/25/2018) showed , , HDL 27,              Major depressive disorder ICD-10-CM: F32.9  ICD-9-CM: 296.20  3/24/2021 Yes    Overview Signed 3/26/2021 11:31 PM by Gabriella Payne MD     On Duloxetine             History of right below knee amputation [facility]) ICD-10-CM: Z89.511  ICD-9-CM: V49.75  3/22/2021 Yes    Overview Signed 3/26/2021  5:43 PM by Gabriella Payne MD     S/P Right below-the-knee amputation (3/22/2021 - Dr. Marilee Garay)             Impaired mobility and ADLs ICD-10-CM: Z74.09, Z78.9  ICD-9-CM: V49.89  3/22/2021 Yes        Critical ischemia of lower extremity ([facility].) ICD-10-CM: F75.233  ICD-9-CM: 459.9  3/22/2021 Yes    Overview Signed 3/26/2021  5:42 PM by Gabriella Payne MD     Right             Acute blood loss as cause of postoperative anemia ICD-10-CM: D62  ICD-9-CM: 285.1  3/22/2021 Yes        History of embolectomy ICD-10-CM: Z98.890  ICD-9-CM: V45.89  3/5/2021 Yes    Overview Signed 3/26/2021  5:39 PM by Gabriella Payne MD     S/P Right lower extremity arterial embolectomy                   Background:   Past Medical History:   Past Medical History:   Diagnosis Date    Acute blood loss as cause of postoperative anemia 3/22/2021    Anticoagulated by anticoagulation treatment     On Rivaroxaban    [facility]    2D echocardiogram (3/24/2021) showed EF 37%; global hypokinesis of left ventricle; grade I diastolic dysfunction; 2D echocardiogram (12/17/2015) showed EF 35%; generalized hypokinesis more focally severe of the inferior and posterior segments; mild mitral regurgitation; trace tricuspud regurgitation; normal pulmonary artery pressure    Chronic systolic heart failure (HCC)     2D echocardiogram (3/24/2021) showed EF 37%; global hypokinesis of left ventricle; grade I diastolic dysfunction; 2D echocardiogram (12/17/2015) showed EF 35%; generalized hypokinesis more focally severe of the inferior and posterior segments; mild mitral regurgitation; trace tricuspud regurgitation; normal pulmonary artery pressure    Coronary artery disease involving native coronary artery of native heart     COVID-19 ruled out by laboratory testing 3/22/2021    SARS-CoV-2 ([facility] m2000, [facility]) (3/22/2021):  Not detected     Critical ischemia of lower extremity ([facility] 3/22/2021    Right    Factor V Leiden mutation ([facility]     Grade I diastolic dysfunction 03/50/6384    2D echocardiogram (3/24/2021) showed EF 37%; global hypokinesis of left ventricle; grade I diastolic dysfunction    History of deep venous thrombosis (DVT) of distal vein of right lower extremity     History of epilepsy     History of head injury     History of myocardial infarction     History of noncompliance with medical treatment 3/18/2021    Hypertensive heart disease with chronic systolic congestive heart failure ([address]     2D echocardiogram (3/24/2021) showed EF 37%; global hypokinesis of left ventricle; grade I diastolic dysfunction; 2D echocardiogram (12/17/2015) showed EF 35%; genealized hypokinesis more focally severe of the inferior and posterior segments; mild mitral regurgitation; trace tricuspud regurgitation; normal pulmonary artery pressure    Infection of right below knee amputation ([address] 4/7/2021    S/P Washout of right below-knee amputation; Right knee disarticulation (4/7/2021  Dr. Valencia Singh); S/P Exploration of right knee disarticulation and control of hemorrhage (4/8/2021  Dr. Valencia Singh)   [facility] Long term current use of aspirin     Major depressive disorder 03/24/2021    On Duloxetine    Migraine headache     Mitral valve prolapse     Mixed hyperlipidemia     Lipid profile (7/25/2018) showed , , HDL 27,     Obstructive sleep apnea     On statin therapy due to risk of future cardiovascular event     On Atorvastatin    Peripheral artery disease ([facility]     Type 2 diabetes mellitus, without long-term current use of insulin ([facility])     HbA1c (4/8/2021) = 9.8; HbA1c (3/6/2021) = 14.6    Umbilical hernia     Wears glasses       Patient taking anticoagulants yes    Patient has a defibrillator: no     Assessment:   Changes in Assessment throughout shift: no acute changes noted throughout the shift     Patient has central line: no Reasons if yes: n/a  Insertion date:n/a Last dressing date:n/a   Patient has Ellis Cath: no Reasons if yes: n/a   Insertion date:n/a     Last Vitals:     Vitals:    04/15/21 1732 04/15/21 2000   BP: 112/74 106/66   Pulse: 94 93   Resp: 18 18   Temp: 98.5 °F (36.9 °C) 98.8 °F (37.1 °C)   SpO2: 98% 99%        PAIN    Pain Assessment    Pain Intensity 1: 7 (04/16/21 0509) Pain Intensity 1: 2 (12/29/14 1105)    Pain Location 1: Leg Pain Location 1: Abdomen    Pain Intervention(s) 1: Medication (see MAR) Pain Intervention(s) 1: Medication (see MAR)  Patient Stated Pain Goal: 0 Patient Stated Pain Goal: 0  o Intervention effective: yes    o Other actions taken for pain: repositioning      Skin Assessment  Skin color Skin Color: Appropriate for ethnicity  Condition/Temperature Skin Condition/Temp: Dry, Warm  Integrity Skin Integrity: Incision (comment)  Turgor Turgor: Non-tenting  Weekly Pressure Ulcer Documentation  Pressure  Injury Documentation: Pressure Injury Noted-See Wound LDA to Document(stage 1 to saccrum)  Wound Prevention & Protection Methods  Orientation of wound Orientation of Wound Prevention: Posterior  Location of Prevention Location of Wound Prevention: Sacrum/Coccyx  Dressing Present Dressing Present : Yes  Dressing Status Dressing Status: Intact  Wound Offloading Wound Offloading (Prevention Methods): Bed, pressure redistribution/air, Bed, pressure reduction mattress, Repositioning     INTAKE/OUPUT  Date 04/15/21 0700 - 04/16/21 0659 04/16/21 0700 - 04/17/21 0659   Shift 4764-1935 5794-5234 24 Hour Total 9712-7715 8296-1511 24 Hour Total   INTAKE   Shift Total         OUTPUT   Urine  400 400        Urine Voided  400 400        Urine Occurrence(s)  1 x 1 x      Stool           Stool Occurrence(s)  0 x 0 x      Shift Total  400 400      NET  -400 -400      Weight (kg)             Recommendations:  1. Patient needs and requests: education     2. Diet: Cardiac consistent carb diet with thin liquids    3. Pending tests/procedures: AM labs     4. Functional Level/Equipment: 1 person assist     5. Estimated Discharge Date: TBD Posted on Whiteboard in Patients Room: [facility] Safety Check    A safety check occurred in the patient's room between off going nurse and oncoming nurse listed above. The safety check included the below items  Area Items   H  High Alert Medications - Verify all high alert medication drips (heparin, PCA, etc.)   E  Equipment - Suction is set up for ALL patients (with yanker)  - Red plugs utilized for all equipment (IV pumps, etc.)  - WOWs wiped down at end of shift.  - Room stocked with oxygen, suction, and other unit-specific supplies   A  Alarms - Bed alarm is set for fall risk patients  - Ensure chair alarm is in place and activated if patient is up in a chair   L  Lines - Check IV for any infiltration  - Ellis bag is empty if patient has a Ellis   - Tubing and IV bags are labeled   S  Safety   - Room is clean, patient is clean, and equipment is clean. - Hallways are clear from equipment besides carts.    - Fall bracelet on for fall risk patients  - Ensure room is clear and free of clutter  - Suction is set up for ALL patients (with yanker)  - Hallways are clear from equipment besides carts.    - Isolation precautions followed, supplies available outside room, sign posted         Aleksey Zimmerman RN

## 2021-04-16 NOTE — ROUTINE PROCESS
1900 Bedside and Verbal report given to Trinh Love RN (on coming nurse) by Caron Abebe RN (off going nurse).

## 2021-04-16 NOTE — PROGRESS NOTES
conducted a Follow up consultation and Spiritual Assessment for Geo Jin, who is a 60 y. o.,male. The  provided the following Interventions:  Continued the relationship of care and support. The following outcomes were achieved:  Patient expressed gratitude for 's visit. Assessment:  There are no further spiritual or Anglican issues which require Spiritual Care Services interventions at this time. Plan:  Chaplains will continue to follow and will provide pastoral care on an as needed/requested basis.  recommends bedside caregivers page  on duty if patient shows signs of acute spiritual or emotional distress.        [address] [place] [address]   [phone]

## 2021-04-16 NOTE — PROGRESS NOTES
conducted an initial consultation and Spiritual Assessment for Concha Wu, who is a 60 y. o.,male. Patient's Primary Language is: [place]. According to the patient's EMR Hinduism Affiliation is: No preference.      The reason the Patient came to the hospital is:   Patient Active Problem List    Diagnosis Date Noted    Status post above-knee amputation of right lower extremity ([address] 04/11/2021    Ischemia of right BKA site ([address] 04/07/2021    Infection of right below knee amputation ([address] 04/07/2021    Constipation 03/29/2021    Peripheral artery disease ([address]     Coronary artery disease involving native coronary artery of native heart     Type 2 diabetes mellitus, without long-term current use of insulin (HCC)     History of epilepsy     Factor V Leiden mutation ([address]     Migraine headache     Wears glasses     Umbilical hernia     Obstructive sleep apnea     ICD (implantable cardioverter-defibrillator) in place     Mitral valve prolapse     History of head injury     Hypertensive heart disease with chronic systolic congestive heart failure (HCC)     History of myocardial infarction     Long term current use of aspirin     On statin therapy due to risk of future cardiovascular event     History of deep venous thrombosis (DVT) of distal vein of right lower extremity     Anticoagulated by anticoagulation treatment     Mixed hyperlipidemia     Chronic systolic heart failure ([address]     Major depressive disorder 03/24/2021    Grade I diastolic dysfunction 87/33/8647    History of right below knee amputation ([address] 03/22/2021    Impaired mobility and ADLs 03/22/2021    Critical ischemia of lower extremity ([address] 03/22/2021    COVID-19 ruled out by laboratory testing 03/22/2021    Acute blood loss as cause of postoperative anemia 03/22/2021    History of noncompliance with medical treatment 03/18/2021    History of embolectomy 03/05/2021    Cardiomyopathy ([address] 12/17/2015 The  provided the following Interventions:  Initiated a relationship of care and support. The following outcomes where achieved:  Patient expressed gratitude for 's visit. Assessment:  There are no spiritual or Yarsanism issues which require intervention at this time. Plan:  Chaplains will continue to follow and will provide pastoral care on an as needed/requested basis.  recommends bedside caregivers page  on duty if patient shows signs of acute spiritual or emotional distress.     [facility]   [phone]

## 2021-04-16 NOTE — PROGRESS NOTES
Problem: Self Care Deficits Care Plan (Adult)  Goal: *Acute Goals and Plan of Care (Insert Text)  Description: Occupational Therapy Goals   Long Term Goals  Initiated 2021 and to be accomplished within 2 week(s)  1. Pt will perform self-feeding with independence. 2. Pt will perform grooming with mod I in standing at the sink. 3. Pt will perform UB bathing with mod I with set up of necessary items. 4. Pt will perform LB bathing with mod I with set up of necessary items. 5. Pt will perform tub/shower transfer with mod I.   6. Pt will perform UB dressing with mod I with set up of necessary items. 7. Pt will perform LB dressing with mod I with set up of necessary items. 8. Pt will perform toileting task with mod I.  9. Pt will perform toilet transfer with mod I with RW. Short Term Goals   Initiated 2021 and to be accomplished within 7 day(s)  1. Pt will perform self-feeding with mod I and no set-up A.   2. Pt will perform grooming with mod I.  3. Pt will perform UB bathing with mod I.  4. Pt will perform LB bathing with mod I.  5. Pt will perform tub/shower transfer with mod I.  6. Pt will perform UB dressing with mod I.  7. Pt will perform LB dressing with mod I.  8. Pt will perform toileting task with mod I.  9. Pt will perform toilet transfer with mod I with w/c as needed. Outcome: Progressing Towards Goal     OCCUPATIONAL THERAPY EVALUATION    Patient: Can Bosch 60 y.o.   Date: 2021  Primary Diagnosis: Status post above-knee amputation of right lower extremity ([facility] [Z89.611]    Patient identified with name and : Yes    Past Medical History:   Past Medical History:   Diagnosis Date    Acute blood loss as cause of postoperative anemia 3/22/2021    Anticoagulated by anticoagulation treatment     On Rivaroxaban    Cardiomyopathy ([facility] 2015    2D echocardiogram (3/24/2021) showed EF 37%; global hypokinesis of left ventricle; grade I diastolic dysfunction; 2D echocardiogram (12/17/2015) showed EF 35%; generalized hypokinesis more focally severe of the inferior and posterior segments; mild mitral regurgitation; trace tricuspud regurgitation; normal pulmonary artery pressure    Chronic systolic heart failure (HCC)     2D echocardiogram (3/24/2021) showed EF 37%; global hypokinesis of left ventricle; grade I diastolic dysfunction; 2D echocardiogram (12/17/2015) showed EF 35%; generalized hypokinesis more focally severe of the inferior and posterior segments; mild mitral regurgitation; trace tricuspud regurgitation; normal pulmonary artery pressure    Coronary artery disease involving native coronary artery of native heart     COVID-19 ruled out by laboratory testing 3/22/2021    SARS-CoV-2 ([place] m2000, [facility]) (3/22/2021):  Not detected     Critical ischemia of lower extremity ([facility] 3/22/2021    Right    Factor V Leiden mutation ([facility]     Grade I diastolic dysfunction 90/10/5635    2D echocardiogram (3/24/2021) showed EF 37%; global hypokinesis of left ventricle; grade I diastolic dysfunction    History of deep venous thrombosis (DVT) of distal vein of right lower extremity     History of epilepsy     History of head injury     History of myocardial infarction     History of noncompliance with medical treatment 3/18/2021    Hypertensive heart disease with chronic systolic congestive heart failure ([address]     2D echocardiogram (3/24/2021) showed EF 37%; global hypokinesis of left ventricle; grade I diastolic dysfunction; 2D echocardiogram (12/17/2015) showed EF 35%; genealized hypokinesis more focally severe of the inferior and posterior segments; mild mitral regurgitation; trace tricuspud regurgitation; normal pulmonary artery pressure    Infection of right below knee amputation ([address] 4/7/2021    S/P Washout of right below-knee amputation; Right knee disarticulation (4/7/2021  Dr. Victor M Scherer); S/P Exploration of right knee disarticulation and control of hemorrhage (4/8/2021  Dr. Gamaliel Patel)   [address] term current use of aspirin     Major depressive disorder 03/24/2021    On Duloxetine    Migraine headache     Mitral valve prolapse     Mixed hyperlipidemia     Lipid profile (7/25/2018) showed , , HDL 27,     Obstructive sleep apnea     On statin therapy due to risk of future cardiovascular event     On Atorvastatin    Peripheral artery disease ([address]     Type 2 diabetes mellitus, without long-term current use of insulin ([facility])     HbA1c (4/8/2021) = 9.8; HbA1c (3/6/2021) = 14.4    Umbilical hernia     Wears glasses      Precautions: Falls    Time In: 1030  Time Out[de-identified] 1153    Pain:  Pt reports 0/10 pain or discomfort prior to treatment. Pt reports 0/10 pain or discomfort post treatment. SUBJECTIVE:   Patient stated  I love to read, but haven't been motivated to read as much lately.     OBJECTIVE DATA SUMMARY:   Pt agreeable to OT evaluation in order to assess functional self-care skills. []  Right hand dominant   [x]  Left hand dominant    Therapy Diagnosis:   Difficulty with ADLs  [x]     Difficulty with functional transfers  [x]     Difficulty with ambulation  [x]     Difficulty with IADLs  [x]       Problem List:    Decreased strength B UE  [x]     Decreased strength trunk/core  [x]     Decreased AROM   [x]     Decreased endurance  [x]     Decreased balance sitting  []     Decreased balance standing  [x]     Pain   []     Decreased PROM  [x]       Functional Limitations:   Decreased independence with ADL  [x]     Decreased independence with functional transfers  [x]     Decreased independence with ambulation  [x]     Decreased independence with IADL  [x]       Previous Functional Level:   Pt was independent with self-care tasks prior to hospital admission.     Home Environment: Home Situation  Home Environment: Private residence  # Steps to Enter: 0  Wheelchair Ramp: Yes  One/Two Story Residence: One story  Living Alone: Yes  Support Systems: Friends \ neighbors  Patient Expects to be Discharged to[de-identified] Rehabilitation facility  Current DME Used/Available at Home: Glu[employer]    Barriers to Learning/Limitations: None  Compensate with: visual, verbal, tactile, kinesthetic cues/model    Outcome Measures:      MMT Initial Assessment   Right Upper Extremity  Left Upper Extremity    UE AROM [facility]   Shoulder flexion     Shoulder extension     Shoulder ABDuction     Shoulder ADDUction     Elbow Flexion     Elbow Extension     Wrist Extension/Flexion          0/5 No palpable muscle contraction  1/5 Palpable muscle contraction, no joint movement  2-/5 Less than full range of motion in gravity eliminated position  2/5 Able to complete full range of motion in gravity eliminated position  2+/5 Able to initiate movement against gravity  3-/5 More than half but not full range of motion against gravity  3/5 Able to complete full range of motion against gravity  3+/5 Completes full range of motion against gravity with minimal resistance  4-/5 Completes full range of motion against gravity with minimal resistance  4/5 Completes full range of motion against gravity with moderate resistance  5/5 Completes full range of motion against gravity with maximum resistance    Sensation: In tact  Coordination: [facility]    FIM SCORES Initial Assessment   Bladder - level of assist 5   Bladder - accident frequency score 5   Bowel - level of assist 6   Bowel - accident frequency score 6   Please see IRC Interdisciplinary Eval: Coordination/Balance Section for details regarding FIM score description.     COGNITION/PERCEPTION Initial Assessment   Reading Status     Writing Status     Arousal/Alertness     Orientation Level Oriented X4   Visual Fields     Praxis     Body Scheme       COMPREHENSION MODE Initial Assessment   Primary Mode of Comprehension Auditory   Hearing Aide None   Corrective Lenses     Score 6     EXPRESSION Initial Assessment   Primary Mode of Expression Verbal   Score 6   Comments       SOCIAL INTERACTION/PRAGMATICS Initial Assessment   Score 6   Comments       PROBLEM SOLVING Initial Assessment   Score 6   Comments       MEMORY Initial Assessment   Score 6   Comments       EATING Initial Assessment   Functional Level 6   Comments       GROOMING Initial Assessment   Functional Level 6    Oral Hygiene FIM:6   Tasks completed by patient  Teeth brushing   Comments seated in w/c at sink     BATHING Initial Assessment   Functional Level 5 SBA   Body parts patient bathed Abdomen, Arm, left, Arm, right, Buttocks, Chest, Lower leg and foot, left, [place] area, Thigh, left, Thigh, right   Comments Seated on TTB in shower; SBA for safety     TUB/SHOWER TRANSFER INDEPENDENCE Initial Assessment   Score 5   Comments Supervision for safety. Transfer from w/c to TTB with gaitbelt and grab bars     UPPER BODY DRESSING/UNDRESSING Initial Assessment   Functional Level 6   Items applied/Steps completed  T shirt, deodorant   Comments Seated in w/c       LOWER BODY DRESSING/UNDRESSING Initial Assessment   Functional Level 5    Sock and/or Shoe Management FIM:5   Items applied/Steps completed Elastic waist pants (3 steps), Sock, left (1 step)   Comments SBA for safety     TOILETING Initial Assessment   Functional Level 5   Comments  Grab bars, regular toilet seat     TOILET TRANSFER INDEPENDENCE Initial Assessment   Transfer score 5   Comments SBA for safety from w/c with Stand pivot transfer     INSTRUMENTAL ADL Initial Assessment (PLOF)   Meal preparation     Homemaking     Medicine Management     Financial Management          ASSESSMENT :  Based on the objective data described above, the patient presents with decreased strength/ROM, endurance and participation in self-care skills. Pt is expected to have excellent progress; however requires the assistance of skilled OT in order to improve functional independence and activity tolerance.      Pt found seated up in chair and agreeable to OT eval at this time. OT able to recall events of surgery and acute stays leading up today and provided information regarding home set up. Pt currently lives alone and has limited help of family/friends. Pt tolerance toileting, bathing, dressing, and grooming tasks this session. Pt able to transfer from seated position with rw or w/c (with grab bar assistance) with SBA. Pt able to ambulate w/c to bathroom to perform toileting and bathing tasks with SBA and grab bars/gait belt for safety. Pt demonstrated ability to bathe/dress UB and LB with SBA for safety only and will progress well to mod I with continued practice and education. Pt seated in w/c at sink to perform grooming task with mod I. At end of session, educated pt on shoulder stretching activities can do to prevent stiffness/sorness including post-cap stretch, inferior-cap stretch, scap protract/retraction stretching. Also provided education regarding improving sensation around incision site on residual limb to improve sensation of varied surfaces and limit tenderness. Pt would benefit from skilled occupational therapy in order to improve independent functional mobility/ADLs,/IADLs within the home. Interventions may include range of motion (AROM, PROM B UE), motor function (B UE/ strengthening/coordination), activity tolerance (vitals, oxygen saturation levels), balance training, ADL/IADL training and functional transfer training. Please see IRC; Interdisciplinary Eval, Care Plan, and Patient Education for further information regarding occupational therapy examination and plan of care.       PLAN :  Recommendations and Planned Interventions:  [x]               Self Care Training                   [x]        Therapeutic Activities  [x]               Functional Mobility Training    []        Cognitive Retraining  [x]               Therapeutic Exercises            [x]        Endurance Activities  [x]               Balance Training [x]        Neuromuscular Re-Education  []               Visual/Perceptual Training      [x]   Home Safety Training  [x]               Patient Education                    [x]        Family Training/Education  []               Other (comment):    Frequency/Duration: Patient will be followed by occupational therapy 1-2 times per day/4-7 days per week to address goals. Discharge Recommendations: Home Health  Further Equipment Recommendations for Discharge: bedside commode, transfer bench and grab bars. Please refer to the flow sheet for vital signs taken during this treatment. After treatment:   [x] Patient left in no apparent distress sitting up in chair  [] Patient left in no apparent distress in bed  [x] Call bell left within reach  [x] Nursing notified  [] Caregiver present  [x] Chair alarm activated    COMMUNICATION/EDUCATION:   [x] Home safety education was provided and the patient/caregiver indicated understanding. [x] Patient/family have participated as able in goal setting and plan of care. [x] Patient/family agree to work toward stated goals and plan of care. [] Patient understands intent and goals of therapy, but is neutral about his/her participation. [] Patient is unable to participate in goal setting and plan of care. Order received from MD for occupational therapy services and chart reviewed. Pt to be seen 5 times per week for 3 hours of total therapy per day for 2 weeks.   Thank you for the referral.    Thank you for this referral.  LEA Scanlon, OTR/L

## 2021-04-17 LAB
ABO + RH BLD: NORMAL
BLD PROD TYP BPU: NORMAL
BLOOD GROUP ANTIBODIES SERPL: NORMAL
BPU ID: NORMAL
CALLED TO:,BCALL1: NORMAL
CROSSMATCH RESULT,%XM: NORMAL
GLUCOSE BLD STRIP.AUTO-MCNC: 143 MG/DL (ref 70–110)
GLUCOSE BLD STRIP.AUTO-MCNC: 145 MG/DL (ref 70–110)
GLUCOSE BLD STRIP.AUTO-MCNC: 180 MG/DL (ref 70–110)
GLUCOSE BLD STRIP.AUTO-MCNC: 190 MG/DL (ref 70–110)
HCT VFR BLD AUTO: 29.8 % (ref 36–48)
HGB BLD-MCNC: 9.5 G/DL (ref 13–16)
SPECIMEN EXP DATE BLD: NORMAL
STATUS OF UNIT,%ST: NORMAL
UNIT DIVISION, %UDIV: 0

## 2021-04-17 PROCEDURE — 97110 THERAPEUTIC EXERCISES: CPT

## 2021-04-17 PROCEDURE — 97542 WHEELCHAIR MNGMENT TRAINING: CPT

## 2021-04-17 PROCEDURE — 36415 COLL VENOUS BLD VENIPUNCTURE: CPT

## 2021-04-17 PROCEDURE — 65310000000 HC RM PRIVATE REHAB

## 2021-04-17 PROCEDURE — 74011250637 HC RX REV CODE- 250/637: Performed by: INTERNAL MEDICINE

## 2021-04-17 PROCEDURE — 85018 HEMOGLOBIN: CPT

## 2021-04-17 PROCEDURE — 97116 GAIT TRAINING THERAPY: CPT

## 2021-04-17 PROCEDURE — 74011636637 HC RX REV CODE- 636/637: Performed by: INTERNAL MEDICINE

## 2021-04-17 PROCEDURE — 82962 GLUCOSE BLOOD TEST: CPT

## 2021-04-17 PROCEDURE — 97530 THERAPEUTIC ACTIVITIES: CPT

## 2021-04-17 RX ADMIN — Medication 250 MG: at 08:38

## 2021-04-17 RX ADMIN — ACETAMINOPHEN 650 MG: 325 TABLET, FILM COATED ORAL at 08:39

## 2021-04-17 RX ADMIN — OXYCODONE HYDROCHLORIDE 10 MG: 5 TABLET ORAL at 17:09

## 2021-04-17 RX ADMIN — EZETIMIBE 10 MG: 10 TABLET ORAL at 08:39

## 2021-04-17 RX ADMIN — GABAPENTIN 100 MG: 100 CAPSULE ORAL at 20:34

## 2021-04-17 RX ADMIN — GABAPENTIN 100 MG: 100 CAPSULE ORAL at 08:39

## 2021-04-17 RX ADMIN — DULOXETINE HYDROCHLORIDE 30 MG: 30 CAPSULE, DELAYED RELEASE ORAL at 08:38

## 2021-04-17 RX ADMIN — METFORMIN HYDROCHLORIDE 500 MG: 500 TABLET ORAL at 08:41

## 2021-04-17 RX ADMIN — RIVAROXABAN 20 MG: 20 TABLET, FILM COATED ORAL at 08:40

## 2021-04-17 RX ADMIN — CARVEDILOL 3.12 MG: 6.25 TABLET, FILM COATED ORAL at 08:39

## 2021-04-17 RX ADMIN — Medication 1 TABLET: at 08:39

## 2021-04-17 RX ADMIN — ZOLPIDEM TARTRATE 5 MG: 5 TABLET ORAL at 20:58

## 2021-04-17 RX ADMIN — INSULIN LISPRO 2 UNITS: 100 INJECTION, SOLUTION INTRAVENOUS; SUBCUTANEOUS at 12:17

## 2021-04-17 RX ADMIN — ACETAMINOPHEN 650 MG: 325 TABLET, FILM COATED ORAL at 12:18

## 2021-04-17 RX ADMIN — INSULIN GLARGINE 5 UNITS: 100 INJECTION, SOLUTION SUBCUTANEOUS at 20:58

## 2021-04-17 RX ADMIN — CARVEDILOL 3.12 MG: 6.25 TABLET, FILM COATED ORAL at 17:01

## 2021-04-17 RX ADMIN — DOCUSATE SODIUM 100 MG: 100 CAPSULE, LIQUID FILLED ORAL at 08:40

## 2021-04-17 RX ADMIN — Medication 5 MG: at 20:34

## 2021-04-17 RX ADMIN — GABAPENTIN 100 MG: 100 CAPSULE ORAL at 13:30

## 2021-04-17 RX ADMIN — ACETAMINOPHEN 650 MG: 325 TABLET, FILM COATED ORAL at 17:01

## 2021-04-17 RX ADMIN — ATORVASTATIN CALCIUM 80 MG: 40 TABLET, FILM COATED ORAL at 08:38

## 2021-04-17 RX ADMIN — OXYCODONE HYDROCHLORIDE 10 MG: 5 TABLET ORAL at 08:42

## 2021-04-17 RX ADMIN — METFORMIN HYDROCHLORIDE 500 MG: 500 TABLET ORAL at 17:01

## 2021-04-17 RX ADMIN — ASPIRIN 81 MG CHEWABLE TABLET 81 MG: 81 TABLET CHEWABLE at 08:39

## 2021-04-17 RX ADMIN — OXYCODONE HYDROCHLORIDE 10 MG: 5 TABLET ORAL at 12:43

## 2021-04-17 RX ADMIN — METHOCARBAMOL 500 MG: 500 TABLET ORAL at 20:35

## 2021-04-17 RX ADMIN — METHOCARBAMOL 500 MG: 500 TABLET ORAL at 13:30

## 2021-04-17 RX ADMIN — METHOCARBAMOL 500 MG: 500 TABLET ORAL at 08:40

## 2021-04-17 NOTE — PROGRESS NOTES
Problem: Mobility Impaired (Adult and Pediatric)  Goal: *Therapy Goal (Edit Goal, Insert Text)  Description: Physical Therapy Short Term Goals  Initiated 4/16/2021 and to be accomplished within 7 day(s) on 4/23/2021:  1. Patient will move from supine to sit and sit to supine , scoot up and down, and roll side to side in bed with modified independence. 2.  Patient will transfer from bed to chair and chair to bed with supervision/set-up using the least restrictive device. 3.  Patient will perform sit to stand with supervision/set-up. 4.  Patient will ambulate with supervision/set-up for 15 feet with the least restrictive device. 5.  Patient will perform w/c mobility for 150 ft at supervision level over even surfaces. Physical Therapy Long Term Goals  Initiated 4/16/2021 and to be accomplished within 14 day(s) on 4/30/2021:  1. Patient will move from supine to sit and sit to supine , scoot up and down, and roll side to side in bed with independence. 2.  Patient will transfer from bed to chair and chair to bed with modified independence using the least restrictive device. 3.  Patient will perform sit to stand with modified independence. 4.  Patient will ambulate with modified independence for 25 feet with the least restrictive device. 5.  Patient will perform w/c mobility for at least 150 ft at modified independent level. 6.  Patient will ascend/descend ramp for safe entry/exit of home using wheelchair with supervision. Outcome: Progressing Towards Goal   PHYSICAL THERAPY TREATMENT    Patient: Jonathan Tabares (54 y.o. male)  Date: 4/17/2021  Diagnosis: Status post above-knee amputation of right lower extremity (HCC) [Z89.611] Status post above-knee amputation of right lower extremity (HCC)  Precautions: Fall(right AKA)  Chart, physical therapy assessment, plan of care and goals were reviewed. Time In:1140  Time Out:1240    Patient seen for: Gait training;Patient education; Therapeutic exercise;Transfer training; Wheelchair mobility    Pain:  Pt pain was reported as 7/10 (R residual limb) pre-treatment. Pt pain was reported as 8/10 (residual limg) post-treatment. Intervention: pt had pain meds prior to PT session and also got scheduled pain meds after PT session. Patient identified with name and : yes    SUBJECTIVE:      Pt noted pain in R residual limb \"it's weird, it's in different spots. Some of it is phantom pain at times, but mostly it's in the leg that's left. \"  Pt agreeable to participate in PT today. Pt verbalized feeling \"like I tire out quickly still\" (educated pt that this is normal considering what he has gone through and being in bed and a second surgery)    OBJECTIVE DATA SUMMARY:    Objective: pt's chart reviewed and Hg level today was 9.5 after transfusion yesterday.   Pt also cleared from droplet precautions and was able to come out of his room today to participate in PT.    BED/MAT MOBILITY Daily Assessment     Rolling Right : 5 (Supervision)  Rolling Left : 5 (Supervision)  Supine to Sit : 5 (Stand-by assistance)  Sit to Supine : 5 (Supervision)      TRANSFERS Daily Assessment     Transfer Type: Lateral pivot  Other: stand step/hop w/RW  Transfer Assistance : 4 (Contact guard assistance)(for steadying support)  Sit to Stand Assistance: Contact guard assistance        GAIT Daily Assessment    Gait Description (WDL) Exceptions to WDL    Gait Abnormalities Decreased step clearance(hop to gait patten s/p R AKA)    Assistive device Walker, rolling;Gait belt    Ambulation assistance - level surface 4 (Contact guard assistance)    Distance 25 Feet (ft)(x2 trials, w/seated rest break between)    Ambulation assistance- uneven surface NT     Comments Pt fatigued after this distance        STEPS/STAIRS Daily Assessment     Steps/Stairs ambulated 3(4\" steps)    Assistance Required 4 (Minimal assistance)    Rail Use Both    Comments  Min A for steadying balance    Curbs/Ramps NT BALANCE Daily Assessment     Sitting - Static: Good (unsupported)  Sitting - Dynamic: Good (unsupported)  Standing - Static: Good(with RW support)  Standing - Dynamic : Impaired        WHEELCHAIR MOBILITY Daily Assessment     Able to Propel (ft): 150 feet  Functional Level: 5  Curbs/Ramps Assist Required (FIM Score): 0 (Not tested)  Wheelchair Setup Assist Required : 4 (Minimal assistance)  Wheelchair Management: Manages left brake;Manages right brake        THERAPEUTIC EXERCISES Daily Assessment     Extremity: Both  Exercise Type #1: Supine lower extremity strengthening(AROM: hip flex, hip abd, bridges)  Sets Performed: 2  Reps Performed: 10  Level of Assist: Supervision  Exercise Type #2: Other (comment)(prone AROM hip ext; and hip flexor positional stretch)  Sets Performed: 2  Reps Performed: 10  Level of Assist: Supervision  Exercise Type #3: Seated lower extremity strengthening(AROM ankle pumps w/L ankle)  Sets Performed: 2  Reps Performed: 10  Level of Assist: Supervision       Neuro Re-Education:  Pt stood static stand at RW for 2 minutes with SBA; noted pt's arms were quivering during this as if he was pushing hard on UE's for support, but pt denied feeling like he was pushing hard with his arms. ASSESSMENT:  Pt seen for gait training, w/c mobility training, therex, and transfers training. Pt tolerated session without c/o's. PT able to address w/c mobility training today as well as initiate stair training on 4\" steps. Pt worked on transfers both lateral pivot and stand step with RW today. Pt remains motivated and willing to work with PT. Progression toward goals:  [x]      Improving appropriately and progressing toward goals  []      Improving slowly and progressing toward goals  []      Not making progress toward goals and plan of care will be adjusted      PLAN:  Patient continues to benefit from skilled intervention to address the above impairments.   Continue treatment per established plan of care. Discharge Recommendations:  Home Health  Further Equipment Recommendations for Discharge:  rolling walker, wheelchair       Estimated Discharge Date: 4/30/21    Activity Tolerance:   Fair +, pt without c/o dizziness, SOB, or nausea. Pt does have pain issues and need for periodic rest breaks. Please refer to the flowsheet for vital signs taken during this treatment. After treatment:   Patient left sitting up at EOB with tray table in front of him and lunch tray on it. Call light within reach.       Nicolasa Gonsalez, PT

## 2021-04-17 NOTE — PROGRESS NOTES
SHIFT CHANGE NOTE FOR [facility]    Bedside and Verbal shift change report given to Diane French RN (oncoming nurse) by Landy Ordonez RN (offgoing nurse). Report included the following information SBAR, Kardex, MAR and Recent Results.     Situation:   Code Status: Full Code   Reason for Admission: right AKA  Hospital Day: 2   Problem List:   Hospital Problems  Date Reviewed: 4/17/2021          Codes Class Noted POA    * (Principal) Status post above-knee amputation of right lower extremity ([facility].) ICD-10-CM: A55.782  ICD-9-CM: V49.76  4/11/2021 Yes    Overview Signed 4/15/2021  5:14 PM by Gabriella Payne MD     S/P Right above-the-knee amputation (4/11/2021  Dr. Saniya Dietz)             Ischemia of right BKA site [facility]) ICD-10-CM: T87.89, I99.8  ICD-9-CM: 997.69, 459.9  4/7/2021 Yes        Infection of right below knee amputation [facility]) ICD-10-CM: T87.43  ICD-9-CM: 997.62  4/7/2021 Yes    Overview Signed 4/15/2021  5:17 PM by Gabriella Payne MD     S/P Washout of right below-knee amputation; Right knee disarticulation (4/7/2021  Dr. Artemio Farley); S/P Exploration of right knee disarticulation and control of hemorrhage (4/8/2021  Dr. Artemio Farley)             Peripheral artery disease ([facility] (Chronic) ICD-10-CM: I73.9  ICD-9-CM: 443.9  Unknown Yes        Type 2 diabetes mellitus, without long-term current use of insulin (HCC) (Chronic) ICD-10-CM: E11.9  ICD-9-CM: 250.00  Unknown Yes    Overview Addendum 4/15/2021  5:40 PM by Gabriella Payne MD     HbA1c (4/8/2021) = 9.8; HbA1c (3/6/2021) = 12.9             Factor V Leiden mutation ([facility] (Chronic) ICD-10-CM: L83.95  ICD-9-CM: 289.81  Unknown Yes        Hypertensive heart disease with chronic systolic congestive heart failure (HCC) (Chronic) ICD-10-CM: I11.0, I50.22  ICD-9-CM: 402.91, 428.22  Unknown Yes    Overview Signed 3/26/2021 11:06 PM by Gabriella Payne MD     2D echocardiogram (12/17/2015) showed EF 35%; genealized hypokinesis more focally severe of the inferior and posterior segments; mild mitral regurgitation; trace tricuspud regurgitation; normal pulmonary artery pressure             Anticoagulated by anticoagulation treatment (Chronic) ICD-10-CM: Z79.01  ICD-9-CM: V58.61  Unknown Yes    Overview Signed 3/26/2021  4:36 PM by Mateus Pendleton MD     On Rivaroxaban             Mixed hyperlipidemia (Chronic) ICD-10-CM: E78.2  ICD-9-CM: 272.2  Unknown Yes    Overview Signed 3/26/2021 11:07 PM by Mateus Pendleton MD     Lipid profile (7/25/2018) showed , , HDL 27,              Major depressive disorder ICD-10-CM: F32.9  ICD-9-CM: 296.20  3/24/2021 Yes    Overview Signed 3/26/2021 11:31 PM by Mateus Pendleton MD     On Duloxetine             History of right below knee amputation [facility]) ICD-10-CM: Z89.511  ICD-9-CM: V49.75  3/22/2021 Yes    Overview Signed 3/26/2021  5:43 PM by Mateus Pendleton MD     S/P Right below-the-knee amputation (3/22/2021 - Dr. Nicci Huggins)             Impaired mobility and ADLs ICD-10-CM: Z74.09, Z78.9  ICD-9-CM: V49.89  3/22/2021 Yes        Critical ischemia of lower extremity ([facility] ICD-10-CM: M15.579  ICD-9-CM: 459.9  3/22/2021 Yes    Overview Signed 3/26/2021  5:42 PM by Mateus Pendleton MD     Right             Acute blood loss as cause of postoperative anemia ICD-10-CM: D62  ICD-9-CM: 285.1  3/22/2021 Yes        History of embolectomy ICD-10-CM: Z98.890  ICD-9-CM: V45.89  3/5/2021 Yes    Overview Signed 3/26/2021  5:39 PM by Mateus Pendleton MD     S/P Right lower extremity arterial embolectomy                   Background:   Past Medical History:   Past Medical History:   Diagnosis Date    Acute blood loss as cause of postoperative anemia 3/22/2021    Anticoagulated by anticoagulation treatment     On Rivaroxaban    Cardiomyopathy ([facility] 12/17/2015    2D echocardiogram (3/24/2021) showed EF 37%; global hypokinesis of left ventricle; grade I diastolic dysfunction; 2D echocardiogram (12/17/2015) showed EF 35%; generalized hypokinesis more focally severe of the inferior and posterior segments; mild mitral regurgitation; trace tricuspud regurgitation; normal pulmonary artery pressure    Chronic systolic heart failure (HCC)     2D echocardiogram (3/24/2021) showed EF 37%; global hypokinesis of left ventricle; grade I diastolic dysfunction; 2D echocardiogram (12/17/2015) showed EF 35%; generalized hypokinesis more focally severe of the inferior and posterior segments; mild mitral regurgitation; trace tricuspud regurgitation; normal pulmonary artery pressure    Coronary artery disease involving native coronary artery of native heart     COVID-19 ruled out by laboratory testing 3/22/2021    SARS-CoV-2 (Frey m2000, [facility]) (3/22/2021):  Not detected     Critical ischemia of lower extremity ([facility] 3/22/2021    Right    Factor V Leiden mutation ([facility]     Grade I diastolic dysfunction 48/55/8715    2D echocardiogram (3/24/2021) showed EF 37%; global hypokinesis of left ventricle; grade I diastolic dysfunction    History of deep venous thrombosis (DVT) of distal vein of right lower extremity     History of epilepsy     History of head injury     History of myocardial infarction     History of noncompliance with medical treatment 3/18/2021    Hypertensive heart disease with chronic systolic congestive heart failure ([facility]     2D echocardiogram (3/24/2021) showed EF 37%; global hypokinesis of left ventricle; grade I diastolic dysfunction; 2D echocardiogram (12/17/2015) showed EF 35%; genealized hypokinesis more focally severe of the inferior and posterior segments; mild mitral regurgitation; trace tricuspud regurgitation; normal pulmonary artery pressure    Infection of right below knee amputation ([facility] 4/7/2021    S/P Washout of right below-knee amputation; Right knee disarticulation (4/7/2021  Dr. Victor M Scherer); S/P Exploration of right knee disarticulation and control of hemorrhage (4/8/2021  Dr. Victor M Scherer)   Lana Negron Long term current use of aspirin     Major depressive disorder 03/24/2021    On Duloxetine    Migraine headache     Mitral valve prolapse     Mixed hyperlipidemia     Lipid profile (7/25/2018) showed , , HDL 27,     Obstructive sleep apnea     On statin therapy due to risk of future cardiovascular event     On Atorvastatin    Peripheral artery disease ([facility]     Type 2 diabetes mellitus, without long-term current use of insulin ([facility])     HbA1c (4/8/2021) = 9.8; HbA1c (3/6/2021) = 37.3    Umbilical hernia     Wears glasses       Patient taking anticoagulants yes    Patient has a defibrillator: no     Assessment:   Changes in Assessment throughout shift: no acute changes noted throughout the shift     Patient has central line: no Reasons if yes: n/a  Insertion date:n/a Last dressing date:n/a   Patient has Ellis Cath: no Reasons if yes: n/a   Insertion date:n/a     Last Vitals:     Vitals:    04/16/21 2026 04/17/21 0748 04/17/21 1358 04/17/21 1651   BP: 109/67 116/76  121/73   Pulse: 81 90  88   Resp: 16 17  18   Temp: 97 °F (36.1 °C) 98 °F (36.7 °C)  97.3 °F (36.3 °C)   SpO2: 99% 98%  99%   Height:   6' 2\" (1.88 m)         PAIN    Pain Assessment    Pain Intensity 1: 6 (04/17/21 1800) Pain Intensity 1: 2 (12/29/14 1105)    Pain Location 1: Leg Pain Location 1: Abdomen    Pain Intervention(s) 1: Medication (see MAR) Pain Intervention(s) 1: Medication (see MAR)  Patient Stated Pain Goal: 0 Patient Stated Pain Goal: 0  o Intervention effective: yes    o Other actions taken for pain: repositioning      Skin Assessment  Skin color Skin Color: Appropriate for ethnicity  Condition/Temperature Skin Condition/Temp: Dry, Warm  Integrity Skin Integrity: Incision (comment)  Turgor Turgor: Non-tenting  Weekly Pressure Ulcer Documentation  Pressure  Injury Documentation: Pressure Injury Noted-See Wound LDA to Document  Wound Prevention & Protection Methods  Orientation of wound Orientation of Wound Prevention: Posterior  Location of Prevention Location of Wound Prevention: Sacrum/Coccyx  Dressing Present Dressing Present : Yes  Dressing Status Dressing Status: Intact  Wound Offloading Wound Offloading (Prevention Methods): Bed, pressure reduction mattress     INTAKE/OUPUT  Date 04/16/21 1900 - 04/17/21 0659 04/17/21 0700 - 04/18/21 0659   Shift 3400-6452 24 Hour Total 6100-4321 9601-2819 24 Hour Total   INTAKE   P.O.  1320        P. O.  1320      Shift Total  1320      OUTPUT   Urine          Urine Occurrence(s) 0 x 5 x 4 x  4 x   Stool          Stool Occurrence(s) 0 x 1 x 1 x  1 x   Shift Total        NET  1320      Weight (kg)            Recommendations:  1. Patient needs and requests: education     2. Diet: Cardiac consistent carb diet with thin liquids    3. Pending tests/procedures: AM labs     4. Functional Level/Equipment: 1 person assist     5. Estimated Discharge Date: TBD Posted on Whiteboard in Patients Room: [facility] Safety Check    A safety check occurred in the patient's room between off going nurse and oncoming nurse listed above. The safety check included the below items  Area Items   H  High Alert Medications - Verify all high alert medication drips (heparin, PCA, etc.)   E  Equipment - Suction is set up for ALL patients (with sendyker)  - Red plugs utilized for all equipment (IV pumps, etc.)  - WOWs wiped down at end of shift.  - Room stocked with oxygen, suction, and other unit-specific supplies   A  Alarms - Bed alarm is set for fall risk patients  - Ensure chair alarm is in place and activated if patient is up in a chair   L  Lines - Check IV for any infiltration  - Ellis bag is empty if patient has a Ellis   - Tubing and IV bags are labeled   S  Safety   - Room is clean, patient is clean, and equipment is clean. - Hallways are clear from equipment besides carts.    - Fall bracelet on for fall risk patients  - Ensure room is clear and free of clutter  - Suction is set up for ALL patients (with yanker)  - Hallways are clear from equipment besides carts.    - Isolation precautions followed, supplies available outside room, sign posted         Dawn Suh RN

## 2021-04-17 NOTE — PROGRESS NOTES
[facility]    Bedside and Verbal shift change report given to Era Ruiz RN (oncoming nurse) by Annalise Porter RN (offgoing nurse). Report included the following information SBAR, Kardex, MAR and Recent Results.     Situation:   Code Status: Full Code   Reason for Admission: right AKA  Hospital Day: 2   Problem List:   Hospital Problems  Date Reviewed: 3/31/2021          Codes Class Noted POA    * (Principal) Status post above-knee amputation of right lower extremity ([facility] ICD-10-CM: F16.842  ICD-9-CM: V49.76  4/11/2021 Yes    Overview Signed 4/15/2021  5:14 PM by Mateusz Luna MD     S/P Right above-the-knee amputation (4/11/2021  Dr. Julius David)             Ischemia of right BKA site [facility]) ICD-10-CM: T87.89, I99.8  ICD-9-CM: 997.69, 459.9  4/7/2021 Yes        Infection of right below knee amputation [facility]) ICD-10-CM: T87.43  ICD-9-CM: 997.62  4/7/2021 Yes    Overview Signed 4/15/2021  5:17 PM by Mateusz Luna MD     S/P Washout of right below-knee amputation; Right knee disarticulation (4/7/2021  Dr. Kyleigh Carter); S/P Exploration of right knee disarticulation and control of hemorrhage (4/8/2021  Dr. Kyleigh Carter)             Peripheral artery disease ([facility] (Chronic) ICD-10-CM: I73.9  ICD-9-CM: 443.9  Unknown Yes        Type 2 diabetes mellitus, without long-term current use of insulin (HCC) (Chronic) ICD-10-CM: E11.9  ICD-9-CM: 250.00  Unknown Yes    Overview Addendum 4/15/2021  5:40 PM by Mateusz Luna MD     HbA1c (4/8/2021) = 9.8; HbA1c (3/6/2021) = 12.9             Factor V Leiden mutation ([facility] (Chronic) ICD-10-CM: L37.22  ICD-9-CM: 289.81  Unknown Yes        Hypertensive heart disease with chronic systolic congestive heart failure (HCC) (Chronic) ICD-10-CM: I11.0, I50.22  ICD-9-CM: 402.91, 428.22  Unknown Yes    Overview Signed 3/26/2021 11:06 PM by Mateusz Luna MD     2D echocardiogram (12/17/2015) showed EF 35%; genealized hypokinesis more focally severe of the inferior and posterior segments; mild mitral regurgitation; trace tricuspud regurgitation; normal pulmonary artery pressure             Anticoagulated by anticoagulation treatment (Chronic) ICD-10-CM: Z79.01  ICD-9-CM: V58.61  Unknown Yes    Overview Signed 3/26/2021  4:36 PM by Analy Poole MD     On Rivaroxaban             Mixed hyperlipidemia (Chronic) ICD-10-CM: E78.2  ICD-9-CM: 272.2  Unknown Yes    Overview Signed 3/26/2021 11:07 PM by Analy Poole MD     Lipid profile (7/25/2018) showed , , HDL 27,              Major depressive disorder ICD-10-CM: F32.9  ICD-9-CM: 296.20  3/24/2021 Yes    Overview Signed 3/26/2021 11:31 PM by Analy Poole MD     On Duloxetine             History of right below knee amputation [facility]) ICD-10-CM: Z89.511  ICD-9-CM: V49.75  3/22/2021 Yes    Overview Signed 3/26/2021  5:43 PM by Analy Poole MD     S/P Right below-the-knee amputation (3/22/2021 - Dr. Alli Mark)             Impaired mobility and ADLs ICD-10-CM: Z74.09, Z78.9  ICD-9-CM: V49.89  3/22/2021 Yes        Critical ischemia of lower extremity ([facility] ICD-10-CM: Y35.704  ICD-9-CM: 459.9  3/22/2021 Yes    Overview Signed 3/26/2021  5:42 PM by Analy Poole MD     Right             Acute blood loss as cause of postoperative anemia ICD-10-CM: D62  ICD-9-CM: 285.1  3/22/2021 Yes        History of embolectomy ICD-10-CM: Z98.890  ICD-9-CM: V45.89  3/5/2021 Yes    Overview Signed 3/26/2021  5:39 PM by Analy Poole MD     S/P Right lower extremity arterial embolectomy                   Background:   Past Medical History:   Past Medical History:   Diagnosis Date    Acute blood loss as cause of postoperative anemia 3/22/2021    Anticoagulated by anticoagulation treatment     On Rivaroxaban    [facility]    2D echocardiogram (3/24/2021) showed EF 37%; global hypokinesis of left ventricle; grade I diastolic dysfunction; 2D echocardiogram (12/17/2015) showed EF 35%; generalized hypokinesis more focally severe of the inferior and posterior segments; mild mitral regurgitation; trace tricuspud regurgitation; normal pulmonary artery pressure    Chronic systolic heart failure (HCC)     2D echocardiogram (3/24/2021) showed EF 37%; global hypokinesis of left ventricle; grade I diastolic dysfunction; 2D echocardiogram (12/17/2015) showed EF 35%; generalized hypokinesis more focally severe of the inferior and posterior segments; mild mitral regurgitation; trace tricuspud regurgitation; normal pulmonary artery pressure    Coronary artery disease involving native coronary artery of native heart     COVID-19 ruled out by laboratory testing 3/22/2021    SARS-CoV-2 ([facility] m2000, [facility]) (3/22/2021):  Not detected     Critical ischemia of lower extremity ([facility] 3/22/2021    Right    Factor V Leiden mutation ([facility]     Grade I diastolic dysfunction 17/82/9909    2D echocardiogram (3/24/2021) showed EF 37%; global hypokinesis of left ventricle; grade I diastolic dysfunction    History of deep venous thrombosis (DVT) of distal vein of right lower extremity     History of epilepsy     History of head injury     History of myocardial infarction     History of noncompliance with medical treatment 3/18/2021    Hypertensive heart disease with chronic systolic congestive heart failure ([address]     2D echocardiogram (3/24/2021) showed EF 37%; global hypokinesis of left ventricle; grade I diastolic dysfunction; 2D echocardiogram (12/17/2015) showed EF 35%; genealized hypokinesis more focally severe of the inferior and posterior segments; mild mitral regurgitation; trace tricuspud regurgitation; normal pulmonary artery pressure    Infection of right below knee amputation ([address] 4/7/2021    S/P Washout of right below-knee amputation; Right knee disarticulation (4/7/2021  Dr. Kim Florian); S/P Exploration of right knee disarticulation and control of hemorrhage (4/8/2021  Dr. Kim Florian)   [facility] Long term current use of aspirin     Major depressive disorder 03/24/2021    On Duloxetine    Migraine headache     Mitral valve prolapse     Mixed hyperlipidemia     Lipid profile (7/25/2018) showed , , HDL 27,     Obstructive sleep apnea     On statin therapy due to risk of future cardiovascular event     On Atorvastatin    Peripheral artery disease ([facility]     Type 2 diabetes mellitus, without long-term current use of insulin ([facility])     HbA1c (4/8/2021) = 9.8; HbA1c (3/6/2021) = 89.0    Umbilical hernia     Wears glasses       Patient taking anticoagulants yes    Patient has a defibrillator: no     Assessment:   Changes in Assessment throughout shift: no acute changes noted throughout the shift     Patient has central line: no Reasons if yes: n/a  Insertion date:n/a Last dressing date:n/a   Patient has Ellis Cath: no Reasons if yes: n/a   Insertion date:n/a     Last Vitals:     Vitals:    04/16/21 1615 04/16/21 1715 04/16/21 1819 04/16/21 2026   BP: 111/71 105/70 112/71 109/67   Pulse: 82 79 85 81   Resp: 16 16 16 16   Temp: 97.8 °F (36.6 °C) 97.6 °F (36.4 °C) 98.3 °F (36.8 °C) 97 °F (36.1 °C)   SpO2:    99%        PAIN    Pain Assessment    Pain Intensity 1: 0 (04/17/21 0400) Pain Intensity 1: 2 (12/29/14 1105)    Pain Location 1: Leg Pain Location 1: Abdomen    Pain Intervention(s) 1: Medication (see MAR) Pain Intervention(s) 1: Medication (see MAR)  Patient Stated Pain Goal: 0 Patient Stated Pain Goal: 0  o Intervention effective: yes    o Other actions taken for pain: repositioning      Skin Assessment  Skin color Skin Color: Appropriate for ethnicity  Condition/Temperature Skin Condition/Temp: Warm  Integrity Skin Integrity: Incision (comment)  Turgor Turgor: Non-tenting  Weekly Pressure Ulcer Documentation  Pressure  Injury Documentation: Pressure Injury Noted-See Wound LDA to Document  Wound Prevention & Protection Methods  Orientation of wound Orientation of Wound Prevention: Posterior  Location of Prevention Location of Wound Prevention: Sacrum/Coccyx  Dressing Present Dressing Present : Yes  Dressing Status Dressing Status: Intact  Wound Offloading Wound Offloading (Prevention Methods): Bed, pressure redistribution/air, Bed, pressure reduction mattress     INTAKE/OUPUT  Date 04/16/21 0700 - 04/17/21 0659 04/17/21 0700 - 04/18/21 0659   Shift 9021-8299 1281-7124 24 Hour Total 8393-9704 1439-4895 24 Hour Total   INTAKE   P.O. 1320  1320        P. O. 1320  1320      Shift Total 1320  1320      OUTPUT   Urine           Urine Occurrence(s) 5 x 0 x 5 x      Stool           Stool Occurrence(s) 1 x 0 x 1 x      Shift Total         NET 1320  1320      Weight (kg)             Recommendations:  1. Patient needs and requests: education     2. Diet: Cardiac consistent carb diet with thin liquids    3. Pending tests/procedures: AM labs     4. Functional Level/Equipment: 1 person assist     5. Estimated Discharge Date: TBD Posted on Whiteboard in Patients Room: [facility] Safety Check    A safety check occurred in the patient's room between off going nurse and oncoming nurse listed above. The safety check included the below items  Area Items   H  High Alert Medications - Verify all high alert medication drips (heparin, PCA, etc.)   E  Equipment - Suction is set up for ALL patients (with yanker)  - Red plugs utilized for all equipment (IV pumps, etc.)  - WOWs wiped down at end of shift.  - Room stocked with oxygen, suction, and other unit-specific supplies   A  Alarms - Bed alarm is set for fall risk patients  - Ensure chair alarm is in place and activated if patient is up in a chair   L  Lines - Check IV for any infiltration  - Ellis bag is empty if patient has a Ellis   - Tubing and IV bags are labeled   S  Safety   - Room is clean, patient is clean, and equipment is clean. - Hallways are clear from equipment besides carts.    - Fall bracelet on for fall risk patients  - Ensure room is clear and free of clutter  - Suction is set up for ALL patients (with faraz)  - Hallways are clear from equipment besides carts.    - Isolation precautions followed, supplies available outside room, sign posted         Eleni Jensen RN

## 2021-04-17 NOTE — PROGRESS NOTES
[facility]    Bedside and Verbal shift change report given to UMU Morales (oncoming nurse) by Colonel Faustin (offgoing nurse). Report included the following information SBAR, Kardex, MAR and Recent Results.     Situation:   Code Status: Full Code   Reason for Admission: right AKA  Hospital Day: 1   Problem List:   Hospital Problems  Date Reviewed: 3/31/2021          Codes Class Noted POA    * (Principal) Status post above-knee amputation of right lower extremity ([facility] ICD-10-CM: Y69.922  ICD-9-CM: V49.76  4/11/2021 Yes    Overview Signed 4/15/2021  5:14 PM by Carlos Barajas MD     S/P Right above-the-knee amputation (4/11/2021  Dr. Christy Mcduffie)             Ischemia of right BKA site [facility]) ICD-10-CM: T87.89, I99.8  ICD-9-CM: 997.69, 459.9  4/7/2021 Yes        Infection of right below knee amputation [facility]) ICD-10-CM: T87.43  ICD-9-CM: 997.62  4/7/2021 Yes    Overview Signed 4/15/2021  5:17 PM by Carlos Barajas MD     S/P Washout of right below-knee amputation; Right knee disarticulation (4/7/2021  Dr. Prudence Vázquez); S/P Exploration of right knee disarticulation and control of hemorrhage (4/8/2021  Dr. Prudence Vázquez)             Peripheral artery disease ([facility] (Chronic) ICD-10-CM: I73.9  ICD-9-CM: 443.9  Unknown Yes        Type 2 diabetes mellitus, without long-term current use of insulin (HCC) (Chronic) ICD-10-CM: E11.9  ICD-9-CM: 250.00  Unknown Yes    Overview Addendum 4/15/2021  5:40 PM by Carlos Barajas MD     HbA1c (4/8/2021) = 9.8; HbA1c (3/6/2021) = 12.9             Factor V Leiden mutation ([facility] (Chronic) ICD-10-CM: Y54.16  ICD-9-CM: 289.81  Unknown Yes        Hypertensive heart disease with chronic systolic congestive heart failure (HCC) (Chronic) ICD-10-CM: I11.0, I50.22  ICD-9-CM: 402.91, 428.22  Unknown Yes    Overview Signed 3/26/2021 11:06 PM by Carlos Barajas MD     2D echocardiogram (12/17/2015) showed EF 35%; genealized hypokinesis more focally severe of the inferior and posterior segments; mild mitral regurgitation; trace tricuspud regurgitation; normal pulmonary artery pressure             Anticoagulated by anticoagulation treatment (Chronic) ICD-10-CM: Z79.01  ICD-9-CM: V58.61  Unknown Yes    Overview Signed 3/26/2021  4:36 PM by Roque Moscoso MD     On Rivaroxaban             Mixed hyperlipidemia (Chronic) ICD-10-CM: E78.2  ICD-9-CM: 272.2  Unknown Yes    Overview Signed 3/26/2021 11:07 PM by Roque Moscoso MD     Lipid profile (7/25/2018) showed , , HDL 27,              Major depressive disorder ICD-10-CM: F32.9  ICD-9-CM: 296.20  3/24/2021 Yes    Overview Signed 3/26/2021 11:31 PM by Roque Moscoso MD     On Duloxetine             History of right below knee amputation [facility]) ICD-10-CM: Z89.511  ICD-9-CM: V49.75  3/22/2021 Yes    Overview Signed 3/26/2021  5:43 PM by Roque Moscoso MD     S/P Right below-the-knee amputation (3/22/2021 - Dr. Kristina López)             Impaired mobility and ADLs ICD-10-CM: Z74.09, Z78.9  ICD-9-CM: V49.89  3/22/2021 Yes        Critical ischemia of lower extremity ([facility] ICD-10-CM: N30.848  ICD-9-CM: 459.9  3/22/2021 Yes    Overview Signed 3/26/2021  5:42 PM by Roque Moscoso MD     Right             Acute blood loss as cause of postoperative anemia ICD-10-CM: D62  ICD-9-CM: 285.1  3/22/2021 Yes        History of embolectomy ICD-10-CM: Z98.890  ICD-9-CM: V45.89  3/5/2021 Yes    Overview Signed 3/26/2021  5:39 PM by Roque Moscoso MD     S/P Right lower extremity arterial embolectomy                   Background:   Past Medical History:   Past Medical History:   Diagnosis Date    Acute blood loss as cause of postoperative anemia 3/22/2021    Anticoagulated by anticoagulation treatment     On Rivaroxaban    Cardiomyopathy ([facility] 12/17/2015    2D echocardiogram (3/24/2021) showed EF 37%; global hypokinesis of left ventricle; grade I diastolic dysfunction; 2D echocardiogram (12/17/2015) showed EF 35%; generalized hypokinesis more focally severe of the inferior and posterior segments; mild mitral regurgitation; trace tricuspud regurgitation; normal pulmonary artery pressure    Chronic systolic heart failure (HCC)     2D echocardiogram (3/24/2021) showed EF 37%; global hypokinesis of left ventricle; grade I diastolic dysfunction; 2D echocardiogram (12/17/2015) showed EF 35%; generalized hypokinesis more focally severe of the inferior and posterior segments; mild mitral regurgitation; trace tricuspud regurgitation; normal pulmonary artery pressure    Coronary artery disease involving native coronary artery of native heart     COVID-19 ruled out by laboratory testing 3/22/2021    SARS-CoV-2 ([facility] [employer] [facility]) (3/22/2021):  Not detected     Critical ischemia of lower extremity ([facility] 3/22/2021    Right    Factor V Leiden mutation ([facility]     Grade I diastolic dysfunction 06/94/5844    2D echocardiogram (3/24/2021) showed EF 37%; global hypokinesis of left ventricle; grade I diastolic dysfunction    History of deep venous thrombosis (DVT) of distal vein of right lower extremity     History of epilepsy     History of head injury     History of myocardial infarction     History of noncompliance with medical treatment 3/18/2021    Hypertensive heart disease with chronic systolic congestive heart failure ([facility]     2D echocardiogram (3/24/2021) showed EF 37%; global hypokinesis of left ventricle; grade I diastolic dysfunction; 2D echocardiogram (12/17/2015) showed EF 35%; genealized hypokinesis more focally severe of the inferior and posterior segments; mild mitral regurgitation; trace tricuspud regurgitation; normal pulmonary artery pressure    Infection of right below knee amputation ([facility] 4/7/2021    S/P Washout of right below-knee amputation; Right knee disarticulation (4/7/2021  Dr. Wil Johnson); S/P Exploration of right knee disarticulation and control of hemorrhage (4/8/2021  Dr. Wil Johnson)    Long term current use of aspirin     Major depressive disorder 03/24/2021    On Duloxetine    Migraine headache     Mitral valve prolapse     Mixed hyperlipidemia     Lipid profile (7/25/2018) showed , , HDL 27,     Obstructive sleep apnea     On statin therapy due to risk of future cardiovascular event     On Atorvastatin    Peripheral artery disease ([address]     Type 2 diabetes mellitus, without long-term current use of insulin ([facility])     HbA1c (4/8/2021) = 9.8; HbA1c (3/6/2021) = 13.0    Umbilical hernia     Wears glasses       Patient taking anticoagulants yes    Patient has a defibrillator: no     Assessment:   Changes in Assessment throughout shift: no acute changes noted throughout the shift     Patient has central line: no Reasons if yes: n/a  Insertion date:n/a Last dressing date:n/a   Patient has Ellis Cath: no Reasons if yes: n/a   Insertion date:n/a     Last Vitals:     Vitals:    04/16/21 1615 04/16/21 1715 04/16/21 1819 04/16/21 2026   BP: 111/71 105/70 112/71 109/67   Pulse: 82 79 85 81   Resp: 16 16 16 16   Temp: 97.8 °F (36.6 °C) 97.6 °F (36.4 °C) 98.3 °F (36.8 °C) 97 °F (36.1 °C)   SpO2:    99%        PAIN    Pain Assessment    Pain Intensity 1: 0 (04/16/21 1715) Pain Intensity 1: 2 (12/29/14 1105)    Pain Location 1: Leg Pain Location 1: Abdomen    Pain Intervention(s) 1: Medication (see MAR) Pain Intervention(s) 1: Medication (see MAR)  Patient Stated Pain Goal: 0 Patient Stated Pain Goal: 0  o Intervention effective: yes    o Other actions taken for pain: repositioning      Skin Assessment  Skin color Skin Color: Appropriate for ethnicity  Condition/Temperature Skin Condition/Temp: Warm  Integrity Skin Integrity: Incision (comment)  Turgor Turgor: Non-tenting  Weekly Pressure Ulcer Documentation  Pressure  Injury Documentation: Pressure Injury Noted-See Wound LDA to Document  Wound Prevention & Protection Methods  Orientation of wound Orientation of Wound Prevention: Posterior  Location of Prevention Location of Wound Prevention: Sacrum/Coccyx  Dressing Present Dressing Present : Yes  Dressing Status Dressing Status: Intact  Wound Offloading Wound Offloading (Prevention Methods): Bed, pressure redistribution/air, Bed, pressure reduction mattress     INTAKE/OUPUT  Date 04/15/21 1900 - 04/16/21 0659 04/16/21 0700 - 04/17/21 0659   Shift 7140-2425 24 Hour Total 7389-1133 1902-3509 24 Hour Total   INTAKE   P.O.   1320  1320     P. O.   1320  1320   Shift Total   1320  1320   OUTPUT   Urine 400 400        Urine Voided 400 400        Urine Occurrence(s) 1 x 1 x 5 x 0 x 5 x   Stool          Stool Occurrence(s) 0 x 0 x 1 x 0 x 1 x   Shift Total 400 400      NET -400 -400 1320  1320   Weight (kg)            Recommendations:  1. Patient needs and requests: education     2. Diet: Cardiac consistent carb diet with thin liquids    3. Pending tests/procedures: AM labs     4. Functional Level/Equipment: 1 person assist     5. Estimated Discharge Date: TBD Posted on Whiteboard in Patients Room: no     HEALS Safety Check    A safety check occurred in the patient's room between off going nurse and oncoming nurse listed above. The safety check included the below items  Area Items   H  High Alert Medications - Verify all high alert medication drips (heparin, PCA, etc.)   E  Equipment - Suction is set up for ALL patients (with yanker)  - Red plugs utilized for all equipment (IV pumps, etc.)  - WOWs wiped down at end of shift.  - Room stocked with oxygen, suction, and other unit-specific supplies   A  Alarms - Bed alarm is set for fall risk patients  - Ensure chair alarm is in place and activated if patient is up in a chair   L  Lines - Check IV for any infiltration  - Ellis bag is empty if patient has a Ellis   - Tubing and IV bags are labeled   S  Safety   - Room is clean, patient is clean, and equipment is clean. - Hallways are clear from equipment besides carts.    - Fall bracelet on for fall risk patients  - Ensure room is clear and free of clutter  - Suction is set up for ALL patients (with faraz)  - Hallways are clear from equipment besides carts.    - Isolation precautions followed, supplies available outside room, sign posted         Zoe Gama

## 2021-04-17 NOTE — PROGRESS NOTES
Problem: Self Care Deficits Care Plan (Adult)  Goal: *Acute Goals and Plan of Care (Insert Text)  Description: Occupational Therapy Goals   Long Term Goals  Initiated 2021 and to be accomplished within 2 week(s)  1. Pt will perform self-feeding with independence. 2. Pt will perform grooming with mod I in standing at the sink. 3. Pt will perform UB bathing with mod I with set up of necessary items. 4. Pt will perform LB bathing with mod I with set up of necessary items. 5. Pt will perform tub/shower transfer with mod I.   6. Pt will perform UB dressing with mod I with set up of necessary items. 7. Pt will perform LB dressing with mod I with set up of necessary items. 8. Pt will perform toileting task with mod I.  9. Pt will perform toilet transfer with mod I with RW. Short Term Goals   Initiated 2021 and to be accomplished within 7 day(s)  1. Pt will perform self-feeding with mod I and no set-up A.   2. Pt will perform grooming with mod I.  3. Pt will perform UB bathing with mod I.  4. Pt will perform LB bathing with mod I.  5. Pt will perform tub/shower transfer with mod I.  6. Pt will perform UB dressing with mod I.  7. Pt will perform LB dressing with mod I.  8. Pt will perform toileting task with mod I.  9. Pt will perform toilet transfer with mod I with w/c as needed. Outcome: Progressing Towards Goal     Occupational Therapy TREATMENT    Patient: Colleen Moore   60 y.o. Patient identified with name and : Yes    Date: 2021    First Tx Session  Time In: 1031  Time Out[de-identified] 9484    Diagnosis: Status post above-knee amputation of right lower extremity ([facility] [Z89.611]   Precautions: Fall(right AKA)  Chart, occupational therapy assessment, plan of care, and goals were reviewed. Pain:  Pt reports 0/10 pain or discomfort prior to treatment. Pt reports 0/10 pain or discomfort post treatment.    Intervention Provided: NA      SUBJECTIVE:   Patient stated  What shall we do today?. \"Let me draw you the layout of my house so you get a good idea. \"    OBJECTIVE DATA SUMMARY:     THERAPEUTIC ACTIVITY Daily Assessment         THERAPEUTIC EXERCISE Daily Assessment    Pt tolerated UE strengthening activities in order to improve UB strength for functional transfers and self-care skills. Pt utilized 4lb dowel nataliia to complete shoulder presses, shldr flex/ext 3x10; bicep curls 3x10, wrist sup/pro with shldr flexed 3x10 R & L, and wrist flex/ext with 3 lb dowel (wheel reels). Pt rested between each set. Verbal cues to sit tall in chair with G posture. IADL Daily Assessment         NMRE Daily Assessment         FEEDING/EATING Daily Assessment          GROOMING Daily Assessment          UPPER BODY BATHING Daily Assessment          LOWER BODY BATHING Daily Assessment          TOILETING Daily Assessment          UPPER BODY DRESSING Daily Assessment          LOWER BODY DRESSING Daily Assessment          MOBILITY/TRANSFERS Daily Assessment     Stand pivot from EOB to w/c with RW - CGA. ASSESSMENT:  Pt continues to make steady progress towards goal attainment. Discussed with pt home-set up/layout in order to begin appropriate planning and for appropriate intervention purposes. Pt stated he has a very small bathroom and is unable to get a w/c through the door. He is able to get to the sink with a RW; however at an angle and unable to get past the sink to the shower and toilet with the RW. Discussed having [facility] come in and assess the home in order to make appropriate recommendations for safety with self-care skills. Pt also tolerated UE strengthening tasks today to improve activity tolerance and strength for self-care skills. Continue with OT in order to achieve functional goals.      Progression toward goals:  [x]          Improving appropriately and progressing toward goals  []          Improving slowly and progressing toward goals  []          Not making progress toward goals and plan of care will be adjusted     PLAN:  Patient continues to benefit from skilled intervention to address the above impairments. Continue treatment per established plan of care. Discharge Recommendations:  Home Health  Further Equipment Recommendations for Discharge:  bedside commode, transfer bench, and grab bars     Activity Tolerance:  Good      Estimated LOS: 4/30/2021    Please refer to the flowsheet for vital signs taken during this treatment. After treatment:   [x]  Patient left in no apparent distress sitting up in chair   []  Patient left in no apparent distress in bed  [x]  Call bell left within reach  []  Nursing notified  []  Caregiver present  [x]  Chair alarm activated    COMMUNICATION/EDUCATION:   [x] Home safety education was provided and the patient/caregiver indicated understanding. [x] Patient/family have participated as able in goal setting and plan of care. [x] Patient/family agree to work toward stated goals and plan of care. [] Patient understands intent and goals of therapy, but is neutral about his/her participation. [] Patient is unable to participate in goal setting and plan of care.       Cheryl Bennett, OTSURAJ, OTR/L

## 2021-04-17 NOTE — REHAB NOTE
[facility] PHYSICAL REHABILITATION  [address] [place] [address], [address]     [address]  OVERALL PLAN OF CARE    Name: Denver Show CSN: [id number]   Age: 60 y.o. MRN: [id number]   Sex: male Admit Date: 4/15/2021     Primary Rehab Diagnosis  1. Impaired Mobility and ADLs  2. S/P Right above-the-knee amputation (4/11/2021  Dr. Tobin Pallas)  3. S/P Exploration of right knee disarticulation and control of hemorrhage (4/8/2021  Dr. Hugh Neri)  4. S/P Washout of right below-knee amputation; Right knee disarticulation (4/7/2021  Dr. Hugh Neri)  5. History of infection and ischemia of right BKA stump (4/7/2021)  6. S/P Right below-the-knee amputation (3/22/2021 - Dr. Felicia Cooper)  7. History of critical ischemia of the right lower extremity  8.  History of right lower extremity arterial embolectomy (3/5/2021 - Dr. Felicia Cooper)    Comorbidities  Patient Active Problem List   Diagnosis Code    History of right below knee amputation ([facility] Z89.511    Impaired mobility and ADLs Z74.09, Z78.9    Critical ischemia of lower extremity ([facility] I70.229    Peripheral artery disease ([facility] I73.9    Coronary artery disease involving native coronary artery of native heart I25.10    Type 2 diabetes mellitus, without long-term current use of insulin (HCC) E11.9    History of epilepsy Z86.69    Factor V Leiden mutation ([facility] D68.51    Migraine headache G43.909    Wears glasses Z15.6    Umbilical hernia D57.3    Obstructive sleep apnea G47.33    ICD (implantable cardioverter-defibrillator) in place Z95.810    Mitral valve prolapse I34.1    History of head injury Z87.828    Hypertensive heart disease with chronic systolic congestive heart failure (HCC) I11.0, I50.22    History of myocardial infarction I25.2    Long term current use of aspirin Z79.82    On statin therapy due to risk of future cardiovascular event Z79.899    History of deep venous thrombosis (DVT) of distal vein of right lower extremity Z86.718    Anticoagulated by anticoagulation treatment Z79.01    Mixed hyperlipidemia E78.2    History of embolectomy Z98.890    COVID-19 ruled out by laboratory testing Z20.822    Acute blood loss as cause of postoperative anemia D62    Cardiomyopathy ([facility] I42.9    Chronic systolic heart failure (HCC) I50.22    History of noncompliance with medical treatment Z91.19    Major depressive disorder F32.9    Grade I diastolic dysfunction A20.6    Constipation K59.00    Ischemia of right BKA site (HCC) T87.89, I99.8    Infection of right below knee amputation (HCC) T87.43    Status post above-knee amputation of right lower extremity ([facility] Z89.611        ANTICIPATED INTERVENTIONS THAT SUPPORT THE MEDICAL NECESSITY OF THIS ADMISSION:    I. Physical Therapy              A. Intensity: 1.5 hour per day              B. Frequency: 5 times per week              C. Duration: 2 weeks              D. Long Term Goals:    1. Patient will move from supine to sit and sit to supine , scoot up and down, and roll side to side in bed with independence. 2.  Patient will transfer from bed to chair and chair to bed with modified independence using the least restrictive device. 3.  Patient will perform sit to stand with modified independence. 4.  Patient will ambulate with modified independence for 25 feet with the least restrictive device. 5.  Patient will perform w/c mobility for at least 150 ft at modified independent level. 6.  Patient will ascend/descend ramp for safe entry/exit of home using wheelchair with supervision. E. Interventions: Interventions may include range of motion (AROM, PROM B LE/trunk), motor function (B LE/trunk strengthening/coordination), activity tolerance (vitals, oxygen saturation levels), bed mobility training, balance activities, gait training (progressive ambulation program), wheelchair management and functional transfer training. II.  Occupational Therapy              A. Intensity: 1.5 hour per day              B. Frequency: 5 times per week              C. Duration: 2 weeks              D. Long Term Goals:    1. Pt will perform self-feeding with independence. 2. Pt will perform grooming with mod I in standing at the sink. 3. Pt will perform UB bathing with mod I with set up of necessary items. 4. Pt will perform LB bathing with mod I with set up of necessary items. 5. Pt will perform tub/shower transfer with mod I.     6. Pt will perform UB dressing with mod I with set up of necessary items. 7. Pt will perform LB dressing with mod I with set up of necessary items. 8. Pt will perform toileting task with mod I.    9. Pt will perform toilet transfer with mod I with RW.   E. Interventions: Interventions may include range of motion (AROM, PROM B UE), motor function (B UE/ strengthening/coordination), activity tolerance (vitals, oxygen saturation levels), balance training, ADL/IADL training and functional transfer training. PHYSICIAN'S ASSESSMENT OF FINDINGS:    Are the established goals sufficient for achieving the optimal level of function? [x]  Yes      []  No    What changes would you recommend to the goals as written? None      Are the interventions noted sufficient for achieving the optimal level of function? [x]  Yes      []  No    What changes would you recommend to the interventions noted? If therapy staff is unable to provide 3 hr of total therapy per day in 5 days due to medical issues or decreased patient tolerance, may modify treatment schedule to 15 hr/week.       Estimated length of stay: 1-2 weeks      Medical rehabilitation prognosis:    []  Excellent     [x]  Good     []  Fair     []  Guarded       Discharge Destination:     [x]  Home     []  [address]     []  [place]     []  [address]     []  [place]     []  Other:       Signed:    Jeanine Matson MD    April 17, 2021

## 2021-04-17 NOTE — CONSULTS
Comprehensive Nutrition Assessment    Type and Reason for Visit: Initial, Positive nutrition screen, Consult    Nutrition Recommendations/Plan:   - Continue current nutrition interventions    Nutrition Assessment:  Pt unavailable; noted fair/good meal intake while in acute care hospital per chart documentation. Fair meal intake since admission to rehab unit. Is receiving nutrition supplements, unable to assess intake. Pt s/p right AKA on 4/11/21. Malnutrition Assessment:  Malnutrition Status: At risk for malnutrition (specify)(decreased po intake)      Nutrition History and Allergies: Past medical hx:  CHF, CAD, DVT, epilepsy, PAD, Type 2 DM, Right BKA on 3/22/21 then Right AKA on 4/11/21. Fair/good meal intake while in acute care hospital; noted was receiving nutrition supplements, good intake.    -9 lb, 4.8% x 3 weeks PTA; likely related to recent AKA. Food allergies: almond, strawberry     Estimated Daily Nutrient Needs:  Energy (kcal): 8960-4828; Weight Used for Energy Requirements: Admission(80.8 kg)  Protein (g): 65-97; Weight Used for Protein Requirements: Admission(x0.8-1.2)  Fluid (ml/day): 6169-8151; Method Used for Fluid Requirements: 1 ml/kcal      Nutrition Related Findings:  BM 4/17, 4/16 formed. +edema. Meds: vitamin C, ferrous sulfate, lanuts, SSI, metformin      Wounds:    Venous stasis       Current Nutrition Therapies:  DIET CARDIAC Regular; Consistent Carb 1800kcal  DIET NUTRITIONAL SUPPLEMENTS All Meals; Glucerna Shake    Anthropometric Measures:  · Height:  6' 2\" (188 cm)  · Current Body Wt:  80.8 kg (178 lb 2.1 oz)   · Admission Body Wt:  178 lb 2.1 oz    · Usual Body Wt:  84.8 kg (187 lb)(3/26/2021 per chart hx)     · Ideal Body Wt:  190 lbs:  93.8 %   · Adjusted Body Weight:  196.1; Weight Adjustment for: Amputation   · Adjusted BMI:  25.2    · BMI Category:  Normal weight (BMI 18.5-24. 9)       Nutrition Diagnosis:   · Predicted inadequate energy intake related to psychological cause or life stress, acute injury/trauma(appetite) as evidenced by (s/p right AKA with variable po intake while in acute care hospital)      Nutrition Interventions:   Food and/or Nutrient Delivery: Continue current diet, Mineral supplement, Vitamin supplement, Continue oral nutrition supplement  Nutrition Education and Counseling: Education not indicated  Coordination of Nutrition Care: Continue to monitor while inpatient    Goals:  PO nutrition intake will meet >75% of patient estimated nutritional needs within the next 7 days. Nutrition Monitoring and Evaluation:   Behavioral-Environmental Outcomes: None identified  Food/Nutrient Intake Outcomes: Food and nutrient intake, Supplement intake, Vitamin/mineral intake  Physical Signs/Symptoms Outcomes: Biochemical data, Meal time behavior, Nutrition focused physical findings    Discharge Planning:     Too soon to determine     Electronically signed by Lexi Stephens RD on 4/17/2021 at 1:51 PM    Contact: [phone]

## 2021-04-17 NOTE — PROGRESS NOTES
[address]  [address] [place] [address] [address]     INPATIENT REHABILITATION  DAILY PROGRESS NOTE     Date: 4/17/2021    Name: Charisma Srivastava Age / Sex: 60 y.o. / male   CSN: [id number] MRN: [id number]   [facility] Date: 4/15/2021 Length of Stay: 2 days     Primary Rehab Diagnosis: Impaired Mobility and ADLs secondary to:  1. S/P Right above-the-knee amputation (4/11/2021  Dr. Julius David)  2. S/P Exploration of right knee disarticulation and control of hemorrhage (4/8/2021  Dr. Kyleigh Carter)  3. S/P Washout of right below-knee amputation; Right knee disarticulation (4/7/2021  Dr. Kyleigh Carter)  4. History of infection and ischemia of right BKA stump (4/7/2021)  5. S/P Right below-the-knee amputation (3/22/2021 - Dr. Ronan Bro)  6. History of critical ischemia of the right lower extremity  7. History of right lower extremity arterial embolectomy (3/5/2021 - Dr. Ronan Bro)      Subjective:     No new issues or problems reported. Patient completed blood transfusion of 1 unit pRBC yesterday PM with no reported issues. Blood pressure controlled. Blood glucose controlled.       Objective:     Vital Signs:  Patient Vitals for the past 24 hrs:   BP Temp Pulse Resp SpO2   04/17/21 0748 116/76 98 °F (36.7 °C) 90 17 98 %   04/16/21 2026 109/67 97 °F (36.1 °C) 81 16 99 %   04/16/21 1819 112/71 98.3 °F (36.8 °C) 85 16    04/16/21 1715 105/70 97.6 °F (36.4 °C) 79 16    04/16/21 1615 111/71 97.8 °F (36.6 °C) 82 16    04/16/21 1515 114/75 98 °F (36.7 °C) 84 18 99 %   04/16/21 1442 107/72 97.5 °F (36.4 °C) 86 18 99 %   04/16/21 1408 112/73 97.8 °F (36.6 °C) 82 18 99 %        Current Medications:  Current Facility-Administered Medications   Medication Dose Route Frequency    methocarbamoL (ROBAXIN) tablet 500 mg  500 mg Oral TID    0.9% sodium chloride infusion 250 mL  250 mL IntraVENous PRN    diphenhydrAMINE (BENADRYL) capsule 25 mg  25 mg Oral Q6H PRN    ferrous gluconate 324 mg (37.5 mg iron) tablet 1 Tab  324 mg Oral DAILY WITH BREAKFAST    ascorbic acid (vitamin C) (VITAMIN C) tablet 250 mg  250 mg Oral DAILY WITH BREAKFAST    acetaminophen (TYLENOL) tablet 650 mg  650 mg Oral Q4H PRN    bisacodyL (DULCOLAX) tablet 10 mg  10 mg Oral Q48H PRN    insulin lispro (HUMALOG) injection   SubCUTAneous TIDAC    carvediloL (COREG) tablet 3.125 mg  3.125 mg Oral BID WITH MEALS    gabapentin (NEURONTIN) capsule 100 mg  100 mg Oral TID    insulin glargine (LANTUS) injection 5 Units  5 Units SubCUTAneous QHS    aspirin chewable tablet 81 mg  81 mg Oral DAILY WITH BREAKFAST    atorvastatin (LIPITOR) tablet 80 mg  80 mg Oral DAILY    docusate sodium (COLACE) capsule 100 mg  100 mg Oral DAILY AFTER BREAKFAST    DULoxetine (CYMBALTA) capsule 30 mg  30 mg Oral DAILY    ezetimibe (ZETIA) tablet 10 mg  10 mg Oral DAILY    acetaminophen (TYLENOL) tablet 650 mg  650 mg Oral TID    oxyCODONE IR (ROXICODONE) tablet 5-10 mg  5-10 mg Oral Q4H PRN    rivaroxaban (XARELTO) tablet 20 mg  20 mg Oral DAILY    melatonin (rapid dissolve) tablet 5 mg  5 mg Oral QHS    zolpidem (AMBIEN) tablet 5 mg  5 mg Oral QHS PRN    metFORMIN (GLUCOPHAGE) tablet 500 mg  500 mg Oral BID WITH MEALS       Allergies:   Allergies   Allergen Reactions    Nitroglycerin Other (comments)      BP drops rapidly when he takes SL Nitro          Copen Diarrhea and Rash    Cat Dander Cough    Codeine Rash    Addison Rash       Lab/Data Review:  Recent Results (from the past 24 hour(s))   GLUCOSE, POC    Collection Time: 04/16/21  4:46 PM   Result Value Ref Range    Glucose (POC) 142 (H) 70 - 110 mg/dL   GLUCOSE, POC    Collection Time: 04/16/21  8:46 PM   Result Value Ref Range    Glucose (POC) 289 (H) 70 - 110 mg/dL   GLUCOSE, POC    Collection Time: 04/16/21  8:56 PM   Result Value Ref Range    Glucose (POC) 169 (H) 70 - 110 mg/dL   HGB & HCT    Collection Time: 04/17/21  7:01 AM   Result Value Ref Range HGB 9.5 (L) 13.0 - 16.0 g/dL    HCT 29.8 (L) 36.0 - 48.0 %   GLUCOSE, POC    Collection Time: 04/17/21  7:47 AM   Result Value Ref Range    Glucose (POC) 143 (H) 70 - 110 mg/dL   GLUCOSE, POC    Collection Time: 04/17/21 11:44 AM   Result Value Ref Range    Glucose (POC) 180 (H) 70 - 110 mg/dL       Assessment:     Primary Rehab Diagnosis  1. Impaired Mobility and ADLs  2. S/P Right above-the-knee amputation (4/11/2021  Dr. Jayesh España)  3. S/P Exploration of right knee disarticulation and control of hemorrhage (4/8/2021  Dr. Raheem Salas)  4. S/P Washout of right below-knee amputation; Right knee disarticulation (4/7/2021  Dr. Raheem Salas)  5. History of infection and ischemia of right BKA stump (4/7/2021)  6. S/P Right below-the-knee amputation (3/22/2021 - Dr. Betty Almeida)  7. History of critical ischemia of the right lower extremity  8.  History of right lower extremity arterial embolectomy (3/5/2021 - Dr. Betty Almeida)    Comorbidities  Patient Active Problem List   Diagnosis Code    [facility] of [facility] Z89.511    Impaired mobility and ADLs Z74.09, Z78.9    Critical ischemia of lower extremity ([facility] I70.229    Peripheral artery disease ([facility] I73.9    Coronary artery disease involving native coronary artery of native heart I25.10    Type 2 diabetes mellitus, without long-term current use of insulin (HCC) E11.9    History of epilepsy Z86.69    Factor V Leiden mutation ([facility] D68.51    Migraine headache G43.909    Wears glasses I07.1    Umbilical hernia W21.3    Obstructive sleep apnea G47.33    ICD (implantable cardioverter-defibrillator) in place Z95.810    Mitral valve prolapse I34.1    History of head injury Z87.828    Hypertensive heart disease with chronic systolic congestive heart failure (HCC) I11.0, I50.22    History of myocardial infarction I25.2    Long term current use of aspirin Z79.82    On statin therapy due to risk of future cardiovascular event Z79.899    History of deep venous thrombosis (DVT) of distal vein of right lower extremity Z86.718    Anticoagulated by anticoagulation treatment Z79.01    Mixed hyperlipidemia E78.2    History of embolectomy Z98.890    COVID-19 ruled out by laboratory testing Z20.822    Acute blood loss as cause of postoperative anemia D62    Cardiomyopathy ([facility] I42.9    Chronic systolic heart failure (HCC) I50.22    History of noncompliance with medical treatment Z91.19    Major depressive disorder F32.9    Grade I diastolic dysfunction U38.5    Constipation K59.00    Ischemia of right BKA site (HCC) T87.89, I99.8    Infection of right below knee amputation (HCC) T87.43    Status post above-knee amputation of right lower extremity ([facility] Z89.611        Plan:     1. Justification for continued stay: Good progression towards established rehabilitation goals. 2. Medical Issues being followed closely:    [x]  Fall and safety precautions     [x]  Wound Care     [x]  Bowel and Bladder Function     [x]  Fluid Electrolyte and Nutrition Balance     [x]  Pain Control      3. Issues that 24 hour rehabilitation nursing is following:    [x]  Fall and safety precautions     [x]  Wound Care     [x]  Bowel and Bladder Function     [x]  Fluid Electrolyte and Nutrition Balance     [x]  Pain Control      [x]  Assistance with and education on in-room safety with transfers to and from the bed, wheelchair, toilet and shower. 4. Acute rehabilitation plan of care:    [x]  Continue current care and rehab. [x]  Physical Therapy           [x]  Occupational Therapy           []  Speech Therapy     []  Hold Rehab until further notice     5. Medications:    [x]  MAR Reviewed     [x]  Continue Present Medications     6.  DVT Prophylaxis:      []  Lovenox     []  Arixtra       []  Unfractionated Heparin     []  Coumadin     [x]  NOAC     []  NEREYDA Stockings     []  Sequential Compression Device     []  None 7. Code status    [x]  Full code     []  Partial code     []  Do not intubate     []  Do not resuscitate     8. Orders:   > S/P Right above-the-knee amputation (4/11/2021  Dr. Gabbie Hearn); S/P Exploration of right knee disarticulation and control of hemorrhage (4/8/2021  Dr. Peter Courser); S/P Washout of right below-knee amputation; Right knee disarticulation (4/7/2021  Dr. Peter Courser); History of infection and ischemia of right BKA stump (4/7/2021); S/P Right below-the-knee amputation (3/22/2021 - Dr. Mitchell Ferris); History of critical ischemia of the right lower extremity;  History of right lower extremity arterial embolectomy (3/5/2021 - Dr. Mitchell Ferris)   > Staples to be removed on 5/19/2021    > Acute Postoperative Blood Loss Anemia   > Hgb/Hct (3/26/2021) = 10.1/31.3   > Hgb/Hct (3/27/2021, on admission to ARU) = 10.9/33.9   > Anemia work-up showed serum iron 23, TIBC 200, iron % saturation 12, ferritin 835   > Hgb/Hct (3/29/2021) = 10.7/33.9       04/14/21  0423 04/13/21  0640 04/12/21  0539 04/11/21  1027 04/11/21  0650 04/10/21  1915 04/10/21  0640 04/09/21  0453 04/08/21  1602 04/08/21  0940 04/08/21  0210 04/07/21  0905 04/06/21  0850   HGB 7.2* 7.3* 7.3* 7.6* 7.9* 7.7* 6.3* 7.4* 6.5* 7.0* 7.8* 9.5* 10.8*   HCT 23.1* 23.2* 23.0*  --  24.4* 23.8* 20.0* 22.7* 21.5* 22.7* 25.4* 30.4* 34.2*      > Hgb/Hct (4/16/2021, on admission) = 6.6/21.1   > Anemia work-up (4/16/2021) showed serum iron 22, TIBC 202, iron % saturation 11, ferritin 545, reticulocyte count 5.1   > On 4/16/2021, patient was transfused 1 unit pRBC properly typed and crossmatched   > Hgb/Hct (4/17/2021) = 9.5/29.8    > Start:    > Ferrous gluconate 324 mg PO once daily with breakfast     > Ascorbic Acid 250 mg PO once daily with breakfast (to enhance the absorption of the FeSO4)     > Coronary artery disease; Peripheral artery disease    > On 3/27/2021, decreased Carvedilol from 6.25 mg to 3.125 mg PO BID with meals (8AM, 5PM)   > Continue:    > Aspirin 81 mg PO once daily with breakfast    > Atorvastatin 80 mg PO once daily    > Carvedilol 3.125 mg PO BID with meals (8AM, 5PM)    > Hypertensive heart disease with chronic systolic heart failure; Cardiomyopathy; Chronic systolic heart failure; Grade I diastolic dysfunction   > 2D echocardiogram (12/17/2015) showed EF 35%; genealized hypokinesis more focally severe of the inferior and posterior segments; mild mitral regurgitation; trace tricuspud regurgitation; normal pulmonary artery pressure   > 2D echocardiogram (3/24/2021) showed EF 37%; global hypokinesis of left ventricle; grade I diastolic dysfunction   > On 3/27/2021, decreased Carvedilol from 6.25 mg to 3.125 mg PO BID with meals (8AM, 5PM)   > On 3/28/2021, held:    > Furosemide 40 mg PO once daily (9AM)    > Lisinopril 5 mg PO once daily (9AM)   > Continue Carvedilol 3.125 mg PO BID with meals (8AM, 5PM)    > Factor V Leiden mutation; History of deep venous thrombosis (DVT) of right lower extremity;  Anticoagulated on Rivaroxaban   > Continue Rivaroxaban 20 mg PO once daily with breakfast    > Major depressive disorder   > Continue Duloxetine 30 mg PO once daily    > Mixed hyperlipidemia   > Lipid profile (7/25/2018) showed , , HDL 27,    > Lipid profile (3/27/2021) showed , , HDL 35, LDL 63   > Continue:    > Atorvastatin 80 mg PO once daily    > Ezetimibe 10 mg PO q HS    > Type 2 diabetes mellitus, poorly controlled, without long-term current use of insulin   > HbA1c (3/6/2021) = 12.9    > HbA1c (4/8/2021) = 9.8   > On admission to the ARU:    > Started Metformin 500 mg PO BID with meals    > Decreased Insulin glargine from 8 units to 5 units SC q HS   > Continue:    > Metformin 500 mg PO BID with meals    > Insulin glargine 5 units SC q HS    > Insulin lispro sliding scale SC TID AC only     > Difficulty sleeping   > Melatonin 5 mg PO q HS   > Zolpidem 5 mg PO q HS PRN for sleep    > COVID-19 ruled out by laboratory testing   > SARS-CoV-2 ([facility], [facility]) (3/22/2021): Not detected   > SARS-CoV-2 ([facility], [facility]) (collected 4/7/2021, resulted 4/8/2021): Not detected    > COVID-19 rapid test ([facility], [facility]) (4/7/2021): Not detected   > COVID-19 rapid test ([facility], [facility]) (4/14/2021): Not detected   > SARS-CoV-2 ([facility], [facility]) (collected 4/15//2021, resulted 4/16/2021): Not detected    > Analgesia   > Continue:    > Acetaminophen 650 mg PO TID (8AM, 12PM, 4PM)    > Acetaminophen 650 mg PO q 4 hr PRN for pain level 4/10 or lesser (from 8PM to 4AM only)    > Duloxetine 30 mg PO once daily    > Gabapentin 100 mg PO TID    > Methocarbamol 500 mg PO TID    > Oxycodone 5-10 mg PO q 4 hr PRN for pain level 5/10 or greater     > Diet:   > Specifications: Cardiac, diabetic, consistent carb 1800 kcal   > Solids (consistency): Regular    > Liquids (consistency):  Thin    > Fluid restriction: None      Signed:    Patria Espinal MD    April 17, 2021

## 2021-04-18 LAB
GLUCOSE BLD STRIP.AUTO-MCNC: 128 MG/DL (ref 70–110)
GLUCOSE BLD STRIP.AUTO-MCNC: 135 MG/DL (ref 70–110)
GLUCOSE BLD STRIP.AUTO-MCNC: 168 MG/DL (ref 70–110)
GLUCOSE BLD STRIP.AUTO-MCNC: 174 MG/DL (ref 70–110)

## 2021-04-18 PROCEDURE — 74011250637 HC RX REV CODE- 250/637: Performed by: INTERNAL MEDICINE

## 2021-04-18 PROCEDURE — 82962 GLUCOSE BLOOD TEST: CPT

## 2021-04-18 PROCEDURE — 65310000000 HC RM PRIVATE REHAB

## 2021-04-18 PROCEDURE — 74011636637 HC RX REV CODE- 636/637: Performed by: INTERNAL MEDICINE

## 2021-04-18 RX ADMIN — GABAPENTIN 100 MG: 100 CAPSULE ORAL at 20:06

## 2021-04-18 RX ADMIN — ACETAMINOPHEN 650 MG: 325 TABLET, FILM COATED ORAL at 12:21

## 2021-04-18 RX ADMIN — ASPIRIN 81 MG CHEWABLE TABLET 81 MG: 81 TABLET CHEWABLE at 08:10

## 2021-04-18 RX ADMIN — Medication 1 TABLET: at 08:11

## 2021-04-18 RX ADMIN — CARVEDILOL 3.12 MG: 6.25 TABLET, FILM COATED ORAL at 16:52

## 2021-04-18 RX ADMIN — OXYCODONE HYDROCHLORIDE 10 MG: 5 TABLET ORAL at 03:44

## 2021-04-18 RX ADMIN — METFORMIN HYDROCHLORIDE 500 MG: 500 TABLET ORAL at 08:11

## 2021-04-18 RX ADMIN — OXYCODONE HYDROCHLORIDE 10 MG: 5 TABLET ORAL at 09:56

## 2021-04-18 RX ADMIN — ACETAMINOPHEN 650 MG: 325 TABLET, FILM COATED ORAL at 16:52

## 2021-04-18 RX ADMIN — METHOCARBAMOL 500 MG: 500 TABLET ORAL at 20:06

## 2021-04-18 RX ADMIN — INSULIN GLARGINE 5 UNITS: 100 INJECTION, SOLUTION SUBCUTANEOUS at 21:07

## 2021-04-18 RX ADMIN — INSULIN LISPRO 2 UNITS: 100 INJECTION, SOLUTION INTRAVENOUS; SUBCUTANEOUS at 12:22

## 2021-04-18 RX ADMIN — GABAPENTIN 100 MG: 100 CAPSULE ORAL at 14:28

## 2021-04-18 RX ADMIN — Medication 250 MG: at 08:11

## 2021-04-18 RX ADMIN — DULOXETINE HYDROCHLORIDE 30 MG: 30 CAPSULE, DELAYED RELEASE ORAL at 08:10

## 2021-04-18 RX ADMIN — RIVAROXABAN 20 MG: 20 TABLET, FILM COATED ORAL at 08:10

## 2021-04-18 RX ADMIN — ATORVASTATIN CALCIUM 80 MG: 40 TABLET, FILM COATED ORAL at 08:10

## 2021-04-18 RX ADMIN — Medication 5 MG: at 20:06

## 2021-04-18 RX ADMIN — OXYCODONE HYDROCHLORIDE 10 MG: 5 TABLET ORAL at 16:51

## 2021-04-18 RX ADMIN — GABAPENTIN 100 MG: 100 CAPSULE ORAL at 08:11

## 2021-04-18 RX ADMIN — METHOCARBAMOL 500 MG: 500 TABLET ORAL at 14:28

## 2021-04-18 RX ADMIN — ACETAMINOPHEN 650 MG: 325 TABLET, FILM COATED ORAL at 08:10

## 2021-04-18 RX ADMIN — METHOCARBAMOL 500 MG: 500 TABLET ORAL at 08:10

## 2021-04-18 RX ADMIN — METFORMIN HYDROCHLORIDE 500 MG: 500 TABLET ORAL at 16:54

## 2021-04-18 RX ADMIN — EZETIMIBE 10 MG: 10 TABLET ORAL at 08:10

## 2021-04-18 RX ADMIN — DOCUSATE SODIUM 100 MG: 100 CAPSULE, LIQUID FILLED ORAL at 08:10

## 2021-04-18 NOTE — PROGRESS NOTES
SHIFT CHANGE NOTE FOR [facility]    Bedside and Verbal shift change report given to Prudence Dinh RN (oncoming nurse) by Pillo Quezada RN (offgoing nurse). Report included the following information SBAR, Kardex, MAR and Recent Results.     Situation:   Code Status: Full Code   Reason for Admission: right AKA  Hospital Day: 3   Problem List:   Hospital Problems  Date Reviewed: 4/18/2021          Codes Class Noted POA    * (Principal) Status post above-knee amputation of right lower extremity ([facility] ICD-10-CM: O21.161  ICD-9-CM: V49.76  4/11/2021 Yes    Overview Signed 4/15/2021  5:14 PM by Sai Cohn MD     S/P Right above-the-knee amputation (4/11/2021  Dr. George Arriaga)             Ischemia of right BKA site [facility]) ICD-10-CM: T87.89, I99.8  ICD-9-CM: 997.69, 459.9  4/7/2021 Yes        Infection of right below knee amputation [facility]) ICD-10-CM: T87.43  ICD-9-CM: 997.62  4/7/2021 Yes    Overview Signed 4/15/2021  5:17 PM by Sai Cohn MD     S/P Washout of right below-knee amputation; Right knee disarticulation (4/7/2021  Dr. Wil Johnson); S/P Exploration of right knee disarticulation and control of hemorrhage (4/8/2021  Dr. Wil Johnson)             Peripheral artery disease ([facility] (Chronic) ICD-10-CM: I73.9  ICD-9-CM: 443.9  Unknown Yes        Type 2 diabetes mellitus, without long-term current use of insulin (HCC) (Chronic) ICD-10-CM: E11.9  ICD-9-CM: 250.00  Unknown Yes    Overview Addendum 4/15/2021  5:40 PM by Sai Cohn MD     HbA1c (4/8/2021) = 9.8; HbA1c (3/6/2021) = 12.9             Factor V Leiden mutation ([facility] (Chronic) ICD-10-CM: B82.67  ICD-9-CM: 289.81  Unknown Yes        Hypertensive heart disease with chronic systolic congestive heart failure (HCC) (Chronic) ICD-10-CM: I11.0, I50.22  ICD-9-CM: 402.91, 428.22  Unknown Yes    Overview Signed 3/26/2021 11:06 PM by Sai Cohn MD     2D echocardiogram (12/17/2015) showed EF 35%; genealized hypokinesis more focally severe of the inferior and posterior segments; mild mitral regurgitation; trace tricuspud regurgitation; normal pulmonary artery pressure             Anticoagulated by anticoagulation treatment (Chronic) ICD-10-CM: Z79.01  ICD-9-CM: V58.61  Unknown Yes    Overview Signed 3/26/2021  4:36 PM by Last Garcia MD     On Rivaroxaban             Mixed hyperlipidemia (Chronic) ICD-10-CM: E78.2  ICD-9-CM: 272.2  Unknown Yes    Overview Signed 3/26/2021 11:07 PM by Last Garcia MD     Lipid profile (7/25/2018) showed , , HDL 27,              Major depressive disorder ICD-10-CM: F32.9  ICD-9-CM: 296.20  3/24/2021 Yes    Overview Signed 3/26/2021 11:31 PM by Last Garcia MD     On Duloxetine             History of right below knee amputation [facility]) ICD-10-CM: Z89.511  ICD-9-CM: V49.75  3/22/2021 Yes    Overview Signed 3/26/2021  5:43 PM by Lsat Garcia MD     S/P Right below-the-knee amputation (3/22/2021 - Dr. Sulma Hernandez)             Impaired mobility and ADLs ICD-10-CM: Z74.09, Z78.9  ICD-9-CM: V49.89  3/22/2021 Yes        Critical ischemia of lower extremity ([facility] ICD-10-CM: C93.556  ICD-9-CM: 459.9  3/22/2021 Yes    Overview Signed 3/26/2021  5:42 PM by Last Garcia MD     Right             Acute blood loss as cause of postoperative anemia ICD-10-CM: D62  ICD-9-CM: 285.1  3/22/2021 Yes        History of embolectomy ICD-10-CM: Z98.890  ICD-9-CM: V45.89  3/5/2021 Yes    Overview Signed 3/26/2021  5:39 PM by Last Garcia MD     S/P Right lower extremity arterial embolectomy                   Background:   Past Medical History:   Past Medical History:   Diagnosis Date    Acute blood loss as cause of postoperative anemia 3/22/2021    Anticoagulated by anticoagulation treatment     On Rivaroxaban    Cardiomyopathy ([facility] 12/17/2015    2D echocardiogram (3/24/2021) showed EF 37%; global hypokinesis of left ventricle; grade I diastolic dysfunction; 2D echocardiogram (12/17/2015) showed EF 35%; generalized hypokinesis more focally severe of the inferior and posterior segments; mild mitral regurgitation; trace tricuspud regurgitation; normal pulmonary artery pressure    Chronic systolic heart failure (HCC)     2D echocardiogram (3/24/2021) showed EF 37%; global hypokinesis of left ventricle; grade I diastolic dysfunction; 2D echocardiogram (12/17/2015) showed EF 35%; generalized hypokinesis more focally severe of the inferior and posterior segments; mild mitral regurgitation; trace tricuspud regurgitation; normal pulmonary artery pressure    Coronary artery disease involving native coronary artery of native heart     COVID-19 ruled out by laboratory testing 3/22/2021    SARS-CoV-2 ([facility], [facility]) (3/22/2021):  Not detected     Critical ischemia of lower extremity ([facility] 3/22/2021    Right    Factor V Leiden mutation ([facility]     Grade I diastolic dysfunction 98/11/9563    2D echocardiogram (3/24/2021) showed EF 37%; global hypokinesis of left ventricle; grade I diastolic dysfunction    History of deep venous thrombosis (DVT) of distal vein of right lower extremity     History of epilepsy     History of head injury     History of myocardial infarction     History of noncompliance with medical treatment 3/18/2021    Hypertensive heart disease with chronic systolic congestive heart failure ([facility]     2D echocardiogram (3/24/2021) showed EF 37%; global hypokinesis of left ventricle; grade I diastolic dysfunction; 2D echocardiogram (12/17/2015) showed EF 35%; genealized hypokinesis more focally severe of the inferior and posterior segments; mild mitral regurgitation; trace tricuspud regurgitation; normal pulmonary artery pressure    Infection of right below knee amputation ([facility] 4/7/2021    S/P Washout of right below-knee amputation; Right knee disarticulation (4/7/2021  Dr. Jed George); S/P Exploration of right knee disarticulation and control of hemorrhage (4/8/2021  Dr. Jed George)   Filomena Jose Long term current use of aspirin     Major depressive disorder 03/24/2021    On Duloxetine    Migraine headache     Mitral valve prolapse     Mixed hyperlipidemia     Lipid profile (7/25/2018) showed , , HDL 27,     Obstructive sleep apnea     On statin therapy due to risk of future cardiovascular event     On Atorvastatin    Peripheral artery disease ([facility]     Type 2 diabetes mellitus, without long-term current use of insulin ([facility])     HbA1c (4/8/2021) = 9.8; HbA1c (3/6/2021) = 51.9    Umbilical hernia     Wears glasses       Patient taking anticoagulants yes    Patient has a defibrillator: no     Assessment:   Changes in Assessment throughout shift: no acute changes noted throughout the shift     Patient has central line: no Reasons if yes: n/a  Insertion date:n/a Last dressing date:n/a   Patient has Ellis Cath: no Reasons if yes: n/a   Insertion date:n/a     Last Vitals:     Vitals:    04/17/21 1651 04/17/21 2051 04/18/21 0800 04/18/21 1559   BP: 121/73 113/70 109/71 119/75   Pulse: 88 80 86 90   Resp: 18 18 16 17   Temp: 97.3 °F (36.3 °C) 97.8 °F (36.6 °C) 98 °F (36.7 °C) 97 °F (36.1 °C)   SpO2: 99% 99% 99%    Height:            PAIN    Pain Assessment    Pain Intensity 1: 6 (04/18/21 1734) Pain Intensity 1: 2 (12/29/14 1105)    Pain Location 1: Leg Pain Location 1: Abdomen    Pain Intervention(s) 1: Medication (see MAR) Pain Intervention(s) 1: Medication (see MAR)  Patient Stated Pain Goal: 0 Patient Stated Pain Goal: 0  o Intervention effective: yes    o Other actions taken for pain: repositioning      Skin Assessment  Skin color Skin Color: Appropriate for ethnicity  Condition/Temperature Skin Condition/Temp: Dry  Integrity Skin Integrity: Incision (comment)  Turgor Turgor: Non-tenting  Weekly Pressure Ulcer Documentation  Pressure  Injury Documentation: Pressure Injury Noted-See Wound LDA to Document  Wound Prevention & Protection Methods  Orientation of wound Orientation of Wound Prevention: Posterior  Location of Prevention Location of Wound Prevention: Sacrum/Coccyx  Dressing Present Dressing Present : Yes  Dressing Status Dressing Status: Intact  Wound Offloading Wound Offloading (Prevention Methods): Bed, pressure reduction mattress     INTAKE/OUPUT  Date 04/17/21 1900 - 04/18/21 0659 04/18/21 0700 - 04/19/21 0659   Shift 6654-3267 24 Hour Total 2652-9792 3788-4497 24 Hour Total   INTAKE   Shift Total        OUTPUT   Urine 1275 1275        Urine Voided 1275 1275        Urine Occurrence(s) 3 x 7 x 3 x  3 x   Stool          Stool Occurrence(s) 0 x 1 x 2 x  2 x   Shift Total 1275 1275      NET -1275 -1275      Weight (kg)            Recommendations:  1. Patient needs and requests: education     2. Diet: Cardiac consistent carb diet with thin liquids    3. Pending tests/procedures: AM labs     4. Functional Level/Equipment: 1 person assist     5. Estimated Discharge Date: TBD Posted on Whiteboard in Patients Room: no     [facility] Safety Check    A safety check occurred in the patient's room between off going nurse and oncoming nurse listed above. The safety check included the below items  Area Items   H  High Alert Medications - Verify all high alert medication drips (heparin, PCA, etc.)   E  Equipment - Suction is set up for ALL patients (with yanker)  - Red plugs utilized for all equipment (IV pumps, etc.)  - WOWs wiped down at end of shift.  - Room stocked with oxygen, suction, and other unit-specific supplies   A  Alarms - Bed alarm is set for fall risk patients  - Ensure chair alarm is in place and activated if patient is up in a chair   L  Lines - Check IV for any infiltration  - Ellis bag is empty if patient has a Ellis   - Tubing and IV bags are labeled   S  Safety   - Room is clean, patient is clean, and equipment is clean. - Hallways are clear from equipment besides carts.    - Fall bracelet on for fall risk patients  - Ensure room is clear and free of clutter  - Suction is set up for ALL patients (with sendyker)  - Hallways are clear from equipment besides carts.    - Isolation precautions followed, supplies available outside room, sign posted         Rosemarie Vergara RN

## 2021-04-18 NOTE — PROGRESS NOTES
[id number]  [address] [place] [address], [address]     INPATIENT REHABILITATION  DAILY PROGRESS NOTE     Date: 4/18/2021    Name: Westley Curtis Age / Sex: 60 y.o. / male   CSN: [id number] MRN: [id number]   [facility] Date: 4/15/2021 Length of Stay: 3 days     Primary Rehab Diagnosis: Impaired Mobility and ADLs secondary to:  1. S/P Right above-the-knee amputation (4/11/2021  Dr. Natalee Su)  2. S/P Exploration of right knee disarticulation and control of hemorrhage (4/8/2021  Dr. Jan Campos)  3. S/P Washout of right below-knee amputation; Right knee disarticulation (4/7/2021  Dr. Jan Campos)  4. History of infection and ischemia of right BKA stump (4/7/2021)  5. S/P Right below-the-knee amputation (3/22/2021 - Dr. Elaine Vargas)  6. History of critical ischemia of the right lower extremity  7. History of right lower extremity arterial embolectomy (3/5/2021 - Dr. Elaine Vargas)      Subjective:     No new issues or problems reported. Blood pressure controlled. Blood glucose controlled.       Objective:     Vital Signs:  Patient Vitals for the past 24 hrs:   BP Temp Pulse Resp SpO2 Height   04/18/21 0800 109/71 98 °F (36.7 °C) 86 16 99 %    04/17/21 2051 113/70 97.8 °F (36.6 °C) 80 18 99 %    04/17/21 1651 121/73 97.3 °F (36.3 °C) 88 18 99 %    04/17/21 1358      6' 2\" (1.88 m)        Current Medications:  Current Facility-Administered Medications   Medication Dose Route Frequency    methocarbamoL (ROBAXIN) tablet 500 mg  500 mg Oral TID    0.9% sodium chloride infusion 250 mL  250 mL IntraVENous PRN    diphenhydrAMINE (BENADRYL) capsule 25 mg  25 mg Oral Q6H PRN    ferrous gluconate 324 mg (37.5 mg iron) tablet 1 Tab  324 mg Oral DAILY WITH BREAKFAST    ascorbic acid (vitamin C) (VITAMIN C) tablet 250 mg  250 mg Oral DAILY WITH BREAKFAST    acetaminophen (TYLENOL) tablet 650 mg  650 mg Oral Q4H PRN    bisacodyL (DULCOLAX) tablet 10 mg  10 mg Oral Q48H PRN    insulin lispro (HUMALOG) injection   SubCUTAneous TIDAC    carvediloL (COREG) tablet 3.125 mg  3.125 mg Oral BID WITH MEALS    gabapentin (NEURONTIN) capsule 100 mg  100 mg Oral TID    insulin glargine (LANTUS) injection 5 Units  5 Units SubCUTAneous QHS    aspirin chewable tablet 81 mg  81 mg Oral DAILY WITH BREAKFAST    atorvastatin (LIPITOR) tablet 80 mg  80 mg Oral DAILY    docusate sodium (COLACE) capsule 100 mg  100 mg Oral DAILY AFTER BREAKFAST    DULoxetine (CYMBALTA) capsule 30 mg  30 mg Oral DAILY    ezetimibe (ZETIA) tablet 10 mg  10 mg Oral DAILY    acetaminophen (TYLENOL) tablet 650 mg  650 mg Oral TID    oxyCODONE IR (ROXICODONE) tablet 5-10 mg  5-10 mg Oral Q4H PRN    rivaroxaban (XARELTO) tablet 20 mg  20 mg Oral DAILY    melatonin (rapid dissolve) tablet 5 mg  5 mg Oral QHS    zolpidem (AMBIEN) tablet 5 mg  5 mg Oral QHS PRN    metFORMIN (GLUCOPHAGE) tablet 500 mg  500 mg Oral BID WITH MEALS       Allergies: Allergies   Allergen Reactions    Nitroglycerin Other (comments)      BP drops rapidly when he takes SL Nitro          Kamiah Diarrhea and Rash    Cat Dander Cough    Codeine Rash    [place] Rash       Lab/Data Review:  Recent Results (from the past 24 hour(s))   GLUCOSE, POC    Collection Time: 04/17/21 11:44 AM   Result Value Ref Range    Glucose (POC) 180 (H) 70 - 110 mg/dL   GLUCOSE, POC    Collection Time: 04/17/21  4:48 PM   Result Value Ref Range    Glucose (POC) 145 (H) 70 - 110 mg/dL   GLUCOSE, POC    Collection Time: 04/17/21  8:37 PM   Result Value Ref Range    Glucose (POC) 190 (H) 70 - 110 mg/dL   GLUCOSE, POC    Collection Time: 04/18/21  7:18 AM   Result Value Ref Range    Glucose (POC) 128 (H) 70 - 110 mg/dL       Assessment:     Primary Rehab Diagnosis  1. Impaired Mobility and ADLs  2. S/P Right above-the-knee amputation (4/11/2021  Dr. Julius David)  3.  S/P Exploration of right knee disarticulation and control of hemorrhage (4/8/2021   Galina Cruz)  4. S/P Washout of right below-knee amputation; Right knee disarticulation (4/7/2021  Dr. Galina Cruz)  5. History of infection and ischemia of right BKA stump (4/7/2021)  6. S/P Right below-the-knee amputation (3/22/2021 - Dr. Dorota Ochoa)  7. History of critical ischemia of the right lower extremity  8.  History of right lower extremity arterial embolectomy (3/5/2021 - Dr. Dorota Ochoa)    Comorbidities  Patient Active Problem List   Diagnosis Code    History of right below knee amputation ([facility] Z89.511    Impaired mobility and ADLs Z74.09, Z78.9    Critical ischemia of lower extremity ([facility] I70.229    Peripheral artery disease ([facility] I73.9    Coronary artery disease involving native coronary artery of native heart I25.10    Type 2 diabetes mellitus, without long-term current use of insulin (HCC) E11.9    History of epilepsy Z86.69    Factor V Leiden mutation ([facility] D68.51    Migraine headache G43.909    Wears glasses Z18.5    Umbilical hernia E26.7    Obstructive sleep apnea G47.33    ICD (implantable cardioverter-defibrillator) in place Z95.810    Mitral valve prolapse I34.1    History of head injury Z87.828    Hypertensive heart disease with chronic systolic congestive heart failure (HCC) I11.0, I50.22    History of myocardial infarction I25.2    Long term current use of aspirin Z79.82    On statin therapy due to risk of future cardiovascular event Z79.899    History of deep venous thrombosis (DVT) of distal vein of right lower extremity Z86.718    Anticoagulated by anticoagulation treatment Z79.01    Mixed hyperlipidemia E78.2    History of embolectomy Z98.890    COVID-19 ruled out by laboratory testing Z20.822    Acute blood loss as cause of postoperative anemia D62    Cardiomyopathy ([facility] I42.9    Chronic systolic heart failure (HCC) I50.22    History of noncompliance with medical treatment Z91.19    Major depressive disorder F32.9    Grade I diastolic dysfunction I51.9    Constipation K59.00    Ischemia of right BKA site ([facility]) T87.89, I99.8    Infection of right below knee amputation ([facility]) T87.43    Status post above-knee amputation of right lower extremity ([facility] Z89.611        Plan:     1. Justification for continued stay: Good progression towards established rehabilitation goals. 2. Medical Issues being followed closely:    [x]  Fall and safety precautions     [x]  Wound Care     [x]  Bowel and Bladder Function     [x]  Fluid Electrolyte and Nutrition Balance     [x]  Pain Control      3. Issues that 24 hour rehabilitation nursing is following:    [x]  Fall and safety precautions     [x]  Wound Care     [x]  Bowel and Bladder Function     [x]  Fluid Electrolyte and Nutrition Balance     [x]  Pain Control      [x]  Assistance with and education on in-room safety with transfers to and from the bed, wheelchair, toilet and shower. 4. Acute rehabilitation plan of care:    [x]  Continue current care and rehab. [x]  Physical Therapy           [x]  Occupational Therapy           []  Speech Therapy     []  Hold Rehab until further notice     5. Medications:    [x]  MAR Reviewed     [x]  Continue Present Medications     6. DVT Prophylaxis:      []  Lovenox     []  Arixtra       []  Unfractionated Heparin     []  Coumadin     [x]  NOAC     []  NEREYDA Stockings     []  Sequential Compression Device     []  None     7. Code status    [x]  Full code     []  Partial code     []  Do not intubate     []  Do not resuscitate     8. Orders:   > S/P Right above-the-knee amputation (4/11/2021  Dr. Fabby Holman); S/P Exploration of right knee disarticulation and control of hemorrhage (4/8/2021  Dr. Andria Nation); S/P Washout of right below-knee amputation; Right knee disarticulation (4/7/2021  Dr. Andria Nation); History of infection and ischemia of right BKA stump (4/7/2021); S/P Right below-the-knee amputation (3/22/2021 - Dr. Janis Brian);  History of critical ischemia of the right lower extremity;  History of right lower extremity arterial embolectomy (3/5/2021 - Dr. Ayana Basilio)   > Staples to be removed on 5/19/2021    > Acute Postoperative Blood Loss Anemia   > Hgb/Hct (3/26/2021) = 10.1/31.3   > Hgb/Hct (3/27/2021, on admission to ARU) = 10.9/33.9   > Anemia work-up showed serum iron 23, TIBC 200, iron % saturation 12, ferritin 835   > Hgb/Hct (3/29/2021) = 10.7/33.9       04/14/21  0423 04/13/21  0640 04/12/21  0539 04/11/21  1027 04/11/21  0650 04/10/21  1915 04/10/21  0640 04/09/21  0453 04/08/21  1602 04/08/21  0940 04/08/21  0210 04/07/21  0905 04/06/21  0850   HGB 7.2* 7.3* 7.3* 7.6* 7.9* 7.7* 6.3* 7.4* 6.5* 7.0* 7.8* 9.5* 10.8*   HCT 23.1* 23.2* 23.0*  --  24.4* 23.8* 20.0* 22.7* 21.5* 22.7* 25.4* 30.4* 34.2*      > Hgb/Hct (4/16/2021, on admission) = 6.6/21.1   > Anemia work-up (4/16/2021) showed serum iron 22, TIBC 202, iron % saturation 11, ferritin 545, reticulocyte count 5.1   > On 4/16/2021, patient was transfused 1 unit pRBC properly typed and crossmatched   > Hgb/Hct (4/17/2021) = 9.5/29.8    > On 4/17/2021, started:    > Ferrous gluconate 324 mg PO once daily with breakfast     > Ascorbic Acid 250 mg PO once daily with breakfast (to enhance the absorption of the FeSO4)    > Continue:    > Ferrous gluconate 324 mg PO once daily with breakfast     > Ascorbic Acid 250 mg PO once daily with breakfast (to enhance the absorption of the FeSO4)     > Coronary artery disease; Peripheral artery disease    > On 3/27/2021, decreased Carvedilol from 6.25 mg to 3.125 mg PO BID with meals (8AM, 5PM)   > Continue:    > Aspirin 81 mg PO once daily with breakfast    > Atorvastatin 80 mg PO once daily    > Carvedilol 3.125 mg PO BID with meals (8AM, 5PM)    > Hypertensive heart disease with chronic systolic heart failure; Cardiomyopathy; Chronic systolic heart failure; Grade I diastolic dysfunction   > 2D echocardiogram (12/17/2015) showed EF 35%; genealized hypokinesis more focally severe of the inferior and posterior segments; mild mitral regurgitation; trace tricuspud regurgitation; normal pulmonary artery pressure   > 2D echocardiogram (3/24/2021) showed EF 37%; global hypokinesis of left ventricle; grade I diastolic dysfunction   > On 3/27/2021, decreased Carvedilol from 6.25 mg to 3.125 mg PO BID with meals (8AM, 5PM)   > On 3/28/2021, held:    > Furosemide 40 mg PO once daily (9AM)    > Lisinopril 5 mg PO once daily (9AM)   > Continue Carvedilol 3.125 mg PO BID with meals (8AM, 5PM)    > Factor V Leiden mutation; History of deep venous thrombosis (DVT) of right lower extremity; Anticoagulated on Rivaroxaban   > Continue Rivaroxaban 20 mg PO once daily with breakfast    > Major depressive disorder   > Continue Duloxetine 30 mg PO once daily    > Mixed hyperlipidemia   > Lipid profile (7/25/2018) showed , , HDL 27,    > Lipid profile (3/27/2021) showed , , HDL 35, LDL 63   > Continue:    > Atorvastatin 80 mg PO once daily    > Ezetimibe 10 mg PO q HS    > Type 2 diabetes mellitus, poorly controlled, without long-term current use of insulin   > HbA1c (3/6/2021) = 12.9    > HbA1c (4/8/2021) = 9.8   > On admission to the ARU:    > Started Metformin 500 mg PO BID with meals    > Decreased Insulin glargine from 8 units to 5 units SC q HS   > Continue:    > Metformin 500 mg PO BID with meals    > Insulin glargine 5 units SC q HS    > Insulin lispro sliding scale SC TID AC only     > Difficulty sleeping   > Melatonin 5 mg PO q HS   > Zolpidem 5 mg PO q HS PRN for sleep    > COVID-19 ruled out by laboratory testing   > SARS-CoV-2 ([facility], [facility]) (3/22/2021): Not detected   > SARS-CoV-2 ([facility], [facility]) (collected 4/7/2021, resulted 4/8/2021): Not detected    > COVID-19 rapid test ([facility], [facility]) (4/7/2021): Not detected   > COVID-19 rapid test ([facility], [facility]) (4/14/2021):  Not detected   > SARS-CoV-2 ([facility], [facility]) (collected 4/15//2021, resulted 4/16/2021): Not detected    > Analgesia   > Continue:    > Acetaminophen 650 mg PO TID (8AM, 12PM, 4PM)    > Acetaminophen 650 mg PO q 4 hr PRN for pain level 4/10 or lesser (from 8PM to 4AM only)    > Duloxetine 30 mg PO once daily    > Gabapentin 100 mg PO TID    > Methocarbamol 500 mg PO TID    > Oxycodone 5-10 mg PO q 4 hr PRN for pain level 5/10 or greater     > Diet:   > Specifications: Cardiac, diabetic, consistent carb 1800 kcal   > Solids (consistency): Regular    > Liquids (consistency):  Thin    > Fluid restriction: None      Signed:    Franklyn Huggins MD    April 18, 2021

## 2021-04-18 NOTE — PROGRESS NOTES
Problem: Diabetes Self-Management  Goal: *Disease process and treatment process  Description: Define diabetes and identify own type of diabetes; list 3 options for treating diabetes. Outcome: Progressing Towards Goal  Goal: *Incorporating nutritional management into lifestyle  Description: Describe effect of type, amount and timing of food on blood glucose; list 3 methods for planning meals. Outcome: Progressing Towards Goal  Goal: *Incorporating physical activity into lifestyle  Description: State effect of exercise on blood glucose levels. Outcome: Progressing Towards Goal  Goal: *Developing strategies to promote health/change behavior  Description: Define the ABC's of diabetes; identify appropriate screenings, schedule and personal plan for screenings. Outcome: Progressing Towards Goal  Goal: *Using medications safely  Description: State effect of diabetes medications on diabetes; name diabetes medication taking, action and side effects. Outcome: Progressing Towards Goal  Goal: *Monitoring blood glucose, interpreting and using results  Description: Identify recommended blood glucose targets  and personal targets. Outcome: Progressing Towards Goal  Goal: *Prevention, detection, treatment of acute complications  Description: List symptoms of hyper- and hypoglycemia; describe how to treat low blood sugar and actions for lowering  high blood glucose level. Outcome: Progressing Towards Goal  Goal: *Prevention, detection and treatment of chronic complications  Description: Define the natural course of diabetes and describe the relationship of blood glucose levels to long term complications of diabetes.   Outcome: Progressing Towards Goal  Goal: *Developing strategies to address psychosocial issues  Description: Describe feelings about living with diabetes; identify support needed and support network  Outcome: Progressing Towards Goal  Goal: *Insulin pump training  Outcome: Progressing Towards Goal  Goal: *Sick day guidelines  Outcome: Progressing Towards Goal  Goal: *Patient Specific Goal (EDIT GOAL, INSERT TEXT)  Outcome: Progressing Towards Goal     Problem: Patient Education: Go to Patient Education Activity  Goal: Patient/Family Education  Outcome: Progressing Towards Goal     Problem: Risk for Spread of Infection  Goal: Prevent transmission of infectious organism to others  Description: Prevent the transmission of infectious organisms to other patients, staff members, and visitors. Outcome: Progressing Towards Goal     Problem: Patient Education:  Go to Education Activity  Goal: Patient/Family Education  Outcome: Progressing Towards Goal     Problem: Falls - Risk of  Goal: *Absence of Falls  Description: Document Shefali Callejas Fall Risk and appropriate interventions in the flowsheet. Outcome: Progressing Towards Goal  Note: Fall Risk Interventions:  Mobility Interventions: Bed/chair exit alarm, Patient to call before getting OOB    Mentation Interventions: Bed/chair exit alarm, Evaluate medications/consider consulting pharmacy    Medication Interventions: Bed/chair exit alarm, Evaluate medications/consider consulting pharmacy, Patient to call before getting OOB    Elimination Interventions: Call light in reach, Bed/chair exit alarm, Patient to call for help with toileting needs              Problem: Patient Education: Go to Patient Education Activity  Goal: Patient/Family Education  Outcome: Progressing Towards Goal     Problem: Patient Education: Go to Patient Education Activity  Goal: Patient/Family Education  Outcome: Progressing Towards Goal     Problem: Patient Education: Go to Patient Education Activity  Goal: Patient/Family Education  Outcome: Progressing Towards Goal     Problem: Pressure Injury - Risk of  Goal: *Prevention of pressure injury  Description: Document Steve Scale and appropriate interventions in the flowsheet.   Outcome: Progressing Towards Goal  Note: Pressure Injury Interventions:  Sensory Interventions: Assess changes in LOC    Moisture Interventions: Absorbent underpads    Activity Interventions: Increase time out of bed, Pressure redistribution bed/mattress(bed type)    Mobility Interventions: Chair cushion, HOB 30 degrees or less, Pressure redistribution bed/mattress (bed type)    Nutrition Interventions: Document food/fluid/supplement intake    Friction and Shear Interventions: HOB 30 degrees or less                Problem: Patient Education: Go to Patient Education Activity  Goal: Patient/Family Education  Outcome: Progressing Towards Goal     Problem: Nutrition Deficit  Goal: *Optimize nutritional status  Outcome: Progressing Towards Goal

## 2021-04-18 NOTE — PROGRESS NOTES
[facility]    Bedside and Verbal shift change report given to [facility] Sue (oncoming nurse) by Alo Teixeira (offgoing nurse). Report included the following information SBAR, Kardex, MAR and Recent Results.     Situation:   Code Status: Full Code   Reason for Admission: right AKA  Hospital Day: 3   Problem List:   Hospital Problems  Date Reviewed: 4/17/2021          Codes Class Noted POA    * (Principal) Status post above-knee amputation of right lower extremity ([facility] ICD-10-CM: Y67.345  ICD-9-CM: V49.76  4/11/2021 Yes    Overview Signed 4/15/2021  5:14 PM by Mateusz Luna MD     S/P Right above-the-knee amputation (4/11/2021  Dr. Julius David)             Ischemia of right BKA site [facility]) ICD-10-CM: T87.89, I99.8  ICD-9-CM: 997.69, 459.9  4/7/2021 Yes        Infection of right below knee amputation [facility]) ICD-10-CM: T87.43  ICD-9-CM: 997.62  4/7/2021 Yes    Overview Signed 4/15/2021  5:17 PM by Mateusz Luna MD     S/P Washout of right below-knee amputation; Right knee disarticulation (4/7/2021  Dr. Kyleigh Carter); S/P Exploration of right knee disarticulation and control of hemorrhage (4/8/2021  Dr. Kyleigh Carter)             Peripheral artery disease ([facility] (Chronic) ICD-10-CM: I73.9  ICD-9-CM: 443.9  Unknown Yes        Type 2 diabetes mellitus, without long-term current use of insulin (HCC) (Chronic) ICD-10-CM: E11.9  ICD-9-CM: 250.00  Unknown Yes    Overview Addendum 4/15/2021  5:40 PM by Mateusz Luna MD     HbA1c (4/8/2021) = 9.8; HbA1c (3/6/2021) = 12.9             Factor V Leiden mutation ([facility] (Chronic) ICD-10-CM: L49.28  ICD-9-CM: 289.81  Unknown Yes        Hypertensive heart disease with chronic systolic congestive heart failure (HCC) (Chronic) ICD-10-CM: I11.0, I50.22  ICD-9-CM: 402.91, 428.22  Unknown Yes    Overview Signed 3/26/2021 11:06 PM by Mateusz Luna MD     2D echocardiogram (12/17/2015) showed EF 35%; genealized hypokinesis more focally severe of the inferior and posterior segments; mild mitral regurgitation; trace tricuspud regurgitation; normal pulmonary artery pressure             Anticoagulated by anticoagulation treatment (Chronic) ICD-10-CM: Z79.01  ICD-9-CM: V58.61  Unknown Yes    Overview Signed 3/26/2021  4:36 PM by Luzma Small MD     On Rivaroxaban             Mixed hyperlipidemia (Chronic) ICD-10-CM: E78.2  ICD-9-CM: 272.2  Unknown Yes    Overview Signed 3/26/2021 11:07 PM by Luzma Small MD     Lipid profile (7/25/2018) showed , , HDL 27,              Major depressive disorder ICD-10-CM: F32.9  ICD-9-CM: 296.20  3/24/2021 Yes    Overview Signed 3/26/2021 11:31 PM by Luzma Small MD     On Duloxetine             History of right below knee amputation [facility]) ICD-10-CM: Z89.511  ICD-9-CM: V49.75  3/22/2021 Yes    Overview Signed 3/26/2021  5:43 PM by Luzma Small MD     S/P Right below-the-knee amputation (3/22/2021 - Dr. Ruthe Phalen)             Impaired mobility and ADLs ICD-10-CM: Z74.09, Z78.9  ICD-9-CM: V49.89  3/22/2021 Yes        Critical ischemia of lower extremity ([facility] ICD-10-CM: O40.846  ICD-9-CM: 459.9  3/22/2021 Yes    Overview Signed 3/26/2021  5:42 PM by Luzma Small MD     Right             Acute blood loss as cause of postoperative anemia ICD-10-CM: D62  ICD-9-CM: 285.1  3/22/2021 Yes        History of embolectomy ICD-10-CM: Z98.890  ICD-9-CM: V45.89  3/5/2021 Yes    Overview Signed 3/26/2021  5:39 PM by Luzma Small MD     S/P Right lower extremity arterial embolectomy                   Background:   Past Medical History:   Past Medical History:   Diagnosis Date    Acute blood loss as cause of postoperative anemia 3/22/2021    Anticoagulated by anticoagulation treatment     On Rivaroxaban    Cardiomyopathy ([address] 12/17/2015    2D echocardiogram (3/24/2021) showed EF 37%; global hypokinesis of left ventricle; grade I diastolic dysfunction; 2D echocardiogram (12/17/2015) showed EF 35%; generalized hypokinesis more focally severe of the inferior and posterior segments; mild mitral regurgitation; trace tricuspud regurgitation; normal pulmonary artery pressure    Chronic systolic heart failure (HCC)     2D echocardiogram (3/24/2021) showed EF 37%; global hypokinesis of left ventricle; grade I diastolic dysfunction; 2D echocardiogram (12/17/2015) showed EF 35%; generalized hypokinesis more focally severe of the inferior and posterior segments; mild mitral regurgitation; trace tricuspud regurgitation; normal pulmonary artery pressure    Coronary artery disease involving native coronary artery of native heart     COVID-19 ruled out by laboratory testing 3/22/2021    SARS-CoV-2 ([facility], [facility]) (3/22/2021):  Not detected     Critical ischemia of lower extremity ([facility] 3/22/2021    Right    Factor V Leiden mutation ([facility]     Grade I diastolic dysfunction 55/04/5291    2D echocardiogram (3/24/2021) showed EF 37%; global hypokinesis of left ventricle; grade I diastolic dysfunction    History of deep venous thrombosis (DVT) of distal vein of right lower extremity     History of epilepsy     History of head injury     History of myocardial infarction     History of noncompliance with medical treatment 3/18/2021    Hypertensive heart disease with chronic systolic congestive heart failure ([facility]     2D echocardiogram (3/24/2021) showed EF 37%; global hypokinesis of left ventricle; grade I diastolic dysfunction; 2D echocardiogram (12/17/2015) showed EF 35%; genealized hypokinesis more focally severe of the inferior and posterior segments; mild mitral regurgitation; trace tricuspud regurgitation; normal pulmonary artery pressure    Infection of right below knee amputation ([facility] 4/7/2021    S/P Washout of right below-knee amputation; Right knee disarticulation (4/7/2021  Dr. Lisa Velázquez); S/P Exploration of right knee disarticulation and control of hemorrhage (4/8/2021  Dr. Lisa Velázquez)    Long term current use of aspirin     Major depressive disorder 03/24/2021    On Duloxetine    Migraine headache     Mitral valve prolapse     Mixed hyperlipidemia     Lipid profile (7/25/2018) showed , , HDL 27,     Obstructive sleep apnea     On statin therapy due to risk of future cardiovascular event     On Atorvastatin    Peripheral artery disease ([facility]     Type 2 diabetes mellitus, without long-term current use of insulin ([facility])     HbA1c (4/8/2021) = 9.8; HbA1c (3/6/2021) = 82.6    Umbilical hernia     Wears glasses       Patient taking anticoagulants yes    Patient has a defibrillator: no     Assessment:   Changes in Assessment throughout shift: no acute changes noted throughout the shift     Patient has central line: no Reasons if yes: n/a  Insertion date:n/a Last dressing date:n/a   Patient has Ellis Cath: no Reasons if yes: n/a   Insertion date:n/a     Last Vitals:     Vitals:    04/17/21 0748 04/17/21 1358 04/17/21 1651 04/17/21 2051   BP: 116/76  121/73 113/70   Pulse: 90  88 80   Resp: 17  18 18   Temp: 98 °F (36.7 °C)  97.3 °F (36.3 °C) 97.8 °F (36.6 °C)   SpO2: 98%  99% 99%   Height:  6' 2\" (1.88 m)          PAIN    Pain Assessment    Pain Intensity 1: 0 (04/18/21 0445) Pain Intensity 1: 2 (12/29/14 1105)    Pain Location 1: Leg Pain Location 1: Abdomen    Pain Intervention(s) 1: Medication (see MAR) Pain Intervention(s) 1: Medication (see MAR)  Patient Stated Pain Goal: 0 Patient Stated Pain Goal: 0  o Intervention effective: yes    o Other actions taken for pain: repositioning      Skin Assessment  Skin color Skin Color: Appropriate for ethnicity  Condition/Temperature Skin Condition/Temp: Warm  Integrity Skin Integrity: Incision (comment)  Turgor Turgor: Non-tenting  Weekly Pressure Ulcer Documentation  Pressure  Injury Documentation: Pressure Injury Noted-See Wound LDA to Document(Venous Ulcer LLE)  Wound Prevention & Protection Methods  Orientation of wound Orientation of Wound Prevention: Posterior  Location of Prevention Location of Wound Prevention: Sacrum/Coccyx  Dressing Present Dressing Present : Yes  Dressing Status Dressing Status: Intact  Wound Offloading Wound Offloading (Prevention Methods): Bed, pressure redistribution/air, Bed, pressure reduction mattress     INTAKE/OUPUT  Date 04/17/21 0700 - 04/18/21 0659 04/18/21 0700 - 04/19/21 0659   Shift 9490-9040 6460-4535 24 Hour Total 8396-0334 8223-1627 24 Hour Total   INTAKE   Shift Total         OUTPUT   Urine  1275 1275        Urine Voided  1275 1275        Urine Occurrence(s) 4 x 3 x 7 x      Stool           Stool Occurrence(s) 1 x 0 x 1 x      Shift Total  1275 1275      NET  -1275 -1275      Weight (kg)             Recommendations:  1. Patient needs and requests: education     2. Diet: Cardiac consistent carb diet with thin liquids    3. Pending tests/procedures: AM labs     4. Functional Level/Equipment: 1 person assist     5. Estimated Discharge Date: TBD Posted on Whiteboard in Patients Room: [facility] Safety Check    A safety check occurred in the patient's room between off going nurse and oncoming nurse listed above. The safety check included the below items  Area Items   H  High Alert Medications - Verify all high alert medication drips (heparin, PCA, etc.)   E  Equipment - Suction is set up for ALL patients (with yanker)  - Red plugs utilized for all equipment (IV pumps, etc.)  - WOWs wiped down at end of shift.  - Room stocked with oxygen, suction, and other unit-specific supplies   A  Alarms - Bed alarm is set for fall risk patients  - Ensure chair alarm is in place and activated if patient is up in a chair   L  Lines - Check IV for any infiltration  - Ellis bag is empty if patient has a Ellis   - Tubing and IV bags are labeled   S  Safety   - Room is clean, patient is clean, and equipment is clean. - Hallways are clear from equipment besides carts.    - Fall bracelet on for fall risk patients  - Ensure room is clear and free of clutter  - Suction is set up for ALL patients (with faraz)  - Hallways are clear from equipment besides carts.    - Isolation precautions followed, supplies available outside room, sign posted         Dariela Rios

## 2021-04-19 LAB
ANION GAP SERPL CALC-SCNC: 8 MMOL/L (ref 3–18)
BUN SERPL-MCNC: 16 MG/DL (ref 7–18)
BUN/CREAT SERPL: 16 (ref 12–20)
CALCIUM SERPL-MCNC: 9.4 MG/DL (ref 8.5–10.1)
CHLORIDE SERPL-SCNC: 106 MMOL/L (ref 100–111)
CO2 SERPL-SCNC: 24 MMOL/L (ref 21–32)
CREAT SERPL-MCNC: 0.97 MG/DL (ref 0.6–1.3)
GLUCOSE BLD STRIP.AUTO-MCNC: 125 MG/DL (ref 70–110)
GLUCOSE BLD STRIP.AUTO-MCNC: 127 MG/DL (ref 70–110)
GLUCOSE BLD STRIP.AUTO-MCNC: 146 MG/DL (ref 70–110)
GLUCOSE BLD STRIP.AUTO-MCNC: 215 MG/DL (ref 70–110)
GLUCOSE SERPL-MCNC: 123 MG/DL (ref 74–99)
HCT VFR BLD AUTO: 28.9 % (ref 36–48)
HGB BLD-MCNC: 8.9 G/DL (ref 13–16)
POTASSIUM SERPL-SCNC: 4.8 MMOL/L (ref 3.5–5.5)
SODIUM SERPL-SCNC: 138 MMOL/L (ref 136–145)

## 2021-04-19 PROCEDURE — 97530 THERAPEUTIC ACTIVITIES: CPT

## 2021-04-19 PROCEDURE — 97535 SELF CARE MNGMENT TRAINING: CPT

## 2021-04-19 PROCEDURE — 74011636637 HC RX REV CODE- 636/637: Performed by: INTERNAL MEDICINE

## 2021-04-19 PROCEDURE — 65310000000 HC RM PRIVATE REHAB

## 2021-04-19 PROCEDURE — 80048 BASIC METABOLIC PNL TOTAL CA: CPT

## 2021-04-19 PROCEDURE — 82962 GLUCOSE BLOOD TEST: CPT

## 2021-04-19 PROCEDURE — 74011250637 HC RX REV CODE- 250/637: Performed by: INTERNAL MEDICINE

## 2021-04-19 PROCEDURE — 97110 THERAPEUTIC EXERCISES: CPT

## 2021-04-19 PROCEDURE — 97116 GAIT TRAINING THERAPY: CPT

## 2021-04-19 PROCEDURE — 99232 SBSQ HOSP IP/OBS MODERATE 35: CPT | Performed by: INTERNAL MEDICINE

## 2021-04-19 PROCEDURE — 85018 HEMOGLOBIN: CPT

## 2021-04-19 PROCEDURE — 36415 COLL VENOUS BLD VENIPUNCTURE: CPT

## 2021-04-19 RX ORDER — ALOGLIPTIN 25 MG/1
25 TABLET, FILM COATED ORAL DAILY
Status: DISCONTINUED | OUTPATIENT
Start: 2021-04-19 | End: 2021-04-21

## 2021-04-19 RX ADMIN — Medication 250 MG: at 08:16

## 2021-04-19 RX ADMIN — OXYCODONE HYDROCHLORIDE 10 MG: 5 TABLET ORAL at 05:24

## 2021-04-19 RX ADMIN — METHOCARBAMOL 500 MG: 500 TABLET ORAL at 13:29

## 2021-04-19 RX ADMIN — METFORMIN HYDROCHLORIDE 500 MG: 500 TABLET ORAL at 16:09

## 2021-04-19 RX ADMIN — GABAPENTIN 100 MG: 100 CAPSULE ORAL at 13:29

## 2021-04-19 RX ADMIN — OXYCODONE HYDROCHLORIDE 10 MG: 5 TABLET ORAL at 13:47

## 2021-04-19 RX ADMIN — METFORMIN HYDROCHLORIDE 500 MG: 500 TABLET ORAL at 08:16

## 2021-04-19 RX ADMIN — Medication 1 TABLET: at 08:17

## 2021-04-19 RX ADMIN — EZETIMIBE 10 MG: 10 TABLET ORAL at 08:16

## 2021-04-19 RX ADMIN — ZOLPIDEM TARTRATE 5 MG: 5 TABLET ORAL at 22:44

## 2021-04-19 RX ADMIN — ASPIRIN 81 MG CHEWABLE TABLET 81 MG: 81 TABLET CHEWABLE at 08:16

## 2021-04-19 RX ADMIN — Medication 5 MG: at 20:14

## 2021-04-19 RX ADMIN — OXYCODONE HYDROCHLORIDE 10 MG: 5 TABLET ORAL at 17:30

## 2021-04-19 RX ADMIN — CARVEDILOL 3.12 MG: 6.25 TABLET, FILM COATED ORAL at 08:16

## 2021-04-19 RX ADMIN — GABAPENTIN 100 MG: 100 CAPSULE ORAL at 08:16

## 2021-04-19 RX ADMIN — INSULIN LISPRO 4 UNITS: 100 INJECTION, SOLUTION INTRAVENOUS; SUBCUTANEOUS at 16:53

## 2021-04-19 RX ADMIN — ATORVASTATIN CALCIUM 80 MG: 40 TABLET, FILM COATED ORAL at 08:16

## 2021-04-19 RX ADMIN — METHOCARBAMOL 500 MG: 500 TABLET ORAL at 20:14

## 2021-04-19 RX ADMIN — ALOGLIPTIN 25 MG: 25 TABLET, FILM COATED ORAL at 13:29

## 2021-04-19 RX ADMIN — OXYCODONE HYDROCHLORIDE 10 MG: 5 TABLET ORAL at 01:24

## 2021-04-19 RX ADMIN — DOCUSATE SODIUM 100 MG: 100 CAPSULE, LIQUID FILLED ORAL at 08:16

## 2021-04-19 RX ADMIN — OXYCODONE HYDROCHLORIDE 10 MG: 5 TABLET ORAL at 09:39

## 2021-04-19 RX ADMIN — DULOXETINE HYDROCHLORIDE 30 MG: 30 CAPSULE, DELAYED RELEASE ORAL at 08:17

## 2021-04-19 RX ADMIN — ACETAMINOPHEN 650 MG: 325 TABLET, FILM COATED ORAL at 08:16

## 2021-04-19 RX ADMIN — ACETAMINOPHEN 650 MG: 325 TABLET, FILM COATED ORAL at 16:09

## 2021-04-19 RX ADMIN — CARVEDILOL 3.12 MG: 6.25 TABLET, FILM COATED ORAL at 16:09

## 2021-04-19 RX ADMIN — METHOCARBAMOL 500 MG: 500 TABLET ORAL at 08:16

## 2021-04-19 RX ADMIN — ACETAMINOPHEN 650 MG: 325 TABLET, FILM COATED ORAL at 11:47

## 2021-04-19 RX ADMIN — GABAPENTIN 100 MG: 100 CAPSULE ORAL at 20:14

## 2021-04-19 RX ADMIN — RIVAROXABAN 20 MG: 20 TABLET, FILM COATED ORAL at 08:16

## 2021-04-19 NOTE — PROGRESS NOTES
SHIFT CHANGE NOTE FOR [facility]    Bedside and Verbal shift change report given to Rosa Lakhani RN (oncoming nurse) by Cruzito Daley (offgoing nurse). Report included the following information SBAR, Kardex, MAR and Recent Results.     Situation:   Code Status: Full Code   Reason for Admission: right AKA  Hospital Day: 4   Problem List:   Hospital Problems  Date Reviewed: 4/18/2021          Codes Class Noted POA    * (Principal) Status post above-knee amputation of right lower extremity ([facility] ICD-10-CM: W18.883  ICD-9-CM: V49.76  4/11/2021 Yes    Overview Signed 4/15/2021  5:14 PM by Janelle Suero MD     S/P Right above-the-knee amputation (4/11/2021  Dr. Caroline Colorado)             Ischemia of right BKA site [facility]) ICD-10-CM: T87.89, I99.8  ICD-9-CM: 997.69, 459.9  4/7/2021 Yes        Infection of right below knee amputation [facility]) ICD-10-CM: T87.43  ICD-9-CM: 997.62  4/7/2021 Yes    Overview Signed 4/15/2021  5:17 PM by Janelle Suero MD     S/P Washout of right below-knee amputation; Right knee disarticulation (4/7/2021  Dr. Meli Resendez); S/P Exploration of right knee disarticulation and control of hemorrhage (4/8/2021  Dr. Meli Resendez)             Peripheral artery disease ([facility] (Chronic) ICD-10-CM: I73.9  ICD-9-CM: 443.9  Unknown Yes        Type 2 diabetes mellitus, without long-term current use of insulin (HCC) (Chronic) ICD-10-CM: E11.9  ICD-9-CM: 250.00  Unknown Yes    Overview Addendum 4/15/2021  5:40 PM by Janelle Suero MD     HbA1c (4/8/2021) = 9.8; HbA1c (3/6/2021) = 12.9             Factor V Leiden mutation ([facility] (Chronic) ICD-10-CM: Y82.93  ICD-9-CM: 289.81  Unknown Yes        Hypertensive heart disease with chronic systolic congestive heart failure (HCC) (Chronic) ICD-10-CM: I11.0, I50.22  ICD-9-CM: 402.91, 428.22  Unknown Yes    Overview Signed 3/26/2021 11:06 PM by Janelle Suero MD     2D echocardiogram (12/17/2015) showed EF 35%; genealized hypokinesis more focally severe of the inferior and posterior segments; mild mitral regurgitation; trace tricuspud regurgitation; normal pulmonary artery pressure             Anticoagulated by anticoagulation treatment (Chronic) ICD-10-CM: Z79.01  ICD-9-CM: V58.61  Unknown Yes    Overview Signed 3/26/2021  4:36 PM by Nata Barragan MD     On Rivaroxaban             Mixed hyperlipidemia (Chronic) ICD-10-CM: E78.2  ICD-9-CM: 272.2  Unknown Yes    Overview Signed 3/26/2021 11:07 PM by Nata Barragan MD     Lipid profile (7/25/2018) showed , , HDL 27,              Major depressive disorder ICD-10-CM: F32.9  ICD-9-CM: 296.20  3/24/2021 Yes    Overview Signed 3/26/2021 11:31 PM by Nata Barragan MD     On Duloxetine             History of right below knee amputation [facility]) ICD-10-CM: Z89.511  ICD-9-CM: V49.75  3/22/2021 Yes    Overview Signed 3/26/2021  5:43 PM by Nata Barragan MD     S/P Right below-the-knee amputation (3/22/2021 - Dr. Gustavo Medina)             Impaired mobility and ADLs ICD-10-CM: Z74.09, Z78.9  ICD-9-CM: V49.89  3/22/2021 Yes        Critical ischemia of lower extremity ([address] ICD-10-CM: K50.287  ICD-9-CM: 459.9  3/22/2021 Yes    Overview Signed 3/26/2021  5:42 PM by Nata Barragan MD     Right             Acute blood loss as cause of postoperative anemia ICD-10-CM: D62  ICD-9-CM: 285.1  3/22/2021 Yes        History of embolectomy ICD-10-CM: Z98.890  ICD-9-CM: V45.89  3/5/2021 Yes    Overview Signed 3/26/2021  5:39 PM by Nata Barragan MD     S/P Right lower extremity arterial embolectomy                   Background:   Past Medical History:   Past Medical History:   Diagnosis Date    Acute blood loss as cause of postoperative anemia 3/22/2021    Anticoagulated by anticoagulation treatment     On Rivaroxaban    Cardiomyopathy ([address] 12/17/2015    2D echocardiogram (3/24/2021) showed EF 37%; global hypokinesis of left ventricle; grade I diastolic dysfunction; 2D echocardiogram (12/17/2015) showed EF 35%; generalized hypokinesis more focally severe of the inferior and posterior segments; mild mitral regurgitation; trace tricuspud regurgitation; normal pulmonary artery pressure    Chronic systolic heart failure (HCC)     2D echocardiogram (3/24/2021) showed EF 37%; global hypokinesis of left ventricle; grade I diastolic dysfunction; 2D echocardiogram (12/17/2015) showed EF 35%; generalized hypokinesis more focally severe of the inferior and posterior segments; mild mitral regurgitation; trace tricuspud regurgitation; normal pulmonary artery pressure    Coronary artery disease involving native coronary artery of native heart     COVID-19 ruled out by laboratory testing 3/22/2021    SARS-CoV-2 ([facility]2000, [facility]) (3/22/2021):  Not detected     Critical ischemia of lower extremity ([facility] 3/22/2021    Right    Factor V Leiden mutation ([facility]     Grade I diastolic dysfunction 44/97/5522    2D echocardiogram (3/24/2021) showed EF 37%; global hypokinesis of left ventricle; grade I diastolic dysfunction    History of deep venous thrombosis (DVT) of distal vein of right lower extremity     History of epilepsy     History of head injury     History of myocardial infarction     History of noncompliance with medical treatment 3/18/2021    Hypertensive heart disease with chronic systolic congestive heart failure ([facility]     2D echocardiogram (3/24/2021) showed EF 37%; global hypokinesis of left ventricle; grade I diastolic dysfunction; 2D echocardiogram (12/17/2015) showed EF 35%; genealized hypokinesis more focally severe of the inferior and posterior segments; mild mitral regurgitation; trace tricuspud regurgitation; normal pulmonary artery pressure    Infection of right below knee amputation ([facility] 4/7/2021    S/P Washout of right below-knee amputation; Right knee disarticulation (4/7/2021  Dr. Kim Florian); S/P Exploration of right knee disarticulation and control of hemorrhage (4/8/2021  Dr. Kim Florian)   [employer] Long term current use of aspirin     Major depressive disorder 03/24/2021    On Duloxetine    Migraine headache     Mitral valve prolapse     Mixed hyperlipidemia     Lipid profile (7/25/2018) showed , , HDL 27,     Obstructive sleep apnea     On statin therapy due to risk of future cardiovascular event     On Atorvastatin    Peripheral artery disease ([facility]     Type 2 diabetes mellitus, without long-term current use of insulin ([facility])     HbA1c (4/8/2021) = 9.8; HbA1c (3/6/2021) = 93.9    Umbilical hernia     Wears glasses       Patient taking anticoagulants yes    Patient has a defibrillator: no     Assessment:   Changes in Assessment throughout shift: no acute changes noted throughout the shift     Patient has central line: no Reasons if yes: n/a  Insertion date:n/a Last dressing date:n/a   Patient has Ellis Cath: no Reasons if yes: n/a   Insertion date:n/a     Last Vitals:     Vitals:    04/17/21 2051 04/18/21 0800 04/18/21 1559 04/18/21 2000   BP: 113/70 109/71 119/75 115/76   Pulse: 80 86 90 86   Resp: 18 16 17 18   Temp: 97.8 °F (36.6 °C) 98 °F (36.7 °C) 97 °F (36.1 °C) 98.1 °F (36.7 °C)   SpO2: 99% 99%  99%   Height:            PAIN    Pain Assessment    Pain Intensity 1: 5 (04/19/21 0624) Pain Intensity 1: 2 (12/29/14 1105)    Pain Location 1: Leg Pain Location 1: Abdomen    Pain Intervention(s) 1: Medication (see MAR) Pain Intervention(s) 1: Medication (see MAR)  Patient Stated Pain Goal: 0 Patient Stated Pain Goal: 0  o Intervention effective: yes    o Other actions taken for pain: repositioning      Skin Assessment  Skin color Skin Color: Appropriate for ethnicity  Condition/Temperature Skin Condition/Temp: Warm  Integrity Skin Integrity: Incision (comment)  Turgor Turgor: Non-tenting  Weekly Pressure Ulcer Documentation  Pressure  Injury Documentation: Pressure Injury Noted-See Wound LDA to Document  Wound Prevention & Protection Methods  Orientation of wound Orientation of Wound Prevention: Posterior  Location of Prevention Location of Wound Prevention: Sacrum/Coccyx  Dressing Present Dressing Present : No  Dressing Status Dressing Status: Intact  Wound Offloading Wound Offloading (Prevention Methods): Bed, pressure redistribution/air, Bed, pressure reduction mattress     INTAKE/OUPUT  Date 04/18/21 0700 - 04/19/21 0659 04/19/21 0700 - 04/20/21 0659   Shift 0700-1859 1900-0659 24 Hour Total 0700-1859 1900-0659 24 Hour Total   INTAKE   Shift Total         OUTPUT   Urine           Urine Occurrence(s) 3 x 2 x 5 x      Stool           Stool Occurrence(s) 2 x 0 x 2 x      Shift Total         NET         Weight (kg)             Recommendations:  1. Patient needs and requests: education     2. Diet: Cardiac consistent carb diet with thin liquids    3. Pending tests/procedures: AM labs     4. Functional Level/Equipment: 1 person assist     5. Estimated Discharge Date: TBD Posted on Whiteboard in Patients Room: [facility] Safety Check    A safety check occurred in the patient's room between off going nurse and oncoming nurse listed above. The safety check included the below items  Area Items   H  High Alert Medications - Verify all high alert medication drips (heparin, PCA, etc.)   E  Equipment - Suction is set up for ALL patients (with yanker)  - Red plugs utilized for all equipment (IV pumps, etc.)  - WOWs wiped down at end of shift.  - Room stocked with oxygen, suction, and other unit-specific supplies   A  Alarms - Bed alarm is set for fall risk patients  - Ensure chair alarm is in place and activated if patient is up in a chair   L  Lines - Check IV for any infiltration  - Ellis bag is empty if patient has a Ellis   - Tubing and IV bags are labeled   S  Safety   - Room is clean, patient is clean, and equipment is clean. - Hallways are clear from equipment besides carts.    - Fall bracelet on for fall risk patients  - Ensure room is clear and free of clutter  - Suction is set up for ALL patients (with faraz)  - Hallways are clear from equipment besides carts.    - Isolation precautions followed, supplies available outside room, sign posted         Annie Crabtree

## 2021-04-19 NOTE — PROGRESS NOTES
[x] Psychology  [] Social Work [] Recreational Therapy    INTERVENTION  UNITS/TIME OF SERVICE   Assessment  April 19, 2021   Supportive Counseling    Orientation    Discharge Planning    Resource Linkage              Progress/Current Status    Patient seen for Psychological Evaluation as requested on admission to ARU by Dr. Porfirio Simental. Patient found sitting upright in wheelchair in bedroom and immediately responsive to my contact. He presents as alert, oriented, with baseline cognitive function that is unimpaired, and also familiar with ARU from previous admit which will allow for easy integration at this time. Patient does report situational stress and upset thoughts and feelings regarding the need for further amputation to above the knee, and the barriers this will pose for him, ie, such as requiring greater UE strength. Further, patient is also stressed by numerous personal issues, including his report that he cannot get in contact with his mother who remains in SNF in [place]. Fortunately, patient remains on Cymbalta for mood stability and acknowledges his belief that he does require psychotropic medication at this time to enable him to persevere and maintain mood stability. Patient will be monitored for emotional and/or behavioral difficulties while on ARU and be encouraged to persevere.     Priscila Scott, THE [facility] 4/19/2021 9:40 AM

## 2021-04-19 NOTE — PROGRESS NOTES
[facility] PSYCHOLOGICAL SCREENING    Assessment Initiated:  April 19, 2021    Rehab Diagnosis:  Right AKA    Pertinent Physical/Psychiatric History:     Patient Active Problem List   Diagnosis Code    History of right below knee amputation ([facility] Z89.511    Impaired mobility and ADLs Z74.09, Z78.9    Critical ischemia of lower extremity (HCC) I70.229    Peripheral artery disease (HCC) I73.9    Coronary artery disease involving native coronary artery of native heart I25.10    Type 2 diabetes mellitus, without long-term current use of insulin (HCC) E11.9    History of epilepsy Z86.69    Factor V Leiden mutation ([facility] D68.51    Migraine headache G43.909    Wears glasses V24.5    Umbilical hernia L21.9    Obstructive sleep apnea G47.33    ICD (implantable cardioverter-defibrillator) in place Z95.810    Mitral valve prolapse I34.1    History of head injury Z87.828    Hypertensive heart disease with chronic systolic congestive heart failure (HCC) I11.0, I50.22    History of myocardial infarction I25.2    Long term current use of aspirin Z79.82    On statin therapy due to risk of future cardiovascular event Z79.899    History of deep venous thrombosis (DVT) of distal vein of right lower extremity Z86.718    Anticoagulated by anticoagulation treatment Z79.01    Mixed hyperlipidemia E78.2    History of embolectomy Z98.890    COVID-19 ruled out by laboratory testing Z20.822    Acute blood loss as cause of postoperative anemia D62    Cardiomyopathy ([facility] I42.9    Chronic systolic heart failure (HCC) I50.22    History of noncompliance with medical treatment Z91.19    Major depressive disorder F32.9    Grade I diastolic dysfunction D41.4    Constipation K59.00    Ischemia of right BKA site (HCC) T87.89, I99.8    Infection of right below knee amputation ([facility] T87.43    Status post above-knee amputation of right lower extremity ([facility] Z89.611       Patient is currently Rx Cymbalta for mood stability, having been diagnosed with Major Depressive Disorder before his previous transfer to ARU; and, he now acknowledges that he is stressed and depressed and requires same for mood stability while in recovery. He continues to be Rx sleep medication, as well. Patient does not have history of substance abuse nor dependency      OBJECTIVE  GENERAL OBSERVATIONS  Willingness to participate in program: [x] good   [] fair [] indifferent [] poor    General Appearance:  Patient appears casually and appropriately dressed and groomed and sitting upright in wheelchair in bedroom, appearing calm but admitting to feeling stressed. Sensory Impairments:  Patient wears eyeglasses for correction. Speech is entirely intelligible and he has good auditory reception and comprehension. Patient is able to voluntarily move all extremities.     Yazidi Affiliation:  Unknown    Admission Assessment  Discharge Status   [x] alert  [] lethargic  [] difficult to arouse  [] fluctuating  [] other: Level of Consciousness [x] alert  [] lethargic  [] difficult to arouse  [] fluctuating  [] other:   [x] person  [x] place  [x] time  [x] situation Oriented [x] person  [x] place  [x] time  [x] situation   [x] within normal limits  [] impaired       [] mild        [] moderate        [] severe Attention [x] within normal limits  [] impaired       [] mild        [] moderate        [] severe   [x] within normal limits  [] impaired       [] mild        [] moderate        [] severe Memory [x] within normal limits  [] impaired       [] mild        [] moderate        [] severe   [] appropriate to situation  [x] depressed  [x] anxious  [] angry   [] fearful  [] emotionally labile  [] other:  Mood [x] appropriate to situation  [] depressed  [] anxious  [] angry   [] fearful  [] emotionally labile  [] other:   [x] appropriate  [] flat  [] inappropriate to content of speech Affect [x] appropriate  [] flat  [] inappropriate to content of speech   [x] appropriate  [] aggressive/agitated  [] withdrawn  [] inappropriate  [] other: Patient presents as calm and composed and neither restless nor agitated Behavior [x] appropriate  [] aggressive/agitated  [] withdrawn  [] inappropriate  [] other:   [] good  [x] limited  [] denial  [] none Insight Into Illness [x] good  [] limited  [] denial  [] none   [x] intact  [] impaired       [] mild        [] moderate        [] severe       Describe: Patient appears to be functioning at his baseline of cognitive ability Cognition [x] intact  [] impaired       [] mild        [] moderate        [] severe       Describe:    [] coping  [x] demonstrates poor adjustment  [] undetermined       As evidenced by: Patient is feeling stressed by medical circumstances and recent change in amputation, as well as by various external, family and personal circumstances Patient Adjustment to Disability [x] coping  [] demonstrates poor adjustment  [] undetermined       As evidenced by:    [] coping  [] demonstrates poor adjustment  [x] undetermined      As evidenced by: Not available on evaluation: patient seems to feel well supported by various friends but not by his siblings Family Adjustment to Disability [] coping  [] demonstrates poor adjustment  [x] undetermined      As evidenced by: Patient has described having \"difficult\" family relations and therefore support     ASSESSMENT  Clinical Impression:  Patient remains a 60year old, single, currently unemployed and having just applied for Social Security Disability,  male. 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 Patient previously reported feeling that he has less than satisfactory support from his sisters and especially in regard to their mother's welfare and care. In fact, his mother is now in SNF, herself having had amputation and suffered numerous CVA events.   At this time, patient is also wary of supposed insurance issues that he worries may impose limitations on his therapeutic need. Importantly, however, he insists that he is motivated to work to his level of ability and be able to regain functional independence, as much as possible. He is aware that he will not be fit with prosthesis while on ARU. Patient was previously diagnosed with Major Depressive Disorder while in hospital and before his initial transfer to ARU. At this time, he remains on Cymbalta and actually states feeling that he \"needs\" the benefit of medication at this time, because he is feeling so stressed. On the one hand, he presents as calm and no lability is observed; however, he obviously is feeling overwhelmed on several fronts, including his own recovery from further amputation of right LE as well as his worry about his mother's welfare and the fact that he is not available to currently support her. Additionally, patient seems worried that insurance will not permit him to remain on ARU long enough to reach satisfactory levels of function so that he is not entirely dependent on others. Patient will obviously benefit from education to better understand all of the parameters of his amputation recovery, as well as support and encouragement. Hopefully, he can maximize his efforts and realistically identify goals for himself, knowing that he may well be a good candidate for prosthetic training, allowing for desired independence. Patient Strengths:  Patient remains alert, oriented, cooperative and motivated to improve his functional status    Patient Preferences:  Patient expects to return to home and apparently hopes to continue to support his parent    Rehab Potential:  Good potential but probable disappointment that he will not be independent \"soon enough\"    Educational Needs: Under each heading list the specific items in which the patient or family will need education/training.  Example: hip precautions, use of walker, ADL equipment, neglect, judgment, adjustment, etc.     Special considerations or accommodations for teaching:  [] Yes     [] No     [] NA  If Yes, explain:  Discharge Status    Completed Demonstrated/ Verbalized Understanding    Yes No Yes No   Info regarding disability: Limited insight [x] [] [x] []   Adjustment: Forced dependency [x] [] [x] []   Cognition:  [] [] [] []   Other: Patient is stressed in his recovery [x] [] [x] []   Other: [] [] [] []   If education not completed, explain: [] [] [] []     PLAN  Problem: Limited insight about all aspects of amputation recovery  Long Term Goal: Increase all insight  Intervention: Patient education  At Discharge  LTG Achieved: [x] Yes [] No If not achieved, explain:    Problem: Forced dependency  Long Term Goal: Accept dependency in recovery  Intervention: Patient education and support   At Discharge  LTG Achieved: [x] Yes [] No If not achieved, explain:    Problem: Situational stress and depressed/worried mood and thinking   Long Term Goal: Maintain mood stability  Intervention: Rx and support   At Discharge  LTG Achieved: [x] Yes [] No If not achieved, explain:    Problem: Diminished self concept and self esteem  Long Term Goal: Improve self confidence  Intervention: Positive reinforcement and ego support  At Discharge  LTG Achieved: [x] Yes [] No If not achieved, explain:    Problem:   Long Term Goal:   Intervention:   At Discharge  LTG Achieved: [] Yes [] No If not achieved, explain:    May Lal, THE [facility]  4/19/2021 9:46 AM    DISCHARGE STATUS    Clinical Impressions: Patient has worked to his level of ability on ARU and therefore able to discharge to home from hospital.  He has essentially maintained mood and behavior stability; however, there have been periods of increased stress for him and he has needed support in coping effectively. He presents as determined to regain independence is motivated to continue to improve.   He has been in contact with prosthetist.  Patient is realistic in his thinking about continued recovery and understands that he will require further therapy services.       Follow-up Services Recommended Purpose                 Jennifer Singh, PHD  Discharge Date/Time:

## 2021-04-19 NOTE — PROGRESS NOTES
Problem: Mobility Impaired (Adult and Pediatric)  Goal: *Therapy Goal (Edit Goal, Insert Text)  Description: Physical Therapy Short Term Goals  Initiated 4/16/2021 and to be accomplished within 7 day(s) on 4/23/2021:  1. Patient will move from supine to sit and sit to supine , scoot up and down, and roll side to side in bed with modified independence. 2.  Patient will transfer from bed to chair and chair to bed with supervision/set-up using the least restrictive device. 3.  Patient will perform sit to stand with supervision/set-up. 4.  Patient will ambulate with supervision/set-up for 15 feet with the least restrictive device. 5.  Patient will perform w/c mobility for 150 ft at supervision level over even surfaces. Physical Therapy Long Term Goals  Initiated 4/16/2021 and to be accomplished within 14 day(s) on 4/30/2021:  1. Patient will move from supine to sit and sit to supine , scoot up and down, and roll side to side in bed with independence. 2.  Patient will transfer from bed to chair and chair to bed with modified independence using the least restrictive device. 3.  Patient will perform sit to stand with modified independence. 4.  Patient will ambulate with modified independence for 25 feet with the least restrictive device. 5.  Patient will perform w/c mobility for at least 150 ft at modified independent level. 6.  Patient will ascend/descend ramp for safe entry/exit of home using wheelchair with supervision. Outcome: Progressing Towards Goal   PHYSICAL THERAPY TREATMENT    Patient: Jonathan Tabares (5 y.o. male)  Date: 4/19/2021  Diagnosis: Status post above-knee amputation of right lower extremity (HCC) [Z89.611] Status post above-knee amputation of right lower extremity (HCC)  Precautions: Fall(right AKA)  Chart, physical therapy assessment, plan of care and goals were reviewed. Time In:0930  Time Out:1035    Patient seen for: AM;Balance activities; Patient education; Therapeutic exercise;Transfer training    Pain:  Pt pain was reported as 7/10 pre-treatment. Pt pain was reported as 6/10 post-treatment. Intervention: Yes, patient was medicated    Patient identified with name and : Yes    SUBJECTIVE:      The pain is in my incision to the left side and also some phantom pain. OBJECTIVE DATA SUMMARY:    Objective: Patient given exercise booklet and instructed in AKA exercises. GROSS ASSESSMENT Daily Assessment     AROM: Generally decreased, functional  PROM: Generally decreased, functional  Strength: Generally decreased, functional  Coordination: Within functional limits  Tone: Normal      BED/MAT MOBILITY Daily Assessment     Rolling Right : 5 (Supervision)  Rolling Left : 5 (Supervision)  Supine to Sit : 5 (Supervision)  Sit to Supine : 5 (Supervision)      TRANSFERS Daily Assessment     Transfer Type: Lateral pivot  Other: (stand step with RW)  Transfer Assistance : 5 (Stand-by assistance)  Sit to Stand Assistance: Contact guard assistance  Car Transfers: Supervision  Car Type: (car simulator)        GAIT Daily Assessment    Gait Description (WDL) Exceptions to WDL    Gait Abnormalities Decreased step clearance    Assistive device Walker, rolling;Gait belt    Ambulation assistance - level surface 4 (Contact guard assistance)    Distance (72 ft)20 ft x 2, 15 feet    Ambulation assistance- uneven surface (not tested)    Comments Patient with good step length, improving foot clearance and balance after 72 feet. Patient is able to manage RW with slight adjustments for safety.           BALANCE Daily Assessment     Sitting - Static: Good (unsupported)  Sitting - Dynamic: Good (unsupported)  Standing - Static: Good(with RW)  Standing - Dynamic : Impaired        WHEELCHAIR MOBILITY Daily Assessment     Able to Propel (ft): 150 feet  Functional Level: (6)  Wheelchair Setup Assist Required : 4 (Minimal assistance)  Wheelchair Management: Manages left brake;Manages right brake THERAPEUTIC EXERCISES Daily Assessment      Supine- RLE-AROM hip fleixon, abduction, glut sets x 15 reps; 2 sets. Prone extension x 15 reps each, 2 sets. ASSESSMENT:  Patient is seen for deficits in strength, mobility, transfers, ambulation, safety and balance. Patient is agreeable to skilled session and reports high pain c/o 7/10 prior to initiation of session. He has requested and was administered pain medications from nurse Calixto Garcia shortly after beginning session in the gym. Patient is improving with transfers and ambulation for improved functional mobility. Patient will benefit from continued skilled rehab to address weakness, safety and balance for improved functional outcome. Progression toward goals:  [x]      Improving appropriately and progressing toward goals  []      Improving slowly and progressing toward goals  []      Not making progress toward goals and plan of care will be adjusted      PLAN:  Patient continues to benefit from skilled intervention to address the above impairments. Continue treatment per established plan of care. Discharge Recommendations:  Home health  Further Equipment Recommendations for Discharge:  Melony Perez      Estimated Discharge Date: 4/29/2021    Activity Tolerance:   Good tolerance to session. Please refer to the flowsheet for vital signs taken during this treatment.     After treatment:   Patient propelled self back to room after  session      Jessenia Farley

## 2021-04-19 NOTE — PROGRESS NOTES
[address]  [address] [place] [address], [address]     INPATIENT REHABILITATION  DAILY PROGRESS NOTE     Date: 4/19/2021    Name: Debra Arriola Age / Sex: 60 y.o. / male   CSN: [id number] MRN: [id number]   [facility] Date: 4/15/2021 Length of Stay: 4 days     Primary Rehab Diagnosis: Impaired Mobility and ADLs secondary to:  1. S/P Right above-the-knee amputation (4/11/2021  Dr. Saniya Dietz)  2. S/P Exploration of right knee disarticulation and control of hemorrhage (4/8/2021  Dr. Artemio Farley)  3. S/P Washout of right below-knee amputation; Right knee disarticulation (4/7/2021  Dr. Artemio Farley)  4. History of infection and ischemia of right BKA stump (4/7/2021)  5. S/P Right below-the-knee amputation (3/22/2021 - Dr. Marilee Garay)  6. History of critical ischemia of the right lower extremity  7. History of right lower extremity arterial embolectomy (3/5/2021 - Dr. Marilee Garay)      Subjective:     Patient seen and examined. Blood pressure controlled. Blood glucose controlled. Patient's Complaint:   No significant medical complaints    Pain Control: stable, mild-to-moderate joint symptoms intermittently, reasonably well controlled by current meds      Objective:     Vital Signs:  Patient Vitals for the past 24 hrs:   BP Temp Pulse Resp SpO2   04/19/21 0736 119/75 97.8 °F (36.6 °C) 86 20 100 %   04/18/21 2000 115/76 98.1 °F (36.7 °C) 86 18 99 %   04/18/21 1559 119/75 97 °F (36.1 °C) 90 17         Physical Examination:  GENERAL SURVEY: Patient is awake, alert, oriented x 3, laying comfortably on the bed, not in acute respiratory distress.   HEENT: pale palpebral conjunctivae, anicteric sclerae, no nasoaural discharge, moist oral mucosa  NECK: supple, no jugular venous distention, no palpable lymph nodes  CHEST/LUNGS: symmetrical chest expansion, good air entry, clear breath sounds  HEART: adynamic precordium, good S1 S2, no S3, regular rhythm, no murmurs  ABDOMEN: flat, bowel sounds appreciated, soft, non-tender  EXTREMITIES: (+) right AKA stump, (+) absence of hair on LLE, (+) healing wound with scab on lateral surface of distal third of left leg with surrounding hyperpigmentation, (+) LLE cool to touch, pale nailbeds, no edema, full and equal pulses, no calf tenderness   NEUROLOGICAL EXAM: The patient is awake, alert and oriented x3, able to answer questions fairly appropriately, able to follow 1 and 2 step commands. Able to tell time from the wall clock. Cranial nerves II-XII are grossly intact. No gross sensory deficit.   Motor strength is 4+/5 on BUE, 4/5 on the RLE, 4 to 4+/5 on the LLE.     Incision(s): covered, clean, dry, and intact       Current Medications:  Current Facility-Administered Medications   Medication Dose Route Frequency    methocarbamoL (ROBAXIN) tablet 500 mg  500 mg Oral TID    0.9% sodium chloride infusion 250 mL  250 mL IntraVENous PRN    diphenhydrAMINE (BENADRYL) capsule 25 mg  25 mg Oral Q6H PRN    ferrous gluconate 324 mg (37.5 mg iron) tablet 1 Tab  324 mg Oral DAILY WITH BREAKFAST    ascorbic acid (vitamin C) (VITAMIN C) tablet 250 mg  250 mg Oral DAILY WITH BREAKFAST    acetaminophen (TYLENOL) tablet 650 mg  650 mg Oral Q4H PRN    bisacodyL (DULCOLAX) tablet 10 mg  10 mg Oral Q48H PRN    insulin lispro (HUMALOG) injection   SubCUTAneous TIDAC    carvediloL (COREG) tablet 3.125 mg  3.125 mg Oral BID WITH MEALS    gabapentin (NEURONTIN) capsule 100 mg  100 mg Oral TID    insulin glargine (LANTUS) injection 5 Units  5 Units SubCUTAneous QHS    aspirin chewable tablet 81 mg  81 mg Oral DAILY WITH BREAKFAST    atorvastatin (LIPITOR) tablet 80 mg  80 mg Oral DAILY    docusate sodium (COLACE) capsule 100 mg  100 mg Oral DAILY AFTER BREAKFAST    DULoxetine (CYMBALTA) capsule 30 mg  30 mg Oral DAILY    ezetimibe (ZETIA) tablet 10 mg  10 mg Oral DAILY    acetaminophen (TYLENOL) tablet 650 mg  650 mg Oral TID    oxyCODONE IR (ROXICODONE) tablet 5-10 mg  5-10 mg Oral Q4H PRN    rivaroxaban (XARELTO) tablet 20 mg  20 mg Oral DAILY    melatonin (rapid dissolve) tablet 5 mg  5 mg Oral QHS    zolpidem (AMBIEN) tablet 5 mg  5 mg Oral QHS PRN    metFORMIN (GLUCOPHAGE) tablet 500 mg  500 mg Oral BID WITH MEALS       Allergies: Allergies   Allergen Reactions    Nitroglycerin Other (comments)      BP drops rapidly when he takes SL Nitro          Ulman Diarrhea and Rash    Cat Dander Cough    Codeine Rash    [place] Rash       Lab/Data Review:  Recent Results (from the past 24 hour(s))   GLUCOSE, POC    Collection Time: 04/18/21  4:40 PM   Result Value Ref Range    Glucose (POC) 135 (H) 70 - 110 mg/dL   GLUCOSE, POC    Collection Time: 04/18/21  8:36 PM   Result Value Ref Range    Glucose (POC) 168 (H) 70 - 869 mg/dL   METABOLIC PANEL, BASIC    Collection Time: 04/19/21  4:31 AM   Result Value Ref Range    Sodium 138 136 - 145 mmol/L    Potassium 4.8 3.5 - 5.5 mmol/L    Chloride 106 100 - 111 mmol/L    CO2 24 21 - 32 mmol/L    Anion gap 8 3.0 - 18 mmol/L    Glucose 123 (H) 74 - 99 mg/dL    BUN 16 7.0 - 18 MG/DL    Creatinine 0.97 0.6 - 1.3 MG/DL    BUN/Creatinine ratio 16 12 - 20      GFR est AA >60 >60 ml/min/1.73m2    GFR est non-AA >60 >60 ml/min/1.73m2    Calcium 9.4 8.5 - 10.1 MG/DL   HGB & HCT    Collection Time: 04/19/21  4:31 AM   Result Value Ref Range    HGB 8.9 (L) 13.0 - 16.0 g/dL    HCT 28.9 (L) 36.0 - 48.0 %   GLUCOSE, POC    Collection Time: 04/19/21  7:41 AM   Result Value Ref Range    Glucose (POC) 125 (H) 70 - 110 mg/dL   GLUCOSE, POC    Collection Time: 04/19/21 12:26 PM   Result Value Ref Range    Glucose (POC) 146 (H) 70 - 110 mg/dL       Assessment:     Primary Rehab Diagnosis  1. Impaired Mobility and ADLs  2. S/P Right above-the-knee amputation (4/11/2021  Dr. Jeff Munguia)  3. S/P Exploration of right knee disarticulation and control of hemorrhage (4/8/2021  Dr. Terri Zhang)  4.  S/P Washout of right below-knee amputation; Right knee disarticulation (4/7/2021  Dr. Gatito Bojorquez)  5. History of infection and ischemia of right BKA stump (4/7/2021)  6. S/P Right below-the-knee amputation (3/22/2021 - Dr. lCaribel Claudio)  7. History of critical ischemia of the right lower extremity  8.  History of right lower extremity arterial embolectomy (3/5/2021 - Dr. Claribel Claudio)    Comorbidities  Patient Active Problem List   Diagnosis Code    History of right below knee amputation ([facility] Z89.511    Impaired mobility and ADLs Z74.09, Z78.9    Critical ischemia of lower extremity (HCC) I70.229    Peripheral artery disease (HCC) I73.9    Coronary artery disease involving native coronary artery of native heart I25.10    Type 2 diabetes mellitus, without long-term current use of insulin (HCC) E11.9    History of epilepsy Z86.69    Factor V Leiden mutation ([facility] D68.51    Migraine headache G43.909    Wears glasses W59.5    Umbilical hernia C48.9    Obstructive sleep apnea G47.33    ICD (implantable cardioverter-defibrillator) in place Z95.810    Mitral valve prolapse I34.1    History of head injury Z87.828    Hypertensive heart disease with chronic systolic congestive heart failure (HCC) I11.0, I50.22    History of myocardial infarction I25.2    Long term current use of aspirin Z79.82    On statin therapy due to risk of future cardiovascular event Z79.899    History of deep venous thrombosis (DVT) of distal vein of right lower extremity Z86.718    Anticoagulated by anticoagulation treatment Z79.01    Mixed hyperlipidemia E78.2    History of embolectomy Z98.890    COVID-19 ruled out by laboratory testing Z20.822    Acute blood loss as cause of postoperative anemia D62    Cardiomyopathy ([facility] I42.9    Chronic systolic heart failure (HCC) I50.22    History of noncompliance with medical treatment Z91.19    Major depressive disorder F32.9    Grade I diastolic dysfunction L04.5    Constipation K59.00  Ischemia of right BKA site ([facility]) T87.89, I99.8    Infection of right below knee amputation ([facility]) T87.43    Status post above-knee amputation of right lower extremity ([facility] Z89.611        Plan:     1. Justification for continued stay: Good progression towards established rehabilitation goals. 2. Medical Issues being followed closely:    [x]  Fall and safety precautions     [x]  Wound Care     [x]  Bowel and Bladder Function     [x]  Fluid Electrolyte and Nutrition Balance     [x]  Pain Control      3. Issues that 24 hour rehabilitation nursing is following:    [x]  Fall and safety precautions     [x]  Wound Care     [x]  Bowel and Bladder Function     [x]  Fluid Electrolyte and Nutrition Balance     [x]  Pain Control      [x]  Assistance with and education on in-room safety with transfers to and from the bed, wheelchair, toilet and shower. 4. Acute rehabilitation plan of care:    [x]  Continue current care and rehab. [x]  Physical Therapy           [x]  Occupational Therapy           []  Speech Therapy     []  Hold Rehab until further notice     5. Medications:    [x]  MAR Reviewed     [x]  Continue Present Medications     6. DVT Prophylaxis:      []  Enoxaparin     []  Unfractionated Heparin     []  Warfarin     [x]  NOAC     []  NEREYDA Stockings     []  Sequential Compression Device     []  None     7. Code status    [x]  Full code     []  Partial code     []  Do not intubate     []  Do not resuscitate     8. Orders:   > S/P Right above-the-knee amputation (4/11/2021  Dr. Marivel Beckett); S/P Exploration of right knee disarticulation and control of hemorrhage (4/8/2021  Dr. Rekha Geller); S/P Washout of right below-knee amputation; Right knee disarticulation (4/7/2021  Dr. Rekha Geller); History of infection and ischemia of right BKA stump (4/7/2021); S/P Right below-the-knee amputation (3/22/2021 - Dr. Rohini Aguilar); History of critical ischemia of the right lower extremity;  History of right lower extremity arterial embolectomy (3/5/2021 - Dr. Ana Luisa Marshall)   > Staples to be removed on 5/19/2021    > Acute Postoperative Blood Loss Anemia   > Hgb/Hct (3/26/2021) = 10.1/31.3   > Hgb/Hct (3/27/2021, on admission to ARU) = 10.9/33.9   > Anemia work-up showed serum iron 23, TIBC 200, iron % saturation 12, ferritin 835   > Hgb/Hct (3/29/2021) = 10.7/33.9       04/14/21  0423 04/13/21  0640 04/12/21  0539 04/11/21  1027 04/11/21  0650 04/10/21  1915 04/10/21  0640 04/09/21  0453 04/08/21  1602 04/08/21  0940 04/08/21  0210 04/07/21  0905 04/06/21  0850   HGB 7.2* 7.3* 7.3* 7.6* 7.9* 7.7* 6.3* 7.4* 6.5* 7.0* 7.8* 9.5* 10.8*   HCT 23.1* 23.2* 23.0*  --  24.4* 23.8* 20.0* 22.7* 21.5* 22.7* 25.4* 30.4* 34.2*      > Hgb/Hct (4/16/2021, on admission) = 6.6/21.1   > Anemia work-up (4/16/2021) showed serum iron 22, TIBC 202, iron % saturation 11, ferritin 545, reticulocyte count 5.1   > On 4/16/2021, patient was transfused 1 unit pRBC properly typed and crossmatched   > Hgb/Hct (4/17/2021) = 9.5/29.8    > On 4/17/2021, started:    > Ferrous gluconate 324 mg PO once daily with breakfast     > Ascorbic Acid 250 mg PO once daily with breakfast (to enhance the absorption of the FeSO4)    > Hgb/Hct (4/19/2021) = 8.9/28.9    > Continue:    > Ferrous gluconate 324 mg PO once daily with breakfast     > Ascorbic Acid 250 mg PO once daily with breakfast (to enhance the absorption of the FeSO4)     > Coronary artery disease; Peripheral artery disease    > On 3/27/2021, decreased Carvedilol from 6.25 mg to 3.125 mg PO BID with meals (8AM, 5PM)   > Continue:    > Aspirin 81 mg PO once daily with breakfast    > Atorvastatin 80 mg PO once daily    > Carvedilol 3.125 mg PO BID with meals (8AM, 5PM)    > Hypertensive heart disease with chronic systolic heart failure; Cardiomyopathy; Chronic systolic heart failure; Grade I diastolic dysfunction   > 2D echocardiogram (12/17/2015) showed EF 35%; genealized hypokinesis more focally severe of the inferior and posterior segments; mild mitral regurgitation; trace tricuspud regurgitation; normal pulmonary artery pressure   > 2D echocardiogram (3/24/2021) showed EF 37%; global hypokinesis of left ventricle; grade I diastolic dysfunction   > On 3/27/2021, decreased Carvedilol from 6.25 mg to 3.125 mg PO BID with meals (8AM, 5PM)   > On 3/28/2021, held:    > Furosemide 40 mg PO once daily (9AM)    > Lisinopril 5 mg PO once daily (9AM)   > Continue Carvedilol 3.125 mg PO BID with meals (8AM, 5PM)    > Factor V Leiden mutation; History of deep venous thrombosis (DVT) of right lower extremity; Anticoagulated on Rivaroxaban   > Continue Rivaroxaban 20 mg PO once daily with breakfast    > Major depressive disorder   > Continue Duloxetine 30 mg PO once daily    > Mixed hyperlipidemia   > Lipid profile (7/25/2018) showed , , HDL 27,    > Lipid profile (3/27/2021) showed , , HDL 35, LDL 63   > Continue:    > Atorvastatin 80 mg PO once daily    > Ezetimibe 10 mg PO q HS    > Type 2 diabetes mellitus, poorly controlled, without long-term current use of insulin   > HbA1c (3/6/2021) = 12.9    > HbA1c (4/8/2021) = 9.8   > On admission to the ARU:    > Started Metformin 500 mg PO BID with meals    > Decreased Insulin glargine from 8 units to 5 units SC q HS   > Start Alogliptin 25 mg PO once daily   > Discontinue Insulin glargine 5 units SC q HS   > Continue:    > Metformin 500 mg PO BID with meals    > Insulin lispro sliding scale SC TID AC only     > Difficulty sleeping   > Continue:    > Melatonin 5 mg PO q HS    > Zolpidem 5 mg PO q HS PRN for sleep    > Wound on lateral surface of distal third of left leg   > Wound Care Nurse consult for wound care recommendations    > COVID-19 ruled out by laboratory testing   > SARS-CoV-2 ([facility], [facility]) (3/22/2021): Not detected   > SARS-CoV-2 ([facility], [facility]) (collected 4/7/2021, resulted 4/8/2021):  Not detected    > COVID-19 rapid test ([facility], [facility]) (4/7/2021): Not detected   > COVID-19 rapid test ([facility], [facility]) (4/14/2021): Not detected   > SARS-CoV-2 ([facility], [facility]) (collected 4/15//2021, resulted 4/16/2021): Not detected    > Analgesia   > Continue:    > Acetaminophen 650 mg PO TID (8AM, 12PM, 4PM)    > Acetaminophen 650 mg PO q 4 hr PRN for pain level 4/10 or lesser (from 8PM to 4AM only)    > Duloxetine 30 mg PO once daily    > Gabapentin 100 mg PO TID    > Methocarbamol 500 mg PO TID    > Oxycodone 5-10 mg PO q 4 hr PRN for pain level 5/10 or greater     > Diet:   > Specifications: Cardiac, diabetic, consistent carb 1800 kcal   > Solids (consistency): Regular    > Liquids (consistency): Thin    > Fluid restriction: None      9. Personal Protective Equipment was used while interacting with patient including: goggles and KN95 face mask. Patient was using a surgical mask. 10. Patient's progress in rehabilitation and medical issues discussed with the patient. All questions answered to the best of my ability. Care plan discussed with patient and nurse. 11. Total clinical care time is 30 minutes, including review of chart including all labs, radiology, past medical history, and discussion with patient and family. Greater than 50% of my time was spent in coordination of care and counseling.       Signed:    Tra Ramirez MD    April 19, 2021

## 2021-04-19 NOTE — PROGRESS NOTES
Problem: Self Care Deficits Care Plan (Adult)  Goal: *Acute Goals and Plan of Care (Insert Text)  Description: Occupational Therapy Goals   Long Term Goals  Initiated 2021 and to be accomplished within 2 week(s)  1. Pt will perform self-feeding with independence. 2. Pt will perform grooming with mod I in standing at the sink. 3. Pt will perform UB bathing with mod I with set up of necessary items. 4. Pt will perform LB bathing with mod I with set up of necessary items. 5. Pt will perform tub/shower transfer with mod I.   6. Pt will perform UB dressing with mod I with set up of necessary items. 7. Pt will perform LB dressing with mod I with set up of necessary items. 8. Pt will perform toileting task with mod I.  9. Pt will perform toilet transfer with mod I with RW. Short Term Goals   Initiated 2021 and to be accomplished within 7 day(s)  1. Pt will perform self-feeding with mod I and no set-up A.   2. Pt will perform grooming with mod I.  3. Pt will perform UB bathing with mod I.  4. Pt will perform LB bathing with mod I.  5. Pt will perform tub/shower transfer with mod I.  6. Pt will perform UB dressing with mod I.  7. Pt will perform LB dressing with mod I.  8. Pt will perform toileting task with mod I.  9. Pt will perform toilet transfer with mod I with w/c as needed. 2021 1134 by Eze Gaspar  Outcome: Progressing Towards Goal  Occupational Therapy TREATMENT    Patient: Charisma Srivastava   60 y.o. Patient identified with name and : yes    Date: 2021    First Tx Session  Time In: 80  Time Out: 200    Diagnosis: Status post above-knee amputation of right lower extremity ([facility] [Z89.611]   Precautions: Fall(right AKA)  Chart, occupational therapy assessment, plan of care, and goals were reviewed. Pain:  Pt reports 6.5/10 pain or discomfort prior to treatment. Pt reports 6.5/10 pain or discomfort post treatment.    Intervention Provided: per pt, nursing administered pain meds prior to and during tx session       SUBJECTIVE:   Patient stated I already have a walker, I'll need a wheelchair.     OBJECTIVE DATA SUMMARY:     THERAPEUTIC ACTIVITY Daily Assessment     Patient participated in There act seated w/c level at tabletop reaching ipsilaterally and contralaterally for placement followed by removal of graded resistant clamps with 2# wrist weights onto yardstick tree with good tolerance for improved BUE AROM and increase functional activity tolerance for increased independence with ADL self care tasks and functional mobility. THERAPEUTIC EXERCISE Daily Assessment    UB bike x 15 mins with no rest breaks to increase strength and functional activity tolerance for improved independence with ADL routine and functional mobility. UE THERE EX with 4 # weighted dowel in order to increase UB strength and functional activity tolerance for improved functional transfers and ADL performance, pt performed 15 reps x 2 sets of bicep curls, shoulder circumduction, shoulder flexion to shoulder height and to over head height and triceps extension, pt tolerated well. IADL Daily Assessment    Medicine management x 5 simulated meds using weekly pill organizer box with Mod I and 100% accuracy     NMRE Daily Assessment         FEEDING/EATING Daily Assessment          GROOMING Daily Assessment          UPPER BODY BATHING Daily Assessment          LOWER BODY BATHING Daily Assessment          TOILETING Daily Assessment          UPPER BODY DRESSING Daily Assessment          LOWER BODY DRESSING Daily Assessment          MOBILITY/TRANSFERS Daily Assessment    Functional Transfers  Toilet Transfer : Other (comment);Grab bars(stand pivot transfer from w/c )  Amount of Assistance Required: 5 (stand-by assistance)       ASSESSMENT:  Patient self propelled w/c to & from rehab gym w/ Mod I using BUEs.  Patient reports he plans to hire laundry service and grocery delivery service for safe d/c home. Patient issued reacher with instruction and practice for item retrieval from floor surface, problem solving re: retrieval of groceries and laundry delivered on porch from w/c level using reacher, pt reports door will remain open easily, instruction provided for locking w/c brakes prior to retrieval of items while requesting laundry and grocery delivery service to deliver items in smaller bags for ease of picking up with reacher to bring into house from [place] [id number]. Patient notified that once (R) LE prosthetic fitted and pt is safe per [facility] therapy with use of prosthetic for functional mobility using RW, it is recommended to perform bathing and dressing tasks seated w/c level and in stance at sink w/ RW and w/c behind for seated rest breaks versus use of tub-shower unit and also the use of bedside commode with liners, visual aide provided for ordering bedside commode bucket liner bags and urinal for toileting d/t narrow bathroom space in which w/c cannot fit for safe access to tub-shower and/or toilet. Pt reports he will order bedside commode via internet and have delivered to his home. Patient reports he already has a RW. Ongoing practice with ADL routine seated w/c level and in stance w/ RW at sink is recommended during OT tx session to increase safety, balance and independence. Patient sitting up in w/c at end of tx session, call bell within reach & pt verbalized understanding to utilize for assist e.g. functional transfers in order to prevent falls, w/c alarm intact. Progression toward goals:  [x]          Improving appropriately and progressing toward goals  []          Improving slowly and progressing toward goals  []          Not making progress toward goals and plan of care will be adjusted     PLAN:  Patient continues to benefit from skilled intervention to address the above impairments. Continue treatment per established plan of care.   Discharge Recommendations:  Home Health  Further Equipment Recommendations for Discharge:  bedside commode,  wheelchair     Activity Tolerance:  fair    Estimated LOS: 2 weeks    Please refer to the flowsheet for vital signs taken during this treatment. After treatment:   [x]  Patient left in no apparent distress sitting up in w/c  []  Patient left in no apparent distress in bed  [x]  Call bell left within reach  [x]  Nursing notified  []  Caregiver present  [x]  w/c alarm activated    COMMUNICATION/EDUCATION:   [x] Home safety education was provided and the patient/caregiver indicated understanding. [x] Patient/family have participated as able in goal setting and plan of care. [x] Patient/family agree to work toward stated goals and plan of care. [] Patient understands intent and goals of therapy, but is neutral about his/her participation. [] Patient is unable to participate in goal setting and plan of care.       Randy Morales

## 2021-04-19 NOTE — PROGRESS NOTES
SHIFT CHANGE NOTE FOR [place]    Bedside and Verbal shift change report given to Vanita Burkitt, RN (oncoming nurse) by Daljit Tavarez (offgoing nurse). Report included the following information SBAR, Kardex, MAR and Recent Results.     Situation:   Code Status: Full Code   Reason for Admission: right AKA  Hospital Day: 4   Problem List:   Hospital Problems  Date Reviewed: 4/18/2021          Codes Class Noted POA    * (Principal) Status post above-knee amputation of right lower extremity ([facility] ICD-10-CM: Z69.384  ICD-9-CM: V49.76  4/11/2021 Yes    Overview Signed 4/15/2021  5:14 PM by Anusha Hillman MD     S/P Right above-the-knee amputation (4/11/2021  Dr. Treasure Wilkins)             Ischemia of right BKA site [facility]) ICD-10-CM: T87.89, I99.8  ICD-9-CM: 997.69, 459.9  4/7/2021 Yes        Infection of right below knee amputation [facility]) ICD-10-CM: T87.43  ICD-9-CM: 997.62  4/7/2021 Yes    Overview Signed 4/15/2021  5:17 PM by Anusha Hillman MD     S/P Washout of right below-knee amputation; Right knee disarticulation (4/7/2021  Dr. Betty Leong); S/P Exploration of right knee disarticulation and control of hemorrhage (4/8/2021  Dr. Betty Leong)             Peripheral artery disease ([facility] (Chronic) ICD-10-CM: I73.9  ICD-9-CM: 443.9  Unknown Yes        Type 2 diabetes mellitus, without long-term current use of insulin (HCC) (Chronic) ICD-10-CM: E11.9  ICD-9-CM: 250.00  Unknown Yes    Overview Addendum 4/15/2021  5:40 PM by Anusha Hillman MD     HbA1c (4/8/2021) = 9.8; HbA1c (3/6/2021) = 12.9             Factor V Leiden mutation ([facility] (Chronic) ICD-10-CM: Z93.57  ICD-9-CM: 289.81  Unknown Yes        Hypertensive heart disease with chronic systolic congestive heart failure (HCC) (Chronic) ICD-10-CM: I11.0, I50.22  ICD-9-CM: 402.91, 428.22  Unknown Yes    Overview Signed 3/26/2021 11:06 PM by Anusha Hillman MD     2D echocardiogram (12/17/2015) showed EF 35%; genealized hypokinesis more focally severe of the inferior and posterior segments; mild mitral regurgitation; trace tricuspud regurgitation; normal pulmonary artery pressure             Anticoagulated by anticoagulation treatment (Chronic) ICD-10-CM: Z79.01  ICD-9-CM: V58.61  Unknown Yes    Overview Signed 3/26/2021  4:36 PM by Brenda Frank MD     On Rivaroxaban             Mixed hyperlipidemia (Chronic) ICD-10-CM: E78.2  ICD-9-CM: 272.2  Unknown Yes    Overview Signed 3/26/2021 11:07 PM by Brenda Frank MD     Lipid profile (7/25/2018) showed , , HDL 27,              Major depressive disorder ICD-10-CM: F32.9  ICD-9-CM: 296.20  3/24/2021 Yes    Overview Signed 3/26/2021 11:31 PM by Brenda Frank MD     On Duloxetine             History of right below knee amputation [facility]) ICD-10-CM: Z89.511  ICD-9-CM: V49.75  3/22/2021 Yes    Overview Signed 3/26/2021  5:43 PM by Brenda Frank MD     S/P Right below-the-knee amputation (3/22/2021 - Dr. Ayah Whitney)             Impaired mobility and ADLs ICD-10-CM: Z74.09, Z78.9  ICD-9-CM: V49.89  3/22/2021 Yes        Critical ischemia of lower extremity ([facility] ICD-10-CM: I33.033  ICD-9-CM: 459.9  3/22/2021 Yes    Overview Signed 3/26/2021  5:42 PM by Brenda Frank MD     Right             Acute blood loss as cause of postoperative anemia ICD-10-CM: D62  ICD-9-CM: 285.1  3/22/2021 Yes        History of embolectomy ICD-10-CM: Z98.890  ICD-9-CM: V45.89  3/5/2021 Yes    Overview Signed 3/26/2021  5:39 PM by Brenda Frank MD     S/P Right lower extremity arterial embolectomy                   Background:   Past Medical History:   Past Medical History:   Diagnosis Date    Acute blood loss as cause of postoperative anemia 3/22/2021    Anticoagulated by anticoagulation treatment     On Rivaroxaban    Cardiomyopathy ([facility] 12/17/2015    2D echocardiogram (3/24/2021) showed EF 37%; global hypokinesis of left ventricle; grade I diastolic dysfunction; 2D echocardiogram (12/17/2015) showed EF 35%; generalized hypokinesis more focally severe of the inferior and posterior segments; mild mitral regurgitation; trace tricuspud regurgitation; normal pulmonary artery pressure    Chronic systolic heart failure (HCC)     2D echocardiogram (3/24/2021) showed EF 37%; global hypokinesis of left ventricle; grade I diastolic dysfunction; 2D echocardiogram (12/17/2015) showed EF 35%; generalized hypokinesis more focally severe of the inferior and posterior segments; mild mitral regurgitation; trace tricuspud regurgitation; normal pulmonary artery pressure    Coronary artery disease involving native coronary artery of native heart     COVID-19 ruled out by laboratory testing 3/22/2021    SARS-CoV-2 ([facility] m2000, [facility]) (3/22/2021):  Not detected     Critical ischemia of lower extremity ([facility] 3/22/2021    Right    Factor V Leiden mutation ([facility]     Grade I diastolic dysfunction 54/28/0753    2D echocardiogram (3/24/2021) showed EF 37%; global hypokinesis of left ventricle; grade I diastolic dysfunction    History of deep venous thrombosis (DVT) of distal vein of right lower extremity     History of epilepsy     History of head injury     History of myocardial infarction     History of noncompliance with medical treatment 3/18/2021    Hypertensive heart disease with chronic systolic congestive heart failure ([facility]     2D echocardiogram (3/24/2021) showed EF 37%; global hypokinesis of left ventricle; grade I diastolic dysfunction; 2D echocardiogram (12/17/2015) showed EF 35%; genealized hypokinesis more focally severe of the inferior and posterior segments; mild mitral regurgitation; trace tricuspud regurgitation; normal pulmonary artery pressure    Infection of right below knee amputation ([facility] 4/7/2021    S/P Washout of right below-knee amputation; Right knee disarticulation (4/7/2021  Dr. Raheem Salas); S/P Exploration of right knee disarticulation and control of hemorrhage (4/8/2021  Dr. Raheem Salas)    Long term current use of aspirin     Major depressive disorder 03/24/2021    On Duloxetine    Migraine headache     Mitral valve prolapse     Mixed hyperlipidemia     Lipid profile (7/25/2018) showed , , HDL 27,     Obstructive sleep apnea     On statin therapy due to risk of future cardiovascular event     On Atorvastatin    Peripheral artery disease ([facility]     Type 2 diabetes mellitus, without long-term current use of insulin ([facility])     HbA1c (4/8/2021) = 9.8; HbA1c (3/6/2021) = 34.6    Umbilical hernia     Wears glasses       Patient taking anticoagulants yes    Patient has a defibrillator: no     Assessment:   Changes in Assessment throughout shift: no acute changes noted throughout the shift     Patient has central line: no Reasons if yes: n/a  Insertion date:n/a Last dressing date:n/a   Patient has Ellis Cath: no Reasons if yes: n/a   Insertion date:n/a     Last Vitals:     Vitals:    04/18/21 0800 04/18/21 1559 04/18/21 2000 04/19/21 0736   BP: 109/71 119/75 115/76 119/75   Pulse: 86 90 86 86   Resp: 16 17 18 20   Temp: 98 °F (36.7 °C) 97 °F (36.1 °C) 98.1 °F (36.7 °C) 97.8 °F (36.6 °C)   SpO2: 99%  99% 100%   Height:            PAIN    Pain Assessment    Pain Intensity 1: 7 (04/19/21 0940) Pain Intensity 1: 2 (12/29/14 1105)    Pain Location 1: Leg Pain Location 1: Abdomen    Pain Intervention(s) 1: Medication (see MAR) Pain Intervention(s) 1: Medication (see MAR)  Patient Stated Pain Goal: 0 Patient Stated Pain Goal: 0  o Intervention effective: yes    o Other actions taken for pain: repositioning      Skin Assessment  Skin color Skin Color: Appropriate for ethnicity  Condition/Temperature Skin Condition/Temp: Warm  Integrity Skin Integrity: Incision (comment)  Turgor Turgor: Non-tenting  Weekly Pressure Ulcer Documentation  Pressure  Injury Documentation: Pressure Injury Noted-See Wound LDA to Document  Wound Prevention & Protection Methods  Orientation of wound Orientation of Wound Prevention: Posterior  Location of Prevention Location of Wound Prevention: Sacrum/Coccyx  Dressing Present Dressing Present : No  Dressing Status Dressing Status: Intact  Wound Offloading Wound Offloading (Prevention Methods): Bed, pressure redistribution/air     INTAKE/OUPUT  Date 04/18/21 0700 - 04/19/21 0659 04/19/21 0700 - 04/20/21 0659   Shift 8618-5149 8875-4582 24 Hour Total 0700-1859 1900-0659 24 Hour Total   INTAKE   Shift Total         OUTPUT   Urine           Urine Occurrence(s) 3 x 2 x 5 x 2 x  2 x   Stool           Stool Occurrence(s) 2 x 0 x 2 x 0 x  0 x   Shift Total         NET         Weight (kg)             Recommendations:  1. Patient needs and requests: education     2. Diet: Cardiac consistent carb diet with thin liquids    3. Pending tests/procedures: AM labs     4. Functional Level/Equipment: 1 person assist     5. Estimated Discharge Date: TBD Posted on Whiteboard in Patients Room: [facility] Safety Check    A safety check occurred in the patient's room between off going nurse and oncoming nurse listed above. The safety check included the below items  Area Items   H  High Alert Medications - Verify all high alert medication drips (heparin, PCA, etc.)   E  Equipment - Suction is set up for ALL patients (with faraz)  - Red plugs utilized for all equipment (IV pumps, etc.)  - WOWs wiped down at end of shift.  - Room stocked with oxygen, suction, and other unit-specific supplies   A  Alarms - Bed alarm is set for fall risk patients  - Ensure chair alarm is in place and activated if patient is up in a chair   L  Lines - Check IV for any infiltration  - Ellis bag is empty if patient has a Ellis   - Tubing and IV bags are labeled   S  Safety   - Room is clean, patient is clean, and equipment is clean. - Hallways are clear from equipment besides carts.    - Fall bracelet on for fall risk patients  - Ensure room is clear and free of clutter  - Suction is set up for ALL patients (with faraz)  - Hallways are clear from equipment besides carts.    - Isolation precautions followed, supplies available outside room, sign posted         Shiraz Jaquez

## 2021-04-20 LAB
GLUCOSE BLD STRIP.AUTO-MCNC: 127 MG/DL (ref 70–110)
GLUCOSE BLD STRIP.AUTO-MCNC: 136 MG/DL (ref 70–110)
GLUCOSE BLD STRIP.AUTO-MCNC: 137 MG/DL (ref 70–110)
GLUCOSE BLD STRIP.AUTO-MCNC: 144 MG/DL (ref 70–110)

## 2021-04-20 PROCEDURE — 97542 WHEELCHAIR MNGMENT TRAINING: CPT

## 2021-04-20 PROCEDURE — 97535 SELF CARE MNGMENT TRAINING: CPT

## 2021-04-20 PROCEDURE — 99232 SBSQ HOSP IP/OBS MODERATE 35: CPT | Performed by: INTERNAL MEDICINE

## 2021-04-20 PROCEDURE — 97110 THERAPEUTIC EXERCISES: CPT

## 2021-04-20 PROCEDURE — 65310000000 HC RM PRIVATE REHAB

## 2021-04-20 PROCEDURE — 97116 GAIT TRAINING THERAPY: CPT

## 2021-04-20 PROCEDURE — 74011250637 HC RX REV CODE- 250/637: Performed by: INTERNAL MEDICINE

## 2021-04-20 PROCEDURE — 2709999900 HC NON-CHARGEABLE SUPPLY

## 2021-04-20 PROCEDURE — 82962 GLUCOSE BLOOD TEST: CPT

## 2021-04-20 RX ADMIN — GABAPENTIN 100 MG: 100 CAPSULE ORAL at 08:15

## 2021-04-20 RX ADMIN — OXYCODONE HYDROCHLORIDE 10 MG: 5 TABLET ORAL at 11:23

## 2021-04-20 RX ADMIN — ACETAMINOPHEN 650 MG: 325 TABLET, FILM COATED ORAL at 16:54

## 2021-04-20 RX ADMIN — CARVEDILOL 3.12 MG: 6.25 TABLET, FILM COATED ORAL at 08:15

## 2021-04-20 RX ADMIN — EZETIMIBE 10 MG: 10 TABLET ORAL at 08:14

## 2021-04-20 RX ADMIN — ASPIRIN 81 MG CHEWABLE TABLET 81 MG: 81 TABLET CHEWABLE at 08:14

## 2021-04-20 RX ADMIN — OXYCODONE HYDROCHLORIDE 10 MG: 5 TABLET ORAL at 19:36

## 2021-04-20 RX ADMIN — ATORVASTATIN CALCIUM 80 MG: 40 TABLET, FILM COATED ORAL at 08:15

## 2021-04-20 RX ADMIN — ALOGLIPTIN 25 MG: 25 TABLET, FILM COATED ORAL at 08:15

## 2021-04-20 RX ADMIN — Medication 5 MG: at 20:40

## 2021-04-20 RX ADMIN — METFORMIN HYDROCHLORIDE 500 MG: 500 TABLET ORAL at 17:07

## 2021-04-20 RX ADMIN — Medication 250 MG: at 08:14

## 2021-04-20 RX ADMIN — Medication 1 TABLET: at 08:15

## 2021-04-20 RX ADMIN — OXYCODONE HYDROCHLORIDE 10 MG: 5 TABLET ORAL at 05:55

## 2021-04-20 RX ADMIN — GABAPENTIN 100 MG: 100 CAPSULE ORAL at 19:36

## 2021-04-20 RX ADMIN — DULOXETINE HYDROCHLORIDE 30 MG: 30 CAPSULE, DELAYED RELEASE ORAL at 08:15

## 2021-04-20 RX ADMIN — METHOCARBAMOL 500 MG: 500 TABLET ORAL at 08:14

## 2021-04-20 RX ADMIN — ZOLPIDEM TARTRATE 5 MG: 5 TABLET ORAL at 20:40

## 2021-04-20 RX ADMIN — OXYCODONE HYDROCHLORIDE 10 MG: 5 TABLET ORAL at 15:31

## 2021-04-20 RX ADMIN — RIVAROXABAN 20 MG: 20 TABLET, FILM COATED ORAL at 08:15

## 2021-04-20 RX ADMIN — METHOCARBAMOL 500 MG: 500 TABLET ORAL at 19:36

## 2021-04-20 RX ADMIN — DOCUSATE SODIUM 100 MG: 100 CAPSULE, LIQUID FILLED ORAL at 08:15

## 2021-04-20 RX ADMIN — GABAPENTIN 100 MG: 100 CAPSULE ORAL at 14:39

## 2021-04-20 RX ADMIN — METHOCARBAMOL 500 MG: 500 TABLET ORAL at 14:39

## 2021-04-20 RX ADMIN — ACETAMINOPHEN 650 MG: 325 TABLET, FILM COATED ORAL at 12:35

## 2021-04-20 RX ADMIN — METFORMIN HYDROCHLORIDE 500 MG: 500 TABLET ORAL at 08:14

## 2021-04-20 RX ADMIN — ACETAMINOPHEN 650 MG: 325 TABLET, FILM COATED ORAL at 08:14

## 2021-04-20 NOTE — INTERDISCIPLINARY ROUNDS
[address] 
[address] [place] [address], [address] INPATIENT REHABILITATION 
PRE-TEAM CONFERENCE SUMMARY Date of Conference: 4/21/2021 Patient Information:  
 
  
Name: Charisma Srivastava Age / Sex: 60 y.o. / male CSN: [id number] MRN: [id number] Admit Date: 4/15/2021 Length of Stay: 5 days Primary Rehab Diagnosis 1. Impaired Mobility and ADLs 2. S/P Right above-the-knee amputation (4/11/2021  Dr. Julius David) 3. S/P Exploration of right knee disarticulation and control of hemorrhage (4/8/2021  Dr. Kyleigh Carter) 4. S/P Washout of right below-knee amputation; Right knee disarticulation (4/7/2021  Dr. Kyleigh Carter) 5. History of infection and ischemia of right BKA stump (4/7/2021) 6. S/P Right below-the-knee amputation (3/22/2021 - Dr. Ronan Bro) 7. History of critical ischemia of the right lower extremity 8. History of right lower extremity arterial embolectomy (3/5/2021 - Dr. Ronan Bro) Comorbidities Patient Active Problem List  
Diagnosis Code  History of right below knee amputation ([facility] Z89.511  Impaired mobility and ADLs Z74.09, Z78.9  Critical ischemia of lower extremity (HCC) I70.229  Peripheral artery disease (HCC) I73.9  Coronary artery disease involving native coronary artery of native heart I25.10  Type 2 diabetes mellitus, without long-term current use of insulin (HCC) E11.9  History of epilepsy Z80.75  
 Factor V Leiden mutation ([facility] D68.51  
 Migraine headache G43.909  Wears glasses Z97.3  Umbilical hernia D75.5  Obstructive sleep apnea G47.33  
 ICD (implantable cardioverter-defibrillator) in place Z95.810  
 Mitral valve prolapse I34.1  History of head injury Z87.828  
 Hypertensive heart disease with chronic systolic congestive heart failure (HCC) I11.0, I50.22  
 History of myocardial infarction I25.2  Long term current use of aspirin Z79.82  
 On statin therapy due to risk of future cardiovascular event Z79.899  
 History of deep venous thrombosis (DVT) of distal vein of right lower extremity Z86.718  Anticoagulated by anticoagulation treatment Z79.01  
 Mixed hyperlipidemia E78.2  History of embolectomy Z98.890  
 COVID-19 ruled out by laboratory testing Z20.822  Acute blood loss as cause of postoperative anemia D62  Cardiomyopathy ([facility] I42.9  Chronic systolic heart failure (HCC) I50.22  
 History of noncompliance with medical treatment Z91.19  
 Major depressive disorder F32.9  Grade I diastolic dysfunction M63.8  Constipation K59.00  
 Ischemia of right BKA site (HCC) T87.89, I99.8  Infection of right below knee amputation (HCC) T87.43  Status post above-knee amputation of right lower extremity ([facility] Z89.611 Therapy: FIM SCORES Initial Assessment Weekly Progress Assessment 4/20/2021 Eating Functional Level: 6  6 Swallowing Grooming 6 Grooming Assistance : 6 (Modified independent) Comments: (seated w/c level at sink washing face and oral hygiene) Bathing 5 Bathing Assistance, Upper: 6 (Modified independent) Position Performed: Other (comment)(seated w/c level at sink) Adaptive Equipment: Long handled sponge Bathing Assistance, Lower : 5 (Stand-by assistance) Adaptive Equipment: Lisa Elder; Other(comment)(w/c) Position Performed: Standing(w/ RW and seated w/c level at sinkside) Upper Body Dressing Functional Level: 6 Comments: T-shirt Dressing Assistance : 6 (Modified independent) Comments: (doff/don overhead shirt seated w/c level at sink) Lower Body Dressing Functional Level: 5 Items Applied/Steps Completed: Elastic waist pants (3 steps), Sock, left (1 step) Comments: SBA for safety Dressing Assistance : 5 (Stand-by assistance) Leg Crossed Method Used: No 
Position Performed: Other (comment)(seated in w/c using compensatory technique, stance w/ RW) Toileting Functional Level: 5 Bladder 0 1 Bowel  0 0 Toilet Transfer [place] Toilet Transfer Score: 5 Comments: SBA for safety  5 SBA from w/c level using grab bar Tub/Shower Transfer [place] Tub/Shower Transfer Score: 5 Comments: Supervision for safety. Transfer from w/c to TTB with gaitbelt and grab bars 5  w/c <-> TTB using grab bar Comprehension Primary Mode of Comprehension: Auditory Score: 6 Expression Primary Mode of Expression: Verbal 
Score: 6 Social Interaction Score: 6 Problem Solving Score: 6 Memory Score: 6    
 
FIM SCORES Initial Assessment Weekly Progress Assessment 4/20/2021 Bed/Chair/Wheelchair Transfers Transfer Type: Lateral pivot Other: stand step/hop with RW Transfer Assistance : 4 (Minimal assistance)(steadying support for safety) Sit to Stand Assistance: Minimal assistance(steadying support needing cues for safety) Car Transfers: Not tested Car Type: N/A Transfer Type: Lateral pivot Transfer Assistance : 5 (Stand-by assistance) Sit to Stand Assistance: Stand-by assistance Bed Mobility Rolling Right 5 (Stand-by assistance) Rolling Left 5 (Stand-by assistance) Supine to Sit 5 (Stand-by assistance) Sit to Stand Minimal assistance(steadying support needing cues for safety) Sit to Supine 5 (Supervision) Rolling Right 
 6 (Modified independent) Rolling Left 
 6 (Modified independent) Supine to Sit 
 6 (Modified independent) Sit to Stand Stand-by assistance Sit to Supine 6 (Modified independent) Locomotion (W/C) Able to Propel (ft): (to be further assessed) Functional Level: (to be further assessed) Curbs/Ramps Assist Required (FIM Score): 0 (Not tested) Wheelchair Setup Assist Required : 0 (Not tested) Wheelchair Management: (NT) Function 6 Setup Assistance Locomotion (W/C distance)   300 feet Locomotion (Walk) 4 (Contact guard assistance) 4 (Contact guard assistance) Walker, rolling;Gait belt Locomotion (Walk dist.) 15 Feet (ft) 90 Feet (ft) Steps/Stairs Steps/Stairs Ambulated (#): 0 Level of Assist : 0 (Not tested) Rail Use: (NT)    
 
 
 
Nursing:  
 
Neuro:   AAA&O x    4 Respiratory:   [x] WNL   [] O2 LPM:  
Other: 
Peripheral Vascular:   [] TEDS present   [] Edema present ____ Grade Cardiac:   [x] WNL   [] Other Genitourinary:   [x] continent   [] incontinent   [] nguyen  Abdominal __4/20/2021_____ LBM 
GI: ___cardiac/diabetic____ Diet __thin____ Liquids _____ tube feeds Musculoskeletal: _3x___ ROM Transfers __wheelchair___ Assistive Device Used 
_1x___ Level of Assistance Skin Integumentary:   [x] Intact   [] Not Intact   __________Preventative Measures Details______________________________________________________________ Pain: [x] Controlled   [] Not Controlled   Pain Meds:   [] Scheduled   [] PRN Registered Dietitian / Nutrition:  
No data found. Supplements:          [x] Yes   [] No     
Amount of supplement consumed:  
 
 
Intake/Output Summary (Last 24 hours) at 4/20/2021 1453 Last data filed at 4/20/2021 0929 Gross per 24 hour Intake  Output 500 ml Net -500 ml Last bowel movement:  
 
 
 
Interdisciplinary Team Goals: 1. Discipline  Physical Therapy Goal  Pt will perform standing transfers and ambulation with supervision Barrier  decreased strength and balance Intervention  LE strengthening, transfer training, gait training, and standing balance activities Goal written by:   Trish Vaughn, PT  
 
2. Discipline  Occupational Therapy Goal  increase strength and functional activity tolerance for ADL & IADL routines, functional transfers Barrier  R AKA pain, decreased strength and functional activity tolerance Intervention  ADL retraining, there ex, there act, NMR Goal written by:  Vianey Sheets MS OTR/L   
 
3. Discipline  Speech Therapy Goal    
 Barrier Intervention Goal written by: 4. Discipline  Nursing  Goal  Pt will achieve timely wound healing and be free of infection. Barrier  Diabetes; environmental exposures Intervention  Monitor VS; assess wound for s/s of infection;maintain appropriate wound dressing as ordered. Goal written by:  Brenda Pham RN  
 
5. Discipline  Clinical Psychology Goal    
 Barrier Intervention Goal written by: 6. Discipline  Nutrition / Dietetics Goal    
 Barrier Intervention Goal written by:    
 
 
Disposition / Discharge Planning: Follow-up services:  [x] Physical Therapy [x] Occupational Therapy     
 [] Speech Therapy         
 [] Skilled Nursing    
 [] Medical Social Worker 
 [] Aide        [] Outpatient      [] vs 
 [x] Home Health  [] vs 
     [] to progress to outpatient 
     [] with 24-hour supervision 
     [] with 24-hour assistance 
 [] [facility] recommendations:  bedside commode Estimated discharge date: 4/29/2021 Discharge Location:  [] Home  [] versus  
 [] [facility] [] [address] [place] [address] [] Other:   
  
 
 
Electronic Signatures:  
 
 Signature Date Signed Physical Therapist 
  Alissa Hooks, PT  4/20/2021 Occupational Therapist 
  Jayshree Muniz MS OTR/L  4/20/2021 Speech Therapist 
      
Recreational Therapist 
  Ayde Polanco, [address] 4/20/2021 Nursing Brenda Pham RN  4/20/2021 Dietitian Clinical Psychologist 
      
Physician 
  May Kimbrough MD   4/20/2021  DARNELL Swan  4/20/2021 Opportunity to share with family/caregiver[] YES [] NO 
 
Relationship to patient____________________________________________________ The above information has been reviewed with the patient in a language that they can understand. Opportunity for comments and questions has been provided and a signed attestation has been scanned into the \"media tab\" of the EMR. Patient Signature: ______________________________________________________ Date Signed: __________________________________________________________

## 2021-04-20 NOTE — PROGRESS NOTES
SHIFT CHANGE NOTE FOR [facility]    Bedside and Verbal shift change report received from [facility] (oncoming nurse) by Leonela Jordan RN (offgoing nurse). Report included the following information SBAR, Kardex, MAR and Recent Results.     Situation:   Code Status: Full Code   Reason for Admission: right AKA  Hospital Day: 5   Problem List:   Hospital Problems  Date Reviewed: 4/20/2021          Codes Class Noted POA    * (Principal) Status post above-knee amputation of right lower extremity ([facility] ICD-10-CM: W93.750  ICD-9-CM: V49.76  4/11/2021 Yes    Overview Signed 4/15/2021  5:14 PM by Charly Bartlett MD     S/P Right above-the-knee amputation (4/11/2021  Dr. Jim Hawkins)             Ischemia of right BKA site [facility]) ICD-10-CM: T87.89, I99.8  ICD-9-CM: 997.69, 459.9  4/7/2021 Yes        Infection of right below knee amputation [facility]) ICD-10-CM: T87.43  ICD-9-CM: 997.62  4/7/2021 Yes    Overview Signed 4/15/2021  5:17 PM by Charly Bartlett MD     S/P Washout of right below-knee amputation; Right knee disarticulation (4/7/2021  Dr. Victor M Scherer); S/P Exploration of right knee disarticulation and control of hemorrhage (4/8/2021  Dr. Victor M Scherer)             Peripheral artery disease ([facility] (Chronic) ICD-10-CM: I73.9  ICD-9-CM: 443.9  Unknown Yes        Type 2 diabetes mellitus, without long-term current use of insulin (HCC) (Chronic) ICD-10-CM: E11.9  ICD-9-CM: 250.00  Unknown Yes    Overview Addendum 4/15/2021  5:40 PM by Charly Bartlett MD     HbA1c (4/8/2021) = 9.8; HbA1c (3/6/2021) = 12.9             Factor V Leiden mutation ([address] (Chronic) ICD-10-CM: C99.37  ICD-9-CM: 289.81  Unknown Yes        Hypertensive heart disease with chronic systolic congestive heart failure (HCC) (Chronic) ICD-10-CM: I11.0, I50.22  ICD-9-CM: 402.91, 428.22  Unknown Yes    Overview Signed 3/26/2021 11:06 PM by Charly Bartlett MD     2D echocardiogram (12/17/2015) showed EF 35%; genealized hypokinesis more focally severe of the inferior and posterior segments; mild mitral regurgitation; trace tricuspud regurgitation; normal pulmonary artery pressure             Anticoagulated by anticoagulation treatment (Chronic) ICD-10-CM: Z79.01  ICD-9-CM: V58.61  Unknown Yes    Overview Signed 3/26/2021  4:36 PM by Lilly Noriega MD     On Rivaroxaban             Mixed hyperlipidemia (Chronic) ICD-10-CM: E78.2  ICD-9-CM: 272.2  Unknown Yes    Overview Signed 3/26/2021 11:07 PM by Lilly Noriega MD     Lipid profile (7/25/2018) showed , , HDL 27,              Major depressive disorder ICD-10-CM: F32.9  ICD-9-CM: 296.20  3/24/2021 Yes    Overview Signed 3/26/2021 11:31 PM by Lilly Noriega MD     On Duloxetine             History of right below knee amputation [facility]) ICD-10-CM: Z89.511  ICD-9-CM: V49.75  3/22/2021 Yes    Overview Signed 3/26/2021  5:43 PM by Lilly Noriega MD     S/P Right below-the-knee amputation (3/22/2021 - Dr. Felicia Cooper)             Impaired mobility and ADLs ICD-10-CM: Z74.09, Z78.9  ICD-9-CM: V49.89  3/22/2021 Yes        Critical ischemia of lower extremity ([facility] ICD-10-CM: S26.733  ICD-9-CM: 459.9  3/22/2021 Yes    Overview Signed 3/26/2021  5:42 PM by Lilly Noriega MD     Right             Acute blood loss as cause of postoperative anemia ICD-10-CM: D62  ICD-9-CM: 285.1  3/22/2021 Yes        History of embolectomy ICD-10-CM: Z98.890  ICD-9-CM: V45.89  3/5/2021 Yes    Overview Signed 3/26/2021  5:39 PM by Lilly Noriega MD     S/P Right lower extremity arterial embolectomy                   Background:   Past Medical History:   Past Medical History:   Diagnosis Date    Acute blood loss as cause of postoperative anemia 3/22/2021    Anticoagulated by anticoagulation treatment     On Rivaroxaban    Cardiomyopathy ([facility] 12/17/2015    2D echocardiogram (3/24/2021) showed EF 37%; global hypokinesis of left ventricle; grade I diastolic dysfunction; 2D echocardiogram (12/17/2015) showed EF 35%; generalized hypokinesis more focally severe of the inferior and posterior segments; mild mitral regurgitation; trace tricuspud regurgitation; normal pulmonary artery pressure    Chronic systolic heart failure (HCC)     2D echocardiogram (3/24/2021) showed EF 37%; global hypokinesis of left ventricle; grade I diastolic dysfunction; 2D echocardiogram (12/17/2015) showed EF 35%; generalized hypokinesis more focally severe of the inferior and posterior segments; mild mitral regurgitation; trace tricuspud regurgitation; normal pulmonary artery pressure    Coronary artery disease involving native coronary artery of native heart     COVID-19 ruled out by laboratory testing 3/22/2021    SARS-CoV-2 ([facility]) (3/22/2021):  Not detected     Critical ischemia of lower extremity ([facility] 3/22/2021    Right    Factor V Leiden mutation ([facility]     Grade I diastolic dysfunction 26/89/8885    2D echocardiogram (3/24/2021) showed EF 37%; global hypokinesis of left ventricle; grade I diastolic dysfunction    History of deep venous thrombosis (DVT) of distal vein of right lower extremity     History of epilepsy     History of head injury     History of myocardial infarction     History of noncompliance with medical treatment 3/18/2021    Hypertensive heart disease with chronic systolic congestive heart failure ([address]     2D echocardiogram (3/24/2021) showed EF 37%; global hypokinesis of left ventricle; grade I diastolic dysfunction; 2D echocardiogram (12/17/2015) showed EF 35%; genealized hypokinesis more focally severe of the inferior and posterior segments; mild mitral regurgitation; trace tricuspud regurgitation; normal pulmonary artery pressure    Infection of right below knee amputation ([address] 4/7/2021    S/P Washout of right below-knee amputation; Right knee disarticulation (4/7/2021  Dr. Byron Sinclair); S/P Exploration of right knee disarticulation and control of hemorrhage (4/8/2021  Dr. Byron Sinclair)   [facility] Long term current use of aspirin     Major depressive disorder 03/24/2021    On Duloxetine    Migraine headache     Mitral valve prolapse     Mixed hyperlipidemia     Lipid profile (7/25/2018) showed , , HDL 27,     Obstructive sleep apnea     On statin therapy due to risk of future cardiovascular event     On Atorvastatin    Peripheral artery disease ([facility]     Type 2 diabetes mellitus, without long-term current use of insulin ([facility])     HbA1c (4/8/2021) = 9.8; HbA1c (3/6/2021) = 96.4    Umbilical hernia     Wears glasses       Patient taking anticoagulants yes    Patient has a defibrillator: no     Assessment:   Changes in Assessment throughout shift: no acute changes noted throughout the shift     Patient has central line: no Reasons if yes: n/a  Insertion date:n/a Last dressing date:n/a   Patient has Ellis Cath: no Reasons if yes: n/a   Insertion date:n/a     Last Vitals:     Vitals:    04/19/21 1539 04/19/21 2048 04/20/21 0734 04/20/21 1536   BP: 115/71 109/70 116/70 101/68   Pulse: 87 84 99 94   Resp: 20 20 20 20   Temp: 97.9 °F (36.6 °C) 98.4 °F (36.9 °C) 98.1 °F (36.7 °C) 98.6 °F (37 °C)   SpO2: 98% 99% 98% 99%   Height:            PAIN    Pain Assessment    Pain Intensity 1: 7 (04/20/21 1534) Pain Intensity 1: 2 (12/29/14 1105)    Pain Location 1: Leg Pain Location 1: Abdomen    Pain Intervention(s) 1: Medication (see MAR) Pain Intervention(s) 1: Medication (see MAR)  Patient Stated Pain Goal: 0 Patient Stated Pain Goal: 0  o Intervention effective: yes    o Other actions taken for pain: repositioning      Skin Assessment  Skin color Skin Color: Appropriate for ethnicity  Condition/Temperature Skin Condition/Temp: Dry, Warm  Integrity Skin Integrity: Incision (comment)  Turgor Turgor: Non-tenting  Weekly Pressure Ulcer Documentation  Pressure  Injury Documentation: Pressure Injury Noted-See Wound LDA to Document  Wound Prevention & Protection Methods  Orientation of wound Orientation of Wound Prevention: Posterior  Location of Prevention Location of Wound Prevention: Sacrum/Coccyx  Dressing Present Dressing Present : No  Dressing Status Dressing Status: Intact  Wound Offloading Wound Offloading (Prevention Methods): Bed, pressure reduction mattress     INTAKE/OUPUT  Date 04/19/21 0700 - 04/20/21 0659 04/20/21 0700 - 04/21/21 0659   Shift 0700-1859 1900-0659 24 Hour Total 0700-1859 1900-0659 24 Hour Total   INTAKE   Shift Total         OUTPUT   Urine  500 500        Urine Voided  500 500        Urine Occurrence(s) 3 x 1 x 4 x 2 x  2 x   Stool           Stool Occurrence(s) 0 x 0 x 0 x 1 x  1 x   Shift Total  500 500      NET  -500 -500      Weight (kg)             Recommendations:  1. Patient needs and requests: education     2. Diet: Cardiac consistent carb diet with thin liquids    3. Pending tests/procedures: AM labs     4. Functional Level/Equipment: 1 person assist     5. Estimated Discharge Date: TBD Posted on Whiteboard in Patients Room: no     [facility] Safety Check    A safety check occurred in the patient's room between off going nurse and oncoming nurse listed above. The safety check included the below items  Area Items   H  High Alert Medications - Verify all high alert medication drips (heparin, PCA, etc.)   E  Equipment - Suction is set up for ALL patients (with yanker)  - Red plugs utilized for all equipment (IV pumps, etc.)  - WOWs wiped down at end of shift.  - Room stocked with oxygen, suction, and other unit-specific supplies   A  Alarms - Bed alarm is set for fall risk patients  - Ensure chair alarm is in place and activated if patient is up in a chair   L  Lines - Check IV for any infiltration  - Ellis bag is empty if patient has a Ellis   - Tubing and IV bags are labeled   S  Safety   - Room is clean, patient is clean, and equipment is clean. - Hallways are clear from equipment besides carts.    - Fall bracelet on for fall risk patients  - Ensure room is clear and free of clutter  - Suction is set up for ALL patients (with faraz)  - Hallways are clear from equipment besides carts.    - Isolation precautions followed, supplies available outside room, sign posted         Natalia Molina RN

## 2021-04-20 NOTE — PROGRESS NOTES
DANIELLE received a return call from family friend Philip Mack. Discharge family education arranged for 4/28/21 at 1:30pm. DANIELLE updated the team. Shakira Pittman will continue to follow.      Erasmo Rush MSW, LCSW, CCM  Doctoral Candidate, Doctor of Social Work

## 2021-04-20 NOTE — PROGRESS NOTES
[address]  [address] [place] [address], [address]     INPATIENT REHABILITATION  DAILY PROGRESS NOTE     Date: 4/20/2021    Name: Concha Wu Age / Sex: 60 y.o. / male   CSN: [id number] MRN: [id number]   [facility] Date: 4/15/2021 Length of Stay: 5 days     Primary Rehab Diagnosis: Impaired Mobility and ADLs secondary to:  1. S/P Right above-the-knee amputation (4/11/2021  Dr. Aubree An)  2. S/P Exploration of right knee disarticulation and control of hemorrhage (4/8/2021  Dr. Duyen Delgado)  3. S/P Washout of right below-knee amputation; Right knee disarticulation (4/7/2021  Dr. Duyen Delgado)  4. History of infection and ischemia of right BKA stump (4/7/2021)  5. S/P Right below-the-knee amputation (3/22/2021 - Dr. Ayah Whitney)  6. History of critical ischemia of the right lower extremity  7. History of right lower extremity arterial embolectomy (3/5/2021 - Dr. Ayah Whitney)      Subjective:     Patient seen and examined. Blood pressure controlled. Blood glucose controlled. Patient's Complaint:   No significant medical complaints    Pain Control: stable, mild-to-moderate joint symptoms intermittently, reasonably well controlled by current meds      Objective:     Vital Signs:  Patient Vitals for the past 24 hrs:   BP Temp Pulse Resp SpO2   04/20/21 0734 116/70 98.1 °F (36.7 °C) 99 20 98 %   04/19/21 2048 109/70 98.4 °F (36.9 °C) 84 20 99 %   04/19/21 1539 115/71 97.9 °F (36.6 °C) 87 20 98 %   04/19/21 1323 107/70 97.7 °F (36.5 °C) 83 18 99 %        Physical Examination:  GENERAL SURVEY: Patient is awake, alert, oriented x 3, laying comfortably on the bed, not in acute respiratory distress.   HEENT: pale palpebral conjunctivae, anicteric sclerae, no nasoaural discharge, moist oral mucosa  NECK: supple, no jugular venous distention, no palpable lymph nodes  CHEST/LUNGS: symmetrical chest expansion, good air entry, clear breath sounds  HEART: adynamic precordium, good S1 S2, no S3, regular rhythm, no murmurs  ABDOMEN: flat, bowel sounds appreciated, soft, non-tender  EXTREMITIES: (+) right AKA stump, (+) absence of hair on LLE, (+) healing wound with scab on lateral surface of distal third of left leg with surrounding hyperpigmentation, (+) LLE cool to touch, pale nailbeds, no edema, full and equal pulses, no calf tenderness   NEUROLOGICAL EXAM: The patient is awake, alert and oriented x3, able to answer questions fairly appropriately, able to follow 1 and 2 step commands. Able to tell time from the wall clock. Cranial nerves II-XII are grossly intact. No gross sensory deficit.   Motor strength is 4+/5 on BUE, 4/5 on the RLE, 4 to 4+/5 on the LLE.     Incision(s): covered, clean, dry, and intact       Current Medications:  Current Facility-Administered Medications   Medication Dose Route Frequency    alogliptin (NESINA) tablet 25 mg  25 mg Oral DAILY    methocarbamoL (ROBAXIN) tablet 500 mg  500 mg Oral TID    0.9% sodium chloride infusion 250 mL  250 mL IntraVENous PRN    diphenhydrAMINE (BENADRYL) capsule 25 mg  25 mg Oral Q6H PRN    ferrous gluconate 324 mg (37.5 mg iron) tablet 1 Tab  324 mg Oral DAILY WITH BREAKFAST    ascorbic acid (vitamin C) (VITAMIN C) tablet 250 mg  250 mg Oral DAILY WITH BREAKFAST    acetaminophen (TYLENOL) tablet 650 mg  650 mg Oral Q4H PRN    bisacodyL (DULCOLAX) tablet 10 mg  10 mg Oral Q48H PRN    insulin lispro (HUMALOG) injection   SubCUTAneous TIDAC    carvediloL (COREG) tablet 3.125 mg  3.125 mg Oral BID WITH MEALS    gabapentin (NEURONTIN) capsule 100 mg  100 mg Oral TID    aspirin chewable tablet 81 mg  81 mg Oral DAILY WITH BREAKFAST    atorvastatin (LIPITOR) tablet 80 mg  80 mg Oral DAILY    docusate sodium (COLACE) capsule 100 mg  100 mg Oral DAILY AFTER BREAKFAST    DULoxetine (CYMBALTA) capsule 30 mg  30 mg Oral DAILY    ezetimibe (ZETIA) tablet 10 mg  10 mg Oral DAILY    acetaminophen (TYLENOL) tablet 650 mg  650 mg Oral TID    oxyCODONE IR (ROXICODONE) tablet 5-10 mg  5-10 mg Oral Q4H PRN    rivaroxaban (XARELTO) tablet 20 mg  20 mg Oral DAILY    melatonin (rapid dissolve) tablet 5 mg  5 mg Oral QHS    zolpidem (AMBIEN) tablet 5 mg  5 mg Oral QHS PRN    metFORMIN (GLUCOPHAGE) tablet 500 mg  500 mg Oral BID WITH MEALS       Allergies:   Allergies   Allergen Reactions    Nitroglycerin Other (comments)      BP drops rapidly when he takes SL Nitro          Peapack Diarrhea and Rash    Cat Dander Cough    Codeine Rash    Strawberry Rash       Functional Progress:    OCCUPATIONAL THERAPY    ON ADMISSION MOST RECENT   Eating  Functional Level: 6   Eating  Functional Level: 5     Grooming  Functional Level: 6   Grooming  Functional Level: 6     Bathing  Functional Level: 5   Bathing  Functional Level: 5     Upper Body Dressing  Functional Level: 6   Upper Body Dressing  Functional Level: 6     Lower Body Dressing  Functional Level: 5   Lower Body Dressing  Functional Level: 5     Toileting  Functional Level: 5   Toileting  Functional Level: 6     Toilet Transfers  Toilet Transfer Score: 5   Toilet Transfers  Toilet Transfer Score: 5     Tub /Shower Transfers  Tub/Shower Transfer Score: 5   Tub/Shower Transfers  Tub/Shower Transfer Score: 5       Legend:   7 - Independent   6 - Modified Independent   5 - Standby Assistance / Supervision / Set-up   4 - Minimum Assistance / Contact Guard Assistance   3 - Moderate Assistance   2 - Maximum Assistance   1 - Total Assistance / Dependent         Lab/Data Review:  Recent Results (from the past 24 hour(s))   GLUCOSE, POC    Collection Time: 04/19/21 12:26 PM   Result Value Ref Range    Glucose (POC) 146 (H) 70 - 110 mg/dL   GLUCOSE, POC    Collection Time: 04/19/21  4:26 PM   Result Value Ref Range    Glucose (POC) 215 (H) 70 - 110 mg/dL   GLUCOSE, POC    Collection Time: 04/19/21  8:22 PM   Result Value Ref Range    Glucose (POC) 127 (H) 70 - 110 mg/dL   GLUCOSE, POC    Collection Time: 04/20/21  7:30 AM   Result Value Ref Range    Glucose (POC) 127 (H) 70 - 110 mg/dL       Assessment:     Primary Rehab Diagnosis  1. Impaired Mobility and ADLs  2. S/P Right above-the-knee amputation (4/11/2021  Dr. Jayesh España)  3. S/P Exploration of right knee disarticulation and control of hemorrhage (4/8/2021  Dr. Raheem Salas)  4. S/P Washout of right below-knee amputation; Right knee disarticulation (4/7/2021  Dr. Raheem Salas)  5. History of infection and ischemia of right BKA stump (4/7/2021)  6. S/P Right below-the-knee amputation (3/22/2021 - Dr. Betty Almeida)  7. History of critical ischemia of the right lower extremity  8.  History of right lower extremity arterial embolectomy (3/5/2021 - Dr. Betty Almeida)    Comorbidities  Patient Active Problem List   Diagnosis Code    History of right below knee amputation ([facility] Z89.511    Impaired mobility and ADLs Z74.09, Z78.9    Critical ischemia of lower extremity ([facility] I70.229    Peripheral artery disease ([facility] I73.9    Coronary artery disease involving native coronary artery of native heart I25.10    Type 2 diabetes mellitus, without long-term current use of insulin (HCC) E11.9    History of epilepsy Z86.69    Factor V Leiden mutation ([facility] D68.51    Migraine headache G43.909    Wears glasses Y21.7    Umbilical hernia A13.6    Obstructive sleep apnea G47.33    ICD (implantable cardioverter-defibrillator) in place Z95.810    Mitral valve prolapse I34.1    History of head injury Z87.828    Hypertensive heart disease with chronic systolic congestive heart failure (HCC) I11.0, I50.22    History of myocardial infarction I25.2    Long term current use of aspirin Z79.82    On statin therapy due to risk of future cardiovascular event Z79.899    History of deep venous thrombosis (DVT) of distal vein of right lower extremity Z86.718    Anticoagulated by anticoagulation treatment Z79.01    Mixed hyperlipidemia E78.2    History of embolectomy Z98.890    COVID-19 ruled out by laboratory testing Z20.822    Acute blood loss as cause of postoperative anemia D62    Cardiomyopathy ([facility] I42.9    Chronic systolic heart failure (HCC) I50.22    History of noncompliance with medical treatment Z91.19    Major depressive disorder F32.9    Grade I diastolic dysfunction N88.8    Constipation K59.00    Ischemia of right BKA site (HCC) T87.89, I99.8    Infection of right below knee amputation (HCC) T87.43    Status post above-knee amputation of right lower extremity ([facility] Z89.611        Plan:     1. Justification for continued stay: Good progression towards established rehabilitation goals. 2. Medical Issues being followed closely:    [x]  Fall and safety precautions     [x]  Wound Care     [x]  Bowel and Bladder Function     [x]  Fluid Electrolyte and Nutrition Balance     [x]  Pain Control      3. Issues that 24 hour rehabilitation nursing is following:    [x]  Fall and safety precautions     [x]  Wound Care     [x]  Bowel and Bladder Function     [x]  Fluid Electrolyte and Nutrition Balance     [x]  Pain Control      [x]  Assistance with and education on in-room safety with transfers to and from the bed, wheelchair, toilet and shower. 4. Acute rehabilitation plan of care:    [x]  Continue current care and rehab. [x]  Physical Therapy           [x]  Occupational Therapy           []  Speech Therapy     []  Hold Rehab until further notice     5. Medications:    [x]  MAR Reviewed     [x]  Continue Present Medications     6. DVT Prophylaxis:      []  Enoxaparin     []  Unfractionated Heparin     []  Warfarin     [x]  NOAC     []  NEREYDA Stockings     []  Sequential Compression Device     []  None     7. Code status    [x]  Full code     []  Partial code     []  Do not intubate     []  Do not resuscitate     8.  Orders:   > S/P Right above-the-knee amputation (4/11/2021  Dr. Fabby Holman); S/P Exploration of right knee disarticulation and control of hemorrhage (4/8/2021  Dr. Jan Campos); S/P Washout of right below-knee amputation; Right knee disarticulation (4/7/2021  Dr. Jan Campos); History of infection and ischemia of right BKA stump (4/7/2021); S/P Right below-the-knee amputation (3/22/2021 - Dr. Elaine Vargas); History of critical ischemia of the right lower extremity;  History of right lower extremity arterial embolectomy (3/5/2021 - Dr. Elaine Vargas)   > Staples to be removed on 5/19/2021    > Acute Postoperative Blood Loss Anemia   > Hgb/Hct (3/26/2021) = 10.1/31.3   > Hgb/Hct (3/27/2021, on admission to ARU) = 10.9/33.9   > Anemia work-up showed serum iron 23, TIBC 200, iron % saturation 12, ferritin 835   > Hgb/Hct (3/29/2021) = 10.7/33.9       04/14/21  0423 04/13/21  0640 04/12/21  0539 04/11/21  1027 04/11/21  0650 04/10/21  1915 04/10/21  0640 04/09/21  0453 04/08/21  1602 04/08/21  0940 04/08/21  0210 04/07/21  0905 04/06/21  0850   HGB 7.2* 7.3* 7.3* 7.6* 7.9* 7.7* 6.3* 7.4* 6.5* 7.0* 7.8* 9.5* 10.8*   HCT 23.1* 23.2* 23.0*  --  24.4* 23.8* 20.0* 22.7* 21.5* 22.7* 25.4* 30.4* 34.2*      > Hgb/Hct (4/16/2021, on admission) = 6.6/21.1   > Anemia work-up (4/16/2021) showed serum iron 22, TIBC 202, iron % saturation 11, ferritin 545, reticulocyte count 5.1   > On 4/16/2021, patient was transfused 1 unit pRBC properly typed and crossmatched   > Hgb/Hct (4/17/2021) = 9.5/29.8    > On 4/17/2021, started:    > Ferrous gluconate 324 mg PO once daily with breakfast     > Ascorbic Acid 250 mg PO once daily with breakfast (to enhance the absorption of the FeSO4)    > Hgb/Hct (4/19/2021) = 8.9/28.9    > Continue:    > Ferrous gluconate 324 mg PO once daily with breakfast     > Ascorbic Acid 250 mg PO once daily with breakfast (to enhance the absorption of the FeSO4)     > Coronary artery disease; Peripheral artery disease    > On 3/27/2021, decreased Carvedilol from 6.25 mg to 3.125 mg PO BID with meals (8AM, 5PM)   > Continue:    > Aspirin 81 mg PO once daily with breakfast    > Atorvastatin 80 mg PO once daily    > Carvedilol 3.125 mg PO BID with meals (8AM, 5PM)    > Hypertensive heart disease with chronic systolic heart failure; Cardiomyopathy; Chronic systolic heart failure; Grade I diastolic dysfunction   > 2D echocardiogram (12/17/2015) showed EF 35%; genealized hypokinesis more focally severe of the inferior and posterior segments; mild mitral regurgitation; trace tricuspud regurgitation; normal pulmonary artery pressure   > 2D echocardiogram (3/24/2021) showed EF 37%; global hypokinesis of left ventricle; grade I diastolic dysfunction   > On 3/27/2021, decreased Carvedilol from 6.25 mg to 3.125 mg PO BID with meals (8AM, 5PM)   > On 3/28/2021, held:    > Furosemide 40 mg PO once daily (9AM)    > Lisinopril 5 mg PO once daily (9AM)   > Continue Carvedilol 3.125 mg PO BID with meals (8AM, 5PM)    > Factor V Leiden mutation; History of deep venous thrombosis (DVT) of right lower extremity;  Anticoagulated on Rivaroxaban   > Continue Rivaroxaban 20 mg PO once daily with breakfast    > Major depressive disorder   > Continue Duloxetine 30 mg PO once daily    > Mixed hyperlipidemia   > Lipid profile (7/25/2018) showed , , HDL 27,    > Lipid profile (3/27/2021) showed , , HDL 35, LDL 63   > Continue:    > Atorvastatin 80 mg PO once daily    > Ezetimibe 10 mg PO q HS    > Type 2 diabetes mellitus, poorly controlled, without long-term current use of insulin   > HbA1c (3/6/2021) = 12.9    > HbA1c (4/8/2021) = 9.8   > On admission to the ARU:    > Started Metformin 500 mg PO BID with meals    > Decreased Insulin glargine from 8 units to 5 units SC q HS   > On 4/20/2021:    > Started Alogliptin 25 mg PO once daily    > Discontinued Insulin glargine 5 units SC q HS   > Continue:    > Alogliptin 25 mg PO once daily    > Metformin 500 mg PO BID with meals    > Insulin lispro sliding scale SC TID AC only     > Difficulty sleeping   > Continue:    > Melatonin 5 mg PO q HS    > Zolpidem 5 mg PO q HS PRN for sleep    > Wound on lateral surface of distal third of left leg   > Wound Care Nurse consult for wound care recommendations    > COVID-19 ruled out by laboratory testing   > SARS-CoV-2 ([facility]) (3/22/2021): Not detected   > SARS-CoV-2 ([facility], [facility]) (collected 4/7/2021, resulted 4/8/2021): Not detected    > COVID-19 rapid test ([facility], [facility]) (4/7/2021): Not detected   > COVID-19 rapid test ([facility], [facility]) (4/14/2021): Not detected   > SARS-CoV-2 ([facility], [facility]) (collected 4/15//2021, resulted 4/16/2021): Not detected    > Analgesia   > Continue:    > Acetaminophen 650 mg PO TID (8AM, 12PM, 4PM)    > Acetaminophen 650 mg PO q 4 hr PRN for pain level 4/10 or lesser (from 8PM to 4AM only)    > Duloxetine 30 mg PO once daily    > Gabapentin 100 mg PO TID    > Methocarbamol 500 mg PO TID    > Oxycodone 5-10 mg PO q 4 hr PRN for pain level 5/10 or greater     > Diet:   > Specifications: Cardiac, diabetic, consistent carb 1800 kcal   > Solids (consistency): Regular    > Liquids (consistency): Thin    > Fluid restriction: None      9. Personal Protective Equipment was used while interacting with patient including: goggles and KN95 face mask. Patient was using a surgical mask. 10. Patient's progress in rehabilitation and medical issues discussed with the patient. All questions answered to the best of my ability. Care plan discussed with patient and nurse. 11. Total clinical care time is 30 minutes, including review of chart including all labs, radiology, past medical history, and discussion with patient and family. Greater than 50% of my time was spent in coordination of care and counseling.       Signed:    Ramon Root MD    April 20, 2021

## 2021-04-20 NOTE — PROGRESS NOTES
Problem: Self Care Deficits Care Plan (Adult)  Goal: *Acute Goals and Plan of Care (Insert Text)  Description: Occupational Therapy Goals   Long Term Goals  Initiated 2021 and to be accomplished within 2 week(s)  1. Pt will perform self-feeding with independence. 2. Pt will perform grooming with mod I in standing at the sink. 3. Pt will perform UB bathing with mod I with set up of necessary items. 4. Pt will perform LB bathing with mod I with set up of necessary items. 5. Pt will perform tub/shower transfer with mod I.   6. Pt will perform UB dressing with mod I with set up of necessary items. 7. Pt will perform LB dressing with mod I with set up of necessary items. 8. Pt will perform toileting task with mod I.  9. Pt will perform toilet transfer with mod I with RW. Short Term Goals   Initiated 2021 and to be accomplished within 7 day(s)  1. Pt will perform self-feeding with mod I and no set-up A.   2. Pt will perform grooming with mod I.  3. Pt will perform UB bathing with mod I.  4. Pt will perform LB bathing with mod I.  5. Pt will perform tub/shower transfer with mod I.  6. Pt will perform UB dressing with mod I.  7. Pt will perform LB dressing with mod I.  8. Pt will perform toileting task with mod I.  9. Pt will perform toilet transfer with mod I with w/c as needed. Outcome: Progressing Towards Goal   Occupational Therapy TREATMENT    Patient: Concha Wu   60 y.o. Patient identified with name and : yes    Date: 2021    First Tx Session  Time In: 56  Time Out:1100    Second Tx Session  Time In: 1500  Time Out:1550    Diagnosis: Status post above-knee amputation of right lower extremity ([facility] [Z89.611]   Precautions: Fall(right AKA)  Chart, occupational therapy assessment, plan of care, and goals were reviewed. First Tx Session  Pain:  Pt reports 0/10 pain or discomfort prior to treatment. Pt reports 0/10 pain or discomfort post treatment.    Intervention Provided:     Second Tx Session  Pain:  Pt reports 7/10 pain or discomfort prior to treatment. Pt reports 7/10 pain or discomfort post treatment. Intervention Provided:       SUBJECTIVE:   Patient stated I feel like I'm getting stronger, but I feel like I'm not ready to go home yet.     OBJECTIVE DATA SUMMARY:     THERAPEUTIC ACTIVITY Daily Assessment         THERAPEUTIC EXERCISE Daily Assessment    Sci-Fit at level 1 resistance seated w/c level x 10 mins without rest break in order to increase UB and core strength and functional activity tolerance for improved ADL independence and functional mobility, pt tolerated well. UE THERE EX with 4 # weighted dowel in order to increase UB strength and functional activity tolerance for improved functional transfers and ADL performance, pt performed 10 reps x 2 sets of bicep curls, shoulder circumduction and triceps extension, pt tolerated well. IADL Daily Assessment         NMRE Daily Assessment         FEEDING/EATING Daily Assessment          GROOMING Daily Assessment    Grooming  Grooming Assistance : 6 (Modified independent)  Comments: (seated w/c level at sink washing face and oral hygiene)     UPPER BODY BATHING Daily Assessment    Upper Body Bathing  Bathing Assistance, Upper: 6 (Modified independent)  Position Performed: Other (comment)(seated w/c level at sink)  Adaptive Equipment: Long handled sponge     LOWER BODY BATHING Daily Assessment    Lower Body Bathing  Bathing Assistance, Lower : 5 (Stand-by assistance)  Adaptive Equipment: [place]; Other(comment)(w/c)  Position Performed: Standing(w/ RW and seated w/c level at sinkside)     TOILETING Daily Assessment          UPPER BODY DRESSING Daily Assessment    Upper Body Dressing   Dressing Assistance : 6 (Modified independent)  Comments: (doff/don overhead shirt seated w/c level at sink)     LOWER BODY DRESSING Daily Assessment    Lower Body Dressing   Dressing Assistance : 5 (Stand-by assistance)  Leg Crossed Method Used: No  Position Performed: Other (comment)(seated in w/c using compensatory technique, stance w/ RW)     MOBILITY/TRANSFERS Daily Assessment    Functional Transfers  Toilet Transfer : Other (comment)(pt declined)       ASSESSMENT:  First tx session: Patient participated in ADL routine seated w/c level at sink and in stance w/ RW, vc's for set up of w/c in front of RW versus at angle to improve balance and fall prevention, good recall to lock w/c brakes prior to sit <-> stand, pt demonstrated good return demonstration (FIM scores posted above). Second tx session: BUE THERE EX while lying in bed, head of bed elevated in order to increase strength and functional activity tolerance for improved self care performance and functional transfers: Blue theraband x 20 reps x 2 sets for shoulder horizontal abduction, triceps extension and shoulder abduction, pt tolerated well and 5# dumb bell x 10 reps x 2 sets for bicep curls and triceps extension to increase  pt tolerated well. Patient resting comfortably lying semi reclined in bed, call bell within reach & pt verbalized understanding and provided return demonstration to utilize for assist e.g. functional transfers in order to prevent falls, bed alarm intact. Progression toward goals:  [x]          Improving appropriately and progressing toward goals  []          Improving slowly and progressing toward goals  []          Not making progress toward goals and plan of care will be adjusted     PLAN:  Patient continues to benefit from skilled intervention to address the above impairments. Continue treatment per established plan of care. Discharge Recommendations:  Home Health  Further Equipment Recommendations for Discharge:  bedside commode, rolling walker, wheelchair      Activity Tolerance:  fair    Estimated LOS: 1-2 weeks    Please refer to the flowsheet for vital signs taken during this treatment.   After treatment:   [x]  Patient left in no apparent distress sitting up in w/c   []  Patient left in no apparent distress in bed  [x]  Call bell left within reach  [x]  Nursing notified  []  Caregiver present  [x]  w/c alarm activated    COMMUNICATION/EDUCATION:   [x] Home safety education was provided and the patient/caregiver indicated understanding. [x] Patient/family have participated as able in goal setting and plan of care. [x] Patient/family agree to work toward stated goals and plan of care. [] Patient understands intent and goals of therapy, but is neutral about his/her participation. [] Patient is unable to participate in goal setting and plan of care.       Feng [place]

## 2021-04-20 NOTE — PROGRESS NOTES
Problem: Mobility Impaired (Adult and Pediatric)  Goal: *Therapy Goal (Edit Goal, Insert Text)  Description: Physical Therapy Short Term Goals  Initiated 4/16/2021 and to be accomplished within 7 day(s) on 4/23/2021:  1. Patient will move from supine to sit and sit to supine , scoot up and down, and roll side to side in bed with modified independence. 2.  Patient will transfer from bed to chair and chair to bed with supervision/set-up using the least restrictive device. 3.  Patient will perform sit to stand with supervision/set-up. 4.  Patient will ambulate with supervision/set-up for 15 feet with the least restrictive device. 5.  Patient will perform w/c mobility for 150 ft at supervision level over even surfaces. Physical Therapy Long Term Goals  Initiated 4/16/2021 and to be accomplished within 14 day(s) on 4/30/2021:  1. Patient will move from supine to sit and sit to supine , scoot up and down, and roll side to side in bed with independence. 2.  Patient will transfer from bed to chair and chair to bed with modified independence using the least restrictive device. 3.  Patient will perform sit to stand with modified independence. 4.  Patient will ambulate with modified independence for 25 feet with the least restrictive device. 5.  Patient will perform w/c mobility for at least 150 ft at modified independent level. 6.  Patient will ascend/descend ramp for safe entry/exit of home using wheelchair with supervision. Outcome: Progressing Towards Goal   PHYSICAL THERAPY TREATMENT    Patient: Benigno Hilario (10 y.o. male)  Date: 4/20/2021  Diagnosis: Status post above-knee amputation of right lower extremity (HCC) [Z89.611] Status post above-knee amputation of right lower extremity (HCC)  Precautions: Fall(right AKA)  Chart, physical therapy assessment, plan of care and goals were reviewed.     Time In:0900  Time Out:1000  Time In: 1325  Time Out: 1355    Patient seen for: AM;Balance activities;Gait training;Patient education; Therapeutic exercise;Transfer training;PM;Wheelchair mobility    Pain:  Pt pain was reported as 0 pre-treatment. Pt pain was reported as 0 post-treatment.   Intervention: n/a    Patient identified with name and :yes    SUBJECTIVE:      \"I keep having that phantom sensation and it's so frustrating\"    OBJECTIVE DATA SUMMARY:    Objective:     GROSS ASSESSMENT Daily Assessment            BED/MAT MOBILITY Daily Assessment     Rolling Right : 6 (Modified independent)  Rolling Left : 6 (Modified independent)  Supine to Sit : 6 (Modified independent)  Sit to Supine : 6 (Modified independent)      TRANSFERS Daily Assessment     Transfer Type: Lateral pivot  Transfer Assistance : 5 (Stand-by assistance)  Sit to Stand Assistance: Stand-by assistance  Sit to stand x10 from the w/c to the RW progressing from CGA to SBA        GAIT Daily Assessment    Gait Description (WDL)      Gait Abnormalities Decreased step clearance    Assistive device Walker, rolling;Gait belt    Ambulation assistance - level surface 4 (Contact guard assistance)    Distance 90 Feet (ft)    Ambulation assistance- uneven surface      Comments Verbal cues to not let the walker get too far in front of him and to take rest breaks when he starts to fatigue to decrease risk of falling        STEPS/STAIRS Daily Assessment     Steps/Stairs ambulated      Assistance Required     Rail Use  NT    Comments      Curbs/Ramps          BALANCE Daily Assessment     Sitting - Static: Good (unsupported)  Sitting - Dynamic: Good (unsupported)  Standing - Static: Good(with support)  Standing- Dynamic: fair/fair-        WHEELCHAIR MOBILITY Daily Assessment     Able to Propel (ft): 300 feet  Functional Level: 6  Curbs/Ramps Assist Required (FIM Score): 5 (Supervision)  Wheelchair Management: Manages left brake;Manages right brake  Pt performed w/c training outside on uneven surfaces and with inclines and declines; pt required close SBA with steeper inclines however was able to maneuver them without physical assistance        THERAPEUTIC EXERCISES Daily Assessment       Pt performed supine, sidelying and prone exercises on his left  LE 2x15        ASSESSMENT:  Pt continues to progress towards long-term goals and remains very motivated to participate in PT session. Pt is currently at a SBA/CGA level with standing activities. Progression toward goals:  [x]      Improving appropriately and progressing toward goals  []      Improving slowly and progressing toward goals  []      Not making progress toward goals and plan of care will be adjusted      PLAN:  Patient continues to benefit from skilled intervention to address the above impairments. Continue treatment per established plan of care. Discharge Recommendations:  Home Health  Further Equipment Recommendations for Discharge:  rolling walker and wheelchair       Estimated Discharge Date:4/29/2021    Activity Tolerance:   fair  Please refer to the flowsheet for vital signs taken during this treatment.     After treatment:   Patient left  sitting in w/c in a.m., and in bed after p.m. session      Tess Stockton, PT

## 2021-04-20 NOTE — WOUND CARE
Physical Exam 
Musculoskeletal:  
     Legs: 
 
     Feet: Focused assessment  POA left ankle, healing venous stasis ulcer. Scabbed over, no drainage, pink epithelial tissue brisa-wound with some hemosiderin staining. AKA incision, staples intact, open to air, no drainage noted. Healed stage 2 pressure injury. No drainage, no open area. Topical treatment protocol in place. Leave left ankle open to air. Silicone dressing to sacrum for prevention. Change every 3 days and prn soilage. Care discussed with primary nurse, Zuri SANZ. Care turned over to nursing staff at this time. Song Hugo RN, BSN, [facility]

## 2021-04-20 NOTE — PROGRESS NOTES
DANIELLE reviewed chart. DANIELLE saw pt in the rehab room. DANIELLE received permission to call pt family friend Yunior Randall. DANIELLE contacted Yunior Randall with permission at [phone]. DANIELLE left voicemail on machine. DANIELLE will continue to follow.        WILNER AshfordW, [facility]  Doctoral Candidate, Doctor of Social Work

## 2021-04-20 NOTE — PROGRESS NOTES
SHIFT CHANGE NOTE FOR [facility]    Bedside and Verbal shift change report given to Margaux Barajas RN (oncoming nurse) by Tim House RN (offgoing nurse). Report included the following information SBAR, Kardex, MAR and Recent Results.     Situation:   Code Status: Full Code   Reason for Admission: right AKA  Hospital Day: 5   Problem List:   Hospital Problems  Date Reviewed: 4/20/2021          Codes Class Noted POA    * (Principal) Status post above-knee amputation of right lower extremity ([email or web address] ICD-10-CM: F50.061  ICD-9-CM: V49.76  4/11/2021 Yes    Overview Signed 4/15/2021  5:14 PM by Keyur Hamm MD     S/P Right above-the-knee amputation (4/11/2021  Dr. Mahesh Santizo)             Ischemia of right BKA site [facility]) ICD-10-CM: T87.89, I99.8  ICD-9-CM: 997.69, 459.9  4/7/2021 Yes        Infection of right below knee amputation [facility]) ICD-10-CM: T87.43  ICD-9-CM: 997.62  4/7/2021 Yes    Overview Signed 4/15/2021  5:17 PM by Keyur Hamm MD     S/P Washout of right below-knee amputation; Right knee disarticulation (4/7/2021  Dr. Ayan Mayen); S/P Exploration of right knee disarticulation and control of hemorrhage (4/8/2021  Dr. Ayan Mayen)             Peripheral artery disease ([email or web address] 75.) (Chronic) ICD-10-CM: I73.9  ICD-9-CM: 443.9  Unknown Yes        Type 2 diabetes mellitus, without long-term current use of insulin (HCC) (Chronic) ICD-10-CM: E11.9  ICD-9-CM: 250.00  Unknown Yes    Overview Addendum 4/15/2021  5:40 PM by Keyur Hamm MD     HbA1c (4/8/2021) = 9.8; HbA1c (3/6/2021) = 12.9             Factor V Leiden mutation ([facility] [[email or web address] 75.) (Chronic) ICD-10-CM: U69.87  ICD-9-CM: 289.81  Unknown Yes        Hypertensive heart disease with chronic systolic congestive heart failure (HCC) (Chronic) ICD-10-CM: I11.0, I50.22  ICD-9-CM: 402.91, 428.22  Unknown Yes    Overview Signed 3/26/2021 11:06 PM by Keyur Hamm MD     2D echocardiogram (12/17/2015) showed EF 35%; genealized hypokinesis more focally severe of the inferior and posterior segments; mild mitral regurgitation; trace tricuspud regurgitation; normal pulmonary artery pressure             Anticoagulated by anticoagulation treatment (Chronic) ICD-10-CM: Z79.01  ICD-9-CM: V58.61  Unknown Yes    Overview Signed 3/26/2021  4:36 PM by Charly Bartlett MD     On Rivaroxaban             Mixed hyperlipidemia (Chronic) ICD-10-CM: E78.2  ICD-9-CM: 272.2  Unknown Yes    Overview Signed 3/26/2021 11:07 PM by Charly Bartlett MD     Lipid profile (7/25/2018) showed , , HDL 27,              Major depressive disorder ICD-10-CM: F32.9  ICD-9-CM: 296.20  3/24/2021 Yes    Overview Signed 3/26/2021 11:31 PM by Charly Bartlett MD     On Duloxetine             History of right below knee amputation [facility]) ICD-10-CM: Z89.511  ICD-9-CM: V49.75  3/22/2021 Yes    Overview Signed 3/26/2021  5:43 PM by Charly Bartlett MD     S/P Right below-the-knee amputation (3/22/2021 - Dr. Harlan Guerra)             Impaired mobility and ADLs ICD-10-CM: Z74.09, Z78.9  ICD-9-CM: V49.89  3/22/2021 Yes        Critical ischemia of lower extremity ([facility] ICD-10-CM: H55.523  ICD-9-CM: 459.9  3/22/2021 Yes    Overview Signed 3/26/2021  5:42 PM by Charly Bartlett MD     Right             Acute blood loss as cause of postoperative anemia ICD-10-CM: D62  ICD-9-CM: 285.1  3/22/2021 Yes        History of embolectomy ICD-10-CM: Z98.890  ICD-9-CM: V45.89  3/5/2021 Yes    Overview Signed 3/26/2021  5:39 PM by Charly Bartlett MD     S/P Right lower extremity arterial embolectomy                   Background:   Past Medical History:   Past Medical History:   Diagnosis Date    Acute blood loss as cause of postoperative anemia 3/22/2021    Anticoagulated by anticoagulation treatment     On Rivaroxaban    Cardiomyopathy ([facility] 12/17/2015    2D echocardiogram (3/24/2021) showed EF 37%; global hypokinesis of left ventricle; grade I diastolic dysfunction; 2D echocardiogram (12/17/2015) showed EF 35%; generalized hypokinesis more focally severe of the inferior and posterior segments; mild mitral regurgitation; trace tricuspud regurgitation; normal pulmonary artery pressure    Chronic systolic heart failure (HCC)     2D echocardiogram (3/24/2021) showed EF 37%; global hypokinesis of left ventricle; grade I diastolic dysfunction; 2D echocardiogram (12/17/2015) showed EF 35%; generalized hypokinesis more focally severe of the inferior and posterior segments; mild mitral regurgitation; trace tricuspud regurgitation; normal pulmonary artery pressure    Coronary artery disease involving native coronary artery of native heart     COVID-19 ruled out by laboratory testing 3/22/2021    SARS-CoV-2 ([facility] m2000, [facility]) (3/22/2021):  Not detected     Critical ischemia of lower extremity ([facility] 3/22/2021    Right    Factor V Leiden mutation ([facility]     Grade I diastolic dysfunction 07/40/4710    2D echocardiogram (3/24/2021) showed EF 37%; global hypokinesis of left ventricle; grade I diastolic dysfunction    History of deep venous thrombosis (DVT) of distal vein of right lower extremity     History of epilepsy     History of head injury     History of myocardial infarction     History of noncompliance with medical treatment 3/18/2021    Hypertensive heart disease with chronic systolic congestive heart failure ([facility]     2D echocardiogram (3/24/2021) showed EF 37%; global hypokinesis of left ventricle; grade I diastolic dysfunction; 2D echocardiogram (12/17/2015) showed EF 35%; genealized hypokinesis more focally severe of the inferior and posterior segments; mild mitral regurgitation; trace tricuspud regurgitation; normal pulmonary artery pressure    Infection of right below knee amputation ([facility] 4/7/2021    S/P Washout of right below-knee amputation; Right knee disarticulation (4/7/2021  Dr. Mehdi Brower); S/P Exploration of right knee disarticulation and control of hemorrhage (4/8/2021  Dr. Mehdi Brower)   Romaine Mooney Long term current use of aspirin     Major depressive disorder 03/24/2021    On Duloxetine    Migraine headache     Mitral valve prolapse     Mixed hyperlipidemia     Lipid profile (7/25/2018) showed , , HDL 27,     Obstructive sleep apnea     On statin therapy due to risk of future cardiovascular event     On Atorvastatin    Peripheral artery disease ([facility]     Type 2 diabetes mellitus, without long-term current use of insulin ([facility])     HbA1c (4/8/2021) = 9.8; HbA1c (3/6/2021) = 97.3    Umbilical hernia     Wears glasses       Patient taking anticoagulants yes    Patient has a defibrillator: no     Assessment:   Changes in Assessment throughout shift: no acute changes noted throughout the shift     Patient has central line: no Reasons if yes: n/a  Insertion date:n/a Last dressing date:n/a   Patient has Ellis Cath: no Reasons if yes: n/a   Insertion date:n/a     Last Vitals:     Vitals:    04/19/21 1539 04/19/21 2048 04/20/21 0734 04/20/21 1536   BP: 115/71 109/70 116/70 101/68   Pulse: 87 84 99 94   Resp: 20 20 20 20   Temp: 97.9 °F (36.6 °C) 98.4 °F (36.9 °C) 98.1 °F (36.7 °C) 98.6 °F (37 °C)   SpO2: 98% 99% 98% 99%   Height:            PAIN    Pain Assessment    Pain Intensity 1: 5 (04/20/21 1600) Pain Intensity 1: 2 (12/29/14 1105)    Pain Location 1: Leg Pain Location 1: Abdomen    Pain Intervention(s) 1: Medication (see MAR) Pain Intervention(s) 1: Medication (see MAR)  Patient Stated Pain Goal: 0 Patient Stated Pain Goal: 0  o Intervention effective: yes    o Other actions taken for pain: repositioning      Skin Assessment  Skin color Skin Color: Appropriate for ethnicity  Condition/Temperature Skin Condition/Temp: Dry, Warm  Integrity Skin Integrity: Incision (comment)  Turgor Turgor: Non-tenting  Weekly Pressure Ulcer Documentation  Pressure  Injury Documentation: Pressure Injury Noted-See Wound LDA to Document  Wound Prevention & Protection Methods  Orientation of wound Orientation of Wound Prevention: Posterior  Location of Prevention Location of Wound Prevention: Sacrum/Coccyx  Dressing Present Dressing Present : No  Dressing Status Dressing Status: Intact  Wound Offloading Wound Offloading (Prevention Methods): Bed, pressure reduction mattress     INTAKE/OUPUT  Date 04/19/21 1900 - 04/20/21 0659 04/20/21 0700 - 04/21/21 0659   Shift 5061-2488 24 Hour Total 6027-9474 1632-4053 24 Hour Total   INTAKE   Shift Total        OUTPUT   Urine 500 500        Urine Voided 500 500        Urine Occurrence(s) 1 x 4 x 4 x  4 x   Stool          Stool Occurrence(s) 0 x 0 x 1 x  1 x   Shift Total 500 500      NET -500 -500      Weight (kg)            Recommendations:  1. Patient needs and requests: education     2. Diet: Cardiac consistent carb diet with thin liquids    3. Pending tests/procedures: AM labs     4. Functional Level/Equipment: 1 person assist     5. Estimated Discharge Date: TBD Posted on Whiteboard in Patients Room: [facility] Safety Check    A safety check occurred in the patient's room between off going nurse and oncoming nurse listed above. The safety check included the below items  Area Items   H  High Alert Medications - Verify all high alert medication drips (heparin, PCA, etc.)   E  Equipment - Suction is set up for ALL patients (with yanker)  - Red plugs utilized for all equipment (IV pumps, etc.)  - WOWs wiped down at end of shift.  - Room stocked with oxygen, suction, and other unit-specific supplies   A  Alarms - Bed alarm is set for fall risk patients  - Ensure chair alarm is in place and activated if patient is up in a chair   L  Lines - Check IV for any infiltration  - Ellis bag is empty if patient has a Ellis   - Tubing and IV bags are labeled   S  Safety   - Room is clean, patient is clean, and equipment is clean. - Hallways are clear from equipment besides carts.    - Fall bracelet on for fall risk patients  - Ensure room is clear and free of clutter  - Suction is set up for ALL patients (with faraz)  - Hallways are clear from equipment besides carts.    - Isolation precautions followed, supplies available outside room, sign posted         Ángel Sheets RN

## 2021-04-21 LAB
GLUCOSE BLD STRIP.AUTO-MCNC: 121 MG/DL (ref 70–110)
GLUCOSE BLD STRIP.AUTO-MCNC: 137 MG/DL (ref 70–110)
GLUCOSE BLD STRIP.AUTO-MCNC: 140 MG/DL (ref 70–110)
GLUCOSE BLD STRIP.AUTO-MCNC: 140 MG/DL (ref 70–110)

## 2021-04-21 PROCEDURE — 97110 THERAPEUTIC EXERCISES: CPT

## 2021-04-21 PROCEDURE — 97530 THERAPEUTIC ACTIVITIES: CPT

## 2021-04-21 PROCEDURE — 99232 SBSQ HOSP IP/OBS MODERATE 35: CPT | Performed by: INTERNAL MEDICINE

## 2021-04-21 PROCEDURE — 97535 SELF CARE MNGMENT TRAINING: CPT

## 2021-04-21 PROCEDURE — 74011250637 HC RX REV CODE- 250/637: Performed by: INTERNAL MEDICINE

## 2021-04-21 PROCEDURE — 97116 GAIT TRAINING THERAPY: CPT

## 2021-04-21 PROCEDURE — 65310000000 HC RM PRIVATE REHAB

## 2021-04-21 PROCEDURE — 82962 GLUCOSE BLOOD TEST: CPT

## 2021-04-21 RX ORDER — METFORMIN HYDROCHLORIDE 850 MG/1
850 TABLET ORAL 2 TIMES DAILY WITH MEALS
Status: DISCONTINUED | OUTPATIENT
Start: 2021-04-21 | End: 2021-04-29 | Stop reason: HOSPADM

## 2021-04-21 RX ADMIN — OXYCODONE HYDROCHLORIDE 10 MG: 5 TABLET ORAL at 14:36

## 2021-04-21 RX ADMIN — DOCUSATE SODIUM 100 MG: 100 CAPSULE, LIQUID FILLED ORAL at 08:16

## 2021-04-21 RX ADMIN — EZETIMIBE 10 MG: 10 TABLET ORAL at 08:15

## 2021-04-21 RX ADMIN — Medication 5 MG: at 20:58

## 2021-04-21 RX ADMIN — OXYCODONE HYDROCHLORIDE 10 MG: 5 TABLET ORAL at 08:16

## 2021-04-21 RX ADMIN — GABAPENTIN 100 MG: 100 CAPSULE ORAL at 08:15

## 2021-04-21 RX ADMIN — Medication 1 TABLET: at 08:15

## 2021-04-21 RX ADMIN — ACETAMINOPHEN 650 MG: 325 TABLET, FILM COATED ORAL at 08:16

## 2021-04-21 RX ADMIN — CARVEDILOL 3.12 MG: 6.25 TABLET, FILM COATED ORAL at 08:15

## 2021-04-21 RX ADMIN — ZOLPIDEM TARTRATE 5 MG: 5 TABLET ORAL at 21:03

## 2021-04-21 RX ADMIN — ALOGLIPTIN 25 MG: 25 TABLET, FILM COATED ORAL at 08:15

## 2021-04-21 RX ADMIN — METHOCARBAMOL 500 MG: 500 TABLET ORAL at 13:58

## 2021-04-21 RX ADMIN — ASPIRIN 81 MG CHEWABLE TABLET 81 MG: 81 TABLET CHEWABLE at 08:15

## 2021-04-21 RX ADMIN — ACETAMINOPHEN 650 MG: 325 TABLET, FILM COATED ORAL at 17:21

## 2021-04-21 RX ADMIN — METFORMIN HYDROCHLORIDE 500 MG: 500 TABLET ORAL at 08:16

## 2021-04-21 RX ADMIN — OXYCODONE HYDROCHLORIDE 10 MG: 5 TABLET ORAL at 03:54

## 2021-04-21 RX ADMIN — ATORVASTATIN CALCIUM 80 MG: 40 TABLET, FILM COATED ORAL at 08:15

## 2021-04-21 RX ADMIN — RIVAROXABAN 20 MG: 20 TABLET, FILM COATED ORAL at 08:15

## 2021-04-21 RX ADMIN — DULOXETINE HYDROCHLORIDE 30 MG: 30 CAPSULE, DELAYED RELEASE ORAL at 08:16

## 2021-04-21 RX ADMIN — METHOCARBAMOL 500 MG: 500 TABLET ORAL at 08:15

## 2021-04-21 RX ADMIN — Medication 250 MG: at 08:15

## 2021-04-21 RX ADMIN — METFORMIN HYDROCHLORIDE 850 MG: 850 TABLET ORAL at 17:21

## 2021-04-21 RX ADMIN — ACETAMINOPHEN 650 MG: 325 TABLET, FILM COATED ORAL at 13:58

## 2021-04-21 RX ADMIN — GABAPENTIN 100 MG: 100 CAPSULE ORAL at 13:58

## 2021-04-21 RX ADMIN — GABAPENTIN 100 MG: 100 CAPSULE ORAL at 20:07

## 2021-04-21 RX ADMIN — CARVEDILOL 3.12 MG: 6.25 TABLET, FILM COATED ORAL at 17:21

## 2021-04-21 RX ADMIN — METHOCARBAMOL 500 MG: 500 TABLET ORAL at 20:07

## 2021-04-21 NOTE — PROGRESS NOTES
Problem: Mobility Impaired (Adult and Pediatric)  Goal: *Therapy Goal (Edit Goal, Insert Text)  Description: Physical Therapy Short Term Goals  Initiated 4/16/2021 and to be accomplished within 7 day(s) on 4/23/2021:  1. Patient will move from supine to sit and sit to supine , scoot up and down, and roll side to side in bed with modified independence. 2.  Patient will transfer from bed to chair and chair to bed with supervision/set-up using the least restrictive device. 3.  Patient will perform sit to stand with supervision/set-up. 4.  Patient will ambulate with supervision/set-up for 15 feet with the least restrictive device. 5.  Patient will perform w/c mobility for 150 ft at supervision level over even surfaces. Physical Therapy Long Term Goals  Initiated 4/16/2021 and to be accomplished within 14 day(s) on 4/30/2021:  1. Patient will move from supine to sit and sit to supine , scoot up and down, and roll side to side in bed with independence. 2.  Patient will transfer from bed to chair and chair to bed with modified independence using the least restrictive device. 3.  Patient will perform sit to stand with modified independence. 4.  Patient will ambulate with modified independence for 25 feet with the least restrictive device. 5.  Patient will perform w/c mobility for at least 150 ft at modified independent level. 6.  Patient will ascend/descend ramp for safe entry/exit of home using wheelchair with supervision. Outcome: Progressing Towards Goal   PHYSICAL THERAPY TREATMENT    Patient: [facility] (72 y.o. male)  Date: 4/21/2021  Diagnosis: Status post above-knee amputation of right lower extremity (HCC) [Z89.611] Status post above-knee amputation of right lower extremity (HCC)  Precautions: Fall(right AKA)  Chart, physical therapy assessment, plan of care and goals were reviewed. Time [id number]:[id number]  Time [id number]    Patient seen for: Patient education; Therapeutic exercise;Gait training;Transfer training; Wheelchair mobility;Balance activities    Pain:  Patient reporting ongoing phantom sensation, pain in residual limb but able to participate in therapy. Patient identified with name and : yes    SUBJECTIVE:      Patient agreeable to treatment. Reporting that he is feeling better and is slowly improving with feeling better with eventual discharge home. OBJECTIVE DATA SUMMARY:    Objective:     GROSS ASSESSMENT Daily Assessment     AROM: Generally decreased, functional  PROM: Generally decreased, functional  Strength: Generally decreased, functional  Coordination: Within functional limits  Tone: Normal  Sensation: Impaired(phantom pain, decreased sensation)      BED/MAT MOBILITY Daily Assessment     Rolling Right : 6 (Modified independent)  Rolling Left : 6 (Modified independent)  Supine to Sit : 6 (Modified independent)  Sit to Supine : 6 (Modified independent)      TRANSFERS Daily Assessment     Transfer Type: Other  Other: stand step transfer with RW  Transfer Assistance : 5 (Stand-by assistance)  Sit to Stand Assistance: Stand-by assistance    SBA with cues for safety; with cues for upright posture and decreased shoulder hiking. GAIT Daily Assessment    Gait Description (WDL) Exceptions to WDL    Gait Abnormalities Decreased step clearance(hop step pattern s/p right AKA)    Assistive device Walker, rolling;Gait belt    Ambulation assistance - level surface 4 (Contact guard assistance)    Distance 70 Feet (ft)    Ambulation assistance- uneven surface (NT)    Comments Gait training with emphasis on short distance gait bouts 15 ft x 6 reps to improve ability to perform safe functional mobility, cues for safe pacing and positioning within base of support of RW.          BALANCE Daily Assessment     Sitting - Static: Good (unsupported)  Sitting - Dynamic: Good (unsupported)  Standing - Static: Fair;Occasional;Good(needing one UE support of RW)  Standing - Dynamic : Impaired    Static standing with one UE support of RW for 10 sec bouts before needing steadying support of RW. Performing 3 x 10 sec bouts each LE.         WHEELCHAIR MOBILITY Daily Assessment      Supervision/mod I for even surfaces to/from gym. THERAPEUTIC EXERCISES Daily Assessment       Prone left LE hamstring curls, hip ext right residual limb 3 x 15 reps. Supine hip abd bilaterally 3 x 15 reps, single limb bridging left LE 3 x 15 reps. Sidelying hip extension right residual limb 3 x 15 reps. Performing for improved ability to perform safe functional mobility. ASSESSMENT:  Patient would continue to benefit from skilled PT to improve ability to perform safe functional mobility. Progression toward goals:  [x]      Improving appropriately and progressing toward goals  []      Improving slowly and progressing toward goals  []      Not making progress toward goals and plan of care will be adjusted      PLAN:  Patient continues to benefit from skilled intervention to address the above impairments. Continue treatment per established plan of care. Discharge Recommendations:  Home Health  Further Equipment Recommendations for Discharge:  RW and wheelchair      Estimated Discharge Date: 4/29/2021    Activity Tolerance:   Good, needing cues for taking rests appropriately. After treatment:   Patient left seated in wheelchair with hand off to OT.        Zac Kincaid, PT

## 2021-04-21 NOTE — INTERDISCIPLINARY ROUNDS
[address] 
[address] [place] [address], [address] INPATIENT REHABILITATION 
PRE-TEAM CONFERENCE SUMMARY Date of Conference: 4/22/2021 Patient Information:  
 
  
Name: [facility] Age / Sex: 60 y.o. / male CSN: [id number] MRN: [id number] Admit Date: 4/15/2021 Length of Stay: 6 days Primary Rehab Diagnosis 1. Impaired Mobility and ADLs 2. S/P Right above-the-knee amputation (4/11/2021  Dr. Galina Jones) 3. S/P Exploration of right knee disarticulation and control of hemorrhage (4/8/2021  Dr. Denise Covington) 4. S/P Washout of right below-knee amputation; Right knee disarticulation (4/7/2021  Dr. Denise Covington) 5. History of infection and ischemia of right BKA stump (4/7/2021) 6. S/P Right below-the-knee amputation (3/22/2021 - Dr. Neno Hernandez) 7. History of critical ischemia of the right lower extremity 8. History of right lower extremity arterial embolectomy (3/5/2021 - Dr. Neno Hernandez) Comorbidities Patient Active Problem List  
Diagnosis Code  History of right below knee amputation ([facility] Z89.511  Impaired mobility and ADLs Z74.09, Z78.9  Critical ischemia of lower extremity (HCC) I70.229  Peripheral artery disease ([facility]) I73.9  Coronary artery disease involving native coronary artery of native heart I25.10  Type 2 diabetes mellitus, without long-term current use of insulin ([facility]) E11.9  History of epilepsy Z80.75  
 Factor V Leiden mutation ([facility] D68.51  
 Migraine headache G43.909  Wears glasses Z97.3  Umbilical hernia A36.9  Obstructive sleep apnea G47.33  
 ICD (implantable cardioverter-defibrillator) in place Z95.810  
 Mitral valve prolapse I34.1  History of head injury Z87.828  
 Hypertensive heart disease with chronic systolic congestive heart failure (HCC) I11.0, I50.22  
 History of myocardial infarction I25.2  Long term current use of aspirin Z79.82  
 On statin therapy due to risk of future cardiovascular event Z79.899  
 History of deep venous thrombosis (DVT) of distal vein of right lower extremity Z86.718  Anticoagulated by anticoagulation treatment Z79.01  
 Mixed hyperlipidemia E78.2  History of embolectomy Z98.890  
 COVID-19 ruled out by laboratory testing Z20.822  Acute blood loss as cause of postoperative anemia D62  Cardiomyopathy ([facility] I42.9  Chronic systolic heart failure (HCC) I50.22  
 History of noncompliance with medical treatment Z91.19  
 Major depressive disorder F32.9  Grade I diastolic dysfunction X47.8  Constipation K59.00  
 Ischemia of right BKA site ([facility]) T87.89, I99.8  Infection of right below knee amputation ([facility]) T87.43  Status post above-knee amputation of right lower extremity ([facility] Z89.611 Therapy: FIM SCORES Initial Assessment Weekly Progress Assessment 4/21/2021 Eating Functional Level: 6  6 Swallowing Grooming 6  6 Bathing 5  5 Upper Body Dressing Functional Level: 6 Comments: T-shirt  6 Lower Body Dressing Functional Level: 5 Items Applied/Steps Completed: Elastic waist pants (3 steps), Sock, left (1 step) Comments: SBA for safety  5 - SBA for safety Toileting Functional Level: 5 Bladder 0 (pt in therapy) Bowel  0 0 Toilet Transfer Stafford Toilet Transfer Score: 5 Comments: SBA for safety  5 - SBA for safety Tub/Shower Transfer Stafford Tub/Shower Transfer Score: 5 Comments: Supervision for safety. Transfer from w/c to TTB with gaitbelt and grab bars 5 - supervision/SBA for safety Comprehension Primary Mode of Comprehension: Auditory Score: 6   
 6 Expression Primary Mode of Expression: Verbal 
Score: 6  6 Social Interaction Score: 6  6 Problem Solving Score: 6  6 Memory Score: 6  6 FIM SCORES Initial Assessment Weekly Progress Assessment 4/21/2021 Bed/Chair/Wheelchair Transfers Transfer Type: Lateral pivot Other: stand step/hop with RW Transfer Assistance : 4 (Minimal assistance)(steadying support for safety) Sit to Stand Assistance: Minimal assistance(steadying support needing cues for safety) Car Transfers: Not tested Car Type: N/A Transfer Type: Other Other: stand step transfer with RW Transfer Assistance : 5 (Stand-by assistance) Sit to Stand Assistance: Stand-by assistance Car Transfers: (Supervision with car transfer simulator on 4/19/21) Car Type: (car transfer simulator assessed on 4/19/2021) Bed Mobility Rolling Right 5 (Stand-by assistance) Rolling Left 5 (Stand-by assistance) Supine to Sit 5 (Stand-by assistance) Sit to Stand Minimal assistance(steadying support needing cues for safety) Sit to Supine 5 (Supervision) Rolling Right 
 6 (Modified independent) Rolling Left 
 6 (Modified independent) Supine to Sit 
 6 (Modified independent) Sit to Stand Stand-by assistance Sit to Supine 6 (Modified independent) Locomotion (W/C) Able to Propel (ft): (to be further assessed) Functional Level: (to be further assessed) Curbs/Ramps Assist Required (FIM Score): 0 (Not tested) Wheelchair Setup Assist Required : 0 (Not tested) Wheelchair Management: (NT) Function  Supervision/mod I Setup Assistance: mod I brakes management Locomotion (W/C distance)    performing 60 ft distances today Locomotion (Walk) 4 (Contact guard assistance) 4 (Contact guard assistance) Walker, rolling;Gait belt Locomotion (Walk dist.) 15 Feet (ft) 70 Feet (ft) Steps/Stairs Steps/Stairs Ambulated (#): 0 Level of Assist : 0 (Not tested) Rail Use: (NT)  NT  
 
 
 
Nursing:  
 
Neuro:   AAA&O x   4 Respiratory:   [x] WNL   [] O2 LPM:  
Other: 
Peripheral Vascular:   [] TEDS present   [] Edema present ____ Grade Cardiac:   [x] WNL   [] Other Genitourinary:   [x] continent   [] incontinent   [] nguyen  Abdominal __4/21_____ LBM 
GI: _cardiac/diabetic______ Diet _thin_____ Liquids _____ tube feeds Musculoskeletal: __3x__ ROM Transfers _wheelchair____ Assistive Device Used __1x__ Level of Assistance Skin Integumentary:   [x] Intact   [] Not Intact   __________Preventative Measures Details______________________________________________________________ Pain: [x] Controlled   [] Not Controlled   Pain Meds:   [] Scheduled   [] PRN Registered Dietitian / Nutrition:  
No data found. Pt reported fair/good appetite and meal intake. Eating 75% of meals. Tolerating diet. Variable intake of glucerna shakes due to flavor preference, which was discussed. Pt denied having any nutrition concerns at this time Supplements:          [x] Yes: glucerna shake TID    [] No     
Amount of supplement consumed:   Variable due to flavor prefernce Intake/Output Summary (Last 24 hours) at 4/21/2021 1707 Last data filed at 4/21/2021 9860 Gross per 24 hour Intake  Output 1300 ml Net -1300 ml Last bowel movement: 4/20 Interdisciplinary Team Goals: 1. Discipline  Physical Therapy Goal  Patient will improved ability to perform short distance gait bouts up to 20 ft at mod I level for safe maneuvering at home in  inaccessible spaces for w/c. Barrier  Decreased strength, balance, endurance Intervention  Therex, balance activities, therapeutic activity, gait training Goal written by:   Benjamin Gonzalez, PT, DPT  
 
2. Discipline  Occupational Therapy Goal  Pt will improve UE strength for functional reaching for IADL tasks and activity tolerance in standing for IADL tasks. Barrier Confidence, balance, strength Intervention TE, TA, IADL kitchen tasks Goal written by: Fede Colindres, OTD, OTR/L  
 
3. Discipline  Speech Therapy Goal    
 Barrier Intervention Goal written by: 4. Discipline  Nursing Goal Pt will be free from infection until discharge Barrier  surgical incision; environmental exposure;diabetes  Intervention  monitor VS; be afebrile; maintain surgical incision c/d/i  
 Goal written by:  Eleuterio Segura RN  
 
5. Discipline  Clinical Psychology Goal  Maintain mood stability during rehabilitation effort Barrier  Situational stressors causing distraction for patient in his recovery Intervention  Support  Goal written by:  Breezy Escobar, PhD  
 
6. Discipline  Nutrition / Dietetics Goal  - PO nutrition intake will continue to meet >75% of patients estimated nutritional needs over the next 7 days. Barrier  appetite, improving Intervention  continue po diet and nutrition supplements. Update flavor preference in supplement order Goal written by:  Jas Kelly RD Disposition / Discharge Planning: Follow-up services:  [x] Physical Therapy [x] Occupational Therapy     
 [] Speech Therapy         
 [] Skilled Nursing    
 [] Medical Social Worker 
 [] Aide        [] Outpatient      [] vs 
 [x] Home Health  [] vs 
     [] to progress to outpatient 
     [] with 24-hour supervision 
     [] with 24-hour assistance 
 [] Walter AGUAYO recommendations: RW, wheelchair Estimated discharge date:    
Discharge Location:  [] Home  [] versus  
 [] Walter Niraj [] [facility] [] Other:   
  
 
 
Electronic Signatures:  
 
 Signature Date Signed Physical Therapist 
  Benjamin Gonzalez, PT, DPT  4/21/2021 Occupational Therapist 
 Fede Colindres, OTD, OTR/L 4/21/2021 Speech Therapist 
      
Recreational Therapist 
  Brandi Dowell, [address] 4/21/2021 Nursing Eleuterio Segura RN 4/21/2021 Dietitian Jas Kelly RD  4/21/2021 Clinical Psychologist 
  Breezy Escobar, PhD  4/21/2021 Physician 
  Jet Bryson MD   4/21/2021  Opportunity to share with family/caregiver[] YES [] NO 
 
Relationship to patient____________________________________________________ The above information has been reviewed with the patient in a language that they can understand. Opportunity for comments and questions has been provided and a signed attestation has been scanned into the \"media tab\" of the EMR. Patient Signature: ______________________________________________________ Date Signed: __________________________________________________________

## 2021-04-21 NOTE — PROGRESS NOTES
SHIFT CHANGE NOTE FOR [facility]    Bedside and Verbal shift change report given to Darcy Hernandez RN (oncoming nurse) by Trent Birmingham RN (offgoing nurse). Report included the following information SBAR, Kardex, MAR and Recent Results.     Situation:   Code Status: Full Code   Reason for Admission: right AKA  Hospital Day: 6   Problem List:   Hospital Problems  Date Reviewed: 4/21/2021          Codes Class Noted POA    * (Principal) Status post above-knee amputation of right lower extremity ([facility] ICD-10-CM: T49.908  ICD-9-CM: V49.76  4/11/2021 Yes    Overview Signed 4/15/2021  5:14 PM by Demarcus Bruce MD     S/P Right above-the-knee amputation (4/11/2021  Dr. Jayesh España)             Ischemia of right BKA site [facility]) ICD-10-CM: T87.89, I99.8  ICD-9-CM: 997.69, 459.9  4/7/2021 Yes        Infection of right below knee amputation [facility]) ICD-10-CM: T87.43  ICD-9-CM: 997.62  4/7/2021 Yes    Overview Signed 4/15/2021  5:17 PM by Demarcus Bruce MD     S/P Washout of right below-knee amputation; Right knee disarticulation (4/7/2021  Dr. Raheem Salas); S/P Exploration of right knee disarticulation and control of hemorrhage (4/8/2021  Dr. Raheem Salas)             Peripheral artery disease ([facility] (Chronic) ICD-10-CM: I73.9  ICD-9-CM: 443.9  Unknown Yes        Type 2 diabetes mellitus, without long-term current use of insulin (HCC) (Chronic) ICD-10-CM: E11.9  ICD-9-CM: 250.00  Unknown Yes    Overview Addendum 4/15/2021  5:40 PM by Demarcus Bruce MD     HbA1c (4/8/2021) = 9.8; HbA1c (3/6/2021) = 12.9             Factor V Leiden mutation ([facility] (Chronic) ICD-10-CM: B37.46  ICD-9-CM: 289.81  Unknown Yes        Hypertensive heart disease with chronic systolic congestive heart failure (HCC) (Chronic) ICD-10-CM: I11.0, I50.22  ICD-9-CM: 402.91, 428.22  Unknown Yes    Overview Signed 3/26/2021 11:06 PM by Demarcus Bruce MD     2D echocardiogram (12/17/2015) showed EF 35%; genealized hypokinesis more focally severe of the inferior and posterior segments; mild mitral regurgitation; trace tricuspud regurgitation; normal pulmonary artery pressure             Anticoagulated by anticoagulation treatment (Chronic) ICD-10-CM: Z79.01  ICD-9-CM: V58.61  Unknown Yes    Overview Signed 3/26/2021  4:36 PM by Roque Moscoso MD     On Rivaroxaban             Mixed hyperlipidemia (Chronic) ICD-10-CM: E78.2  ICD-9-CM: 272.2  Unknown Yes    Overview Signed 3/26/2021 11:07 PM by Roque Moscoso MD     Lipid profile (7/25/2018) showed , , HDL 27,              Major depressive disorder ICD-10-CM: F32.9  ICD-9-CM: 296.20  3/24/2021 Yes    Overview Signed 3/26/2021 11:31 PM by Roque Moscoso MD     On Duloxetine             History of right below knee amputation [facility]) ICD-10-CM: Z89.511  ICD-9-CM: V49.75  3/22/2021 Yes    Overview Signed 3/26/2021  5:43 PM by Roque Moscoso MD     S/P Right below-the-knee amputation (3/22/2021 - Dr. Kristina López)             Impaired mobility and ADLs ICD-10-CM: Z74.09, Z78.9  ICD-9-CM: V49.89  3/22/2021 Yes        Critical ischemia of lower extremity ([facility] ICD-10-CM: Q34.273  ICD-9-CM: 459.9  3/22/2021 Yes    Overview Signed 3/26/2021  5:42 PM by Roque Moscoso MD     Right             Acute blood loss as cause of postoperative anemia ICD-10-CM: D62  ICD-9-CM: 285.1  3/22/2021 Yes        History of embolectomy ICD-10-CM: Z98.890  ICD-9-CM: V45.89  3/5/2021 Yes    Overview Signed 3/26/2021  5:39 PM by Roque Moscoso MD     S/P Right lower extremity arterial embolectomy                   Background:   Past Medical History:   Past Medical History:   Diagnosis Date    Acute blood loss as cause of postoperative anemia 3/22/2021    Anticoagulated by anticoagulation treatment     On Rivaroxaban    Cardiomyopathy ([facility] 12/17/2015    2D echocardiogram (3/24/2021) showed EF 37%; global hypokinesis of left ventricle; grade I diastolic dysfunction; 2D echocardiogram (12/17/2015) showed EF 35%; generalized hypokinesis more focally severe of the inferior and posterior segments; mild mitral regurgitation; trace tricuspud regurgitation; normal pulmonary artery pressure    Chronic systolic heart failure (HCC)     2D echocardiogram (3/24/2021) showed EF 37%; global hypokinesis of left ventricle; grade I diastolic dysfunction; 2D echocardiogram (12/17/2015) showed EF 35%; generalized hypokinesis more focally severe of the inferior and posterior segments; mild mitral regurgitation; trace tricuspud regurgitation; normal pulmonary artery pressure    Coronary artery disease involving native coronary artery of native heart     COVID-19 ruled out by laboratory testing 3/22/2021    SARS-CoV-2 ([facility]) (3/22/2021):  Not detected     Critical ischemia of lower extremity ([facility] 3/22/2021    Right    Factor V Leiden mutation ([facility]     Grade I diastolic dysfunction 83/94/2308    2D echocardiogram (3/24/2021) showed EF 37%; global hypokinesis of left ventricle; grade I diastolic dysfunction    History of deep venous thrombosis (DVT) of distal vein of right lower extremity     History of epilepsy     History of head injury     History of myocardial infarction     History of noncompliance with medical treatment 3/18/2021    Hypertensive heart disease with chronic systolic congestive heart failure ([facility]     2D echocardiogram (3/24/2021) showed EF 37%; global hypokinesis of left ventricle; grade I diastolic dysfunction; 2D echocardiogram (12/17/2015) showed EF 35%; genealized hypokinesis more focally severe of the inferior and posterior segments; mild mitral regurgitation; trace tricuspud regurgitation; normal pulmonary artery pressure    Infection of right below knee amputation ([facility] 4/7/2021    S/P Washout of right below-knee amputation; Right knee disarticulation (4/7/2021  Dr. Maryse Novak); S/P Exploration of right knee disarticulation and control of hemorrhage (4/8/2021  Dr. Maryse Novak)   Zena Officer Long term current use of aspirin     Major depressive disorder 03/24/2021    On Duloxetine    Migraine headache     Mitral valve prolapse     Mixed hyperlipidemia     Lipid profile (7/25/2018) showed , , HDL 27,     Obstructive sleep apnea     On statin therapy due to risk of future cardiovascular event     On Atorvastatin    Peripheral artery disease ([facility]     Type 2 diabetes mellitus, without long-term current use of insulin ([facility])     HbA1c (4/8/2021) = 9.8; HbA1c (3/6/2021) = 45.1    Umbilical hernia     Wears glasses       Patient taking anticoagulants yes    Patient has a defibrillator: no     Assessment:   Changes in Assessment throughout shift: no acute changes noted throughout the shift     Patient has central line: no Reasons if yes: n/a  Insertion date:n/a Last dressing date:n/a   Patient has Ellis Cath: no Reasons if yes: n/a   Insertion date:n/a     Last Vitals:     Vitals:    04/20/21 1536 04/20/21 2028 04/21/21 0720 04/21/21 1722   BP: 101/68 122/77 116/73 116/81   Pulse: 94 91 93 93   Resp: 20 20 20 18   Temp: 98.6 °F (37 °C) 97.9 °F (36.6 °C) 98.6 °F (37 °C) 97.6 °F (36.4 °C)   SpO2: 99% 100% 99% 98%   Height:            PAIN    Pain Assessment    Pain Intensity 1: 5 (04/21/21 1534) Pain Intensity 1: 2 (12/29/14 1105)    Pain Location 1: Leg Pain Location 1: Abdomen    Pain Intervention(s) 1: Medication (see MAR) Pain Intervention(s) 1: Medication (see MAR)  Patient Stated Pain Goal: 0 Patient Stated Pain Goal: 0  o Intervention effective: yes    o Other actions taken for pain: repositioning      Skin Assessment  Skin color Skin Color: Appropriate for ethnicity  Condition/Temperature Skin Condition/Temp: Dry, Warm  Integrity Skin Integrity: Incision (comment)  Turgor Turgor: Non-tenting  Weekly Pressure Ulcer Documentation  Pressure  Injury Documentation: Pressure Injury Noted-See Wound LDA to Document  Wound Prevention & Protection Methods  Orientation of wound Orientation of Wound Prevention: Posterior  Location of Prevention Location of Wound Prevention: Sacrum/Coccyx  Dressing Present Dressing Present : Yes  Dressing Status Dressing Status: Intact  Wound Offloading Wound Offloading (Prevention Methods): Bed, pressure reduction mattress     INTAKE/OUPUT  Date 04/20/21 1900 - 04/21/21 0659 04/21/21 0700 - 04/22/21 0659   Shift 9541-8191 24 Hour Total 7233-4031 5675-0074 24 Hour Total   INTAKE   Shift Total        OUTPUT   Urine 1300 1300        Urine Voided 1300 1300        Urine Occurrence(s) 2 x 6 x 0 x  0 x   Stool          Stool Occurrence(s) 0 x 1 x 0 x  0 x   Shift Total 1300 1300      NET -1300 -1300      Weight (kg)            Recommendations:  1. Patient needs and requests: education     2. Diet: Cardiac consistent carb diet with thin liquids    3. Pending tests/procedures: AM labs     4. Functional Level/Equipment: 1 person assist     5. Estimated Discharge Date: TBD Posted on Whiteboard in Patients Room: [facility] Safety Check    A safety check occurred in the patient's room between off going nurse and oncoming nurse listed above. The safety check included the below items  Area Items   H  High Alert Medications - Verify all high alert medication drips (heparin, PCA, etc.)   E  Equipment - Suction is set up for ALL patients (with yanker)  - Red plugs utilized for all equipment (IV pumps, etc.)  - WOWs wiped down at end of shift.  - Room stocked with oxygen, suction, and other unit-specific supplies   A  Alarms - Bed alarm is set for fall risk patients  - Ensure chair alarm is in place and activated if patient is up in a chair   L  Lines - Check IV for any infiltration  - Ellis bag is empty if patient has a Ellis   - Tubing and IV bags are labeled   S  Safety   - Room is clean, patient is clean, and equipment is clean. - Hallways are clear from equipment besides carts.    - Fall bracelet on for fall risk patients  - Ensure room is clear and free of clutter  - Suction is set up for ALL patients (with faraz)  - Hallways are clear from equipment besides carts.    - Isolation precautions followed, supplies available outside room, sign posted         Charlotte Nguyen RN

## 2021-04-21 NOTE — PROGRESS NOTES
Problem: Diabetes Self-Management  Goal: *Disease process and treatment process  Description: Define diabetes and identify own type of diabetes; list 3 options for treating diabetes. Outcome: Progressing Towards Goal  Goal: *Incorporating nutritional management into lifestyle  Description: Describe effect of type, amount and timing of food on blood glucose; list 3 methods for planning meals. Outcome: Progressing Towards Goal  Goal: *Incorporating physical activity into lifestyle  Description: State effect of exercise on blood glucose levels. Outcome: Progressing Towards Goal  Goal: *Developing strategies to promote health/change behavior  Description: Define the ABC's of diabetes; identify appropriate screenings, schedule and personal plan for screenings. Outcome: Progressing Towards Goal  Goal: *Using medications safely  Description: State effect of diabetes medications on diabetes; name diabetes medication taking, action and side effects. Outcome: Progressing Towards Goal  Goal: *Monitoring blood glucose, interpreting and using results  Description: Identify recommended blood glucose targets  and personal targets. Outcome: Progressing Towards Goal  Goal: *Prevention, detection, treatment of acute complications  Description: List symptoms of hyper- and hypoglycemia; describe how to treat low blood sugar and actions for lowering  high blood glucose level. Outcome: Progressing Towards Goal  Goal: *Prevention, detection and treatment of chronic complications  Description: Define the natural course of diabetes and describe the relationship of blood glucose levels to long term complications of diabetes.   Outcome: Progressing Towards Goal  Goal: *Developing strategies to address psychosocial issues  Description: Describe feelings about living with diabetes; identify support needed and support network  Outcome: Progressing Towards Goal  Goal: *Insulin pump training  Outcome: Progressing Towards Goal  Goal: *Sick day guidelines  Outcome: Progressing Towards Goal  Goal: *Patient Specific Goal (EDIT GOAL, INSERT TEXT)  Outcome: Progressing Towards Goal     Problem: Patient Education: Go to Patient Education Activity  Goal: Patient/Family Education  Outcome: Progressing Towards Goal     Problem: Risk for Spread of Infection  Goal: Prevent transmission of infectious organism to others  Description: Prevent the transmission of infectious organisms to other patients, staff members, and visitors. Outcome: Progressing Towards Goal     Problem: Patient Education:  Go to Education Activity  Goal: Patient/Family Education  Outcome: Progressing Towards Goal     Problem: Falls - Risk of  Goal: *Absence of Falls  Description: Document Jerrylove Benton Fall Risk and appropriate interventions in the flowsheet. Outcome: Progressing Towards Goal  Note: Fall Risk Interventions:  Mobility Interventions: Bed/chair exit alarm, Patient to call before getting OOB    Mentation Interventions: Bed/chair exit alarm, Evaluate medications/consider consulting pharmacy    Medication Interventions: Bed/chair exit alarm, Evaluate medications/consider consulting pharmacy, Patient to call before getting OOB    Elimination Interventions: Call light in reach, Bed/chair exit alarm, Elevated toilet seat, Patient to call for help with toileting needs              Problem: Patient Education: Go to Patient Education Activity  Goal: Patient/Family Education  Outcome: Progressing Towards Goal     Problem: Patient Education: Go to Patient Education Activity  Goal: Patient/Family Education  Outcome: Progressing Towards Goal     Problem: Patient Education: Go to Patient Education Activity  Goal: Patient/Family Education  Outcome: Progressing Towards Goal     Problem: Pressure Injury - Risk of  Goal: *Prevention of pressure injury  Description: Document Steve Scale and appropriate interventions in the flowsheet.   Outcome: Progressing Towards Goal  Note: Pressure Injury Interventions:  Sensory Interventions: Assess changes in LOC    Moisture Interventions: Absorbent underpads    Activity Interventions: Chair cushion, Increase time out of bed, Pressure redistribution bed/mattress(bed type)    Mobility Interventions: Chair cushion, Pressure redistribution bed/mattress (bed type), HOB 30 degrees or less    Nutrition Interventions: Document food/fluid/supplement intake    Friction and Shear Interventions: Foam dressings/transparent film/skin sealants                Problem: Patient Education: Go to Patient Education Activity  Goal: Patient/Family Education  Outcome: Progressing Towards Goal     Problem: Nutrition Deficit  Goal: *Optimize nutritional status  Outcome: Progressing Towards Goal

## 2021-04-21 NOTE — PROGRESS NOTES
SW reviewed chart. Referral made for [facility] [place] [facility].  DANIELLE will continue to follow    Fernand Gowers MSW, LCSW, CCM  Doctoral Candidate, Doctor of Social Work

## 2021-04-21 NOTE — PROGRESS NOTES
[id number]  [address] [place] [address], [address]     INPATIENT REHABILITATION  DAILY PROGRESS NOTE     Date: 4/21/2021    Name: Charisma Srivastava Age / Sex: 60 y.o. / male   CSN: [id number] MRN: [id number]   [facility] Date: 4/15/2021 Length of Stay: 6 days     Primary Rehab Diagnosis: Impaired Mobility and ADLs secondary to:  1. S/P Right above-the-knee amputation (4/11/2021  Dr. Julius David)  2. S/P Exploration of right knee disarticulation and control of hemorrhage (4/8/2021  Dr. Kyleigh Carter)  3. S/P Washout of right below-knee amputation; Right knee disarticulation (4/7/2021  Dr. Kyleigh Carter)  4. History of infection and ischemia of right BKA stump (4/7/2021)  5. S/P Right below-the-knee amputation (3/22/2021 - Dr. Ronan Bro)  6. History of critical ischemia of the right lower extremity  7. History of right lower extremity arterial embolectomy (3/5/2021 - Dr. Ronan Bro)      Subjective:     Patient seen and examined. Blood pressure controlled. Blood glucose controlled. Patient's Complaint:   No significant medical complaints    Pain Control: stable, mild-to-moderate joint symptoms intermittently, reasonably well controlled by current meds      Objective:     Vital Signs:  Patient Vitals for the past 24 hrs:   BP Temp Pulse Resp SpO2   04/21/21 0720 116/73 98.6 °F (37 °C) 93 20 99 %   04/20/21 2028 122/77 97.9 °F (36.6 °C) 91 20 100 %   04/20/21 1536 101/68 98.6 °F (37 °C) 94 20 99 %        Physical Examination:  GENERAL SURVEY: Patient is awake, alert, oriented x 3, laying comfortably on the bed, not in acute respiratory distress.   HEENT: pale palpebral conjunctivae, anicteric sclerae, no nasoaural discharge, moist oral mucosa  NECK: supple, no jugular venous distention, no palpable lymph nodes  CHEST/LUNGS: symmetrical chest expansion, good air entry, clear breath sounds  HEART: adynamic precordium, good S1 S2, no S3, regular rhythm, no murmurs  ABDOMEN: flat, bowel sounds appreciated, soft, non-tender  EXTREMITIES: (+) right AKA stump, (+) absence of hair on LLE, (+) healing wound with scab on lateral surface of distal third of left leg with surrounding hyperpigmentation, (+) LLE cool to touch, pale nailbeds, no edema, full and equal pulses, no calf tenderness   NEUROLOGICAL EXAM: The patient is awake, alert and oriented x3, able to answer questions fairly appropriately, able to follow 1 and 2 step commands. Able to tell time from the wall clock. Cranial nerves II-XII are grossly intact. No gross sensory deficit.   Motor strength is 4+/5 on BUE, 4/5 on the RLE, 4 to 4+/5 on the LLE.     Incision(s): covered, clean, dry, and intact       Current Medications:  Current Facility-Administered Medications   Medication Dose Route Frequency    alogliptin (NESINA) tablet 25 mg  25 mg Oral DAILY    methocarbamoL (ROBAXIN) tablet 500 mg  500 mg Oral TID    0.9% sodium chloride infusion 250 mL  250 mL IntraVENous PRN    diphenhydrAMINE (BENADRYL) capsule 25 mg  25 mg Oral Q6H PRN    ferrous gluconate 324 mg (37.5 mg iron) tablet 1 Tab  324 mg Oral DAILY WITH BREAKFAST    ascorbic acid (vitamin C) (VITAMIN C) tablet 250 mg  250 mg Oral DAILY WITH BREAKFAST    acetaminophen (TYLENOL) tablet 650 mg  650 mg Oral Q4H PRN    bisacodyL (DULCOLAX) tablet 10 mg  10 mg Oral Q48H PRN    insulin lispro (HUMALOG) injection   SubCUTAneous TIDAC    carvediloL (COREG) tablet 3.125 mg  3.125 mg Oral BID WITH MEALS    gabapentin (NEURONTIN) capsule 100 mg  100 mg Oral TID    aspirin chewable tablet 81 mg  81 mg Oral DAILY WITH BREAKFAST    atorvastatin (LIPITOR) tablet 80 mg  80 mg Oral DAILY    docusate sodium (COLACE) capsule 100 mg  100 mg Oral DAILY AFTER BREAKFAST    DULoxetine (CYMBALTA) capsule 30 mg  30 mg Oral DAILY    ezetimibe (ZETIA) tablet 10 mg  10 mg Oral DAILY    acetaminophen (TYLENOL) tablet 650 mg  650 mg Oral TID    oxyCODONE IR (ROXICODONE) tablet 5-10 mg  5-10 mg Oral Q4H PRN    rivaroxaban (XARELTO) tablet 20 mg  20 mg Oral DAILY    melatonin (rapid dissolve) tablet 5 mg  5 mg Oral QHS    zolpidem (AMBIEN) tablet 5 mg  5 mg Oral QHS PRN    metFORMIN (GLUCOPHAGE) tablet 500 mg  500 mg Oral BID WITH MEALS       Allergies:   Allergies   Allergen Reactions    Nitroglycerin Other (comments)      BP drops rapidly when he takes SL Nitro          [place] Diarrhea and Rash    Cat Dander Cough    Codeine Rash    Strawberry Rash       Functional Progress:    PHYSICAL THERAPY    ON ADMISSION MOST RECENT   Wheelchair Mobility/Management  Able to Propel (ft): (to be further assessed)  Functional Level: (to be further assessed)  Curbs/Ramps Assist Required (FIM Score): 0 (Not tested)  Wheelchair Setup Assist Required : 0 (Not tested)  Wheelchair Management: (NT) Wheelchair Mobility/Management  Able to Propel (ft): 300 feet  Functional Level: 6  Curbs/Ramps Assist Required (FIM Score): 5 (Supervision)  Wheelchair Setup Assist Required : 4 (Minimal assistance)  Wheelchair Management: Manages left brake, Manages right brake     Gait  Amount of Assistance: 4 (Contact guard assistance)  Distance (ft): 15 Feet (ft)  Assistive Device: Walker, rolling Gait  Amount of Assistance: 4 (Contact guard assistance)  Distance (ft): 90 Feet (ft)  Assistive Device: Walker, rolling, Gait belt     Balance-Sitting/Standing  Sitting - Static: Good (unsupported)  Sitting - Dynamic: Good (unsupported)  Standing - Static: Good  Standing - Dynamic : Impaired Balance-Sitting/Standing  Sitting - Static: Good (unsupported)  Sitting - Dynamic: Good (unsupported)  Standing - Static: Good(with support)  Standing - Dynamic : Impaired     Bed/Mat Mobility  Rolling Right : 5 (Stand-by assistance)  Rolling Left : 5 (Stand-by assistance)  Supine to Sit : 5 (Stand-by assistance)  Sit to Supine : 5 (Supervision) Bed/Mat Mobility  Rolling Right : 6 (Modified independent)  Rolling Left : 6 (Modified independent)  Supine to Sit : 6 (Modified independent)  Sit to Supine : 6 (Modified independent)     Transfers  Transfer Type: Lateral pivot  Other: stand step/hop with RW  Transfer Assistance : 4 (Minimal assistance)(steadying support for safety)  Sit to Stand Assistance: Minimal assistance(steadying support needing cues for safety)  Car Transfers: Not tested  Car Type: N/A Transfers  Transfer Type: Lateral pivot  Other: (stand step with RW)  Transfer Assistance : 5 (Stand-by assistance)  Sit to Stand Assistance: Stand-by assistance  Car Transfers: Supervision  Car Type: (car simulator)     Steps or Stairs  Steps/Stairs Ambulated (#): 0  Level of Assist : 0 (Not tested)  Rail Use: (NT) Steps or Stairs  Steps/Stairs Ambulated (#): 3(4\" steps)  Level of Assist : 4 (Minimal assistance)  Rail Use: Both         Lab/Data Review:  Recent Results (from the past 24 hour(s))   GLUCOSE, POC    Collection Time: 04/20/21  4:30 PM   Result Value Ref Range    Glucose (POC) 137 (H) 70 - 110 mg/dL   GLUCOSE, POC    Collection Time: 04/20/21  8:52 PM   Result Value Ref Range    Glucose (POC) 144 (H) 70 - 110 mg/dL   GLUCOSE, POC    Collection Time: 04/21/21  7:19 AM   Result Value Ref Range    Glucose (POC) 121 (H) 70 - 110 mg/dL   GLUCOSE, POC    Collection Time: 04/21/21 12:06 PM   Result Value Ref Range    Glucose (POC) 140 (H) 70 - 110 mg/dL       Assessment:     Primary Rehab Diagnosis  1. Impaired Mobility and ADLs  2. S/P Right above-the-knee amputation (4/11/2021  Dr. Ella Palacios)  3. S/P Exploration of right knee disarticulation and control of hemorrhage (4/8/2021  Dr. Daylin Liriano)  4. S/P Washout of right below-knee amputation; Right knee disarticulation (4/7/2021  Dr. Daylin Liriano)  5. History of infection and ischemia of right BKA stump (4/7/2021)  6. S/P Right below-the-knee amputation (3/22/2021 - Dr. Leopoldo Atkinson)  7. History of critical ischemia of the right lower extremity  8.  History of right lower extremity arterial embolectomy (3/5/2021 - Dr. Venessa Marrufo)    Comorbidities  Patient Active Problem List   Diagnosis Code    History of right below knee amputation ([facility] Z89.511    Impaired mobility and ADLs Z74.09, Z78.9    Critical ischemia of lower extremity ([facility] I70.229    Peripheral artery disease ([facility] I73.9    Coronary artery disease involving native coronary artery of native heart I25.10    Type 2 diabetes mellitus, without long-term current use of insulin (HCC) E11.9    History of epilepsy Z86.69    Factor V Leiden mutation ([facility] D68.51    Migraine headache G43.909    Wears glasses U37.3    Umbilical hernia M35.0    Obstructive sleep apnea G47.33    ICD (implantable cardioverter-defibrillator) in place Z95.810    Mitral valve prolapse I34.1    History of head injury Z87.828    Hypertensive heart disease with chronic systolic congestive heart failure (HCC) I11.0, I50.22    History of myocardial infarction I25.2    Long term current use of aspirin Z79.82    On statin therapy due to risk of future cardiovascular event Z79.899    History of deep venous thrombosis (DVT) of distal vein of right lower extremity Z86.718    Anticoagulated by anticoagulation treatment Z79.01    Mixed hyperlipidemia E78.2    History of embolectomy Z98.890    COVID-19 ruled out by laboratory testing Z20.822    Acute blood loss as cause of postoperative anemia D62    Cardiomyopathy ([facility] I42.9    Chronic systolic heart failure (HCC) I50.22    History of noncompliance with medical treatment Z91.19    Major depressive disorder F32.9    Grade I diastolic dysfunction W75.3    Constipation K59.00    Ischemia of right BKA site ([facility]) T87.89, I99.8    Infection of right below knee amputation ([facility]) T87.43    Status post above-knee amputation of right lower extremity ([facility] Z89.611        Plan:     1. Justification for continued stay: Good progression towards established rehabilitation goals.     2. Medical Issues being followed closely:    [x]  Fall and safety precautions     [x]  Wound Care     [x]  Bowel and Bladder Function     [x]  Fluid Electrolyte and Nutrition Balance     [x]  Pain Control      3. Issues that 24 hour rehabilitation nursing is following:    [x]  Fall and safety precautions     [x]  Wound Care     [x]  Bowel and Bladder Function     [x]  Fluid Electrolyte and Nutrition Balance     [x]  Pain Control      [x]  Assistance with and education on in-room safety with transfers to and from the bed, wheelchair, toilet and shower. 4. Acute rehabilitation plan of care:    [x]  Continue current care and rehab. [x]  Physical Therapy           [x]  Occupational Therapy           []  Speech Therapy     []  Hold Rehab until further notice     5. Medications:    [x]  MAR Reviewed     [x]  Continue Present Medications     6. DVT Prophylaxis:      []  Enoxaparin     []  Unfractionated Heparin     []  Warfarin     [x]  NOAC     []  NEREYDA Stockings     []  Sequential Compression Device     []  None     7. Code status    [x]  Full code     []  Partial code     []  Do not intubate     []  Do not resuscitate     8. Orders:   > S/P Right above-the-knee amputation (4/11/2021  Dr. Barbie Sandoval); S/P Exploration of right knee disarticulation and control of hemorrhage (4/8/2021  Dr. Porfirio Barker); S/P Washout of right below-knee amputation; Right knee disarticulation (4/7/2021  Dr. Porfirio Barker); History of infection and ischemia of right BKA stump (4/7/2021); S/P Right below-the-knee amputation (3/22/2021 - Dr. Gurvinder Baron); History of critical ischemia of the right lower extremity;  History of right lower extremity arterial embolectomy (3/5/2021 - Dr. Gurvinder Baron)   > Staples to be removed on 5/19/2021    > Acute Postoperative Blood Loss Anemia   > Hgb/Hct (3/26/2021) = 10.1/31.3   > Hgb/Hct (3/27/2021, on admission to ARU) = 10.9/33.9   > Anemia work-up showed serum iron 23, TIBC 200, iron % saturation 12, ferritin 835   > Hgb/Hct (3/29/2021) = 10.7/33.9       04/14/21  0423 04/13/21  0640 04/12/21  0539 04/11/21  1027 04/11/21  0650 04/10/21  1915 04/10/21  0640 04/09/21  0453 04/08/21  1602 04/08/21  0940 04/08/21  0210 04/07/21  0905 04/06/21  0850   HGB 7.2* 7.3* 7.3* 7.6* 7.9* 7.7* 6.3* 7.4* 6.5* 7.0* 7.8* 9.5* 10.8*   HCT 23.1* 23.2* 23.0*  --  24.4* 23.8* 20.0* 22.7* 21.5* 22.7* 25.4* 30.4* 34.2*      > Hgb/Hct (4/16/2021, on admission) = 6.6/21.1   > Anemia work-up (4/16/2021) showed serum iron 22, TIBC 202, iron % saturation 11, ferritin 545, reticulocyte count 5.1   > On 4/16/2021, patient was transfused 1 unit pRBC properly typed and crossmatched   > Hgb/Hct (4/17/2021) = 9.5/29.8    > On 4/17/2021, started:    > Ferrous gluconate 324 mg PO once daily with breakfast     > Ascorbic Acid 250 mg PO once daily with breakfast (to enhance the absorption of the FeSO4)    > Hgb/Hct (4/19/2021) = 8.9/28.9    > Continue:    > Ferrous gluconate 324 mg PO once daily with breakfast     > Ascorbic Acid 250 mg PO once daily with breakfast (to enhance the absorption of the FeSO4)     > Coronary artery disease; Peripheral artery disease    > On 3/27/2021, decreased Carvedilol from 6.25 mg to 3.125 mg PO BID with meals (8AM, 5PM)   > Continue:    > Aspirin 81 mg PO once daily with breakfast    > Atorvastatin 80 mg PO once daily    > Carvedilol 3.125 mg PO BID with meals (8AM, 5PM)    > Hypertensive heart disease with chronic systolic heart failure; Cardiomyopathy; Chronic systolic heart failure; Grade I diastolic dysfunction   > 2D echocardiogram (12/17/2015) showed EF 35%; genealized hypokinesis more focally severe of the inferior and posterior segments; mild mitral regurgitation; trace tricuspud regurgitation; normal pulmonary artery pressure   > 2D echocardiogram (3/24/2021) showed EF 37%; global hypokinesis of left ventricle; grade I diastolic dysfunction   > On 3/27/2021, decreased Carvedilol from 6.25 mg to 3.125 mg PO BID with meals (8AM, 5PM)   > On 3/28/2021, held:    > Furosemide 40 mg PO once daily (9AM)    > Lisinopril 5 mg PO once daily (9AM)   > Continue Carvedilol 3.125 mg PO BID with meals (8AM, 5PM)    > Factor V Leiden mutation; History of deep venous thrombosis (DVT) of right lower extremity; Anticoagulated on Rivaroxaban   > Continue Rivaroxaban 20 mg PO once daily with breakfast    > Major depressive disorder   > Continue Duloxetine 30 mg PO once daily    > Mixed hyperlipidemia   > Lipid profile (7/25/2018) showed , , HDL 27,    > Lipid profile (3/27/2021) showed , , HDL 35, LDL 63   > Continue:    > Atorvastatin 80 mg PO once daily    > Ezetimibe 10 mg PO q HS    > Type 2 diabetes mellitus, poorly controlled, without long-term current use of insulin   > HbA1c (3/6/2021) = 12.9    > HbA1c (4/8/2021) = 9.8   > On admission to the ARU:    > Started Metformin 500 mg PO BID with meals    > Decreased Insulin glargine from 8 units to 5 units SC q HS   > On 4/20/2021:    > Started Alogliptin 25 mg PO once daily    > Discontinued Insulin glargine 5 units SC q HS   > Discontinue Alogliptin 25 mg PO once daily   > Continue:    > Increase Metformin from 500 mg to 850 mg PO BID with meals    > Insulin lispro sliding scale SC TID AC only     > Difficulty sleeping   > Continue:    > Melatonin 5 mg PO q HS    > Zolpidem 5 mg PO q HS PRN for sleep    > Wound on lateral surface of distal third of left leg   > Wound Care Nurse consult for wound care recommendations    > COVID-19 ruled out by laboratory testing   > SARS-CoV-2 ([facility], [facility]) (3/22/2021): Not detected   > SARS-CoV-2 ([facility], [facility]) (collected 4/7/2021, resulted 4/8/2021): Not detected    > COVID-19 rapid test ([facility], [facility]) (4/7/2021): Not detected   > COVID-19 rapid test ([facility], [facility]) (4/14/2021): Not detected   > SARS-CoV-2 ([facility], [facility]) (collected 4/15//2021, resulted 4/16/2021):  Not detected    > Analgesia   > Continue:    > Acetaminophen 650 mg PO TID (8AM, 12PM, 4PM)    > Acetaminophen 650 mg PO q 4 hr PRN for pain level 4/10 or lesser (from 8PM to 4AM only)    > Duloxetine 30 mg PO once daily    > Gabapentin 100 mg PO TID    > Methocarbamol 500 mg PO TID    > Oxycodone 5-10 mg PO q 4 hr PRN for pain level 5/10 or greater     > Diet:   > Specifications: Cardiac, diabetic, consistent carb 1800 kcal   > Solids (consistency): Regular    > Liquids (consistency): Thin    > Fluid restriction: None      9. Personal Protective Equipment was used while interacting with patient including: goggles and [id number] face mask. Patient was using a surgical mask. 10. Patient's progress in rehabilitation and medical issues discussed with the patient. All questions answered to the best of my ability. Care plan discussed with patient and nurse. 11. Total clinical care time is 30 minutes, including review of chart including all labs, radiology, past medical history, and discussion with patient and family. Greater than 50% of my time was spent in coordination of care and counseling.       Signed:    Cecily Méndez MD    April 21, 2021

## 2021-04-21 NOTE — INTERDISCIPLINARY ROUNDS
[id number] 
[address] [place] [address], [address] [facility] SUMMARY Date of Conference: 4/21/2021 Patient Information:  
 
  
Name: Westley Curtis Age / Sex: 60 y.o. / male CSN: [id number] MRN: [id number] Admit Date: 4/15/2021 Length of Stay: 6 days Primary Rehab Diagnosis 1. Impaired Mobility and ADLs 2. S/P Right above-the-knee amputation (4/11/2021  Dr. Natalee Su) 3. S/P Exploration of right knee disarticulation and control of hemorrhage (4/8/2021  Dr. Jan Campos) 4. S/P Washout of right below-knee amputation; Right knee disarticulation (4/7/2021  Dr. Jan Campos) 5. History of infection and ischemia of right BKA stump (4/7/2021) 6. S/P Right below-the-knee amputation (3/22/2021 - Dr. Elaine Vargas) 7. History of critical ischemia of the right lower extremity 8. History of right lower extremity arterial embolectomy (3/5/2021 - Dr. Elaine Vargas) Comorbidities Patient Active Problem List  
Diagnosis Code  History of right below knee amputation ([facility] Z89.511  Impaired mobility and ADLs Z74.09, Z78.9  Critical ischemia of lower extremity (HCC) I70.229  Peripheral artery disease (HCC) I73.9  Coronary artery disease involving native coronary artery of native heart I25.10  Type 2 diabetes mellitus, without long-term current use of insulin ([facility]) E11.9  History of epilepsy Z80.75  
 Factor V Leiden mutation ([facility] D68.51  
 Migraine headache G43.909  Wears glasses Z97.3  Umbilical hernia T15.2  Obstructive sleep apnea G47.33  
 ICD (implantable cardioverter-defibrillator) in place Z95.810  
 Mitral valve prolapse I34.1  History of head injury Z87.828  
 Hypertensive heart disease with chronic systolic congestive heart failure (HCC) I11.0, I50.22  
 History of myocardial infarction I25.2  Long term current use of aspirin Z79.82  
 On statin therapy due to risk of future cardiovascular event Z79.899  
 History of deep venous thrombosis (DVT) of distal vein of right lower extremity Z86.718  Anticoagulated by anticoagulation treatment Z79.01  
 Mixed hyperlipidemia E78.2  History of embolectomy Z98.890  
 COVID-19 ruled out by laboratory testing Z20.822  Acute blood loss as cause of postoperative anemia D62  Cardiomyopathy ([facility] I42.9  Chronic systolic heart failure (HCC) I50.22  
 History of noncompliance with medical treatment Z91.19  
 Major depressive disorder F32.9  Grade I diastolic dysfunction B05.1  Constipation K59.00  
 Ischemia of right BKA site (HCC) T87.89, I99.8  Infection of right below knee amputation ([facility]) T87.43  Status post above-knee amputation of right lower extremity ([facility] Z89.611 Therapy: FIM SCORES Initial Assessment Weekly Progress Assessment 4/21/2021 Eating Functional Level: 6  6 Swallowing Grooming 6  Grooming Assistance: 6 (modified independent) Comments: seated w/c level at sink washing face and oral hygiene Bathing 5  Bathing assistance, Upper: 6 seated at w/c level at sink Adaptive equipment: Long handled sponge Bathing assistance, Lower: 5 Adaptive equipment: walker; other (comment) (w/c) Standing w/RW and seated w/c at sinkside Upper Body Dressing Functional Level: 6 Comments: T-shirt  Dressing assistance:6 Comments: (doff/don overhead shirt seated w/c level at sink) Lower Body Dressing Functional Level: 5 Items Applied/Steps Completed: Elastic waist pants (3 steps), Sock, left (1 step) Comments: SBA for safety  Dressing assistance: 5 Leg Crossed Method Used: No 
Position Performed: other (comment)(seated in w/c using compensatory tecnique, stance w/RW) Toileting Functional Level: 5  5 Bladder 0 0 Bowel  0 0 Toilet Transfer Cherry Toilet Transfer Score: 5 Comments: SBA for safety  5 SBA from w/c level using grab bar Tub/Shower Transfer Cherry Tub/Shower Transfer Score: 5 Comments: Supervision for safety. Transfer from w/c to TTB with gaitbelt and grab bars 5  w/c <-> TTB using grab bar Comprehension Primary Mode of Comprehension: Auditory Score: 6  6 Expression Primary Mode of Expression: Verbal 
Score: 6  6 Social Interaction Score: 6  6 Problem Solving Score: 6  6 Memory Score: 6  6 FIM SCORES Initial Assessment Weekly Progress Assessment 4/21/2021 Bed/Chair/Wheelchair Transfers Transfer Type: Lateral pivot Other: stand step/hop with RW Transfer Assistance : 4 (Minimal assistance)(steadying support for safety) Sit to Stand Assistance: Minimal assistance(steadying support needing cues for safety) Car Transfers: Not tested Car Type: N/A Transfer type: Lateral pivot Transfer Assistance: 5 Sit to Stand Assistance Standby assistance Bed Mobility Rolling Right 5 (Stand-by assistance) Rolling Left 5 (Stand-by assistance) Supine to Sit 5 (Stand-by assistance) Sit to Stand Minimal assistance(steadying support needing cues for safety) Sit to Supine 5 (Supervision) Rolling Right Mod I Rolling Left Mod I Supine to Sit Mod I Sit to Stand SBA with RW Sit to Supine Mod I Locomotion (W/C) Able to Propel (ft): (to be further assessed) Functional Level: (to be further assessed) Curbs/Ramps Assist Required (FIM Score): 0 (Not tested) Wheelchair Setup Assist Required : 0 (Not tested) Wheelchair Management: (NT) Function  Mod I even surface Setup Assistance: manages left and right brakes Locomotion (W/C distance)    300 ft Locomotion (Walk) 4 (Contact guard assistance)  CGA Locomotion (Walk dist.) 15 Feet (ft)  90 ft Steps/Stairs Steps/Stairs Ambulated (#): 0 Level of Assist : 0 (Not tested) Rail Use: (NT)  NT  
 
 
 
Nursing:  
 
Neuro:   AAA&O x    4 Respiratory:   [x] WNL   [] O2 LPM:  
Other: 
Peripheral Vascular:   [] TEDS present   [] Edema present ____ Grade Cardiac: [x] WNL   [] Other Genitourinary:   [x] continent   [] incontinent   [] nguyen  Abdominal __4/20/2021_____ LBM 
GI: ___cardiac/diabetic____ Diet __thin____ Liquids _____ tube feeds Musculoskeletal: _3x___ ROM Transfers __wheelchair___ Assistive Device Used 
_1x___ Level of Assistance Skin Integumentary:   [x] Intact   [] Not Intact   __________Preventative Measures Details______________________________________________________________ Pain: [x] Controlled   [] Not Controlled   Pain Meds:   [] Scheduled   [] PRN Registered Dietitian / Nutrition:  
No data found. Supplements:          [x] Yes   [] No     
Amount of supplement consumed:  
 
 
Intake/Output Summary (Last 24 hours) at 4/21/2021 7758 Last data filed at 4/21/2021 5861 Gross per 24 hour Intake  Output 1300 ml Net -1300 ml Last bowel movement:  
 
 
 
Interdisciplinary Team Goals: 1. Discipline  Physical Therapy Goal  Pt will perform standing transfers and ambulation with supervision Barrier  decreased strength and balance Intervention  LE strengthening, transfer training, gait training, and standing balance activities Goal written by:   Alissa Hooks, PT  
 
2. Discipline  Occupational Therapy Goal  increase strength and functional activity tolerance for ADL & IADL routines, functional transfers Barrier  R AKA pain, decreased strength and functional activity tolerance Intervention  ADL retraining, there ex, there act, NMR Goal written by:  Jayshree Muniz MS OTR/L   
 
3. Discipline  Speech Therapy Goal    
 Barrier Intervention Goal written by: 4. Discipline  Nursing Goal  Pt will achieve timely wound healing and be free of infection. Barrier  Diabetes; environmental exposures Intervention  Monitor VS; assess wound for s/s of infection;maintain appropriate wound dressing as ordered. Goal written by:  Brenda Pham RN  
 
5.   Discipline  Clinical Psychology Goal  Minimize subjective stress and distress secondary to prolonged hospitalization and forced dependency Barrier  Prolonged situational stressors with illness and recovery Intervention  Support  Goal written by:  Gerry Alonso, PhD  
 
6. Discipline  Nutrition / Dietetics Goal    
 Barrier Intervention Goal written by:    
 
 
Disposition / Discharge Planning: Follow-up services:  [x] Physical Therapy [x] Occupational Therapy     
 [] Speech Therapy         
 [] Skilled Nursing    
 [] Medical Social Worker 
 [] Aide        [] Outpatient      [] vs 
 [x] Home Health  [] vs 
     [] to progress to outpatient 
     [] with 24-hour supervision 
     [] with 24-hour assistance 
 [] Walter AGUAYO recommendations:  bedside commode Estimated discharge date: 4/29/2021 Discharge Location:  [x] Home  [] versus  
 [] Walter Healy [] [address] [] Other:   
  
 
 
Interdisciplinary team rounds were held this PM with the following team members:   
 
 Name Physical Therapist 
  Suhail Arnold PT, DPT Occupational Therapist 
  Mookie Li MS OTR/L Recreational Therapist 
  Henrry Ramirez, [address] Nursing Jordan Meléndez RN Physician 
  Evan Ortiz MD   
 Aiyana Byrd MSW Signed:  Evan Ortiz MD 
  April 21, 2021

## 2021-04-21 NOTE — PROGRESS NOTES
SHIFT CHANGE NOTE FOR [facility]    Bedside and Verbal shift change report given to [facility] Sue (oncoming nurse) by Daniel Fuller (offgoing nurse). Report included the following information SBAR, Kardex, MAR and Recent Results.     Situation:   Code Status: Full Code   Reason for Admission: right AKA  Hospital Day: 6   Problem List:   Hospital Problems  Date Reviewed: 4/20/2021          Codes Class Noted POA    * (Principal) Status post above-knee amputation of right lower extremity ([facility] ICD-10-CM: A04.973  ICD-9-CM: V49.76  4/11/2021 Yes    Overview Signed 4/15/2021  5:14 PM by Linette Florian MD     S/P Right above-the-knee amputation (4/11/2021  Dr. Kiarra Kazt)             Ischemia of right BKA site [facility]) ICD-10-CM: T87.89, I99.8  ICD-9-CM: 997.69, 459.9  4/7/2021 Yes        Infection of right below knee amputation [facility]) ICD-10-CM: T87.43  ICD-9-CM: 997.62  4/7/2021 Yes    Overview Signed 4/15/2021  5:17 PM by Linette Florian MD     S/P Washout of right below-knee amputation; Right knee disarticulation (4/7/2021  Dr. Gamaliel Patel); S/P Exploration of right knee disarticulation and control of hemorrhage (4/8/2021  Dr. Gamaliel Patel)             Peripheral artery disease ([facility] (Chronic) ICD-10-CM: I73.9  ICD-9-CM: 443.9  Unknown Yes        Type 2 diabetes mellitus, without long-term current use of insulin (HCC) (Chronic) ICD-10-CM: E11.9  ICD-9-CM: 250.00  Unknown Yes    Overview Addendum 4/15/2021  5:40 PM by Linette Florian MD     HbA1c (4/8/2021) = 9.8; HbA1c (3/6/2021) = 12.9             Factor V Leiden mutation ([facility] (Chronic) ICD-10-CM: J36.40  ICD-9-CM: 289.81  Unknown Yes        Hypertensive heart disease with chronic systolic congestive heart failure (HCC) (Chronic) ICD-10-CM: I11.0, I50.22  ICD-9-CM: 402.91, 428.22  Unknown Yes    Overview Signed 3/26/2021 11:06 PM by Linette Florian MD     2D echocardiogram (12/17/2015) showed EF 35%; genealized hypokinesis more focally severe of the inferior and posterior segments; mild mitral regurgitation; trace tricuspud regurgitation; normal pulmonary artery pressure             Anticoagulated by anticoagulation treatment (Chronic) ICD-10-CM: Z79.01  ICD-9-CM: V58.61  Unknown Yes    Overview Signed 3/26/2021  4:36 PM by Janae Newton MD     On Rivaroxaban             Mixed hyperlipidemia (Chronic) ICD-10-CM: E78.2  ICD-9-CM: 272.2  Unknown Yes    Overview Signed 3/26/2021 11:07 PM by Janae Newton MD     Lipid profile (7/25/2018) showed , , HDL 27,              Major depressive disorder ICD-10-CM: F32.9  ICD-9-CM: 296.20  3/24/2021 Yes    Overview Signed 3/26/2021 11:31 PM by Janae Newton MD     On Duloxetine             History of right below knee amputation [facility]) ICD-10-CM: Z89.511  ICD-9-CM: V49.75  3/22/2021 Yes    Overview Signed 3/26/2021  5:43 PM by Janae Newton MD     S/P Right below-the-knee amputation (3/22/2021 - Dr. Humaira Tavarez)             Impaired mobility and ADLs ICD-10-CM: Z74.09, Z78.9  ICD-9-CM: V49.89  3/22/2021 Yes        Critical ischemia of lower extremity ([facility] ICD-10-CM: Z49.951  ICD-9-CM: 459.9  3/22/2021 Yes    Overview Signed 3/26/2021  5:42 PM by Janae Newton MD     Right             Acute blood loss as cause of postoperative anemia ICD-10-CM: D62  ICD-9-CM: 285.1  3/22/2021 Yes        History of embolectomy ICD-10-CM: Z98.890  ICD-9-CM: V45.89  3/5/2021 Yes    Overview Signed 3/26/2021  5:39 PM by Janae Newton MD     S/P Right lower extremity arterial embolectomy                   Background:   Past Medical History:   Past Medical History:   Diagnosis Date    Acute blood loss as cause of postoperative anemia 3/22/2021    Anticoagulated by anticoagulation treatment     On Rivaroxaban    Cardiomyopathy ([facility] 12/17/2015    2D echocardiogram (3/24/2021) showed EF 37%; global hypokinesis of left ventricle; grade I diastolic dysfunction; 2D echocardiogram (12/17/2015) showed EF 35%; generalized hypokinesis more focally severe of the inferior and posterior segments; mild mitral regurgitation; trace tricuspud regurgitation; normal pulmonary artery pressure    Chronic systolic heart failure (HCC)     2D echocardiogram (3/24/2021) showed EF 37%; global hypokinesis of left ventricle; grade I diastolic dysfunction; 2D echocardiogram (12/17/2015) showed EF 35%; generalized hypokinesis more focally severe of the inferior and posterior segments; mild mitral regurgitation; trace tricuspud regurgitation; normal pulmonary artery pressure    Coronary artery disease involving native coronary artery of native heart     COVID-19 ruled out by laboratory testing 3/22/2021    SARS-CoV-2 ([employer] [facility]) (3/22/2021):  Not detected     Critical ischemia of lower extremity ([facility] 3/22/2021    Right    Factor V Leiden mutation ([facility]     Grade I diastolic dysfunction 88/17/9657    2D echocardiogram (3/24/2021) showed EF 37%; global hypokinesis of left ventricle; grade I diastolic dysfunction    History of deep venous thrombosis (DVT) of distal vein of right lower extremity     History of epilepsy     History of head injury     History of myocardial infarction     History of noncompliance with medical treatment 3/18/2021    Hypertensive heart disease with chronic systolic congestive heart failure ([facility]     2D echocardiogram (3/24/2021) showed EF 37%; global hypokinesis of left ventricle; grade I diastolic dysfunction; 2D echocardiogram (12/17/2015) showed EF 35%; genealized hypokinesis more focally severe of the inferior and posterior segments; mild mitral regurgitation; trace tricuspud regurgitation; normal pulmonary artery pressure    Infection of right below knee amputation ([facility] 4/7/2021    S/P Washout of right below-knee amputation; Right knee disarticulation (4/7/2021  Dr. Jed George); S/P Exploration of right knee disarticulation and control of hemorrhage (4/8/2021  Dr. Jed George)    Long term current use of aspirin     Major depressive disorder 03/24/2021    On Duloxetine    Migraine headache     Mitral valve prolapse     Mixed hyperlipidemia     Lipid profile (7/25/2018) showed , , HDL 27,     Obstructive sleep apnea     On statin therapy due to risk of future cardiovascular event     On Atorvastatin    Peripheral artery disease ([facility]     Type 2 diabetes mellitus, without long-term current use of insulin ([facility])     HbA1c (4/8/2021) = 9.8; HbA1c (3/6/2021) = 88.0    Umbilical hernia     Wears glasses       Patient taking anticoagulants yes    Patient has a defibrillator: no     Assessment:   Changes in Assessment throughout shift: no acute changes noted throughout the shift     Patient has central line: no Reasons if yes: n/a  Insertion date:n/a Last dressing date:n/a   Patient has Ellis Cath: no Reasons if yes: n/a   Insertion date:n/a     Last Vitals:     Vitals:    04/19/21 2048 04/20/21 0734 04/20/21 1536 04/20/21 2028   BP: 109/70 116/70 101/68 122/77   Pulse: 84 99 94 91   Resp: 20 20 20 20   Temp: 98.4 °F (36.9 °C) 98.1 °F (36.7 °C) 98.6 °F (37 °C) 97.9 °F (36.6 °C)   SpO2: 99% 98% 99% 100%   Height:            PAIN    Pain Assessment    Pain Intensity 1: 0 (04/21/21 0454) Pain Intensity 1: 2 (12/29/14 1105)    Pain Location 1: Leg Pain Location 1: Abdomen    Pain Intervention(s) 1: Medication (see MAR) Pain Intervention(s) 1: Medication (see MAR)  Patient Stated Pain Goal: 0 Patient Stated Pain Goal: 0  o Intervention effective: yes    o Other actions taken for pain: repositioning      Skin Assessment  Skin color Skin Color: Appropriate for ethnicity  Condition/Temperature Skin Condition/Temp: Warm  Integrity Skin Integrity: Incision (comment), Other (comment)(Healing venous ulcer lle)  Turgor Turgor: Non-tenting  Weekly Pressure Ulcer Documentation  Pressure  Injury Documentation: Pressure Injury Noted-See Wound LDA to Document  Wound Prevention & Protection Methods  Orientation of wound Orientation of Wound Prevention: Posterior  Location of Prevention Location of Wound Prevention: Sacrum/Coccyx  Dressing Present Dressing Present : No  Dressing Status Dressing Status: Intact  Wound Offloading Wound Offloading (Prevention Methods): Bed, pressure redistribution/air, Bed, pressure reduction mattress     INTAKE/OUPUT  Date 04/20/21 0700 - 04/21/21 0659 04/21/21 0700 - 04/22/21 0659   Shift 0700-1859 1900-0659 24 Hour Total 0700-1859 1900-0659 24 Hour Total   INTAKE   Shift Total         OUTPUT   Urine  1300 1300        Urine Voided  1300 1300        Urine Occurrence(s) 4 x 2 x 6 x      Stool           Stool Occurrence(s) 1 x 0 x 1 x      Shift Total  1300 1300      NET  -1300 -1300      Weight (kg)             Recommendations:  1. Patient needs and requests: education     2. Diet: Cardiac consistent carb diet with thin liquids    3. Pending tests/procedures: AM labs     4. Functional Level/Equipment: 1 person assist     5. Estimated Discharge Date: TBD Posted on Whiteboard in Patients Room: [facility] Safety Check    A safety check occurred in the patient's room between off going nurse and oncoming nurse listed above. The safety check included the below items  Area Items   H  High Alert Medications - Verify all high alert medication drips (heparin, PCA, etc.)   E  Equipment - Suction is set up for ALL patients (with yanker)  - Red plugs utilized for all equipment (IV pumps, etc.)  - WOWs wiped down at end of shift.  - Room stocked with oxygen, suction, and other unit-specific supplies   A  Alarms - Bed alarm is set for fall risk patients  - Ensure chair alarm is in place and activated if patient is up in a chair   L  Lines - Check IV for any infiltration  - Ellis bag is empty if patient has a Ellis   - Tubing and IV bags are labeled   S  Safety   - Room is clean, patient is clean, and equipment is clean. - Hallways are clear from equipment besides carts.    - Fall bracelet on for fall risk patients  - Ensure room is clear and free of clutter  - Suction is set up for ALL patients (with faraz)  - Hallways are clear from equipment besides carts.    - Isolation precautions followed, supplies available outside room, sign posted         Cleatrice Mourning

## 2021-04-21 NOTE — PROGRESS NOTES
DANIELLE reviewed chart. SW spoke with pt. Dcp was discussed Pt. verbalized he would like to have Mildred for his [address] [facility]. DANIELLE will coordinate with liaison on referral. Florentino Mendiola will continue to follow.     Betty PATRICK, LCSW, [facility]  Doctoral Candidate, Doctor of Social Work

## 2021-04-21 NOTE — PROGRESS NOTES
assisted  patient with the completion of an advance directive form at his request. Document was copied nd charted in like chart. Copies were given to patient for distribution to agents at a later date.     Jaqueline Gaspar   Spiritual Care   [phone]

## 2021-04-21 NOTE — PROGRESS NOTES
Problem: Self Care Deficits Care Plan (Adult)  Goal: *Acute Goals and Plan of Care (Insert Text)  Description: Occupational Therapy Goals   Long Term Goals  Initiated 2021 and to be accomplished within 2 week(s)  1. Pt will perform self-feeding with independence. 2. Pt will perform grooming with mod I in standing at the sink. 3. Pt will perform UB bathing with mod I with set up of necessary items. 4. Pt will perform LB bathing with mod I with set up of necessary items. 5. Pt will perform tub/shower transfer with mod I.   6. Pt will perform UB dressing with mod I with set up of necessary items. 7. Pt will perform LB dressing with mod I with set up of necessary items. 8. Pt will perform toileting task with mod I.  9. Pt will perform toilet transfer with mod I with RW. Short Term Goals   Initiated 2021 and to be accomplished within 7 day(s)  1. Pt will perform self-feeding with mod I and no set-up A.   2. Pt will perform grooming with mod I.  3. Pt will perform UB bathing with mod I.  4. Pt will perform LB bathing with mod I.  5. Pt will perform tub/shower transfer with mod I.  6. Pt will perform UB dressing with mod I.  7. Pt will perform LB dressing with mod I.  8. Pt will perform toileting task with mod I.  9. Pt will perform toilet transfer with mod I with w/c as needed. Outcome: Progressing Towards Goal    Occupational Therapy TREATMENT    Patient: Veronda Holter   60 y.o. Patient identified with name and : Yes    Date: 2021    First Tx Session  Time In: 1034  Time Out[de-identified] 7248    Diagnosis: Status post above-knee amputation of right lower extremity ([facility] [Z89.611]   Precautions: Fall(right AKA)  Chart, occupational therapy assessment, plan of care, and goals were reviewed. Pain:  Pt reports -/10 pain or discomfort prior to treatment. Pt reports -/10 pain or discomfort post treatment.    Intervention Provided: NA      SUBJECTIVE:   Patient stated  I am feeling less terrified than previously about going home, but I am still nervous because I am going home alone.     OBJECTIVE DATA SUMMARY:     THERAPEUTIC ACTIVITY Daily Assessment    Kitchen scavenger hunt - pt to find cones in varied heights/locations of cabinets, freezer, and on countertops with 1lb weights on b/l hands to simulate picking up heavier items. Pt able to find 20/20 cones with mod I. Pulling to stand for higher cones with supervision for safety. 1x LOB d/t gait belt falling and attempting to prevent from falling. Clothespin Dowel-nataliia tree - pt tolerated placing varied resistive clothes pins on dowel nataliia tree at varied heights - pt completed in standing x2 within 2 mins, but requiring rest breaks between putting on and taking off. Pt stated tired and requested rest between tasks. 1lb wt on wrists for inc resistance for both trials. THERAPEUTIC EXERCISE Daily Assessment    Pt propelled w/c around courtyard and back to room for UE strengthening with 1lb wt on b/l arms. IADL Daily Assessment    Cooking task - while seated in w/c, pt completed microwave meal task with mod I (min supervision for safety when standing). Pt also required to boil water to simulate using hot stove/equipment. Pt able to follow directions and access equipment in cabinets safety. Educated pt on transferring hot equipment on towel and pulling across counter rather than lifting for energy conservation/safety technique. NMRE Daily Assessment         FEEDING/EATING Daily Assessment          GROOMING Daily Assessment          UPPER BODY BATHING Daily Assessment          LOWER BODY BATHING Daily Assessment          TOILETING Daily Assessment          UPPER BODY DRESSING Daily Assessment          LOWER BODY DRESSING Daily Assessment          MOBILITY/TRANSFERS Daily Assessment            ASSESSMENT:  Pt continues to make steady progress towards goal attainment.  Pt completed IADL kitchen task to help improve confidence, balance, activity tolerance, and home kitchen navigation skills piror to d/c home. Pt utilized w/c in kitchen d/t ability to use at home as well. Pt continues to demonstrate need for improved activity tolerance in standing and UE strengthening. Overall, good improvements and will continue to benefit from skilled OT for further gains toward goal attainment. Progression toward goals:  [x]          Improving appropriately and progressing toward goals  []          Improving slowly and progressing toward goals  []          Not making progress toward goals and plan of care will be adjusted     PLAN:  Patient continues to benefit from skilled intervention to address the above impairments. Continue treatment per established plan of care. Discharge Recommendations:  Home Health  Further Equipment Recommendations for Discharge:  3in1 bobo, anthony springer     Activity Tolerance:  Fair +    Estimated LOS: 4/29/2021    Please refer to the flowsheet for vital signs taken during this treatment. After treatment:   [x]  Patient left in no apparent distress sitting up in chair   []  Patient left in no apparent distress in bed  [x]  Call bell left within reach  []  Nursing notified  []  Caregiver present  [x]  Chair alarm activated    COMMUNICATION/EDUCATION:   [x] Home safety education was provided and the patient/caregiver indicated understanding. [x] Patient/family have participated as able in goal setting and plan of care. [x] Patient/family agree to work toward stated goals and plan of care. [] Patient understands intent and goals of therapy, but is neutral about his/her participation. [] Patient is unable to participate in goal setting and plan of care.       Jackie Gibson, OTD, OTR/L

## 2021-04-22 LAB
GLUCOSE BLD STRIP.AUTO-MCNC: 103 MG/DL (ref 70–110)
GLUCOSE BLD STRIP.AUTO-MCNC: 118 MG/DL (ref 70–110)
GLUCOSE BLD STRIP.AUTO-MCNC: 134 MG/DL (ref 70–110)
GLUCOSE BLD STRIP.AUTO-MCNC: 137 MG/DL (ref 70–110)
HCT VFR BLD AUTO: 28.5 % (ref 36–48)
HGB BLD-MCNC: 8.9 G/DL (ref 13–16)

## 2021-04-22 PROCEDURE — 97535 SELF CARE MNGMENT TRAINING: CPT

## 2021-04-22 PROCEDURE — 97110 THERAPEUTIC EXERCISES: CPT

## 2021-04-22 PROCEDURE — 97116 GAIT TRAINING THERAPY: CPT

## 2021-04-22 PROCEDURE — 85018 HEMOGLOBIN: CPT

## 2021-04-22 PROCEDURE — 36415 COLL VENOUS BLD VENIPUNCTURE: CPT

## 2021-04-22 PROCEDURE — 65310000000 HC RM PRIVATE REHAB

## 2021-04-22 PROCEDURE — 82962 GLUCOSE BLOOD TEST: CPT

## 2021-04-22 PROCEDURE — 99232 SBSQ HOSP IP/OBS MODERATE 35: CPT | Performed by: INTERNAL MEDICINE

## 2021-04-22 PROCEDURE — 2709999900 HC NON-CHARGEABLE SUPPLY

## 2021-04-22 PROCEDURE — 74011250637 HC RX REV CODE- 250/637: Performed by: INTERNAL MEDICINE

## 2021-04-22 PROCEDURE — 97530 THERAPEUTIC ACTIVITIES: CPT

## 2021-04-22 RX ADMIN — OXYCODONE HYDROCHLORIDE 10 MG: 5 TABLET ORAL at 17:09

## 2021-04-22 RX ADMIN — METHOCARBAMOL 500 MG: 500 TABLET ORAL at 14:55

## 2021-04-22 RX ADMIN — Medication 250 MG: at 07:56

## 2021-04-22 RX ADMIN — ATORVASTATIN CALCIUM 80 MG: 40 TABLET, FILM COATED ORAL at 07:57

## 2021-04-22 RX ADMIN — EZETIMIBE 10 MG: 10 TABLET ORAL at 07:58

## 2021-04-22 RX ADMIN — ACETAMINOPHEN 650 MG: 325 TABLET, FILM COATED ORAL at 12:26

## 2021-04-22 RX ADMIN — GABAPENTIN 100 MG: 100 CAPSULE ORAL at 14:55

## 2021-04-22 RX ADMIN — DOCUSATE SODIUM 100 MG: 100 CAPSULE, LIQUID FILLED ORAL at 07:58

## 2021-04-22 RX ADMIN — GABAPENTIN 100 MG: 100 CAPSULE ORAL at 19:38

## 2021-04-22 RX ADMIN — METFORMIN HYDROCHLORIDE 850 MG: 850 TABLET ORAL at 07:56

## 2021-04-22 RX ADMIN — RIVAROXABAN 20 MG: 20 TABLET, FILM COATED ORAL at 07:59

## 2021-04-22 RX ADMIN — ACETAMINOPHEN 650 MG: 325 TABLET, FILM COATED ORAL at 17:06

## 2021-04-22 RX ADMIN — CARVEDILOL 3.12 MG: 6.25 TABLET, FILM COATED ORAL at 17:06

## 2021-04-22 RX ADMIN — OXYCODONE HYDROCHLORIDE 10 MG: 5 TABLET ORAL at 12:26

## 2021-04-22 RX ADMIN — Medication 1 TABLET: at 07:56

## 2021-04-22 RX ADMIN — ACETAMINOPHEN 650 MG: 325 TABLET, FILM COATED ORAL at 07:55

## 2021-04-22 RX ADMIN — GABAPENTIN 100 MG: 100 CAPSULE ORAL at 07:56

## 2021-04-22 RX ADMIN — METHOCARBAMOL 500 MG: 500 TABLET ORAL at 07:56

## 2021-04-22 RX ADMIN — OXYCODONE HYDROCHLORIDE 10 MG: 5 TABLET ORAL at 07:52

## 2021-04-22 RX ADMIN — METFORMIN HYDROCHLORIDE 850 MG: 850 TABLET ORAL at 17:09

## 2021-04-22 RX ADMIN — ZOLPIDEM TARTRATE 5 MG: 5 TABLET ORAL at 21:19

## 2021-04-22 RX ADMIN — DULOXETINE HYDROCHLORIDE 30 MG: 30 CAPSULE, DELAYED RELEASE ORAL at 07:58

## 2021-04-22 RX ADMIN — ASPIRIN 81 MG CHEWABLE TABLET 81 MG: 81 TABLET CHEWABLE at 07:55

## 2021-04-22 RX ADMIN — Medication 5 MG: at 21:17

## 2021-04-22 RX ADMIN — METHOCARBAMOL 500 MG: 500 TABLET ORAL at 19:38

## 2021-04-22 NOTE — PROGRESS NOTES
[address]  [address] [place] [address], [address]     INPATIENT REHABILITATION  DAILY PROGRESS NOTE     Date: 4/22/2021    Name: Tierney Casanova Age / Sex: 60 y.o. / male   CSN: [id number] MRN: [id number]   [facility] Date: 4/15/2021 Length of Stay: 7 days     Primary Rehab Diagnosis: Impaired Mobility and ADLs secondary to:  1. S/P Right above-the-knee amputation (4/11/2021  Dr. Gordon Billy)  2. S/P Exploration of right knee disarticulation and control of hemorrhage (4/8/2021  Dr. Gatito Bojorquez)  3. S/P Washout of right below-knee amputation; Right knee disarticulation (4/7/2021  Dr. Gatito Bojorquez)  4. History of infection and ischemia of right BKA stump (4/7/2021)  5. S/P Right below-the-knee amputation (3/22/2021 - Dr. Claribel Claudio)  6. History of critical ischemia of the right lower extremity  7. History of right lower extremity arterial embolectomy (3/5/2021 - Dr. Claribel Claudio)      Subjective:     Patient seen and examined. Blood pressure controlled. Blood glucose controlled. Team conference was held at bedside this PM.     Patient's Complaint:   No significant medical complaints    Pain Control: stable, mild-to-moderate joint symptoms intermittently, reasonably well controlled by current meds      Objective:     Vital Signs:  Patient Vitals for the past 24 hrs:   BP Temp Pulse Resp SpO2   04/22/21 0757 105/76 97.8 °F (36.6 °C) 96 18 100 %   04/21/21 2037 114/72 98 °F (36.7 °C) 87 18 100 %   04/21/21 1722 116/81 97.6 °F (36.4 °C) 93 18 98 %        Physical Examination:  GENERAL SURVEY: Patient is awake, alert, oriented x 3, laying comfortably on the bed, not in acute respiratory distress.   HEENT: pale palpebral conjunctivae, anicteric sclerae, no nasoaural discharge, moist oral mucosa  NECK: supple, no jugular venous distention, no palpable lymph nodes  CHEST/LUNGS: symmetrical chest expansion, good air entry, clear breath sounds  HEART: adynamic precordium, good S1 S2, no S3, regular rhythm, no murmurs  ABDOMEN: flat, bowel sounds appreciated, soft, non-tender  EXTREMITIES: (+) right AKA stump, (+) absence of hair on LLE, (+) healing wound with scab on lateral surface of distal third of left leg with surrounding hyperpigmentation, (+) LLE cool to touch, pale nailbeds, no edema, full and equal pulses, no calf tenderness   NEUROLOGICAL EXAM: The patient is awake, alert and oriented x3, able to answer questions fairly appropriately, able to follow 1 and 2 step commands. Able to tell time from the wall clock. Cranial nerves II-XII are grossly intact. No gross sensory deficit.   Motor strength is 4+/5 on BUE, 4/5 on the RLE, 4 to 4+/5 on the LLE.     Incision(s): covered, clean, dry, and intact       Current Medications:  Current Facility-Administered Medications   Medication Dose Route Frequency    metFORMIN (GLUCOPHAGE) tablet 850 mg  850 mg Oral BID WITH MEALS    methocarbamoL (ROBAXIN) tablet 500 mg  500 mg Oral TID    0.9% sodium chloride infusion 250 mL  250 mL IntraVENous PRN    diphenhydrAMINE (BENADRYL) capsule 25 mg  25 mg Oral Q6H PRN    ferrous gluconate 324 mg (37.5 mg iron) tablet 1 Tab  324 mg Oral DAILY WITH BREAKFAST    ascorbic acid (vitamin C) (VITAMIN C) tablet 250 mg  250 mg Oral DAILY WITH BREAKFAST    acetaminophen (TYLENOL) tablet 650 mg  650 mg Oral Q4H PRN    bisacodyL (DULCOLAX) tablet 10 mg  10 mg Oral Q48H PRN    insulin lispro (HUMALOG) injection   SubCUTAneous TIDAC    carvediloL (COREG) tablet 3.125 mg  3.125 mg Oral BID WITH MEALS    gabapentin (NEURONTIN) capsule 100 mg  100 mg Oral TID    aspirin chewable tablet 81 mg  81 mg Oral DAILY WITH BREAKFAST    atorvastatin (LIPITOR) tablet 80 mg  80 mg Oral DAILY    docusate sodium (COLACE) capsule 100 mg  100 mg Oral DAILY AFTER BREAKFAST    DULoxetine (CYMBALTA) capsule 30 mg  30 mg Oral DAILY    ezetimibe (ZETIA) tablet 10 mg  10 mg Oral DAILY    acetaminophen (TYLENOL) tablet 650 mg  650 mg Oral TID    oxyCODONE IR (ROXICODONE) tablet 5-10 mg  5-10 mg Oral Q4H PRN    rivaroxaban (XARELTO) tablet 20 mg  20 mg Oral DAILY    melatonin (rapid dissolve) tablet 5 mg  5 mg Oral QHS    zolpidem (AMBIEN) tablet 5 mg  5 mg Oral QHS PRN       Allergies:   Allergies   Allergen Reactions    Nitroglycerin Other (comments)      BP drops rapidly when he takes SL Nitro          Gilbert Diarrhea and Rash    Cat Dander Cough    Codeine Rash    Strawberry Rash       Functional Progress:    OCCUPATIONAL THERAPY    ON ADMISSION MOST RECENT   Eating  Functional Level: 6   Eating  Functional Level: 5     Grooming  Functional Level: 6   Grooming  Functional Level: 6     Bathing  Functional Level: 5   Bathing  Functional Level: 5     Upper Body Dressing  Functional Level: 6   Upper Body Dressing  Functional Level: 6     Lower Body Dressing  Functional Level: 5   Lower Body Dressing  Functional Level: 5     Toileting  Functional Level: 5   Toileting  Functional Level: 5     Toilet Transfers  Toilet Transfer Score: 5   Toilet Transfers  Toilet Transfer Score: 5     Tub /Shower Transfers  Tub/Shower Transfer Score: 5   Tub/Shower Transfers  Tub/Shower Transfer Score: 5       Legend:   7 - Independent   6 - Modified Independent   5 - Standby Assistance / Supervision / Set-up   4 - Minimum Assistance / Contact Guard Assistance   3 - Moderate Assistance   2 - Maximum Assistance   1 - Total Assistance / Dependent         Lab/Data Review:  Recent Results (from the past 24 hour(s))   GLUCOSE, POC    Collection Time: 04/21/21  4:55 PM   Result Value Ref Range    Glucose (POC) 137 (H) 70 - 110 mg/dL   GLUCOSE, POC    Collection Time: 04/21/21  9:00 PM   Result Value Ref Range    Glucose (POC) 140 (H) 70 - 110 mg/dL   HGB & HCT    Collection Time: 04/22/21  5:05 AM   Result Value Ref Range    HGB 8.9 (L) 13.0 - 16.0 g/dL    HCT 28.5 (L) 36.0 - 48.0 %   GLUCOSE, POC    Collection Time: 04/22/21  8:03 AM   Result Value Ref Range    Glucose (POC) 103 70 - 110 mg/dL   GLUCOSE, POC    Collection Time: 04/22/21 11:55 AM   Result Value Ref Range    Glucose (POC) 118 (H) 70 - 110 mg/dL       Assessment:     Primary Rehab Diagnosis  1. Impaired Mobility and ADLs  2. S/P Right above-the-knee amputation (4/11/2021  Dr. Christy Mcduffie)  3. S/P Exploration of right knee disarticulation and control of hemorrhage (4/8/2021  Dr. Prudence Vázquez)  4. S/P Washout of right below-knee amputation; Right knee disarticulation (4/7/2021  Dr. Prudence Vázquez)  5. History of infection and ischemia of right BKA stump (4/7/2021)  6. S/P Right below-the-knee amputation (3/22/2021 - Dr. Solo Mark)  7. History of critical ischemia of the right lower extremity  8.  History of right lower extremity arterial embolectomy (3/5/2021 - Dr. Solo Mark)    Comorbidities  Patient Active Problem List   Diagnosis Code    History of right below knee amputation ([facility] Z89.511    Impaired mobility and ADLs Z74.09, Z78.9    Critical ischemia of lower extremity ([facility] I70.229    Peripheral artery disease ([facility] I73.9    Coronary artery disease involving native coronary artery of native heart I25.10    Type 2 diabetes mellitus, without long-term current use of insulin (HCC) E11.9    History of epilepsy Z86.69    Factor V Leiden mutation ([facility] D68.51    Migraine headache G43.909    Wears glasses X49.4    Umbilical hernia Z42.2    Obstructive sleep apnea G47.33    ICD (implantable cardioverter-defibrillator) in place Z95.810    Mitral valve prolapse I34.1    History of head injury Z87.828    Hypertensive heart disease with chronic systolic congestive heart failure (HCC) I11.0, I50.22    History of myocardial infarction I25.2    Long term current use of aspirin Z79.82    On statin therapy due to risk of future cardiovascular event Z79.899    History of deep venous thrombosis (DVT) of distal vein of right lower extremity Z86.718    Anticoagulated by anticoagulation treatment Z79.01    Mixed hyperlipidemia E78.2    History of embolectomy Z98.890    COVID-19 ruled out by laboratory testing Z20.822    Acute blood loss as cause of postoperative anemia D62    Cardiomyopathy ([facility] I42.9    Chronic systolic heart failure (HCC) I50.22    History of noncompliance with medical treatment Z91.19    Major depressive disorder F32.9    Grade I diastolic dysfunction F20.1    Constipation K59.00    Ischemia of right BKA site (HCC) T87.89, I99.8    Infection of right below knee amputation (HCC) T87.43    Status post above-knee amputation of right lower extremity ([facility] [address] Z89.611        Plan:     1. Justification for continued stay: Good progression towards established rehabilitation goals. 2. Medical Issues being followed closely:    [x]  Fall and safety precautions     [x]  Wound Care     [x]  Bowel and Bladder Function     [x]  Fluid Electrolyte and Nutrition Balance     [x]  Pain Control      3. Issues that 24 hour rehabilitation nursing is following:    [x]  Fall and safety precautions     [x]  Wound Care     [x]  Bowel and Bladder Function     [x]  Fluid Electrolyte and Nutrition Balance     [x]  Pain Control      [x]  Assistance with and education on in-room safety with transfers to and from the bed, wheelchair, toilet and shower. 4. Acute rehabilitation plan of care:    [x]  Continue current care and rehab. [x]  Physical Therapy           [x]  Occupational Therapy           []  Speech Therapy     []  Hold Rehab until further notice     5. Medications:    [x]  MAR Reviewed     [x]  Continue Present Medications     6. DVT Prophylaxis:      []  Enoxaparin     []  Unfractionated Heparin     []  Warfarin     [x]  NOAC     []  NEREYDA Stockings     []  Sequential Compression Device     []  None     7. Code status    [x]  Full code     []  Partial code     []  Do not intubate     []  Do not resuscitate     8.  Orders:   > S/P Right above-the-knee amputation (4/11/2021  Dr. Christy Mcduffie); S/P Exploration of right knee disarticulation and control of hemorrhage (4/8/2021  Dr. Prudence Vázquez); S/P Washout of right below-knee amputation; Right knee disarticulation (4/7/2021  Dr. Prudence Vázquez); History of infection and ischemia of right BKA stump (4/7/2021); S/P Right below-the-knee amputation (3/22/2021 - Dr. Solo Mark); History of critical ischemia of the right lower extremity;  History of right lower extremity arterial embolectomy (3/5/2021 - Dr. Solo Mark)   > Staples to be removed on 5/19/2021    > Acute Postoperative Blood Loss Anemia   > Hgb/Hct (3/26/2021) = 10.1/31.3   > Hgb/Hct (3/27/2021, on admission to ARU) = 10.9/33.9   > Anemia work-up showed serum iron 23, TIBC 200, iron % saturation 12, ferritin 835   > Hgb/Hct (3/29/2021) = 10.7/33.9       04/14/21  0423 04/13/21  0640 04/12/21  0539 04/11/21  1027 04/11/21  0650 04/10/21  1915 04/10/21  0640 04/09/21  0453 04/08/21  1602 04/08/21  0940 04/08/21  0210 04/07/21  0905 04/06/21  0850   HGB 7.2* 7.3* 7.3* 7.6* 7.9* 7.7* 6.3* 7.4* 6.5* 7.0* 7.8* 9.5* 10.8*   HCT 23.1* 23.2* 23.0*  --  24.4* 23.8* 20.0* 22.7* 21.5* 22.7* 25.4* 30.4* 34.2*      > Hgb/Hct (4/16/2021, on admission) = 6.6/21.1   > Anemia work-up (4/16/2021) showed serum iron 22, TIBC 202, iron % saturation 11, ferritin 545, reticulocyte count 5.1   > On 4/16/2021, patient was transfused 1 unit pRBC properly typed and crossmatched   > Hgb/Hct (4/17/2021) = 9.5/29.8    > On 4/17/2021, started:    > Ferrous gluconate 324 mg PO once daily with breakfast     > Ascorbic Acid 250 mg PO once daily with breakfast (to enhance the absorption of the FeSO4)    > Hgb/Hct (4/19/2021) = 8.9/28.9    > Hgb/Hct (4/22/2021) = 8.9/28.5    > Continue:    > Ferrous gluconate 324 mg PO once daily with breakfast     > Ascorbic Acid 250 mg PO once daily with breakfast (to enhance the absorption of the FeSO4)     > Coronary artery disease; Peripheral artery disease    > On 3/27/2021, decreased Carvedilol from 6.25 mg to 3.125 mg PO BID with meals (8AM, 5PM)   > Continue:    > Aspirin 81 mg PO once daily with breakfast    > Atorvastatin 80 mg PO once daily    > Carvedilol 3.125 mg PO BID with meals (8AM, 5PM)    > Hypertensive heart disease with chronic systolic heart failure; Cardiomyopathy; Chronic systolic heart failure; Grade I diastolic dysfunction   > 2D echocardiogram (12/17/2015) showed EF 35%; genealized hypokinesis more focally severe of the inferior and posterior segments; mild mitral regurgitation; trace tricuspud regurgitation; normal pulmonary artery pressure   > 2D echocardiogram (3/24/2021) showed EF 37%; global hypokinesis of left ventricle; grade I diastolic dysfunction   > On 3/27/2021, decreased Carvedilol from 6.25 mg to 3.125 mg PO BID with meals (8AM, 5PM)   > On 3/28/2021, held:    > Furosemide 40 mg PO once daily (9AM)    > Lisinopril 5 mg PO once daily (9AM)   > Continue Carvedilol 3.125 mg PO BID with meals (8AM, 5PM)    > Factor V Leiden mutation; History of deep venous thrombosis (DVT) of right lower extremity;  Anticoagulated on Rivaroxaban   > Continue Rivaroxaban 20 mg PO once daily with breakfast    > Major depressive disorder   > Continue Duloxetine 30 mg PO once daily    > Mixed hyperlipidemia   > Lipid profile (7/25/2018) showed , , HDL 27,    > Lipid profile (3/27/2021) showed , , HDL 35, LDL 63   > Continue:    > Atorvastatin 80 mg PO once daily    > Ezetimibe 10 mg PO q HS    > Type 2 diabetes mellitus, poorly controlled, without long-term current use of insulin   > HbA1c (3/6/2021) = 12.9    > HbA1c (4/8/2021) = 9.8   > On admission to the ARU:    > Started Metformin 500 mg PO BID with meals    > Decreased Insulin glargine from 8 units to 5 units SC q HS   > On 4/20/2021:    > Started Alogliptin 25 mg PO once daily    > Discontinued Insulin glargine 5 units SC q HS   > On 4/21/2021:    > Discontinued Alogliptin 25 mg PO once daily    > Increased Metformin from 500 mg to 850 mg PO BID with meals   > Continue:    > Metformin 850 mg PO BID with meals    > Insulin lispro sliding scale SC TID AC only     > Difficulty sleeping   > Continue:    > Melatonin 5 mg PO q HS    > Zolpidem 5 mg PO q HS PRN for sleep    > Wound on lateral surface of distal third of left leg   > Wound Care Nurse consult for wound care recommendations    > COVID-19 ruled out by laboratory testing   > SARS-CoV-2 ([facility], [facility]) (3/22/2021): Not detected   > SARS-CoV-2 ([facility], [facility]) (collected 4/7/2021, resulted 4/8/2021): Not detected    > COVID-19 rapid test ([facility], [facility]) (4/7/2021): Not detected   > COVID-19 rapid test ([facility], [facility]) (4/14/2021): Not detected   > SARS-CoV-2 ([facility], [facility]) (collected 4/15//2021, resulted 4/16/2021): Not detected    > Analgesia   > Continue:    > Acetaminophen 650 mg PO TID (8AM, 12PM, 4PM)    > Acetaminophen 650 mg PO q 4 hr PRN for pain level 4/10 or lesser (from 8PM to 4AM only)    > Duloxetine 30 mg PO once daily    > Gabapentin 100 mg PO TID    > Methocarbamol 500 mg PO TID    > Oxycodone 5-10 mg PO q 4 hr PRN for pain level 5/10 or greater     > Diet:   > Specifications: Cardiac, diabetic, consistent carb 1800 kcal   > Solids (consistency): Regular    > Liquids (consistency): Thin    > Fluid restriction: None      9. Personal Protective Equipment was used while interacting with patient including: goggles and KN95 face mask. Patient was using a surgical mask. 10. Patient's progress in rehabilitation and medical issues discussed with the patient. All questions answered to the best of my ability. Care plan discussed with patient and nurse. 11. Total clinical care time is 30 minutes, including review of chart including all labs, radiology, past medical history, and discussion with patient and family.  Greater than 50% of my time was spent in coordination of care and counseling.       Signed:    Jeanine Matson MD    April 22, 2021

## 2021-04-22 NOTE — PROGRESS NOTES
Problem: Self Care Deficits Care Plan (Adult)  Goal: *Acute Goals and Plan of Care (Insert Text)  Description: Occupational Therapy Goals   Long Term Goals  Initiated 2021 and to be accomplished within 2 week(s)  1. Pt will perform self-feeding with independence. 2. Pt will perform grooming with mod I in standing at the sink. 3. Pt will perform UB bathing with mod I with set up of necessary items. ( 2021)  4. Pt will perform LB bathing with mod I with set up of necessary items. 5. Pt will perform tub/shower transfer with mod I.   6. Pt will perform UB dressing with mod I with set up of necessary items. ( 2021)  7. Pt will perform LB dressing with mod I with set up of necessary items. 8. Pt will perform toileting task with mod I.  9. Pt will perform toilet transfer with mod I with RW. Short Term Goals   Initiated 2021 and to be accomplished within 7 day(s)  1. Pt will perform self-feeding with mod I and no set-up A.   2. Pt will perform grooming with mod I. ( 2021)  3. Pt will perform UB bathing with mod I. ( 2021)  4. Pt will perform LB bathing with mod I.  5. Pt will perform tub/shower transfer with mod I.  6. Pt will perform UB dressing with mod I. ( 2021)  7. Pt will perform LB dressing with mod I.  8. Pt will perform toileting task with mod I.  9. Pt will perform toilet transfer with mod I with w/c as needed. Outcome: Progressing Towards Goal     Occupational Therapy TREATMENT    Patient: Can Bosch   60 y.o. Patient identified with name and : Yes    Date: 2021    First Tx Session  Time In: 1007   Time Out[de-identified] 1130    Diagnosis: Status post above-knee amputation of right lower extremity ([facility] [Z89.611]   Precautions: Fall(right AKA)  Chart, occupational therapy assessment, plan of care, and goals were reviewed. Pain:  Pt reports 0/10 pain or discomfort prior to treatment. Pt reports 0/10 pain or discomfort post treatment. Intervention Provided: NA      SUBJECTIVE:   Patient stated  I won't have to do laundry at home, but I'm not looking forward to having to make a bed.     OBJECTIVE DATA SUMMARY:     THERAPEUTIC ACTIVITY Daily Assessment         THERAPEUTIC EXERCISE Daily Assessment    Blue theraband UE strengthening - mod I to complete 2x15 shoulder flex, abd, ER, IR, scaption, and bicep curls. IADL Daily Assessment    Bed making practice - Mod I - Pt able to strip sheets and make bed with new sheets. Pt performed task at w/c level and able to stand-pivot from w/c onto bed as needed to complete task. NMRE Daily Assessment         FEEDING/EATING Daily Assessment          GROOMING Daily Assessment    Grooming  Grooming Assistance : 6 (Modified independent)  Comments: (wheelchair level at sink to brush teeth)     UPPER BODY BATHING Daily Assessment    Upper Body Bathing  Bathing Assistance, Upper: 6 (Modified independent)  Position Performed: Seated in chair  Adaptive Equipment: Shower chair     LOWER BODY BATHING Daily Assessment    Lower Body Bathing  Bathing Assistance, Lower : 6 (Modified independent)  Position Performed: Seated in chair  Adaptive Equipment: Shower chair     TOILETING Daily Assessment          UPPER BODY DRESSING Daily Assessment    Upper Body Dressing   Dressing Assistance : 6 (Modified independent)  Comments: (collected clothing; able to manage throughout process)     LOWER BODY DRESSING Daily Assessment    Lower Body Dressing   Dressing Assistance : 5 (Supervision)  Leg Crossed Method Used: No  Position Performed: Seated in chair  Adaptive Equipment Used: Walker  Comments: (min A for socks d/t smaller size; supervision for safety)     MOBILITY/TRANSFERS Daily Assessment     Tub/shower transfer practice with shower chair and walker - supervision required for safety       ASSESSMENT:  Pt continues to make steady progress towards goal attainment, meeting 3 ST goals and 2 LT goals this date.   This OT continues to provide supervision for safety when transferring, standing with walker, and bending at the waist. Pt tolerated ADL tasks of bathing, dressing and grooming. Pt demonstrated ability to transfer in/out of tub-shower with shower chair to practice prior to discharge. Pt demonstrated ability to complete bed making task with mod I. Continue with UB strengthening tasks and activity tolerance in order to further meet goals. Progression toward goals:  [x]          Improving appropriately and progressing toward goals  []          Improving slowly and progressing toward goals  []          Not making progress toward goals and plan of care will be adjusted     PLAN:  Patient continues to benefit from skilled intervention to address the above impairments. Continue treatment per established plan of care. Discharge Recommendations:  Home Health  Further Equipment Recommendations for Discharge:  3 in 1 anthony broussard     Activity Tolerance:  Good    Estimated LOS: 4/29/2021    Please refer to the flowsheet for vital signs taken during this treatment. After treatment:   [x]  Patient left in no apparent distress sitting up in chair   []  Patient left in no apparent distress in bed  [x]  Call bell left within reach  []  Nursing notified  []  Caregiver present  [x]  Bed/Chair alarm activated    COMMUNICATION/EDUCATION:   [x] Home safety education was provided and the patient/caregiver indicated understanding. [x] Patient/family have participated as able in goal setting and plan of care. [x] Patient/family agree to work toward stated goals and plan of care. [] Patient understands intent and goals of therapy, but is neutral about his/her participation. [] Patient is unable to participate in goal setting and plan of care.       Irma Cook, LEA, OTR/L

## 2021-04-22 NOTE — PROGRESS NOTES
Problem: Mobility Impaired (Adult and Pediatric)  Goal: *Therapy Goal (Edit Goal, Insert Text)  Description: Physical Therapy Short Term Goals  Initiated 4/16/2021 and to be accomplished within 7 day(s) on 4/23/2021:  1. Patient will move from supine to sit and sit to supine , scoot up and down, and roll side to side in bed with modified independence. 2.  Patient will transfer from bed to chair and chair to bed with supervision/set-up using the least restrictive device. 3.  Patient will perform sit to stand with supervision/set-up. 4.  Patient will ambulate with supervision/set-up for 15 feet with the least restrictive device. 5.  Patient will perform w/c mobility for 150 ft at supervision level over even surfaces. Physical Therapy Long Term Goals  Initiated 4/16/2021 and to be accomplished within 14 day(s) on 4/30/2021:  1. Patient will move from supine to sit and sit to supine , scoot up and down, and roll side to side in bed with independence. 2.  Patient will transfer from bed to chair and chair to bed with modified independence using the least restrictive device. 3.  Patient will perform sit to stand with modified independence. 4.  Patient will ambulate with modified independence for 25 feet with the least restrictive device. 5.  Patient will perform w/c mobility for at least 150 ft at modified independent level. 6.  Patient will ascend/descend ramp for safe entry/exit of home using wheelchair with supervision. Outcome: Progressing Towards Goal   PHYSICAL THERAPY TREATMENT    Patient: Mila Parkinson (20 y.o. male)  Date: 4/22/2021  Diagnosis: Status post above-knee amputation of right lower extremity (HCC) [Z89.611] Status post above-knee amputation of right lower extremity (HCC)  Precautions: Fall(right AKA)  Chart, physical therapy assessment, plan of care and goals were reviewed. Time In:1145  Time Out:1215    Patient seen for: Wheelchair mobility;Transfer training; Therapeutic exercise(bed mobility)  Time In:1330  Time Out:1430    Patient seen for: Wheelchair mobility;Transfer training;Gait training; Therapeutic exercise      Pain:  Pt pain was reported as 8/10(AM), 6/10(PM) pre-treatment. Pt pain was reported as 8/10 post-treatment. Intervention: requested pain meds after first session    Patient identified with name and : yes     SUBJECTIVE:      Pt reports right LE muscle spasms and increased pain due to hitting residual limb a few times during ADL. Pt began crying while in left sidelying due to pain. Pt reports \"It is a lot less than earlier\" regarding right LE pain during second session. OBJECTIVE DATA SUMMARY:    Objective:       BED/MAT MOBILITY Daily Assessment     Rolling Right : 6 (Modified independent)  Rolling Left : 6 (Modified independent)  Supine to Sit : 6 (Modified independent)  Sit to Supine : 6 (Modified independent)   Pt performed bed mobility on left side of mat table Russell to include rolling into prone. TRANSFERS Daily Assessment     Transfer Type: Other  Other: squat pivot txfr  Transfer Assistance : 5 (Stand-by assistance)  Pt performed squat pivot txfr w/c<->mat table with SBA for safety. Transfer Type: Other  Other: stand step txfr with RW  Transfer Assistance : 4 (Contact guard assistance)  Sit to Stand Assistance: Contact guard assistance  Car Transfers: Minimum assistance(/CGA)  Car Type: car txfr simulator  Pt performed sit to stand to RW with SBA/CGA for safety and balance due to SLS. Pt performed stand step txfr mat to w/c with RW and CGA for safety and balance. Pt performed car txfr in simulator with RW with CGA for balance. Pt ingress and egressed simulator Russell.        GAIT Daily Assessment    Gait Description (WDL)      Gait Abnormalities      Assistive device Walker, rolling;Gait belt    Ambulation assistance - level surface 4 (Contact guard assistance)    Distance 88 Feet (ft)(60ft)    Ambulation assistance- uneven surface Comments Pt ambulated 88ft and 60ft with RW and CGA with w/c follow. Pt demo'd appropriate lift off and swing through but as pt fatigued foot clearance decreased. Pt ambulated 2x12ft with RW and SBA. Pt ambulated 2x20ft with RW and CGA to and from car simulator. STEPS/STAIRS Daily Assessment     Steps/Stairs ambulated      Assistance Required     Rail Use      Comments      Curbs/Ramps  Pt negotiated up and down 2\" platform step with RW x2 trials with CGA/min assist for balance. BALANCE Daily Assessment     Sitting - Static: Good (unsupported)  Sitting - Dynamic: Good (unsupported)  Standing - Static: Fair(with RW)  Standing - Dynamic : Impaired        WHEELCHAIR MOBILITY Daily Assessment     Able to Propel (ft): 56 feet  Wheelchair Management: Manages left brake;Manages right brake  Pt propelled w/c to and from gym for both sessions Russell. THERAPEUTIC EXERCISES Daily Assessment      Supine BLE hip abd and SLR 2x15, left LE heel slides and SAQ 2x15, bridging with right LE over bolster 2x10, left sidelying right hip extension 2x15  Seated BLE hip flexion, hip add with ball and abd with GTB 2x15, left ham curls with GTB 2x15  Prone left LE ham curls 2x15, right hip extension 2x15        ASSESSMENT:  Pt making progress with strength and mobility but slightly limited today with tolerance due to pain. Progression toward goals:  [x]      Improving appropriately and progressing toward goals  []      Improving slowly and progressing toward goals  []      Not making progress toward goals and plan of care will be adjusted      PLAN:  Patient continues to benefit from skilled intervention to address the above impairments. Continue treatment per established plan of care.   Discharge Recommendations:  Home Health  Further Equipment Recommendations for Discharge:  rolling walker and wheelchair 18 inch      Estimated Discharge Date: 4/29/2021    Activity Tolerance:   Fair+  Please refer to the flowsheet for vital signs taken during this treatment. After treatment:   Patient left self propelling w/c to room.        Aaliyah Meehan PTA

## 2021-04-22 NOTE — PROGRESS NOTES
[facility]    Bedside and Verbal shift change report given to Linette RN (oncoming nurse) by Elias Roldan RN (offgoing nurse). Report included the following information SBAR, Kardex, MAR and Recent Results.     Situation:   Code Status: Full Code   Reason for Admission: right AKA  Hospital Day: 7   Problem List:   Hospital Problems  Date Reviewed: 4/21/2021          Codes Class Noted POA    * (Principal) Status post above-knee amputation of right lower extremity ([facility] ICD-10-CM: M48.777  ICD-9-CM: V49.76  4/11/2021 Yes    Overview Signed 4/15/2021  5:14 PM by Brenda Frank MD     S/P Right above-the-knee amputation (4/11/2021  Dr. Aubree An)             Ischemia of right BKA site [facility]) ICD-10-CM: T87.89, I99.8  ICD-9-CM: 997.69, 459.9  4/7/2021 Yes        Infection of right below knee amputation [facility]) ICD-10-CM: T87.43  ICD-9-CM: 997.62  4/7/2021 Yes    Overview Signed 4/15/2021  5:17 PM by Brenda Frank MD     S/P Washout of right below-knee amputation; Right knee disarticulation (4/7/2021  Dr. Duyen Delgado); S/P Exploration of right knee disarticulation and control of hemorrhage (4/8/2021  Dr. Duyen Delgado)             Peripheral artery disease ([facility] (Chronic) ICD-10-CM: I73.9  ICD-9-CM: 443.9  Unknown Yes        Type 2 diabetes mellitus, without long-term current use of insulin (HCC) (Chronic) ICD-10-CM: E11.9  ICD-9-CM: 250.00  Unknown Yes    Overview Addendum 4/15/2021  5:40 PM by Brenda Frank MD     HbA1c (4/8/2021) = 9.8; HbA1c (3/6/2021) = 12.9             Factor V Leiden mutation ([facility] (Chronic) ICD-10-CM: S29.80  ICD-9-CM: 289.81  Unknown Yes        Hypertensive heart disease with chronic systolic congestive heart failure (HCC) (Chronic) ICD-10-CM: I11.0, I50.22  ICD-9-CM: 402.91, 428.22  Unknown Yes    Overview Signed 3/26/2021 11:06 PM by Brenda Frank MD     2D echocardiogram (12/17/2015) showed EF 35%; genealized hypokinesis more focally severe of the inferior and posterior segments; mild mitral regurgitation; trace tricuspud regurgitation; normal pulmonary artery pressure             Anticoagulated by anticoagulation treatment (Chronic) ICD-10-CM: Z79.01  ICD-9-CM: V58.61  Unknown Yes    Overview Signed 3/26/2021  4:36 PM by Linette Florian MD     On Rivaroxaban             Mixed hyperlipidemia (Chronic) ICD-10-CM: E78.2  ICD-9-CM: 272.2  Unknown Yes    Overview Signed 3/26/2021 11:07 PM by Linette Florian MD     Lipid profile (7/25/2018) showed , , HDL 27,              Major depressive disorder ICD-10-CM: F32.9  ICD-9-CM: 296.20  3/24/2021 Yes    Overview Signed 3/26/2021 11:31 PM by Linette Florian MD     On Duloxetine             History of right below knee amputation [facility]) ICD-10-CM: Z89.511  ICD-9-CM: V49.75  3/22/2021 Yes    Overview Signed 3/26/2021  5:43 PM by Linette Florian MD     S/P Right below-the-knee amputation (3/22/2021 - Dr. Shanon Jackson)             Impaired mobility and ADLs ICD-10-CM: Z74.09, Z78.9  ICD-9-CM: V49.89  3/22/2021 Yes        Critical ischemia of lower extremity ([facility] ICD-10-CM: I66.124  ICD-9-CM: 459.9  3/22/2021 Yes    Overview Signed 3/26/2021  5:42 PM by Linette Florian MD     Right             Acute blood loss as cause of postoperative anemia ICD-10-CM: D62  ICD-9-CM: 285.1  3/22/2021 Yes        History of embolectomy ICD-10-CM: Z98.890  ICD-9-CM: V45.89  3/5/2021 Yes    Overview Signed 3/26/2021  5:39 PM by Linette Florian MD     S/P Right lower extremity arterial embolectomy                   Background:   Past Medical History:   Past Medical History:   Diagnosis Date    Acute blood loss as cause of postoperative anemia 3/22/2021    Anticoagulated by anticoagulation treatment     On Rivaroxaban    Cardiomyopathy ([address] 12/17/2015    2D echocardiogram (3/24/2021) showed EF 37%; global hypokinesis of left ventricle; grade I diastolic dysfunction; 2D echocardiogram (12/17/2015) showed EF 35%; generalized hypokinesis more focally severe of the inferior and posterior segments; mild mitral regurgitation; trace tricuspud regurgitation; normal pulmonary artery pressure    Chronic systolic heart failure (HCC)     2D echocardiogram (3/24/2021) showed EF 37%; global hypokinesis of left ventricle; grade I diastolic dysfunction; 2D echocardiogram (12/17/2015) showed EF 35%; generalized hypokinesis more focally severe of the inferior and posterior segments; mild mitral regurgitation; trace tricuspud regurgitation; normal pulmonary artery pressure    Coronary artery disease involving native coronary artery of native heart     COVID-19 ruled out by laboratory testing 3/22/2021    SARS-CoV-2 ([facility], [facility]) (3/22/2021):  Not detected     Critical ischemia of lower extremity ([facility] [address] 3/22/2021    Right    Factor V Leiden mutation ([facility] [address] [facility]     Grade I diastolic dysfunction 36/88/2884    2D echocardiogram (3/24/2021) showed EF 37%; global hypokinesis of left ventricle; grade I diastolic dysfunction    History of deep venous thrombosis (DVT) of distal vein of right lower extremity     History of epilepsy     History of head injury     History of myocardial infarction     History of noncompliance with medical treatment 3/18/2021    Hypertensive heart disease with chronic systolic congestive heart failure ([facility] [address]     2D echocardiogram (3/24/2021) showed EF 37%; global hypokinesis of left ventricle; grade I diastolic dysfunction; 2D echocardiogram (12/17/2015) showed EF 35%; genealized hypokinesis more focally severe of the inferior and posterior segments; mild mitral regurgitation; trace tricuspud regurgitation; normal pulmonary artery pressure    Infection of right below knee amputation ([facility] [facility] 4/7/2021    S/P Washout of right below-knee amputation; Right knee disarticulation (4/7/2021  Dr. Linh Garcia); S/P Exploration of right knee disarticulation and control of hemorrhage (4/8/2021  Dr. Linh Garcia)   [address] [place] [address] Long term current use of aspirin     Major depressive disorder 03/24/2021    On Duloxetine    Migraine headache     Mitral valve prolapse     Mixed hyperlipidemia     Lipid profile (7/25/2018) showed , , HDL 27,     Obstructive sleep apnea     On statin therapy due to risk of future cardiovascular event     On Atorvastatin    Peripheral artery disease ([facility] [facility]     Type 2 diabetes mellitus, without long-term current use of insulin ([facility])     HbA1c (4/8/2021) = 9.8; HbA1c (3/6/2021) = 20.6    Umbilical hernia     Wears glasses       Patient taking anticoagulants yes    Patient has a defibrillator: no     Assessment:   Changes in Assessment throughout shift: no acute changes noted throughout the shift     Patient has central line: no Reasons if yes: n/a  Insertion date:n/a Last dressing date:n/a   Patient has Ellis Cath: no Reasons if yes: n/a   Insertion date:n/a     Last Vitals:     Vitals:    04/20/21 2028 04/21/21 0720 04/21/21 1722 04/21/21 2037   BP: 122/77 116/73 116/81 114/72   Pulse: 91 93 93 87   Resp: 20 20 18 18   Temp: 97.9 °F (36.6 °C) 98.6 °F (37 °C) 97.6 °F (36.4 °C) 98 °F (36.7 °C)   SpO2: 100% 99% 98% 100%   Height:            PAIN    Pain Assessment    Pain Intensity 1: 0 (04/22/21 0400) Pain Intensity 1: 2 (12/29/14 1105)    Pain Location 1: Leg Pain Location 1: Abdomen    Pain Intervention(s) 1: (scheduled meds given) Pain Intervention(s) 1: Medication (see MAR)  Patient Stated Pain Goal: 0 Patient Stated Pain Goal: 0  o Intervention effective: yes    o Other actions taken for pain: repositioning      Skin Assessment  Skin color Skin Color: Appropriate for ethnicity  Condition/Temperature Skin Condition/Temp: Dry, Warm  Integrity Skin Integrity: Incision (comment)  Turgor Turgor: Non-tenting  Weekly Pressure Ulcer Documentation  Pressure  Injury Documentation: No Pressure Injury Noted-Pressure Ulcer Prevention Initiated  Wound Prevention & Protection Methods  Orientation of wound Orientation of Wound Prevention: Posterior  Location of Prevention Location of Wound Prevention: Buttocks, Sacrum/Coccyx  Dressing Present Dressing Present : Yes  Dressing Status Dressing Status: Intact  Wound Offloading Wound Offloading (Prevention Methods): Bed, pressure redistribution/air     INTAKE/OUPUT  Date 04/21/21 0700 - 04/22/21 0659 04/22/21 0700 - 04/23/21 0659   Shift [id number] [id number] 24 Hour Total 8387-6095 9037-1670 24 Hour Total   INTAKE   Shift Total         OUTPUT   Urine  1350 1350        Urine Voided  1350 1350        Urine Occurrence(s) 0 x 2 x 2 x      Stool           Stool Occurrence(s) 0 x 0 x 0 x      Shift Total  1350 1350      NET  -1350 -1350      Weight (kg)             Recommendations:  1. Patient needs and requests: education     2. Diet: Cardiac consistent carb diet with thin liquids    3. Pending tests/procedures: AM labs     4. Functional Level/Equipment: 1 person assist     5. Estimated Discharge Date: TBD Posted on Whiteboard in Patients Room: [facility] Safety Check    A safety check occurred in the patient's room between off going nurse and oncoming nurse listed above. The safety check included the below items  Area Items   H  High Alert Medications - Verify all high alert medication drips (heparin, PCA, etc.)   E  Equipment - Suction is set up for ALL patients (with yanker)  - Red plugs utilized for all equipment (IV pumps, etc.)  - WOWs wiped down at end of shift.  - Room stocked with oxygen, suction, and other unit-specific supplies   A  Alarms - Bed alarm is set for fall risk patients  - Ensure chair alarm is in place and activated if patient is up in a chair   L  Lines - Check IV for any infiltration  - Ellis bag is empty if patient has a Ellis   - Tubing and IV bags are labeled   S  Safety   - Room is clean, patient is clean, and equipment is clean. - Hallways are clear from equipment besides carts.    - Fall bracelet on for fall risk patients  - Ensure room is clear and free of clutter  - Suction is set up for ALL patients (with faraz)  - Hallways are clear from equipment besides carts.    - Isolation precautions followed, supplies available outside room, sign posted         Jw Jamil RN

## 2021-04-22 NOTE — INTERDISCIPLINARY ROUNDS
[address] 
[address] [place] [address], [address] [facility] SUMMARY Date of Conference: 4/22/2021 Patient Information:  
 
  
Name: Newton Lema Age / Sex: 60 y.o. / male CSN: [id number] MRN: [id number] Admit Date: 4/15/2021 Length of Stay: 7 days Primary Rehab Diagnosis 1. Impaired Mobility and ADLs 2. S/P Right above-the-knee amputation (4/11/2021  Dr. Zenobia Cortes) 3. S/P Exploration of right knee disarticulation and control of hemorrhage (4/8/2021  Dr. Arvind Elizabeth) 4. S/P Washout of right below-knee amputation; Right knee disarticulation (4/7/2021  Dr. Arvind Elizabeth) 5. History of infection and ischemia of right BKA stump (4/7/2021) 6. S/P Right below-the-knee amputation (3/22/2021 - Dr. Nicci Huggins) 7. History of critical ischemia of the right lower extremity 8. History of right lower extremity arterial embolectomy (3/5/2021 - Dr. Nicci Huggins) Comorbidities Patient Active Problem List  
Diagnosis Code  History of right below knee amputation ([facility] Z89.511  Impaired mobility and ADLs Z74.09, Z78.9  Critical ischemia of lower extremity (HCC) I70.229  Peripheral artery disease ([facility]) I73.9  Coronary artery disease involving native coronary artery of native heart I25.10  Type 2 diabetes mellitus, without long-term current use of insulin ([facility]) E11.9  History of epilepsy Z80.75  
 [facility] Leiden mutation ([facility] D68.51  
 Migraine headache G43.909  Wears glasses Z97.3  Umbilical hernia O07.7  Obstructive sleep apnea G47.33  
 ICD (implantable cardioverter-defibrillator) in place Z95.810  
 Mitral valve prolapse I34.1  History of head injury Z87.828  
 Hypertensive heart disease with chronic systolic congestive heart failure (HCC) I11.0, I50.22  
 History of myocardial infarction I25.2  Long term current use of aspirin Z79.82  
 On statin therapy due to risk of future cardiovascular event Z79.899  
 History of deep venous thrombosis (DVT) of distal vein of right lower extremity Z86.718  Anticoagulated by anticoagulation treatment Z79.01  
 Mixed hyperlipidemia E78.2  History of embolectomy Z98.890  
 COVID-19 ruled out by laboratory testing Z20.822  Acute blood loss as cause of postoperative anemia D62  Cardiomyopathy ([facility] I42.9  Chronic systolic heart failure (HCC) I50.22  
 History of noncompliance with medical treatment Z91.19  
 Major depressive disorder F32.9  Grade I diastolic dysfunction S63.1  Constipation K59.00  
 Ischemia of right BKA site (HCC) T87.89, I99.8  Infection of right below knee amputation (HCC) T87.43  Status post above-knee amputation of right lower extremity ([facility] Z89.611 Therapy: FIM SCORES Initial Assessment Weekly Progress Assessment 4/22/2021 Eating Functional Level: 6  6 Swallowing Grooming 6 Grooming Assistance : 6 (Modified independent) Comments: (wheelchair level at sink to brush teeth)6 Bathing 5 Bathing Assistance, Upper: 6 (Modified independent) Position Performed: Seated in chair Adaptive Equipment: Shower chair5 Bathing Assistance, Lower : 6 (Modified independent) Position Performed: Seated in chair Adaptive Equipment: Shower chair Upper Body Dressing Functional Level: 6 Comments: T-shirt Dressing Assistance : 6 (Modified independent) Comments: (collected clothing; able to manage throughout process)6 Lower Body Dressing Functional Level: 5 Items Applied/Steps Completed: Elastic waist pants (3 steps), Sock, left (1 step) Comments: SBA for safety Dressing Assistance : 5 (Supervision) Leg Crossed Method Used: No 
Position Performed: Seated in chair Adaptive Equipment Used: Mary Lucas Comments: (min A for socks d/t smaller size; supervision for safety)5 - SBA for safety Toileting Functional Level: 5 Bladder 0 0 Bowel  0 0 Toilet Transfer Hansen Toilet Transfer Score: 5 Comments: SBA for safety  5 - SBA for safety Tub/Shower Transfer [place]hal Tub/Shower Transfer Score: 5 Comments: Supervision for safety. Transfer from w/c to TTB with gaitbelt and grab bars 5 - supervision/SBA for safety Comprehension Primary Mode of Comprehension: Auditory Score: 6   
 6 Expression Primary Mode of Expression: Verbal 
Score: 6  6 Social Interaction Score: 6  6 Problem Solving Score: 6  6 Memory Score: 6  6 FIM SCORES Initial Assessment Weekly Progress Assessment 4/22/2021 Bed/Chair/Wheelchair Transfers Transfer Type: Lateral pivot Other: stand step/hop with RW Transfer Assistance : 4 (Minimal assistance)(steadying support for safety) Sit to Stand Assistance: Minimal assistance(steadying support needing cues for safety) Car Transfers: Not tested Car Type: N/A Transfer Type: Other Other: squat pivot txfr Transfer Assistance : 5 (Stand-by assistance) Bed Mobility Rolling Right 5 (Stand-by assistance) Rolling Left 5 (Stand-by assistance) Supine to Sit 5 (Stand-by assistance) Sit to Stand Minimal assistance(steadying support needing cues for safety) Sit to Supine 5 (Supervision) Rolling Right 
 6 (Modified independent) Rolling Left 
 6 (Modified independent) Supine to Sit 
 6 (Modified independent) Sit to Stand Sit to Supine 6 (Modified independent) Locomotion (W/C) Able to Propel (ft): (to be further assessed) Functional Level: (to be further assessed) Curbs/Ramps Assist Required (FIM Score): 0 (Not tested) Wheelchair Setup Assist Required : 0 (Not tested) Wheelchair Management: (NT) Function  Supervision/mod I Setup Assistance: mod I brakes management Locomotion (W/C distance)    performing 60 ft distances today Locomotion (Walk) 4 (Contact guard assistance) Locomotion (Walk dist.) 15 Feet (ft) Steps/Stairs Steps/Stairs Ambulated (#): 0 Level of Assist : 0 (Not tested) Rail Use: (NT)  NT Nursing:  
 
Neuro:   AAA&O x   4 Respiratory:   [x] WNL   [] O2 LPM:  
Other: 
Peripheral Vascular:   [] TEDS present   [] Edema present ____ Grade Cardiac:   [x] WNL   [] Other Genitourinary:   [x] continent   [] incontinent   [] nguyen  Abdominal __4/21_____ LBM 
GI: _cardiac/diabetic______ Diet _thin_____ Liquids _____ tube feeds Musculoskeletal: __3x__ ROM Transfers _wheelchair____ Assistive Device Used __1x__ Level of Assistance Skin Integumentary:   [x] Intact   [] Not Intact   __________Preventative Measures Details______________________________________________________________ Pain: [x] Controlled   [] Not Controlled   Pain Meds:   [] Scheduled   [] PRN Registered Dietitian / Nutrition:  
No data found. Pt reported fair/good appetite and meal intake. Eating 75% of meals. Tolerating diet. Variable intake of glucerna shakes due to flavor preference, which was discussed. Pt denied having any nutrition concerns at this time Supplements:          [x] Yes: glucerna shake TID    [] No     
Amount of supplement consumed:   Variable due to flavor prefernce Intake/Output Summary (Last 24 hours) at 4/22/2021 1348 Last data filed at 4/22/2021 0630 Gross per 24 hour Intake  Output 1350 ml Net -1350 ml Last bowel movement: 4/20 Interdisciplinary Team Goals: 1. Discipline  Physical Therapy Goal  Patient will improved ability to perform short distance gait bouts up to 20 ft at mod I level for safe maneuvering at home in  inaccessible spaces for w/c. Barrier  Decreased strength, balance, endurance Intervention  Therex, balance activities, therapeutic activity, gait training Goal written by:   Barbie Hairston, PT, DPT  
 
2. Discipline  Occupational Therapy Goal  Pt will improve UE strength for functional reaching for IADL tasks and activity tolerance in standing for IADL tasks. Barrier Confidence, balance, strength Intervention TE, TA, IADL kitchen tasks Goal written by: Gamaliel Chaves, OTD, OTR/L  
 
3. Discipline  Speech Therapy Goal    
 Barrier Intervention Goal written by: 4. Discipline  Nursing Goal Pt will be free from infection until discharge Barrier  surgical incision; environmental exposure;diabetes Intervention  monitor VS; be afebrile; maintain surgical incision c/d/i  
 Goal written by:  Wes Keen RN  
 
5. Discipline  Clinical Psychology Goal  Maintain mood stability during rehabilitation effort Barrier  Situational stressors causing distraction for patient in his recovery Intervention  Support  Goal written by:  Jp Keen, PhD  
 
6. Discipline  Nutrition / Dietetics Goal  - PO nutrition intake will continue to meet >75% of patients estimated nutritional needs over the next 7 days. Barrier  appetite, improving Intervention  continue po diet and nutrition supplements. Update flavor preference in supplement order Goal written by:  Herbert Matthews RD Disposition / Discharge Planning: Follow-up services:  [x] Physical Therapy [x] Occupational Therapy     
 [] Speech Therapy         
 [] Skilled Nursing    
 [] Medical Social Worker 
 [] Aide        [] Outpatient      [] vs 
 [x] Home Health  [] vs 
     [] to progress to outpatient 
     [] with 24-hour supervision 
     [] with 24-hour assistance 
 [] Walter AGUAYO recommendations: RW, wheelchair Estimated discharge date:  4/29/2021 Discharge Location:  [x] Home  [] versus  
 [] Walter Healy [] [address] [] Other:   
  
 
 
Interdisciplinary team rounds were held this PM with the following team members:   
 
 Name Physical Therapist 
  Marshal Oshea, PT, DPT Occupational Therapist 
  Gabriela Hernandez MS OTR/L Recreational Therapist 
  Gus Ellis, [address] Nursing Wes Keen RN Dietitian   Jefferson Adelita, [address] [place] [address] Clinical Psychologist 
  Wolfgang Roque, PhD  
Physician 
  Caro Ball MD   
 Herbie Sánchez, RN Signed:  Caro Ball MD 
  April 22, 2021

## 2021-04-22 NOTE — PROGRESS NOTES
[x] Psychology  [] Social Work [] Recreational Therapy    INTERVENTION  UNITS/TIME OF SERVICE   Assessment    Supportive Counseling  April 21, 2021   Orientation    Discharge Planning    Resource Linkage              Progress/Current Status    Patient seen for individual support  and follow up this date on ARU. He was found sitting upright in wheelchair in bedroom and immediately responsive to my contact. He reported that he had just been contacted by the  at his mother's SNF in [place] and, perhaps, my conveyance of his concerns (about his mother and his inability to contact her) to the SW at his mother's facility was helpful. But, when he finally spoke with his mother last evening, he found her confused in her conversation and this seems to be a source of distress for him, now. Importantly, a call from that SW seemed to provide him with some reassurance but he remains, obviously preoccupied and worried about her welfare. As before, he feels that his siblings are much less engaged and may even disregard his expressed concerns. Patient encouraged to try and maintain his focus on his own rehabilitation effort and let others manage circumstances outside the hospital, which he cannot really control at this time. He was somewhat in agreement but; still; he very much wants to manage her welfare, as well as now need to attend to his own.     Luis Centeno, THE [facility] 4/22/2021 5:11 PM

## 2021-04-22 NOTE — PROGRESS NOTES
[facility]    Bedside and Verbal shift change report given to Jessica Link RN (oncoming nurse) by Justin Stanton RN (offgoing nurse). Report included the following information SBAR, Kardex, MAR and Recent Results.     Situation:   Code Status: Full Code   Reason for Admission: right AKA  Hospital Day: 7   Problem List:   Hospital Problems  Date Reviewed: 4/22/2021          Codes Class Noted POA    * (Principal) Status post above-knee amputation of right lower extremity ([facility] ICD-10-CM: I14.643  ICD-9-CM: V49.76  4/11/2021 Yes    Overview Signed 4/15/2021  5:14 PM by Hayder Sharma MD     S/P Right above-the-knee amputation (4/11/2021  Dr. Lawyer Wyatt)             Ischemia of right BKA site [facility]) ICD-10-CM: T87.89, I99.8  ICD-9-CM: 997.69, 459.9  4/7/2021 Yes        Infection of right below knee amputation [facility]) ICD-10-CM: T87.43  ICD-9-CM: 997.62  4/7/2021 Yes    Overview Signed 4/15/2021  5:17 PM by Hayder Sharma MD     S/P Washout of right below-knee amputation; Right knee disarticulation (4/7/2021  Dr. Galina Cruz); S/P Exploration of right knee disarticulation and control of hemorrhage (4/8/2021  Dr. Galina Cruz)             Peripheral artery disease ([facility] (Chronic) ICD-10-CM: I73.9  ICD-9-CM: 443.9  Unknown Yes        Type 2 diabetes mellitus, without long-term current use of insulin (HCC) (Chronic) ICD-10-CM: E11.9  ICD-9-CM: 250.00  Unknown Yes    Overview Addendum 4/15/2021  5:40 PM by Hayder Sharma MD     HbA1c (4/8/2021) = 9.8; HbA1c (3/6/2021) = 12.9             Factor V Leiden mutation ([facility] (Chronic) ICD-10-CM: Z86.03  ICD-9-CM: 289.81  Unknown Yes        Hypertensive heart disease with chronic systolic congestive heart failure (HCC) (Chronic) ICD-10-CM: I11.0, I50.22  ICD-9-CM: 402.91, 428.22  Unknown Yes    Overview Signed 3/26/2021 11:06 PM by Hayder Sharma MD     2D echocardiogram (12/17/2015) showed EF 35%; genealized hypokinesis more focally severe of the inferior and posterior segments; mild mitral regurgitation; trace tricuspud regurgitation; normal pulmonary artery pressure             Anticoagulated by anticoagulation treatment (Chronic) ICD-10-CM: Z79.01  ICD-9-CM: V58.61  Unknown Yes    Overview Signed 3/26/2021  4:36 PM by Tom Jaffe MD     On Rivaroxaban             Mixed hyperlipidemia (Chronic) ICD-10-CM: E78.2  ICD-9-CM: 272.2  Unknown Yes    Overview Signed 3/26/2021 11:07 PM by Tom Jaffe MD     Lipid profile (7/25/2018) showed , , HDL 27,              Major depressive disorder ICD-10-CM: F32.9  ICD-9-CM: 296.20  3/24/2021 Yes    Overview Signed 3/26/2021 11:31 PM by Tom Jaffe MD     On Duloxetine             History of right below knee amputation [facility]) ICD-10-CM: Z89.511  ICD-9-CM: V49.75  3/22/2021 Yes    Overview Signed 3/26/2021  5:43 PM by Tom Jaffe MD     S/P Right below-the-knee amputation (3/22/2021 - Dr. Mirza Locke)             Impaired mobility and ADLs ICD-10-CM: Z74.09, Z78.9  ICD-9-CM: V49.89  3/22/2021 Yes        Critical ischemia of lower extremity ([facility] ICD-10-CM: B06.763  ICD-9-CM: 459.9  3/22/2021 Yes    Overview Signed 3/26/2021  5:42 PM by Tom Jaffe MD     Right             Acute blood loss as cause of postoperative anemia ICD-10-CM: D62  ICD-9-CM: 285.1  3/22/2021 Yes        History of embolectomy ICD-10-CM: Z98.890  ICD-9-CM: V45.89  3/5/2021 Yes    Overview Signed 3/26/2021  5:39 PM by Tom Jaffe MD     S/P Right lower extremity arterial embolectomy                   Background:   Past Medical History:   Past Medical History:   Diagnosis Date    Acute blood loss as cause of postoperative anemia 3/22/2021    Anticoagulated by anticoagulation treatment     On Rivaroxaban    Cardiomyopathy ([facility] 12/17/2015    2D echocardiogram (3/24/2021) showed EF 37%; global hypokinesis of left ventricle; grade I diastolic dysfunction; 2D echocardiogram (12/17/2015) showed EF 35%; generalized hypokinesis more focally severe of the inferior and posterior segments; mild mitral regurgitation; trace tricuspud regurgitation; normal pulmonary artery pressure    Chronic systolic heart failure (HCC)     2D echocardiogram (3/24/2021) showed EF 37%; global hypokinesis of left ventricle; grade I diastolic dysfunction; 2D echocardiogram (12/17/2015) showed EF 35%; generalized hypokinesis more focally severe of the inferior and posterior segments; mild mitral regurgitation; trace tricuspud regurgitation; normal pulmonary artery pressure    Coronary artery disease involving native coronary artery of native heart     COVID-19 ruled out by laboratory testing 3/22/2021    SARS-CoV-2 ([facility]) (3/22/2021):  Not detected     Critical ischemia of lower extremity ([facility] 3/22/2021    Right    Factor V Leiden mutation ([facility]     Grade I diastolic dysfunction 53/64/5205    2D echocardiogram (3/24/2021) showed EF 37%; global hypokinesis of left ventricle; grade I diastolic dysfunction    History of deep venous thrombosis (DVT) of distal vein of right lower extremity     History of epilepsy     History of head injury     History of myocardial infarction     History of noncompliance with medical treatment 3/18/2021    Hypertensive heart disease with chronic systolic congestive heart failure ([facility]     2D echocardiogram (3/24/2021) showed EF 37%; global hypokinesis of left ventricle; grade I diastolic dysfunction; 2D echocardiogram (12/17/2015) showed EF 35%; genealized hypokinesis more focally severe of the inferior and posterior segments; mild mitral regurgitation; trace tricuspud regurgitation; normal pulmonary artery pressure    Infection of right below knee amputation ([facility] 4/7/2021    S/P Washout of right below-knee amputation; Right knee disarticulation (4/7/2021  Dr. Gatito Bojorquez); S/P Exploration of right knee disarticulation and control of hemorrhage (4/8/2021  Dr. Gatito Bojorquez)   Nicci Long term current use of aspirin     Major depressive disorder 03/24/2021    On Duloxetine    Migraine headache     Mitral valve prolapse     Mixed hyperlipidemia     Lipid profile (7/25/2018) showed , , HDL 27,     Obstructive sleep apnea     On statin therapy due to risk of future cardiovascular event     On Atorvastatin    Peripheral artery disease ([facility]     Type 2 diabetes mellitus, without long-term current use of insulin ([facility])     HbA1c (4/8/2021) = 9.8; HbA1c (3/6/2021) = 78.2    Umbilical hernia     Wears glasses       Patient taking anticoagulants yes    Patient has a defibrillator: no     Assessment:   Changes in Assessment throughout shift: no acute changes noted throughout the shift     Patient has central line: no Reasons if yes: n/a  Insertion date:n/a Last dressing date:n/a   Patient has Ellis Cath: no Reasons if yes: n/a   Insertion date:n/a     Last Vitals:     Vitals:    04/21/21 1722 04/21/21 2037 04/22/21 0757 04/22/21 1515   BP: 116/81 114/72 105/76 113/72   Pulse: 93 87 96 87   Resp: 18 18 18 18   Temp: 97.6 °F (36.4 °C) 98 °F (36.7 °C) 97.8 °F (36.6 °C) 97.7 °F (36.5 °C)   SpO2: 98% 100% 100% 99%   Height:            PAIN    Pain Assessment    Pain Intensity 1: 5 (04/22/21 1735) Pain Intensity 1: 2 (12/29/14 1105)    Pain Location 1: Leg Pain Location 1: Abdomen    Pain Intervention(s) 1: Medication (see MAR) Pain Intervention(s) 1: Medication (see MAR)  Patient Stated Pain Goal: 0 Patient Stated Pain Goal: 0  o Intervention effective: yes    o Other actions taken for pain: repositioning      Skin Assessment  Skin color Skin Color: Appropriate for ethnicity  Condition/Temperature Skin Condition/Temp: Dry, Warm  Integrity Skin Integrity: Incision (comment)  Turgor Turgor: Non-tenting  Weekly Pressure Ulcer Documentation  Pressure  Injury Documentation: No Pressure Injury Noted-Pressure Ulcer Prevention Initiated  Wound Prevention & Protection Methods  Orientation of wound Orientation of Wound Prevention: Posterior  Location of Prevention Location of Wound Prevention: Sacrum/Coccyx  Dressing Present Dressing Present : Yes  Dressing Status Dressing Status: Intact  Wound Offloading Wound Offloading (Prevention Methods): Bed, pressure reduction mattress     INTAKE/OUPUT  Date 04/21/21 1900 - 04/22/21 0659 04/22/21 0700 - 04/23/21 0659   Shift 3611-0397 24 Hour Total 7800-4270 8748-4418 24 Hour Total   INTAKE   Shift Total        OUTPUT   Urine 1350 1350        Urine Voided 1350 1350        Urine Occurrence(s) 2 x 2 x 1 x  1 x   Stool          Stool Occurrence(s) 0 x 0 x 0 x  0 x   Shift Total 1350 1350      NET -1350 -1350      Weight (kg)            Recommendations:  1. Patient needs and requests: education     2. Diet: Cardiac consistent carb diet with thin liquids    3. Pending tests/procedures: AM labs     4. Functional Level/Equipment: 1 person assist     5. Estimated Discharge Date: TBD Posted on Whiteboard in Patients Room: [facility] Safety Check    A safety check occurred in the patient's room between off going nurse and oncoming nurse listed above. The safety check included the below items  Area Items   H  High Alert Medications - Verify all high alert medication drips (heparin, PCA, etc.)   E  Equipment - Suction is set up for ALL patients (with yanker)  - Red plugs utilized for all equipment (IV pumps, etc.)  - WOWs wiped down at end of shift.  - Room stocked with oxygen, suction, and other unit-specific supplies   A  Alarms - Bed alarm is set for fall risk patients  - Ensure chair alarm is in place and activated if patient is up in a chair   L  Lines - Check IV for any infiltration  - Ellis bag is empty if patient has a Ellis   - Tubing and IV bags are labeled   S  Safety   - Room is clean, patient is clean, and equipment is clean. - Hallways are clear from equipment besides carts.    - Fall bracelet on for fall risk patients  - Ensure room is clear and free of clutter  - Suction is set up for ALL patients (with yanker)  - Hallways are clear from equipment besides carts.    - Isolation precautions followed, supplies available outside room, sign posted         Elena Lima RN

## 2021-04-23 LAB
GLUCOSE BLD STRIP.AUTO-MCNC: 123 MG/DL (ref 70–110)
GLUCOSE BLD STRIP.AUTO-MCNC: 129 MG/DL (ref 70–110)
GLUCOSE BLD STRIP.AUTO-MCNC: 137 MG/DL (ref 70–110)
GLUCOSE BLD STRIP.AUTO-MCNC: 138 MG/DL (ref 70–110)

## 2021-04-23 PROCEDURE — 97110 THERAPEUTIC EXERCISES: CPT

## 2021-04-23 PROCEDURE — 97530 THERAPEUTIC ACTIVITIES: CPT

## 2021-04-23 PROCEDURE — 99232 SBSQ HOSP IP/OBS MODERATE 35: CPT | Performed by: INTERNAL MEDICINE

## 2021-04-23 PROCEDURE — 97116 GAIT TRAINING THERAPY: CPT

## 2021-04-23 PROCEDURE — 65310000000 HC RM PRIVATE REHAB

## 2021-04-23 PROCEDURE — 82962 GLUCOSE BLOOD TEST: CPT

## 2021-04-23 PROCEDURE — 97535 SELF CARE MNGMENT TRAINING: CPT

## 2021-04-23 PROCEDURE — 74011250637 HC RX REV CODE- 250/637: Performed by: INTERNAL MEDICINE

## 2021-04-23 RX ORDER — GABAPENTIN 100 MG/1
100 CAPSULE ORAL 2 TIMES DAILY
Status: DISCONTINUED | OUTPATIENT
Start: 2021-04-24 | End: 2021-04-29 | Stop reason: HOSPADM

## 2021-04-23 RX ORDER — GABAPENTIN 100 MG/1
200 CAPSULE ORAL
Status: DISCONTINUED | OUTPATIENT
Start: 2021-04-23 | End: 2021-04-29 | Stop reason: HOSPADM

## 2021-04-23 RX ADMIN — OXYCODONE HYDROCHLORIDE 10 MG: 5 TABLET ORAL at 21:17

## 2021-04-23 RX ADMIN — ZOLPIDEM TARTRATE 5 MG: 5 TABLET ORAL at 22:04

## 2021-04-23 RX ADMIN — METFORMIN HYDROCHLORIDE 850 MG: 850 TABLET ORAL at 08:06

## 2021-04-23 RX ADMIN — RIVAROXABAN 20 MG: 20 TABLET, FILM COATED ORAL at 08:06

## 2021-04-23 RX ADMIN — ASPIRIN 81 MG CHEWABLE TABLET 81 MG: 81 TABLET CHEWABLE at 08:06

## 2021-04-23 RX ADMIN — ACETAMINOPHEN 650 MG: 325 TABLET, FILM COATED ORAL at 12:21

## 2021-04-23 RX ADMIN — Medication 250 MG: at 08:06

## 2021-04-23 RX ADMIN — METHOCARBAMOL 500 MG: 500 TABLET ORAL at 08:06

## 2021-04-23 RX ADMIN — CARVEDILOL 3.12 MG: 6.25 TABLET, FILM COATED ORAL at 08:06

## 2021-04-23 RX ADMIN — METHOCARBAMOL 500 MG: 500 TABLET ORAL at 21:17

## 2021-04-23 RX ADMIN — EZETIMIBE 10 MG: 10 TABLET ORAL at 08:06

## 2021-04-23 RX ADMIN — GABAPENTIN 200 MG: 100 CAPSULE ORAL at 21:17

## 2021-04-23 RX ADMIN — OXYCODONE HYDROCHLORIDE 10 MG: 5 TABLET ORAL at 13:41

## 2021-04-23 RX ADMIN — CARVEDILOL 3.12 MG: 6.25 TABLET, FILM COATED ORAL at 16:00

## 2021-04-23 RX ADMIN — OXYCODONE HYDROCHLORIDE 10 MG: 5 TABLET ORAL at 06:37

## 2021-04-23 RX ADMIN — GABAPENTIN 100 MG: 100 CAPSULE ORAL at 08:06

## 2021-04-23 RX ADMIN — ACETAMINOPHEN 650 MG: 325 TABLET, FILM COATED ORAL at 16:00

## 2021-04-23 RX ADMIN — Medication 5 MG: at 21:17

## 2021-04-23 RX ADMIN — METFORMIN HYDROCHLORIDE 850 MG: 850 TABLET ORAL at 16:00

## 2021-04-23 RX ADMIN — ACETAMINOPHEN 650 MG: 325 TABLET, FILM COATED ORAL at 08:06

## 2021-04-23 RX ADMIN — METHOCARBAMOL 500 MG: 500 TABLET ORAL at 14:39

## 2021-04-23 RX ADMIN — GABAPENTIN 100 MG: 100 CAPSULE ORAL at 14:39

## 2021-04-23 RX ADMIN — DULOXETINE HYDROCHLORIDE 30 MG: 30 CAPSULE, DELAYED RELEASE ORAL at 08:06

## 2021-04-23 RX ADMIN — DOCUSATE SODIUM 100 MG: 100 CAPSULE, LIQUID FILLED ORAL at 08:06

## 2021-04-23 RX ADMIN — ATORVASTATIN CALCIUM 80 MG: 40 TABLET, FILM COATED ORAL at 08:06

## 2021-04-23 RX ADMIN — Medication 1 TABLET: at 08:06

## 2021-04-23 NOTE — PROGRESS NOTES
[facility] CH[facility]    Bedside and Verbal shift change report given to Rafael Maloney RN (oncoming nurse) by Halima Lamas (offgoing nurse). Report included the following information SBAR, Kardex, MAR and Recent Results.     Situation:   Code Status: Full Code   Reason for Admission: right AKA  Hospital Day: 8   Problem List:   Hospital Problems  Date Reviewed: 4/22/2021          Codes Class Noted POA    * (Principal) Status post above-knee amputation of right lower extremity ([facility] ICD-10-CM: C39.797  ICD-9-CM: V49.76  4/11/2021 Yes    Overview Signed 4/15/2021  5:14 PM by Gabriella Payne MD     S/P Right above-the-knee amputation (4/11/2021  Dr. Saniya Dietz)             Ischemia of right BKA site [facility]) ICD-10-CM: T87.89, I99.8  ICD-9-CM: 997.69, 459.9  4/7/2021 Yes        Infection of right below knee amputation [facility]) ICD-10-CM: T87.43  ICD-9-CM: 997.62  4/7/2021 Yes    Overview Signed 4/15/2021  5:17 PM by Gabriella Payne MD     S/P Washout of right below-knee amputation; Right knee disarticulation (4/7/2021  Dr. Artemio Farley); S/P Exploration of right knee disarticulation and control of hemorrhage (4/8/2021  Dr. Artemio Farley)             Peripheral artery disease ([facility] (Chronic) ICD-10-CM: I73.9  ICD-9-CM: 443.9  Unknown Yes        Type 2 diabetes mellitus, without long-term current use of insulin (HCC) (Chronic) ICD-10-CM: E11.9  ICD-9-CM: 250.00  Unknown Yes    Overview Addendum 4/15/2021  5:40 PM by Gabriella Payne MD     HbA1c (4/8/2021) = 9.8; HbA1c (3/6/2021) = 12.9             Factor V Leiden mutation ([facility] (Chronic) ICD-10-CM: Y30.85  ICD-9-CM: 289.81  Unknown Yes        Hypertensive heart disease with chronic systolic congestive heart failure (HCC) (Chronic) ICD-10-CM: I11.0, I50.22  ICD-9-CM: 402.91, 428.22  Unknown Yes    Overview Signed 3/26/2021 11:06 PM by Gabriella Payne MD     2D echocardiogram (12/17/2015) showed EF 35%; genealized hypokinesis more focally severe of the inferior and posterior segments; mild mitral regurgitation; trace tricuspud regurgitation; normal pulmonary artery pressure             Anticoagulated by anticoagulation treatment (Chronic) ICD-10-CM: Z79.01  ICD-9-CM: V58.61  Unknown Yes    Overview Signed 3/26/2021  4:36 PM by Florentino Mead MD     On Rivaroxaban             Mixed hyperlipidemia (Chronic) ICD-10-CM: E78.2  ICD-9-CM: 272.2  Unknown Yes    Overview Signed 3/26/2021 11:07 PM by Florentino Mead MD     Lipid profile (7/25/2018) showed , , HDL 27,              Major depressive disorder ICD-10-CM: F32.9  ICD-9-CM: 296.20  3/24/2021 Yes    Overview Signed 3/26/2021 11:31 PM by Florentino Mead MD     On Duloxetine             History of right below knee amputation [facility]) ICD-10-CM: Z89.511  ICD-9-CM: V49.75  3/22/2021 Yes    Overview Signed 3/26/2021  5:43 PM by Florentino Mead MD     S/P Right below-the-knee amputation (3/22/2021 - Dr. Salinas Roberson)             Impaired mobility and ADLs ICD-10-CM: Z74.09, Z78.9  ICD-9-CM: V49.89  3/22/2021 Yes        Critical ischemia of lower extremity ([facility] ICD-10-CM: E97.476  ICD-9-CM: 459.9  3/22/2021 Yes    Overview Signed 3/26/2021  5:42 PM by Florentino Mead MD     Right             Acute blood loss as cause of postoperative anemia ICD-10-CM: D62  ICD-9-CM: 285.1  3/22/2021 Yes        History of embolectomy ICD-10-CM: Z98.890  ICD-9-CM: V45.89  3/5/2021 Yes    Overview Signed 3/26/2021  5:39 PM by Florentino Mead MD     S/P Right lower extremity arterial embolectomy                   Background:   Past Medical History:   Past Medical History:   Diagnosis Date    Acute blood loss as cause of postoperative anemia 3/22/2021    Anticoagulated by anticoagulation treatment     On Rivaroxaban    Cardiomyopathy ([facility] 12/17/2015    2D echocardiogram (3/24/2021) showed EF 37%; global hypokinesis of left ventricle; grade I diastolic dysfunction; 2D echocardiogram (12/17/2015) showed EF 35%; generalized hypokinesis more focally severe of the inferior and posterior segments; mild mitral regurgitation; trace tricuspud regurgitation; normal pulmonary artery pressure    Chronic systolic heart failure (HCC)     2D echocardiogram (3/24/2021) showed EF 37%; global hypokinesis of left ventricle; grade I diastolic dysfunction; 2D echocardiogram (12/17/2015) showed EF 35%; generalized hypokinesis more focally severe of the inferior and posterior segments; mild mitral regurgitation; trace tricuspud regurgitation; normal pulmonary artery pressure    Coronary artery disease involving native coronary artery of native heart     COVID-19 ruled out by laboratory testing 3/22/2021    SARS-CoV-2 ([employer] [facility]2000, [facility]) (3/22/2021):  Not detected     Critical ischemia of lower extremity ([facility] 3/22/2021    Right    Factor V Leiden mutation ([facility]     Grade I diastolic dysfunction 34/33/9235    2D echocardiogram (3/24/2021) showed EF 37%; global hypokinesis of left ventricle; grade I diastolic dysfunction    History of deep venous thrombosis (DVT) of distal vein of right lower extremity     History of epilepsy     History of head injury     History of myocardial infarction     History of noncompliance with medical treatment 3/18/2021    Hypertensive heart disease with chronic systolic congestive heart failure ([facility]     2D echocardiogram (3/24/2021) showed EF 37%; global hypokinesis of left ventricle; grade I diastolic dysfunction; 2D echocardiogram (12/17/2015) showed EF 35%; genealized hypokinesis more focally severe of the inferior and posterior segments; mild mitral regurgitation; trace tricuspud regurgitation; normal pulmonary artery pressure    Infection of right below knee amputation ([facility] 4/7/2021    S/P Washout of right below-knee amputation; Right knee disarticulation (4/7/2021  Dr. Jan Campos); S/P Exploration of right knee disarticulation and control of hemorrhage (4/8/2021  Dr. Jan Campos)    Long term current use of aspirin     Major depressive disorder 03/24/2021    On Duloxetine    Migraine headache     Mitral valve prolapse     Mixed hyperlipidemia     Lipid profile (7/25/2018) showed , , HDL 27,     Obstructive sleep apnea     On statin therapy due to risk of future cardiovascular event     On Atorvastatin    Peripheral artery disease ([address]     Type 2 diabetes mellitus, without long-term current use of insulin ([facility])     HbA1c (4/8/2021) = 9.8; HbA1c (3/6/2021) = 65.6    Umbilical hernia     Wears glasses       Patient taking anticoagulants yes    Patient has a defibrillator: no     Assessment:   Changes in Assessment throughout shift: no acute changes noted throughout the shift     Patient has central line: no Reasons if yes: n/a  Insertion date:n/a Last dressing date:n/a   Patient has Ellis Cath: no Reasons if yes: n/a   Insertion date:n/a     Last Vitals:     Vitals:    04/22/21 0757 04/22/21 1515 04/22/21 2100 04/23/21 0706   BP: 105/76 113/72 115/73 112/73   Pulse: 96 87 84 88   Resp: 18 18 18 16   Temp: 97.8 °F (36.6 °C) 97.7 °F (36.5 °C) 97.2 °F (36.2 °C) 97.4 °F (36.3 °C)   SpO2: 100% 99% 100% 99%   Height:            PAIN    Pain Assessment    Pain Intensity 1: 0 (04/23/21 0917) Pain Intensity 1: 2 (12/29/14 1105)    Pain Location 1: Leg Pain Location 1: Abdomen    Pain Intervention(s) 1: Medication (see MAR) Pain Intervention(s) 1: Medication (see MAR)  Patient Stated Pain Goal: 0 Patient Stated Pain Goal: 0  o Intervention effective: yes    o Other actions taken for pain: repositioning      Skin Assessment  Skin color Skin Color: Appropriate for ethnicity  Condition/Temperature Skin Condition/Temp: Dry, Warm  Integrity Skin Integrity: Incision (comment)  Turgor Turgor: Non-tenting  Weekly Pressure Ulcer Documentation  Pressure  Injury Documentation: No Pressure Injury Noted-Pressure Ulcer Prevention Initiated  Wound Prevention & Protection Methods  Orientation of wound Orientation of Wound Prevention: Posterior  Location of Prevention Location of Wound Prevention: Sacrum/Coccyx  Dressing Present Dressing Present : Yes  Dressing Status Dressing Status: Intact  Wound Offloading Wound Offloading (Prevention Methods): Bed, pressure redistribution/air     INTAKE/OUPUT  Date 04/22/21 0700 - 04/23/21 0659 04/23/21 0700 - 04/24/21 0659   Shift 6526-2480 0930-1597 24 Hour Total 0511-7342 7503-4041 24 Hour Total   INTAKE   Shift Total         OUTPUT   Urine  1325 1325        Urine Voided  1325 1325        Urine Occurrence(s) 1 x 2 x 3 x 1 x  1 x   Stool           Stool Occurrence(s) 0 x 2 x 2 x 0 x  0 x   Shift Total  1325 1325      NET  -1325 -1325      Weight (kg)             Recommendations:  1. Patient needs and requests: education     2. Diet: Cardiac consistent carb diet with thin liquids    3. Pending tests/procedures: AM labs     4. Functional Level/Equipment: 1 person assist     5. Estimated Discharge Date: TBD Posted on Whiteboard in Patients Room: [facility] Safety Check    A safety check occurred in the patient's room between off going nurse and oncoming nurse listed above. The safety check included the below items  Area Items   H  High Alert Medications - Verify all high alert medication drips (heparin, PCA, etc.)   E  Equipment - Suction is set up for ALL patients (with yanker)  - Red plugs utilized for all equipment (IV pumps, etc.)  - WOWs wiped down at end of shift.  - Room stocked with oxygen, suction, and other unit-specific supplies   A  Alarms - Bed alarm is set for fall risk patients  - Ensure chair alarm is in place and activated if patient is up in a chair   L  Lines - Check IV for any infiltration  - Ellis bag is empty if patient has a Ellis   - Tubing and IV bags are labeled   S  Safety   - Room is clean, patient is clean, and equipment is clean. - Hallways are clear from equipment besides carts.    - Fall bracelet on for fall risk patients  - Ensure room is clear and free of clutter  - Suction is set up for ALL patients (with faraz)  - Hallways are clear from equipment besides carts.    - Isolation precautions followed, supplies available outside room, sign posted         Raul Li

## 2021-04-23 NOTE — PROGRESS NOTES
[id number]  [address] [place] [address], [address]     INPATIENT REHABILITATION  DAILY PROGRESS NOTE     Date: 4/23/2021    Name: Vincent Riggs Age / Sex: 60 y.o. / male   CSN: [id number] MRN: [id number]   [facility] Date: 4/15/2021 Length of Stay: 8 days     Primary Rehab Diagnosis: Impaired Mobility and ADLs secondary to:  1. S/P Right above-the-knee amputation (4/11/2021  Dr. Chava Chiang)  2. S/P Exploration of right knee disarticulation and control of hemorrhage (4/8/2021  Dr. Popeye Fuentes)  3. S/P Washout of right below-knee amputation; Right knee disarticulation (4/7/2021  Dr. Popeye Fuentes)  4. History of infection and ischemia of right BKA stump (4/7/2021)  5. S/P Right below-the-knee amputation (3/22/2021 - Dr. Ayana Basilio)  6. History of critical ischemia of the right lower extremity  7. History of right lower extremity arterial embolectomy (3/5/2021 - Dr. Ayana Basilio)      Subjective:     Patient seen and examined. Blood pressure controlled. Blood glucose controlled. Patient's Complaint:   Phantom limb pain on RLE    Pain Control: ongoing significant pain in which is stable and controlled by current meds      Objective:     Vital Signs:  Patient Vitals for the past 24 hrs:   BP Temp Pulse Resp SpO2   04/23/21 0706 112/73 97.4 °F (36.3 °C) 88 16 99 %   04/22/21 2100 115/73 97.2 °F (36.2 °C) 84 18 100 %   04/22/21 1515 113/72 97.7 °F (36.5 °C) 87 18 99 %        Physical Examination:  GENERAL SURVEY: Patient is awake, alert, oriented x 3, laying comfortably on the bed, not in acute respiratory distress.   HEENT: pale palpebral conjunctivae, anicteric sclerae, no nasoaural discharge, moist oral mucosa  NECK: supple, no jugular venous distention, no palpable lymph nodes  CHEST/LUNGS: symmetrical chest expansion, good air entry, clear breath sounds  HEART: adynamic precordium, good S1 S2, no S3, regular rhythm, no murmurs  ABDOMEN: flat, bowel sounds appreciated, soft, non-tender  EXTREMITIES: (+) right AKA stump, (+) absence of hair on LLE, (+) healing wound with scab on lateral surface of distal third of left leg with surrounding hyperpigmentation, (+) LLE cool to touch, pale nailbeds, no edema, full and equal pulses, no calf tenderness   NEUROLOGICAL EXAM: The patient is awake, alert and oriented x3, able to answer questions fairly appropriately, able to follow 1 and 2 step commands. Able to tell time from the wall clock. Cranial nerves II-XII are grossly intact. No gross sensory deficit.   Motor strength is 4+/5 on BUE, 4/5 on the RLE, 4 to 4+/5 on the LLE.     Incision(s): covered, clean, dry, and intact       Current Medications:  Current Facility-Administered Medications   Medication Dose Route Frequency    metFORMIN (GLUCOPHAGE) tablet 850 mg  850 mg Oral BID WITH MEALS    methocarbamoL (ROBAXIN) tablet 500 mg  500 mg Oral TID    0.9% sodium chloride infusion 250 mL  250 mL IntraVENous PRN    diphenhydrAMINE (BENADRYL) capsule 25 mg  25 mg Oral Q6H PRN    ferrous gluconate 324 mg (37.5 mg iron) tablet 1 Tab  324 mg Oral DAILY WITH BREAKFAST    ascorbic acid (vitamin C) (VITAMIN C) tablet 250 mg  250 mg Oral DAILY WITH BREAKFAST    acetaminophen (TYLENOL) tablet 650 mg  650 mg Oral Q4H PRN    bisacodyL (DULCOLAX) tablet 10 mg  10 mg Oral Q48H PRN    insulin lispro (HUMALOG) injection   SubCUTAneous TIDAC    carvediloL (COREG) tablet 3.125 mg  3.125 mg Oral BID WITH MEALS    gabapentin (NEURONTIN) capsule 100 mg  100 mg Oral TID    aspirin chewable tablet 81 mg  81 mg Oral DAILY WITH BREAKFAST    atorvastatin (LIPITOR) tablet 80 mg  80 mg Oral DAILY    docusate sodium (COLACE) capsule 100 mg  100 mg Oral DAILY AFTER BREAKFAST    DULoxetine (CYMBALTA) capsule 30 mg  30 mg Oral DAILY    ezetimibe (ZETIA) tablet 10 mg  10 mg Oral DAILY    acetaminophen (TYLENOL) tablet 650 mg  650 mg Oral TID    oxyCODONE IR (ROXICODONE) tablet 5-10 mg 5-10 mg Oral Q4H PRN    rivaroxaban (XARELTO) tablet 20 mg  20 mg Oral DAILY    melatonin (rapid dissolve) tablet 5 mg  5 mg Oral QHS    zolpidem (AMBIEN) tablet 5 mg  5 mg Oral QHS PRN       Allergies: Allergies   Allergen Reactions    Nitroglycerin Other (comments)      BP drops rapidly when he takes SL Nitro          [place] Diarrhea and Rash    Cat Dander Cough    Codeine Rash    [place] Rash       Lab/Data Review:  Recent Results (from the past 24 hour(s))   GLUCOSE, POC    Collection Time: 04/22/21  4:34 PM   Result Value Ref Range    Glucose (POC) 134 (H) 70 - 110 mg/dL   GLUCOSE, POC    Collection Time: 04/22/21  8:30 PM   Result Value Ref Range    Glucose (POC) 137 (H) 70 - 110 mg/dL   GLUCOSE, POC    Collection Time: 04/23/21  7:09 AM   Result Value Ref Range    Glucose (POC) 123 (H) 70 - 110 mg/dL   GLUCOSE, POC    Collection Time: 04/23/21 11:35 AM   Result Value Ref Range    Glucose (POC) 138 (H) 70 - 110 mg/dL       Assessment:     Primary Rehab Diagnosis  1. Impaired Mobility and ADLs  2. S/P Right above-the-knee amputation (4/11/2021  Dr. Barbie Sandoval)  3. S/P Exploration of right knee disarticulation and control of hemorrhage (4/8/2021  Dr. Porfirio Barker)  4. S/P Washout of right below-knee amputation; Right knee disarticulation (4/7/2021  Dr. Porfirio Barker)  5. History of infection and ischemia of right BKA stump (4/7/2021)  6. S/P Right below-the-knee amputation (3/22/2021 - Dr. Gurvinder Baron)  7. History of critical ischemia of the right lower extremity  8.  History of right lower extremity arterial embolectomy (3/5/2021 - Dr. Gurvinder Baron)    Comorbidities  Patient Active Problem List   Diagnosis Code    History of right below knee amputation ([facility] Z89.511    Impaired mobility and ADLs Z74.09, Z78.9    Critical ischemia of lower extremity ([facility] I70.229    Peripheral artery disease (HCC) I73.9    Coronary artery disease involving native coronary artery of native heart I25.10    Type 2 diabetes mellitus, without long-term current use of insulin (HCC) E11.9    History of epilepsy Z86.69    Factor V Leiden mutation ([facility] D68.51    Migraine headache G43.909    Wears glasses D28.8    Umbilical hernia J50.3    Obstructive sleep apnea G47.33    ICD (implantable cardioverter-defibrillator) in place Z95.810    Mitral valve prolapse I34.1    History of head injury Z87.828    Hypertensive heart disease with chronic systolic congestive heart failure (HCC) I11.0, I50.22    History of myocardial infarction I25.2    Long term current use of aspirin Z79.82    On statin therapy due to risk of future cardiovascular event Z79.899    History of deep venous thrombosis (DVT) of distal vein of right lower extremity Z86.718    Anticoagulated by anticoagulation treatment Z79.01    Mixed hyperlipidemia E78.2    History of embolectomy Z98.890    COVID-19 ruled out by laboratory testing Z20.822    Acute blood loss as cause of postoperative anemia D62    Cardiomyopathy ([facility] I42.9    Chronic systolic heart failure (HCC) I50.22    History of noncompliance with medical treatment Z91.19    Major depressive disorder F32.9    Grade I diastolic dysfunction F40.6    Constipation K59.00    Ischemia of right BKA site ([facility]) T87.89, I99.8    Infection of right below knee amputation ([facility]) T87.43    Status post above-knee amputation of right lower extremity ([facility] Z89.611        Plan:     1. Justification for continued stay: Good progression towards established rehabilitation goals. 2. Medical Issues being followed closely:    [x]  Fall and safety precautions     [x]  Wound Care     [x]  Bowel and Bladder Function     [x]  Fluid Electrolyte and Nutrition Balance     [x]  Pain Control      3.  Issues that 24 hour rehabilitation nursing is following:    [x]  Fall and safety precautions     [x]  Wound Care     [x]  Bowel and Bladder Function     [x]  Fluid Electrolyte and Nutrition Balance [x]  Pain Control      [x]  Assistance with and education on in-room safety with transfers to and from the bed, wheelchair, toilet and shower. 4. Acute rehabilitation plan of care:    [x]  Continue current care and rehab. [x]  Physical Therapy           [x]  Occupational Therapy           []  Speech Therapy     []  Hold Rehab until further notice     5. Medications:    [x]  MAR Reviewed     [x]  Continue Present Medications     6. DVT Prophylaxis:      []  Enoxaparin     []  Unfractionated Heparin     []  Warfarin     [x]  NOAC     []  NEREYDA Stockings     []  Sequential Compression Device     []  None     7. Code status    [x]  Full code     []  Partial code     []  Do not intubate     []  Do not resuscitate     8. Orders:   > S/P Right above-the-knee amputation (4/11/2021  Dr. Jayesh España); S/P Exploration of right knee disarticulation and control of hemorrhage (4/8/2021  Dr. Raheem Salas); S/P Washout of right below-knee amputation; Right knee disarticulation (4/7/2021  Dr. Raheem Salas); History of infection and ischemia of right BKA stump (4/7/2021); S/P Right below-the-knee amputation (3/22/2021 - Dr. Betty Almeida); History of critical ischemia of the right lower extremity;  History of right lower extremity arterial embolectomy (3/5/2021 - Dr. Betty Almeida)   > Staples to be removed on 5/19/2021    > Acute Postoperative Blood Loss Anemia   > Hgb/Hct (3/26/2021) = 10.1/31.3   > Hgb/Hct (3/27/2021, on admission to ARU) = 10.9/33.9   > Anemia work-up showed serum iron 23, TIBC 200, iron % saturation 12, ferritin 835   > Hgb/Hct (3/29/2021) = 10.7/33.9       04/14/21  0423 04/13/21  0640 04/12/21  0539 04/11/21  1027 04/11/21  0650 04/10/21  1915 04/10/21  0640 04/09/21  0453 04/08/21  1602 04/08/21  0940 04/08/21  0210 04/07/21  0905 04/06/21  0850   HGB 7.2* 7.3* 7.3* 7.6* 7.9* 7.7* 6.3* 7.4* 6.5* 7.0* 7.8* 9.5* 10.8*   HCT 23.1* 23.2* 23.0*  --  24.4* 23.8* 20.0* 22.7* 21.5* 22.7* 25.4* 30.4* 34.2*      > Hgb/Hct (4/16/2021, on admission) = 6.6/21.1   > Anemia work-up (4/16/2021) showed serum iron 22, TIBC 202, iron % saturation 11, ferritin 545, reticulocyte count 5.1   > On 4/16/2021, patient was transfused 1 unit pRBC properly typed and crossmatched   > Hgb/Hct (4/17/2021) = 9.5/29.8    > On 4/17/2021, started:    > Ferrous gluconate 324 mg PO once daily with breakfast     > Ascorbic Acid 250 mg PO once daily with breakfast (to enhance the absorption of the FeSO4)    > Hgb/Hct (4/19/2021) = 8.9/28.9    > Hgb/Hct (4/22/2021) = 8.9/28.5    > Continue:    > Ferrous gluconate 324 mg PO once daily with breakfast     > Ascorbic Acid 250 mg PO once daily with breakfast (to enhance the absorption of the FeSO4)     > Coronary artery disease; Peripheral artery disease    > On 3/27/2021, decreased Carvedilol from 6.25 mg to 3.125 mg PO BID with meals (8AM, 5PM)   > Continue:    > Aspirin 81 mg PO once daily with breakfast    > Atorvastatin 80 mg PO once daily    > Carvedilol 3.125 mg PO BID with meals (8AM, 5PM)    > Hypertensive heart disease with chronic systolic heart failure; Cardiomyopathy; Chronic systolic heart failure; Grade I diastolic dysfunction   > 2D echocardiogram (12/17/2015) showed EF 35%; genealized hypokinesis more focally severe of the inferior and posterior segments; mild mitral regurgitation; trace tricuspud regurgitation; normal pulmonary artery pressure   > 2D echocardiogram (3/24/2021) showed EF 37%; global hypokinesis of left ventricle; grade I diastolic dysfunction   > On 3/27/2021, decreased Carvedilol from 6.25 mg to 3.125 mg PO BID with meals (8AM, 5PM)   > On 3/28/2021, held:    > Furosemide 40 mg PO once daily (9AM)    > Lisinopril 5 mg PO once daily (9AM)   > Continue Carvedilol 3.125 mg PO BID with meals (8AM, 5PM)    > Factor V Leiden mutation; History of deep venous thrombosis (DVT) of right lower extremity;  Anticoagulated on Rivaroxaban   > Continue Rivaroxaban 20 mg PO once daily with breakfast    > Major depressive disorder   > Continue Duloxetine 30 mg PO once daily    > Mixed hyperlipidemia   > Lipid profile (7/25/2018) showed , , HDL 27,    > Lipid profile (3/27/2021) showed , , HDL 35, LDL 63   > Continue:    > Atorvastatin 80 mg PO once daily    > Ezetimibe 10 mg PO q HS    > Type 2 diabetes mellitus, poorly controlled, without long-term current use of insulin   > HbA1c (3/6/2021) = 12.9    > HbA1c (4/8/2021) = 9.8   > On admission to the ARU:    > Started Metformin 500 mg PO BID with meals    > Decreased Insulin glargine from 8 units to 5 units SC q HS   > On 4/20/2021:    > Started Alogliptin 25 mg PO once daily    > Discontinued Insulin glargine 5 units SC q HS   > On 4/21/2021:    > Discontinued Alogliptin 25 mg PO once daily    > Increased Metformin from 500 mg to 850 mg PO BID with meals   > Continue:    > Metformin 850 mg PO BID with meals    > Insulin lispro sliding scale SC TID AC only     > Difficulty sleeping   > Continue:    > Melatonin 5 mg PO q HS    > Zolpidem 5 mg PO q HS PRN for sleep    > Wound on lateral surface of distal third of left leg   > Wound Care Nurse consult for wound care recommendations    > COVID-19 ruled out by laboratory testing   > SARS-CoV-2 ([facility], [facility]) (3/22/2021): Not detected   > SARS-CoV-2 ([facility], [facility]) (collected 4/7/2021, resulted 4/8/2021): Not detected    > COVID-19 rapid test ([facility], [facility]) (4/7/2021): Not detected   > COVID-19 rapid test ([facility], [facility]) (4/14/2021): Not detected   > SARS-CoV-2 ([facility], [facility]) (collected 4/15//2021, resulted 4/16/2021):  Not detected    > Analgesia   > Continue:    > Acetaminophen 650 mg PO TID (8AM, 12PM, 4PM)    > Acetaminophen 650 mg PO q 4 hr PRN for pain level 4/10 or lesser (from 8PM to 4AM only)    > Duloxetine 30 mg PO once daily    > Increase Gabapentin from 100 mg PO TID to:     > Gabapentin 100 mg PO BID (8AM, 2PM)     > Gabapentin 200 mg PO q HS (8PM)    > Methocarbamol 500 mg PO TID    > Oxycodone 5-10 mg PO q 4 hr PRN for pain level 5/10 or greater     > Diet:   > Specifications: Cardiac, diabetic, consistent carb 1800 kcal   > Solids (consistency): Regular    > Liquids (consistency): Thin    > Fluid restriction: None      9. Personal Protective Equipment was used while interacting with patient including: goggles and KN95 face mask. Patient was using a surgical mask. 10. Patient's progress in rehabilitation and medical issues discussed with the patient. All questions answered to the best of my ability. Care plan discussed with patient and nurse. 11. Total clinical care time is 30 minutes, including review of chart including all labs, radiology, past medical history, and discussion with patient. Greater than 50% of my time was spent in coordination of care and counseling.       Shawn Núñez MD    April 23, 2021

## 2021-04-23 NOTE — PROGRESS NOTES
Problem: Mobility Impaired (Adult and Pediatric)  Goal: *Therapy Goal (Edit Goal, Insert Text)  Description: Physical Therapy Short Term Goals  Initiated 4/16/2021 and to be accomplished within 7 day(s) on 4/23/2021:  1. Patient will move from supine to sit and sit to supine , scoot up and down, and roll side to side in bed with modified independence. 2.  Patient will transfer from bed to chair and chair to bed with supervision/set-up using the least restrictive device. 3.  Patient will perform sit to stand with supervision/set-up. 4.  Patient will ambulate with supervision/set-up for 15 feet with the least restrictive device. 5.  Patient will perform w/c mobility for 150 ft at supervision level over even surfaces. Physical Therapy Long Term Goals  Initiated 4/16/2021 and to be accomplished within 14 day(s) on 4/30/2021:  1. Patient will move from supine to sit and sit to supine , scoot up and down, and roll side to side in bed with independence. 2.  Patient will transfer from bed to chair and chair to bed with modified independence using the least restrictive device. 3.  Patient will perform sit to stand with modified independence. 4.  Patient will ambulate with modified independence for 25 feet with the least restrictive device. 5.  Patient will perform w/c mobility for at least 150 ft at modified independent level. 6.  Patient will ascend/descend ramp for safe entry/exit of home using wheelchair with supervision. Outcome: Progressing Towards Goal  PHYSICAL THERAPY TREATMENT    Patient: Can Bosch (48 y.o. male)  Date: 4/23/2021  Diagnosis: Status post above-knee amputation of right lower extremity (HCC) [Z89.611] Status post above-knee amputation of right lower extremity (HCC)  Precautions: Fall(right AKA)  Chart, physical therapy assessment, plan of care and goals were reviewed.     Time In:0805  Time Out:0900   Time In: 2473  Time Out: 1227    Patient seen for: Therapeutic exercise;Gait training;Balance activities    Pain:  Pt pain was reported as 3 pre-treatment. Pt pain was reported as 3 post-treatment. Intervention: rest breaks as needed    Patient identified with name and :    SUBJECTIVE:      \"I'm hurting less than yesterday. \"    OBJECTIVE DATA SUMMARY:    Objective:     GROSS ASSESSMENT Daily Assessment            BED/MAT MOBILITY Daily Assessment     Rolling Right : 7 (Independent)  Rolling Left : 7 (Independent)  Supine to Sit : 7 (Independent)  Sit to Supine : 7 (Independent)      TRANSFERS Daily Assessment     Other: squat pivot  Transfer Assistance : 5 (Supervision/setup)  Sit to Stand Assistance: Supervision        GAIT Daily Assessment    Gait Description (WDL)      Gait Abnormalities      Assistive device Walker, rolling;Gait belt    Ambulation assistance - level surface 4 (Contact guard assistance)    Distance 70 Feet (ft)    Ambulation assistance- uneven surface      Comments Pt demonstrated good foot clearance when ambulating until he starts to fatigue. STEPS/STAIRS Daily Assessment     Steps/Stairs ambulated      Assistance Required     Rail Use      Comments      Curbs/Ramps          BALANCE Daily Assessment     Sitting - Static: Good (unsupported)  Sitting - Dynamic: Good (unsupported)  Standing - Static: (fair to fair+ with RW)    Sitting edge of mat patient participated in throwing and catching activity and beachball volleyball to increase trunk control and dynamic sitting balance. Second session patient performed stand reaching and throwing activity to increase standing balance with one hand support on the RW; Pt was provided CGA support for balance and had one minor loss of balance that required min A to remain standing.         WHEELCHAIR MOBILITY Daily Assessment      mod I with w/c mobility on even surfaces        THERAPEUTIC EXERCISES Daily Assessment       Mat exercises 2x15 in supine, prone, and sidelying        ASSESSMENT:  Pt continues to progress well with mobility and standing balance. During beach ball volley ball patient's residual limb did get hit with the ball one time, pt reported increased pain for several minutes which then resolved and no opening of his surgical incision was noted. Continuing to provide education to patient on activity pacing as he continues to push himself until he is too fatigued to mobilize safely. Progression toward goals:  [x]      Improving appropriately and progressing toward goals  []      Improving slowly and progressing toward goals  []      Not making progress toward goals and plan of care will be adjusted      PLAN:  Patient continues to benefit from skilled intervention to address the above impairments. Continue treatment per established plan of care. Discharge Recommendations:  Home Health  Further Equipment Recommendations for Discharge:  rolling walker and wheelchair       Estimated Discharge Date:4/29/2021    Activity Tolerance:   fair  Please refer to the flowsheet for vital signs taken during this treatment.     After treatment:   Patient left sitting in w/c in his room with needs in reach      Neisha Skinner PT

## 2021-04-23 NOTE — PROGRESS NOTES
Problem: Self Care Deficits Care Plan (Adult)  Goal: *Acute Goals and Plan of Care (Insert Text)  Description: Occupational Therapy Goals   Long Term Goals  Initiated 4/16/2021 and to be accomplished within 2 week(s)  1. Pt will perform self-feeding with independence. 2. Pt will perform grooming with mod I in standing at the sink. 3. Pt will perform UB bathing with mod I with set up of necessary items. ( 4/22/2021)  4. Pt will perform LB bathing with mod I with set up of necessary items. ( 4/22/2021)  5. Pt will perform tub/shower transfer with mod I.   6. Pt will perform UB dressing with mod I with set up of necessary items. ( 4/22/2021)  7. Pt will perform LB dressing with mod I with set up of necessary items. 8. Pt will perform toileting task with mod I.  9. Pt will perform toilet transfer with mod I with RW. Short Term Goals   Initiated 4/16/2021 and to be accomplished within 7 day(s) (4/23/2021). Re-assessed 4/23/2021. 1. Pt will perform self-feeding with mod I and no set-up A. ( 4/23/2021)  2. Pt will perform grooming with mod I. ( 4/22/2021)  3. Pt will perform UB bathing with mod I. ( 4/22/2021)  4. Pt will perform LB bathing with mod I. ( 4/22/2021)  5. Pt will perform tub/shower transfer with mod I. - At supervision - can transfer from RW into shower; t/s T with S for practice  6. Pt will perform UB dressing with mod I. ( 4/22/2021)  7. Pt will perform LB dressing with mod I.  - At supervision; can progress to Mod I   8. Pt will perform toileting task with mod I. - At supervision; progress to mod I  9. Pt will perform toilet transfer with mod I with w/c as needed. - At supervision; progress to mod I with RW as appropriate.         Outcome: Progressing Towards Goal    OT WEEKLY PROGRESS NOTE  Patient Cleo Footman      Time In: 1000  Time Out: 7816    Time In: 1133  Time Out: 9479    Medical Diagnosis:  Status post above-knee amputation of right lower extremity ([facility] [address] [T66.449] Status post above-knee amputation of right lower extremity (HCC)     Pain at start of tx:-/10 pain or discomfort. Pain at stop of tx:-/10 pain or discomfort. Patient identified with name and : Yes  Subjective: \"I continue to feel more confident with these activities, but still a little anxious. \"       Objective: Pt tolerated functional strengthening and IADL tasks for session. ADL assessment per chart review from previous day's work (2021). Outcome Measures:      AROM: Pt tolerated functional strengthening tasks in order to improve UE strength for ADL and IADL tasks.    Pt utilized 4 lb dowel nataliia to complete shoulder presses, flex/ext and bicep curls with 5lb dowel nataliia - 3x15 each   Pt utilized green flex bar to complete wrist sup/pro, flex/ext - 3x15      COGNITION/PERCEPTION Initial Assessment Weekly Progress Assessment 2021   Premorbid Reading Status       Premorbid Writing Status       Arousal/Alertness       Orientation Level Oriented X4 Oriented X4   Visual Fields       Praxis       Body Scheme       COMPREHENSION MODE Initial Assessment Weekly Progress Assessment 2021   Primary Mode of Comprehension Auditory Auditory   Hearing Aide None None   Corrective Lenses       Score 6 6     EXPRESSION Initial Assessment Weekly Progress Assessment 2021   Primary Mode of Expression Verbal Verbal   Score 6 6   Comments         SOCIAL INTERACTION/ PRAGMATICS Initial Assessment Weekly Progress Assessment 2021   Score 6 6   Comments         PROBLEM SOLVING Initial Assessment Weekly Progress Assessment 2021   Score 6 6   Comments         MEMORY Initial Assessment Weekly Progress Assessment 2021   Score 6 6   Comments         EATING Initial Assessment Weekly Progress Assessment 2021   Functional Level 6 6    Comments    Seated in w/c with tray     GROOMING Initial Assessment Weekly Progress Assessment 2021   Functional Level 6  6   Tasks completed by patient       Comments seated in w/c at sink Comments: seated in w/c     BATHING Initial Assessment Weekly Progress Assessment 4/23/2021   Functional Level 5 Functional Level: 6         Body parts patient bathed Abdomen, Arm, left, Arm, right, Buttocks, Chest, Lower leg and foot, left, [place] area, Thigh, left, Thigh, right     Comments Seated on TTB in shower       TUB/SHOWER TRANSFER INDEPENDENCE Initial Assessment Weekly Progress Assessment 4/23/2021   Score 5  5   Comments Supervision for safety. Transfer from w/c to TTB with gaitbelt and grab bars Comments: Supervision for safety  Simulation on 4/22/2021 into T/S combo with RW, shower chair, grab bars     UPPER BODY DRESSING/UNDRESSING Initial Assessment Weekly Progress Assessment 4/23/2021   Functional Level 6 6    Items applied/Steps completed       Comments T-shirt       LOWER BODY DRESSING/UNDRESSING Initial Assessment Weekly Progress Assessment 4/23/2021   Functional Level 5  5   Items applied/Steps completed Elastic waist pants (3 steps), Sock, left (1 step)     Comments SBA for safety Comments: Supervision     TOILETING Initial Assessment Weekly Progress Assessment 4/23/2021   Functional Level 5 6    Comments         TOILET TRANSFER INDEPENDENCE Initial Assessment Weekly Progress Assessment 4/23/2021   Transfer score 5 5    Comments SBA for safety Comments: Supervision       IADL TASKS:  Pt completed simulation of collecting groceries/laundry after delivery at his residence and putting away while seated in w/c. Trial 1 - 3 bags with light items inside - placing groceries on lap and propelling self 30 ft to simulate moving in/out of house - competed in 2 trips. Trial 2 - 3 bags with additional 2 or 4 pound weights - placing grocers on lap - completed in 3 trips.   Trial 3 - 3 bags w/o weight - placing one bag on lap and 2 on handles of w/c and traveling over doorway \"bump\"   Performed with Mod I/Supervision for safety    Pt tolerated simulation of putting grocery items away in refrigerator, cabinets, and freezer utilizing w/c d/t ability to use at home and gait belt. And then cleaning up at end. Pt completed with Mod I/Supervision for safety only when standing up from w/c to place items in cabinets. ASSESSMENT:  Pt continues to make great progress towards goal attainment. Pt tolerated functional exercises prior to simulated IADL tasks for assistance upon return to home. Progressing goals appropriately. Pt to attempt tasks in standing next in order to achieve goals and improve activity tolerance. Progression toward goals:  [x]          Improving appropriately and progressing toward goals  []          Improving slowly and progressing toward goals  []          Not making progress toward goals and plan of care will be adjusted     PLAN:  Patient continues to benefit from skilled intervention to address the above impairments. Continue treatment per established plan of care. Discharge Recommendations:  Home Health  Further Equipment Recommendations for Discharge:  3in1 commode, grab bars     Please refer to the flow sheet for vital signs taken during this treatment. After treatment:   [x]  Patient left in no apparent distress sitting up in chair  []  Patient left in no apparent distress in bed  [x]  Call bell left within reach  []  Nursing notified  []  Caregiver present  [x]  Bed/Chair alarm activated    COMMUNICATION/EDUCATION:   [x] Home safety education was provided and the patient/caregiver indicated understanding. [x] Patient/family have participated as able in goal setting and plan of care. [x] Patient/family agree to work toward stated goals and plan of care. [] Patient understands intent and goals of therapy, but is neutral about his/her participation. [] Patient is unable to participate in goal setting and plan of care. Plan of Care: Please see Care Plan for updated STG/LTGs.    Family Training:  None at this time; returning home alone  Estimated LOS: 1 week (4/30/2021)    LEA Mclain, OTR/L  4/23/2021

## 2021-04-23 NOTE — PROGRESS NOTES
SHIFT CHANGE NOTE FOR [facility]    Bedside and Verbal shift change report given to Gavin Pinzon RN (oncoming nurse) by Emili Acosta RN (offgoing nurse). Report included the following information SBAR, Kardex, MAR and Recent Results.     Situation:   Code Status: Full Code   Reason for Admission: right AKA  Hospital Day: 8   Problem List:   Hospital Problems  Date Reviewed: 4/22/2021          Codes Class Noted POA    * (Principal) Status post above-knee amputation of right lower extremity ([facility] ICD-10-CM: N24.236  ICD-9-CM: V49.76  4/11/2021 Yes    Overview Signed 4/15/2021  5:14 PM by Martha Hook MD     S/P Right above-the-knee amputation (4/11/2021  Dr. Galina Jones)             Ischemia of right BKA site [facility]) ICD-10-CM: T87.89, I99.8  ICD-9-CM: 997.69, 459.9  4/7/2021 Yes        Infection of right below knee amputation [facility]) ICD-10-CM: T87.43  ICD-9-CM: 997.62  4/7/2021 Yes    Overview Signed 4/15/2021  5:17 PM by Martha Hook MD     S/P Washout of right below-knee amputation; Right knee disarticulation (4/7/2021  Dr. Denise Covington); S/P Exploration of right knee disarticulation and control of hemorrhage (4/8/2021  Dr. Denise Covington)             Peripheral artery disease ([facility] (Chronic) ICD-10-CM: I73.9  ICD-9-CM: 443.9  Unknown Yes        Type 2 diabetes mellitus, without long-term current use of insulin (HCC) (Chronic) ICD-10-CM: E11.9  ICD-9-CM: 250.00  Unknown Yes    Overview Addendum 4/15/2021  5:40 PM by Martha Hook MD     HbA1c (4/8/2021) = 9.8; HbA1c (3/6/2021) = 12.9             Factor V Leiden mutation ([facility] (Chronic) ICD-10-CM: F43.71  ICD-9-CM: 289.81  Unknown Yes        Hypertensive heart disease with chronic systolic congestive heart failure (HCC) (Chronic) ICD-10-CM: I11.0, I50.22  ICD-9-CM: 402.91, 428.22  Unknown Yes    Overview Signed 3/26/2021 11:06 PM by Martha Hook MD     2D echocardiogram (12/17/2015) showed EF 35%; genealized hypokinesis more focally severe of the inferior and posterior segments; mild mitral regurgitation; trace tricuspud regurgitation; normal pulmonary artery pressure             Anticoagulated by anticoagulation treatment (Chronic) ICD-10-CM: Z79.01  ICD-9-CM: V58.61  Unknown Yes    Overview Signed 3/26/2021  4:36 PM by Analy Poole MD     On Rivaroxaban             Mixed hyperlipidemia (Chronic) ICD-10-CM: E78.2  ICD-9-CM: 272.2  Unknown Yes    Overview Signed 3/26/2021 11:07 PM by Analy Poole MD     Lipid profile (7/25/2018) showed , , HDL 27,              Major depressive disorder ICD-10-CM: F32.9  ICD-9-CM: 296.20  3/24/2021 Yes    Overview Signed 3/26/2021 11:31 PM by Analy Poole MD     On Duloxetine             History of right below knee amputation [facility]) ICD-10-CM: Z89.511  ICD-9-CM: V49.75  3/22/2021 Yes    Overview Signed 3/26/2021  5:43 PM by Analy Poole MD     S/P Right below-the-knee amputation (3/22/2021 - Dr. Alli Mark)             Impaired mobility and ADLs ICD-10-CM: Z74.09, Z78.9  ICD-9-CM: V49.89  3/22/2021 Yes        Critical ischemia of lower extremity ([facility] ICD-10-CM: H04.251  ICD-9-CM: 459.9  3/22/2021 Yes    Overview Signed 3/26/2021  5:42 PM by Analy Poole MD     Right             Acute blood loss as cause of postoperative anemia ICD-10-CM: D62  ICD-9-CM: 285.1  3/22/2021 Yes        History of embolectomy ICD-10-CM: Z98.890  ICD-9-CM: V45.89  3/5/2021 Yes    Overview Signed 3/26/2021  5:39 PM by Analy Poole MD     S/P Right lower extremity arterial embolectomy                   Background:   Past Medical History:   Past Medical History:   Diagnosis Date    Acute blood loss as cause of postoperative anemia 3/22/2021    Anticoagulated by anticoagulation treatment     On Rivaroxaban    Cardiomy[facility] ([facility] 12/17/2015    2D echocardiogram (3/24/2021) showed EF 37%; global hypokinesis of left ventricle; grade I diastolic dysfunction; 2D echocardiogram (12/17/2015) showed EF 35%; generalized hypokinesis more focally severe of the inferior and posterior segments; mild mitral regurgitation; trace tricuspud regurgitation; normal pulmonary artery pressure    Chronic systolic heart failure (HCC)     2D echocardiogram (3/24/2021) showed EF 37%; global hypokinesis of left ventricle; grade I diastolic dysfunction; 2D echocardiogram (12/17/2015) showed EF 35%; generalized hypokinesis more focally severe of the inferior and posterior segments; mild mitral regurgitation; trace tricuspud regurgitation; normal pulmonary artery pressure    Coronary artery disease involving native coronary artery of native heart     COVID-19 ruled out by laboratory testing 3/22/2021    SARS-CoV-2 ([employer] [facility], [facility]) (3/22/2021):  Not detected     Critical ischemia of lower extremity ([facility] [address] 3/22/2021    Right    Factor V Leiden mutation ([facility] [address]     Grade I diastolic dysfunction 03/91/8661    2D echocardiogram (3/24/2021) showed EF 37%; global hypokinesis of left ventricle; grade I diastolic dysfunction    History of deep venous thrombosis (DVT) of distal vein of right lower extremity     History of epilepsy     History of head injury     History of myocardial infarction     History of noncompliance with medical treatment 3/18/2021    Hypertensive heart disease with chronic systolic congestive heart failure ([facility] [address]     2D echocardiogram (3/24/2021) showed EF 37%; global hypokinesis of left ventricle; grade I diastolic dysfunction; 2D echocardiogram (12/17/2015) showed EF 35%; genealized hypokinesis more focally severe of the inferior and posterior segments; mild mitral regurgitation; trace tricuspud regurgitation; normal pulmonary artery pressure    Infection of right below knee amputation ([facility] [address] 4/7/2021    S/P Washout of right below-knee amputation; Right knee disarticulation (4/7/2021  Dr. Hugh Neri); S/P Exploration of right knee disarticulation and control of hemorrhage (4/8/2021  Dr. Hugh Neri)   Liseth Dudley Long term current use of aspirin     Major depressive disorder 03/24/2021    On Duloxetine    Migraine headache     Mitral valve prolapse     Mixed hyperlipidemia     Lipid profile (7/25/2018) showed , , HDL 27,     Obstructive sleep apnea     On statin therapy due to risk of future cardiovascular event     On Atorvastatin    Peripheral artery disease ([facility]     Type 2 diabetes mellitus, without long-term current use of insulin ([facility])     HbA1c (4/8/2021) = 9.8; HbA1c (3/6/2021) = 14.5    Umbilical hernia     Wears glasses       Patient taking anticoagulants yes    Patient has a defibrillator: no     Assessment:   Changes in Assessment throughout shift: no acute changes noted throughout the shift     Patient has central line: no Reasons if yes: n/a  Insertion date:n/a Last dressing date:n/a   Patient has Ellis Cath: no Reasons if yes: n/a   Insertion date:n/a     Last Vitals:     Vitals:    04/22/21 0757 04/22/21 1515 04/22/21 2100 04/23/21 0706   BP: 105/76 113/72 115/73 112/73   Pulse: 96 87 84 88   Resp: 18 18 18 16   Temp: 97.8 °F (36.6 °C) 97.7 °F (36.5 °C) 97.2 °F (36.2 °C) 97.4 °F (36.3 °C)   SpO2: 100% 99% 100% 99%   Height:            PAIN    Pain Assessment    Pain Intensity 1: 7 (04/23/21 0637) Pain Intensity 1: 2 (12/29/14 1105)    Pain Location 1: Leg Pain Location 1: Abdomen    Pain Intervention(s) 1: Medication (see MAR) Pain Intervention(s) 1: Medication (see MAR)  Patient Stated Pain Goal: 0 Patient Stated Pain Goal: 0  o Intervention effective: yes    o Other actions taken for pain: repositioning      Skin Assessment  Skin color Skin Color: Appropriate for ethnicity  Condition/Temperature Skin Condition/Temp: Dry, Warm  Integrity Skin Integrity: Incision (comment)  Turgor Turgor: Non-tenting  Weekly Pressure Ulcer Documentation  Pressure  Injury Documentation: No Pressure Injury Noted-Pressure Ulcer Prevention Initiated  Wound Prevention & Protection Methods  Orientation of wound Orientation of Wound Prevention: Posterior  Location of Prevention Location of Wound Prevention: Buttocks, Sacrum/Coccyx  Dressing Present Dressing Present : Yes  Dressing Status Dressing Status: Intact  Wound Offloading Wound Offloading (Prevention Methods): Bed, pressure redistribution/air     INTAKE/OUPUT  Date 04/22/21 0700 - 04/23/21 0659 04/23/21 0700 - 04/24/21 0659   Shift 3217-6887 [id number] 24 Hour Total 8276-5683 8377-8592 24 Hour Total   INTAKE   Shift Total         OUTPUT   Urine  1325 1325        Urine Voided  1325 1325        Urine Occurrence(s) 1 x 2 x 3 x      Stool           Stool Occurrence(s) 0 x 2 x 2 x      Shift Total  1325 1325      NET  -1325 -1325      Weight (kg)             Recommendations:  1. Patient needs and requests: education     2. Diet: Cardiac consistent carb diet with thin liquids    3. Pending tests/procedures: AM labs     4. Functional Level/Equipment: 1 person assist     5. Estimated Discharge Date: TBD Posted on Whiteboard in Patients Room: [facility] Safety Check    A safety check occurred in the patient's room between off going nurse and oncoming nurse listed above. The safety check included the below items  Area Items   H  High Alert Medications - Verify all high alert medication drips (heparin, PCA, etc.)   E  Equipment - Suction is set up for ALL patients (with yanker)  - Red plugs utilized for all equipment (IV pumps, etc.)  - WOWs wiped down at end of shift.  - Room stocked with oxygen, suction, and other unit-specific supplies   A  Alarms - Bed alarm is set for fall risk patients  - Ensure chair alarm is in place and activated if patient is up in a chair   L  Lines - Check IV for any infiltration  - Ellis bag is empty if patient has a Ellis   - Tubing and IV bags are labeled   S  Safety   - Room is clean, patient is clean, and equipment is clean. - Hallways are clear from equipment besides carts.    - Fall bracelet on for fall risk patients  - Ensure room is clear and free of clutter  - Suction is set up for ALL patients (with faraz)  - Hallways are clear from equipment besides carts.    - Isolation precautions followed, supplies available outside room, sign posted         Cathy Crisostomo RN

## 2021-04-24 ENCOUNTER — PATIENT OUTREACH (OUTPATIENT)
Dept: CASE MANAGEMENT | Age: 56
End: 2021-04-24

## 2021-04-24 LAB
GLUCOSE BLD STRIP.AUTO-MCNC: 118 MG/DL (ref 70–110)
GLUCOSE BLD STRIP.AUTO-MCNC: 123 MG/DL (ref 70–110)
GLUCOSE BLD STRIP.AUTO-MCNC: 149 MG/DL (ref 70–110)
GLUCOSE BLD STRIP.AUTO-MCNC: 160 MG/DL (ref 70–110)

## 2021-04-24 PROCEDURE — 74011636637 HC RX REV CODE- 636/637: Performed by: INTERNAL MEDICINE

## 2021-04-24 PROCEDURE — 74011250637 HC RX REV CODE- 250/637: Performed by: INTERNAL MEDICINE

## 2021-04-24 PROCEDURE — 82962 GLUCOSE BLOOD TEST: CPT

## 2021-04-24 PROCEDURE — 65310000000 HC RM PRIVATE REHAB

## 2021-04-24 RX ADMIN — Medication 5 MG: at 21:28

## 2021-04-24 RX ADMIN — Medication 1 TABLET: at 08:00

## 2021-04-24 RX ADMIN — CARVEDILOL 3.12 MG: 6.25 TABLET, FILM COATED ORAL at 08:01

## 2021-04-24 RX ADMIN — GABAPENTIN 100 MG: 100 CAPSULE ORAL at 14:42

## 2021-04-24 RX ADMIN — ACETAMINOPHEN 650 MG: 325 TABLET, FILM COATED ORAL at 16:34

## 2021-04-24 RX ADMIN — ZOLPIDEM TARTRATE 5 MG: 5 TABLET ORAL at 21:35

## 2021-04-24 RX ADMIN — OXYCODONE HYDROCHLORIDE 10 MG: 5 TABLET ORAL at 14:42

## 2021-04-24 RX ADMIN — OXYCODONE HYDROCHLORIDE 10 MG: 5 TABLET ORAL at 05:40

## 2021-04-24 RX ADMIN — RIVAROXABAN 20 MG: 20 TABLET, FILM COATED ORAL at 08:01

## 2021-04-24 RX ADMIN — METHOCARBAMOL 500 MG: 500 TABLET ORAL at 21:29

## 2021-04-24 RX ADMIN — ACETAMINOPHEN 650 MG: 325 TABLET, FILM COATED ORAL at 12:08

## 2021-04-24 RX ADMIN — ASPIRIN 81 MG CHEWABLE TABLET 81 MG: 81 TABLET CHEWABLE at 08:00

## 2021-04-24 RX ADMIN — GABAPENTIN 200 MG: 100 CAPSULE ORAL at 21:29

## 2021-04-24 RX ADMIN — ATORVASTATIN CALCIUM 80 MG: 40 TABLET, FILM COATED ORAL at 08:00

## 2021-04-24 RX ADMIN — METFORMIN HYDROCHLORIDE 850 MG: 850 TABLET ORAL at 08:00

## 2021-04-24 RX ADMIN — INSULIN LISPRO 2 UNITS: 100 INJECTION, SOLUTION INTRAVENOUS; SUBCUTANEOUS at 16:47

## 2021-04-24 RX ADMIN — METFORMIN HYDROCHLORIDE 850 MG: 850 TABLET ORAL at 16:11

## 2021-04-24 RX ADMIN — OXYCODONE HYDROCHLORIDE 10 MG: 5 TABLET ORAL at 18:44

## 2021-04-24 RX ADMIN — ACETAMINOPHEN 650 MG: 325 TABLET, FILM COATED ORAL at 08:00

## 2021-04-24 RX ADMIN — GABAPENTIN 100 MG: 100 CAPSULE ORAL at 08:00

## 2021-04-24 RX ADMIN — DULOXETINE HYDROCHLORIDE 30 MG: 30 CAPSULE, DELAYED RELEASE ORAL at 08:00

## 2021-04-24 RX ADMIN — EZETIMIBE 10 MG: 10 TABLET ORAL at 08:00

## 2021-04-24 RX ADMIN — METHOCARBAMOL 500 MG: 500 TABLET ORAL at 14:42

## 2021-04-24 RX ADMIN — CARVEDILOL 3.12 MG: 6.25 TABLET, FILM COATED ORAL at 16:11

## 2021-04-24 RX ADMIN — Medication 250 MG: at 08:00

## 2021-04-24 RX ADMIN — OXYCODONE HYDROCHLORIDE 10 MG: 5 TABLET ORAL at 10:31

## 2021-04-24 RX ADMIN — METHOCARBAMOL 500 MG: 500 TABLET ORAL at 08:00

## 2021-04-24 RX ADMIN — DOCUSATE SODIUM 100 MG: 100 CAPSULE, LIQUID FILLED ORAL at 08:01

## 2021-04-24 NOTE — PROGRESS NOTES
[facility]    Bedside and Verbal shift change report given to Jose Dotson LPN (oncoming nurse) by Sosa Mercer (offgoing nurse). Report included the following information SBAR, Kardex, MAR and Recent Results.     Situation:   Code Status: Full Code   Reason for Admission: right AKA  Hospital Day: 9   Problem List:   Hospital Problems  Date Reviewed: 4/23/2021          Codes Class Noted POA    * (Principal) Status post above-knee amputation of right lower extremity ([facility] ICD-10-CM: J18.772  ICD-9-CM: V49.76  4/11/2021 Yes    Overview Signed 4/15/2021  5:14 PM by Linette Florian MD     S/P Right above-the-knee amputation (4/11/2021  Dr. Kiarra Katz)             Ischemia of right BKA site [facility]) ICD-10-CM: T87.89, I99.8  ICD-9-CM: 997.69, 459.9  4/7/2021 Yes        Infection of right below knee amputation [facility]) ICD-10-CM: T87.43  ICD-9-CM: 997.62  4/7/2021 Yes    Overview Signed 4/15/2021  5:17 PM by Linette Florian MD     S/P Washout of right below-knee amputation; Right knee disarticulation (4/7/2021  Dr. Gamaliel Patel); S/P Exploration of right knee disarticulation and control of hemorrhage (4/8/2021  Dr. Gamaliel Patel)             Peripheral artery disease ([facility] (Chronic) ICD-10-CM: I73.9  ICD-9-CM: 443.9  Unknown Yes        Type 2 diabetes mellitus, without long-term current use of insulin (HCC) (Chronic) ICD-10-CM: E11.9  ICD-9-CM: 250.00  Unknown Yes    Overview Addendum 4/15/2021  5:40 PM by Linette Florian MD     HbA1c (4/8/2021) = 9.8; HbA1c (3/6/2021) = 12.9             Factor V Leiden mutation ([facility] (Chronic) ICD-10-CM: S58.67  ICD-9-CM: 289.81  Unknown Yes        Hypertensive heart disease with chronic systolic congestive heart failure (HCC) (Chronic) ICD-10-CM: I11.0, I50.22  ICD-9-CM: 402.91, 428.22  Unknown Yes    Overview Signed 3/26/2021 11:06 PM by Linette Florian MD     2D echocardiogram (12/17/2015) showed EF 35%; genealized hypokinesis more focally severe of the inferior and posterior segments; mild mitral regurgitation; trace tricuspud regurgitation; normal pulmonary artery pressure             Anticoagulated by anticoagulation treatment (Chronic) ICD-10-CM: Z79.01  ICD-9-CM: V58.61  Unknown Yes    Overview Signed 3/26/2021  4:36 PM by Courtney Umanzor MD     On Rivaroxaban             Mixed hyperlipidemia (Chronic) ICD-10-CM: E78.2  ICD-9-CM: 272.2  Unknown Yes    Overview Signed 3/26/2021 11:07 PM by Courtney Umanzor MD     Lipid profile (7/25/2018) showed , , HDL 27,              Major depressive disorder ICD-10-CM: F32.9  ICD-9-CM: 296.20  3/24/2021 Yes    Overview Signed 3/26/2021 11:31 PM by Courtney Umanzor MD     On Duloxetine             History of right below knee amputation [facility]) ICD-10-CM: Z89.511  ICD-9-CM: V49.75  3/22/2021 Yes    Overview Signed 3/26/2021  5:43 PM by Courtney Umanzor MD     S/P Right below-the-knee amputation (3/22/2021 - Dr. Janis Brian)             Impaired mobility and ADLs ICD-10-CM: Z74.09, Z78.9  ICD-9-CM: V49.89  3/22/2021 Yes        Critical ischemia of lower extremity ([facility] ICD-10-CM: P90.349  ICD-9-CM: 459.9  3/22/2021 Yes    Overview Signed 3/26/2021  5:42 PM by Courtney Umanzor MD     Right             Acute blood loss as cause of postoperative anemia ICD-10-CM: D62  ICD-9-CM: 285.1  3/22/2021 Yes        History of embolectomy ICD-10-CM: Z98.890  ICD-9-CM: V45.89  3/5/2021 Yes    Overview Signed 3/26/2021  5:39 PM by Courtney Umanzor MD     S/P Right lower extremity arterial embolectomy                   Background:   Past Medical History:   Past Medical History:   Diagnosis Date    Acute blood loss as cause of postoperative anemia 3/22/2021    Anticoagulated by anticoagulation treatment     On Rivaroxaban    [facility]17/2015    2D echocardiogram (3/24/2021) showed EF 37%; global hypokinesis of left ventricle; grade I diastolic dysfunction; 2D echocardiogram (12/17/2015) showed EF 35%; generalized hypokinesis more focally severe of the inferior and posterior segments; mild mitral regurgitation; trace tricuspud regurgitation; normal pulmonary artery pressure    Chronic systolic heart failure (HCC)     2D echocardiogram (3/24/2021) showed EF 37%; global hypokinesis of left ventricle; grade I diastolic dysfunction; 2D echocardiogram (12/17/2015) showed EF 35%; generalized hypokinesis more focally severe of the inferior and posterior segments; mild mitral regurgitation; trace tricuspud regurgitation; normal pulmonary artery pressure    Coronary artery disease involving native coronary artery of native heart     COVID-19 ruled out by laboratory testing 3/22/2021    SARS-CoV-2 ([place] [facility]2000, [facility]) (3/22/2021):  Not detected     Critical ischemia of lower extremity ([facility] 3/22/2021    Right    Factor V Leiden mutation ([facility]     Grade I diastolic dysfunction 25/78/9774    2D echocardiogram (3/24/2021) showed EF 37%; global hypokinesis of left ventricle; grade I diastolic dysfunction    History of deep venous thrombosis (DVT) of distal vein of right lower extremity     History of epilepsy     History of head injury     History of myocardial infarction     History of noncompliance with medical treatment 3/18/2021    Hypertensive heart disease with chronic systolic congestive heart failure ([facility]     2D echocardiogram (3/24/2021) showed EF 37%; global hypokinesis of left ventricle; grade I diastolic dysfunction; 2D echocardiogram (12/17/2015) showed EF 35%; genealized hypokinesis more focally severe of the inferior and posterior segments; mild mitral regurgitation; trace tricuspud regurgitation; normal pulmonary artery pressure    Infection of right below knee amputation ([facility] 4/7/2021    S/P Washout of right below-knee amputation; Right knee disarticulation (4/7/2021  Dr. Victor M Scherer); S/P Exploration of right knee disarticulation and control of hemorrhage (4/8/2021  Dr. Victor M Scherer)    Long term current use of aspirin     Major depressive disorder 03/24/2021    On Duloxetine    Migraine headache     Mitral valve prolapse     Mixed hyperlipidemia     Lipid profile (7/25/2018) showed , , HDL 27,     Obstructive sleep apnea     On statin therapy due to risk of future cardiovascular event     On Atorvastatin    Peripheral artery disease ([address]     Type 2 diabetes mellitus, without long-term current use of insulin ([facility])     HbA1c (4/8/2021) = 9.8; HbA1c (3/6/2021) = 65.6    Umbilical hernia     Wears glasses       Patient taking anticoagulants yes    Patient has a defibrillator: no     Assessment:   Changes in Assessment throughout shift: no acute changes noted throughout the shift     Patient has central line: no Reasons if yes: n/a  Insertion date:n/a Last dressing date:n/a   Patient has Ellis Cath: no Reasons if yes: n/a   Insertion date:n/a     Last Vitals:     Vitals:    04/23/21 0706 04/23/21 1529 04/23/21 2105 04/24/21 0707   BP: 112/73 112/77 118/76 115/72   Pulse: 88 89 86 90   Resp: 16 18 18 16   Temp: 97.4 °F (36.3 °C) 97.5 °F (36.4 °C) 97.5 °F (36.4 °C) 97.3 °F (36.3 °C)   SpO2: 99% 95% 99% 99%   Height:            PAIN    Pain Assessment    Pain Intensity 1: 0 (04/24/21 1136) Pain Intensity 1: 2 (12/29/14 1105)    Pain Location 1: Leg Pain Location 1: Abdomen    Pain Intervention(s) 1: Medication (see MAR) Pain Intervention(s) 1: Medication (see MAR)  Patient Stated Pain Goal: 0 Patient Stated Pain Goal: 0  o Intervention effective: yes    o Other actions taken for pain: repositioning      Skin Assessment  Skin color Skin Color: Appropriate for ethnicity  Condition/Temperature Skin Condition/Temp: Dry, Warm  Integrity Skin Integrity: Incision (comment)  Turgor Turgor: Non-tenting  Weekly Pressure Ulcer Documentation  Pressure  Injury Documentation: No Pressure Injury Noted-Pressure Ulcer Prevention Initiated  Wound Prevention & Protection Methods  Orientation of wound Orientation of Wound Prevention: Posterior  Location of Prevention Location of Wound Prevention: Sacrum/Coccyx  Dressing Present Dressing Present : Yes  Dressing Status Dressing Status: Intact  Wound Offloading Wound Offloading (Prevention Methods): Bed, pressure redistribution/air     INTAKE/OUPUT  Date 04/23/21 0700 - 04/24/21 0659 04/24/21 0700 - 04/25/21 0659   Shift 9822-4764 7214-7559 24 Hour Total 4596-4286 5648-5148 24 Hour Total   INTAKE   Shift Total         OUTPUT   Urine  1450 1450        Urine Voided  1450 1450        Urine Occurrence(s) 3 x 2 x 5 x 2 x  2 x   Stool           Stool Occurrence(s) 1 x 0 x 1 x 1 x  1 x   Shift Total  1450 1450      NET  -1450 -1450      Weight (kg)             Recommendations:  1. Patient needs and requests: education     2. Diet: Cardiac consistent carb diet with thin liquids    3. Pending tests/procedures: AM labs     4. Functional Level/Equipment: 1 person assist     5. Estimated Discharge Date: TBD Posted on Whiteboard in Patients Room: [facility] Safety Check    A safety check occurred in the patient's room between off going nurse and oncoming nurse listed above. The safety check included the below items  Area Items   H  High Alert Medications - Verify all high alert medication drips (heparin, PCA, etc.)   E  Equipment - Suction is set up for ALL patients (with yanker)  - Red plugs utilized for all equipment (IV pumps, etc.)  - WOWs wiped down at end of shift.  - Room stocked with oxygen, suction, and other unit-specific supplies   A  Alarms - Bed alarm is set for fall risk patients  - Ensure chair alarm is in place and activated if patient is up in a chair   L  Lines - Check IV for any infiltration  - Ellis bag is empty if patient has a Ellis   - Tubing and IV bags are labeled   S  Safety   - Room is clean, patient is clean, and equipment is clean. - Hallways are clear from equipment besides carts.    - Fall bracelet on for fall risk patients  - Ensure room is clear and free of clutter  - Suction is set up for ALL patients (with sendyker)  - Hallways are clear from equipment besides carts.    - Isolation precautions followed, supplies available outside room, sign posted         Paul Bee

## 2021-04-24 NOTE — PROGRESS NOTES
4/24/2021 8:22  AM    CTN reviewed chart. Noted that patient was admitted to [facility] 4/7/2021 to 4/15/2021 for an infection to the above the knee amputation. He was also tested for COVID- the result was not detected. Patient was then transferred to [facility] inpatient rehab unit. Did not call today. Will postpone call another week as patient is still in inpatient rehab. Will follow.

## 2021-04-24 NOTE — PROGRESS NOTES
[facility]    Bedside and Verbal shift change report given to Chidi Santos RN (oncoming nurse) by Kiya Batista RN (offgoing nurse). Report included the following information SBAR, Kardex, MAR and Recent Results.     Situation:   Code Status: Full Code   Reason for Admission: right AKA  Hospital Day: 9   Problem List:   Hospital Problems  Date Reviewed: 4/23/2021          Codes Class Noted POA    * (Principal) Status post above-knee amputation of right lower extremity ([facility] ICD-10-CM: O00.395  ICD-9-CM: V49.76  4/11/2021 Yes    Overview Signed 4/15/2021  5:14 PM by Tita Alvarez MD     S/P Right above-the-knee amputation (4/11/2021  Dr. Niru Elise)             Ischemia of right BKA site [facility]) ICD-10-CM: T87.89, I99.8  ICD-9-CM: 997.69, 459.9  4/7/2021 Yes        Infection of right below knee amputation [facility]) ICD-10-CM: T87.43  ICD-9-CM: 997.62  4/7/2021 Yes    Overview Signed 4/15/2021  5:17 PM by Tita Alvarez MD     S/P Washout of right below-knee amputation; Right knee disarticulation (4/7/2021  Dr. Maynor Yusuf); S/P Exploration of right knee disarticulation and control of hemorrhage (4/8/2021  Dr. Maynor Yusuf)             Peripheral artery disease ([facility] (Chronic) ICD-10-CM: I73.9  ICD-9-CM: 443.9  Unknown Yes        Type 2 diabetes mellitus, without long-term current use of insulin (HCC) (Chronic) ICD-10-CM: E11.9  ICD-9-CM: 250.00  Unknown Yes    Overview Addendum 4/15/2021  5:40 PM by Tita Alvarez MD     HbA1c (4/8/2021) = 9.8; HbA1c (3/6/2021) = 12.9             Factor V Leiden mutation ([facility] (Chronic) ICD-10-CM: O02.96  ICD-9-CM: 289.81  Unknown Yes        Hypertensive heart disease with chronic systolic congestive heart failure (HCC) (Chronic) ICD-10-CM: I11.0, I50.22  ICD-9-CM: 402.91, 428.22  Unknown Yes    Overview Signed 3/26/2021 11:06 PM by Tita Alvarez MD     2D echocardiogram (12/17/2015) showed EF 35%; genealized hypokinesis more focally severe of the inferior and posterior segments; mild mitral regurgitation; trace tricuspud regurgitation; normal pulmonary artery pressure             Anticoagulated by anticoagulation treatment (Chronic) ICD-10-CM: Z79.01  ICD-9-CM: V58.61  Unknown Yes    Overview Signed 3/26/2021  4:36 PM by Last Gacria MD     On Rivaroxaban             Mixed hyperlipidemia (Chronic) ICD-10-CM: E78.2  ICD-9-CM: 272.2  Unknown Yes    Overview Signed 3/26/2021 11:07 PM by Last Garcia MD     Lipid profile (7/25/2018) showed , , HDL 27,              Major depressive disorder ICD-10-CM: F32.9  ICD-9-CM: 296.20  3/24/2021 Yes    Overview Signed 3/26/2021 11:31 PM by Last Garcia MD     On Duloxetine             History of right below knee amputation [facility]) ICD-10-CM: Z89.511  ICD-9-CM: V49.75  3/22/2021 Yes    Overview Signed 3/26/2021  5:43 PM by Last Garcia MD     S/P Right below-the-knee amputation (3/22/2021 - Dr. Sulma Hernandez)             Impaired mobility and ADLs ICD-10-CM: Z74.09, Z78.9  ICD-9-CM: V49.89  3/22/2021 Yes        Critical ischemia of lower extremity ([facility] ICD-10-CM: Y29.957  ICD-9-CM: 459.9  3/22/2021 Yes    Overview Signed 3/26/2021  5:42 PM by Last Garcia MD     Right             Acute blood loss as cause of postoperative anemia ICD-10-CM: D62  ICD-9-CM: 285.1  3/22/2021 Yes        History of embolectomy ICD-10-CM: Z98.890  ICD-9-CM: V45.89  3/5/2021 Yes    Overview Signed 3/26/2021  5:39 PM by Last Garcia MD     S/P Right lower extremity arterial embolectomy                   Background:   Past Medical History:   Past Medical History:   Diagnosis Date    Acute blood loss as cause of postoperative anemia 3/22/2021    Anticoagulated by anticoagulation treatment     On Rivaroxaban    Cardiomyopathy ([facility] 12/17/2015    2D echocardiogram (3/24/2021) showed EF 37%; global hypokinesis of left ventricle; grade I diastolic dysfunction; 2D echocardiogram (12/17/2015) showed EF 35%; generalized hypokinesis more focally severe of the inferior and posterior segments; mild mitral regurgitation; trace tricuspud regurgitation; normal pulmonary artery pressure    Chronic systolic heart failure (HCC)     2D echocardiogram (3/24/2021) showed EF 37%; global hypokinesis of left ventricle; grade I diastolic dysfunction; 2D echocardiogram (12/17/2015) showed EF 35%; generalized hypokinesis more focally severe of the inferior and posterior segments; mild mitral regurgitation; trace tricuspud regurgitation; normal pulmonary artery pressure    Coronary artery disease involving native coronary artery of native heart     COVID-19 ruled out by laboratory testing 3/22/2021    SARS-CoV-2 ([employer] [facility]2000, [facility]) (3/22/2021):  Not detected     Critical ischemia of lower extremity ([facility] 3/22/2021    Right    Factor V Leiden mutation ([facility]     Grade I diastolic dysfunction 91/52/6508    2D echocardiogram (3/24/2021) showed EF 37%; global hypokinesis of left ventricle; grade I diastolic dysfunction    History of deep venous thrombosis (DVT) of distal vein of right lower extremity     History of epilepsy     History of head injury     History of myocardial infarction     History of noncompliance with medical treatment 3/18/2021    Hypertensive heart disease with chronic systolic congestive heart failure ([address]     2D echocardiogram (3/24/2021) showed EF 37%; global hypokinesis of left ventricle; grade I diastolic dysfunction; 2D echocardiogram (12/17/2015) showed EF 35%; genealized hypokinesis more focally severe of the inferior and posterior segments; mild mitral regurgitation; trace tricuspud regurgitation; normal pulmonary artery pressure    Infection of right below knee amputation ([address] 4/7/2021    S/P Washout of right below-knee amputation; Right knee disarticulation (4/7/2021  Dr. Shannan Macdonald); S/P Exploration of right knee disarticulation and control of hemorrhage (4/8/2021  Dr. Shannan Macdonald)   [facility] Long term current use of aspirin     Major depressive disorder 03/24/2021    On Duloxetine    Migraine headache     Mitral valve prolapse     Mixed hyperlipidemia     Lipid profile (7/25/2018) showed , , HDL 27,     Obstructive sleep apnea     On statin therapy due to risk of future cardiovascular event     On Atorvastatin    Peripheral artery disease ([facility]     Type 2 diabetes mellitus, without long-term current use of insulin ([facility])     HbA1c (4/8/2021) = 9.8; HbA1c (3/6/2021) = 93.8    Umbilical hernia     Wears glasses       Patient taking anticoagulants yes    Patient has a defibrillator: no     Assessment:   Changes in Assessment throughout shift: no acute changes noted throughout the shift     Patient has central line: no Reasons if yes: n/a  Insertion date:n/a Last dressing date:n/a   Patient has Ellis Cath: no Reasons if yes: n/a   Insertion date:n/a     Last Vitals:     Vitals:    04/23/21 0706 04/23/21 1529 04/23/21 2105 04/24/21 0707   BP: 112/73 112/77 118/76 115/72   Pulse: 88 89 86 90   Resp: 16 18 18 16   Temp: 97.4 °F (36.3 °C) 97.5 °F (36.4 °C) 97.5 °F (36.4 °C) 97.3 °F (36.3 °C)   SpO2: 99% 95% 99% 99%   Height:            PAIN    Pain Assessment    Pain Intensity 1: 8 (04/24/21 0616) Pain Intensity 1: 2 (12/29/14 1105)    Pain Location 1: Leg Pain Location 1: Abdomen    Pain Intervention(s) 1: Medication (see MAR) Pain Intervention(s) 1: Medication (see MAR)  Patient Stated Pain Goal: 0 Patient Stated Pain Goal: 0  o Intervention effective: yes    o Other actions taken for pain: repositioning      Skin Assessment  Skin color Skin Color: Appropriate for ethnicity  Condition/Temperature Skin Condition/Temp: Dry, Warm  Integrity Skin Integrity: Incision (comment)  Turgor Turgor: Non-tenting  Weekly Pressure Ulcer Documentation  Pressure  Injury Documentation: No Pressure Injury Noted-Pressure Ulcer Prevention Initiated  Wound Prevention & Protection Methods  Orientation of wound Orientation of Wound Prevention: Posterior  Location of Prevention Location of Wound Prevention: Sacrum/Coccyx  Dressing Present Dressing Present : Yes  Dressing Status Dressing Status: Intact  Wound Offloading Wound Offloading (Prevention Methods): Bed, pressure redistribution/air, Bed, pressure reduction mattress, Repositioning     INTAKE/OUPUT  Date 04/23/21 0700 - 04/24/21 0659 04/24/21 0700 - 04/25/21 0659   Shift 8785-1164 2001-9162 24 Hour Total 8626-1794 9517-5151 24 Hour Total   INTAKE   Shift Total         OUTPUT   Urine  1450 1450        Urine Voided  1450 1450        Urine Occurrence(s) 3 x 2 x 5 x 0 x  0 x   Stool           Stool Occurrence(s) 1 x 0 x 1 x 0 x  0 x   Shift Total  1450 1450      NET  -1450 -1450      Weight (kg)             Recommendations:  1. Patient needs and requests: education     2. Diet: Cardiac consistent carb diet with thin liquids    3. Pending tests/procedures: AM labs     4. Functional Level/Equipment: 1 person assist     5. Estimated Discharge Date: TBD Posted on Whiteboard in Patients Room: no     [facility] Safety Check    A safety check occurred in the patient's room between off going nurse and oncoming nurse listed above. The safety check included the below items  Area Items   H  High Alert Medications - Verify all high alert medication drips (heparin, PCA, etc.)   E  Equipment - Suction is set up for ALL patients (with yanker)  - Red plugs utilized for all equipment (IV pumps, etc.)  - WOWs wiped down at end of shift.  - Room stocked with oxygen, suction, and other unit-specific supplies   A  Alarms - Bed alarm is set for fall risk patients  - Ensure chair alarm is in place and activated if patient is up in a chair   L  Lines - Check IV for any infiltration  - Ellis bag is empty if patient has a Ellis   - Tubing and IV bags are labeled   S  Safety   - Room is clean, patient is clean, and equipment is clean.   - Hallways are clear from equipment besides carts. - Fall bracelet on for fall risk patients  - Ensure room is clear and free of clutter  - Suction is set up for ALL patients (with faraz)  - Hallways are clear from equipment besides carts.    - Isolation precautions followed, supplies available outside room, sign posted         Deanna Stafford RN

## 2021-04-24 NOTE — PROGRESS NOTES
Progress Note    Patient: Vincent Riggs MRN: [id number]  CSN: [id number]    YOB: 1965  Age: 60 y.o. Sex: male    DOA: 4/15/2021 LOS:  LOS: 9 days                    Subjective:     Primary Rehab Diagnosis: Impaired Mobility and ADLs secondary to:  1. S/P Right above-the-knee amputation (4/11/2021  Dr. Chava Chiang)  2. S/P Exploration of right knee disarticulation and control of hemorrhage (4/8/2021  Dr. Popeye Fuentes)  3. S/P Washout of right below-knee amputation; Right knee disarticulation (4/7/2021  Dr. Popeye Fuentes)  4. History of infection and ischemia of right BKA stump (4/7/2021)  5. S/P Right below-the-knee amputation (3/22/2021 - Dr. Ayana Basilio)  6. History of critical ischemia of the right lower extremity  7. History of right lower extremity arterial embolectomy (3/5/2021 - Dr. Ayana Basilio)        Review of systems  General: No fevers or chills. Cardiovascular: No chest pain or pressure. Pulmonary: No shortness of breath, cough or wheeze. Gastrointestinal: No abdominal pain, nausea, vomiting or diarrhea. Genitourinary: No  dysuria. Musculoskeletal: No joint or muscle pain, no back pain, no recent trauma. Neurologic: No headache, numbness, tingling or weakness. Objective:     Physical Exam:  Visit Vitals  /84 (BP 1 Location: Right lower arm, BP Patient Position: Supine)   Pulse 86   Temp 97.1 °F (36.2 °C)   Resp 18   Ht 6' 2\" (1.88 m)   SpO2 99%   BMI 22.88 kg/m²        General:         Alert, cooperative, no acute distress    HEENT: NC, Atraumatic. PERRLA, anicteric sclerae. Lungs: CTA Bilaterally. No Wheezing/Rhonchi/Rales. Heart:  Regular  rhythm,  No murmur, No Rubs, No Gallops  Abdomen: Soft, Non distended, Non tender.    Extremities: Rt AKA staples in place   Psych:    Not anxious or agitated. Neurologic:  CN 2-12 grossly intact, Alert and oriented X 3.   No acute neurological                                 Deficits,     Intake and Output:  Current Shift:  No intake/output data recorded. Last three shifts:  04/22 1901 - 04/24 0700  In: -   Out: 9954 [Urine:2775]    Labs: Results:       Chemistry No results for input(s): GLU, NA, K, CL, CO2, BUN, CREA, CA, AGAP, BUCR, TBIL, AP, TP, ALB, GLOB, AGRAT in the last 72 hours. No lab exists for component: GPT   CBC w/Diff Recent Labs     04/22/21  0505   HGB 8.9*   HCT 28.5*      Cardiac Enzymes No results for input(s): CPK, CKND1, TENZIN in the last 72 hours. No lab exists for component: CKRMB, TROIP   Coagulation No results for input(s): PTP, INR, APTT, INREXT in the last 72 hours. Lipid Panel Lab Results   Component Value Date/Time    Cholesterol, total 135 03/27/2021 06:32 AM    HDL Cholesterol 35 (L) 03/27/2021 06:32 AM    LDL, calculated 63 03/27/2021 06:32 AM    VLDL, calculated 37 03/27/2021 06:32 AM    Triglyceride 185 (H) 03/27/2021 06:32 AM    CHOL/HDL Ratio 3.9 03/27/2021 06:32 AM      BNP No results for input(s): BNPP in the last 72 hours. Liver Enzymes No results for input(s): TP, ALB, TBIL, AP in the last 72 hours.     No lab exists for component: SGOT, GPT, DBIL   Thyroid Studies No results found for: T4, T3U, TSH, TSHEXT       Procedures/imaging: see electronic medical records for all procedures/Xrays and details which were not copied into this note but were reviewed prior to creation of Plan    Medications:   Current Facility-Administered Medications   Medication Dose Route Frequency    gabapentin (NEURONTIN) capsule 100 mg  100 mg Oral BID    gabapentin (NEURONTIN) capsule 200 mg  200 mg Oral QHS    metFORMIN (GLUCOPHAGE) tablet 850 mg  850 mg Oral BID WITH MEALS    methocarbamoL (ROBAXIN) tablet 500 mg  500 mg Oral TID    0.9% sodium chloride infusion 250 mL  250 mL IntraVENous PRN    diphenhydrAMINE (BENADRYL) capsule 25 mg  25 mg Oral Q6H PRN    ferrous gluconate 324 mg (37.5 mg iron) tablet 1 Tab  324 mg Oral DAILY WITH BREAKFAST    ascorbic acid (vitamin C) (VITAMIN C) tablet 250 mg  250 mg Oral DAILY WITH BREAKFAST    acetaminophen (TYLENOL) tablet 650 mg  650 mg Oral Q4H PRN    bisacodyL (DULCOLAX) tablet 10 mg  10 mg Oral Q48H PRN    insulin lispro (HUMALOG) injection   SubCUTAneous TIDAC    carvediloL (COREG) tablet 3.125 mg  3.125 mg Oral BID WITH MEALS    aspirin chewable tablet 81 mg  81 mg Oral DAILY WITH BREAKFAST    atorvastatin (LIPITOR) tablet 80 mg  80 mg Oral DAILY    docusate sodium (COLACE) capsule 100 mg  100 mg Oral DAILY AFTER BREAKFAST    DULoxetine (CYMBALTA) capsule 30 mg  30 mg Oral DAILY    ezetimibe (ZETIA) tablet 10 mg  10 mg Oral DAILY    acetaminophen (TYLENOL) tablet 650 mg  650 mg Oral TID    oxyCODONE IR (ROXICODONE) tablet 5-10 mg  5-10 mg Oral Q4H PRN    rivaroxaban (XARELTO) tablet 20 mg  20 mg Oral DAILY    melatonin (rapid dissolve) tablet 5 mg  5 mg Oral QHS    zolpidem (AMBIEN) tablet 5 mg  5 mg Oral QHS PRN       Assessment/Plan     Principal Problem:    Status post above-knee amputation of right lower extremity (HCC) (4/11/2021)      Overview: S/P Right above-the-knee amputation (4/11/2021  Dr. Marivel Beckett)    Active Problems:    History of right below knee amputation ([facility] (3/22/2021)      Overview: S/P Right below-the-knee amputation (3/22/2021 - Dr. Rohini Aguilar)      Impaired mobility and ADLs (3/22/2021)      Critical [facility] lower [facility] (3/22/2021)      Overview: Right      Peripheral artery disease (HCC) ()      Type 2 diabetes mellitus, without long-term current use of insulin ([facility]) ()      Overview: HbA1c (4/8/2021) = 9.8; HbA1c (3/6/2021) = 12.9      Factor V Leiden mutation ([facility]) ()      Hypertensive heart disease with chronic systolic congestive heart failure ([facility]) ()      Overview: 2D echocardiogram (12/17/2015) showed EF 35%; genealized hypokinesis more       focally severe of the inferior and posterior segments; mild mitral       regurgitation; trace tricuspud regurgitation; normal pulmonary artery       pressure      Anticoagulated by anticoagulation treatment ()      Overview: On Rivaroxaban      Mixed hyperlipidemia ()      Overview: Lipid profile (7/25/2018) showed , , HDL 27,       History of embolectomy (3/5/2021)      Overview: S/P Right lower extremity arterial embolectomy      Acute blood loss as cause of postoperative anemia (3/22/2021)      Major depressive disorder (3/24/2021)      Overview: On Duloxetine      Ischemia of right BKA site ([facility] [address] (4/7/2021)      Infection of right below knee amputation ([facility] [address] (4/7/2021)      Overview: S/P Washout of right below-knee amputation; Right knee disarticulation       (4/7/2021  Dr. Gamaliel Patel); S/P Exploration of right knee       disarticulation and control of hemorrhage (4/8/2021  Dr. Gamaliel Patel)      S/P Right above-the-knee amputation (4/11/2021  Dr. Kiarra Katz); S/P Exploration of right knee disarticulation and control of hemorrhage (4/8/2021  Dr. Gamaliel Patel); S/P Washout of right below-knee amputation; Right knee disarticulation (4/7/2021  Dr. Gamaliel Patel); History of infection and ischemia of right BKA stump (4/7/2021); S/P Right below-the-knee amputation (3/22/2021 - Dr. Shanon Jackson); History of critical ischemia of the right lower extremity;  History of right lower extremity arterial embolectomy (3/5/2021 - Dr. Shanon Jackson)    Staples to be removed on 5/19/2021    Acute Postoperative Blood Loss Anemia  Ferrous gluconate 324 mg PO once daily with breakfast    Ascorbic Acid 250 mg PO once daily with breakfast     Coronary artery disease; Peripheral artery disease   Aspirin 81 mg PO once daily with breakfast   Atorvastatin 80 mg PO once daily   Carvedilol 3.125 mg PO BID with meals     Hypertensive heart disease with chronic systolic heart failure; Cardiomyopathy; Chronic systolic heart failure; Grade I diastolic dysfunction  Furosemide 40 mg PO once daily (9AM)   Lisinopril 5 mg PO once daily (9AM)   Continue Carvedilol 3.125 mg PO BID with meals     Factor V Leiden mutation; History of deep venous thrombosis (DVT) of right lower extremity;  Anticoagulated on Rivaroxaban  Rivaroxaban 20 mg PO once daily with breakfast     Major depressive disorder  Continue Duloxetine 30 mg PO once daily    Mixed hyperlipidemia  Atorvastatin 80 mg PO once daily   Ezetimibe 10 mg PO q HS    Type 2 diabetes mellitus, poorly controlled, without long-term current use of insulin  Metformin 850 mg PO BID with meals   Insulin lispro sliding scale SC TID AC     Difficulty sleeping   Melatonin 5 mg PO q HS   Zolpidem 5 mg PO q HS PRN for sleep    Cristina Lemus MD  4/24/2021 4:48 PM

## 2021-04-25 LAB
GLUCOSE BLD STRIP.AUTO-MCNC: 118 MG/DL (ref 70–110)
GLUCOSE BLD STRIP.AUTO-MCNC: 121 MG/DL (ref 70–110)
GLUCOSE BLD STRIP.AUTO-MCNC: 131 MG/DL (ref 70–110)
GLUCOSE BLD STRIP.AUTO-MCNC: 137 MG/DL (ref 70–110)

## 2021-04-25 PROCEDURE — 74011250637 HC RX REV CODE- 250/637: Performed by: INTERNAL MEDICINE

## 2021-04-25 PROCEDURE — 82962 GLUCOSE BLOOD TEST: CPT

## 2021-04-25 PROCEDURE — 65310000000 HC RM PRIVATE REHAB

## 2021-04-25 RX ADMIN — METFORMIN HYDROCHLORIDE 850 MG: 850 TABLET ORAL at 17:28

## 2021-04-25 RX ADMIN — GABAPENTIN 100 MG: 100 CAPSULE ORAL at 07:52

## 2021-04-25 RX ADMIN — DULOXETINE HYDROCHLORIDE 30 MG: 30 CAPSULE, DELAYED RELEASE ORAL at 08:04

## 2021-04-25 RX ADMIN — DOCUSATE SODIUM 100 MG: 100 CAPSULE, LIQUID FILLED ORAL at 08:04

## 2021-04-25 RX ADMIN — METFORMIN HYDROCHLORIDE 850 MG: 850 TABLET ORAL at 07:50

## 2021-04-25 RX ADMIN — METHOCARBAMOL 500 MG: 500 TABLET ORAL at 07:52

## 2021-04-25 RX ADMIN — ATORVASTATIN CALCIUM 80 MG: 40 TABLET, FILM COATED ORAL at 08:04

## 2021-04-25 RX ADMIN — OXYCODONE HYDROCHLORIDE 10 MG: 5 TABLET ORAL at 14:42

## 2021-04-25 RX ADMIN — RIVAROXABAN 20 MG: 20 TABLET, FILM COATED ORAL at 08:04

## 2021-04-25 RX ADMIN — METHOCARBAMOL 500 MG: 500 TABLET ORAL at 14:42

## 2021-04-25 RX ADMIN — METHOCARBAMOL 500 MG: 500 TABLET ORAL at 20:34

## 2021-04-25 RX ADMIN — OXYCODONE HYDROCHLORIDE 10 MG: 5 TABLET ORAL at 05:54

## 2021-04-25 RX ADMIN — GABAPENTIN 100 MG: 100 CAPSULE ORAL at 14:42

## 2021-04-25 RX ADMIN — EZETIMIBE 10 MG: 10 TABLET ORAL at 08:04

## 2021-04-25 RX ADMIN — ASPIRIN 81 MG CHEWABLE TABLET 81 MG: 81 TABLET CHEWABLE at 07:51

## 2021-04-25 RX ADMIN — GABAPENTIN 200 MG: 100 CAPSULE ORAL at 20:34

## 2021-04-25 RX ADMIN — ACETAMINOPHEN 650 MG: 325 TABLET, FILM COATED ORAL at 17:28

## 2021-04-25 RX ADMIN — ZOLPIDEM TARTRATE 5 MG: 5 TABLET ORAL at 20:34

## 2021-04-25 RX ADMIN — ACETAMINOPHEN 650 MG: 325 TABLET, FILM COATED ORAL at 07:51

## 2021-04-25 RX ADMIN — Medication 1 TABLET: at 07:52

## 2021-04-25 RX ADMIN — ACETAMINOPHEN 650 MG: 325 TABLET, FILM COATED ORAL at 11:39

## 2021-04-25 RX ADMIN — OXYCODONE HYDROCHLORIDE 10 MG: 5 TABLET ORAL at 18:34

## 2021-04-25 RX ADMIN — OXYCODONE HYDROCHLORIDE 10 MG: 5 TABLET ORAL at 10:10

## 2021-04-25 RX ADMIN — Medication 250 MG: at 07:51

## 2021-04-25 RX ADMIN — Medication 5 MG: at 20:34

## 2021-04-25 NOTE — PROGRESS NOTES
SHIFT CHANGE NOTE FOR [facility]    Bedside and Verbal shift change report given to Henry Demarco RN (oncoming nurse) by Marj Ricketts LPN (offgoing nurse). Report included the following information SBAR, Kardex, MAR and Recent Results.     Situation:   Code Status: Full Code   Reason for Admission: right AKA  Hospital Day: 10   Problem List:   Hospital Problems  Date Reviewed: 4/23/2021          Codes Class Noted POA    * (Principal) Status post above-knee amputation of right lower extremity ([facility] ICD-10-CM: D64.900  ICD-9-CM: V49.76  4/11/2021 Yes    Overview Signed 4/15/2021  5:14 PM by Kandis Johnson MD     S/P Right above-the-knee amputation (4/11/2021  Dr. Ella Palacios)             Ischemia of right BKA site [facility]) ICD-10-CM: T87.89, I99.8  ICD-9-CM: 997.69, 459.9  4/7/2021 Yes        Infection of right below knee amputation [facility]) ICD-10-CM: T87.43  ICD-9-CM: 997.62  4/7/2021 Yes    Overview Signed 4/15/2021  5:17 PM by Kandis Johnson MD     S/P Washout of right below-knee amputation; Right knee disarticulation (4/7/2021  Dr. Daylin Liriano); S/P Exploration of right knee disarticulation and control of hemorrhage (4/8/2021  Dr. Daylin Liriano)             Peripheral artery disease ([facility] (Chronic) ICD-10-CM: I73.9  ICD-9-CM: 443.9  Unknown Yes        Type 2 diabetes mellitus, without long-term current use of insulin (HCC) (Chronic) ICD-10-CM: E11.9  ICD-9-CM: 250.00  Unknown Yes    Overview Addendum 4/15/2021  5:40 PM by Kandis Johnson MD     HbA1c (4/8/2021) = 9.8; HbA1c (3/6/2021) = 12.9             Factor V Leiden mutation ([facility] (Chronic) ICD-10-CM: V31.14  ICD-9-CM: 289.81  Unknown Yes        Hypertensive heart disease with chronic systolic congestive heart failure (HCC) (Chronic) ICD-10-CM: I11.0, I50.22  ICD-9-CM: 402.91, 428.22  Unknown Yes    Overview Signed 3/26/2021 11:06 PM by Kandis Johnson MD     2D echocardiogram (12/17/2015) showed EF 35%; genealized hypokinesis more focally severe of the inferior and posterior segments; mild mitral regurgitation; trace tricuspud regurgitation; normal pulmonary artery pressure             Anticoagulated by anticoagulation treatment (Chronic) ICD-10-CM: Z79.01  ICD-9-CM: V58.61  Unknown Yes    Overview Signed 3/26/2021  4:36 PM by Luzma Small MD     On Rivaroxaban             Mixed hyperlipidemia (Chronic) ICD-10-CM: E78.2  ICD-9-CM: 272.2  Unknown Yes    Overview Signed 3/26/2021 11:07 PM by Luzma Small MD     Lipid profile (7/25/2018) showed , , HDL 27,              Major depressive disorder ICD-10-CM: F32.9  ICD-9-CM: 296.20  3/24/2021 Yes    Overview Signed 3/26/2021 11:31 PM by Luzma Small MD     On Duloxetine             History of right below knee amputation [facility]) ICD-10-CM: Z89.511  ICD-9-CM: V49.75  3/22/2021 Yes    Overview Signed 3/26/2021  5:43 PM by Luzma Small MD     S/P Right below-the-knee amputation (3/22/2021 - Dr. Ruthe Phalen)             Impaired mobility and ADLs ICD-10-CM: Z74.09, Z78.9  ICD-9-CM: V49.89  3/22/2021 Yes        Critical ischemia of lower extremity ([facility] ICD-10-CM: G51.573  ICD-9-CM: 459.9  3/22/2021 Yes    Overview Signed 3/26/2021  5:42 PM by Luzma Small MD     Right             Acute blood loss as cause of postoperative anemia ICD-10-CM: D62  ICD-9-CM: 285.1  3/22/2021 Yes        History of embolectomy ICD-10-CM: Z98.890  ICD-9-CM: V45.89  3/5/2021 Yes    Overview Signed 3/26/2021  5:39 PM by Luzma Small MD     S/P Right lower extremity arterial embolectomy                   Background:   Past Medical History:   Past Medical History:   Diagnosis Date    Acute blood loss as cause of postoperative anemia 3/22/2021    Anticoagulated by anticoagulation treatment     On Rivaroxaban    Cardiomyopathy ([facility] [address] 12/17/2015    2D echocardiogram (3/24/2021) showed EF 37%; global hypokinesis of left ventricle; grade I diastolic dysfunction; 2D echocardiogram (12/17/2015) showed EF 35%; generalized hypokinesis more focally severe of the inferior and posterior segments; mild mitral regurgitation; trace tricuspud regurgitation; normal pulmonary artery pressure    Chronic systolic heart failure (HCC)     2D echocardiogram (3/24/2021) showed EF 37%; global hypokinesis of left ventricle; grade I diastolic dysfunction; 2D echocardiogram (12/17/2015) showed EF 35%; generalized hypokinesis more focally severe of the inferior and posterior segments; mild mitral regurgitation; trace tricuspud regurgitation; normal pulmonary artery pressure    Coronary artery disease involving native coronary artery of native heart     COVID-19 ruled out by laboratory testing 3/22/2021    SARS-CoV-2 ([facility]) (3/22/2021):  Not detected     Critical ischemia of lower extremity ([facility] 3/22/2021    Right    Factor V Leiden mutation ([facility] [facility]     Grade I diastolic dysfunction 37/16/0865    2D echocardiogram (3/24/2021) showed EF 37%; global hypokinesis of left ventricle; grade I diastolic dysfunction    History of deep venous thrombosis (DVT) of distal vein of right lower extremity     History of epilepsy     History of head injury     History of myocardial infarction     History of noncompliance with medical treatment 3/18/2021    Hypertensive heart disease with chronic systolic congestive heart failure ([facility]     2D echocardiogram (3/24/2021) showed EF 37%; global hypokinesis of left ventricle; grade I diastolic dysfunction; 2D echocardiogram (12/17/2015) showed EF 35%; genealized hypokinesis more focally severe of the inferior and posterior segments; mild mitral regurgitation; trace tricuspud regurgitation; normal pulmonary artery pressure    Infection of right below knee amputation ([facility] 4/7/2021    S/P Washout of right below-knee amputation; Right knee disarticulation (4/7/2021  Dr. Hugh Neri); S/P Exploration of right knee disarticulation and control of hemorrhage (4/8/2021  Dr. Esme Parker Ray Ma)    Long term current use of aspirin     Major depressive disorder 03/24/2021    On Duloxetine    Migraine headache     Mitral valve prolapse     Mixed hyperlipidemia     Lipid profile (7/25/2018) showed , , HDL 27,     Obstructive sleep apnea     On statin therapy due to risk of future cardiovascular event     On Atorvastatin    Peripheral artery disease ([facility]     Type 2 diabetes mellitus, without long-term current use of insulin ([facility])     HbA1c (4/8/2021) = 9.8; HbA1c (3/6/2021) = 81.8    Umbilical hernia     Wears glasses       Patient taking anticoagulants yes    Patient has a defibrillator: no     Assessment:   Changes in Assessment throughout shift: no acute changes noted throughout the shift     Patient has central line: no Reasons if yes: n/a  Insertion date:n/a Last dressing date:n/a   Patient has Ellis Cath: no Reasons if yes: n/a   Insertion date:n/a     Last Vitals:     Vitals:    04/24/21 0707 04/24/21 1503 04/24/21 1608 04/24/21 2100   BP: 115/72 110/69 120/84 110/73   Pulse: 90 83 86 82   Resp: 16 18 18 18   Temp: 97.3 °F (36.3 °C) 97.1 °F (36.2 °C)  97.4 °F (36.3 °C)   SpO2: 99% 99% 99% 100%   Height:            PAIN    Pain Assessment    Pain Intensity 1: 0 (04/24/21 2357) Pain Intensity 1: 2 (12/29/14 1105)    Pain Location 1: Leg Pain Location 1: Abdomen    Pain Intervention(s) 1: Medication (see MAR) Pain Intervention(s) 1: Medication (see MAR)  Patient Stated Pain Goal: 0 Patient Stated Pain Goal: 0  o Intervention effective: yes    o Other actions taken for pain: repositioning      Skin Assessment  Skin color Skin Color: Appropriate for ethnicity  Condition/Temperature Skin Condition/Temp: Dry, Warm  Integrity Skin Integrity: Incision (comment)  Turgor Turgor: Non-tenting  Weekly Pressure Ulcer Documentation  Pressure  Injury Documentation: No Pressure Injury Noted-Pressure Ulcer Prevention Initiated  Wound Prevention & Protection Methods  Orientation of wound Orientation of Wound Prevention: Posterior  Location of Prevention Location of Wound Prevention: Sacrum/Coccyx  Dressing Present Dressing Present : Yes  Dressing Status Dressing Status: Intact  Wound Offloading Wound Offloading (Prevention Methods): Bed, pressure redistribution/air     INTAKE/OUPUT  Date 04/24/21 0700 - 04/25/21 0659 04/25/21 0700 - 04/26/21 0659   Shift 0700-1859 1900-0659 24 Hour Total 0700-1859 1900-0659 24 Hour Total   INTAKE   Shift Total         OUTPUT   Urine           Urine Occurrence(s) 3 x  3 x      Stool           Stool Occurrence(s) 1 x  1 x      Shift Total         NET         Weight (kg)             Recommendations:  1. Patient needs and requests: education     2. Diet: Cardiac consistent carb diet with thin liquids    3. Pending tests/procedures: AM labs     4. Functional Level/Equipment: 1 person assist     5. Estimated Discharge Date: TBD Posted on Whiteboard in Patients Room: [facility] Safety Check    A safety check occurred in the patient's room between off going nurse and oncoming nurse listed above. The safety check included the below items  Area Items   H  High Alert Medications - Verify all high alert medication drips (heparin, PCA, etc.)   E  Equipment - Suction is set up for ALL patients (with yanker)  - Red plugs utilized for all equipment (IV pumps, etc.)  - WOWs wiped down at end of shift.  - Room stocked with oxygen, suction, and other unit-specific supplies   A  Alarms - Bed alarm is set for fall risk patients  - Ensure chair alarm is in place and activated if patient is up in a chair   L  Lines - Check IV for any infiltration  - Ellis bag is empty if patient has a Ellis   - Tubing and IV bags are labeled   S  Safety   - Room is clean, patient is clean, and equipment is clean. - Hallways are clear from equipment besides carts.    - Fall bracelet on for fall risk patients  - Ensure room is clear and free of clutter  - Suction is set up for ALL patients (with faraz)  - Hallways are clear from equipment besides carts.    - Isolation precautions followed, supplies available outside room, sign posted         Angelina Long LPN

## 2021-04-25 NOTE — PROGRESS NOTES
Progress Note    Patient: Westley Curtis MRN: [id number]  CSN: [id number]    YOB: 1965  Age: 60 y.o. Sex: male    DOA: 4/15/2021 LOS:  LOS: 10 days                    Subjective:     Primary Rehab Diagnosis: Impaired Mobility and ADLs secondary to:  1. S/P Right above-the-knee amputation (4/11/2021  Dr. Natalee Su)  2. S/P Exploration of right knee disarticulation and control of hemorrhage (4/8/2021  Dr. Jan Campos)  3. S/P Washout of right below-knee amputation; Right knee disarticulation (4/7/2021  Dr. Jan Campos)  4. History of infection and ischemia of right BKA stump (4/7/2021)  5. S/P Right below-the-knee amputation (3/22/2021 - Dr. Elaine Vargas)  6. History of critical ischemia of the right lower extremity  7. History of right lower extremity arterial embolectomy (3/5/2021 - Dr. Elaine Vargas)        Review of systems  General: No fevers or chills. Cardiovascular: No chest pain or pressure. Pulmonary: No shortness of breath, cough or wheeze. Gastrointestinal: No abdominal pain, nausea, vomiting or diarrhea. Genitourinary: No  dysuria. Musculoskeletal: No joint or muscle pain, no back pain, no recent trauma. Neurologic: No headache, generalized weakness improving      Objective:     Physical Exam:  Visit Vitals  /72 (BP 1 Location: Right upper arm, BP Patient Position: Sitting)   Pulse 86   Temp 98.2 °F (36.8 °C)   Resp 18   Ht 6' 2\" (1.88 m)   SpO2 99%   BMI 22.88 kg/m²        General:         Alert,, no acute distress    HEENT: NC, anicteric sclerae. Lungs: CTA Bilaterally. No Wheezing/Rhonchi/Rales. Heart:  Regular  rhythm,  No murmur, No Rubs, No Gallops  Abdomen: Soft, Non distended, Non tender.    Extremities: Rt AKA staples in place   Psych:    Not anxious or agitated. Neurologic:  CN 2-12 grossly intact, Alert and oriented X 3.   No acute neurological                                 Deficits,     Intake and Output:  Current Shift:  No intake/output data recorded. Last three shifts:  04/23 1901 - 04/25 0700  In: -   Out: 1450 [Urine:1450]    Labs: Results:       Chemistry No results for input(s): GLU, NA, K, CL, CO2, BUN, CREA, CA, AGAP, BUCR, TBIL, AP, TP, ALB, GLOB, AGRAT in the last 72 hours. No lab exists for component: GPT   CBC w/Diff No results for input(s): WBC, RBC, HGB, HCT, PLT, GRANS, LYMPH, EOS, HGBEXT, HCTEXT, PLTEXT, HGBEXT, HCTEXT, PLTEXT in the last 72 hours. Cardiac Enzymes No results for input(s): CPK, CKND1, TENZIN in the last 72 hours. No lab exists for component: CKRMB, TROIP   Coagulation No results for input(s): PTP, INR, APTT, INREXT, INREXT in the last 72 hours. Lipid Panel Lab Results   Component Value Date/Time    Cholesterol, total 135 03/27/2021 06:32 AM    HDL Cholesterol 35 (L) 03/27/2021 06:32 AM    LDL, calculated 63 03/27/2021 06:32 AM    VLDL, calculated 37 03/27/2021 06:32 AM    Triglyceride 185 (H) 03/27/2021 06:32 AM    CHOL/HDL Ratio 3.9 03/27/2021 06:32 AM      BNP No results for input(s): BNPP in the last 72 hours. Liver Enzymes No results for input(s): TP, ALB, TBIL, AP in the last 72 hours.     No lab exists for component: SGOT, GPT, DBIL   Thyroid Studies No results found for: T4, T3U, TSH, TSHEXT, TSHEXT       Procedures/imaging: see electronic medical records for all procedures/Xrays and details which were not copied into this note but were reviewed prior to creation of Plan    Medications:   Current Facility-Administered Medications   Medication Dose Route Frequency    gabapentin (NEURONTIN) capsule 100 mg  100 mg Oral BID    gabapentin (NEURONTIN) capsule 200 mg  200 mg Oral QHS    metFORMIN (GLUCOPHAGE) tablet 850 mg  850 mg Oral BID WITH MEALS    methocarbamoL (ROBAXIN) tablet 500 mg  500 mg Oral TID    0.9% sodium chloride infusion 250 mL  250 mL IntraVENous PRN    diphenhydrAMINE (BENADRYL) capsule 25 mg  25 mg Oral Q6H PRN    ferrous gluconate 324 mg (37.5 mg iron) tablet 1 Tab 324 mg Oral DAILY WITH BREAKFAST    ascorbic acid (vitamin C) (VITAMIN C) tablet 250 mg  250 mg Oral DAILY WITH BREAKFAST    acetaminophen (TYLENOL) tablet 650 mg  650 mg Oral Q4H PRN    bisacodyL (DULCOLAX) tablet 10 mg  10 mg Oral Q48H PRN    insulin lispro (HUMALOG) injection   SubCUTAneous TIDAC    carvediloL (COREG) tablet 3.125 mg  3.125 mg Oral BID WITH MEALS    aspirin chewable tablet 81 mg  81 mg Oral DAILY WITH BREAKFAST    atorvastatin (LIPITOR) tablet 80 mg  80 mg Oral DAILY    docusate sodium (COLACE) capsule 100 mg  100 mg Oral DAILY AFTER BREAKFAST    DULoxetine (CYMBALTA) capsule 30 mg  30 mg Oral DAILY    ezetimibe (ZETIA) tablet 10 mg  10 mg Oral DAILY    acetaminophen (TYLENOL) tablet 650 mg  650 mg Oral TID    oxyCODONE IR (ROXICODONE) tablet 5-10 mg  5-10 mg Oral Q4H PRN    rivaroxaban (XARELTO) tablet 20 mg  20 mg Oral DAILY    melatonin (rapid dissolve) tablet 5 mg  5 mg Oral QHS    zolpidem (AMBIEN) tablet 5 mg  5 mg Oral QHS PRN       Assessment/Plan     Principal Problem:    Status post above-knee amputation of right lower extremity (HCC) (4/11/2021)      Overview: S/P Right above-the-knee amputation (4/11/2021  Dr. Jim Hawkins)    Active Problems:    History of right below knee amputation ([facility] (3/22/2021)      Overview: S/P Right below-the-knee amputation (3/22/2021 - Dr. Harlan Guerra)      Impaired mobility and ADLs (3/22/2021)      Critical ischemia of lower extremity ([facility] (3/22/2021)      Overview: Right      Peripheral artery disease (HCC) ()      Type 2 diabetes mellitus, without long-term current use of insulin ([facility]) ()      Overview: HbA1c (4/8/2021) = 9.8; HbA1c (3/6/2021) = 12.9      Factor V Leiden mutation ([facility]) ()      Hypertensive heart disease with chronic systolic congestive heart failure (HCC) ()      Overview: 2D echocardiogram (12/17/2015) showed EF 35%; genealized hypokinesis more       focally severe of the inferior and posterior segments; mild mitral       regurgitation; trace tricuspud regurgitation; normal pulmonary artery       pressure      Anticoagulated by anticoagulation treatment ()      Overview: On Rivaroxaban      Mixed hyperlipidemia ()      Overview: Lipid profile (7/25/2018) showed , , HDL 27,       History of embolectomy (3/5/2021)      Overview: S/P Right lower extremity arterial embolectomy      Acute blood loss as cause of postoperative anemia (3/22/2021)      Major depressive disorder (3/24/2021)      Overview: On Duloxetine      Ischemia of right BKA site ([address] (4/7/2021)      Infection of right below knee amputation ([facility] (4/7/2021)      Overview: S/P Washout of right below-knee amputation; Right knee disarticulation       (4/7/2021  Dr. Nadia Jaime); S/P Exploration of right knee       disarticulation and control of hemorrhage (4/8/2021  Dr. Nadia Jaime)      S/P Right above-the-knee amputation (4/11/2021  Dr. Robert Rosales); S/P Exploration of right knee disarticulation and control of hemorrhage (4/8/2021  Dr. Nadia Jaime); S/P Washout of right below-knee amputation; Right knee disarticulation (4/7/2021  Dr. Nadia Jaime); History of infection and ischemia of right BKA stump (4/7/2021); S/P Right below-the-knee amputation (3/22/2021 - Dr. Wynne Opitz); History of critical ischemia of the right lower extremity;  History of right lower extremity arterial embolectomy (3/5/2021 - Dr. Wynne Opitz)    Thedora Reynaldo to be removed on 5/19/2021    Acute Postoperative Blood Loss Anemia  Ferrous gluconate 324 mg PO once daily with breakfast    Ascorbic Acid 250 mg PO once daily with breakfast     Coronary artery disease; Peripheral artery disease   Aspirin 81 mg PO once daily with breakfast   Atorvastatin 80 mg PO once daily   Carvedilol 3.125 mg PO BID with meals     Hypertensive heart disease with chronic systolic heart failure; Cardiomyopathy; Chronic systolic heart failure; Grade I diastolic dysfunction  Furosemide 40 mg PO once daily    Lisinopril 5 mg PO once daily    Continue Carvedilol 3.125 mg PO BID with meals     Factor V Leiden mutation; History of deep venous thrombosis (DVT) of right lower extremity;  Anticoagulated on Rivaroxaban  Rivaroxaban 20 mg PO once daily with breakfast     Major depressive disorder  Continue Duloxetine 30 mg PO once daily    Mixed hyperlipidemia  Atorvastatin 80 mg PO once daily   Ezetimibe 10 mg PO q HS    Type 2 diabetes mellitus, poorly controlled, without long-term current use of insulin  Metformin 850 mg PO BID with meals   Insulin lispro sliding scale SC TID AC     Difficulty sleeping   Melatonin 5 mg PO q HS   Zolpidem 5 mg PO q HS PRN for sleep    David Verdugo MD  4/25/2021 2:08 PM

## 2021-04-25 NOTE — PROGRESS NOTES
SHIFT CHANGE NOTE FOR [place]    Bedside and Verbal shift change report given to Tavo Gruber LPN (oncoming nurse) by Shiraz Jaquez (offgoing nurse). Report included the following information SBAR, Kardex, MAR and Recent Results.     Situation:   Code Status: Full Code   Reason for Admission: right AKA  Hospital Day: 10   Problem List:   Hospital Problems  Date Reviewed: 4/23/2021          Codes Class Noted POA    * (Principal) Status post above-knee amputation of right lower extremity ([facility] ICD-10-CM: A36.927  ICD-9-CM: V49.76  4/11/2021 Yes    Overview Signed 4/15/2021  5:14 PM by Janae Newton MD     S/P Right above-the-knee amputation (4/11/2021  Dr. Nando Balbuena)             Ischemia of right BKA site [facility]) ICD-10-CM: T87.89, I99.8  ICD-9-CM: 997.69, 459.9  4/7/2021 Yes        Infection of right below knee amputation [facility]) ICD-10-CM: T87.43  ICD-9-CM: 997.62  4/7/2021 Yes    Overview Signed 4/15/2021  5:17 PM by Janae Newton MD     S/P Washout of right below-knee amputation; Right knee disarticulation (4/7/2021  Dr. Valencia Singh); S/P Exploration of right knee disarticulation and control of hemorrhage (4/8/2021  Dr. Valencia Singh)             Peripheral artery disease ([facility] (Chronic) ICD-10-CM: I73.9  ICD-9-CM: 443.9  Unknown Yes        Type 2 diabetes mellitus, without long-term current use of insulin (HCC) (Chronic) ICD-10-CM: E11.9  ICD-9-CM: 250.00  Unknown Yes    Overview Addendum 4/15/2021  5:40 PM by Janae Newton MD     HbA1c (4/8/2021) = 9.8; HbA1c (3/6/2021) = 12.9             Factor V Leiden mutation ([facility] (Chronic) ICD-10-CM: R89.45  ICD-9-CM: 289.81  Unknown Yes        Hypertensive heart disease with chronic systolic congestive heart failure (HCC) (Chronic) ICD-10-CM: I11.0, I50.22  ICD-9-CM: 402.91, 428.22  Unknown Yes    Overview Signed 3/26/2021 11:06 PM by Janae Newton MD     2D echocardiogram (12/17/2015) showed EF 35%; genealized hypokinesis more focally severe of the inferior and posterior segments; mild mitral regurgitation; trace tricuspud regurgitation; normal pulmonary artery pressure             Anticoagulated by anticoagulation treatment (Chronic) ICD-10-CM: Z79.01  ICD-9-CM: V58.61  Unknown Yes    Overview Signed 3/26/2021  4:36 PM by Hayder Sharma MD     On Rivaroxaban             Mixed hyperlipidemia (Chronic) ICD-10-CM: E78.2  ICD-9-CM: 272.2  Unknown Yes    Overview Signed 3/26/2021 11:07 PM by Hayder Sharma MD     Lipid profile (7/25/2018) showed , , HDL 27,              Major depressive disorder ICD-10-CM: F32.9  ICD-9-CM: 296.20  3/24/2021 Yes    Overview Signed 3/26/2021 11:31 PM by Hayder Sharma MD     On Duloxetine             History of right below knee amputation [facility]) ICD-10-CM: Z89.511  ICD-9-CM: V49.75  3/22/2021 Yes    Overview Signed 3/26/2021  5:43 PM by Hayder Sharma MD     S/P Right below-the-knee amputation (3/22/2021 - Dr. Dorota Ochoa)             Impaired mobility and ADLs ICD-10-CM: Z74.09, Z78.9  ICD-9-CM: V49.89  3/22/2021 Yes        Critical ischemia of lower extremity ([facility] ICD-10-CM: U03.090  ICD-9-CM: 459.9  3/22/2021 Yes    Overview Signed 3/26/2021  5:42 PM by Hayder Sharma MD     Right             Acute blood loss as cause of postoperative anemia ICD-10-CM: D62  ICD-9-CM: 285.1  3/22/2021 Yes        History of embolectomy ICD-10-CM: Z98.890  ICD-9-CM: V45.89  3/5/2021 Yes    Overview Signed 3/26/2021  5:39 PM by Hayder Sharma MD     S/P Right lower extremity arterial embolectomy                   Background:   Past Medical History:   Past Medical History:   Diagnosis Date    Acute blood loss as cause of postoperative anemia 3/22/2021    Anticoagulated by anticoagulation treatment     On Rivaroxaban    Cardiomyopathy ([address] 12/17/2015    2D echocardiogram (3/24/2021) showed EF 37%; global hypokinesis of left ventricle; grade I diastolic dysfunction; 2D echocardiogram (12/17/2015) showed EF 35%; generalized hypokinesis more focally severe of the inferior and posterior segments; mild mitral regurgitation; trace tricuspud regurgitation; normal pulmonary artery pressure    Chronic systolic heart failure (HCC)     2D echocardiogram (3/24/2021) showed EF 37%; global hypokinesis of left ventricle; grade I diastolic dysfunction; 2D echocardiogram (12/17/2015) showed EF 35%; generalized hypokinesis more focally severe of the inferior and posterior segments; mild mitral regurgitation; trace tricuspud regurgitation; normal pulmonary artery pressure    Coronary artery disease involving native coronary artery of native heart     COVID-19 ruled out by laboratory testing 3/22/2021    SARS-CoV-2 ([facility]) (3/22/2021):  Not detected     Critical ischemia of lower extremity ([facility] 3/22/2021    Right    Factor V Leiden mutation ([facility]     Grade I diastolic dysfunction 41/94/9111    2D echocardiogram (3/24/2021) showed EF 37%; global hypokinesis of left ventricle; grade I diastolic dysfunction    History of deep venous thrombosis (DVT) of distal vein of right lower extremity     History of epilepsy     History of head injury     History of myocardial infarction     History of noncompliance with medical treatment 3/18/2021    Hypertensive heart disease with chronic systolic congestive heart failure ([facility]     2D echocardiogram (3/24/2021) showed EF 37%; global hypokinesis of left ventricle; grade I diastolic dysfunction; 2D echocardiogram (12/17/2015) showed EF 35%; genealized hypokinesis more focally severe of the inferior and posterior segments; mild mitral regurgitation; trace tricuspud regurgitation; normal pulmonary artery pressure    Infection of right below knee amputation ([facility] 4/7/2021    S/P Washout of right below-knee amputation; Right knee disarticulation (4/7/2021  Dr. Betty Leong); S/P Exploration of right knee disarticulation and control of hemorrhage (4/8/2021  Dr. Betty Leong)    Long term current use of aspirin     Major depressive disorder 03/24/2021    On Duloxetine    Migraine headache     Mitral valve prolapse     Mixed hyperlipidemia     Lipid profile (7/25/2018) showed , , HDL 27,     Obstructive sleep apnea     On statin therapy due to risk of future cardiovascular event     On Atorvastatin    Peripheral artery disease ([address]     Type 2 diabetes mellitus, without long-term current use of insulin ([facility])     HbA1c (4/8/2021) = 9.8; HbA1c (3/6/2021) = 57.3    Umbilical hernia     Wears glasses       Patient taking anticoagulants yes    Patient has a defibrillator: no     Assessment:   Changes in Assessment throughout shift: no acute changes noted throughout the shift     Patient has central line: no Reasons if yes: n/a  Insertion date:n/a Last dressing date:n/a   Patient has Ellis Cath: no Reasons if yes: n/a   Insertion date:n/a     Last Vitals:     Vitals:    04/24/21 1608 04/24/21 2100 04/25/21 0748 04/25/21 1623   BP: 120/84 110/73 107/72 108/67   Pulse: 86 82 86 85   Resp: 18 18 18 18   Temp:  97.4 °F (36.3 °C) 98.2 °F (36.8 °C) 98.4 °F (36.9 °C)   SpO2: 99% 100% 99% 98%   Height:            PAIN    Pain Assessment    Pain Intensity 1: 7 (04/25/21 1833) Pain Intensity 1: 2 (12/29/14 1105)    Pain Location 1: Leg Pain Location 1: Abdomen    Pain Intervention(s) 1: Medication (see MAR) Pain Intervention(s) 1: Medication (see MAR)  Patient Stated Pain Goal: 0 Patient Stated Pain Goal: 0  o Intervention effective: yes    o Other actions taken for pain: repositioning      Skin Assessment  Skin color Skin Color: Appropriate for ethnicity  Condition/Temperature Skin Condition/Temp: Dry, Warm  Integrity Skin Integrity: Incision (comment)  Turgor Turgor: Non-tenting  Weekly Pressure Ulcer Documentation  Pressure  Injury Documentation: No Pressure Injury Noted-Pressure Ulcer Prevention Initiated  Wound Prevention & Protection Methods  Orientation of wound Orientation of Wound Prevention: Posterior  Location of Prevention Location of Wound Prevention: Sacrum/Coccyx  Dressing Present Dressing Present : Yes  Dressing Status Dressing Status: Intact  Wound Offloading Wound Offloading (Prevention Methods): Bed, pressure redistribution/air     INTAKE/OUPUT  Date 04/24/21 1900 - 04/25/21 0659 04/25/21 0700 - 04/26/21 0659   Shift 5433-8457 24 Hour Total 3476-0834 7257-6864 24 Hour Total   INTAKE   Shift Total        OUTPUT   Urine          Urine Occurrence(s) 3 x 6 x 4 x  4 x   Stool          Stool Occurrence(s) 0 x 1 x 0 x  0 x   Shift Total        NET        Weight (kg)            Recommendations:  1. Patient needs and requests: education     2. Diet: Cardiac consistent carb diet with thin liquids    3. Pending tests/procedures: AM labs     4. Functional Level/Equipment: 1 person assist     5. Estimated Discharge Date: TBD Posted on Whiteboard in Patients Room: [facility] Safety Check    A safety check occurred in the patient's room between off going nurse and oncoming nurse listed above. The safety check included the below items  Area Items   H  High Alert Medications - Verify all high alert medication drips (heparin, PCA, etc.)   E  Equipment - Suction is set up for ALL patients (with faraz)  - Red plugs utilized for all equipment (IV pumps, etc.)  - WOWs wiped down at end of shift.  - Room stocked with oxygen, suction, and other unit-specific supplies   A  Alarms - Bed alarm is set for fall risk patients  - Ensure chair alarm is in place and activated if patient is up in a chair   L  Lines - Check IV for any infiltration  - Ellis bag is empty if patient has a Ellis   - Tubing and IV bags are labeled   S  Safety   - Room is clean, patient is clean, and equipment is clean. - Hallways are clear from equipment besides carts.    - Fall bracelet on for fall risk patients  - Ensure room is clear and free of clutter  - Suction is set up for ALL patients (with faraz)  - Hallways are clear from equipment besides carts.    - Isolation precautions followed, supplies available outside room, sign posted         Murray Day

## 2021-04-25 NOTE — PROGRESS NOTES
SHIFT CHANGE NOTE FOR [facility]    Bedside and Verbal shift change report given to UMU Morales (oncoming nurse) by Raghavendra Lala (offgoing nurse). Report included the following information SBAR, Kardex, MAR and Recent Results.     Situation:   Code Status: Full Code   Reason for Admission: right AKA  Hospital Day: 10   Problem List:   Hospital Problems  Date Reviewed: 4/23/2021          Codes Class Noted POA    * (Principal) Status post above-knee amputation of right lower extremity ([facility] ICD-10-CM: R38.151  ICD-9-CM: V49.76  4/11/2021 Yes    Overview Signed 4/15/2021  5:14 PM by Carlos Barajas MD     S/P Right above-the-knee amputation (4/11/2021  Dr. Christy Mcduffie)             Ischemia of right BKA site [facility]) ICD-10-CM: T87.89, I99.8  ICD-9-CM: 997.69, 459.9  4/7/2021 Yes        Infection of right below knee amputation [facility]) ICD-10-CM: T87.43  ICD-9-CM: 997.62  4/7/2021 Yes    Overview Signed 4/15/2021  5:17 PM by Carlos Barajas MD     S/P Washout of right below-knee amputation; Right knee disarticulation (4/7/2021  Dr. Prudence Vázquez); S/P Exploration of right knee disarticulation and control of hemorrhage (4/8/2021  Dr. Prudence Vázquez)             Peripheral artery disease ([facility] (Chronic) ICD-10-CM: I73.9  ICD-9-CM: 443.9  Unknown Yes        Type 2 diabetes mellitus, without long-term current use of insulin (HCC) (Chronic) ICD-10-CM: E11.9  ICD-9-CM: 250.00  Unknown Yes    Overview Addendum 4/15/2021  5:40 PM by Carlos Barajas MD     HbA1c (4/8/2021) = 9.8; HbA1c (3/6/2021) = 12.9             Factor V Leiden mutation ([facility] (Chronic) ICD-10-CM: V91.96  ICD-9-CM: 289.81  Unknown Yes        Hypertensive heart disease with chronic systolic congestive heart failure (HCC) (Chronic) ICD-10-CM: I11.0, I50.22  ICD-9-CM: 402.91, 428.22  Unknown Yes    Overview Signed 3/26/2021 11:06 PM by Carlos Barajas MD     2D echocardiogram (12/17/2015) showed EF 35%; genealized hypokinesis more focally severe of the inferior and posterior segments; mild mitral regurgitation; trace tricuspud regurgitation; normal pulmonary artery pressure             Anticoagulated by anticoagulation treatment (Chronic) ICD-10-CM: Z79.01  ICD-9-CM: V58.61  Unknown Yes    Overview Signed 3/26/2021  4:36 PM by Tereso Busch MD     On Rivaroxaban             Mixed hyperlipidemia (Chronic) ICD-10-CM: E78.2  ICD-9-CM: 272.2  Unknown Yes    Overview Signed 3/26/2021 11:07 PM by Tereso Busch MD     Lipid profile (7/25/2018) showed , , HDL 27,              Major depressive disorder ICD-10-CM: F32.9  ICD-9-CM: 296.20  3/24/2021 Yes    Overview Signed 3/26/2021 11:31 PM by Tereso Busch MD     On Duloxetine             History of right below knee amputation [facility]) ICD-10-CM: Z89.511  ICD-9-CM: V49.75  3/22/2021 Yes    Overview Signed 3/26/2021  5:43 PM by Tereso Busch MD     S/P Right below-the-knee amputation (3/22/2021 - Dr. Chriss Min)             Impaired mobility and ADLs ICD-10-CM: Z74.09, Z78.9  ICD-9-CM: V49.89  3/22/2021 Yes        Critical ischemia of lower extremity ([facility] ICD-10-CM: L73.109  ICD-9-CM: 459.9  3/22/2021 Yes    Overview Signed 3/26/2021  5:42 PM by Tereso Busch MD     Right             Acute blood loss as cause of postoperative anemia ICD-10-CM: D62  ICD-9-CM: 285.1  3/22/2021 Yes        History of embolectomy ICD-10-CM: Z98.890  ICD-9-CM: V45.89  3/5/2021 Yes    Overview Signed 3/26/2021  5:39 PM by Tereso Busch MD     S/P Right lower extremity arterial embolectomy                   Background:   Past Medical History:   Past Medical History:   Diagnosis Date    Acute blood loss as cause of postoperative anemia 3/22/2021    Anticoagulated by anticoagulation treatment     On Rivaroxaban    Cardiomyopathy ([address] 12/17/2015    2D echocardiogram (3/24/2021) showed EF 37%; global hypokinesis of left ventricle; grade I diastolic dysfunction; 2D echocardiogram (12/17/2015) showed EF 35%; generalized hypokinesis more focally severe of the inferior and posterior segments; mild mitral regurgitation; trace tricuspud regurgitation; normal pulmonary artery pressure    Chronic systolic heart failure (HCC)     2D echocardiogram (3/24/2021) showed EF 37%; global hypokinesis of left ventricle; grade I diastolic dysfunction; 2D echocardiogram (12/17/2015) showed EF 35%; generalized hypokinesis more focally severe of the inferior and posterior segments; mild mitral regurgitation; trace tricuspud regurgitation; normal pulmonary artery pressure    Coronary artery disease involving native coronary artery of native heart     COVID-19 ruled out by laboratory testing 3/22/2021    SARS-CoV-2 ([facility] m2000, [facility]) (3/22/2021):  Not detected     Critical ischemia of lower extremity ([facility] 3/22/2021    Right    Factor V Leiden mutation ([facility]     Grade I diastolic dysfunction 98/79/5201    2D echocardiogram (3/24/2021) showed EF 37%; global hypokinesis of left ventricle; grade I diastolic dysfunction    History of deep venous thrombosis (DVT) of distal vein of right lower extremity     History of epilepsy     History of head injury     History of myocardial infarction     History of noncompliance with medical treatment 3/18/2021    Hypertensive heart disease with chronic systolic congestive heart failure ([facility]     2D echocardiogram (3/24/2021) showed EF 37%; global hypokinesis of left ventricle; grade I diastolic dysfunction; 2D echocardiogram (12/17/2015) showed EF 35%; genealized hypokinesis more focally severe of the inferior and posterior segments; mild mitral regurgitation; trace tricuspud regurgitation; normal pulmonary artery pressure    Infection of right below knee amputation ([facility] 4/7/2021    S/P Washout of right below-knee amputation; Right knee disarticulation (4/7/2021  Dr. Arnaldo Correa); S/P Exploration of right knee disarticulation and control of hemorrhage (4/8/2021  Dr. Arnaldo Correa)    Long term current use of aspirin     Major depressive disorder 03/24/2021    On Duloxetine    Migraine headache     Mitral valve prolapse     Mixed hyperlipidemia     Lipid profile (7/25/2018) showed , , HDL 27,     Obstructive sleep apnea     On statin therapy due to risk of future cardiovascular event     On Atorvastatin    Peripheral artery disease ([address]     Type 2 diabetes mellitus, without long-term current use of insulin ([facility])     HbA1c (4/8/2021) = 9.8; HbA1c (3/6/2021) = 34.9    Umbilical hernia     Wears glasses       Patient taking anticoagulants yes    Patient has a defibrillator: no     Assessment:   Changes in Assessment throughout shift: no acute changes noted throughout the shift     Patient has central line: no Reasons if yes: n/a  Insertion date:n/a Last dressing date:n/a   Patient has Ellis Cath: no Reasons if yes: n/a   Insertion date:n/a     Last Vitals:     Vitals:    04/24/21 1608 04/24/21 2100 04/25/21 0748 04/25/21 1623   BP: 120/84 110/73 107/72 108/67   Pulse: 86 82 86 85   Resp: 18 18 18 18   Temp:  97.4 °F (36.3 °C) 98.2 °F (36.8 °C) 98.4 °F (36.9 °C)   SpO2: 99% 100% 99% 98%   Height:            PAIN    Pain Assessment    Pain Intensity 1: 7 (04/25/21 1833) Pain Intensity 1: 2 (12/29/14 1105)    Pain Location 1: Leg Pain Location 1: Abdomen    Pain Intervention(s) 1: Medication (see MAR) Pain Intervention(s) 1: Medication (see MAR)  Patient Stated Pain Goal: 0 Patient Stated Pain Goal: 0  o Intervention effective: yes    o Other actions taken for pain: repositioning      Skin Assessment  Skin color Skin Color: Appropriate for ethnicity  Condition/Temperature Skin Condition/Temp: Dry, Warm  Integrity Skin Integrity: Incision (comment)  Turgor Turgor: Non-tenting  Weekly Pressure Ulcer Documentation  Pressure  Injury Documentation: No Pressure Injury Noted-Pressure Ulcer Prevention Initiated  Wound Prevention & Protection Methods  Orientation of wound Orientation of Wound Prevention: Posterior  Location of Prevention Location of Wound Prevention: Sacrum/Coccyx  Dressing Present Dressing Present : Yes  Dressing Status Dressing Status: Intact  Wound Offloading Wound Offloading (Prevention Methods): Bed, pressure redistribution/air     INTAKE/OUPUT  Date 04/24/21 1900 - 04/25/21 0659 04/25/21 0700 - 04/26/21 0659   Shift 3059-8052 24 Hour Total 9253-3294 6962-4425 24 Hour Total   INTAKE   Shift Total        OUTPUT   Urine          Urine Occurrence(s) 3 x 6 x 4 x  4 x   Stool          Stool Occurrence(s) 0 x 1 x 0 x  0 x   Shift Total        NET        Weight (kg)            Recommendations:  1. Patient needs and requests: education     2. Diet: Cardiac consistent carb diet with thin liquids    3. Pending tests/procedures: AM labs     4. Functional Level/Equipment: 1 person assist     5. Estimated Discharge Date: TBD Posted on Whiteboard in Patients Room: [facility] Safety Check    A safety check occurred in the patient's room between off going nurse and oncoming nurse listed above. The safety check included the below items  Area Items   H  High Alert Medications - Verify all high alert medication drips (heparin, PCA, etc.)   E  Equipment - Suction is set up for ALL patients (with faraz)  - Red plugs utilized for all equipment (IV pumps, etc.)  - WOWs wiped down at end of shift.  - Room stocked with oxygen, suction, and other unit-specific supplies   A  Alarms - Bed alarm is set for fall risk patients  - Ensure chair alarm is in place and activated if patient is up in a chair   L  Lines - Check IV for any infiltration  - Ellis bag is empty if patient has a Ellis   - Tubing and IV bags are labeled   S  Safety   - Room is clean, patient is clean, and equipment is clean. - Hallways are clear from equipment besides carts.    - Fall bracelet on for fall risk patients  - Ensure room is clear and free of clutter  - Suction is set up for ALL patients (with faraz)  - Hallways are clear from equipment besides carts.    - Isolation precautions followed, supplies available outside room, sign posted         Jacobo Akbar

## 2021-04-25 NOTE — PROGRESS NOTES
SHIFT CHANGE NOTE FOR [facility]    Bedside and Verbal shift change report given to Brenda Davenport LPN (oncoming nurse) by Ash Yang (offgoing nurse). Report included the following information SBAR, Kardex, MAR and Recent Results.     Situation:   Code Status: Full Code   Reason for Admission: right AKA  Hospital Day: 10   Problem List:   Hospital Problems  Date Reviewed: 4/23/2021          Codes Class Noted POA    * (Principal) Status post above-knee amputation of right lower extremity ([facility] ICD-10-CM: R89.730  ICD-9-CM: V49.76  4/11/2021 Yes    Overview Signed 4/15/2021  5:14 PM by Courtney Umanzor MD     S/P Right above-the-knee amputation (4/11/2021  Dr. Fabby Holman)             Ischemia of right BKA site [facility]) ICD-10-CM: T87.89, I99.8  ICD-9-CM: 997.69, 459.9  4/7/2021 Yes        Infection of right below knee amputation [facility]) ICD-10-CM: T87.43  ICD-9-CM: 997.62  4/7/2021 Yes    Overview Signed 4/15/2021  5:17 PM by Courtney Umanzor MD     S/P Washout of right below-knee amputation; Right knee disarticulation (4/7/2021  Dr. Andria Nation); S/P Exploration of right knee disarticulation and control of hemorrhage (4/8/2021  Dr. Andria Nation)             Peripheral artery disease ([facility] (Chronic) ICD-10-CM: I73.9  ICD-9-CM: 443.9  Unknown Yes        Type 2 diabetes mellitus, without long-term current use of insulin (HCC) (Chronic) ICD-10-CM: E11.9  ICD-9-CM: 250.00  Unknown Yes    Overview Addendum 4/15/2021  5:40 PM by Courtney Umanzor MD     HbA1c (4/8/2021) = 9.8; HbA1c (3/6/2021) = 12.9             Factor V Leiden mutation ([facility] (Chronic) ICD-10-CM: O11.80  ICD-9-CM: 289.81  Unknown Yes        Hypertensive heart disease with chronic systolic congestive heart failure (HCC) (Chronic) ICD-10-CM: I11.0, I50.22  ICD-9-CM: 402.91, 428.22  Unknown Yes    Overview Signed 3/26/2021 11:06 PM by Courtney Umanzor MD     2D echocardiogram (12/17/2015) showed EF 35%; genealized hypokinesis more focally severe of the inferior and posterior segments; mild mitral regurgitation; trace tricuspud regurgitation; normal pulmonary artery pressure             Anticoagulated by anticoagulation treatment (Chronic) ICD-10-CM: Z79.01  ICD-9-CM: V58.61  Unknown Yes    Overview Signed 3/26/2021  4:36 PM by Tereso Busch MD     On Rivaroxaban             Mixed hyperlipidemia (Chronic) ICD-10-CM: E78.2  ICD-9-CM: 272.2  Unknown Yes    Overview Signed 3/26/2021 11:07 PM by Tereso Busch MD     Lipid profile (7/25/2018) showed , , HDL 27,              Major depressive disorder ICD-10-CM: F32.9  ICD-9-CM: 296.20  3/24/2021 Yes    Overview Signed 3/26/2021 11:31 PM by Tereso Busch MD     On Duloxetine             History of right below knee amputation [facility]) ICD-10-CM: Z89.511  ICD-9-CM: V49.75  3/22/2021 Yes    Overview Signed 3/26/2021  5:43 PM by Tereso Busch MD     S/P Right below-the-knee amputation (3/22/2021 - Dr. Chriss Min)             Impaired mobility and ADLs ICD-10-CM: Z74.09, Z78.9  ICD-9-CM: V49.89  3/22/2021 Yes        Critical ischemia of lower extremity ([facility] ICD-10-CM: L98.562  ICD-9-CM: 459.9  3/22/2021 Yes    Overview Signed 3/26/2021  5:42 PM by Tereso Busch MD     Right             Acute blood loss as cause of postoperative anemia ICD-10-CM: D62  ICD-9-CM: 285.1  3/22/2021 Yes        History of embolectomy ICD-10-CM: Z98.890  ICD-9-CM: V45.89  3/5/2021 Yes    Overview Signed 3/26/2021  5:39 PM by Tereso Busch MD     S/P Right lower extremity arterial embolectomy                   Background:   Past Medical History:   Past Medical History:   Diagnosis Date    Acute blood loss as cause of postoperative anemia 3/22/2021    Anticoagulated by anticoagulation treatment     On Rivaroxaban    Cardiomyopathy ([address] 12/17/2015    2D echocardiogram (3/24/2021) showed EF 37%; global hypokinesis of left ventricle; grade I diastolic dysfunction; 2D echocardiogram (12/17/2015) showed EF 35%; generalized hypokinesis more focally severe of the inferior and posterior segments; mild mitral regurgitation; trace tricuspud regurgitation; normal pulmonary artery pressure    Chronic systolic heart failure (HCC)     2D echocardiogram (3/24/2021) showed EF 37%; global hypokinesis of left ventricle; grade I diastolic dysfunction; 2D echocardiogram (12/17/2015) showed EF 35%; generalized hypokinesis more focally severe of the inferior and posterior segments; mild mitral regurgitation; trace tricuspud regurgitation; normal pulmonary artery pressure    Coronary artery disease involving native coronary artery of native heart     COVID-19 ruled out by laboratory testing 3/22/2021    SARS-CoV-2 ([facility] m2000, [facility]) (3/22/2021):  Not detected     Critical ischemia of lower extremity ([facility] 3/22/2021    Right    Factor V Leiden mutation ([facility]     Grade I diastolic dysfunction 85/97/4420    2D echocardiogram (3/24/2021) showed EF 37%; global hypokinesis of left ventricle; grade I diastolic dysfunction    History of deep venous thrombosis (DVT) of distal vein of right lower extremity     History of epilepsy     History of head injury     History of myocardial infarction     History of noncompliance with medical treatment 3/18/2021    Hypertensive heart disease with chronic systolic congestive heart failure ([facility]     2D echocardiogram (3/24/2021) showed EF 37%; global hypokinesis of left ventricle; grade I diastolic dysfunction; 2D echocardiogram (12/17/2015) showed EF 35%; genealized hypokinesis more focally severe of the inferior and posterior segments; mild mitral regurgitation; trace tricuspud regurgitation; normal pulmonary artery pressure    Infection of right below knee amputation ([facility] 4/7/2021    S/P Washout of right below-knee amputation; Right knee disarticulation (4/7/2021  Dr. Arnaldo Correa); S/P Exploration of right knee disarticulation and control of hemorrhage (4/8/2021  Dr. Arnaldo Correa)    Long term current use of aspirin     Major depressive disorder 03/24/2021    On Duloxetine    Migraine headache     Mitral valve prolapse     Mixed hyperlipidemia     Lipid profile (7/25/2018) showed , , HDL 27,     Obstructive sleep apnea     On statin therapy due to risk of future cardiovascular event     On Atorvastatin    Peripheral artery disease ([address]     Type 2 diabetes mellitus, without long-term current use of insulin ([facility])     HbA1c (4/8/2021) = 9.8; HbA1c (3/6/2021) = 98.1    Umbilical hernia     Wears glasses       Patient taking anticoagulants yes    Patient has a defibrillator: no     Assessment:   Changes in Assessment throughout shift: no acute changes noted throughout the shift     Patient has central line: no Reasons if yes: n/a  Insertion date:n/a Last dressing date:n/a   Patient has Ellis Cath: no Reasons if yes: n/a   Insertion date:n/a     Last Vitals:     Vitals:    04/24/21 1608 04/24/21 2100 04/25/21 0748 04/25/21 1623   BP: 120/84 110/73 107/72 108/67   Pulse: 86 82 86 85   Resp: 18 18 18 18   Temp:  97.4 °F (36.3 °C) 98.2 °F (36.8 °C) 98.4 °F (36.9 °C)   SpO2: 99% 100% 99% 98%   Height:            PAIN    Pain Assessment    Pain Intensity 1: 7 (04/25/21 1833) Pain Intensity 1: 2 (12/29/14 1105)    Pain Location 1: Leg Pain Location 1: Abdomen    Pain Intervention(s) 1: Medication (see MAR) Pain Intervention(s) 1: Medication (see MAR)  Patient Stated Pain Goal: 0 Patient Stated Pain Goal: 0  o Intervention effective: yes    o Other actions taken for pain: repositioning      Skin Assessment  Skin color Skin Color: Appropriate for ethnicity  Condition/Temperature Skin Condition/Temp: Dry, Warm  Integrity Skin Integrity: Incision (comment)  Turgor Turgor: Non-tenting  Weekly Pressure Ulcer Documentation  Pressure  Injury Documentation: No Pressure Injury Noted-Pressure Ulcer Prevention Initiated  Wound Prevention & Protection Methods  Orientation of wound Orientation of Wound Prevention: Posterior  Location of Prevention Location of Wound Prevention: Sacrum/Coccyx  Dressing Present Dressing Present : Yes  Dressing Status Dressing Status: Intact  Wound Offloading Wound Offloading (Prevention Methods): Bed, pressure redistribution/air     INTAKE/OUPUT  Date 04/24/21 1900 - 04/25/21 0659 04/25/21 0700 - 04/26/21 0659   Shift 5315-7747 24 Hour Total 0533-5113 3052-5510 24 Hour Total   INTAKE   Shift Total        OUTPUT   Urine          Urine Occurrence(s) 3 x 6 x 4 x  4 x   Stool          Stool Occurrence(s) 0 x 1 x 0 x  0 x   Shift Total        NET        Weight (kg)            Recommendations:  1. Patient needs and requests: education     2. Diet: Cardiac consistent carb diet with thin liquids    3. Pending tests/procedures: AM labs     4. Functional Level/Equipment: 1 person assist     5. Estimated Discharge Date: TBD Posted on Whiteboard in Patients Room: [facility] Safety Check    A safety check occurred in the patient's room between off going nurse and oncoming nurse listed above. The safety check included the below items  Area Items   H  High Alert Medications - Verify all high alert medication drips (heparin, PCA, etc.)   E  Equipment - Suction is set up for ALL patients (with faraz)  - Red plugs utilized for all equipment (IV pumps, etc.)  - WOWs wiped down at end of shift.  - Room stocked with oxygen, suction, and other unit-specific supplies   A  Alarms - Bed alarm is set for fall risk patients  - Ensure chair alarm is in place and activated if patient is up in a chair   L  Lines - Check IV for any infiltration  - Ellis bag is empty if patient has a Ellis   - Tubing and IV bags are labeled   S  Safety   - Room is clean, patient is clean, and equipment is clean. - Hallways are clear from equipment besides carts.    - Fall bracelet on for fall risk patients  - Ensure room is clear and free of clutter  - Suction is set up for ALL patients (with faraz)  - Hallways are clear from equipment besides carts.    - Isolation precautions followed, supplies available outside room, sign posted         Dagmar Cogan

## 2021-04-25 NOTE — PROGRESS NOTES
[facility]    Bedside and Verbal shift change report given to Heather Singh LPN (oncoming nurse) by Dee Ralph (offgoing nurse). Report included the following information SBAR, Kardex, MAR and Recent Results.     Situation:   Code Status: Full Code   Reason for Admission: right AKA  Hospital Day: 10   Problem List:   Hospital Problems  Date Reviewed: 4/23/2021          Codes Class Noted POA    * (Principal) Status post above-knee amputation of right lower extremity ([facility] ICD-10-CM: Z27.147  ICD-9-CM: V49.76  4/11/2021 Yes    Overview Signed 4/15/2021  5:14 PM by Leann Bruner MD     S/P Right above-the-knee amputation (4/11/2021  Dr. Robert Rosales)             Ischemia of right BKA site [facility]) ICD-10-CM: T87.89, I99.8  ICD-9-CM: 997.69, 459.9  4/7/2021 Yes        Infection of right below knee amputation [facility]) ICD-10-CM: T87.43  ICD-9-CM: 997.62  4/7/2021 Yes    Overview Signed 4/15/2021  5:17 PM by Leann Bruner MD     S/P Washout of right below-knee amputation; Right knee disarticulation (4/7/2021  Dr. Nadia Jaime); S/P Exploration of right knee disarticulation and control of hemorrhage (4/8/2021  Dr. Nadia Jamie)             Peripheral artery disease ([facility] (Chronic) ICD-10-CM: I73.9  ICD-9-CM: 443.9  Unknown Yes        Type 2 diabetes mellitus, without long-term current use of insulin (HCC) (Chronic) ICD-10-CM: E11.9  ICD-9-CM: 250.00  Unknown Yes    Overview Addendum 4/15/2021  5:40 PM by Leann Bruner MD     HbA1c (4/8/2021) = 9.8; HbA1c (3/6/2021) = 12.9             Factor V Leiden mutation ([facility] (Chronic) ICD-10-CM: Y13.94  ICD-9-CM: 289.81  Unknown Yes        Hypertensive heart disease with chronic systolic congestive heart failure (HCC) (Chronic) ICD-10-CM: I11.0, I50.22  ICD-9-CM: 402.91, 428.22  Unknown Yes    Overview Signed 3/26/2021 11:06 PM by Leann Bruner MD     2D echocardiogram (12/17/2015) showed EF 35%; genealized hypokinesis more focally severe of the inferior and posterior segments; mild mitral regurgitation; trace tricuspud regurgitation; normal pulmonary artery pressure             Anticoagulated by anticoagulation treatment (Chronic) ICD-10-CM: Z79.01  ICD-9-CM: V58.61  Unknown Yes    Overview Signed 3/26/2021  4:36 PM by Martha Hook MD     On Rivaroxaban             Mixed hyperlipidemia (Chronic) ICD-10-CM: E78.2  ICD-9-CM: 272.2  Unknown Yes    Overview Signed 3/26/2021 11:07 PM by Martha Hook MD     Lipid profile (7/25/2018) showed , , HDL 27,              Major depressive disorder ICD-10-CM: F32.9  ICD-9-CM: 296.20  3/24/2021 Yes    Overview Signed 3/26/2021 11:31 PM by Martha Hook MD     On Duloxetine             History of right below knee amputation [facility]) ICD-10-CM: Z89.511  ICD-9-CM: V49.75  3/22/2021 Yes    Overview Signed 3/26/2021  5:43 PM by Martha Hook MD     S/P Right below-the-knee amputation (3/22/2021 - Dr. Neno Hernandez)             Impaired mobility and ADLs ICD-10-CM: Z74.09, Z78.9  ICD-9-CM: V49.89  3/22/2021 Yes        Critical ischemia of lower extremity ([facility] ICD-10-CM: Y23.989  ICD-9-CM: 459.9  3/22/2021 Yes    Overview Signed 3/26/2021  5:42 PM by Martha Hook MD     Right             Acute blood loss as cause of postoperative anemia ICD-10-CM: D62  ICD-9-CM: 285.1  3/22/2021 Yes        History of embolectomy ICD-10-CM: Z98.890  ICD-9-CM: V45.89  3/5/2021 Yes    Overview Signed 3/26/2021  5:39 PM by Martha Hook MD     S/P Right lower extremity arterial embolectomy                   Background:   Past Medical History:   Past Medical History:   Diagnosis Date    Acute blood loss as cause of postoperative anemia 3/22/2021    Anticoagulated by anticoagulation treatment     On Rivaroxaban    Cardiomyopathy ([facility] 12/17/2015    2D echocardiogram (3/24/2021) showed EF 37%; global hypokinesis of left ventricle; grade I diastolic dysfunction; 2D echocardiogram (12/17/2015) showed EF 35%; generalized hypokinesis more focally severe of the inferior and posterior segments; mild mitral regurgitation; trace tricuspud regurgitation; normal pulmonary artery pressure    Chronic systolic heart failure (HCC)     2D echocardiogram (3/24/2021) showed EF 37%; global hypokinesis of left ventricle; grade I diastolic dysfunction; 2D echocardiogram (12/17/2015) showed EF 35%; generalized hypokinesis more focally severe of the inferior and posterior segments; mild mitral regurgitation; trace tricuspud regurgitation; normal pulmonary artery pressure    Coronary artery disease involving native coronary artery of native heart     COVID-19 ruled out by laboratory testing 3/22/2021    SARS-CoV-2 ([employer] m2000, [facility]) (3/22/2021):  Not detected     Critical ischemia of lower extremity ([facility] 3/22/2021    Right    Factor V Leiden mutation ([facility]     Grade I diastolic dysfunction 92/86/3484    2D echocardiogram (3/24/2021) showed EF 37%; global hypokinesis of left ventricle; grade I diastolic dysfunction    History of deep venous thrombosis (DVT) of distal vein of right lower extremity     History of epilepsy     History of head injury     History of myocardial infarction     History of noncompliance with medical treatment 3/18/2021    Hypertensive heart disease with chronic systolic congestive heart failure ([facility]     2D echocardiogram (3/24/2021) showed EF 37%; global hypokinesis of left ventricle; grade I diastolic dysfunction; 2D echocardiogram (12/17/2015) showed EF 35%; genealized hypokinesis more focally severe of the inferior and posterior segments; mild mitral regurgitation; trace tricuspud regurgitation; normal pulmonary artery pressure    Infection of right below knee amputation ([facility] 4/7/2021    S/P Washout of right below-knee amputation; Right knee disarticulation (4/7/2021  Dr. Hugh Neri); S/P Exploration of right knee disarticulation and control of hemorrhage (4/8/2021  Dr. Hugh Neri)    Long term current use of aspirin     Major depressive disorder 03/24/2021    On Duloxetine    Migraine headache     Mitral valve prolapse     Mixed hyperlipidemia     Lipid profile (7/25/2018) showed , , HDL 27,     Obstructive sleep apnea     On statin therapy due to risk of future cardiovascular event     On Atorvastatin    Peripheral artery disease ([address]     Type 2 diabetes mellitus, without long-term current use of insulin ([facility])     HbA1c (4/8/2021) = 9.8; HbA1c (3/6/2021) = 22.2    Umbilical hernia     Wears glasses       Patient taking anticoagulants yes    Patient has a defibrillator: no     Assessment:   Changes in Assessment throughout shift: no acute changes noted throughout the shift     Patient has central line: no Reasons if yes: n/a  Insertion date:n/a Last dressing date:n/a   Patient has Ellis Cath: no Reasons if yes: n/a   Insertion date:n/a     Last Vitals:     Vitals:    04/24/21 1608 04/24/21 2100 04/25/21 0748 04/25/21 1623   BP: 120/84 110/73 107/72 108/67   Pulse: 86 82 86 85   Resp: 18 18 18 18   Temp:  97.4 °F (36.3 °C) 98.2 °F (36.8 °C) 98.4 °F (36.9 °C)   SpO2: 99% 100% 99% 98%   Height:            PAIN    Pain Assessment    Pain Intensity 1: 7 (04/25/21 1833) Pain Intensity 1: 2 (12/29/14 1105)    Pain Location 1: Leg Pain Location 1: Abdomen    Pain Intervention(s) 1: Medication (see MAR) Pain Intervention(s) 1: Medication (see MAR)  Patient Stated Pain Goal: 0 Patient Stated Pain Goal: 0  o Intervention effective: yes    o Other actions taken for pain: repositioning      Skin Assessment  Skin color Skin Color: Appropriate for ethnicity  Condition/Temperature Skin Condition/Temp: Dry, Warm  Integrity Skin Integrity: Incision (comment)  Turgor Turgor: Non-tenting  Weekly Pressure Ulcer Documentation  Pressure  Injury Documentation: No Pressure Injury Noted-Pressure Ulcer Prevention Initiated  Wound Prevention & Protection Methods  Orientation of wound Orientation of Wound Prevention: Posterior  Location of Prevention Location of Wound Prevention: Sacrum/Coccyx  Dressing Present Dressing Present : Yes  Dressing Status Dressing Status: Intact  Wound Offloading Wound Offloading (Prevention Methods): Bed, pressure redistribution/air     INTAKE/OUPUT  Date 04/24/21 1900 - 04/25/21 0659 04/25/21 0700 - 04/26/21 0659   Shift 8213-5370 24 Hour Total 2188-3147 2955-7508 24 Hour Total   INTAKE   Shift Total        OUTPUT   Urine          Urine Occurrence(s) 3 x 6 x 4 x  4 x   Stool          Stool Occurrence(s) 0 x 1 x 0 x  0 x   Shift Total        NET        Weight (kg)            Recommendations:  1. Patient needs and requests: education     2. Diet: Cardiac consistent carb diet with thin liquids    3. Pending tests/procedures: AM labs     4. Functional Level/Equipment: 1 person assist     5. Estimated Discharge Date: TBD Posted on Whiteboard in Patients Room: [facility] Safety Check    A safety check occurred in the patient's room between off going nurse and oncoming nurse listed above. The safety check included the below items  Area Items   H  High Alert Medications - Verify all high alert medication drips (heparin, PCA, etc.)   E  Equipment - Suction is set up for ALL patients (with faraz)  - Red plugs utilized for all equipment (IV pumps, etc.)  - WOWs wiped down at end of shift.  - Room stocked with oxygen, suction, and other unit-specific supplies   A  Alarms - Bed alarm is set for fall risk patients  - Ensure chair alarm is in place and activated if patient is up in a chair   L  Lines - Check IV for any infiltration  - Ellis bag is empty if patient has a Ellis   - Tubing and IV bags are labeled   S  Safety   - Room is clean, patient is clean, and equipment is clean. - Hallways are clear from equipment besides carts.    - Fall bracelet on for fall risk patients  - Ensure room is clear and free of clutter  - Suction is set up for ALL patients (with faraz)  - Hallways are clear from equipment besides carts.    - Isolation precautions followed, supplies available outside room, sign posted         Trish Baars

## 2021-04-26 LAB
ANION GAP SERPL CALC-SCNC: 7 MMOL/L (ref 3–18)
BUN SERPL-MCNC: 21 MG/DL (ref 7–18)
BUN/CREAT SERPL: 21 (ref 12–20)
CALCIUM SERPL-MCNC: 9.3 MG/DL (ref 8.5–10.1)
CHLORIDE SERPL-SCNC: 107 MMOL/L (ref 100–111)
CO2 SERPL-SCNC: 23 MMOL/L (ref 21–32)
CREAT SERPL-MCNC: 0.98 MG/DL (ref 0.6–1.3)
GLUCOSE BLD STRIP.AUTO-MCNC: 111 MG/DL (ref 70–110)
GLUCOSE BLD STRIP.AUTO-MCNC: 116 MG/DL (ref 70–110)
GLUCOSE BLD STRIP.AUTO-MCNC: 121 MG/DL (ref 70–110)
GLUCOSE BLD STRIP.AUTO-MCNC: 131 MG/DL (ref 70–110)
GLUCOSE SERPL-MCNC: 101 MG/DL (ref 74–99)
HCT VFR BLD AUTO: 32.7 % (ref 36–48)
HGB BLD-MCNC: 9.9 G/DL (ref 13–16)
POTASSIUM SERPL-SCNC: 5.7 MMOL/L (ref 3.5–5.5)
SODIUM SERPL-SCNC: 137 MMOL/L (ref 136–145)

## 2021-04-26 PROCEDURE — 74011250637 HC RX REV CODE- 250/637: Performed by: INTERNAL MEDICINE

## 2021-04-26 PROCEDURE — 97110 THERAPEUTIC EXERCISES: CPT

## 2021-04-26 PROCEDURE — 65310000000 HC RM PRIVATE REHAB

## 2021-04-26 PROCEDURE — 36415 COLL VENOUS BLD VENIPUNCTURE: CPT

## 2021-04-26 PROCEDURE — 97530 THERAPEUTIC ACTIVITIES: CPT

## 2021-04-26 PROCEDURE — 82962 GLUCOSE BLOOD TEST: CPT

## 2021-04-26 PROCEDURE — 80048 BASIC METABOLIC PNL TOTAL CA: CPT

## 2021-04-26 PROCEDURE — 99232 SBSQ HOSP IP/OBS MODERATE 35: CPT | Performed by: INTERNAL MEDICINE

## 2021-04-26 PROCEDURE — 85018 HEMOGLOBIN: CPT

## 2021-04-26 PROCEDURE — 97116 GAIT TRAINING THERAPY: CPT

## 2021-04-26 RX ORDER — SERTRALINE HYDROCHLORIDE 25 MG/1
25 TABLET, FILM COATED ORAL DAILY
Status: DISCONTINUED | OUTPATIENT
Start: 2021-04-27 | End: 2021-04-29 | Stop reason: HOSPADM

## 2021-04-26 RX ORDER — AMOXICILLIN 250 MG
2 CAPSULE ORAL
Status: DISCONTINUED | OUTPATIENT
Start: 2021-04-26 | End: 2021-04-29 | Stop reason: HOSPADM

## 2021-04-26 RX ADMIN — METHOCARBAMOL 500 MG: 500 TABLET ORAL at 15:06

## 2021-04-26 RX ADMIN — CARVEDILOL 3.12 MG: 6.25 TABLET, FILM COATED ORAL at 08:33

## 2021-04-26 RX ADMIN — METFORMIN HYDROCHLORIDE 850 MG: 850 TABLET ORAL at 17:10

## 2021-04-26 RX ADMIN — OXYCODONE HYDROCHLORIDE 10 MG: 5 TABLET ORAL at 21:03

## 2021-04-26 RX ADMIN — ACETAMINOPHEN 650 MG: 325 TABLET, FILM COATED ORAL at 08:34

## 2021-04-26 RX ADMIN — OXYCODONE HYDROCHLORIDE 10 MG: 5 TABLET ORAL at 10:34

## 2021-04-26 RX ADMIN — ACETAMINOPHEN 650 MG: 325 TABLET, FILM COATED ORAL at 17:04

## 2021-04-26 RX ADMIN — RIVAROXABAN 20 MG: 20 TABLET, FILM COATED ORAL at 08:33

## 2021-04-26 RX ADMIN — Medication 1 TABLET: at 08:33

## 2021-04-26 RX ADMIN — OXYCODONE HYDROCHLORIDE 10 MG: 5 TABLET ORAL at 06:14

## 2021-04-26 RX ADMIN — DOCUSATE SODIUM 100 MG: 100 CAPSULE, LIQUID FILLED ORAL at 08:32

## 2021-04-26 RX ADMIN — DULOXETINE HYDROCHLORIDE 30 MG: 30 CAPSULE, DELAYED RELEASE ORAL at 08:33

## 2021-04-26 RX ADMIN — ACETAMINOPHEN 650 MG: 325 TABLET, FILM COATED ORAL at 12:22

## 2021-04-26 RX ADMIN — ATORVASTATIN CALCIUM 80 MG: 40 TABLET, FILM COATED ORAL at 08:33

## 2021-04-26 RX ADMIN — METHOCARBAMOL 500 MG: 500 TABLET ORAL at 08:33

## 2021-04-26 RX ADMIN — Medication 250 MG: at 08:33

## 2021-04-26 RX ADMIN — GABAPENTIN 100 MG: 100 CAPSULE ORAL at 08:33

## 2021-04-26 RX ADMIN — GABAPENTIN 200 MG: 100 CAPSULE ORAL at 21:00

## 2021-04-26 RX ADMIN — CARVEDILOL 3.12 MG: 6.25 TABLET, FILM COATED ORAL at 17:10

## 2021-04-26 RX ADMIN — METFORMIN HYDROCHLORIDE 850 MG: 850 TABLET ORAL at 08:33

## 2021-04-26 RX ADMIN — METHOCARBAMOL 500 MG: 500 TABLET ORAL at 21:00

## 2021-04-26 RX ADMIN — DOCUSATE SODIUM 50MG AND SENNOSIDES 8.6MG 2 TABLET: 8.6; 5 TABLET, FILM COATED ORAL at 21:00

## 2021-04-26 RX ADMIN — ASPIRIN 81 MG CHEWABLE TABLET 81 MG: 81 TABLET CHEWABLE at 08:33

## 2021-04-26 RX ADMIN — Medication 5 MG: at 21:00

## 2021-04-26 RX ADMIN — GABAPENTIN 100 MG: 100 CAPSULE ORAL at 15:06

## 2021-04-26 RX ADMIN — ZOLPIDEM TARTRATE 5 MG: 5 TABLET ORAL at 22:25

## 2021-04-26 RX ADMIN — EZETIMIBE 10 MG: 10 TABLET ORAL at 08:33

## 2021-04-26 NOTE — PROGRESS NOTES
[facility]    Bedside and Verbal shift change report given to Siri Caldwell, RN (oncoming nurse) by Donis Roper RN (offgoing nurse). Report included the following information SBAR, Kardex, MAR and Recent Results.     Situation:   Code Status: Full Code   Reason for Admission: right AKA  Hospital Day: 11   Problem List:   Hospital Problems  Date Reviewed: 4/23/2021          Codes Class Noted POA    * (Principal) Status post above-knee amputation of right lower extremity ([facility] ICD-10-CM: D71.911  ICD-9-CM: V49.76  4/11/2021 Yes    Overview Signed 4/15/2021  5:14 PM by Lilly Noriega MD     S/P Right above-the-knee amputation (4/11/2021  Dr. Tobin Pallas)             Ischemia of right BKA site [facility]) ICD-10-CM: T87.89, I99.8  ICD-9-CM: 997.69, 459.9  4/7/2021 Yes        Infection of right below knee amputation [facility]) ICD-10-CM: T87.43  ICD-9-CM: 997.62  4/7/2021 Yes    Overview Signed 4/15/2021  5:17 PM by Lilly Noriega MD     S/P Washout of right below-knee amputation; Right knee disarticulation (4/7/2021  Dr. Hugh Neri); S/P Exploration of right knee disarticulation and control of hemorrhage (4/8/2021  Dr. Hugh Neri)             Peripheral artery disease ([facility] (Chronic) ICD-10-CM: I73.9  ICD-9-CM: 443.9  Unknown Yes        Type 2 diabetes mellitus, without long-term current use of insulin (HCC) (Chronic) ICD-10-CM: E11.9  ICD-9-CM: 250.00  Unknown Yes    Overview Addendum 4/15/2021  5:40 PM by Lilly Noriega MD     HbA1c (4/8/2021) = 9.8; HbA1c (3/6/2021) = 12.9             Factor V Leiden mutation ([facility] (Chronic) ICD-10-CM: G21.16  ICD-9-CM: 289.81  Unknown Yes        Hypertensive heart disease with chronic systolic congestive heart failure (HCC) (Chronic) ICD-10-CM: I11.0, I50.22  ICD-9-CM: 402.91, 428.22  Unknown Yes    Overview Signed 3/26/2021 11:06 PM by Lilly Noriega MD     2D echocardiogram (12/17/2015) showed EF 35%; genealized hypokinesis more focally severe of the inferior and posterior segments; mild mitral regurgitation; trace tricuspud regurgitation; normal pulmonary artery pressure             Anticoagulated by anticoagulation treatment (Chronic) ICD-10-CM: Z79.01  ICD-9-CM: V58.61  Unknown Yes    Overview Signed 3/26/2021  4:36 PM by Carlos Barajas MD     On Rivaroxaban             Mixed hyperlipidemia (Chronic) ICD-10-CM: E78.2  ICD-9-CM: 272.2  Unknown Yes    Overview Signed 3/26/2021 11:07 PM by Carlos Barajas MD     Lipid profile (7/25/2018) showed , , HDL 27,              Major depressive disorder ICD-10-CM: F32.9  ICD-9-CM: 296.20  3/24/2021 Yes    Overview Signed 3/26/2021 11:31 PM by Carlos Barajas MD     On Duloxetine             History of right below knee amputation [facility]) ICD-10-CM: Z89.511  ICD-9-CM: V49.75  3/22/2021 Yes    Overview Signed 3/26/2021  5:43 PM by Carlos Barajas MD     S/P Right below-the-knee amputation (3/22/2021 - Dr. Solo Mark)             Impaired mobility and ADLs ICD-10-CM: Z74.09, Z78.9  ICD-9-CM: V49.89  3/22/2021 Yes        Critical ischemia of lower extremity ([facility] ICD-10-CM: T38.134  ICD-9-CM: 459.9  3/22/2021 Yes    Overview Signed 3/26/2021  5:42 PM by Carlos Barajas MD     Right             Acute blood loss as cause of postoperative anemia ICD-10-CM: D62  ICD-9-CM: 285.1  3/22/2021 Yes        History of embolectomy ICD-10-CM: Z98.890  ICD-9-CM: V45.89  3/5/2021 Yes    Overview Signed 3/26/2021  5:39 PM by Carlos Barajas MD     S/P Right lower extremity arterial embolectomy                   Background:   Past Medical History:   Past Medical History:   Diagnosis Date    Acute blood loss as cause of postoperative anemia 3/22/2021    Anticoagulated by anticoagulation treatment     On Rivaroxaban    [facility]17[facility]2015    2D echocardiogram (3/24/2021) showed EF 37%; global hypokinesis of left ventricle; grade I diastolic dysfunction; 2D echocardiogram (12/17/2015) showed EF 35%; generalized hypokinesis more focally severe of the inferior and posterior segments; mild mitral regurgitation; trace tricuspud regurgitation; normal pulmonary artery pressure    Chronic systolic heart failure (HCC)     2D echocardiogram (3/24/2021) showed EF 37%; global hypokinesis of left ventricle; grade I diastolic dysfunction; 2D echocardiogram (12/17/2015) showed EF 35%; generalized hypokinesis more focally severe of the inferior and posterior segments; mild mitral regurgitation; trace tricuspud regurgitation; normal pulmonary artery pressure    Coronary artery disease involving native coronary artery of native heart     COVID-19 ruled out by laboratory testing 3/22/2021    SARS-CoV-2 ([place] m2000, [facility]) (3/22/2021):  Not detected     Critical ischemia of lower extremity ([facility] 3/22/2021    Right    Factor V Leiden mutation ([facility]     Grade I diastolic dysfunction 88/55/7700    2D echocardiogram (3/24/2021) showed EF 37%; global hypokinesis of left ventricle; grade I diastolic dysfunction    History of deep venous thrombosis (DVT) of distal vein of right lower extremity     History of epilepsy     History of head injury     History of myocardial infarction     History of noncompliance with medical treatment 3/18/2021    Hypertensive heart disease with chronic systolic congestive heart failure ([facility]     2D echocardiogram (3/24/2021) showed EF 37%; global hypokinesis of left ventricle; grade I diastolic dysfunction; 2D echocardiogram (12/17/2015) showed EF 35%; genealized hypokinesis more focally severe of the inferior and posterior segments; mild mitral regurgitation; trace tricuspud regurgitation; normal pulmonary artery pressure    Infection of right below knee amputation ([facility] 4/7/2021    S/P Washout of right below-knee amputation; Right knee disarticulation (4/7/2021  Dr. Maynor Yusuf); S/P Exploration of right knee disarticulation and control of hemorrhage (4/8/2021  Dr. Maynor Yusuf)   Wallace Melara Long term current use of aspirin     Major depressive disorder 03/24/2021    On Duloxetine    Migraine headache     Mitral valve prolapse     Mixed hyperlipidemia     Lipid profile (7/25/2018) showed , , HDL 27,     Obstructive sleep apnea     On statin therapy due to risk of future cardiovascular event     On Atorvastatin    Peripheral artery disease ([facility]     Type 2 diabetes mellitus, without long-term current use of insulin ([facility])     HbA1c (4/8/2021) = 9.8; HbA1c (3/6/2021) = 74.0    Umbilical hernia     Wears glasses       Patient taking anticoagulants yes    Patient has a defibrillator: no     Assessment:   Changes in Assessment throughout shift: no acute changes noted throughout the shift     Patient has central line: no Reasons if yes: n/a  Insertion date:n/a Last dressing date:n/a   Patient has Ellis Cath: no Reasons if yes: n/a   Insertion date:n/a     Last Vitals:     Vitals:    04/24/21 2100 04/25/21 0748 04/25/21 1623 04/25/21 2100   BP: 110/73 107/72 108/67 125/75   Pulse: 82 86 85 84   Resp: 18 18 18 18   Temp: 97.4 °F (36.3 °C) 98.2 °F (36.8 °C) 98.4 °F (36.9 °C) 97.4 °F (36.3 °C)   SpO2: 100% 99% 98% 100%   Height:            PAIN    Pain Assessment    Pain Intensity 1: 0 (04/26/21 0400) Pain Intensity 1: 2 (12/29/14 1105)    Pain Location 1: Leg Pain Location 1: Abdomen    Pain Intervention(s) 1: Medication (see MAR) Pain Intervention(s) 1: Medication (see MAR)  Patient Stated Pain Goal: 0 Patient Stated Pain Goal: 0  o Intervention effective: yes    o Other actions taken for pain: repositioning      Skin Assessment  Skin color Skin Color: Appropriate for ethnicity  Condition/Temperature Skin Condition/Temp: Dry, Warm  Integrity Skin Integrity: Incision (comment)  Turgor Turgor: Non-tenting  Weekly Pressure Ulcer Documentation  Pressure  Injury Documentation: No Pressure Injury Noted-Pressure Ulcer Prevention Initiated  Wound Prevention & Protection Methods  Orientation of wound Orientation of Wound Prevention: Posterior  Location of Prevention Location of Wound Prevention: Buttocks, Sacrum/Coccyx  Dressing Present Dressing Present : Yes  Dressing Status Dressing Status: Intact  Wound Offloading Wound Offloading (Prevention Methods): Bed, pressure redistribution/air     INTAKE/OUPUT  Date 04/25/21 0700 - 04/26/21 0659 04/26/21 0700 - 04/27/21 0659   Shift 0700-1859 1900-0659 24 Hour Total 0700-1859 1900-0659 24 Hour Total   INTAKE   Shift Total         OUTPUT   Urine  300 300        Urine Voided  300 300        Urine Occurrence(s) 4 x 2 x 6 x      Stool           Stool Occurrence(s) 0 x 0 x 0 x      Shift Total  300 300      NET  -300 -300      Weight (kg)             Recommendations:  1. Patient needs and requests: education     2. Diet: Cardiac consistent carb diet with thin liquids    3. Pending tests/procedures: AM labs     4. Functional Level/Equipment: 1 person assist     5. Estimated Discharge Date: TBD Posted on Whiteboard in Patients Room: no     [facility] Safety Check    A safety check occurred in the patient's room between off going nurse and oncoming nurse listed above. The safety check included the below items  Area Items   H  High Alert Medications - Verify all high alert medication drips (heparin, PCA, etc.)   E  Equipment - Suction is set up for ALL patients (with yanker)  - Red plugs utilized for all equipment (IV pumps, etc.)  - WOWs wiped down at end of shift.  - Room stocked with oxygen, suction, and other unit-specific supplies   A  Alarms - Bed alarm is set for fall risk patients  - Ensure chair alarm is in place and activated if patient is up in a chair   L  Lines - Check IV for any infiltration  - Ellis bag is empty if patient has a Ellis   - Tubing and IV bags are labeled   S  Safety   - Room is clean, patient is clean, and equipment is clean. - Hallways are clear from equipment besides carts.    - Fall bracelet on for fall risk patients  - Ensure room is clear and free of clutter  - Suction is set up for ALL patients (with faraz)  - Hallways are clear from equipment besides carts.    - Isolation precautions followed, supplies available outside room, sign posted         Rohan Franco RN

## 2021-04-26 NOTE — PROGRESS NOTES
Problem: Self Care Deficits Care Plan (Adult)  Goal: *Acute Goals and Plan of Care (Insert Text)  Description: Occupational Therapy Goals   Long Term Goals  Initiated 2021 and to be accomplished within 2 week(s)  1. Pt will perform self-feeding with independence. 2. Pt will perform grooming with mod I in standing at the sink. 3. Pt will perform UB bathing with mod I with set up of necessary items. ( 2021)  4. Pt will perform LB bathing with mod I with set up of necessary items. ( 2021)  5. Pt will perform tub/shower transfer with mod I.   6. Pt will perform UB dressing with mod I with set up of necessary items. ( 2021)  7. Pt will perform LB dressing with mod I with set up of necessary items. 8. Pt will perform toileting task with mod I.  9. Pt will perform toilet transfer with mod I with RW. Short Term Goals   Initiated 2021 and to be accomplished within 7 day(s) (2021). Re-assessed 2021. 1. Pt will perform self-feeding with mod I and no set-up A. ( 2021)  2. Pt will perform grooming with mod I. ( 2021)  3. Pt will perform UB bathing with mod I. ( 2021)  4. Pt will perform LB bathing with mod I. ( 2021)  5. Pt will perform tub/shower transfer with mod I. - At supervision - can transfer from RW into shower; t/s T with S for practice  6. Pt will perform UB dressing with mod I. ( 2021)  7. Pt will perform LB dressing with mod I.  - At supervision; can progress to Mod I   8. Pt will perform toileting task with mod I. - At supervision; progress to mod I  9. Pt will perform toilet transfer with mod I with w/c as needed. - At supervision; progress to mod I with RW as appropriate. Outcome: Progressing Towards Goal   Occupational Therapy TREATMENT    Patient: Westley Curtis   60 y.o.     Patient identified with name and : yes    Date: 2021    First Tx Session  Time In: 80  Time Out[de-identified] 9837    Second Tx Session  Time In: 1330  Time Out[de-identified] 6153    Diagnosis: Status post above-knee amputation of right lower extremity (HCC) [Z89.611]   Precautions: Fall(right AKA)  Chart, occupational therapy assessment, plan of care, and goals were reviewed. Pain:  Pt reports 0/10 pain or discomfort prior to treatment. Pt reports 0/10 pain or discomfort post treatment. Intervention Provided:       SUBJECTIVE:   Patient stated i'm going home in 4 days.     OBJECTIVE DATA SUMMARY:     THERAPEUTIC ACTIVITY Daily Assessment    Pt engaged in BUE FM task which involved removing and fastening nuts and bolts with 2# wrist weights to challenge pt BUE strength and FM coordination. Pt engaged in BUE FM task which involved grasping small pegs and securing to peg board at table top coordinating colors to duplicate 2d visual model with setup. THERAPEUTIC EXERCISE Daily Assessment    Pt completed 10 minutes on SciFit  against mod resistance to increase pt BUE strength and activity tolerance for self care routine. Pt completed BUE strengthening with 4# dowel (shoulder flexion/extension, chest press, IR/ER) 3x15 reps, 3 sets and bicep curls 15 reps, 3 sets with 5# dowel. Pt      IADL Daily Assessment    Pt engaged in dynamic sitting and standing activity which involved locating various cones in kitchen setting to simulate organizing kitchen and challenging pt visual scanning and problem solving skills to locate various cones in kitchen cabinets, cupboards and appliances. Pt completed tasks locating 7/10 cones with Russell and requiring min VC's for remainder 3. ASSESSMENT:  Pt is making good progress toward goals and is excited for upcoming DC. Pt demonstrates good activity tolerance for self care routine and good safety awareness with functional mobility for maneuvering in kitchen.   Progression toward goals:  [x]          Improving appropriately and progressing toward goals  []          Improving slowly and progressing toward goals  [] Not making progress toward goals and plan of care will be adjusted     PLAN:  Patient continues to benefit from skilled intervention to address the above impairments. Continue treatment per established plan of care. Discharge Recommendations:  Home Health  Further Equipment Recommendations for Discharge:  N/A     Activity Tolerance:  good      Estimated LOS:4/29    Please refer to the flowsheet for vital signs taken during this treatment. After treatment:   [x]  Patient left in no apparent distress sitting up in chair   []  Patient left in no apparent distress in bed  []  Call bell left within reach  []  Nursing notified  []  Caregiver present  []  Bed alarm activated    COMMUNICATION/EDUCATION:   [] Home safety education was provided and the patient/caregiver indicated understanding. [] Patient/family have participated as able in goal setting and plan of care. [x] Patient/family agree to work toward stated goals and plan of care. [] Patient understands intent and goals of therapy, but is neutral about his/her participation. [] Patient is unable to participate in goal setting and plan of care.       Earle Hamman, OT

## 2021-04-26 NOTE — PROGRESS NOTES
[address]  [address] [place] [address], [address]     INPATIENT REHABILITATION  DAILY PROGRESS NOTE     Date: 4/26/2021    Name: Tita Stockton Age / Sex: 60 y.o. / male   CSN: [id number] MRN: [id number]   [facility] Date: 4/15/2021 Length of Stay: 11 days     Primary Rehab Diagnosis: Impaired Mobility and ADLs secondary to:  1. S/P Right above-the-knee amputation (4/11/2021  Dr. Jayesh España)  2. S/P Exploration of right knee disarticulation and control of hemorrhage (4/8/2021  Dr. Raheem Salas)  3. S/P Washout of right below-knee amputation; Right knee disarticulation (4/7/2021  Dr. Raheem Salas)  4. History of infection and ischemia of right BKA stump (4/7/2021)  5. S/P Right below-the-knee amputation (3/22/2021 - Dr. Betty Almeida)  6. History of critical ischemia of the right lower extremity  7. History of right lower extremity arterial embolectomy (3/5/2021 - Dr. Betty Almeida)      Subjective:     Patient seen and examined. Blood pressure controlled. Blood glucose controlled. Patient's Complaint:   Constipation    Pain Control: ongoing significant pain in which is stable and controlled by current meds      Objective:     Vital Signs:  Patient Vitals for the past 24 hrs:   BP Temp Pulse Resp SpO2   04/26/21 0726 122/74 97.8 °F (36.6 °C) 84 18 99 %   04/25/21 2100 125/75 97.4 °F (36.3 °C) 84 18 100 %   04/25/21 1623 108/67 98.4 °F (36.9 °C) 85 18 98 %        Physical Examination:  GENERAL SURVEY: Patient is awake, alert, oriented x 3, laying comfortably on the bed, not in acute respiratory distress.   HEENT: pale palpebral conjunctivae, anicteric sclerae, no nasoaural discharge, moist oral mucosa  NECK: supple, no jugular venous distention, no palpable lymph nodes  CHEST/LUNGS: symmetrical chest expansion, good air entry, clear breath sounds  HEART: adynamic precordium, good S1 S2, no S3, regular rhythm, no murmurs  ABDOMEN: flat, bowel sounds appreciated, soft, non-tender  EXTREMITIES: (+) right AKA stump, (+) absence of hair on LLE, (+) healing wound with scab on lateral surface of distal third of left leg with surrounding hyperpigmentation, (+) LLE cool to touch, pale nailbeds, no edema, full and equal pulses, no calf tenderness   NEUROLOGICAL EXAM: The patient is awake, alert and oriented x3, able to answer questions fairly appropriately, able to follow 1 and 2 step commands. Able to tell time from the wall clock. Cranial nerves II-XII are grossly intact. No gross sensory deficit.   Motor strength is 4+/5 on BUE, 4/5 on the RLE, 4 to 4+/5 on the LLE.     Incision(s): covered, clean, dry, and intact       Current Medications:  Current Facility-Administered Medications   Medication Dose Route Frequency    gabapentin (NEURONTIN) capsule 100 mg  100 mg Oral BID    gabapentin (NEURONTIN) capsule 200 mg  200 mg Oral QHS    metFORMIN (GLUCOPHAGE) tablet 850 mg  850 mg Oral BID WITH MEALS    methocarbamoL (ROBAXIN) tablet 500 mg  500 mg Oral TID    0.9% sodium chloride infusion 250 mL  250 mL IntraVENous PRN    diphenhydrAMINE (BENADRYL) capsule 25 mg  25 mg Oral Q6H PRN    ferrous gluconate 324 mg (37.5 mg iron) tablet 1 Tab  324 mg Oral DAILY WITH BREAKFAST    ascorbic acid (vitamin C) (VITAMIN C) tablet 250 mg  250 mg Oral DAILY WITH BREAKFAST    acetaminophen (TYLENOL) tablet 650 mg  650 mg Oral Q4H PRN    bisacodyL (DULCOLAX) tablet 10 mg  10 mg Oral Q48H PRN    insulin lispro (HUMALOG) injection   SubCUTAneous TIDAC    carvediloL (COREG) tablet 3.125 mg  3.125 mg Oral BID WITH MEALS    aspirin chewable tablet 81 mg  81 mg Oral DAILY WITH BREAKFAST    atorvastatin (LIPITOR) tablet 80 mg  80 mg Oral DAILY    docusate sodium (COLACE) capsule 100 mg  100 mg Oral DAILY AFTER BREAKFAST    DULoxetine (CYMBALTA) capsule 30 mg  30 mg Oral DAILY    ezetimibe (ZETIA) tablet 10 mg  10 mg Oral DAILY    acetaminophen (TYLENOL) tablet 650 mg  650 mg Oral TID    oxyCODONE IR (ROXICODONE) tablet 5-10 mg  5-10 mg Oral Q4H PRN    rivaroxaban (XARELTO) tablet 20 mg  20 mg Oral DAILY    melatonin (rapid dissolve) tablet 5 mg  5 mg Oral QHS    zolpidem (AMBIEN) tablet 5 mg  5 mg Oral QHS PRN       Allergies: Allergies   Allergen Reactions    Nitroglycerin Other (comments)      BP drops rapidly when he takes SL Nitro          [place] Diarrhea and Rash    Cat Dander Cough    Codeine Rash    Vass[place] Rash       Lab/Data Review:  Recent Results (from the past 24 hour(s))   GLUCOSE, POC    Collection Time: 04/25/21  4:28 PM   Result Value Ref Range    Glucose (POC) 131 (H) 70 - 110 mg/dL   GLUCOSE, POC    Collection Time: 04/25/21  8:01 PM   Result Value Ref Range    Glucose (POC) 121 (H) 70 - 489 mg/dL   METABOLIC PANEL, BASIC    Collection Time: 04/26/21  4:44 AM   Result Value Ref Range    Sodium 137 136 - 145 mmol/L    Potassium 5.7 (H) 3.5 - 5.5 mmol/L    Chloride 107 100 - 111 mmol/L    CO2 23 21 - 32 mmol/L    Anion gap 7 3.0 - 18 mmol/L    Glucose 101 (H) 74 - 99 mg/dL    BUN 21 (H) 7.0 - 18 MG/DL    Creatinine 0.98 0.6 - 1.3 MG/DL    BUN/Creatinine ratio 21 (H) 12 - 20      GFR est AA >60 >60 ml/min/1.73m2    GFR est non-AA >60 >60 ml/min/1.73m2    Calcium 9.3 8.5 - 10.1 MG/DL   HGB & HCT    Collection Time: 04/26/21  4:44 AM   Result Value Ref Range    HGB 9.9 (L) 13.0 - 16.0 g/dL    HCT 32.7 (L) 36.0 - 48.0 %   GLUCOSE, POC    Collection Time: 04/26/21  7:28 AM   Result Value Ref Range    Glucose (POC) 111 (H) 70 - 110 mg/dL   GLUCOSE, POC    Collection Time: 04/26/21 12:06 PM   Result Value Ref Range    Glucose (POC) 121 (H) 70 - 110 mg/dL       Assessment:     Primary Rehab Diagnosis  1. Impaired Mobility and ADLs  2. S/P Right above-the-knee amputation (4/11/2021  Dr. Jeff Munguia)  3. S/P Exploration of right knee disarticulation and control of hemorrhage (4/8/2021  Dr. Terri Zhang)  4.  S/P Washout of right below-knee amputation; Right knee disarticulation (4/7/2021  Dr. Denise Covington)  5. History of infection and ischemia of right BKA stump (4/7/2021)  6. S/P Right below-the-knee amputation (3/22/2021 - Dr. Neno Hernandez)  7. History of critical ischemia of the right lower extremity  8.  History of right lower extremity arterial embolectomy (3/5/2021 - Dr. Neno Hernandez)    Comorbidities  Patient Active Problem List   Diagnosis Code    History of right below knee amputation ([facility] Z89.511    Impaired mobility and ADLs Z74.09, Z78.9    Critical ischemia of lower extremity (HCC) I70.229    Peripheral artery disease (HCC) I73.9    Coronary artery disease involving native coronary artery of native heart I25.10    Type 2 diabetes mellitus, without long-term current use of insulin (HCC) E11.9    History of epilepsy Z86.69    Factor V Leiden mutation ([facility] D68.51    Migraine headache G43.909    Wears glasses L83.5    Umbilical hernia H83.3    Obstructive sleep apnea G47.33    ICD (implantable cardioverter-defibrillator) in place Z95.810    Mitral valve prolapse I34.1    History of head injury Z87.828    Hypertensive heart disease with chronic systolic congestive heart failure (HCC) I11.0, I50.22    History of myocardial infarction I25.2    Long term current use of aspirin Z79.82    On statin therapy due to risk of future cardiovascular event Z79.899    History of deep venous thrombosis (DVT) of distal vein of right lower extremity Z86.718    Anticoagulated by anticoagulation treatment Z79.01    Mixed hyperlipidemia E78.2    History of embolectomy Z98.890    COVID-19 ruled out by laboratory testing Z20.822    Acute blood loss as cause of postoperative anemia D62    Cardiomyopathy ([facility] I42.9    Chronic systolic heart failure (HCC) I50.22    History of noncompliance with medical treatment Z91.19    Major depressive disorder F32.9    Grade I diastolic dysfunction G07.2    Constipation K59.00    Ischemia of right BKA site ([facility] T87.89, I99.8    Infection of right below knee amputation ([facility] T87.43    Status post above-knee amputation of right lower extremity ([facility] T02.818        Plan:     1. Justification for continued stay: Good progression towards established rehabilitation goals. 2. Medical Issues being followed closely:    [x]  Fall and safety precautions     [x]  Wound Care     [x]  Bowel and Bladder Function     [x]  Fluid Electrolyte and Nutrition Balance     [x]  Pain Control      3. Issues that 24 hour rehabilitation nursing is following:    [x]  Fall and safety precautions     [x]  Wound Care     [x]  Bowel and Bladder Function     [x]  Fluid Electrolyte and Nutrition Balance     [x]  Pain Control      [x]  Assistance with and education on in-room safety with transfers to and from the bed, wheelchair, toilet and shower. 4. Acute rehabilitation plan of care:    [x]  Continue current care and rehab. [x]  Physical Therapy           [x]  Occupational Therapy           []  Speech Therapy     []  Hold Rehab until further notice     5. Medications:    [x]  MAR Reviewed     [x]  Continue Present Medications     6. DVT Prophylaxis:      []  Enoxaparin     []  Unfractionated Heparin     []  Warfarin     [x]  NOAC     []  NEREYDA Stockings     []  Sequential Compression Device     []  None     7. Code status    [x]  Full code     []  Partial code     []  Do not intubate     []  Do not resuscitate     8. Orders:   > S/P Right above-the-knee amputation (4/11/2021  Dr. Galina Jones); S/P Exploration of right knee disarticulation and control of hemorrhage (4/8/2021  Dr. Denise Covington); S/P Washout of right below-knee amputation; Right knee disarticulation (4/7/2021  Dr. Denise Covington); History of infection and ischemia of right BKA stump (4/7/2021); S/P Right below-the-knee amputation (3/22/2021 - Dr. Neno Hernandez); History of critical ischemia of the right lower extremity;  History of right lower extremity arterial embolectomy (3/5/2021 - Dr. Gustavo Medina)   > Staples to be removed on 5/19/2021    > Acute Postoperative Blood Loss Anemia   > Hgb/Hct (3/26/2021) = 10.1/31.3   > Hgb/Hct (3/27/2021, on admission to ARU) = 10.9/33.9   > Anemia work-up showed serum iron 23, TIBC 200, iron % saturation 12, ferritin 835   > Hgb/Hct (3/29/2021) = 10.7/33.9       04/14/21  0423 04/13/21  0640 04/12/21  0539 04/11/21  1027 04/11/21  0650 04/10/21  1915 04/10/21  0640 04/09/21  0453 04/08/21  1602 04/08/21  0940 04/08/21  0210 04/07/21  0905 04/06/21  0850   HGB 7.2* 7.3* 7.3* 7.6* 7.9* 7.7* 6.3* 7.4* 6.5* 7.0* 7.8* 9.5* 10.8*   HCT 23.1* 23.2* 23.0*  --  24.4* 23.8* 20.0* 22.7* 21.5* 22.7* 25.4* 30.4* 34.2*      > Hgb/Hct (4/16/2021, on admission) = 6.6/21.1   > Anemia work-up (4/16/2021) showed serum iron 22, TIBC 202, iron % saturation 11, ferritin 545, reticulocyte count 5.1   > On 4/16/2021, patient was transfused 1 unit pRBC properly typed and crossmatched   > Hgb/Hct (4/17/2021) = 9.5/29.8    > On 4/17/2021, started:    > Ferrous gluconate 324 mg PO once daily with breakfast     > Ascorbic Acid 250 mg PO once daily with breakfast (to enhance the absorption of the FeSO4)    > Hgb/Hct (4/19/2021) = 8.9/28.9    > Hgb/Hct (4/22/2021) = 8.9/28.5    > Hgb/Hct (4/26/2021) = 9.9/32.7    > Continue:    > Ferrous gluconate 324 mg PO once daily with breakfast     > Ascorbic Acid 250 mg PO once daily with breakfast (to enhance the absorption of the FeSO4)     > Coronary artery disease; Peripheral artery disease    > On 3/27/2021, decreased Carvedilol from 6.25 mg to 3.125 mg PO BID with meals (8AM, 5PM)   > Continue:    > Aspirin 81 mg PO once daily with breakfast    > Atorvastatin 80 mg PO once daily    > Carvedilol 3.125 mg PO BID with meals (8AM, 5PM)    > Hypertensive heart disease with chronic systolic heart failure; Cardiomyopathy; Chronic systolic heart failure; Grade I diastolic dysfunction   > 2D echocardiogram (12/17/2015) showed EF 35%; genealized hypokinesis more focally severe of the inferior and posterior segments; mild mitral regurgitation; trace tricuspud regurgitation; normal pulmonary artery pressure   > 2D echocardiogram (3/24/2021) showed EF 37%; global hypokinesis of left ventricle; grade I diastolic dysfunction   > On 3/27/2021, decreased Carvedilol from 6.25 mg to 3.125 mg PO BID with meals (8AM, 5PM)   > On 3/28/2021, held:    > Furosemide 40 mg PO once daily (9AM)    > Lisinopril 5 mg PO once daily (9AM)   > Continue Carvedilol 3.125 mg PO BID with meals (8AM, 5PM)    > Factor V Leiden mutation; History of deep venous thrombosis (DVT) of right lower extremity;  Anticoagulated on Rivaroxaban   > Continue Rivaroxaban 20 mg PO once daily with breakfast    > Major depressive disorder   > Discontinue Duloxetine 30 mg PO once daily   > In AM, start Sertraline 25 mg PO once daily    > Mixed hyperlipidemia   > Lipid profile (7/25/2018) showed , , HDL 27,    > Lipid profile (3/27/2021) showed , , HDL 35, LDL 63   > Continue:    > Atorvastatin 80 mg PO once daily    > Ezetimibe 10 mg PO q HS    > Type 2 diabetes mellitus, poorly controlled, without long-term current use of insulin   > HbA1c (3/6/2021) = 12.9    > HbA1c (4/8/2021) = 9.8   > On admission to the ARU:    > Started Metformin 500 mg PO BID with meals    > Decreased Insulin glargine from 8 units to 5 units SC q HS   > On 4/20/2021:    > Started Alogliptin 25 mg PO once daily    > Discontinued Insulin glargine 5 units SC q HS   > On 4/21/2021:    > Discontinued Alogliptin 25 mg PO once daily    > Increased Metformin from 500 mg to 850 mg PO BID with meals   > Continue:    > Metformin 850 mg PO BID with meals    > Insulin lispro sliding scale SC TID AC only     > Difficulty sleeping   > Continue:    > Melatonin 5 mg PO q HS    > Zolpidem 5 mg PO q HS PRN for sleep    > Wound on lateral surface of distal third of left leg   > Wound Care Nurse consult for wound care recommendations    > Constipation   > Start PeriColace 2 tabs PO once daily after dinner   > Continue Docusate sodium 100 mg PO once daily after breakfast    > COVID-19 ruled out by laboratory testing   > SARS-CoV-2 ([facility]) (3/22/2021): Not detected   > SARS-CoV-2 ([facility], [facility]) (collected 4/7/2021, resulted 4/8/2021): Not detected    > COVID-19 rapid test ([facility], [facility]) (4/7/2021): Not detected   > COVID-19 rapid test ([facility], [facility]) (4/14/2021): Not detected   > SARS-CoV-2 ([facility], [facility]) (collected 4/15//2021, resulted 4/16/2021): Not detected    > Analgesia   > On 4/23/2021, increased Gabapentin from 100 mg PO TID to:    > Gabapentin 100 mg PO BID (8AM, 2PM)    > Gabapentin 200 mg PO q HS (8PM)   > Discontinue Duloxetine 30 mg PO once daily   > Continue:    > Acetaminophen 650 mg PO TID (8AM, 12PM, 4PM)    > Acetaminophen 650 mg PO q 4 hr PRN for pain level 4/10 or lesser (from 8PM to 4AM only)    > Gabapentin 100 mg PO BID (8AM, 2PM)    > Gabapentin 200 mg PO q HS (8PM)    > Methocarbamol 500 mg PO TID    > Oxycodone 5-10 mg PO q 4 hr PRN for pain level 5/10 or greater     > Diet:   > Specifications: Cardiac, diabetic, consistent carb 1800 kcal   > Solids (consistency): Regular    > Liquids (consistency): Thin    > Fluid restriction: None      9. Personal Protective Equipment was used while interacting with patient including: goggles and [id number] face mask. Patient was using a surgical mask. 10. Patient's progress in rehabilitation and medical issues discussed with the patient. All questions answered to the best of my ability. Care plan discussed with patient and nurse. 11. Total clinical care time is 30 minutes, including review of chart including all labs, radiology, past medical history, and discussion with patient. Greater than 50% of my time was spent in coordination of care and counseling.       Signed:    Cecily Méndez MD    April 26, 2021

## 2021-04-26 NOTE — PROGRESS NOTES
Problem: Diabetes Self-Management  Goal: *Disease process and treatment process  Description: Define diabetes and identify own type of diabetes; list 3 options for treating diabetes. Outcome: Progressing Towards Goal  Goal: *Incorporating nutritional management into lifestyle  Description: Describe effect of type, amount and timing of food on blood glucose; list 3 methods for planning meals. Outcome: Progressing Towards Goal  Goal: *Incorporating physical activity into lifestyle  Description: State effect of exercise on blood glucose levels. Outcome: Progressing Towards Goal  Goal: *Developing strategies to promote health/change behavior  Description: Define the ABC's of diabetes; identify appropriate screenings, schedule and personal plan for screenings. Outcome: Progressing Towards Goal  Goal: *Using medications safely  Description: State effect of diabetes medications on diabetes; name diabetes medication taking, action and side effects. Outcome: Progressing Towards Goal  Goal: *Monitoring blood glucose, interpreting and using results  Description: Identify recommended blood glucose targets  and personal targets. Outcome: Progressing Towards Goal  Goal: *Prevention, detection, treatment of acute complications  Description: List symptoms of hyper- and hypoglycemia; describe how to treat low blood sugar and actions for lowering  high blood glucose level. Outcome: Progressing Towards Goal  Goal: *Prevention, detection and treatment of chronic complications  Description: Define the natural course of diabetes and describe the relationship of blood glucose levels to long term complications of diabetes.   Outcome: Progressing Towards Goal  Goal: *Developing strategies to address psychosocial issues  Description: Describe feelings about living with diabetes; identify support needed and support network  Outcome: Progressing Towards Goal     Problem: Patient Education: Go to Patient Education Activity  Goal: Patient/Family Education  Outcome: Progressing Towards Goal     Problem: Patient Education:  Go to Education Activity  Goal: Patient/Family Education  Outcome: Progressing Towards Goal     Problem: Falls - Risk of  Goal: *Absence of Falls  Description: Document Lina Sutherland Fall Risk and appropriate interventions in the flowsheet. Outcome: Progressing Towards Goal  Note: Fall Risk Interventions:  Mobility Interventions: Assess mobility with egress test, Bed/chair exit alarm, Patient to call before getting OOB, Utilize walker, cane, or other assistive device, Utilize gait belt for transfers/ambulation    Mentation Interventions: Adequate sleep, hydration, pain control, Bed/chair exit alarm, Eyeglasses and hearing aids, Increase mobility, Self-releasing belt, Update white board    Medication Interventions: Assess postural VS orthostatic hypotension, Bed/chair exit alarm, Patient to call before getting OOB, Teach patient to arise slowly    Elimination Interventions: Bed/chair exit alarm, Call light in reach, Patient to call for help with toileting needs, Stay With Me (per policy), Urinal in reach, Toilet paper/wipes in reach              Problem: Patient Education: Go to Patient Education Activity  Goal: Patient/Family Education  Outcome: Progressing Towards Goal     Problem: Patient Education: Go to Patient Education Activity  Goal: Patient/Family Education  Outcome: Progressing Towards Goal     Problem: Patient Education: Go to Patient Education Activity  Goal: Patient/Family Education  Outcome: Progressing Towards Goal     Problem: Pressure Injury - Risk of  Goal: *Prevention of pressure injury  Description: Document Steve Scale and appropriate interventions in the flowsheet.   Outcome: Progressing Towards Goal  Note: Pressure Injury Interventions:  Sensory Interventions: Assess changes in LOC, Assess need for specialty bed, Chair cushion, Check visual cues for pain, Float heels, Keep linens dry and wrinkle-free, Maintain/enhance activity level, Minimize linen layers, Pressure redistribution bed/mattress (bed type)    Moisture Interventions: Absorbent underpads, Assess need for specialty bed, Limit adult briefs, Maintain skin hydration (lotion/cream), Minimize layers    Activity Interventions: Chair cushion, Increase time out of bed, Pressure redistribution bed/mattress(bed type)    Mobility Interventions: Chair cushion, Float heels, HOB 30 degrees or less, Pressure redistribution bed/mattress (bed type)    Nutrition Interventions: Document food/fluid/supplement intake    Friction and Shear Interventions: Feet elevated on foot rest, HOB 30 degrees or less, Minimize layers                Problem: Patient Education: Go to Patient Education Activity  Goal: Patient/Family Education  Outcome: Progressing Towards Goal     Problem: Nutrition Deficit  Goal: *Optimize nutritional status  Outcome: Progressing Towards Goal

## 2021-04-26 NOTE — PROGRESS NOTES
Problem: Mobility Impaired (Adult and Pediatric)  Goal: *Therapy Goal (Edit Goal, Insert Text)  Description: Physical Therapy Short Term Goals  Initiated 4/16/2021 and to be accomplished within 7 day(s) on 4/23/2021:  1. Patient will move from supine to sit and sit to supine , scoot up and down, and roll side to side in bed with modified independence. 2.  Patient will transfer from bed to chair and chair to bed with supervision/set-up using the least restrictive device. 3.  Patient will perform sit to stand with supervision/set-up. 4.  Patient will ambulate with supervision/set-up for 15 feet with the least restrictive device. 5.  Patient will perform w/c mobility for 150 ft at supervision level over even surfaces. Physical Therapy Long Term Goals  Initiated 4/16/2021 and to be accomplished within 14 day(s) on 4/30/2021:  1. Patient will move from supine to sit and sit to supine , scoot up and down, and roll side to side in bed with independence. 2.  Patient will transfer from bed to chair and chair to bed with modified independence using the least restrictive device. 3.  Patient will perform sit to stand with modified independence. 4.  Patient will ambulate with modified independence for 25 feet with the least restrictive device. 5.  Patient will perform w/c mobility for at least 150 ft at modified independent level. 6.  Patient will ascend/descend ramp for safe entry/exit of home using wheelchair with supervision. Outcome: Progressing Towards Goal   PHYSICAL THERAPY TREATMENT    Patient: Jonathan Tabares (47 y.o. male)  Date: 4/26/2021  Diagnosis: Status post above-knee amputation of right lower extremity (HCC) [Z89.611] Status post above-knee amputation of right lower extremity (HCC)  Precautions: Fall(right AKA)  Chart, physical therapy assessment, plan of care and goals were reviewed.     Time in: 1005  Time out: 1030  Pt seen for: txfr training, bed mobility, therapeutic exercises    Time In:1435  Time WVA:[id number]    Patient seen for: Gait training;Transfer training; Therapeutic exercise    Pain:  Pt pain was reported as no c/o pre-treatment. Pt pain was reported as no c/o post-treatment. Intervention: pt reports pain meds are due after both sessions    Patient identified with name and : yes     SUBJECTIVE:      Pt reports right calf muscles spasms but does not report residual limb pain. OBJECTIVE DATA SUMMARY:    Objective:   BED/MAT MOBILITY Daily Assessment     Supine to Sit : 6 (Modified independent)  Sit to Supine : 6 (Modified independent)  Pt performed bed mobility on right side of mat table Russell. TRANSFERS Daily Assessment     Transfer Type: Other  Other: squat pivot  Transfer Assistance : 5 (Stand-by assistance)  Sit to Stand Assistance: Stand-by assistance  Pt performed squat pivot txfr w/c<->mat table with SBA for safety. Pt performed sit to stand from w/c to RW with SBA for safety due to SLS on left LE. GAIT Daily Assessment    Gait Description (WDL)      Gait Abnormalities      Assistive device Walker, rolling    Ambulation assistance - level surface 4 (Contact guard assistance)    Distance 118 Feet (ft)(and 92ft)    Ambulation assistance- uneven surface      Comments Pt ambulated 92ft with RW and w/c follow with CGA for safety. Pt ambulated 118ft with RW and w/c follow with CGA for safety and balance. Pt demo'd decreased foot clearance with fatigue. BALANCE Daily Assessment     Sitting - Static: Good (unsupported)  Sitting - Dynamic: Good (unsupported)  Standing - Static: Fair(with RW)  Standing - Dynamic : Impaired        THERAPEUTIC EXERCISES Daily Assessment       Supine BLE hip abd, SLR 3x15, left LE SAQ 3x15 and bridging with right residual limb on bolster 2x10  Standing right LE hip extension and abd, left LE heel raises x20          ASSESSMENT:  Pt making progress with strength, tolerance and balance.    Progression toward goals:  [x] Improving appropriately and progressing toward goals  []      Improving slowly and progressing toward goals  []      Not making progress toward goals and plan of care will be adjusted      PLAN:  Patient continues to benefit from skilled intervention to address the above impairments. Continue treatment per established plan of care. Discharge Recommendations:  Home Health  Further Equipment Recommendations for Discharge:  rolling walker and wheelchair 18 inch      Estimated Discharge Date: 4/29/2021    Activity Tolerance:   Fair+  Please refer to the flowsheet for vital signs taken during this treatment. After treatment:   Patient left self propelling w/c back to room after both sessions.        Beth Richie, PTA

## 2021-04-26 NOTE — PROGRESS NOTES
[facility]    Bedside and Verbal shift change report given to [facility] Sosa (oncoming nurse) by Carlito Tate RN (offgoing nurse). Report included the following information SBAR, Kardex, MAR and Recent Results.     Situation:   Code Status: Full Code   Reason for Admission: right AKA  Hospital Day: 11   Problem List:   Hospital Problems  Date Reviewed: 4/26/2021          Codes Class Noted POA    * (Principal) Status post above-knee amputation of right lower extremity ([facility] ICD-10-CM: Z21.915  ICD-9-CM: V49.76  4/11/2021 Yes    Overview Signed 4/15/2021  5:14 PM by Mateus Pendleton MD     S/P Right above-the-knee amputation (4/11/2021  Dr. Zenobia Cortes)             Ischemia of right BKA site [facility]) ICD-10-CM: T87.89, I99.8  ICD-9-CM: 997.69, 459.9  4/7/2021 Yes        Infection of right below knee amputation [facility]) ICD-10-CM: T87.43  ICD-9-CM: 997.62  4/7/2021 Yes    Overview Signed 4/15/2021  5:17 PM by Mateus Pendleton MD     S/P Washout of right below-knee amputation; Right knee disarticulation (4/7/2021  Dr. Arvind Elizabeth); S/P Exploration of right knee disarticulation and control of hemorrhage (4/8/2021  Dr. Arvind Elizabeth)             Peripheral artery disease ([facility] (Chronic) ICD-10-CM: I73.9  ICD-9-CM: 443.9  Unknown Yes        Type 2 diabetes mellitus, without long-term current use of insulin (HCC) (Chronic) ICD-10-CM: E11.9  ICD-9-CM: 250.00  Unknown Yes    Overview Addendum 4/15/2021  5:40 PM by Mateus Pendleton MD     HbA1c (4/8/2021) = 9.8; HbA1c (3/6/2021) = 12.9             Factor V Leiden mutation ([facility] (Chronic) ICD-10-CM: K81.95  ICD-9-CM: 289.81  Unknown Yes        Hypertensive heart disease with chronic systolic congestive heart failure (HCC) (Chronic) ICD-10-CM: I11.0, I50.22  ICD-9-CM: 402.91, 428.22  Unknown Yes    Overview Signed 3/26/2021 11:06 PM by Mateus Pendleton MD     2D echocardiogram (12/17/2015) showed EF 35%; genealized hypokinesis more focally severe of the inferior and posterior segments; mild mitral regurgitation; trace tricuspud regurgitation; normal pulmonary artery pressure             Anticoagulated by anticoagulation treatment (Chronic) ICD-10-CM: Z79.01  ICD-9-CM: V58.61  Unknown Yes    Overview Signed 3/26/2021  4:36 PM by Kandis Johnson MD     On Rivaroxaban             Mixed hyperlipidemia (Chronic) ICD-10-CM: E78.2  ICD-9-CM: 272.2  Unknown Yes    Overview Signed 3/26/2021 11:07 PM by Kandis Johnson MD     Lipid profile (7/25/2018) showed , , HDL 27,              Major depressive disorder ICD-10-CM: F32.9  ICD-9-CM: 296.20  3/24/2021 Yes    Overview Signed 3/26/2021 11:31 PM by Kandis Johnson MD     On Duloxetine             History of right below knee amputation [facility]) ICD-10-CM: Z89.511  ICD-9-CM: V49.75  3/22/2021 Yes    Overview Signed 3/26/2021  5:43 PM by Kandis Johnson MD     S/P Right below-the-knee amputation (3/22/2021 - Dr. Leopoldo Atkinson)             Impaired mobility and ADLs ICD-10-CM: Z74.09, Z78.9  ICD-9-CM: V49.89  3/22/2021 Yes        Critical ischemia of lower extremity ([address] ICD-10-CM: U09.905  ICD-9-CM: 459.9  3/22/2021 Yes    Overview Signed 3/26/2021  5:42 PM by Kandis Johnson MD     Right             Acute blood loss as cause of postoperative anemia ICD-10-CM: D62  ICD-9-CM: 285.1  3/22/2021 Yes        History of embolectomy ICD-10-CM: Z98.890  ICD-9-CM: V45.89  3/5/2021 Yes    Overview Signed 3/26/2021  5:39 PM by Kandis Johnson MD     S/P Right lower extremity arterial embolectomy                   Background:   Past Medical History:   Past Medical History:   Diagnosis Date    Acute blood loss as cause of postoperative anemia 3/22/2021    Anticoagulated by anticoagulation treatment     On Rivaroxaban    Cardiomyopathy ([facility] 12/17/2015    2D echocardiogram (3/24/2021) showed EF 37%; global hypokinesis of left ventricle; grade I diastolic dysfunction; 2D echocardiogram (12/17/2015) showed EF 35%; generalized hypokinesis more focally severe of the inferior and posterior segments; mild mitral regurgitation; trace tricuspud regurgitation; normal pulmonary artery pressure    Chronic systolic heart failure (HCC)     2D echocardiogram (3/24/2021) showed EF 37%; global hypokinesis of left ventricle; grade I diastolic dysfunction; 2D echocardiogram (12/17/2015) showed EF 35%; generalized hypokinesis more focally severe of the inferior and posterior segments; mild mitral regurgitation; trace tricuspud regurgitation; normal pulmonary artery pressure    Coronary artery disease involving native coronary artery of native heart     COVID-19 ruled out by laboratory testing 3/22/2021    SARS-CoV-2 ([facility]) (3/22/2021):  Not detected     Critical ischemia of lower extremity ([facility] 3/22/2021    Right    Factor V Leiden mutation ([facility]     Grade I diastolic dysfunction 34/31/6685    2D echocardiogram (3/24/2021) showed EF 37%; global hypokinesis of left ventricle; grade I diastolic dysfunction    History of deep venous thrombosis (DVT) of distal vein of right lower extremity     History of epilepsy     History of head injury     History of myocardial infarction     History of noncompliance with medical treatment 3/18/2021    Hypertensive heart disease with chronic systolic congestive heart failure ([facility]     2D echocardiogram (3/24/2021) showed EF 37%; global hypokinesis of left ventricle; grade I diastolic dysfunction; 2D echocardiogram (12/17/2015) showed EF 35%; genealized hypokinesis more focally severe of the inferior and posterior segments; mild mitral regurgitation; trace tricuspud regurgitation; normal pulmonary artery pressure    Infection of right below knee amputation ([facility] 4/7/2021    S/P Washout of right below-knee amputation; Right knee disarticulation (4/7/2021  Dr. Victor M Scherer); S/P Exploration of right knee disarticulation and control of hemorrhage (4/8/2021  Dr. Victor M Scherer)   Lana Negron Long term current use of aspirin     Major depressive disorder 03/24/2021    On Duloxetine    Migraine headache     Mitral valve prolapse     Mixed hyperlipidemia     Lipid profile (7/25/2018) showed , , HDL 27,     Obstructive sleep apnea     On statin therapy due to risk of future cardiovascular event     On Atorvastatin    Peripheral artery disease ([facility]     Type 2 diabetes mellitus, without long-term current use of insulin ([facility])     HbA1c (4/8/2021) = 9.8; HbA1c (3/6/2021) = 83.5    Umbilical hernia     Wears glasses       Patient taking anticoagulants yes    Patient has a defibrillator: no     Assessment:   Changes in Assessment throughout shift: no acute changes noted throughout the shift     Patient has central line: no Reasons if yes: n/a  Insertion date:n/a Last dressing date:n/a   Patient has Ellis Cath: no Reasons if yes: n/a   Insertion date:n/a     Last Vitals:     Vitals:    04/25/21 1623 04/25/21 2100 04/26/21 0726 04/26/21 1709   BP: 108/67 125/75 122/74 119/73   Pulse: 85 84 84    Resp: 18 18 18 18   Temp: 98.4 °F (36.9 °C) 97.4 °F (36.3 °C) 97.8 °F (36.6 °C) 97.1 °F (36.2 °C)   SpO2: 98% 100% 99% 99%   Height:            PAIN    Pain Assessment    Pain Intensity 1: 0 (04/26/21 1618) Pain Intensity 1: 2 (12/29/14 1105)    Pain Location 1: Leg Pain Location 1: Abdomen    Pain Intervention(s) 1: Medication (see MAR) Pain Intervention(s) 1: Medication (see MAR)  Patient Stated Pain Goal: 0 Patient Stated Pain Goal: 0  o Intervention effective: yes    o Other actions taken for pain: repositioning      Skin Assessment  Skin color Skin Color: Appropriate for ethnicity  Condition/Temperature Skin Condition/Temp: Dry, Warm  Integrity Skin Integrity: Incision (comment)  Turgor Turgor: Non-tenting  Weekly Pressure Ulcer Documentation  Pressure  Injury Documentation: No Pressure Injury Noted-Pressure Ulcer Prevention Initiated  Wound Prevention & Protection Methods  Orientation of wound Orientation of Wound Prevention: Posterior  Location of Prevention Location of Wound Prevention: Buttocks, Sacrum/Coccyx  Dressing Present Dressing Present : Yes  Dressing Status Dressing Status: Intact  Wound Offloading Wound Offloading (Prevention Methods): Bed, pressure reduction mattress     INTAKE/OUPUT  Date 04/25/21 1900 - 04/26/21 0659 04/26/21 0700 - 04/27/21 0659   Shift 5981-0080 24 Hour Total 5787-6990 9155-8810 24 Hour Total   INTAKE   Shift Total        OUTPUT   Urine 300 300        Urine Voided 300 300        Urine Occurrence(s) 3 x 7 x 1 x  1 x   Stool          Stool Occurrence(s) 0 x 0 x 0 x  0 x   Shift Total 300 300      NET -300 -300      Weight (kg)            Recommendations:  1. Patient needs and requests: education     2. Diet: Cardiac consistent carb diet with thin liquids    3. Pending tests/procedures: AM labs     4. Functional Level/Equipment: 1 person assist     5. Estimated Discharge Date: TBD Posted on Whiteboard in Patients Room: no     [facility] [place] Safety Check    A safety check occurred in the patient's room between off going nurse and oncoming nurse listed above. The safety check included the below items  Area Items   H  High Alert Medications - Verify all high alert medication drips (heparin, PCA, etc.)   E  Equipment - Suction is set up for ALL patients (with yanker)  - Red plugs utilized for all equipment (IV pumps, etc.)  - WOWs wiped down at end of shift.  - Room stocked with oxygen, suction, and other unit-specific supplies   A  Alarms - Bed alarm is set for fall risk patients  - Ensure chair alarm is in place and activated if patient is up in a chair   L  Lines - Check IV for any infiltration  - Ellis bag is empty if patient has a Ellis   - Tubing and IV bags are labeled   S  Safety   - Room is clean, patient is clean, and equipment is clean. - Hallways are clear from equipment besides carts.    - Fall bracelet on for fall risk patients  - Ensure room is clear and free of clutter  - Suction is set up for ALL patients (with faraz)  - Hallways are clear from equipment besides carts.    - Isolation precautions followed, supplies available outside room, sign posted         [facility] Arnett, [facility]

## 2021-04-26 NOTE — DISCHARGE SUMMARY
[facility] inpatient rehabilitation unit  [address] [place] [address], [address] SUMMARY    Name: [facility] MRN: [id number]   Age / Sex: 60 y.o. / male CSN: [id number]   YOB: 1965 Length of Stay: 12 days   Admit Date: 3/26/2021 Discharge Date:        PRIMARY CARE PHYSICIAN: Mc Hamilton      DISCHARGE DIAGNOSES:    Primary Rehab Diagnosis  1. Impaired Mobility and ADLs  2. S/P Right below-the-knee amputation (3/22/2021 - Dr. Neno Hernandez)  3. History of critical ischemia of the right lower extremity  4.  History of right lower extremity arterial embolectomy      Comorbidities       Patient Active Problem List   Diagnosis Code    Status post below knee amputation of right lower extremity (HCC) Z89.511    Impaired mobility and ADLs Z74.09, Z78.9    Critical ischemia of lower extremity (HCC) I70.229    Peripheral artery disease ([facility]) I73.9    Coronary artery disease involving native coronary artery of native heart I25.10    Type 2 diabetes mellitus, without long-term current use of insulin ([facility]) E11.9    History of epilepsy Z86.69    Factor V Leiden mutation ([facility] D68.51    Migraine headache G43.909    Wears glasses E91.9    Umbilical hernia M47.5    Obstructive sleep apnea G47.33    ICD (implantable cardioverter-defibrillator) in place Z95.810    Mitral valve prolapse I34.1    History of head injury Z87.828    Hypertensive heart disease with chronic systolic congestive heart failure (HCC) I11.0, I50.22    History of myocardial infarction I25.2    Long term current use of aspirin Z79.82    On statin therapy due to risk of future cardiovascular event Z79.899    History of deep venous thrombosis (DVT) of distal vein of right lower extremity Z86.718    Anticoagulated by anticoagulation treatment Z79.01    Mixed hyperlipidemia E78.2    History of embolectomy Z98.890    COVID-19 ruled out by laboratory testing Z20.822    Acute blood loss as cause of postoperative anemia D62    Cardiomyopathy ([facility] I42.9    Chronic systolic heart failure (HCC) I50.22    History of noncompliance with medical treatment Z91.19    Major depressive disorder F32.9    Grade I diastolic dysfunction Z67.3    Constipation K59.00            COURSE IN THE rehabthis is a 57-year-old male with a recent history of critical ischemia of the right lower extremity and had undergone a right below-knee amputation. Subsequently it was felt that patient may benefit from being transition to inpatient rehab. When the patient arrived at the inpatient rehab he was started on a comprehensive inpatient rehabilitation program including PT and OT. Patient started to show some progress. Subsequently patient was noted to have some fevers and was noted to have some drainage from his right BKA stump. Vascular surgery was consulted and patient was started on antibiotics. Vascular surgery took the patient to the OR for surgery on April 7, 2021. Postoperatively patient was transferred to the [facility] under the care of the hospitalist service. MEDICATIONS ON DISCHARGE:    Patient was transferred to the [facility]    [facility]:  Visit Vitals  BP (!) 87/58   Pulse 80   Temp 97.2 °F (36.2 °C)   Resp 20   Ht 6' 2\" (1.88 m)   Wt 84.8 kg (187 lb)   SpO2 100%   BMI 24.01 kg/m²             DISPOSITION: Acute care hospital      FOLLOW-UP RECOMMENDATIONS:   Follow-up Information     Follow up With Specialties Details Why Contact Info    [facility] Physician Assistant  Patient has an appointment scheduled with PCP/PA Dr. Fany Sen on April 23, 2021 @ 10:00am. [address] [place] [address]      Aidee Sharma MD Vascular Surgery, General Surgery   [address] [place] [phone]            OTHER INSTRUCTIONS:      Dragon medical dictation software was used for portions of this report.  Unintended errors may occur.      Signed:  Bernice Alas MD      4/26/2021

## 2021-04-27 LAB
GLUCOSE BLD STRIP.AUTO-MCNC: 105 MG/DL (ref 70–110)
GLUCOSE BLD STRIP.AUTO-MCNC: 124 MG/DL (ref 70–110)
GLUCOSE BLD STRIP.AUTO-MCNC: 126 MG/DL (ref 70–110)
GLUCOSE BLD STRIP.AUTO-MCNC: 144 MG/DL (ref 70–110)

## 2021-04-27 PROCEDURE — 97110 THERAPEUTIC EXERCISES: CPT

## 2021-04-27 PROCEDURE — 97530 THERAPEUTIC ACTIVITIES: CPT

## 2021-04-27 PROCEDURE — 2709999900 HC NON-CHARGEABLE SUPPLY

## 2021-04-27 PROCEDURE — 74011250637 HC RX REV CODE- 250/637: Performed by: INTERNAL MEDICINE

## 2021-04-27 PROCEDURE — 82962 GLUCOSE BLOOD TEST: CPT

## 2021-04-27 PROCEDURE — 65310000000 HC RM PRIVATE REHAB

## 2021-04-27 PROCEDURE — 99232 SBSQ HOSP IP/OBS MODERATE 35: CPT | Performed by: INTERNAL MEDICINE

## 2021-04-27 PROCEDURE — 97535 SELF CARE MNGMENT TRAINING: CPT

## 2021-04-27 PROCEDURE — 97116 GAIT TRAINING THERAPY: CPT

## 2021-04-27 RX ADMIN — METHOCARBAMOL 500 MG: 500 TABLET ORAL at 08:37

## 2021-04-27 RX ADMIN — METHOCARBAMOL 500 MG: 500 TABLET ORAL at 14:00

## 2021-04-27 RX ADMIN — SERTRALINE HYDROCHLORIDE 25 MG: 25 TABLET ORAL at 08:38

## 2021-04-27 RX ADMIN — Medication 1 TABLET: at 08:38

## 2021-04-27 RX ADMIN — DOCUSATE SODIUM 50MG AND SENNOSIDES 8.6MG 2 TABLET: 8.6; 5 TABLET, FILM COATED ORAL at 17:22

## 2021-04-27 RX ADMIN — METHOCARBAMOL 500 MG: 500 TABLET ORAL at 21:05

## 2021-04-27 RX ADMIN — GABAPENTIN 100 MG: 100 CAPSULE ORAL at 14:00

## 2021-04-27 RX ADMIN — ACETAMINOPHEN 650 MG: 325 TABLET, FILM COATED ORAL at 08:38

## 2021-04-27 RX ADMIN — DOCUSATE SODIUM 100 MG: 100 CAPSULE, LIQUID FILLED ORAL at 08:38

## 2021-04-27 RX ADMIN — ASPIRIN 81 MG CHEWABLE TABLET 81 MG: 81 TABLET CHEWABLE at 08:37

## 2021-04-27 RX ADMIN — OXYCODONE HYDROCHLORIDE 10 MG: 5 TABLET ORAL at 21:05

## 2021-04-27 RX ADMIN — Medication 250 MG: at 08:37

## 2021-04-27 RX ADMIN — RIVAROXABAN 20 MG: 20 TABLET, FILM COATED ORAL at 08:38

## 2021-04-27 RX ADMIN — METFORMIN HYDROCHLORIDE 850 MG: 850 TABLET ORAL at 17:22

## 2021-04-27 RX ADMIN — GABAPENTIN 100 MG: 100 CAPSULE ORAL at 08:38

## 2021-04-27 RX ADMIN — ACETAMINOPHEN 650 MG: 325 TABLET, FILM COATED ORAL at 12:24

## 2021-04-27 RX ADMIN — EZETIMIBE 10 MG: 10 TABLET ORAL at 08:37

## 2021-04-27 RX ADMIN — ATORVASTATIN CALCIUM 80 MG: 40 TABLET, FILM COATED ORAL at 08:37

## 2021-04-27 RX ADMIN — ACETAMINOPHEN 650 MG: 325 TABLET, FILM COATED ORAL at 16:37

## 2021-04-27 RX ADMIN — GABAPENTIN 200 MG: 100 CAPSULE ORAL at 21:05

## 2021-04-27 RX ADMIN — OXYCODONE HYDROCHLORIDE 10 MG: 5 TABLET ORAL at 06:01

## 2021-04-27 RX ADMIN — METFORMIN HYDROCHLORIDE 850 MG: 850 TABLET ORAL at 08:38

## 2021-04-27 RX ADMIN — Medication 5 MG: at 21:05

## 2021-04-27 NOTE — PROGRESS NOTES
[address]  [address] [place] [address], [address]     INPATIENT REHABILITATION  DAILY PROGRESS NOTE     Date: 4/27/2021    Name: Benigno Hilario Age / Sex: 60 y.o. / male   CSN: [id number] MRN: [id number]   [facility] Date: 4/15/2021 Length of Stay: 12 days     Primary Rehab Diagnosis: Impaired Mobility and ADLs secondary to:  1. S/P Right above-the-knee amputation (4/11/2021  Dr. Sera Barton)  2. S/P Exploration of right knee disarticulation and control of hemorrhage (4/8/2021  Dr. Shannan Macdonald)  3. S/P Washout of right below-knee amputation; Right knee disarticulation (4/7/2021  Dr. Shannan Macdonald)  4. History of infection and ischemia of right BKA stump (4/7/2021)  5. S/P Right below-the-knee amputation (3/22/2021 - Dr. Ruthe Phalen)  6. History of critical ischemia of the right lower extremity  7. History of right lower extremity arterial embolectomy (3/5/2021 - Dr. Ruthe Phalen)      Subjective:     Patient seen and examined. Blood pressure controlled. Carvedilol was not given this AM due to BP precautions. Blood glucose controlled. Patient's Complaint:   No significant medical complaints    Pain Control: ongoing significant pain in which is stable and controlled by current meds      Objective:     Vital Signs:  Patient Vitals for the past 24 hrs:   BP Temp Pulse Resp SpO2 Weight   04/27/21 1037      79 kg (174 lb 1.6 oz)   04/27/21 0815 99/72 99.2 °F (37.3 °C) 95 17 95 %    04/26/21 2059 115/71 98.8 °F (37.1 °C) 87 16 98 %    04/26/21 1709 119/73 97.1 °F (36.2 °C)  18 99 %         Physical Examination:  GENERAL SURVEY: Patient is awake, alert, oriented x 3, laying comfortably on the bed, not in acute respiratory distress.   HEENT: pale palpebral conjunctivae, anicteric sclerae, no nasoaural discharge, moist oral mucosa  NECK: supple, no jugular venous distention, no palpable lymph nodes  CHEST/LUNGS: symmetrical chest expansion, good air entry, clear breath sounds  HEART: adynamic precordium, good S1 S2, no S3, regular rhythm, no murmurs  ABDOMEN: flat, bowel sounds appreciated, soft, non-tender  EXTREMITIES: (+) right AKA stump, (+) absence of hair on LLE, (+) healing wound with scab on lateral surface of distal third of left leg with surrounding hyperpigmentation, (+) LLE cool to touch, pale nailbeds, no edema, full and equal pulses, no calf tenderness   NEUROLOGICAL EXAM: The patient is awake, alert and oriented x3, able to answer questions fairly appropriately, able to follow 1 and 2 step commands. Able to tell time from the wall clock. Cranial nerves II-XII are grossly intact. No gross sensory deficit.   Motor strength is 4+/5 on BUE, 4/5 on the RLE, 4 to 4+/5 on the LLE.     Incision(s): covered, clean, dry, and intact       Current Medications:  Current Facility-Administered Medications   Medication Dose Route Frequency    sertraline (ZOLOFT) tablet 25 mg  25 mg Oral DAILY    senna-docusate (PERICOLACE) 8.6-50 mg per tablet 2 Tab  2 Tab Oral PCD    gabapentin (NEURONTIN) capsule 100 mg  100 mg Oral BID    gabapentin (NEURONTIN) capsule 200 mg  200 mg Oral QHS    metFORMIN (GLUCOPHAGE) tablet 850 mg  850 mg Oral BID WITH MEALS    methocarbamoL (ROBAXIN) tablet 500 mg  500 mg Oral TID    0.9% sodium chloride infusion 250 mL  250 mL IntraVENous PRN    diphenhydrAMINE (BENADRYL) capsule 25 mg  25 mg Oral Q6H PRN    ferrous gluconate 324 mg (37.5 mg iron) tablet 1 Tab  324 mg Oral DAILY WITH BREAKFAST    ascorbic acid (vitamin C) (VITAMIN C) tablet 250 mg  250 mg Oral DAILY WITH BREAKFAST    acetaminophen (TYLENOL) tablet 650 mg  650 mg Oral Q4H PRN    bisacodyL (DULCOLAX) tablet 10 mg  10 mg Oral Q48H PRN    insulin lispro (HUMALOG) injection   SubCUTAneous TIDAC    carvediloL (COREG) tablet 3.125 mg  3.125 mg Oral BID WITH MEALS    aspirin chewable tablet 81 mg  81 mg Oral DAILY WITH BREAKFAST    atorvastatin (LIPITOR) tablet 80 mg  80 mg Oral DAILY    docusate sodium (COLACE) capsule 100 mg  100 mg Oral DAILY AFTER BREAKFAST    ezetimibe (ZETIA) tablet 10 mg  10 mg Oral DAILY    acetaminophen (TYLENOL) tablet 650 mg  650 mg Oral TID    oxyCODONE IR (ROXICODONE) tablet 5-10 mg  5-10 mg Oral Q4H PRN    rivaroxaban (XARELTO) tablet 20 mg  20 mg Oral DAILY    melatonin (rapid dissolve) tablet 5 mg  5 mg Oral QHS    zolpidem (AMBIEN) tablet 5 mg  5 mg Oral QHS PRN       Allergies:   Allergies   Allergen Reactions    Nitroglycerin Other (comments)      BP drops rapidly when he takes SL Nitro          Bertha Diarrhea and Rash    Cat Dander Cough    Codeine Rash    Strawberry Rash       Functional Progress:    OCCUPATIONAL THERAPY    ON ADMISSION MOST RECENT   Eating  Functional Level: 6   Eating  Functional Level: 6     Grooming  Functional Level: 6   Grooming  Functional Level: 6     Bathing  Functional Level: 5   Bathing  Functional Level: 6     Upper Body Dressing  Functional Level: 6   Upper Body Dressing  Functional Level: 6     Lower Body Dressing  Functional Level: 5   Lower Body Dressing  Functional Level: 5     Toileting  Functional Level: 5   Toileting  Functional Level: 5     Toilet Transfers  Toilet Transfer Score: 5   Toilet Transfers  Toilet Transfer Score: 5     Tub /Shower Transfers  Tub/Shower Transfer Score: 5   Tub/Shower Transfers  Tub/Shower Transfer Score: 5       Legend:   7 - Independent   6 - Modified Independent   5 - Standby Assistance / Supervision / Set-up   4 - Minimum Assistance / Contact Guard Assistance   3 - Moderate Assistance   2 - Maximum Assistance   1 - Total Assistance / Dependent       Lab/Data Review:  Recent Results (from the past 24 hour(s))   GLUCOSE, POC    Collection Time: 04/26/21  5:06 PM   Result Value Ref Range    Glucose (POC) 116 (H) 70 - 110 mg/dL   GLUCOSE, POC    Collection Time: 04/26/21  8:51 PM   Result Value Ref Range    Glucose (POC) 131 (H) 70 - 110 mg/dL   GLUCOSE, POC    Collection Time: 04/27/21  8:01 AM   Result Value Ref Range    Glucose (POC) 126 (H) 70 - 110 mg/dL   GLUCOSE, POC    Collection Time: 04/27/21 12:05 PM   Result Value Ref Range    Glucose (POC) 105 70 - 110 mg/dL       Assessment:     Primary Rehab Diagnosis  1. Impaired Mobility and ADLs  2. S/P Right above-the-knee amputation (4/11/2021  Dr. Chava Chiang)  3. S/P Exploration of right knee disarticulation and control of hemorrhage (4/8/2021  Dr. Popeye Fuentes)  4. S/P Washout of right below-knee amputation; Right knee disarticulation (4/7/2021  Dr. Popeye Fuentes)  5. History of infection and ischemia of right BKA stump (4/7/2021)  6. S/P Right below-the-knee amputation (3/22/2021 - Dr. Ayana Basilio)  7. History of critical ischemia of the right lower extremity  8.  History of right lower extremity arterial embolectomy (3/5/2021 - Dr. Ayana Basilio)    Comorbidities  Patient Active Problem List   Diagnosis Code    History of right below knee amputation ([facility] Z89.511    Impaired mobility and ADLs Z74.09, Z78.9    Critical ischemia of lower extremity ([facility] I70.229    Peripheral artery disease ([facility] I73.9    Coronary artery disease involving native coronary artery of native heart I25.10    Type 2 diabetes mellitus, without long-term current use of insulin (HCC) E11.9    History of epilepsy Z86.69    Factor V Leiden mutation ([facility] D68.51    Migraine headache G43.909    Wears glasses T14.7    Umbilical hernia X01.1    Obstructive sleep apnea G47.33    ICD (implantable cardioverter-defibrillator) in place Z95.810    Mitral valve prolapse I34.1    History of head injury Z87.828    Hypertensive heart disease with chronic systolic congestive heart failure (HCC) I11.0, I50.22    History of myocardial infarction I25.2    Long term current use of aspirin Z79.82    On statin therapy due to risk of future cardiovascular event Z79.899    History of deep venous thrombosis (DVT) of distal vein of right lower extremity Z86.718    Anticoagulated by anticoagulation treatment Z79.01    Mixed hyperlipidemia E78.2    History of embolectomy Z98.890    COVID-19 ruled out by laboratory testing Z20.822    Acute blood loss as cause of postoperative anemia D62    Cardiomyopathy ([facility] I42.9    Chronic systolic heart failure (HCC) I50.22    History of noncompliance with medical treatment Z91.19    Major depressive disorder F32.9    Grade I diastolic dysfunction S29.1    Constipation K59.00    Ischemia of right BKA site (HCC) T87.89, I99.8    Infection of right below knee amputation (HCC) T87.43    Status post above-knee amputation of right lower extremity ([facility] Z89.611        Plan:     1. Justification for continued stay: Good progression towards established rehabilitation goals. 2. Medical Issues being followed closely:    [x]  Fall and safety precautions     [x]  Wound Care     [x]  Bowel and Bladder Function     [x]  Fluid Electrolyte and Nutrition Balance     [x]  Pain Control      3. Issues that 24 hour rehabilitation nursing is following:    [x]  Fall and safety precautions     [x]  Wound Care     [x]  Bowel and Bladder Function     [x]  Fluid Electrolyte and Nutrition Balance     [x]  Pain Control      [x]  Assistance with and education on in-room safety with transfers to and from the bed, wheelchair, toilet and shower. 4. Acute rehabilitation plan of care:    [x]  Continue current care and rehab. [x]  Physical Therapy           [x]  Occupational Therapy           []  Speech Therapy     []  Hold Rehab until further notice     5. Medications:    [x]  MAR Reviewed     [x]  Continue Present Medications     6. DVT Prophylaxis:      []  Enoxaparin     []  Unfractionated Heparin     []  Warfarin     [x]  NOAC     []  NEREYDA Stockings     []  Sequential Compression Device     []  None     7. Code status    [x]  Full code     []  Partial code     []  Do not intubate     []  Do not resuscitate     8. Orders:   > S/P Right above-the-knee amputation (4/11/2021  Dr. Gabbie Hearn); S/P Exploration of right knee disarticulation and control of hemorrhage (4/8/2021  Dr. Peter Courser); S/P Washout of right below-knee amputation; Right knee disarticulation (4/7/2021  Dr. Peter Courser); History of infection and ischemia of right BKA stump (4/7/2021); S/P Right below-the-knee amputation (3/22/2021 - Dr. Mitchell Ferris); History of critical ischemia of the right lower extremity;  History of right lower extremity arterial embolectomy (3/5/2021 - Dr. Mitchell Ferris)   > Staples to be removed on 5/19/2021    > Acute Postoperative Blood Loss Anemia   > Hgb/Hct (3/26/2021) = 10.1/31.3   > Hgb/Hct (3/27/2021, on admission to ARU) = 10.9/33.9   > Anemia work-up showed serum iron 23, TIBC 200, iron % saturation 12, ferritin 835   > Hgb/Hct (3/29/2021) = 10.7/33.9       04/14/21  0423 04/13/21  0640 04/12/21  0539 04/11/21  1027 04/11/21  0650 04/10/21  1915 04/10/21  0640 04/09/21  0453 04/08/21  1602 04/08/21  0940 04/08/21  0210 04/07/21  0905 04/06/21  0850   HGB 7.2* 7.3* 7.3* 7.6* 7.9* 7.7* 6.3* 7.4* 6.5* 7.0* 7.8* 9.5* 10.8*   HCT 23.1* 23.2* 23.0*  --  24.4* 23.8* 20.0* 22.7* 21.5* 22.7* 25.4* 30.4* 34.2*      > Hgb/Hct (4/16/2021, on admission) = 6.6/21.1   > Anemia work-up (4/16/2021) showed serum iron 22, TIBC 202, iron % saturation 11, ferritin 545, reticulocyte count 5.1   > On 4/16/2021, patient was transfused 1 unit pRBC properly typed and crossmatched   > Hgb/Hct (4/17/2021) = 9.5/29.8    > On 4/17/2021, started:    > Ferrous gluconate 324 mg PO once daily with breakfast     > Ascorbic Acid 250 mg PO once daily with breakfast (to enhance the absorption of the FeSO4)    > Hgb/Hct (4/19/2021) = 8.9/28.9    > Hgb/Hct (4/22/2021) = 8.9/28.5    > Hgb/Hct (4/26/2021) = 9.9/32.7    > Continue:    > Ferrous gluconate 324 mg PO once daily with breakfast     > Ascorbic Acid 250 mg PO once daily with breakfast (to enhance the absorption of the FeSO4)     > Coronary artery disease; Peripheral artery disease    > On 3/27/2021, decreased Carvedilol from 6.25 mg to 3.125 mg PO BID with meals (8AM, 5PM)   > Continue:    > Aspirin 81 mg PO once daily with breakfast    > Atorvastatin 80 mg PO once daily    > Carvedilol 3.125 mg PO BID with meals (8AM, 5PM)    > Hypertensive heart disease with chronic systolic heart failure; Cardiomyopathy; Chronic systolic heart failure; Grade I diastolic dysfunction   > 2D echocardiogram (12/17/2015) showed EF 35%; genealized hypokinesis more focally severe of the inferior and posterior segments; mild mitral regurgitation; trace tricuspud regurgitation; normal pulmonary artery pressure   > 2D echocardiogram (3/24/2021) showed EF 37%; global hypokinesis of left ventricle; grade I diastolic dysfunction   > On 3/27/2021, decreased Carvedilol from 6.25 mg to 3.125 mg PO BID with meals (8AM, 5PM)   > On 3/28/2021, held:    > Furosemide 40 mg PO once daily (9AM)    > Lisinopril 5 mg PO once daily (9AM)   > Hold Carvedilol 3.125 mg PO BID with meals (8AM, 5PM)    > Factor V Leiden mutation; History of deep venous thrombosis (DVT) of right lower extremity;  Anticoagulated on Rivaroxaban   > Continue Rivaroxaban 20 mg PO once daily with breakfast    > Major depressive disorder   > On 4/26/2021, discontinued Duloxetine 30 mg PO once daily   > Start Sertraline 25 mg PO once daily    > Mixed hyperlipidemia   > Lipid profile (7/25/2018) showed , , HDL 27,    > Lipid profile (3/27/2021) showed , , HDL 35, LDL 63   > Continue:    > Atorvastatin 80 mg PO once daily    > Ezetimibe 10 mg PO q HS    > Type 2 diabetes mellitus, poorly controlled, without long-term current use of insulin   > HbA1c (3/6/2021) = 12.9    > HbA1c (4/8/2021) = 9.8   > On admission to the ARU:    > Started Metformin 500 mg PO BID with meals    > Decreased Insulin glargine from 8 units to 5 units SC q HS   > On 4/20/2021:    > Started Alogliptin 25 mg PO once daily    > Discontinued Insulin glargine 5 units SC q HS   > On 4/21/2021:    > Discontinued Alogliptin 25 mg PO once daily    > Increased Metformin from 500 mg to 850 mg PO BID with meals   > Continue:    > Metformin 850 mg PO BID with meals    > Insulin lispro sliding scale SC TID AC only     > Difficulty sleeping   > Continue:    > Melatonin 5 mg PO q HS    > Zolpidem 5 mg PO q HS PRN for sleep    > Wound on lateral surface of distal third of left leg   > Wound Care Nurse consult for wound care recommendations    > Constipation   > On 4/26/2021, started PeriColace 2 tabs PO once daily after dinner   > Continue:    > Docusate sodium 100 mg PO once daily after breakfast    > PeriColace 2 tabs PO once daily after dinner    > COVID-19 ruled out by laboratory testing   > SARS-CoV-2 ([facility]) (3/22/2021): Not detected   > SARS-CoV-2 ([facility], [facility]) (collected 4/7/2021, resulted 4/8/2021): Not detected    > COVID-19 rapid test ([facility], [facility]) (4/7/2021): Not detected   > COVID-19 rapid test ([facility], [facility]) (4/14/2021): Not detected   > SARS-CoV-2 ([facility], [facility]) (collected 4/15//2021, resulted 4/16/2021): Not detected    > Analgesia   > On 4/23/2021, increased Gabapentin from 100 mg PO TID to:    > Gabapentin 100 mg PO BID (8AM, 2PM)    > Gabapentin 200 mg PO q HS (8PM)   > Discontinue Duloxetine 30 mg PO once daily   > Continue:    > Acetaminophen 650 mg PO TID (8AM, 12PM, 4PM)    > Acetaminophen 650 mg PO q 4 hr PRN for pain level 4/10 or lesser (from 8PM to 4AM only)    > Gabapentin 100 mg PO BID (8AM, 2PM)    > Gabapentin 200 mg PO q HS (8PM)    > Methocarbamol 500 mg PO TID    > Oxycodone 5-10 mg PO q 4 hr PRN for pain level 5/10 or greater     > Diet:   > Specifications: Cardiac, diabetic, consistent carb 1800 kcal   > Solids (consistency): Regular    > Liquids (consistency): Thin    > Fluid restriction: None      9.  Personal Protective Equipment was used while interacting with patient including: goggles and KN95 face mask. Patient was using a surgical mask. 10. Patient's progress in rehabilitation and medical issues discussed with the patient. All questions answered to the best of my ability. Care plan discussed with patient and nurse. 11. Total clinical care time is 30 minutes, including review of chart including all labs, radiology, past medical history, and discussion with patient. Greater than 50% of my time was spent in coordination of care and counseling.       Sae Reese MD    April 27, 2021

## 2021-04-27 NOTE — DIABETES MGMT
Diabetes Plan of Care      T2DM with A1c of 9.8% (4/08/2021)  See separate notes entered, 4/13/2021, for assessment of home diabetes management and education. Patient reported history of non compliant with diabetes management and regret it. He is now willing to comply with treatment to help prevent potential complications of uncontrolled diabetes. Home diabetes medications: Patient reported on 4/13/2021 that he was not compliant in taking his diabetes medications but he is willing to take lantus and humalog insulin again, if prescribed. Patient was admitted from ARU to acute setting on 4/07/2021 after surgery. Noted the following assessment:  Status post right BKA on 3/25/2021  Extensive infection of right below knee amputation stump  Status post washout right below-knee amputation and right knee disarticulation on 4/07/2021  Taken back to OR on 4/08/2021 for bleeding  History of Factor V leiden deficiency  Major depressive disorder    4/27: Patient was transferred from medical to ARU on 4/15/2021. Glycemic assessment:  POC BG range on 4/26: 111-131  POC BG 4/27 at time of review: 126, 105  Diet: cardiac regular; and already on consistent carb; nutr suppl glucerna shake with all meals. Recommendation:   1.) cont of custom dose correctional lispro insulin and metformin. Most recent blood glucose values: Within target range : Yes. Current A1c 9.8% (4/08/2021) which is equivalent to estimated average blood glucose of  235 mg/dL during the past 2-3 months    Adequate glycemic control PTA: No.    Current hospital diabetes medications:  Custom correctional lispro insulin TID AC  Metformin 850 mg 2 times daily with meals    TDD previous day 4/26:  Lispro: None.  Patient did no require coverage    Diet: Cardiac regular; consistent carb; nutritional supplement glucerna shake with all meals    Goals: Blood glucose will be within target of 70 - 180 mg/dl by: 4/30/2021    Education:  __X___ Refer to Diabetes Education Record: 4/13/2021                       _____ Education not indicated at this time     Patricio Winkler RN [facility]  Pager: [phone]

## 2021-04-27 NOTE — PROGRESS NOTES
completed a follow up visit with and Spiritual assessment of patient  In room 191 today and found him lying in the bed between his therapy sessions. Patient says he is doing ok and making some adjustments to his new life. Patient expressed gratitude for the continuous prayers offered through out the day.  Chaplains will continue to follow and will provide pastoral care on an as needed/requested basis    Chaplain Hemant Alexander   Board Certified [address] [place] [address]   [phone]

## 2021-04-27 NOTE — PROGRESS NOTES
Problem: Mobility Impaired (Adult and Pediatric)  Goal: *Therapy Goal (Edit Goal, Insert Text)  Description: Physical Therapy Short Term Goals  Initiated 4/16/2021 and to be accomplished within 7 day(s) on 4/23/2021:  1. Patient will move from supine to sit and sit to supine , scoot up and down, and roll side to side in bed with modified independence. 2.  Patient will transfer from bed to chair and chair to bed with supervision/set-up using the least restrictive device. 3.  Patient will perform sit to stand with supervision/set-up. 4.  Patient will ambulate with supervision/set-up for 15 feet with the least restrictive device. 5.  Patient will perform w/c mobility for 150 ft at supervision level over even surfaces. Physical Therapy Long Term Goals  Initiated 4/16/2021 and to be accomplished within 14 day(s) on 4/30/2021:  1. Patient will move from supine to sit and sit to supine , scoot up and down, and roll side to side in bed with independence. 2.  Patient will transfer from bed to chair and chair to bed with modified independence using the least restrictive device. 3.  Patient will perform sit to stand with modified independence. 4.  Patient will ambulate with modified independence for 25 feet with the least restrictive device. 5.  Patient will perform w/c mobility for at least 150 ft at modified independent level. 6.  Patient will ascend/descend ramp for safe entry/exit of home using wheelchair with supervision. Outcome: Progressing Towards Goal   PHYSICAL THERAPY TREATMENT    Patient: Cuba Munson (18 y.o. male)  Date: 4/27/2021  Diagnosis: Status post above-knee amputation of right lower extremity (HCC) [Z89.611] Status post above-knee amputation of right lower extremity (HCC)  Precautions: Fall(right AKA)  Chart, physical therapy assessment, plan of care and goals were reviewed. Time In:1055  Time Out:1225    Patient seen for: Transfer training; Therapeutic exercise;Gait training(bed mobility)    Pain:  Pt pain was reported as no c/o pre-treatment. Pt pain was reported as no c/o post-treatment. Intervention: NA    Patient identified with name and : YES     SUBJECTIVE:      Pt reports muscle spasms in right calf. Pt reports feeling pretty good. OBJECTIVE DATA SUMMARY:    Objective:     BED/MAT MOBILITY Daily Assessment     Rolling Right : 6 (Modified independent)  Rolling Left : 6 (Modified independent)  Supine to Sit : 6 (Modified independent)  Sit to Supine : 6 (Modified independent)  Pt performed bed mobility on left side of mat table for exercises with Russell for all bed mobility. TRANSFERS Daily Assessment     Transfer Type: Other  Other: squat pivot  Transfer Assistance : 5 (Stand-by assistance)  Sit to Stand Assistance: Stand-by assistance  Car Transfers: Supervision  Car Type: car txfr simulator  Pt performed squat pivot txfr w/c<->mat table with SBA. Pt performed sit to stand to RW with SBA. Pt performed car txfr with RW in simulator with S. GAIT Daily Assessment    Gait Description (WDL)      Gait Abnormalities      Assistive device Walker, rolling;Gait belt    Ambulation assistance - level surface 4 (Contact guard assistance)    Distance 75 Feet (ft)(x2 trials)    Ambulation assistance- uneven surface      Comments Pt ambulated 2x75ft and 2x15ft with RW and CGA.  Pt had one instance of left knee buckling at end of second 75ft trial.         BALANCE Daily Assessment     Sitting - Static: Good (unsupported)  Sitting - Dynamic: Good (unsupported)  Standing - Static: Fair(with RW)  Standing - Dynamic : Impaired        THERAPEUTIC EXERCISES Daily Assessment       Supine BLE hip abd, SLR 3x15, left heel slides and SAQ 3x15, bridging with right LE over bolster 3x15 and left sidelying right hip extension 3x15  Standing right hip extension and abd x20, left heel raises x20  Seated BLE hip flexion, hip abd and add with BTB 3x15, left LE LAQ 3x15 ASSESSMENT:  Pt wore shoe on left foot today and demo'd fatigue quicker with gait and demo'd increased challenge with left foot clearance. Progression toward goals:  [x]      Improving appropriately and progressing toward goals  []      Improving slowly and progressing toward goals  []      Not making progress toward goals and plan of care will be adjusted      PLAN:  Patient continues to benefit from skilled intervention to address the above impairments. Continue treatment per established plan of care. Discharge Recommendations:  Home Health  Further Equipment Recommendations for Discharge:  rolling walker and wheelchair 18 inch      Estimated Discharge Date:4/29/2021    Activity Tolerance:   Fair+/good-  Please refer to the flowsheet for vital signs taken during this treatment. After treatment:   Patient left self propelling back to room.        Mariano Sanchez, PTA

## 2021-04-27 NOTE — PROGRESS NOTES
1 [facility]     Patient __Cooper Neal was evaluated on _4/27/2021__ for a manual wheelchair for home use. Patient has a diagnosis(s) of: __Status post above-knee amputation of right lower extremity _ that causes mobility limitations in the home that significantly impairs the ability to participate in mobility related activities of daily living (MRADLs) like-   Please check all that apply:  _x_ Toileting _x_Bathing _x_ Grooming _x_ Dressing __ Feeding     Patient has the following mobility condition/limitation that  Please check one:  __ Prevents the beneficiary from accomplishing MRADL entirely  _x_ Places the beneficiary at reasonably determined heightened risk of morbidity or mortality secondary to the attempts to perform MRADL  _x_ Prevents the beneficiary from completing an MRADL within a reasonable time frame     The Patients: (Please check all that apply)  _x_ Mobility limitation cannot be sufficiently resolved by the use of appropriately fitted cane or walker   _x_ Home provides adequate access between rooms, maneuvering space, and surfaces for use of the manual wheelchair that is provided  _x_ Use of a manual wheelchair will significantly improve the ability to participate in MRADLs and will be able to use it at home on a regular basis  _x_ Has not expressed an unwillingness to use the manual wheelchair in the home  _x_ Has sufficient upper extremity function and other physical and mental capabilities needed to safely self-propel the wheelchair that is provided in the home during a typical day. Limitations of strength, endurance and range of motion, or coordination, presence of pain, or deformity or absence of one or both upper extremities are relevant to the assessment of upper extremity function.    _x_ Has a caregiver who is available, willing and able to provide assistance with the wheelchair    _6'2____ Height  ___174 lbs___ Weight _______________________________ ___[id number]___________  PHYSICIAN'S SIGNATURE   NPI        4/27/2021        DATE

## 2021-04-27 NOTE — PROGRESS NOTES
Problem: Self Care Deficits Care Plan (Adult)  Goal: *Acute Goals and Plan of Care (Insert Text)  Description: Occupational Therapy Goals   Long Term Goals  Initiated 2021 and to be accomplished within 2 week(s)  1. Pt will perform self-feeding with independence. 2. Pt will perform grooming with mod I in standing at the sink. 3. Pt will perform UB bathing with mod I with set up of necessary items. ( 2021)  4. Pt will perform LB bathing with mod I with set up of necessary items. ( 2021)  5. Pt will perform tub/shower transfer with mod I.   6. Pt will perform UB dressing with mod I with set up of necessary items. ( 2021)  7. Pt will perform LB dressing with mod I with set up of necessary items. 8. Pt will perform toileting task with mod I.  9. Pt will perform toilet transfer with mod I with RW. Short Term Goals   Initiated 2021 and to be accomplished within 7 day(s) (2021). Re-assessed 2021. 1. Pt will perform self-feeding with mod I and no set-up A. ( 2021)  2. Pt will perform grooming with mod I. ( 2021)  3. Pt will perform UB bathing with mod I. ( 2021)  4. Pt will perform LB bathing with mod I. ( 2021)  5. Pt will perform tub/shower transfer with mod I. - At supervision - can transfer from RW into shower; t/s T with S for practice  6. Pt will perform UB dressing with mod I. ( 2021)  7. Pt will perform LB dressing with mod I.  - At supervision; can progress to Mod I   8. Pt will perform toileting task with mod I. - At supervision; progress to mod I  9. Pt will perform toilet transfer with mod I with w/c as needed. - At supervision; progress to mod I with RW as appropriate. Outcome: Progressing Towards Goal   Occupational Therapy TREATMENT    Patient: Pinky    60 y.o.     Patient identified with name and : yes    Date: 2021    First Tx Session  Time In: 0700  Time Out[de-identified] 0800    Second Tx Session  Time In: 1430  Time Out[de-identified] 0543    Diagnosis: Status post above-knee amputation of right lower extremity (HCC) [Z89.611]   Precautions: Fall(right AKA)  Chart, occupational therapy assessment, plan of care, and goals were reviewed. Pain:  Pt reports 0/10 pain or discomfort prior to treatment. Pt reports 0/10 pain or discomfort post treatment. Intervention Provided:      SUBJECTIVE:   Patient stated I was waiting for you.     OBJECTIVE DATA SUMMARY:     THERAPEUTIC ACTIVITY Daily Assessment    Pt engaged in table top task with 2# wrist weights to challenge pt BUE strength and FM which involved fitting pipes and removing them from fasteners to build a tower with supervision and setup in seated position with setup. THERAPEUTIC EXERCISE Daily Assessment    Pt completed BUE strengthening using sci Fit for 15 continuous minutes to increase BUE strength and activity tolerance for self cares and functional mobility. Pt performed BUE strengthening to increase core strength with large ball and 2# wrist weights in all planes (forward flexion, chest press, shoulder flexion/extension, lateral rotation)  4x15 reps, 2 sets with RB's in between sets. UPPER BODY BATHING Daily Assessment    Upper Body Bathing  Bathing Assistance, Upper: 6 (Modified independent)  Position Performed: Seated in chair  Adaptive Equipment: Shower chair  Comments: Following dressing RLE to waterproof pt performed UB showering with supervision/Russell     LOWER BODY BATHING Daily Assessment    Lower Body Bathing  Bathing Assistance, Lower : 5 (Stand-by assistance)  Position Performed: Seated in chair;Standing  Adaptive Equipment: Tub bench  Comments: Pt performed LB showering with use of grab bars and SBA for increased safety in standing with towel under foot to prevent risk for falls when standing in shower     UPPER BODY DRESSING Daily Assessment     Pt performed UB dressing with Russell.      LOWER BODY DRESSING Daily Assessment    Lower Body Dressing Dressing Assistance : 5 (Supervision)  Leg Crossed Method Used: No  Position Performed: Seated in chair  Adaptive Equipment Used: Walker  Comments: Pt performed LB dressing from w/c level with supervision/Russell     MOBILITY/TRANSFERS Daily Assessment     Pt performed shower transfer form w/c level to shower bench using grab bars with supervision/SBA for increased safety. ASSESSMENT:  Pt is making good progress toward goals and demonstrates good safety awareness and independence with showering, dressing and grooming tasks. OTR discussed environmental modifications for increased safety in home environment. Pt expressed concerns for not being able to shower due to [address] not fitting in bathroom. Progression toward goals:  [x]          Improving appropriately and progressing toward goals  []          Improving slowly and progressing toward goals  []          Not making progress toward goals and plan of care will be adjusted     PLAN:  Patient continues to benefit from skilled intervention to address the above impairments. Continue treatment per established plan of care. Discharge Recommendations:  Home Health  Further Equipment Recommendations for Discharge:  N/A     Activity Tolerance:  good      Estimated LOS:4/29    Please refer to the flowsheet for vital signs taken during this treatment. After treatment:   [x]  Patient left in no apparent distress sitting up in chair   []  Patient left in no apparent distress in bed  [x]  Call bell left within reach  []  Nursing notified  []  Caregiver present  []  Bed alarm activated    COMMUNICATION/EDUCATION:   [x] Home safety education was provided and the patient/caregiver indicated understanding. [] Patient/family have participated as able in goal setting and plan of care. [x] Patient/family agree to work toward stated goals and plan of care. [] Patient understands intent and goals of therapy, but is neutral about his/her participation.   [] Patient is unable to participate in goal setting and plan of care.       Sena Olmedo, OT

## 2021-04-27 NOTE — PROGRESS NOTES
SHIFT CHANGE NOTE FOR [facility]    Bedside and Verbal shift change report given to Lashawn Lawson RN (oncoming nurse) by Marely Lew RN (offgoing nurse). Report included the following information SBAR, Kardex, MAR and Recent Results.     Situation:   Code Status: Full Code   Reason for Admission: right AKA  Hospital Day: 12   Problem List:   Hospital Problems  Date Reviewed: 4/26/2021          Codes Class Noted POA    * (Principal) Status post above-knee amputation of right lower extremity ([facility] ICD-10-CM: Q06.105  ICD-9-CM: V49.76  4/11/2021 Yes    Overview Signed 4/15/2021  5:14 PM by Leann Bruner MD     S/P Right above-the-knee amputation (4/11/2021  Dr. Robert Rosales)             Ischemia of right BKA site [facility]) ICD-10-CM: T87.89, I99.8  ICD-9-CM: 997.69, 459.9  4/7/2021 Yes        Infection of right below knee amputation [facility]) ICD-10-CM: T87.43  ICD-9-CM: 997.62  4/7/2021 Yes    Overview Signed 4/15/2021  5:17 PM by Leann Bruner MD     S/P Washout of right below-knee amputation; Right knee disarticulation (4/7/2021  Dr. Nadia Jaime); S/P Exploration of right knee disarticulation and control of hemorrhage (4/8/2021  Dr. Nadia Jaime)             Peripheral artery disease ([facility] (Chronic) ICD-10-CM: I73.9  ICD-9-CM: 443.9  Unknown Yes        Type 2 diabetes mellitus, without long-term current use of insulin (HCC) (Chronic) ICD-10-CM: E11.9  ICD-9-CM: 250.00  Unknown Yes    Overview Addendum 4/15/2021  5:40 PM by Leann Bruner MD     HbA1c (4/8/2021) = 9.8; HbA1c (3/6/2021) = 12.9             Factor V Leiden mutation ([facility] (Chronic) ICD-10-CM: W61.17  ICD-9-CM: 289.81  Unknown Yes        Hypertensive heart disease with chronic systolic congestive heart failure (HCC) (Chronic) ICD-10-CM: I11.0, I50.22  ICD-9-CM: 402.91, 428.22  Unknown Yes    Overview Signed 3/26/2021 11:06 PM by Leann Bruner MD     2D echocardiogram (12/17/2015) showed EF 35%; genealized hypokinesis more focally severe of the inferior and posterior segments; mild mitral regurgitation; trace tricuspud regurgitation; normal pulmonary artery pressure             Anticoagulated by anticoagulation treatment (Chronic) ICD-10-CM: Z79.01  ICD-9-CM: V58.61  Unknown Yes    Overview Signed 3/26/2021  4:36 PM by Anusha Hillman MD     On Rivaroxaban             Mixed hyperlipidemia (Chronic) ICD-10-CM: E78.2  ICD-9-CM: 272.2  Unknown Yes    Overview Signed 3/26/2021 11:07 PM by Anusha Hillman MD     Lipid profile (7/25/2018) showed , , HDL 27,              Major depressive disorder ICD-10-CM: F32.9  ICD-9-CM: 296.20  3/24/2021 Yes    Overview Signed 3/26/2021 11:31 PM by Anusha Hillman MD     On Duloxetine             History of right below knee amputation [facility]) ICD-10-CM: Z89.511  ICD-9-CM: V49.75  3/22/2021 Yes    Overview Signed 3/26/2021  5:43 PM by Anusha Hillman MD     S/P Right below-the-knee amputation (3/22/2021 - Dr. Venessa Marrufo)             Impaired mobility and ADLs ICD-10-CM: Z74.09, Z78.9  ICD-9-CM: V49.89  3/22/2021 Yes        Critical ischemia of lower extremity ([facility] ICD-10-CM: H29.699  ICD-9-CM: 459.9  3/22/2021 Yes    Overview Signed 3/26/2021  5:42 PM by Anusha Hillman MD     Right             Acute blood loss as cause of postoperative anemia ICD-10-CM: D62  ICD-9-CM: 285.1  3/22/2021 Yes        History of embolectomy ICD-10-CM: Z98.890  ICD-9-CM: V45.89  3/5/2021 Yes    Overview Signed 3/26/2021  5:39 PM by Aunsha Hillman MD     S/P Right lower extremity arterial embolectomy                   Background:   Past Medical History:   Past Medical History:   Diagnosis Date    Acute blood loss as cause of postoperative anemia 3/22/2021    Anticoagulated by anticoagulation treatment     On Rivaroxaban    Cardiomyopathy ([facility] 12/17/2015    2D echocardiogram (3/24/2021) showed EF 37%; global hypokinesis of left ventricle; grade I diastolic dysfunction; 2D echocardiogram (12/17/2015) showed EF 35%; generalized hypokinesis more focally severe of the inferior and posterior segments; mild mitral regurgitation; trace tricuspud regurgitation; normal pulmonary artery pressure    Chronic systolic heart failure (HCC)     2D echocardiogram (3/24/2021) showed EF 37%; global hypokinesis of left ventricle; grade I diastolic dysfunction; 2D echocardiogram (12/17/2015) showed EF 35%; generalized hypokinesis more focally severe of the inferior and posterior segments; mild mitral regurgitation; trace tricuspud regurgitation; normal pulmonary artery pressure    Coronary artery disease involving native coronary artery of native heart     COVID-19 ruled out by laboratory testing 3/22/2021    SARS-CoV-2 ([facility], [facility]) (3/22/2021):  Not detected     Critical ischemia of lower extremity ([facility] 3/22/2021    Right    Factor V Leiden mutation ([facility]     Grade I diastolic dysfunction 64/23/0219    2D echocardiogram (3/24/2021) showed EF 37%; global hypokinesis of left ventricle; grade I diastolic dysfunction    History of deep venous thrombosis (DVT) of distal vein of right lower extremity     History of epilepsy     History of head injury     History of myocardial infarction     History of noncompliance with medical treatment 3/18/2021    Hypertensive heart disease with chronic systolic congestive heart failure ([address]     2D echocardiogram (3/24/2021) showed EF 37%; global hypokinesis of left ventricle; grade I diastolic dysfunction; 2D echocardiogram (12/17/2015) showed EF 35%; genealized hypokinesis more focally severe of the inferior and posterior segments; mild mitral regurgitation; trace tricuspud regurgitation; normal pulmonary artery pressure    Infection of right below knee amputation ([address] 4/7/2021    S/P Washout of right below-knee amputation; Right knee disarticulation (4/7/2021  Dr. Porfirio Barker); S/P Exploration of right knee disarticulation and control of hemorrhage (4/8/2021  Dr. Porfirio Barker)   [facility] Long term current use of aspirin     Major depressive disorder 03/24/2021    On Duloxetine    Migraine headache     Mitral valve prolapse     Mixed hyperlipidemia     Lipid profile (7/25/2018) showed , , HDL 27,     Obstructive sleep apnea     On statin therapy due to risk of future cardiovascular event     On Atorvastatin    Peripheral artery disease ([facility]     Type 2 diabetes mellitus, without long-term current use of insulin ([facility])     HbA1c (4/8/2021) = 9.8; HbA1c (3/6/2021) = 82.1    Umbilical hernia     Wears glasses       Patient taking anticoagulants yes    Patient has a defibrillator: no     Assessment:   Changes in Assessment throughout shift: no acute changes noted throughout the shift     Patient has central line: no Reasons if yes: n/a  Insertion date:n/a Last dressing date:n/a   Patient has Ellis Cath: no Reasons if yes: n/a   Insertion date:n/a     Last Vitals:     Vitals:    04/25/21 2100 04/26/21 0726 04/26/21 1709 04/26/21 2059   BP: 125/75 122/74 119/73 115/71   Pulse: 84 84  87   Resp: 18 18 18 16   Temp: 97.4 °F (36.3 °C) 97.8 °F (36.6 °C) 97.1 °F (36.2 °C) 98.8 °F (37.1 °C)   SpO2: 100% 99% 99% 98%   Height:            PAIN    Pain Assessment    Pain Intensity 1: 6 (04/27/21 0704) Pain Intensity 1: 2 (12/29/14 1105)    Pain Location 1: Leg Pain Location 1: Abdomen    Pain Intervention(s) 1: Medication (see MAR) Pain Intervention(s) 1: Medication (see MAR)  Patient Stated Pain Goal: 0 Patient Stated Pain Goal: 0  o Intervention effective: yes    o Other actions taken for pain: repositioning      Skin Assessment  Skin color Skin Color: Appropriate for ethnicity  Condition/Temperature Skin Condition/Temp: Dry, Warm  Integrity Skin Integrity: Incision (comment)  Turgor Turgor: Non-tenting  Weekly Pressure Ulcer Documentation  Pressure  Injury Documentation: No Pressure Injury Noted-Pressure Ulcer Prevention Initiated  Wound Prevention & Protection Methods  Orientation of wound Orientation of Wound Prevention: Posterior  Location of Prevention Location of Wound Prevention: Sacrum/Coccyx  Dressing Present Dressing Present : No  Dressing Status Dressing Status: Intact  Wound Offloading Wound Offloading (Prevention Methods): Bed, pressure redistribution/air, Bed, pressure reduction mattress, Repositioning     INTAKE/OUPUT  Date 04/26/21 0700 - 04/27/21 0659 04/27/21 0700 - 04/28/21 0659   Shift 0700-1859 1900-0659 24 Hour Total 0700-1859 1900-0659 24 Hour Total   INTAKE   Shift Total         OUTPUT   Urine  900 900        Urine Voided  900 900        Urine Occurrence(s) 1 x 1 x 2 x      Stool           Stool Occurrence(s) 0 x 1 x 1 x      Shift Total  900 900      NET  -900 -900      Weight (kg)             Recommendations:  1. Patient needs and requests: education     2. Diet: Cardiac consistent carb diet with thin liquids    3. Pending tests/procedures: AM labs     4. Functional Level/Equipment: 1 person assist     5. Estimated Discharge Date: TBD Posted on Whiteboard in Patients Room: [facility] Safety Check    A safety check occurred in the patient's room between off going nurse and oncoming nurse listed above. The safety check included the below items  Area Items   H  High Alert Medications - Verify all high alert medication drips (heparin, PCA, etc.)   E  Equipment - Suction is set up for ALL patients (with yanker)  - Red plugs utilized for all equipment (IV pumps, etc.)  - WOWs wiped down at end of shift.  - Room stocked with oxygen, suction, and other unit-specific supplies   A  Alarms - Bed alarm is set for fall risk patients  - Ensure chair alarm is in place and activated if patient is up in a chair   L  Lines - Check IV for any infiltration  - Ellis bag is empty if patient has a Ellis   - Tubing and IV bags are labeled   S  Safety   - Room is clean, patient is clean, and equipment is clean. - Hallways are clear from equipment besides carts. - Fall bracelet on for fall risk patients  - Ensure room is clear and free of clutter  - Suction is set up for ALL patients (with faraz)  - Hallways are clear from equipment besides carts.    - Isolation precautions followed, supplies available outside room, sign posted         Tony Parker RN

## 2021-04-27 NOTE — PROGRESS NOTES
[facility]    Bedside and Verbal shift change report given to [facility] Morales (oncoming nurse) by David Henry RN (offgoing nurse). Report included the following information SBAR, Kardex, MAR and Recent Results.     Situation:   Code Status: Full Code   Reason for Admission: right AKA  Hospital Day: 12   Problem List:   Hospital Problems  Date Reviewed: 4/27/2021          Codes Class Noted POA    * (Principal) Status post above-knee amputation of right lower extremity ([facility] ICD-10-CM: O92.879  ICD-9-CM: V49.76  4/11/2021 Yes    Overview Signed 4/15/2021  5:14 PM by Tom Jaffe MD     S/P Right above-the-knee amputation (4/11/2021  Dr. Ana Lilia Rodrigez)             Ischemia of right BKA site [facility]) ICD-10-CM: T87.89, I99.8  ICD-9-CM: 997.69, 459.9  4/7/2021 Yes        Infection of right below knee amputation [facility]) ICD-10-CM: T87.43  ICD-9-CM: 997.62  4/7/2021 Yes    Overview Signed 4/15/2021  5:17 PM by Tom Jaffe MD     S/P Washout of right below-knee amputation; Right knee disarticulation (4/7/2021  Dr. Maryse Novak); S/P Exploration of right knee disarticulation and control of hemorrhage (4/8/2021  Dr. Maryse Novak)             Peripheral artery disease ([facility] (Chronic) ICD-10-CM: I73.9  ICD-9-CM: 443.9  Unknown Yes        Type 2 diabetes mellitus, without long-term current use of insulin (HCC) (Chronic) ICD-10-CM: E11.9  ICD-9-CM: 250.00  Unknown Yes    Overview Addendum 4/15/2021  5:40 PM by Tom Jaffe MD     HbA1c (4/8/2021) = 9.8; HbA1c (3/6/2021) = 12.9             Factor V Leiden mutation ([facility] (Chronic) ICD-10-CM: K68.79  ICD-9-CM: 289.81  Unknown Yes        Hypertensive heart disease with chronic systolic congestive heart failure (HCC) (Chronic) ICD-10-CM: I11.0, I50.22  ICD-9-CM: 402.91, 428.22  Unknown Yes    Overview Signed 3/26/2021 11:06 PM by Tom Jaffe MD     2D echocardiogram (12/17/2015) showed EF 35%; genealized hypokinesis more focally severe of the inferior and posterior segments; mild mitral regurgitation; trace tricuspud regurgitation; normal pulmonary artery pressure             Anticoagulated by anticoagulation treatment (Chronic) ICD-10-CM: Z79.01  ICD-9-CM: V58.61  Unknown Yes    Overview Signed 3/26/2021  4:36 PM by Brenda Frank MD     On Rivaroxaban             Mixed hyperlipidemia (Chronic) ICD-10-CM: E78.2  ICD-9-CM: 272.2  Unknown Yes    Overview Signed 3/26/2021 11:07 PM by Brenda Frank MD     Lipid profile (7/25/2018) showed , , HDL 27,              Major depressive disorder ICD-10-CM: F32.9  ICD-9-CM: 296.20  3/24/2021 Yes    Overview Signed 3/26/2021 11:31 PM by Brenda Frank MD     On Duloxetine             History of right below knee amputation [facility]) ICD-10-CM: Z89.511  ICD-9-CM: V49.75  3/22/2021 Yes    Overview Signed 3/26/2021  5:43 PM by Brenda Frank MD     S/P Right below-the-knee amputation (3/22/2021 - Dr. Ayah Whitney)             Impaired mobility and ADLs ICD-10-CM: Z74.09, Z78.9  ICD-9-CM: V49.89  3/22/2021 Yes        Critical ischemia of lower extremity ([facility] ICD-10-CM: E81.842  ICD-9-CM: 459.9  3/22/2021 Yes    Overview Signed 3/26/2021  5:42 PM by Brenda Frank MD     Right             Acute blood loss as cause of postoperative anemia ICD-10-CM: D62  ICD-9-CM: 285.1  3/22/2021 Yes        History of embolectomy ICD-10-CM: Z98.890  ICD-9-CM: V45.89  3/5/2021 Yes    Overview Signed 3/26/2021  5:39 PM by Brenda Frank MD     S/P Right lower extremity arterial embolectomy                   Background:   Past Medical History:   Past Medical History:   Diagnosis Date    Acute blood loss as cause of postoperative anemia 3/22/2021    Anticoagulated by anticoagulation treatment     On Rivaroxaban    Cardiomyopathy ([address] 12/17/2015    2D echocardiogram (3/24/2021) showed EF 37%; global hypokinesis of left ventricle; grade I diastolic dysfunction; 2D echocardiogram (12/17/2015) showed EF 35%; generalized hypokinesis more focally severe of the inferior and posterior segments; mild mitral regurgitation; trace tricuspud regurgitation; normal pulmonary artery pressure    Chronic systolic heart failure (HCC)     2D echocardiogram (3/24/2021) showed EF 37%; global hypokinesis of left ventricle; grade I diastolic dysfunction; 2D echocardiogram (12/17/2015) showed EF 35%; generalized hypokinesis more focally severe of the inferior and posterior segments; mild mitral regurgitation; trace tricuspud regurgitation; normal pulmonary artery pressure    Coronary artery disease involving native coronary artery of native heart     COVID-19 ruled out by laboratory testing 3/22/2021    SARS-CoV-2 (Movable[employer]k m2000, Auto I.D.rt5 CUPS and some sugar 92) (3/22/2021):  Not detected     Critical ischemia of lower extremity ([facility] [address] [facility] 3/22/2021    Right    Factor V Leiden mutation ([facility] [address] [facility]     Grade I diastolic dysfunction 42/88/8900    2D echocardiogram (3/24/2021) showed EF 37%; global hypokinesis of left ventricle; grade I diastolic dysfunction    History of deep venous thrombosis (DVT) of distal vein of right lower extremity     History of epilepsy     History of head injury     History of myocardial infarction     History of noncompliance with medical treatment 3/18/2021    Hypertensive heart disease with chronic systolic congestive heart failure ([facility] [address] [facility]     2D echocardiogram (3/24/2021) showed EF 37%; global hypokinesis of left ventricle; grade I diastolic dysfunction; 2D echocardiogram (12/17/2015) showed EF 35%; genealized hypokinesis more focally severe of the inferior and posterior segments; mild mitral regurgitation; trace tricuspud regurgitation; normal pulmonary artery pressure    Infection of right below knee amputation ([facility] [facility] 4/7/2021    S/P Washout of right below-knee amputation; Right knee disarticulation (4/7/2021  Dr. Maryse Novak); S/P Exploration of right knee disarticulation and control of hemorrhage (4/8/2021  Dr. Maryse Novak)    Long term current use of aspirin     Major depressive disorder 03/24/2021    On Duloxetine    Migraine headache     Mitral valve prolapse     Mixed hyperlipidemia     Lipid profile (7/25/2018) showed , , HDL 27,     Obstructive sleep apnea     On statin therapy due to risk of future cardiovascular event     On Atorvastatin    Peripheral artery disease ([address]     Type 2 diabetes mellitus, without long-term current use of insulin ([facility])     HbA1c (4/8/2021) = 9.8; HbA1c (3/6/2021) = 12.8    Umbilical hernia     Wears glasses       Patient taking anticoagulants yes    Patient has a defibrillator: no     Assessment:   Changes in Assessment throughout shift: no acute changes noted throughout the shift     Patient has central line: no Reasons if yes: n/a  Insertion date:n/a Last dressing date:n/a   Patient has Ellis Cath: no Reasons if yes: n/a   Insertion date:n/a     Last Vitals:     Vitals:    04/26/21 2059 04/27/21 0815 04/27/21 1037 04/27/21 1554   BP: 115/71 99/72  118/71   Pulse: 87 95  88   Resp: 16 17  16   Temp: 98.8 °F (37.1 °C) 99.2 °F (37.3 °C)  98.2 °F (36.8 °C)   SpO2: 98% 95%  97%   Weight:   79 kg (174 lb 1.6 oz)    Height:            PAIN    Pain Assessment    Pain Intensity 1: 0 (04/27/21 1645) Pain Intensity 1: 2 (12/29/14 1105)    Pain Location 1: Leg Pain Location 1: Abdomen    Pain Intervention(s) 1: Medication (see MAR) Pain Intervention(s) 1: Medication (see MAR)  Patient Stated Pain Goal: 0 Patient Stated Pain Goal: 0  o Intervention effective: yes    o Other actions taken for pain: repositioning      Skin Assessment  Skin color Skin Color: Appropriate for ethnicity  Condition/Temperature Skin Condition/Temp: Dry, Warm  Integrity Skin Integrity: Incision (comment)  Turgor Turgor: Non-tenting  Weekly Pressure Ulcer Documentation  Pressure  Injury Documentation: No Pressure Injury Noted-Pressure Ulcer Prevention Initiated  Wound Prevention & Protection Methods  Orientation of wound Orientation of Wound Prevention: Posterior  Location of Prevention Location of Wound Prevention: Sacrum/Coccyx  Dressing Present Dressing Present : No  Dressing Status Dressing Status: Intact  Wound Offloading Wound Offloading (Prevention Methods): Bed, pressure reduction mattress     INTAKE/OUPUT  Date 04/26/21 1900 - 04/27/21 0659 04/27/21 0700 - 04/28/21 0659   Shift 7202-9434 24 Hour Total 4834-1970 8981-7124 24 Hour Total   INTAKE   Shift Total(mL/kg)        OUTPUT   Urine 900 900        Urine Voided 900 900        Urine Occurrence(s) 1 x 2 x 3 x  3 x   Stool          Stool Occurrence(s) 1 x 1 x 0 x  0 x   Shift Total(mL/kg) 900 900      NET -900 -900      Weight (kg)   79 79 79       Recommendations:  1. Patient needs and requests: education     2. Diet: Cardiac consistent carb diet with thin liquids    3. Pending tests/procedures: AM labs     4. Functional Level/Equipment: 1 person assist     5. Estimated Discharge Date: TBD Posted on Whiteboard in Patients Room: [facility] Safety Check    A safety check occurred in the patient's room between off going nurse and oncoming nurse listed above. The safety check included the below items  Area Items   H  High Alert Medications - Verify all high alert medication drips (heparin, PCA, etc.)   E  Equipment - Suction is set up for ALL patients (with yanker)  - Red plugs utilized for all equipment (IV pumps, etc.)  - WOWs wiped down at end of shift.  - Room stocked with oxygen, suction, and other unit-specific supplies   A  Alarms - Bed alarm is set for fall risk patients  - Ensure chair alarm is in place and activated if patient is up in a chair   L  Lines - Check IV for any infiltration  - Ellis bag is empty if patient has a Ellis   - Tubing and IV bags are labeled   S  Safety   - Room is clean, patient is clean, and equipment is clean. - Hallways are clear from equipment besides carts.    - Fall bracelet on for fall risk patients  - Ensure room is clear and free of clutter  - Suction is set up for ALL patients (with faraz)  - Hallways are clear from equipment besides carts.    - Isolation precautions followed, supplies available outside room, sign posted         Elise Castillo RN

## 2021-04-28 LAB
GLUCOSE BLD STRIP.AUTO-MCNC: 114 MG/DL (ref 70–110)
GLUCOSE BLD STRIP.AUTO-MCNC: 125 MG/DL (ref 70–110)
GLUCOSE BLD STRIP.AUTO-MCNC: 127 MG/DL (ref 70–110)
GLUCOSE BLD STRIP.AUTO-MCNC: 134 MG/DL (ref 70–110)

## 2021-04-28 PROCEDURE — 99232 SBSQ HOSP IP/OBS MODERATE 35: CPT | Performed by: INTERNAL MEDICINE

## 2021-04-28 PROCEDURE — 97116 GAIT TRAINING THERAPY: CPT

## 2021-04-28 PROCEDURE — 82962 GLUCOSE BLOOD TEST: CPT

## 2021-04-28 PROCEDURE — 97535 SELF CARE MNGMENT TRAINING: CPT

## 2021-04-28 PROCEDURE — 97110 THERAPEUTIC EXERCISES: CPT

## 2021-04-28 PROCEDURE — 97530 THERAPEUTIC ACTIVITIES: CPT

## 2021-04-28 PROCEDURE — 74011250637 HC RX REV CODE- 250/637: Performed by: INTERNAL MEDICINE

## 2021-04-28 PROCEDURE — 65310000000 HC RM PRIVATE REHAB

## 2021-04-28 RX ORDER — METHOCARBAMOL 500 MG/1
500 TABLET, FILM COATED ORAL 3 TIMES DAILY
Qty: 21 TAB | Refills: 0 | Status: SHIPPED | OUTPATIENT
Start: 2021-04-28 | End: 2021-05-05

## 2021-04-28 RX ORDER — METFORMIN HYDROCHLORIDE 850 MG/1
850 TABLET ORAL 2 TIMES DAILY WITH MEALS
Qty: 60 TAB | Refills: 0 | Status: SHIPPED | OUTPATIENT
Start: 2021-04-29

## 2021-04-28 RX ORDER — SERTRALINE HYDROCHLORIDE 25 MG/1
25 TABLET, FILM COATED ORAL DAILY
Qty: 30 TAB | Refills: 0 | Status: SHIPPED | OUTPATIENT
Start: 2021-04-29

## 2021-04-28 RX ORDER — EZETIMIBE 10 MG/1
10 TABLET ORAL DAILY
Qty: 30 TAB | Refills: 0 | Status: SHIPPED | OUTPATIENT
Start: 2021-04-29

## 2021-04-28 RX ORDER — ATORVASTATIN CALCIUM 80 MG/1
80 TABLET, FILM COATED ORAL DAILY
Qty: 30 TAB | Refills: 0 | Status: SHIPPED | OUTPATIENT
Start: 2021-04-29

## 2021-04-28 RX ORDER — ASCORBIC ACID 250 MG
250 TABLET ORAL
Qty: 30 TAB | Refills: 0 | Status: SHIPPED | OUTPATIENT
Start: 2021-04-29

## 2021-04-28 RX ORDER — ACETAMINOPHEN 325 MG/1
650 TABLET ORAL
Qty: 60 TAB | Refills: 0 | Status: SHIPPED | OUTPATIENT
Start: 2021-04-28

## 2021-04-28 RX ORDER — GABAPENTIN 100 MG/1
CAPSULE ORAL
Qty: 120 CAP | Refills: 0 | Status: SHIPPED | OUTPATIENT
Start: 2021-04-28

## 2021-04-28 RX ORDER — GUAIFENESIN 100 MG/5ML
81 LIQUID (ML) ORAL
Qty: 30 TAB | Refills: 0 | Status: SHIPPED | OUTPATIENT
Start: 2021-04-29

## 2021-04-28 RX ORDER — OXYCODONE AND ACETAMINOPHEN 7.5; 325 MG/1; MG/1
1 TABLET ORAL
Qty: 20 TAB | Refills: 0 | Status: SHIPPED | OUTPATIENT
Start: 2021-04-28 | End: 2021-05-03

## 2021-04-28 RX ORDER — FERROUS GLUCONATE 324(37.5)
324 TABLET ORAL
Qty: 30 TAB | Refills: 0 | Status: SHIPPED | OUTPATIENT
Start: 2021-04-29

## 2021-04-28 RX ADMIN — OXYCODONE HYDROCHLORIDE 10 MG: 5 TABLET ORAL at 13:07

## 2021-04-28 RX ADMIN — ATORVASTATIN CALCIUM 80 MG: 40 TABLET, FILM COATED ORAL at 08:06

## 2021-04-28 RX ADMIN — METHOCARBAMOL 500 MG: 500 TABLET ORAL at 08:06

## 2021-04-28 RX ADMIN — GABAPENTIN 100 MG: 100 CAPSULE ORAL at 08:06

## 2021-04-28 RX ADMIN — ACETAMINOPHEN 650 MG: 325 TABLET, FILM COATED ORAL at 08:06

## 2021-04-28 RX ADMIN — DOCUSATE SODIUM 100 MG: 100 CAPSULE, LIQUID FILLED ORAL at 08:06

## 2021-04-28 RX ADMIN — ACETAMINOPHEN 650 MG: 325 TABLET, FILM COATED ORAL at 11:40

## 2021-04-28 RX ADMIN — OXYCODONE HYDROCHLORIDE 10 MG: 5 TABLET ORAL at 22:03

## 2021-04-28 RX ADMIN — RIVAROXABAN 20 MG: 20 TABLET, FILM COATED ORAL at 08:06

## 2021-04-28 RX ADMIN — ACETAMINOPHEN 650 MG: 325 TABLET, FILM COATED ORAL at 17:16

## 2021-04-28 RX ADMIN — METHOCARBAMOL 500 MG: 500 TABLET ORAL at 14:02

## 2021-04-28 RX ADMIN — Medication 250 MG: at 08:06

## 2021-04-28 RX ADMIN — METFORMIN HYDROCHLORIDE 850 MG: 850 TABLET ORAL at 08:05

## 2021-04-28 RX ADMIN — Medication 5 MG: at 22:00

## 2021-04-28 RX ADMIN — METFORMIN HYDROCHLORIDE 850 MG: 850 TABLET ORAL at 17:16

## 2021-04-28 RX ADMIN — METHOCARBAMOL 500 MG: 500 TABLET ORAL at 22:00

## 2021-04-28 RX ADMIN — EZETIMIBE 10 MG: 10 TABLET ORAL at 08:06

## 2021-04-28 RX ADMIN — Medication 1 TABLET: at 08:06

## 2021-04-28 RX ADMIN — DOCUSATE SODIUM 50MG AND SENNOSIDES 8.6MG 2 TABLET: 8.6; 5 TABLET, FILM COATED ORAL at 17:16

## 2021-04-28 RX ADMIN — GABAPENTIN 100 MG: 100 CAPSULE ORAL at 14:02

## 2021-04-28 RX ADMIN — SERTRALINE HYDROCHLORIDE 25 MG: 25 TABLET ORAL at 08:06

## 2021-04-28 RX ADMIN — ASPIRIN 81 MG CHEWABLE TABLET 81 MG: 81 TABLET CHEWABLE at 08:06

## 2021-04-28 RX ADMIN — GABAPENTIN 200 MG: 100 CAPSULE ORAL at 22:00

## 2021-04-28 NOTE — PROGRESS NOTES
Problem: Mobility Impaired (Adult and Pediatric)  Goal: *Therapy Goal (Edit Goal, Insert Text)  Description: Physical Therapy Short Term Goals  Initiated 4/16/2021, re-assessed 4/28/2021 for d/c on 4/29/2021:  1. Patient will move from supine to sit and sit to supine , scoot up and down, and roll side to side in bed with modified independence. (MET 4/28/2021)  2. Patient will transfer from bed to chair and chair to bed with supervision/set-up using the least restrictive device. (MET 4/28/2021)  3. Patient will perform sit to stand with supervision/set-up. (MET 4/28/2021)  4. Patient will ambulate with supervision/set-up for 15 feet with the least restrictive device. (MET 4/28/2021)  5. Patient will perform w/c mobility for 150 ft at supervision level over even surfaces. (MET 4/28/2021)    Physical Therapy Long Term Goals  Initiated 4/16/2021 and to be accomplished within 14 day(s) on 4/30/2021:  1. Patient will move from supine to sit and sit to supine , scoot up and down, and roll side to side in bed with independence. 2.  Patient will transfer from bed to chair and chair to bed with modified independence using the least restrictive device. 3.  Patient will perform sit to stand with modified independence. 4.  Patient will ambulate with modified independence for 25 feet with the least restrictive device. 5.  Patient will perform w/c mobility for at least 150 ft at modified independent level. 6.  Patient will ascend/descend ramp for safe entry/exit of home using wheelchair with supervision. (MET 4/28/2021)      Outcome: Progressing Towards Goal   PHYSICAL THERAPY DISCHARGE NOTE    Patient: Tita Stockton (85 y.o. male)  Date: 4/28/2021  Diagnosis: Status post above-knee amputation of right lower extremity (HCC) [Z89.611] Status post above-knee amputation of right lower extremity (HCC)  Precautions: Fall(right AKA)  Chart, physical therapy assessment, plan of care and goals were reviewed.     Time in:1406  Time out:1537    Patient seen for: Wheelchair mobility;Transfer training; Therapeutic exercise;Gait training(bed mobility)    Pain:  Pt pain was reported as  no c/o pre-treatment. Pt pain was reported as no c/o post-treatment. Intervention: NA     Patient identified with name and : yes     SUBJECTIVE:     Patient stated:\"Just kind of nervous about going home. \"     OBJECTIVE DATA SUMMARY:     GROSS ASSESSMENT Discharge Assessment 2021   AROM Within functional limits   Strength Generally decreased, functional   Coordination Within functional limits   Tone Normal   Sensation Impaired   PROM Within functional limits       POSTURE Discharge Assessment 2021   Posture (WDL) Exceptions to [facility]   Posture Assessment Forward head;Rounded shoulders           BALANCE Discharge Assessment 2021    Sitting - Static: Good (unsupported)  Sitting - Dynamic: Good (unsupported)  Standing - Static: Fair(with RW)  Standing - Dynamic : Impaired       BED/CHAIR/WHEELCHAIR TRANSFERS Initial Assessment Discharge Assessment   Rolling Right 5 (Stand-by assistance) 6 (Modified independent)   Rolling Left 5 (Stand-by assistance) 6 (Modified independent)   Supine to Sit 5 (Stand-by assistance) 6 (Modified independent)   Sit to Stand Minimal assistance(steadying support needing cues for safety) Stand-by assistance   Sit to Supine 5 (Supervision) 6 (Modified independent)   Transfer Assistance Level 4 (Minimal assistance)(steadying support for safety) 5 (Stand-by assistance)   Transfer Type Lateral pivot Other   Comments   Patient performing lateral pivot transfer Cg/min A for steadying support and cues for increased clearance of buttocks and completely getting to chair from bed. Also performing sit<->stand with RW, needing steadying support of trunk for safety, slight loss of balance to the right. Using RW to get to w/c needing steadying support for safety, able to clear right foot appropriately.    Pt performed squat pivot txfr with SBA and sit to stand with SBA. Car Transfer Not tested Supervision   Car Type N/A car txfr simulator       WHEELCHAIR MOBILITY/MANAGEMENT Initial Assessment Discharge Assessment   Able to Propel (to be further assessed) 200 feet   Assistance Level (to be further assessed) Tonio with BUE   Curbs/ramps assistance required 0 (Not tested) 5 (Supervision) pt propelled up w/c ramp backward with BUE and left LE with supervision and v/c for technique. Wheelchair set up assistance required 0 (Not tested) 6 (Modified independent)   Wheelchair management (NT) Manages left brake;Manages right brake       WALKING INDEPENDENCE Initial Assessment Discharge Assessment   Assistive device Walker, rolling Walker, rolling;Gait belt   Ambulation assistance - level surface 4 (Contact guard assistance) 4 (Contact guard assistance)   Distance 15 Feet (ft) 75 Feet (ft)(x2)   Comments  Patient performing hop step with RW, needing seated rest after 15 ft. Patient did not indicate shortness of breath or dizziness but was fatigued after this distance. Pt ambulated 75ft with RW performing lift off and step and CGA with shoe on left foot. Pt demo'd decreased foot clearance. Ambulation assistance - unlevel surface (NT)  NT       GAIT Discharge Assessment 4/28/2021   Gait Description (WDL) Exceptions to WDL   Gait Abnormalities Decreased step clearance       STEPS/STAIRS Initial Assessment Discharge Assessment   Steps/Stairs ambulated 0 (Not tested) 2(2\" platform step)   Rail Use (NT) None(RW)   Assistance Level   CGA   Comments    Pt negotiated up and down 2 2\" platform step to simulate threshold of door with RW and CGA. Curbs/Ramps (NT)  NT     Therapeutic Exercises:    Supine BLE hip abd, SLR 3x15, left heel slides and SAQ 3x15, bridging with right LE over bolster 3x15 and left sidelying right hip extension 3x15    ASSESSMENT:  Pt met all STGs and 1/5 LTGs.  Pt continues to be safest with SBA for txfrs and CGA for ambulation with RW. LTGs: met 1/5     PLAN:  Pt would benefit from continued skilled physical therapy in order to improve independent functional mobility at home health level. Interventions may include range of motion (AROM, PROM B LE/trunk), motor function (B LE/trunk strengthening/coordination), activity tolerance (vitals, oxygen saturation levels), bed mobility training, balance activities, gait training (progressive ambulation program), and functional transfer training. Discharge Recommendations:  Home Health  Further Equipment Recommendations for Discharge:  rolling walker, wheelchair 18 inch       Activity Tolerance:   Fair+  Please refer to the flowsheet for vital signs taken during this treatment.   After treatment:   [x] Patient left in no apparent distress sitting up in chair  [] Patient left in no apparent distress in bed  [] Call bell left within reach  [] Nursing notified  [] Caregiver present  [] Bed alarm activated      Mildred Banks, MIREILLE  4/28/2021

## 2021-04-28 NOTE — PROGRESS NOTES
SHIFT CHANGE NOTE FOR [facility]    Bedside and Verbal shift change report given to Kamran Whitehead RN (oncoming nurse) by Jennie Valdez RN (offgoing nurse). Report included the following information SBAR, Kardex, MAR and Recent Results.     Situation:   Code Status: Full Code   Reason for Admission: right AKA  Hospital Day: 13   Problem List:   Hospital Problems  Date Reviewed: 4/27/2021          Codes Class Noted POA    * (Principal) Status post above-knee amputation of right lower extremity ([facility] ICD-10-CM: L29.469  ICD-9-CM: V49.76  4/11/2021 Yes    Overview Signed 4/15/2021  5:14 PM by Gabriella Payne MD     S/P Right above-the-knee amputation (4/11/2021  Dr. Saniya Dietz)             Ischemia of right BKA site [facility]) ICD-10-CM: T87.89, I99.8  ICD-9-CM: 997.69, 459.9  4/7/2021 Yes        Infection of right below knee amputation [facility]) ICD-10-CM: T87.43  ICD-9-CM: 997.62  4/7/2021 Yes    Overview Signed 4/15/2021  5:17 PM by Gabriella Payne MD     S/P Washout of right below-knee amputation; Right knee disarticulation (4/7/2021  Dr. Artemio Farley); S/P Exploration of right knee disarticulation and control of hemorrhage (4/8/2021  Dr. Artemio Farley)             Peripheral artery disease ([facility] (Chronic) ICD-10-CM: I73.9  ICD-9-CM: 443.9  Unknown Yes        Type 2 diabetes mellitus, without long-term current use of insulin (HCC) (Chronic) ICD-10-CM: E11.9  ICD-9-CM: 250.00  Unknown Yes    Overview Addendum 4/15/2021  5:40 PM by Gabriella Payne MD     HbA1c (4/8/2021) = 9.8; HbA1c (3/6/2021) = 12.9             Factor V Leiden mutation ([facility] (Chronic) ICD-10-CM: G97.88  ICD-9-CM: 289.81  Unknown Yes        Hypertensive heart disease with chronic systolic congestive heart failure (HCC) (Chronic) ICD-10-CM: I11.0, I50.22  ICD-9-CM: 402.91, 428.22  Unknown Yes    Overview Signed 3/26/2021 11:06 PM by Gabriella Payne MD     2D echocardiogram (12/17/2015) showed EF 35%; genealized hypokinesis more focally severe of the inferior and posterior segments; mild mitral regurgitation; trace tricuspud regurgitation; normal pulmonary artery pressure             Anticoagulated by anticoagulation treatment (Chronic) ICD-10-CM: Z79.01  ICD-9-CM: V58.61  Unknown Yes    Overview Signed 3/26/2021  4:36 PM by Courtney Umanzor MD     On Rivaroxaban             Mixed hyperlipidemia (Chronic) ICD-10-CM: E78.2  ICD-9-CM: 272.2  Unknown Yes    Overview Signed 3/26/2021 11:07 PM by Courtney Umanzor MD     Lipid profile (7/25/2018) showed , , HDL 27,              Major depressive disorder ICD-10-CM: F32.9  ICD-9-CM: 296.20  3/24/2021 Yes    Overview Signed 3/26/2021 11:31 PM by Courtney Umanzor MD     On Duloxetine             History of right below knee amputation [facility]) ICD-10-CM: Z89.511  ICD-9-CM: V49.75  3/22/2021 Yes    Overview Signed 3/26/2021  5:43 PM by Courtney Umanzor MD     S/P Right below-the-knee amputation (3/22/2021 - Dr. Janis Brian)             Impaired mobility and ADLs ICD-10-CM: Z74.09, Z78.9  ICD-9-CM: V49.89  3/22/2021 Yes        Critical ischemia of lower extremity ([facility] ICD-10-CM: Q22.943  ICD-9-CM: 459.9  3/22/2021 Yes    Overview Signed 3/26/2021  5:42 PM by Courtney Umanzor MD     Right             Acute blood loss as cause of postoperative anemia ICD-10-CM: D62  ICD-9-CM: 285.1  3/22/2021 Yes        History of embolectomy ICD-10-CM: Z98.890  ICD-9-CM: V45.89  3/5/2021 Yes    Overview Signed 3/26/2021  5:39 PM by Courtney Umanzor MD     S/P Right lower extremity arterial embolectomy                   Background:   Past Medical History:   Past Medical History:   Diagnosis Date    Acute blood loss as cause of postoperative anemia 3/22/2021    Anticoagulated by anticoagulation treatment     On Rivaroxaban    Cardiomyopathy ([facility] 12/17/2015    2D echocardiogram (3/24/2021) showed EF 37%; global hypokinesis of left ventricle; grade I diastolic dysfunction; 2D echocardiogram (12/17/2015) showed EF 35%; generalized hypokinesis more focally severe of the inferior and posterior segments; mild mitral regurgitation; trace tricuspud regurgitation; normal pulmonary artery pressure    Chronic systolic heart failure (HCC)     2D echocardiogram (3/24/2021) showed EF 37%; global hypokinesis of left ventricle; grade I diastolic dysfunction; 2D echocardiogram (12/17/2015) showed EF 35%; generalized hypokinesis more focally severe of the inferior and posterior segments; mild mitral regurgitation; trace tricuspud regurgitation; normal pulmonary artery pressure    Coronary artery disease involving native coronary artery of native heart     COVID-19 ruled out by laboratory testing 3/22/2021    SARS-CoV-2 ([employer] [facility]2000, [facility]) (3/22/2021):  Not detected     Critical ischemia of lower extremity ([facility] 3/22/2021    Right    Factor V Leiden mutation ([facility]     Grade I diastolic dysfunction 78/78/7967    2D echocardiogram (3/24/2021) showed EF 37%; global hypokinesis of left ventricle; grade I diastolic dysfunction    History of deep venous thrombosis (DVT) of distal vein of right lower extremity     History of epilepsy     History of head injury     History of myocardial infarction     History of noncompliance with medical treatment 3/18/2021    Hypertensive heart disease with chronic systolic congestive heart failure ([facility]     2D echocardiogram (3/24/2021) showed EF 37%; global hypokinesis of left ventricle; grade I diastolic dysfunction; 2D echocardiogram (12/17/2015) showed EF 35%; genealized hypokinesis more focally severe of the inferior and posterior segments; mild mitral regurgitation; trace tricuspud regurgitation; normal pulmonary artery pressure    Infection of right below knee amputation ([facility] 4/7/2021    S/P Washout of right below-knee amputation; Right knee disarticulation (4/7/2021  Dr. Hugh Neri); S/P Exploration of right knee disarticulation and control of hemorrhage (4/8/2021  Dr. Hugh Neri)   Ashley.Lob Long term current use of aspirin     Major depressive disorder 03/24/2021    On Duloxetine    Migraine headache     Mitral valve prolapse     Mixed hyperlipidemia     Lipid profile (7/25/2018) showed , , HDL 27,     Obstructive sleep apnea     On statin therapy due to risk of future cardiovascular event     On Atorvastatin    Peripheral artery disease ([facility]     Type 2 diabetes mellitus, without long-term current use of insulin ([facility])     HbA1c (4/8/2021) = 9.8; HbA1c (3/6/2021) = 26.0    Umbilical hernia     Wears glasses       Patient taking anticoagulants yes    Patient has a defibrillator: no     Assessment:   Changes in Assessment throughout shift: no acute changes noted throughout the shift     Patient has central line: no Reasons if yes: n/a  Insertion date:n/a Last dressing date:n/a   Patient has Ellis Cath: no Reasons if yes: n/a   Insertion date:n/a     Last Vitals:     Vitals:    04/27/21 0815 04/27/21 1037 04/27/21 1554 04/27/21 2111   BP: 99/72  118/71 133/83   Pulse: 95  88 92   Resp: 17  16 18   Temp: 99.2 °F (37.3 °C)  98.2 °F (36.8 °C) 98.7 °F (37.1 °C)   SpO2: 95%  97% 99%   Weight:  79 kg (174 lb 1.6 oz)     Height:            PAIN    Pain Assessment    Pain Intensity 1: 0 (04/28/21 0400) Pain Intensity 1: 2 (12/29/14 1105)    Pain Location 1: Leg Pain Location 1: Abdomen    Pain Intervention(s) 1: Medication (see MAR) Pain Intervention(s) 1: Medication (see MAR)  Patient Stated Pain Goal: 0 Patient Stated Pain Goal: 0  o Intervention effective: yes    o Other actions taken for pain: repositioning      Skin Assessment  Skin color Skin Color: Appropriate for ethnicity  Condition/Temperature Skin Condition/Temp: Dry, Warm  Integrity Skin Integrity: Incision (comment)  Turgor Turgor: Non-tenting  Weekly Pressure Ulcer Documentation  Pressure  Injury Documentation: No Pressure Injury Noted-Pressure Ulcer Prevention Initiated  Wound Prevention & Protection Methods  Orientation of wound Orientation of Wound Prevention: Posterior  Location of Prevention Location of Wound Prevention: Buttocks, Sacrum/Coccyx  Dressing Present Dressing Present : No  Dressing Status Dressing Status: Intact  Wound Offloading Wound Offloading (Prevention Methods): Bed, pressure redistribution/air     INTAKE/OUPUT  Date 04/27/21 0700 - 04/28/21 0659 04/28/21 0700 - 04/29/21 0659   Shift 0700-1859 1900-0659 24 Hour Total 0700-1859 1900-0659 24 Hour Total   INTAKE   Shift Total(mL/kg)         OUTPUT   Urine(mL/kg/hr)           Urine Occurrence(s) 3 x  3 x      Stool           Stool Occurrence(s) 0 x  0 x      Shift Total(mL/kg)         NET         Weight (kg) 79 79 79 79 79 79       Recommendations:  1. Patient needs and requests: education     2. Diet: Cardiac consistent carb diet with thin liquids    3. Pending tests/procedures: AM labs     4. Functional Level/Equipment: 1 person assist     5. Estimated Discharge Date: TBD Posted on Whiteboard in Patients Room: [facility] Safety Check    A safety check occurred in the patient's room between off going nurse and oncoming nurse listed above. The safety check included the below items  Area Items   H  High Alert Medications - Verify all high alert medication drips (heparin, PCA, etc.)   E  Equipment - Suction is set up for ALL patients (with yanker)  - Red plugs utilized for all equipment (IV pumps, etc.)  - WOWs wiped down at end of shift.  - Room stocked with oxygen, suction, and other unit-specific supplies   A  Alarms - Bed alarm is set for fall risk patients  - Ensure chair alarm is in place and activated if patient is up in a chair   L  Lines - Check IV for any infiltration  - Ellis bag is empty if patient has a Ellis   - Tubing and IV bags are labeled   S  Safety   - Room is clean, patient is clean, and equipment is clean. - Hallways are clear from equipment besides carts.    - Fall bracelet on for fall risk patients  - Ensure room is clear and free of clutter  - Suction is set up for ALL patients (with faraz)  - Hallways are clear from equipment besides carts.    - Isolation precautions followed, supplies available outside room, sign posted         Devendra Lindsay RN

## 2021-04-28 NOTE — PROGRESS NOTES
Problem: Self Care Deficits Care Plan (Adult)  Goal: *Acute Goals and Plan of Care (Insert Text)  Description: Occupational Therapy Goals   Long Term Goals  Initiated 4/16/2021 and to be accomplished within 2 week(s)  1. Pt will perform self-feeding with independence. 2. Pt will perform grooming with mod I in standing at the sink. 3. Pt will perform UB bathing with mod I with set up of necessary items. ( 4/22/2021)  4. Pt will perform LB bathing with mod I with set up of necessary items. ( 4/22/2021)  5. Pt will perform tub/shower transfer with mod I.   6. Pt will perform UB dressing with mod I with set up of necessary items. ( 4/22/2021)  7. Pt will perform LB dressing with mod I with set up of necessary items. 8. Pt will perform toileting task with mod I.  9. Pt will perform toilet transfer with mod I with RW. Short Term Goals   Initiated 4/16/2021 and to be accomplished within 7 day(s) (4/23/2021). Re-assessed 4/23/2021. 1. Pt will perform self-feeding with mod I and no set-up A. ( 4/23/2021)  2. Pt will perform grooming with mod I. ( 4/22/2021)  3. Pt will perform UB bathing with mod I. ( 4/22/2021)  4. Pt will perform LB bathing with mod I. ( 4/22/2021)  5. Pt will perform tub/shower transfer with mod I. - At supervision - can transfer from RW into shower; t/s T with S for practice  6. Pt will perform UB dressing with mod I. ( 4/22/2021)  7. Pt will perform LB dressing with mod I.  - At supervision; can progress to Mod I   8. Pt will perform toileting task with mod I. - At supervision; progress to mod I  9. Pt will perform toilet transfer with mod I with w/c as needed. - At supervision; progress to mod I with RW as appropriate.         4/28/2021 1533 by Sandstone Boom, OT  Outcome: Progressing Towards Goal  4/28/2021 1350 by Sandstone Boom, OT  Outcome: Progressing Towards Goal   OCCUPATIONAL THERAPY DISCHARGE    Patient: Can Bosch (6 y.o. male)  Date: 4/28/2021    First Tx Session  Time In: 1110  Time Out[de-identified] 5563    Second Tx Session  Time In: 4555  Time Out[de-identified] 9780    Primary Diagnosis: Status post above-knee amputation of right lower extremity (HCC) [Z89.611] Status post above-knee amputation of right lower extremity (HCC)    Precautions:   Fall(right AKA)    Barriers to Learning/Limitations: None  Compensate with: visual, verbal, tactile, kinesthetic cues/model     Patient identified with name and :savannah    SUBJECTIVE:   Patient stated I used to kneel over the tub and cut my hair but now I can't do that anymore.     OBJECTIVE DATA SUMMARY:     Past Medical History:   Diagnosis Date    Acute blood loss as cause of postoperative anemia 3/22/2021    Anticoagulated by anticoagulation treatment     On Rivaroxaban    Cardiomyopathy ([facility] 2015    2D echocardiogram (3/24/2021) showed EF 37%; global hypokinesis of left ventricle; grade I diastolic dysfunction; 2D echocardiogram (2015) showed EF 35%; generalized hypokinesis more focally severe of the inferior and posterior segments; mild mitral regurgitation; trace tricuspud regurgitation; normal pulmonary artery pressure    Chronic systolic heart failure (HCC)     2D echocardiogram (3/24/2021) showed EF 37%; global hypokinesis of left ventricle; grade I diastolic dysfunction; 2D echocardiogram (2015) showed EF 35%; generalized hypokinesis more focally severe of the inferior and posterior segments; mild mitral regurgitation; trace tricuspud regurgitation; normal pulmonary artery pressure    Coronary artery disease involving native coronary artery of native heart     COVID-19 ruled out by laboratory testing 3/22/2021    SARS-CoV-2 ([facility]2000, [facility]) (3/22/2021):  Not detected     Critical ischemia of lower extremity ([facility] 3/22/2021    Right    Factor V Leiden mutation ([facility]     Grade I diastolic dysfunction     2D echocardiogram (3/24/2021) showed EF 37%; global hypokinesis of left ventricle; grade I diastolic dysfunction    History of deep venous thrombosis (DVT) of distal vein of right lower extremity     History of epilepsy     History of head injury     History of myocardial infarction     History of noncompliance with medical treatment 3/18/2021    Hypertensive heart disease with chronic systolic congestive heart failure ([facility]     2D echocardiogram (3/24/2021) showed EF 37%; global hypokinesis of left ventricle; grade I diastolic dysfunction; 2D echocardiogram (12/17/2015) showed EF 35%; genealized hypokinesis more focally severe of the inferior and posterior segments; mild mitral regurgitation; trace tricuspud regurgitation; normal pulmonary artery pressure    Infection of right below knee amputation ([facility] 4/7/2021    S/P Washout of right below-knee amputation; Right knee disarticulation (4/7/2021 - Dr. Ronan Flores); S/P Exploration of right knee disarticulation and control of hemorrhage (4/8/2021 - Dr. Ronan Flores)    Long term current use of aspirin     Major depressive disorder 03/24/2021    On Duloxetine    Migraine headache     Mitral valve prolapse     Mixed hyperlipidemia     Lipid profile (7/25/2018) showed , , HDL 27,     Obstructive sleep apnea     On statin therapy due to risk of future cardiovascular event     On Atorvastatin    Peripheral artery disease ([facility]     Type 2 diabetes mellitus, without long-term current use of insulin ([facility])     HbA1c (4/8/2021) = 9.8; HbA1c (4/0/8591) = 43.8    Umbilical hernia     Wears glasses      Past Surgical History:   Procedure Laterality Date    HX ABOVE KNEE AMPUTATION Right 04/11/2021    S/P Right above-the-knee amputation (4/11/2021 - Dr. Nelli Iqbal)    HX BELOW KNEE AMPUTATION Right 03/22/2021    S/P Right below-the-knee amputation (3/22/2021 - Dr. Brooklyn Campos)    HX EMBOLECTOMY Right 03/05/2021    S/P Right lower extremity arterial embolectomy (3/5/2021 - Dr. Brooklyn Campos)    HX IMPLANTABLE CARDIOVERTER DEFIBRILLATOR  2013 HX OTHER SURGICAL Right 04/08/2021    S/P Exploration of right knee disarticulation and control of hemorrhage (4/8/2021 - Dr. Prudence Vázquez)    HX OTHER SURGICAL Right 04/08/2021    S/P Washout of right below-knee amputation; Right knee disarticulation (4/7/2021 - Dr. Prudence Vázquez)     Prior Level of Function/Home Situation:   Home Situation  Home Environment: Private residence  # Steps to Enter: 0  Wheelchair Ramp: Yes  One/Two Story Residence: One story  Living Alone: Yes  Support Systems: Friends \ neighbors  Patient Expects to be Discharged to[de-identified] Private residence  Current DME Used/Available at Home: Glucometer  []     Right hand dominant   []     Left hand dominant    Therapeutic Exercise:  Pt performed BUE strengthening at [facility] for 15 continuous minutes to increase pt BUE strength  And activity tolerance. Pt performed engagement in dynamic standing activity with 2# wrist weights for bean bag toss to challenge   Pt standing balance at [facility]. Pain:  Pt reports 0/10 pain or discomfort prior to treatment. Pt reports 0/10 pain or discomfort post treatment.    Problem List:    Decreased strength B UE  []     Decreased strength trunk/core  []     Decreased AROM   []     Decreased PROM  []     Decreased balance sitting  []     Decreased balance standing  []     Decreased endurance  []     Pain  []       Functional Limitations:   Decreased independence with ADL  []     Decreased independence with functional transfers  []     Decreased independence with ambulation  []     Decreased independence with IADL  []       Outcome Measures:      MMT Initial Assessment   Right Upper Extremity  Left Upper Extremity    UE AROM [facility] [place]   Shoulder flexion     Shoulder extension     Shoulder ABDuction     Shoulder ADDUction     Elbow Flexion     Elbow Extension     Wrist Extension/Flexion                   MMT Discharge Assessment   Right Upper Extremity  Left Upper Extremity    UE [facility]   Shoulder flexion 4-/5 4-/5   Shoulder extension 4-/5 4-/5   Shoulder ABDuction     Shoulder ADDUction     Elbow Flexion 4/5 4/5   Elbow Extension 4/5 4/5   Wrist Extension/Flexion              0/5 No palpable muscle contraction  1/5 Palpable muscle contraction, no joint movement  2-/5 Less than full range of motion in gravity eliminated position  2/5 Able to complete full range of motion in gravity eliminated position  2+/5 Able to initiate movement against gravity  3-/5 More than half but not full range of motion against gravity  3/5 Able to complete full range of motion against gravity  3+/5 Completes full range of motion against gravity with minimal resistance  4-/5 Completes full range of motion against gravity with minimal resistance  4/5 Completes full range of motion against gravity with moderate resistance  5/5 Completes full range of motion against gravity with maximum resistance    Coordination: Intact  Sensation: Intact    FIM SCORES Initial Assessment Discharge Assessment   Eating 6     Grooming 6 Grooming  Grooming Assistance : 5 (Supervision)  Comments: Pt reported concern for technique for cutting hair due to used to cut hair in kneeling position over bathtub. Pt cut hair in seated position from w/c with sheet spread over wheelchair and floor to prevent hair from getting in/on w/c and towel draped over pt lap. Pt completed grooming cutting hair and beard with setup. Oral Hygiene FIM: 7    Bathing 5    6   Upper Body Dressing 6  6   Lower Body Dressing 5      Sock and/or Shoe Management FIM: 7   Toileting 5  6   Tub/Shower Transfer 5     Toilet Transfer 5     Comprehension 6 Score: 6   Expression 6 Score: 6   Social Interaction 6 Score: 6   Problem Solving 6 Score: 6   Memory 6 Score: 6   Please see [facility] Interdisciplinary Eval: Coordination/Balance Section for details regarding FIM score description.     Activity Tolerance:   WFL    ASSESSMENT:  Pt cut hair at sink following set up of necessary towels and sheets to minimize mess and loose hair in w/c. Pt now demonstrates increased independence with toileting, dressing and bathing at Tonio level. Pt consistently performs all self cares with Tonio and has met all STG/LTG's. Pt demonstrates improved safety awareness for ADL/IADL's and functional mobility to reduce risk for falls. Progression toward goals:  [x]      Improving appropriately and progressing toward goals  []      Improving slowly and progressing toward goals  []      Not making progress toward goals and plan of care will be adjusted     PLAN:  Pt would benefit from continued skilled occupational therapy in order to improve ADL function and IADL with use of least restrictive device. Interventions may include range of motion (AROM, PROM B UE/trunk), motor function (B UE/trunk strengthening/coordination), activity tolerance (vitals, oxygen saturation levels), ADL, balance activities, IADL, and functional transfer training. Discharge Recommendations: Home Health  Further Equipment Recommendations for Discharge: N/A       Please refer to the flow sheet for vital signs taken during this treatment. After treatment:   [x]  Patient left in no apparent distress sitting up in chair  []  Patient left in no apparent distress in bed  []  Call bell left within reach  []  Nursing notified  []  Caregiver present  []  Bed alarm activated    COMMUNICATION/EDUCATION:   Communication/Collaboration:  [x]      Home safety education was provided and the patient/caregiver indicated understanding. [x]      Patient/family have participated as able and agree with findings and recommendations. []      Patient is unable to participate in plan of care at this time.     Sandrita Mims OT  4/28/2021

## 2021-04-28 NOTE — PROGRESS NOTES
Nutrition Note      Pt reported good appetite, variable meal intake, eating % of meals. Tolerating diet. Consuming Glucerna Shakes, good intake. BM 4/28. Noted plan for discharge tomorrow.  Nutrition goals are being met      Nutrition Recommendations/Plan:   - Continue current nutrition interventions      Electronically signed by Nikole Guo RD on 4/28/2021 at 5:50 PM    Contact: [phone]

## 2021-04-28 NOTE — DISCHARGE INSTRUCTIONS
------------------------------------------------------------------------------------------------------------    DISCHARGE INSTRUCTIONS    1. Make sure that when you request refills at the pharmacy that the refill requests are sent to your PCP (NOT to the prescriber at the [facility]) to avoid any delays in getting your medication refills. The physician at the [facility] for 2021 Femi Reyes will not able to order medications or refills after discharge -- Please understand that though we would like to help, it is simply not safe for our physician to order you a medication that we cannot monitor. 2. If any of the prescribed medications require a PRIOR AUTHORIZATION, contact your Primary Care Physician or specialist to EITHER complete prior authorizations and paperwork on your behalf OR prescribe an alternative medication. -- Please understand that though we would like to help, it is simply not safe for our physician to order you a medication that we cannot monitor. 3. If any of your prescriptions medications PRIOR TO ADMISSION TO [facility] [place] [address] needs a refill (and either the dosage, frequency or instructions was not changed), kindly contact your Primary Care Physician for refills.     -------------------------------------------------------------------------------------------------------------------

## 2021-04-28 NOTE — PROGRESS NOTES
[address]  [address] [place] [address], [address]     INPATIENT REHABILITATION  DAILY PROGRESS NOTE     Date: 4/28/2021    Name: Colleen Moore Age / Sex: 60 y.o. / male   CSN: [id number] MRN: [id number]   [facility] Date: 4/15/2021 Length of Stay: 13 days     Primary Rehab Diagnosis: Impaired Mobility and ADLs secondary to:  1. S/P Right above-the-knee amputation (4/11/2021  Dr. Tasia Guzman)  2. S/P Exploration of right knee disarticulation and control of hemorrhage (4/8/2021  Dr. Linh Garcia)  3. S/P Washout of right below-knee amputation; Right knee disarticulation (4/7/2021  Dr. Linh Garcia)  4. History of infection and ischemia of right BKA stump (4/7/2021)  5. S/P Right below-the-knee amputation (3/22/2021 - Dr. Alli Mark)  6. History of critical ischemia of the right lower extremity  7. History of right lower extremity arterial embolectomy (3/5/2021 - Dr. Alli Mark)      Subjective:     Patient seen and examined. Blood pressure controlled. Blood glucose controlled. Patient's Complaint:   No significant medical complaints    Pain Control: ongoing significant pain in which is stable and controlled by current meds      Objective:     Vital Signs:  Patient Vitals for the past 24 hrs:   BP Temp Pulse Resp SpO2   04/28/21 0754 109/70 98.1 °F (36.7 °C) 91 18 97 %   04/27/21 2111 133/83 98.7 °F (37.1 °C) 92 18 99 %   04/27/21 1554 118/71 98.2 °F (36.8 °C) 88 16 97 %        Physical Examination:  GENERAL SURVEY: Patient is awake, alert, oriented x 3, laying comfortably on the bed, not in acute respiratory distress.   HEENT: pale palpebral conjunctivae, anicteric sclerae, no nasoaural discharge, moist oral mucosa  NECK: supple, no jugular venous distention, no palpable lymph nodes  CHEST/LUNGS: symmetrical chest expansion, good air entry, clear breath sounds  HEART: adynamic precordium, good S1 S2, no S3, regular rhythm, no murmurs  ABDOMEN: flat, bowel sounds appreciated, soft, non-tender  EXTREMITIES: (+) right AKA stump, (+) absence of hair on LLE, (+) healing wound with scab on lateral surface of distal third of left leg with surrounding hyperpigmentation, (+) LLE cool to touch, pale nailbeds, no edema, full and equal pulses, no calf tenderness   NEUROLOGICAL EXAM: The patient is awake, alert and oriented x3, able to answer questions fairly appropriately, able to follow 1 and 2 step commands. Able to tell time from the wall clock. Cranial nerves II-XII are grossly intact. No gross sensory deficit.   Motor strength is 4+/5 on BUE, 4/5 on the RLE, 4 to 4+/5 on the LLE.     Incision(s): covered, clean, dry, and intact       Current Medications:  Current Facility-Administered Medications   Medication Dose Route Frequency    sertraline (ZOLOFT) tablet 25 mg  25 mg Oral DAILY    senna-docusate (PERICOLACE) 8.6-50 mg per tablet 2 Tab  2 Tab Oral PCD    gabapentin (NEURONTIN) capsule 100 mg  100 mg Oral BID    gabapentin (NEURONTIN) capsule 200 mg  200 mg Oral QHS    metFORMIN (GLUCOPHAGE) tablet 850 mg  850 mg Oral BID WITH MEALS    methocarbamoL (ROBAXIN) tablet 500 mg  500 mg Oral TID    0.9% sodium chloride infusion 250 mL  250 mL IntraVENous PRN    diphenhydrAMINE (BENADRYL) capsule 25 mg  25 mg Oral Q6H PRN    ferrous gluconate 324 mg (37.5 mg iron) tablet 1 Tab  324 mg Oral DAILY WITH BREAKFAST    ascorbic acid (vitamin C) (VITAMIN C) tablet 250 mg  250 mg Oral DAILY WITH BREAKFAST    acetaminophen (TYLENOL) tablet 650 mg  650 mg Oral Q4H PRN    bisacodyL (DULCOLAX) tablet 10 mg  10 mg Oral Q48H PRN    insulin lispro (HUMALOG) injection   SubCUTAneous TIDAC    aspirin chewable tablet 81 mg  81 mg Oral DAILY WITH BREAKFAST    atorvastatin (LIPITOR) tablet 80 mg  80 mg Oral DAILY    docusate sodium (COLACE) capsule 100 mg  100 mg Oral DAILY AFTER BREAKFAST    ezetimibe (ZETIA) tablet 10 mg  10 mg Oral DAILY    acetaminophen (TYLENOL) tablet 650 mg 650 mg Oral TID    oxyCODONE IR (ROXICODONE) tablet 5-10 mg  5-10 mg Oral Q4H PRN    rivaroxaban (XARELTO) tablet 20 mg  20 mg Oral DAILY    melatonin (rapid dissolve) tablet 5 mg  5 mg Oral QHS    zolpidem (AMBIEN) tablet 5 mg  5 mg Oral QHS PRN       Allergies:   Allergies   Allergen Reactions    Nitroglycerin Other (comments)      BP drops rapidly when he takes SL Nitro          Rueter Diarrhea and Rash    Cat Dander Cough    Codeine Rash    Strawberry Rash       Functional Progress:    PHYSICAL THERAPY    ON ADMISSION MOST RECENT   Wheelchair Mobility/Management  Able to Propel (ft): (to be further assessed)  Functional Level: (to be further assessed)  Curbs/Ramps Assist Required (FIM Score): 0 (Not tested)  Wheelchair Setup Assist Required : 0 (Not tested)  Wheelchair Management: (NT) Wheelchair Mobility/Management  Able to Propel (ft): 56 feet  Functional Level: 6  Curbs/Ramps Assist Required (FIM Score): 5 (Supervision)  Wheelchair Setup Assist Required : 4 (Minimal assistance)  Wheelchair Management: Manages left brake, Manages right brake     Gait  Amount of Assistance: 4 (Contact guard assistance)  Distance (ft): 15 Feet (ft)  Assistive Device: Walker, rolling Gait  Amount of Assistance: 4 (Contact guard assistance)  Distance (ft): 75 Feet (ft)(x2 trials)  Assistive Device: Walker, rolling, Gait belt     Balance-Sitting/Standing  Sitting - Static: Good (unsupported)  Sitting - Dynamic: Good (unsupported)  Standing - Static: Good  Standing - Dynamic : Impaired Balance-Sitting/Standing  Sitting - Static: Good (unsupported)  Sitting - Dynamic: Good (unsupported)  Standing - Static: Fair(with RW)  Standing - Dynamic : Impaired     Bed/Mat Mobility  Rolling Right : 5 (Stand-by assistance)  Rolling Left : 5 (Stand-by assistance)  Supine to Sit : 5 (Stand-by assistance)  Sit to Supine : 5 (Supervision) Bed/Mat Mobility  Rolling Right : 6 (Modified independent)  Rolling Left : 6 (Modified independent)  Supine to Sit : 6 (Modified independent)  Sit to Supine : 6 (Modified independent)     Transfers  Transfer Type: Lateral pivot  Other: stand step/hop with RW  Transfer Assistance : 4 (Minimal assistance)(steadying support for safety)  Sit to Stand Assistance: Minimal assistance(steadying support needing cues for safety)  Car Transfers: Not tested  Car Type: N/A Transfers  Transfer Type: Other  Other: squat pivot  Transfer Assistance : 5 (Stand-by assistance)  Sit to Stand Assistance: Stand-by assistance  Car Transfers: Supervision  Car Type: car txfr simulator     Steps or Stairs  Steps/Stairs Ambulated (#): 0  Level of Assist : 0 (Not tested)  Rail Use: (NT) Steps or Stairs  Steps/Stairs Ambulated (#): 3(4\" steps)  Level of Assist : 4 (Minimal assistance)  Rail Use: Both         Lab/Data Review:  Recent Results (from the past 24 hour(s))   GLUCOSE, POC    Collection Time: 04/27/21  4:39 PM   Result Value Ref Range    Glucose (POC) 144 (H) 70 - 110 mg/dL   GLUCOSE, POC    Collection Time: 04/27/21  9:08 PM   Result Value Ref Range    Glucose (POC) 124 (H) 70 - 110 mg/dL   GLUCOSE, POC    Collection Time: 04/28/21  7:51 AM   Result Value Ref Range    Glucose (POC) 114 (H) 70 - 110 mg/dL   GLUCOSE, POC    Collection Time: 04/28/21 11:32 AM   Result Value Ref Range    Glucose (POC) 134 (H) 70 - 110 mg/dL       Assessment:     Primary Rehab Diagnosis  1. Impaired Mobility and ADLs  2. S/P Right above-the-knee amputation (4/11/2021  Dr. Sera Barton)  3. S/P Exploration of right knee disarticulation and control of hemorrhage (4/8/2021  Dr. Shannan Macdonald)  4. S/P Washout of right below-knee amputation; Right knee disarticulation (4/7/2021  Dr. Shannan Macdonald)  5. History of infection and ischemia of right BKA stump (4/7/2021)  6. S/P Right below-the-knee amputation (3/22/2021 - Dr. Ruthe Phalen)  7. History of critical ischemia of the right lower extremity  8.  History of right lower extremity arterial embolectomy (3/5/2021 - Dr. Alli Mark)    Comorbidities  Patient Active Problem List   Diagnosis Code    History of right below knee amputation ([facility] Z89.511    Impaired mobility and ADLs Z74.09, Z78.9    Critical ischemia of lower extremity ([facility] I70.229    Peripheral artery disease (HCC) I73.9    Coronary artery disease involving native coronary artery of native heart I25.10    Type 2 diabetes mellitus, without long-term current use of insulin (HCC) E11.9    History of epilepsy Z86.69    Factor V Leiden mutation ([facility] D68.51    Migraine headache G43.909    Wears glasses H45.4    Umbilical hernia W52.4    Obstructive sleep apnea G47.33    ICD (implantable cardioverter-defibrillator) in place Z95.810    Mitral valve prolapse I34.1    History of head injury Z87.828    Hypertensive heart disease with chronic systolic congestive heart failure (HCC) I11.0, I50.22    History of myocardial infarction I25.2    Long term current use of aspirin Z79.82    On statin therapy due to risk of future cardiovascular event Z79.899    History of deep venous thrombosis (DVT) of distal vein of right lower extremity Z86.718    Anticoagulated by anticoagulation treatment Z79.01    Mixed hyperlipidemia E78.2    History of embolectomy Z98.890    COVID-19 ruled out by laboratory testing Z20.822    Acute blood loss as cause of postoperative anemia D62    Cardiomyopathy ([facility] I42.9    Chronic systolic heart failure (HCC) I50.22    History of noncompliance with medical treatment Z91.19    Major depressive disorder F32.9    Grade I diastolic dysfunction L14.5    Constipation K59.00    Ischemia of right BKA site (HCC) T87.89, I99.8    Infection of right below knee amputation (HCC) T87.43    Status post above-knee amputation of right lower extremity ([facility] Z89.611        Plan:     1. Justification for continued stay: Good progression towards established rehabilitation goals.     2. Medical Issues being followed closely:    [x]  Fall and safety precautions     [x]  Wound Care     [x]  Bowel and Bladder Function     [x]  Fluid Electrolyte and Nutrition Balance     [x]  Pain Control      3. Issues that 24 hour rehabilitation nursing is following:    [x]  Fall and safety precautions     [x]  Wound Care     [x]  Bowel and Bladder Function     [x]  Fluid Electrolyte and Nutrition Balance     [x]  Pain Control      [x]  Assistance with and education on in-room safety with transfers to and from the bed, wheelchair, toilet and shower. 4. Acute rehabilitation plan of care:    [x]  Continue current care and rehab. [x]  Physical Therapy           [x]  Occupational Therapy           []  Speech Therapy     []  Hold Rehab until further notice     5. Medications:    [x]  MAR Reviewed     [x]  Continue Present Medications     6. DVT Prophylaxis:      []  Enoxaparin     []  Unfractionated Heparin     []  Warfarin     [x]  NOAC     []  NEREYDA Stockings     []  Sequential Compression Device     []  None     7. Code status    [x]  Full code     []  Partial code     []  Do not intubate     []  Do not resuscitate     8. Orders:   > S/P Right above-the-knee amputation (4/11/2021  Dr. Mayda Will); S/P Exploration of right knee disarticulation and control of hemorrhage (4/8/2021  Dr. Summer Pettit); S/P Washout of right below-knee amputation; Right knee disarticulation (4/7/2021  Dr. Summer Pettit); History of infection and ischemia of right BKA stump (4/7/2021); S/P Right below-the-knee amputation (3/22/2021 - Dr. Verenice Kern); History of critical ischemia of the right lower extremity;  History of right lower extremity arterial embolectomy (3/5/2021 - Dr. Verenice Kern)   > Staples to be removed on 5/19/2021    > Acute Postoperative Blood Loss Anemia   > Hgb/Hct (3/26/2021) = 10.1/31.3   > Hgb/Hct (3/27/2021, on admission to ARU) = 10.9/33.9   > Anemia work-up showed serum iron 23, TIBC 200, iron % saturation 12, ferritin 835   > Hgb/Hct (3/29/2021) = 10.7/33.9       04/14/21  0423 04/13/21  0640 04/12/21  0539 04/11/21  1027 04/11/21  0650 04/10/21  1915 04/10/21  0640 04/09/21  0453 04/08/21  1602 04/08/21  0940 04/08/21  0210 04/07/21  0905 04/06/21  0850   HGB 7.2* 7.3* 7.3* 7.6* 7.9* 7.7* 6.3* 7.4* 6.5* 7.0* 7.8* 9.5* 10.8*   HCT 23.1* 23.2* 23.0*  --  24.4* 23.8* 20.0* 22.7* 21.5* 22.7* 25.4* 30.4* 34.2*      > Hgb/Hct (4/16/2021, on admission) = 6.6/21.1   > Anemia work-up (4/16/2021) showed serum iron 22, TIBC 202, iron % saturation 11, ferritin 545, reticulocyte count 5.1   > On 4/16/2021, patient was transfused 1 unit pRBC properly typed and crossmatched   > Hgb/Hct (4/17/2021) = 9.5/29.8    > On 4/17/2021, started:    > Ferrous gluconate 324 mg PO once daily with breakfast     > Ascorbic Acid 250 mg PO once daily with breakfast (to enhance the absorption of the FeSO4)    > Hgb/Hct (4/19/2021) = 8.9/28.9    > Hgb/Hct (4/22/2021) = 8.9/28.5    > Hgb/Hct (4/26/2021) = 9.9/32.7    > Continue:    > Ferrous gluconate 324 mg PO once daily with breakfast     > Ascorbic Acid 250 mg PO once daily with breakfast (to enhance the absorption of the FeSO4)     > Coronary artery disease; Peripheral artery disease    > On 3/27/2021, decreased Carvedilol from 6.25 mg to 3.125 mg PO BID with meals (8AM, 5PM)   > On 4/27/2021, held Carvedilol 3.125 mg PO BID with meals (8AM, 5PM)   > Continue:    > Aspirin 81 mg PO once daily with breakfast    > Atorvastatin 80 mg PO once daily    > Hypertensive heart disease with chronic systolic heart failure; Cardiomyopathy; Chronic systolic heart failure; Grade I diastolic dysfunction   > 2D echocardiogram (12/17/2015) showed EF 35%; genealized hypokinesis more focally severe of the inferior and posterior segments; mild mitral regurgitation; trace tricuspud regurgitation; normal pulmonary artery pressure   > 2D echocardiogram (3/24/2021) showed EF 37%; global hypokinesis of left ventricle; grade I diastolic dysfunction   > On 3/27/2021, decreased Carvedilol from 6.25 mg to 3.125 mg PO BID with meals (8AM, 5PM)   > On 3/28/2021, held:    > Furosemide 40 mg PO once daily (9AM)    > Lisinopril 5 mg PO once daily (9AM)   > On 4/27/2021, held Carvedilol 3.125 mg PO BID with meals (8AM, 5PM)    > Factor V Leiden mutation; History of deep venous thrombosis (DVT) of right lower extremity;  Anticoagulated on Rivaroxaban   > Continue Rivaroxaban 20 mg PO once daily with breakfast    > Major depressive disorder   > On 4/26/2021, discontinued Duloxetine 30 mg PO once daily   > On 4/27/2021, started Sertraline 25 mg PO once daily   > Continue Sertraline 25 mg PO once daily    > Mixed hyperlipidemia   > Lipid profile (7/25/2018) showed , , HDL 27,    > Lipid profile (3/27/2021) showed , , HDL 35, LDL 63   > Continue:    > Atorvastatin 80 mg PO once daily    > Ezetimibe 10 mg PO q HS    > Type 2 diabetes mellitus, poorly controlled, without long-term current use of insulin   > HbA1c (3/6/2021) = 12.9    > HbA1c (4/8/2021) = 9.8   > On admission to the ARU:    > Started Metformin 500 mg PO BID with meals    > Decreased Insulin glargine from 8 units to 5 units SC q HS   > On 4/20/2021:    > Started Alogliptin 25 mg PO once daily    > Discontinued Insulin glargine 5 units SC q HS   > On 4/21/2021:    > Discontinued Alogliptin 25 mg PO once daily    > Increased Metformin from 500 mg to 850 mg PO BID with meals   > Continue:    > Metformin 850 mg PO BID with meals    > Insulin lispro sliding scale SC TID AC only     > Difficulty sleeping   > Continue:     > Melatonin 5 mg PO q HS    > Zolpidem 5 mg PO q HS PRN for sleep    > Wound on lateral surface of distal third of left leg   > Wound Care Nurse consult for wound care recommendations    > Constipation   > On 4/26/2021, started PeriColace 2 tabs PO once daily after dinner   > Continue:    > Docusate sodium 100 mg PO once daily after breakfast    > PeriColace 2 tabs PO once daily after dinner    > COVID-19 ruled out by laboratory testing   > SARS-CoV-2 ([facility], [facility]) (3/22/2021): Not detected   > SARS-CoV-2 ([facility], [facility]) (collected 4/7/2021, resulted 4/8/2021): Not detected    > COVID-19 rapid test ([facility], [facility]) (4/7/2021): Not detected   > COVID-19 rapid test ([facility], [facility]) (4/14/2021): Not detected   > SARS-CoV-2 ([facility], [facility]) (collected 4/15//2021, resulted 4/16/2021): Not detected    > Analgesia   > On 4/23/2021, increased Gabapentin from 100 mg PO TID to:    > Gabapentin 100 mg PO BID (8AM, 2PM)    > Gabapentin 200 mg PO q HS (8PM)   > On 4/27/2021, discontinued Duloxetine 30 mg PO once daily   > Continue:    > Acetaminophen 650 mg PO TID (8AM, 12PM, 4PM)    > Acetaminophen 650 mg PO q 4 hr PRN for pain level 4/10 or lesser (from 8PM to 4AM only)    > Gabapentin 100 mg PO BID (8AM, 2PM)    > Gabapentin 200 mg PO q HS (8PM)    > Methocarbamol 500 mg PO TID    > Oxycodone 5-10 mg PO q 4 hr PRN for pain level 5/10 or greater     > Diet:   > Specifications: Cardiac, diabetic, consistent carb 1800 kcal   > Solids (consistency): Regular    > Liquids (consistency): Thin    > Fluid restriction: None      9. Personal Protective Equipment was used while interacting with patient including: goggles and KN95 face mask. Patient was using a surgical mask. 10. Patient's progress in rehabilitation and medical issues discussed with the patient. All questions answered to the best of my ability. Care plan discussed with patient and nurse. 11. Total clinical care time is 30 minutes, including review of chart including all labs, radiology, past medical history, and discussion with patient. Greater than 50% of my time was spent in coordination of care and counseling.     11. Discharge Planning:  Discharge date  4/29/2021 (Thursday)   Discharge location  [x] Home     [] [address]    [] Other:    Follow-up services  [] Outpatient      [x] Home Health       [x] Physical Therapy              [x] Occupational Therapy       [] Speech Therapy                [] Medical Social Worker    [] Aide   [x] Skilled Nursing           [x] Medication reconciliation        [x] Disease education        [x] Removal of staples on 5/19/2021 if not yet taken out by Vascular Surgery        [] Routine PICC line care        [] IV antibiotic administration            Antibiotic:             Stop date:        [] Tube feeding        [] Indwelling nguyen catheter care        [] Wound Care/Dressing             Instructions:       [] Other:      Follow-up appointments  1. PCP (TERRANCE Leggett)   2. Vascular Surgery (Dr. Gabbie Hearn)   3. Cardiology (Dr. Sue Gallego)   4.  Psychiatry (Dr. Seth Samuels)        Signed:    Van Kim MD    April 28, 2021

## 2021-04-28 NOTE — PROGRESS NOTES
[x] Psychology  [] Social Work [] Recreational Therapy    INTERVENTION  UNITS/TIME OF SERVICE   Assessment    Supportive Counseling  April 27, 2021   Orientation    Discharge Planning    Resource Linkage              Progress/Current Status    Patient seen for individual support  and follow up this date on ARU and, as usual, found sitting upright in wheelchair in bedroom, in between scheduled therapies, and occupying himself with reading. He presents as generally calm and entirely composed. He is able to emphasize progress to date and that he has had conversations with prosthetist and expects various support services after discharge. Patient may also be feeling some relief since he has had greater contacts with his mother who remains in a nursing home setting. He alluded to her general confusion but at least he feels that she is receiving the care that she requires. He recognizes that he will not be able to provide same to her, for the near term, at least.  He seems to feel somewhat less guilty about this and better focused on his needs to regain independence, as able. He maintains mixed feelings about his sisters and their level of support, or lack thereof, at this time and in light of his circumstances. Patient expressed general satisfaction with his care received on ARU. He is expecting discharge to home, shortly, and expects some support forthcoming from one friend, in particular.       May Lal, [facility] 4/28/2021 8:36 AM

## 2021-04-28 NOTE — PROGRESS NOTES
[facility]    Bedside and Verbal shift change report given to UMU Morales (oncoming nurse) by Trish Jarrell (offgoing nurse). Report included the following information SBAR, Kardex, MAR and Recent Results.     Situation:   Code Status: Full Code   Reason for Admission: right AKA  Hospital Day: 13   Problem List:   Hospital Problems  Date Reviewed: 4/28/2021          Codes Class Noted POA    * (Principal) Status post above-knee amputation of right lower extremity ([facility] ICD-10-CM: R16.713  ICD-9-CM: V49.76  4/11/2021 Yes    Overview Signed 4/15/2021  5:14 PM by Roque Moscoso MD     S/P Right above-the-knee amputation (4/11/2021  Dr. Alli Velasquez)             Ischemia of right BKA site [facility]) ICD-10-CM: T87.89, I99.8  ICD-9-CM: 997.69, 459.9  4/7/2021 Yes        Infection of right below knee amputation [facility]) ICD-10-CM: T87.43  ICD-9-CM: 997.62  4/7/2021 Yes    Overview Signed 4/15/2021  5:17 PM by Roque Moscoso MD     S/P Washout of right below-knee amputation; Right knee disarticulation (4/7/2021  Dr. Anamaria Jay); S/P Exploration of right knee disarticulation and control of hemorrhage (4/8/2021  Dr. Anamaria Jay)             Peripheral artery disease ([facility] (Chronic) ICD-10-CM: I73.9  ICD-9-CM: 443.9  Unknown Yes        Type 2 diabetes mellitus, without long-term current use of insulin (HCC) (Chronic) ICD-10-CM: E11.9  ICD-9-CM: 250.00  Unknown Yes    Overview Addendum 4/15/2021  5:40 PM by Roque Moscoso MD     HbA1c (4/8/2021) = 9.8; HbA1c (3/6/2021) = 12.9             Factor V Leiden mutation ([facility] (Chronic) ICD-10-CM: R01.77  ICD-9-CM: 289.81  Unknown Yes        Hypertensive heart disease with chronic systolic congestive heart failure (HCC) (Chronic) ICD-10-CM: I11.0, I50.22  ICD-9-CM: 402.91, 428.22  Unknown Yes    Overview Signed 3/26/2021 11:06 PM by Roque Moscoso MD     2D echocardiogram (12/17/2015) showed EF 35%; genealized hypokinesis more focally severe of the inferior and posterior segments; mild mitral regurgitation; trace tricuspud regurgitation; normal pulmonary artery pressure             Anticoagulated by anticoagulation treatment (Chronic) ICD-10-CM: Z79.01  ICD-9-CM: V58.61  Unknown Yes    Overview Signed 3/26/2021  4:36 PM by Martha Hook MD     On Rivaroxaban             Mixed hyperlipidemia (Chronic) ICD-10-CM: E78.2  ICD-9-CM: 272.2  Unknown Yes    Overview Signed 3/26/2021 11:07 PM by Martha Hook MD     Lipid profile (7/25/2018) showed , , HDL 27,              Major depressive disorder ICD-10-CM: F32.9  ICD-9-CM: 296.20  3/24/2021 Yes    Overview Signed 3/26/2021 11:31 PM by Martha Hook MD     On Duloxetine             History of right below knee amputation [facility]) ICD-10-CM: Z89.511  ICD-9-CM: V49.75  3/22/2021 Yes    Overview Signed 3/26/2021  5:43 PM by Martha Hook MD     S/P Right below-the-knee amputation (3/22/2021 - Dr. Neno Hernandez)             Impaired mobility and ADLs ICD-10-CM: Z74.09, Z78.9  ICD-9-CM: V49.89  3/22/2021 Yes        Critical ischemia of lower extremity ([facility] ICD-10-CM: G56.352  ICD-9-CM: 459.9  3/22/2021 Yes    Overview Signed 3/26/2021  5:42 PM by Martha Hook MD     Right             Acute blood loss as cause of postoperative anemia ICD-10-CM: D62  ICD-9-CM: 285.1  3/22/2021 Yes        History of embolectomy ICD-10-CM: Z98.890  ICD-9-CM: V45.89  3/5/2021 Yes    Overview Signed 3/26/2021  5:39 PM by Martha Hook MD     S/P Right lower extremity arterial embolectomy                   Background:   Past Medical History:   Past Medical History:   Diagnosis Date    Acute blood loss as cause of postoperative anemia 3/22/2021    Anticoagulated by anticoagulation treatment     On Rivaroxaban    Cardiomyopathy ([address] 12/17/2015    2D echocardiogram (3/24/2021) showed EF 37%; global hypokinesis of left ventricle; grade I diastolic dysfunction; 2D echocardiogram (12/17/2015) showed EF 35%; generalized hypokinesis more focally severe of the inferior and posterior segments; mild mitral regurgitation; trace tricuspud regurgitation; normal pulmonary artery pressure    Chronic systolic heart failure (HCC)     2D echocardiogram (3/24/2021) showed EF 37%; global hypokinesis of left ventricle; grade I diastolic dysfunction; 2D echocardiogram (12/17/2015) showed EF 35%; generalized hypokinesis more focally severe of the inferior and posterior segments; mild mitral regurgitation; trace tricuspud regurgitation; normal pulmonary artery pressure    Coronary artery disease involving native coronary artery of native heart     COVID-19 ruled out by laboratory testing 3/22/2021    SARS-CoV-2 ([facility] m2000, [facility]) (3/22/2021):  Not detected     Critical ischemia of lower extremity ([facility] 3/22/2021    Right    Factor V Leiden mutation ([facility]     Grade I diastolic dysfunction 83/15/1081    2D echocardiogram (3/24/2021) showed EF 37%; global hypokinesis of left ventricle; grade I diastolic dysfunction    History of deep venous thrombosis (DVT) of distal vein of right lower extremity     History of epilepsy     History of head injury     History of myocardial infarction     History of noncompliance with medical treatment 3/18/2021    Hypertensive heart disease with chronic systolic congestive heart failure ([facility]     2D echocardiogram (3/24/2021) showed EF 37%; global hypokinesis of left ventricle; grade I diastolic dysfunction; 2D echocardiogram (12/17/2015) showed EF 35%; genealized hypokinesis more focally severe of the inferior and posterior segments; mild mitral regurgitation; trace tricuspud regurgitation; normal pulmonary artery pressure    Infection of right below knee amputation ([facility] 4/7/2021    S/P Washout of right below-knee amputation; Right knee disarticulation (4/7/2021  Dr. Raheem Salas); S/P Exploration of right knee disarticulation and control of hemorrhage (4/8/2021  Dr. Raheem Salas)    Long term current use of aspirin     Major depressive disorder 03/24/2021    On Duloxetine    Migraine headache     Mitral valve prolapse     Mixed hyperlipidemia     Lipid profile (7/25/2018) showed , , HDL 27,     Obstructive sleep apnea     On statin therapy due to risk of future cardiovascular event     On Atorvastatin    Peripheral artery disease ([facility]     Type 2 diabetes mellitus, without long-term current use of insulin ([facility])     HbA1c (4/8/2021) = 9.8; HbA1c (3/6/2021) = 83.6    Umbilical hernia     Wears glasses       Patient taking anticoagulants yes    Patient has a defibrillator: no     Assessment:   Changes in Assessment throughout shift: no acute changes noted throughout the shift     Patient has central line: no Reasons if yes: n/a  Insertion date:n/a Last dressing date:n/a   Patient has Ellis Cath: no Reasons if yes: n/a   Insertion date:n/a     Last Vitals:     Vitals:    04/27/21 1554 04/27/21 2111 04/28/21 0754 04/28/21 1557   BP: 118/71 133/83 109/70 107/69   Pulse: 88 92 91    Resp: 16 18 18 19   Temp: 98.2 °F (36.8 °C) 98.7 °F (37.1 °C) 98.1 °F (36.7 °C) 99.3 °F (37.4 °C)   SpO2: 97% 99% 97% 95%   Weight:       Height:            PAIN    Pain Assessment    Pain Intensity 1: 0 (04/28/21 0754) Pain Intensity 1: 2 (12/29/14 1105)    Pain Location 1: Leg Pain Location 1: Abdomen    Pain Intervention(s) 1: Medication (see MAR) Pain Intervention(s) 1: Medication (see MAR)  Patient Stated Pain Goal: 0 Patient Stated Pain Goal: 0  o Intervention effective: yes    o Other actions taken for pain: repositioning      Skin Assessment  Skin color Skin Color: Appropriate for ethnicity  Condition/Temperature Skin Condition/Temp: Dry, Warm  Integrity Skin Integrity: Incision (comment)  Turgor Turgor: Non-tenting  Weekly Pressure Ulcer Documentation  Pressure  Injury Documentation: No Pressure Injury Noted-Pressure Ulcer Prevention Initiated  Wound Prevention & Protection Methods  Orientation of wound Orientation of Wound Prevention: Posterior  Location of Prevention Location of Wound Prevention: Sacrum/Coccyx  Dressing Present Dressing Present : No  Dressing Status Dressing Status: Intact  Wound Offloading Wound Offloading (Prevention Methods): Bed, pressure redistribution/air     INTAKE/OUPUT  Date 04/27/21 0700 - 04/28/21 0659 04/28/21 0700 - 04/29/21 0659   Shift 0700-1859 1900-0659 24 Hour Total 0700-1859 1900-0659 24 Hour Total   INTAKE   Shift Total(mL/kg)         OUTPUT   Urine(mL/kg/hr)           Urine Occurrence(s) 3 x 5 x 8 x 3 x  3 x   Stool           Stool Occurrence(s) 0 x 1 x 1 x 1 x  1 x   Shift Total(mL/kg)         NET         Weight (kg) 79 79 79 79 79 79       Recommendations:  1. Patient needs and requests: education     2. Diet: Cardiac consistent carb diet with thin liquids    3. Pending tests/procedures: AM labs     4. Functional Level/Equipment: 1 person assist     5. Estimated Discharge Date: TBD Posted on Whiteboard in Patients Room: no     [facility] Safety Check    A safety check occurred in the patient's room between off going nurse and oncoming nurse listed above. The safety check included the below items  Area Items   H  High Alert Medications - Verify all high alert medication drips (heparin, PCA, etc.)   E  Equipment - Suction is set up for ALL patients (with yanker)  - Red plugs utilized for all equipment (IV pumps, etc.)  - WOWs wiped down at end of shift.  - Room stocked with oxygen, suction, and other unit-specific supplies   A  Alarms - Bed alarm is set for fall risk patients  - Ensure chair alarm is in place and activated if patient is up in a chair   L  Lines - Check IV for any infiltration  - Ellis bag is empty if patient has a Ellis   - Tubing and IV bags are labeled   S  Safety   - Room is clean, patient is clean, and equipment is clean. - Hallways are clear from equipment besides carts.    - Fall bracelet on for fall risk patients  - Ensure room is clear and free of clutter  - Suction is set up for ALL patients (with faraz)  - Hallways are clear from equipment besides carts.    - Isolation precautions followed, supplies available outside room, sign posted         Milan Crimes

## 2021-04-28 NOTE — PROGRESS NOTES
Problem: Diabetes Self-Management  Goal: *Disease process and treatment process  Description: Define diabetes and identify own type of diabetes; list 3 options for treating diabetes. Outcome: Progressing Towards Goal  Goal: *Incorporating nutritional management into lifestyle  Description: Describe effect of type, amount and timing of food on blood glucose; list 3 methods for planning meals. Outcome: Progressing Towards Goal  Goal: *Incorporating physical activity into lifestyle  Description: State effect of exercise on blood glucose levels. Outcome: Progressing Towards Goal  Goal: *Developing strategies to promote health/change behavior  Description: Define the ABC's of diabetes; identify appropriate screenings, schedule and personal plan for screenings. Outcome: Progressing Towards Goal  Goal: *Using medications safely  Description: State effect of diabetes medications on diabetes; name diabetes medication taking, action and side effects. Outcome: Progressing Towards Goal  Goal: *Monitoring blood glucose, interpreting and using results  Description: Identify recommended blood glucose targets  and personal targets. Outcome: Progressing Towards Goal  Goal: *Prevention, detection, treatment of acute complications  Description: List symptoms of hyper- and hypoglycemia; describe how to treat low blood sugar and actions for lowering  high blood glucose level. Outcome: Progressing Towards Goal  Goal: *Prevention, detection and treatment of chronic complications  Description: Define the natural course of diabetes and describe the relationship of blood glucose levels to long term complications of diabetes.   Outcome: Progressing Towards Goal  Goal: *Sick day guidelines  Outcome: Progressing Towards Goal  Goal: *Patient Specific Goal (EDIT GOAL, INSERT TEXT)  Outcome: Progressing Towards Goal     Problem: Patient Education: Go to Patient Education Activity  Goal: Patient/Family Education  Outcome: Progressing Towards Goal     Problem: Falls - Risk of  Goal: *Absence of Falls  Description: Document Shiraz Carter Fall Risk and appropriate interventions in the flowsheet. Outcome: Progressing Towards Goal  Note: Fall Risk Interventions:  Mobility Interventions: Assess mobility with egress test    Mentation Interventions: Adequate sleep, hydration, pain control    Medication Interventions: Assess postural VS orthostatic hypotension    Elimination Interventions: Call light in reach              Problem: Patient Education: Go to Patient Education Activity  Goal: Patient/Family Education  Outcome: Progressing Towards Goal     Problem: Patient Education: Go to Patient Education Activity  Goal: Patient/Family Education  Outcome: Progressing Towards Goal     Problem: Patient Education: Go to Patient Education Activity  Goal: Patient/Family Education  Outcome: Progressing Towards Goal     Problem: Pressure Injury - Risk of  Goal: *Prevention of pressure injury  Description: Document Steve Scale and appropriate interventions in the flowsheet.   Outcome: Progressing Towards Goal  Note: Pressure Injury Interventions:  Sensory Interventions: Assess changes in LOC    Moisture Interventions: Absorbent underpads    Activity Interventions: Increase time out of bed    Mobility Interventions: HOB 30 degrees or less    Nutrition Interventions: Document food/fluid/supplement intake    Friction and Shear Interventions: Lift sheet                Problem: Patient Education: Go to Patient Education Activity  Goal: Patient/Family Education  Outcome: Progressing Towards Goal     Problem: Nutrition Deficit  Goal: *Optimize nutritional status  Outcome: Progressing Towards Goal

## 2021-04-28 NOTE — PROGRESS NOTES
Problem: Self Care Deficits Care Plan (Adult)  Goal: *Acute Goals and Plan of Care (Insert Text)  Description: Occupational Therapy Goals   Long Term Goals  Initiated 4/16/2021 and to be accomplished within 2 week(s)  1. Pt will perform self-feeding with independence. 2. Pt will perform grooming with mod I in standing at the sink. 3. Pt will perform UB bathing with mod I with set up of necessary items. ( 4/22/2021)  4. Pt will perform LB bathing with mod I with set up of necessary items. ( 4/22/2021)  5. Pt will perform tub/shower transfer with mod I.   6. Pt will perform UB dressing with mod I with set up of necessary items. ( 4/22/2021)  7. Pt will perform LB dressing with mod I with set up of necessary items. 8. Pt will perform toileting task with mod I.  9. Pt will perform toilet transfer with mod I with RW. Short Term Goals   Initiated 4/16/2021 and to be accomplished within 7 day(s) (4/23/2021). Re-assessed 4/23/2021. 1. Pt will perform self-feeding with mod I and no set-up A. ( 4/23/2021)  2. Pt will perform grooming with mod I. ( 4/22/2021)  3. Pt will perform UB bathing with mod I. ( 4/22/2021)  4. Pt will perform LB bathing with mod I. ( 4/22/2021)  5. Pt will perform tub/shower transfer with mod I. - At supervision - can transfer from RW into shower; t/s T with S for practice  6. Pt will perform UB dressing with mod I. ( 4/22/2021)  7. Pt will perform LB dressing with mod I.  - At supervision; can progress to Mod I   8. Pt will perform toileting task with mod I. - At supervision; progress to mod I  9. Pt will perform toilet transfer with mod I with w/c as needed. - At supervision; progress to mod I with RW as appropriate.         Outcome: Progressing Towards Goal   OCCUPATIONAL THERAPY DISCHARGE    Patient: Marlenejayson Kelly (70 y.o. male)  Date: 4/28/2021    First Tx Session  Time In: 0  Time Out[de-identified] 5865    Second Tx Session  Time In: 6415  Time Out[de-identified] 1410    Primary Diagnosis: Status post above-knee amputation of right lower extremity ([facility] [Z89.611] Status post above-knee amputation of right lower extremity (HCC)    Precautions:   Fall(right AKA)    Barriers to Learning/Limitations: {barriers to learning/limitations:65361:a}  Compensate with: visual, verbal, tactile, kinesthetic cues/model     Patient identified with name and :yes    SUBJECTIVE:   Patient stated I used to cut my hair kneeling over the tub but I can't do that anymore.     OBJECTIVE DATA SUMMARY:     Past Medical History:   Diagnosis Date    Acute blood loss as cause of postoperative anemia 3/22/2021    Anticoagulated by anticoagulation treatment     On Rivaroxaban    [facility] 2015    2D echocardiogram (3/24/2021) showed EF 37%; global hypokinesis of left ventricle; grade I diastolic dysfunction; 2D echocardiogram (2015) showed EF 35%; generalized hypokinesis more focally severe of the inferior and posterior segments; mild mitral regurgitation; trace tricuspud regurgitation; normal pulmonary artery pressure    Chronic systolic heart failure (HCC)     2D echocardiogram (3/24/2021) showed EF 37%; global hypokinesis of left ventricle; grade I diastolic dysfunction; 2D echocardiogram (2015) showed EF 35%; generalized hypokinesis more focally severe of the inferior and posterior segments; mild mitral regurgitation; trace tricuspud regurgitation; normal pulmonary artery pressure    Coronary artery disease involving native coronary artery of native heart     COVID-19 ruled out by laboratory testing 3/22/2021    SARS-CoV-2 ([place] m2000, [facility]) (3/22/2021):  Not detected     Critical ischemia of lower extremity ([facility] 3/22/2021    Right    Factor V Leiden mutation ([facility]     Grade I diastolic dysfunction     2D echocardiogram (3/24/2021) showed EF 37%; global hypokinesis of left ventricle; grade I diastolic dysfunction    History of deep venous thrombosis (DVT) of distal vein of right lower extremity     History of epilepsy     History of head injury     History of myocardial infarction     History of noncompliance with medical treatment 3/18/2021    Hypertensive heart disease with chronic systolic congestive heart failure ([facility]     2D echocardiogram (3/24/2021) showed EF 37%; global hypokinesis of left ventricle; grade I diastolic dysfunction; 2D echocardiogram (12/17/2015) showed EF 35%; genealized hypokinesis more focally severe of the inferior and posterior segments; mild mitral regurgitation; trace tricuspud regurgitation; normal pulmonary artery pressure    Infection of right below knee amputation ([facility] 4/7/2021    S/P Washout of right below-knee amputation; Right knee disarticulation (4/7/2021  Dr. Gatito Bojorquez); S/P Exploration of right knee disarticulation and control of hemorrhage (4/8/2021  Dr. Gatito Bojorquez)   [address] term current use of aspirin     Major depressive disorder 03/24/2021    On Duloxetine    Migraine headache     Mitral valve prolapse     Mixed hyperlipidemia     Lipid profile (7/25/2018) showed , , HDL 27,     Obstructive sleep apnea     On statin therapy due to risk of future cardiovascular event     On Atorvastatin    Peripheral artery disease ([facility]     Type 2 diabetes mellitus, without long-term current use of insulin ([facility])     HbA1c (4/8/2021) = 9.8; HbA1c (3/6/2021) = 45.9    Umbilical hernia     Wears glasses      Past Surgical History:   Procedure Laterality Date    HX ABOVE KNEE AMPUTATION Right 04/11/2021    S/P Right above-the-knee amputation (4/11/2021 - Dr. Gordon Billy)    HX BELOW KNEE AMPUTATION Right 03/22/2021    S/P Right below-the-knee amputation (3/22/2021 - Dr. Claribel Claudio)    HX EMBOLECTOMY Right 03/05/2021    S/P Right lower extremity arterial embolectomy (3/5/2021 - Dr. Claribel Claudio)    HX IMPLANTABLE CARDIOVERTER DEFIBRILLATOR  2013    HX OTHER SURGICAL Right 04/08/2021    S/P Exploration of right knee disarticulation and control of hemorrhage (4/8/2021 - Dr. Popeye Fuentes)    HX OTHER SURGICAL Right 04/08/2021    S/P Washout of right below-knee amputation; Right knee disarticulation (4/7/2021 - Dr. Popeye Fuentes)     Prior Level of Function/Home Situation:   Home Situation  Home Environment: Private residence  # Steps to Enter: 0  Wheelchair Ramp: Yes  One/Two Story Residence: One story  Living Alone: Yes  Support Systems: Friends \ neighbors  Patient Expects to be Discharged to[de-identified] Private residence  Current DME Used/Available at Home: Glucometer  []     Right hand dominant   []     Left hand dominant    Therapeutic Exercise:  Pt completed BUE strengthening using Sci Fit for 15 continuous minutes to increase pt BUE strength  And activity tolerance for self cares and functional mobility. Pt completed BUE strengthening with 2 # wrist weights  Using ball in all planes to increase core strength     Pain:  Pt reports 0/10 pain or discomfort prior to treatment. Pt reports 0/10 pain or discomfort post treatment.    Problem List:    Decreased strength B UE  []     Decreased strength trunk/core  []     Decreased AROM   []     Decreased PROM  []     Decreased balance sitting  []     Decreased balance standing  []     Decreased endurance  []     Pain  []       Functional Limitations:   Decreased independence with ADL  []     Decreased independence with functional transfers  []     Decreased independence with ambulation  []     Decreased independence with IADL  []       Outcome Measures:      MMT Initial Assessment   Right Upper Extremity  Left Upper Extremity    UE AROM [facility]   Shoulder flexion     Shoulder extension     Shoulder ABDuction     Shoulder ADDUction     Elbow Flexion     Elbow Extension     Wrist Extension/Flexion                   MMT Discharge Assessment   Right Upper Extremity  Left Upper Extremity    UE AROM *** ***   Shoulder flexion     Shoulder extension     Shoulder ABDuction Shoulder ADDUction     Elbow Flexion     Elbow Extension     Wrist Extension/Flexion              0/5 No palpable muscle contraction  1/5 Palpable muscle contraction, no joint movement  2-/5 Less than full range of motion in gravity eliminated position  2/5 Able to complete full range of motion in gravity eliminated position  2+/5 Able to initiate movement against gravity  3-/5 More than half but not full range of motion against gravity  3/5 Able to complete full range of motion against gravity  3+/5 Completes full range of motion against gravity with minimal resistance  4-/5 Completes full range of motion against gravity with minimal resistance  4/5 Completes full range of motion against gravity with moderate resistance  5/5 Completes full range of motion against gravity with maximum resistance    Coordination: ***  Sensation: ***    FIM SCORES Initial Assessment Discharge Assessment   Eating 6     Grooming 6 Grooming  Grooming Assistance : 5 (Supervision)  Comments: Pt reported concern for technique for cutting hair due to used to cut hair in kneeling position over bathtub. Pt cut hair in seated position from w/c with sheet spread over wheelchair and floor to prevent hair from getting in/on w/c and towel draped over pt lap. Pt completed grooming cutting hair and beard with setup. Oral Hygiene FIM: ***    Bathing 5        Upper Body Dressing 6     Lower Body Dressing 5      Sock and/or Shoe Management FIM: ***   Toileting 5     Tub/Shower Transfer 5     Toilet Transfer 5     Comprehension 6 Score: 6   Expression 6 Score: 6   Social Interaction 6 Score: 6   Problem Solving 6 Score: 6   Memory 6 Score: 6   Please see IRC Interdisciplinary Eval: Coordination/Balance Section for details regarding FIM score description.     Activity Tolerance:   ***    ASSESSMENT:  ***  Progression toward goals:  []      Improving appropriately and progressing toward goals  []      Improving slowly and progressing toward goals  []      Not making progress toward goals and plan of care will be adjusted     PLAN:  Pt would benefit from continued skilled occupational therapy in order to improve ADL function and IADL with use of least restrictive device. Interventions may include range of motion (AROM, PROM B UE/trunk), motor function (B UE/trunk strengthening/coordination), activity tolerance (vitals, oxygen saturation levels), ADL, balance activities, IADL, and functional transfer training. Discharge Recommendations: {AFTER XKOM:[id number]}  Further Equipment Recommendations for Discharge: { :71782::N/A}       Please refer to the flow sheet for vital signs taken during this treatment. After treatment:   []  Patient left in no apparent distress sitting up in chair  []  Patient left in no apparent distress in bed  []  Call bell left within reach  []  Nursing notified  []  Caregiver present  []  Bed alarm activated    COMMUNICATION/EDUCATION:   Communication/Collaboration:  []      Home safety education was provided and the patient/caregiver indicated understanding. []      Patient/family have participated as able and agree with findings and recommendations. []      Patient is unable to participate in plan of care at this time.     Rosendo Nguyen OT  4/28/2021

## 2021-04-29 VITALS
TEMPERATURE: 99.5 F | RESPIRATION RATE: 18 BRPM | OXYGEN SATURATION: 99 % | SYSTOLIC BLOOD PRESSURE: 110 MMHG | BODY MASS INDEX: 22.34 KG/M2 | DIASTOLIC BLOOD PRESSURE: 75 MMHG | WEIGHT: 174.1 LBS | HEART RATE: 91 BPM | HEIGHT: 74 IN

## 2021-04-29 LAB — GLUCOSE BLD STRIP.AUTO-MCNC: 126 MG/DL (ref 70–110)

## 2021-04-29 PROCEDURE — 99239 HOSP IP/OBS DSCHRG MGMT >30: CPT | Performed by: INTERNAL MEDICINE

## 2021-04-29 PROCEDURE — 82962 GLUCOSE BLOOD TEST: CPT

## 2021-04-29 PROCEDURE — 74011250637 HC RX REV CODE- 250/637: Performed by: INTERNAL MEDICINE

## 2021-04-29 RX ADMIN — ATORVASTATIN CALCIUM 80 MG: 40 TABLET, FILM COATED ORAL at 08:58

## 2021-04-29 RX ADMIN — EZETIMIBE 10 MG: 10 TABLET ORAL at 08:58

## 2021-04-29 RX ADMIN — RIVAROXABAN 20 MG: 20 TABLET, FILM COATED ORAL at 08:58

## 2021-04-29 RX ADMIN — Medication 250 MG: at 08:57

## 2021-04-29 RX ADMIN — GABAPENTIN 100 MG: 100 CAPSULE ORAL at 08:58

## 2021-04-29 RX ADMIN — METHOCARBAMOL 500 MG: 500 TABLET ORAL at 08:58

## 2021-04-29 RX ADMIN — Medication 1 TABLET: at 08:58

## 2021-04-29 RX ADMIN — DOCUSATE SODIUM 100 MG: 100 CAPSULE, LIQUID FILLED ORAL at 08:58

## 2021-04-29 RX ADMIN — OXYCODONE HYDROCHLORIDE 10 MG: 5 TABLET ORAL at 10:30

## 2021-04-29 RX ADMIN — SERTRALINE HYDROCHLORIDE 25 MG: 25 TABLET ORAL at 08:58

## 2021-04-29 RX ADMIN — ACETAMINOPHEN 650 MG: 325 TABLET, FILM COATED ORAL at 08:57

## 2021-04-29 RX ADMIN — METFORMIN HYDROCHLORIDE 850 MG: 850 TABLET ORAL at 08:57

## 2021-04-29 RX ADMIN — ASPIRIN 81 MG CHEWABLE TABLET 81 MG: 81 TABLET CHEWABLE at 08:57

## 2021-04-29 NOTE — ROUTINE PROCESS
[facility] 60 y.o. male was discharged home on 21. Patient's armband removed and shredded. Discharge instructions were reviewed with patient, and he verbalized understanding. The patient was wheeled out to transportation vehicle by a staff member along with the patient's belongings. Transportation was provided by private vehicle.     Trish Jarrell

## 2021-04-29 NOTE — PROGRESS NOTES
SHIFT CHANGE NOTE FOR [facility]    Bedside and Verbal shift change report given to Sparkle Cruz RN (oncoming nurse) by Devendra Lindsay RN (offgoing nurse). Report included the following information SBAR, Kardex, MAR and Recent Results.     Situation:   Code Status: Full Code   Reason for Admission: right AKA  Hospital Day: 14   Problem List:   Hospital Problems  Date Reviewed: 4/28/2021          Codes Class Noted POA    * (Principal) Status post above-knee amputation of right lower extremity ([facility] ICD-10-CM: E20.767  ICD-9-CM: V49.76  4/11/2021 Yes    Overview Signed 4/15/2021  5:14 PM by Carlos Barajas MD     S/P Right above-the-knee amputation (4/11/2021  Dr. Christy Mcduffie)             Ischemia of right BKA site [facility]) ICD-10-CM: T87.89, I99.8  ICD-9-CM: 997.69, 459.9  4/7/2021 Yes        Infection of right below knee amputation [facility]) ICD-10-CM: T87.43  ICD-9-CM: 997.62  4/7/2021 Yes    Overview Signed 4/15/2021  5:17 PM by Carlos Barajas MD     S/P Washout of right below-knee amputation; Right knee disarticulation (4/7/2021  Dr. Prudence Vázquez); S/P Exploration of right knee disarticulation and control of hemorrhage (4/8/2021  Dr. Prudence Vázquez)             Peripheral artery disease ([facility] (Chronic) ICD-10-CM: I73.9  ICD-9-CM: 443.9  Unknown Yes        Type 2 diabetes mellitus, without long-term current use of insulin (HCC) (Chronic) ICD-10-CM: E11.9  ICD-9-CM: 250.00  Unknown Yes    Overview Addendum 4/15/2021  5:40 PM by Carlos Barajas MD     HbA1c (4/8/2021) = 9.8; HbA1c (3/6/2021) = 12.9             Factor V Leiden mutation ([facility] (Chronic) ICD-10-CM: S15.10  ICD-9-CM: 289.81  Unknown Yes        Hypertensive heart disease with chronic systolic congestive heart failure (HCC) (Chronic) ICD-10-CM: I11.0, I50.22  ICD-9-CM: 402.91, 428.22  Unknown Yes    Overview Signed 3/26/2021 11:06 PM by Calros Barajas MD     2D echocardiogram (12/17/2015) showed EF 35%; genealized hypokinesis more focally severe of the inferior and posterior segments; mild mitral regurgitation; trace tricuspud regurgitation; normal pulmonary artery pressure             Anticoagulated by anticoagulation treatment (Chronic) ICD-10-CM: Z79.01  ICD-9-CM: V58.61  Unknown Yes    Overview Signed 3/26/2021  4:36 PM by Anusha Hillman MD     On Rivaroxaban             Mixed hyperlipidemia (Chronic) ICD-10-CM: E78.2  ICD-9-CM: 272.2  Unknown Yes    Overview Signed 3/26/2021 11:07 PM by Anusha Hillman MD     Lipid profile (7/25/2018) showed , , HDL 27,              Major depressive disorder ICD-10-CM: F32.9  ICD-9-CM: 296.20  3/24/2021 Yes    Overview Signed 3/26/2021 11:31 PM by Anusha Hillman MD     On Duloxetine             History of right below knee amputation [facility]) ICD-10-CM: Z89.511  ICD-9-CM: V49.75  3/22/2021 Yes    Overview Signed 3/26/2021  5:43 PM by Anusha Hillman MD     S/P Right below-the-knee amputation (3/22/2021 - Dr. Venessa Marrufo)             Impaired mobility and ADLs ICD-10-CM: Z74.09, Z78.9  ICD-9-CM: V49.89  3/22/2021 Yes        Critical ischemia of lower extremity ([facility] ICD-10-CM: G43.532  ICD-9-CM: 459.9  3/22/2021 Yes    Overview Signed 3/26/2021  5:42 PM by Anusha Hillman MD     Right             Acute blood loss as cause of postoperative anemia ICD-10-CM: D62  ICD-9-CM: 285.1  3/22/2021 Yes        History of embolectomy ICD-10-CM: Z98.890  ICD-9-CM: V45.89  3/5/2021 Yes    Overview Signed 3/26/2021  5:39 PM by Anusha Hillman MD     S/P Right lower extremity arterial embolectomy                   Background:   Past Medical History:   Past Medical History:   Diagnosis Date    Acute blood loss as cause of postoperative anemia 3/22/2021    Anticoagulated by anticoagulation treatment     On Rivaroxaban    Cardiomyopathy ([address] 12/17/2015    2D echocardiogram (3/24/2021) showed EF 37%; global hypokinesis of left ventricle; grade I diastolic dysfunction; 2D echocardiogram (12/17/2015) showed EF 35%; generalized hypokinesis more focally severe of the inferior and posterior segments; mild mitral regurgitation; trace tricuspud regurgitation; normal pulmonary artery pressure    Chronic systolic heart failure (HCC)     2D echocardiogram (3/24/2021) showed EF 37%; global hypokinesis of left ventricle; grade I diastolic dysfunction; 2D echocardiogram (12/17/2015) showed EF 35%; generalized hypokinesis more focally severe of the inferior and posterior segments; mild mitral regurgitation; trace tricuspud regurgitation; normal pulmonary artery pressure    Coronary artery disease involving native coronary artery of native heart     COVID-19 ruled out by laboratory testing 3/22/2021    SARS-CoV-2 ([employer] [facility], [employer] [address]) (3/22/2021):  Not detected     Critical ischemia of lower extremity ([facility] 3/22/2021    Right    Factor V Leiden mutation ([facility]     Grade I diastolic dysfunction 33/85/0300    2D echocardiogram (3/24/2021) showed EF 37%; global hypokinesis of left ventricle; grade I diastolic dysfunction    History of deep venous thrombosis (DVT) of distal vein of right lower extremity     History of epilepsy     History of head injury     History of myocardial infarction     History of noncompliance with medical treatment 3/18/2021    Hypertensive heart disease with chronic systolic congestive heart failure ([facility]     2D echocardiogram (3/24/2021) showed EF 37%; global hypokinesis of left ventricle; grade I diastolic dysfunction; 2D echocardiogram (12/17/2015) showed EF 35%; genealized hypokinesis more focally severe of the inferior and posterior segments; mild mitral regurgitation; trace tricuspud regurgitation; normal pulmonary artery pressure    Infection of right below knee amputation ([facility] 4/7/2021    S/P Washout of right below-knee amputation; Right knee disarticulation (4/7/2021  Dr. Ronan Flores); S/P Exploration of right knee disarticulation and control of hemorrhage (4/8/2021  Dr. Ronan Flores)   [place] Long term current use of aspirin     Major depressive disorder 03/24/2021    On Duloxetine    Migraine headache     Mitral valve prolapse     Mixed hyperlipidemia     Lipid profile (7/25/2018) showed , , HDL 27,     Obstructive sleep apnea     On statin therapy due to risk of future cardiovascular event     On Atorvastatin    Peripheral artery disease ([facility]     Type 2 diabetes mellitus, without long-term current use of insulin ([facility])     HbA1c (4/8/2021) = 9.8; HbA1c (3/6/2021) = 69.4    Umbilical hernia     Wears glasses       Patient taking anticoagulants yes    Patient has a defibrillator: no     Assessment:   Changes in Assessment throughout shift: no acute changes noted throughout the shift     Patient has central line: no Reasons if yes: n/a  Insertion date:n/a Last dressing date:n/a   Patient has Ellis Cath: no Reasons if yes: n/a   Insertion date:n/a     Last Vitals:     Vitals:    04/27/21 2111 04/28/21 0754 04/28/21 1557 04/28/21 2203   BP: 133/83 109/70 107/69 124/81   Pulse: 92 91  87   Resp: 18 18 19 19   Temp: 98.7 °F (37.1 °C) 98.1 °F (36.7 °C) 99.3 °F (37.4 °C) 98 °F (36.7 °C)   SpO2: 99% 97% 95% 100%   Weight:       Height:            PAIN    Pain Assessment    Pain Intensity 1: 0 (04/29/21 0400) Pain Intensity 1: 2 (12/29/14 1105)    Pain Location 1: Leg Pain Location 1: Abdomen    Pain Intervention(s) 1: Medication (see MAR) Pain Intervention(s) 1: Medication (see MAR)  Patient Stated Pain Goal: 0 Patient Stated Pain Goal: 0  o Intervention effective: yes    o Other actions taken for pain: repositioning      Skin Assessment  Skin color Skin Color: Appropriate for ethnicity  Condition/Temperature Skin Condition/Temp: Dry, Warm  Integrity Skin Integrity: Incision (comment)  Turgor Turgor: Non-tenting  Weekly Pressure Ulcer Documentation  Pressure  Injury Documentation: No Pressure Injury Noted-Pressure Ulcer Prevention Initiated  Wound Prevention & Protection Methods  Orientation of wound Orientation of Wound Prevention: Posterior  Location of Prevention Location of Wound Prevention: Buttocks, Sacrum/Coccyx  Dressing Present Dressing Present : No  Dressing Status Dressing Status: Intact  Wound Offloading Wound Offloading (Prevention Methods): Bed, pressure redistribution/air     INTAKE/OUPUT  Date 04/28/21 0700 - 04/29/21 0659 04/29/21 0700 - 04/30/21 0659   Shift 0700-1859 1900-0659 24 Hour Total 0700-1859 1900-0659 24 Hour Total   INTAKE   Shift Total(mL/kg)         OUTPUT   Urine(mL/kg/hr)           Urine Occurrence(s) 3 x 2 x 5 x      Stool           Stool Occurrence(s) 1 x 0 x 1 x      Shift Total(mL/kg)         NET         Weight (kg) 79 79 79 79 79 79       Recommendations:  1. Patient needs and requests: assist with toileting     2. Diet: Cardiac consistent carb diet with thin liquids    3. Pending tests/procedures: AM labs     4. Functional Level/Equipment: 1 person assist     5. Estimated Discharge Date: TBD Posted on Whiteboard in Patients Room: no     \[facility]\"" Safety Check    A safety check occurred in the patient's room between off going nurse and oncoming nurse listed above. The safety check included the below items  Area Items   H  High Alert Medications - Verify all high alert medication drips (heparin, PCA, etc.)   E  Equipment - Suction is set up for ALL patients (with yanker)  - Red plugs utilized for all equipment (IV pumps, etc.)  - WOWs wiped down at end of shift.  - Room stocked with oxygen, suction, and other unit-specific supplies   A  Alarms - Bed alarm is set for fall risk patients  - Ensure chair alarm is in place and activated if patient is up in a chair   L  Lines - Check IV for any infiltration  - Ellis bag is empty if patient has a Ellis   - Tubing and IV bags are labeled   S  Safety   - Room is clean, patient is clean, and equipment is clean. - Hallways are clear from equipment besides carts.    - Fall bracelet on for fall risk patients  - Ensure room is clear and free of clutter  - Suction is set up for ALL patients (with faraz)  - Hallways are clear from equipment besides carts.    - Isolation precautions followed, supplies available outside room, sign posted         Luci Valdez RN

## 2021-04-29 NOTE — DISCHARGE SUMMARY
[facility] PHYSICAL REHABILITATION  [address] [place] [address], [address]     INPATIENT REHABILITATION  DISCHARGE SUMMARY    Name: Pinky Ledezma MRN: [id number]   Age / Sex: 60 y.o. / male CSN: [id number]   YOB: 1965 Length of Stay: 14 days   Admit Date: 4/15/2021 Discharge Date: 2021        PRIMARY CARE PHYSICIAN: TERRANCE Ingram      DISCHARGE DIAGNOSES:    Primary Rehab Diagnosis  1. Impaired Mobility and ADLs  2. S/P Right above-the-knee amputation (2021  Dr. Alli Velasquez)  3. S/P Exploration of right knee disarticulation and control of hemorrhage (2021  Dr. Anamaria Jay)  4. S/P Washout of right below-knee amputation; Right knee disarticulation (2021  Dr. Anamaria Jay)  5. History of infection and ischemia of right BKA stump (2021)  6. S/P Right below-the-knee amputation (3/22/2021 - Dr. Kristina López)  7. History of critical ischemia of the right lower extremity  8.  History of right lower extremity arterial embolectomy (3/5/2021 - Dr. Kristina López)    Comorbidities  Patient Active Problem List   Diagnosis Code    History of right below knee amputation ([facility] Z89.511    Impaired mobility and ADLs Z74.09, Z78.9    Critical ischemia of lower extremity ([facility] I70.229    Peripheral artery disease ([facility] I73.9    Coronary artery disease involving native coronary artery of native heart I25.10    Type 2 diabetes mellitus, without long-term current use of insulin (HCC) E11.9    History of epilepsy Z86.69    Factor V Leiden mutation ([facility] D68.51    Migraine headache G43.909    Wears glasses F06.6    Umbilical hernia A62.8    Obstructive sleep apnea G47.33    ICD (implantable cardioverter-defibrillator) in place Z95.810    Mitral valve prolapse I34.1    History of head injury Z87.828    Hypertensive heart disease with chronic systolic congestive heart failure (HCC) I11.0, I50.22    History of myocardial infarction I25.2    Long term current use of aspirin Z79.82    On statin therapy due to risk of future cardiovascular event Z79.899    History of deep venous thrombosis (DVT) of distal vein of right lower extremity Z86.718    Anticoagulated by anticoagulation treatment Z79.01    Mixed hyperlipidemia E78.2    History of embolectomy Z98.890    COVID-19 ruled out by laboratory testing Z20.822    Acute blood loss as cause of postoperative anemia D62    Cardiomyopathy ([facility] I42.9    Chronic systolic heart failure (HCC) I50.22    History of noncompliance with medical treatment Z91.19    Major depressive disorder F32.9    Grade I diastolic dysfunction U37.0    Constipation K59.00    Ischemia of right BKA site (HCC) T87.89, I99.8    Infection of right below knee amputation (HCC) T87.43    Status post above-knee amputation of right lower extremity ([facility] Z89.611        CONSULTS CALLED: Wound Care Nurse       PROCEDURES DONE: None      BRIEF HISTORY: The patient is a 77-year-old White male with multiple medical comorbidities who was admitted to [facility] on 3/22/2021 for an elective right below-the-knee amputation.      WBC count (3/9/2021) = 14.0  Hgb/Hct (3/9/2021) = 11.7/37.5  BUN/Creatinine (3/9/2021) = 17/1.6      On 3/18/2021, the patient was seen at the office of Vascular Surgery (Dr. Gustavo Medina) for a wound check after undergoing a right lower extremity arterial embolectomy last 3/5/2021. The right foot was noted to be ischemic and Dr. Cayla Meredith recommended a right below-the-knee amputation.     On 3/22/2021, the patient was admitted to [facility] under the service of Vascular Surgery (Dr. Gustavo Medina).     On 3/22/2021, the patient underwent a Right below-the-knee amputation done by Dr. Gustavo Medina. The patient tolerated the procedure well with no intraoperative complications.  The patient developed acute postoperative blood loss anemia but no blood transfusion was given.     Rivaroxaban was used for DVT prophylaxis.     Internal Medicine consult (Dr. Jan Tobin) was called on 3/22/2021 for medical management.     WBC count (3/23/2021) = 10.7  Hgb/Hct (3/23/2021) = 10.7/34.2  BUN/Creatinine (3/23/2021) = 16/1.2      Physical Medicine and Rehabilitation consult (Dr. Damion De Dios) was called on 3/23/2021 for evaluation of rehabilitation needs. Dr. Socrates Calvo recommended IPR but since insurance does not contract with [facility], she recommended to consider [facility] IPR.     Cardiology consult (Dr. Paulette Samaniego) was called on 3/24/2021 for evaluation and comanagement.     2D echocardiogram (3/24/2021) showed EF 37%; global hypokinesis of left ventricle; grade I diastolic dysfunction     Psychiatry consult (Dr. Allie Melendez) was called on 3/24/2021 for evaluation and comanagement. Patient was diagnosed with major depressive disorder, severe, recurrent, without psychotic features and Caregiver burnout. The patient was determined to have the capacity to make decisions on his own. Patient was started on Duloxetine 30 mg PO once daily.     WBC count (3/26/2021) = 13.2  Hgb/Hct (3/26/2021) = 10.1/31.3  BUN/Creatinine (3/26/2021) = 16/1.1      Pain was controlled with Morphine IV PRN and Oxycodone PO PRN.     The patient had remained hemodynamically stable but due to the above events, the patient was noted to be generally weak and with impaired mobility and ADLs. Patient was felt to be a good candidate for acute inpatient rehabilitation. Upon evaluation by Physical Therapy and Occupational Therapy, the patient was recommended for acute inpatient rehabilitation.  The patient was discharged and was subsequently admitted to the [facility] for [facility] for intensive rehabilitation to help recover strength, function and mobility.     -     On 3/26/2021, the patient was admitted to the [facility] for inpatient rehabilitation.     On 4/5/2021, patient had completed the recommended 10-day treatment course of Amoxicillin-Clavulanate PO.     On 4/6/2021, pain in the postoperative area had became steady. Patient was noted to have a low-grade fever and some serosanguinous discharge from the postoperative site. Cultures were sent. Patient was empirically started on Piperacillin-Tazobactam IV and Vancomycin IV.      Vascular Surgery consult (Dr. Elena Casiano) was called on 4/6/2021 for evaluation and comanagement. Dr. Angie Randall recommended right below amputation washout and debridement with possible wound VAC placement.     On 4/7/2021, the patient was discharged from the [facility] for Physical Rehabilitation and the patient was brought to the operating room.     -     S/P Washout of right below-knee amputation; Right knee disarticulation (4/7/2021  Dr. Prudence Vázquez)     On 4/7/2021, the patient was admitted under the service of the [facility] (Dr. Bianca Storey).     On 4/8/2021, Hgb/Hct was noted to be trending downwards (6.5/21.5 at 4:02 PM). Patient was transfused 2 units pRBC.     S/P Exploration of right knee disarticulation and control of hemorrhage (4/8/2021  Dr. Prudence Vázquez)     On 4/10/2021, Hgb/Hct was noted to be trending downwards (6.3/20.0 at 6:40 AM). Patient was transfused 2 units pRBC.     S/P Right above-the-knee amputation (4/11/2021  Dr. Christy Mcduffie)     Infectious Disease (Dr. Theo Chua) was called on 4/13/2021 for evaluation and comanagement. Piperacillin-Tazobactam IV was discontinued.     [facility] HOSPITAL Course) from Bryan Hernandez:  \"Mr. Gio Centeno is a 24-year-old gentleman with a past medical history of factor V Leiden mutation, diastolic dysfunction, prior head injury with seizure disorder, chronic systolic heart failure with ejection fraction of 35%, insulin-dependent diabetes who was admitted to ARU on 3/25 following right BKA at [facility] by Dr. Leslee Mahmood on 3/22/21. He was overall doing well kin ARU but developed fevers and increased pain to his right stump yesterday. He had recently completed course of Augmentin post-operatively. Vascular surgery was consulted to evaluate drainage from the stump incision. He has undergone I&D and washout of the right stump on 4/7 with right knee disarticulation.   Patient was transferred to [facility] and was admitted under the hospitalist service was started on empiric antibiotics and vascular surgery on board.  Postoperatively patient developed blood loss anemia requiring 2 units of blood transfusion.  Patient was taken to the OR on 4/8/2021 and exploration of right knee articulation and hemorrhage control was done by vascular surgery.  Patient was continued on empiric antibiotics and is hemoglobin hematocrit was monitored closely. Vu Geovanny was held during the hospital course.  After 48 hours after surgery and hemorrhage control patient was resumed back on Xarelto once cleared by vascular surgery.  His multiple hematocrit remained stable, did not have any bleeding after starting Xarelto.  ID was also consulted.  Patient's white count and fever resolved.  Patient had a difficult pain control but slowly improved.  ID saw the patient and recommended no need for antibiotics if the wound looks good.  Vascular surgery followed up on the patient, dressing was removed and wound looked clean so vascular agreed with no more antibiotics.  Antibiotics were discontinued.  Vascular cleared the patient for discharge.  PT OT was seen recommended acute rehab. Dale Felton has been currently accepted to acute rehab, will transfer today.     Patient blood pressure was on the lower side in the hospital, his hypertensive medications are being resumed at a lower dose.  Patient was euvolemic, his Lasix has been held in the hospital. [facility] resume Lasix when needed.  Patient's blood pressure medicine needs to be adjusted in the rehab. \"         CBC    04/14/21  0423 04/13/21  0640 04/12/21  0539 04/11/21  1027 04/11/21  0650 04/10/21  1915 04/10/21  0640 04/09/21  0453 04/08/21  1602 04/08/21  0940 04/08/21  0210 04/07/21  0905 04/06/21  0850   WBC 12.2 14.3* 17.2*  --  16.5*  --  14.1* 18.6*  --   --  17.2* 22.0* 23.6*   HGB 7.2* 7.3* 7.3* 7.6* 7.9* 7.7* 6.3* 7.4* 6.5* 7.0* 7.8* 9.5* 10.8*   HCT 23.1* 23.2* 23.0*  --  24.4* 23.8* 20.0* 22.7* 21.5* 22.7* 25.4* 30.4* 34.2*   * 506* 461*  --  461*  --  404 431*  --   --  507* 546* 661*         Electrolytes    04/13/21  0640 04/12/21  0539 04/11/21  0650 04/10/21  0640 04/09/21  0453 04/08/21  0210 04/07/21  0905 04/06/21  0850 04/05/21  0427    137 140 139 136 138 136 130* 131*   K 3.9 4.9 4.6 4.4 4.4 5.3 4.6 4.6 5.0    108 110 109 106 105 103 96* 99*   CA 8.7 8.1* 8.2* 7.9* 8.4* 8.6 8.8 9.6 9.4   PHOS 3.4  --   --   --   --   --   --   --   --    MG 1.8  --   --   --   --   --   --   --   --          Renal Function    04/13/21  0640 04/12/21  0539 04/11/21  0650 04/10/21  0640 04/09/21  0453 04/08/21  0210 04/07/21  0905 04/06/21  0850 04/05/21  0427   BUN 9 9 9 15 22* 17 18 20* 17   CREA 1.04 1.12 1.08 1.16 1.35* 1.12 1.16 1.26 0.98   CO2 25 24 23 23 22 26 25 22 23         Liver Function    04/08/21  0210   TBILI 0.6   TP 5.9*   ALB 1.9*   GLOB 4.0   ALT 43   AST 37   *         The patient had remained hemodynamically stable but due to the above events, the patient was noted to be generally weak and with impaired mobility and ADLs. Patient was felt to be a good candidate for acute inpatient rehabilitation. Upon evaluation by Physical Therapy and Occupational Therapy, the patient was recommended for acute inpatient rehabilitation.  The patient was discharged and was subsequently admitted to the [facility] for [facility] Rehabilitation for intensive rehabilitation to help recover strength, function and mobility. COURSE IN THE HOSPITAL: Upon admission to the [facility] for [facility], the patient underwent physical therapy, occupational therapy and speech therapy. The patient was able to actively participate in the rehabilitation activities and progressed well. On discharge, the patient was able to perform the following activities:    1. Occupational Therapy    ON ADMISSION ON DISCHARGE   Eating  Functional Level: 6   Eating  Functional Level: 6     Grooming  Functional Level: 6   Grooming  Functional Level: 6     Bathing  Functional Level: 5   Bathing  Functional Level: 6     Upper Body Dressing  Functional Level: 6   Upper Body Dressing  Functional Level: 6     Lower Body Dressing  Functional Level: 5   Lower Body Dressing  Functional Level: 5     Toileting  Functional Level: 5   Toileting  Functional Level: 5     Toilet Transfers  Toilet Transfer Score: 5   Toilet Transfers  Toilet Transfer Score: 5     Tub /Shower Transfers  Tub/Shower Transfer Score: 5   Tub/Shower Transfers  Tub/Shower Transfer Score: 5       2.  Physical Therapy    ON ADMISSION ON DISCHARGE   Wheelchair Mobility/Management  Able to Propel (ft): (to be further assessed)  Functional Level: (to be further assessed)  Curbs/Ramps Assist Required (FIM Score): 0 (Not tested)  Wheelchair Setup Assist Required : 0 (Not tested)  Wheelchair Management: (NT) Wheelchair Mobility/Management  Able to Propel (ft): 200 feet  Functional Level: 5  Curbs/Ramps Assist Required (FIM Score): 5 (Supervision)  Wheelchair Setup Assist Required : 6 (Modified independent)  Wheelchair Management: Manages left brake, Manages right brake     Gait  Amount of Assistance: 4 (Contact guard assistance)  Distance (ft): 15 Feet (ft)  Assistive Device: Walker, rolling Gait  Amount of Assistance: 4 (Contact guard assistance)  Distance (ft): 75 Feet (ft)(x2)  Assistive Device: Walker, rolling, Gait belt Balance-Sitting/Standing  Sitting - Static: Good (unsupported)  Sitting - Dynamic: Good (unsupported)  Standing - Static: Good  Standing - Dynamic : Impaired Balance-Sitting/Standing  Sitting - Static: Good (unsupported)  Sitting - Dynamic: Good (unsupported)  Standing - Static: Fair(with RW)  Standing - Dynamic : Impaired     Bed/Mat Mobility  Rolling Right : 5 (Stand-by assistance)  Rolling Left : 5 (Stand-by assistance)  Supine to Sit : 5 (Stand-by assistance)  Sit to Supine : 5 (Supervision) Bed/Mat Mobility  Rolling Right : 6 (Modified independent)  Rolling Left : 6 (Modified independent)  Supine to Sit : 6 (Modified independent)  Sit to Supine : 6 (Modified independent)     Transfers  Transfer Type: Lateral pivot  Other: stand step/hop with RW  Transfer Assistance : 4 (Minimal assistance)(steadying support for safety)  Sit to Stand Assistance: Minimal assistance(steadying support needing cues for safety)  Car Transfers: Not tested  Car Type: N/A Transfers  Transfer Type:  Other  Other: squat pivot   Transfer Assistance : 5 (Stand-by assistance)  Sit to Stand Assistance: Stand-by assistance  Car Transfers: Supervision  Car Type: car txfr simulator     Steps or Stairs  Steps/Stairs Ambulated (#): 0  Level of Assist : 0 (Not tested)  Rail Use: (NT) Steps or Stairs  Steps/Stairs Ambulated (#): 2(2\" platform step)  Level of Assist : 4 (Contact guard assistance)  Rail Use: None(RW)       3. Speech and Language Pathology    ON ADMISSION ON DISCHARGE   Comprehension (Native Language)  Primary Mode of Comprehension: Auditory  Score: 6 Comprehension (Native Language)  Primary Mode of Comprehension: Auditory  Score: 6     Expression (Native Language)  Primary Mode of Expression: Verbal  Score: 6   Expression (Native Language)  Primary Mode of Expression: Verbal  Score: 6     Social Interaction/Pragmatics  Score: 6 Social Interaction/Pragmatics  Score: 6     Problem Solving  Score: 6   Problem Solving  Score: 6 Memory  Score: 6 Memory  Score: 6       Legend:   7 - Independent   6 - Modified Independent   5 - Standby Assistance / Supervision / Set-up   4 - Minimum Assistance / Contact Guard Assistance   3 - Moderate Assistance   2 - Maximum Assistance   1 - Total Assistance / Dependent       ACUTE MEDICAL ISSUES ADDRESSED IN INPATIENT REHABILITATION FACILITY:     > S/P Right above-the-knee amputation (4/11/2021  Dr. Sera Barton); S/P Exploration of right knee disarticulation and control of hemorrhage (4/8/2021  Dr. Shannan Macdonald); S/P Washout of right below-knee amputation; Right knee disarticulation (4/7/2021  Dr. Shannan Macdonald); History of infection and ischemia of right BKA stump (4/7/2021); S/P Right below-the-knee amputation (3/22/2021 - Dr. Ruthe Phalen); History of critical ischemia of the right lower extremity;  History of right lower extremity arterial embolectomy (3/5/2021 - Dr. Ruthe Phalen)   > Staples to be removed on 5/19/2021    > Acute Postoperative Blood Loss Anemia   > Hgb/Hct (3/26/2021) = 10.1/31.3   > Hgb/Hct (3/27/2021, on admission to ARU) = 10.9/33.9   > Anemia work-up showed serum iron 23, TIBC 200, iron % saturation 12, ferritin 835   > Hgb/Hct (3/29/2021) = 10.7/33.9       04/14/21  0423 04/13/21  0640 04/12/21  0539 04/11/21  1027 04/11/21  0650 04/10/21  1915 04/10/21  0640 04/09/21  0453 04/08/21  1602 04/08/21  0940 04/08/21  0210 04/07/21  0905 04/06/21  0850   HGB 7.2* 7.3* 7.3* 7.6* 7.9* 7.7* 6.3* 7.4* 6.5* 7.0* 7.8* 9.5* 10.8*   HCT 23.1* 23.2* 23.0*  --  24.4* 23.8* 20.0* 22.7* 21.5* 22.7* 25.4* 30.4* 34.2*      > Hgb/Hct (4/16/2021, on admission) = 6.6/21.1   > Anemia work-up (4/16/2021) showed serum iron 22, TIBC 202, iron % saturation 11, ferritin 545, reticulocyte count 5.1   > On 4/16/2021, patient was transfused 1 unit pRBC properly typed and crossmatched   > Hgb/Hct (4/17/2021) = 9.5/29.8    > On 4/17/2021, started:    > Ferrous gluconate 324 mg PO once daily with breakfast     > Ascorbic Acid 250 mg PO once daily with breakfast (to enhance the absorption of the FeSO4)    > Hgb/Hct (4/19/2021) = 8.9/28.9    > Hgb/Hct (4/22/2021) = 8.9/28.5    > Hgb/Hct (4/26/2021) = 9.9/32.7    > Continue:    > Ferrous gluconate 324 mg PO once daily with breakfast     > Ascorbic Acid 250 mg PO once daily with breakfast (to enhance the absorption of the FeSO4)     > Coronary artery disease; Peripheral artery disease    > On 3/27/2021, decreased Carvedilol from 6.25 mg to 3.125 mg PO BID with meals (8AM, 5PM)   > On 4/27/2021, held Carvedilol 3.125 mg PO BID with meals (8AM, 5PM)   > Continue:    > Aspirin 81 mg PO once daily with breakfast    > Atorvastatin 80 mg PO once daily    > Hypertensive heart disease with chronic systolic heart failure; Cardiomyopathy; Chronic systolic heart failure; Grade I diastolic dysfunction   > 2D echocardiogram (12/17/2015) showed EF 35%; genealized hypokinesis more focally severe of the inferior and posterior segments; mild mitral regurgitation; trace tricuspud regurgitation; normal pulmonary artery pressure   > 2D echocardiogram (3/24/2021) showed EF 37%; global hypokinesis of left ventricle; grade I diastolic dysfunction   > On 3/27/2021, decreased Carvedilol from 6.25 mg to 3.125 mg PO BID with meals (8AM, 5PM)   > On 3/28/2021, held:    > Furosemide 40 mg PO once daily (9AM)    > Lisinopril 5 mg PO once daily (9AM)   > On 4/27/2021, held Carvedilol 3.125 mg PO BID with meals (8AM, 5PM)    > Factor V Leiden mutation; History of deep venous thrombosis (DVT) of right lower extremity;  Anticoagulated on Rivaroxaban   > Continue Rivaroxaban 20 mg PO once daily with breakfast    > Major depressive disorder   > On 4/26/2021, discontinued Duloxetine 30 mg PO once daily   > On 4/27/2021, started Sertraline 25 mg PO once daily   > Continue Sertraline 25 mg PO once daily    > Mixed hyperlipidemia   > Lipid profile (7/25/2018) showed , , HDL 27,    > Lipid profile (3/27/2021) showed , , HDL 35, LDL 63   > Continue:    > Atorvastatin 80 mg PO once daily    > Ezetimibe 10 mg PO q HS    > Type 2 diabetes mellitus, poorly controlled, without long-term current use of insulin   > HbA1c (3/6/2021) = 12.9    > HbA1c (4/8/2021) = 9.8   > On admission to the ARU:    > Started Metformin 500 mg PO BID with meals    > Decreased Insulin glargine from 8 units to 5 units SC q HS   > On 4/20/2021:    > Started Alogliptin 25 mg PO once daily    > Discontinued Insulin glargine 5 units SC q HS   > On 4/21/2021:    > Discontinued Alogliptin 25 mg PO once daily    > Increased Metformin from 500 mg to 850 mg PO BID with meals              > During the patient's stay at the ARU, the patient was placed on an Insulin lispro sliding scale SC TID AC only    > Continue Metformin 850 mg PO BID with meals     > Difficulty sleeping              > During the patient's stay at the ARU, the patient was given:     > Melatonin 5 mg PO q HS    > Zolpidem 5 mg PO q HS PRN for sleep    > Wound on lateral surface of distal third of left leg   > Wound Care Nurse consult for wound care recommendations    > Constipation   > On 4/26/2021, started PeriColace 2 tabs PO once daily after dinner              > During the patient's stay at the ARU, the patient was given:     > Docusate sodium 100 mg PO once daily after breakfast    > PeriColace 2 tabs PO once daily after dinner    > COVID-19 ruled out by laboratory testing   > SARS-CoV-2 ([facility], [facility]) (3/22/2021): Not detected   > SARS-CoV-2 ([facility], [facility]) (collected 4/7/2021, resulted 4/8/2021): Not detected    > COVID-19 rapid test ([facility], [facility]) (4/7/2021): Not detected   > COVID-19 rapid test ([facility], [facility]) (4/14/2021): Not detected   > SARS-CoV-2 ([facility], [facility]) (collected 4/15//2021, resulted 4/16/2021):  Not detected    > Analgesia   > On 4/23/2021, increased Gabapentin from 100 mg PO TID to:    > Gabapentin 100 mg PO BID (8AM, 2PM)    > Gabapentin 200 mg PO q HS (8PM)   > On 4/27/2021, discontinued Duloxetine 30 mg PO once daily              > During the patient's stay at the ARU, the patient was given:     > Acetaminophen 650 mg PO TID (8AM, 12PM, 4PM)    > Acetaminophen 650 mg PO q 4 hr PRN for pain level 4/10 or lesser (from 8PM to 4AM only)    > Gabapentin 100 mg PO BID (8AM, 2PM)    > Gabapentin 200 mg PO q HS (8PM)    > Methocarbamol 500 mg PO TID    > Oxycodone 5-10 mg PO q 4 hr PRN for pain level 5/10 or greater       MEDICATIONS ON DISCHARGE:    Discharge Medication List as of 4/29/2021 10:14 AM      START taking these medications    Details   acetaminophen (TYLENOL) 325 mg tablet Take 2 Tabs by mouth every four (4) hours as needed (for fever or pain level 4/10 or lesser). Indications: fever, pain, Normal, Disp-60 Tab, R-0      ascorbic acid, vitamin C, (VITAMIN C) 250 mg tablet Take 1 Tab by mouth daily (with breakfast). Indications: to enhance absorption of iron supplement, Normal, Disp-30 Tab, R-0      aspirin 81 mg chewable tablet Take 1 Tab by mouth daily (with breakfast). Indications: treatment to prevent peripheral artery thromboembolism, Normal, Disp-30 Tab, R-0      atorvastatin (LIPITOR) 80 mg tablet Take 1 Tab by mouth daily. Indications: high cholesterol and high triglycerides, Normal, Disp-30 Tab, R-0      ezetimibe (ZETIA) 10 mg tablet Take 1 Tab by mouth daily. Indications: high cholesterol and high triglycerides, Normal, Disp-30 Tab, R-0      ferrous gluconate 324 mg (37.5 mg iron) tablet Take 1 Tab by mouth daily (with breakfast). Indications: anemia from inadequate iron, Normal, Disp-30 Tab, R-0      metFORMIN (GLUCOPHAGE) 850 mg tablet Take 1 Tab by mouth two (2) times daily (with meals). Indications: type 2 diabetes mellitus, Normal, Disp-60 Tab, R-0      methocarbamoL (ROBAXIN) 500 mg tablet Take 1 Tab by mouth three (3) times daily for 7 days.  Indications: muscle spasm, Normal, Disp-21 Tab, R-0 rivaroxaban (XARELTO) 20 mg tab tablet Take 1 Tab by mouth daily. Indications: blood clot in a deep vein of the extremities, Normal, Disp-30 Tab, R-0      sertraline (ZOLOFT) 25 mg tablet Take 1 Tab by mouth daily. Indications: major depressive disorder, Normal, Disp-30 Tab, R-0      oxyCODONE-acetaminophen (Percocet) 7.5-325 mg per tablet Take 1 Tab by mouth every six (6) hours as needed for Pain for up to 5 days. Max Daily Amount: 4 Tabs. Indications: pain, Normal, Disp-20 Tab, R-0         CONTINUE these medications which have CHANGED    Details   gabapentin (NEURONTIN) 100 mg capsule Take by mouth three (3) times daily. [Take 1 Cap at 8AM and 2PM. Take 2 Caps at 8PM]  Indications: neuropathic pain, acute pain following an operation, Normal, Disp-120 Cap, R-0         STOP taking these medications       amoxicillin-clavulanate (AUGMENTIN) 875-125 mg per tablet Comments:   Reason for Stopping:         carvediloL (COREG) 6.25 mg tablet Comments:   Reason for Stopping:         furosemide (LASIX) 40 mg tablet Comments:   Reason for Stopping:         insulin glargine (LANTUS) 100 unit/mL injection Comments:   Reason for Stopping:         lisinopriL (PRINIVIL, ZESTRIL) 5 mg tablet Comments:   Reason for Stopping:               DISCHARGE VITAL SIGNS:  Visit Vitals  /75 (BP 1 Location: Right upper arm, BP Patient Position: Sitting)   Pulse 91   Temp 99.5 °F (37.5 °C)   Resp 18   Ht 6' 2\" (1.88 m)   Wt 79 kg (174 lb 1.6 oz)   SpO2 99%   BMI 22.35 kg/m²       DISCHARGE PHYSICAL EXAMINATION:  GENERAL SURVEY: Patient is awake, alert, oriented x 3, laying comfortably on the bed, not in acute respiratory distress.   HEENT: pale palpebral conjunctivae, anicteric sclerae, no nasoaural discharge, moist oral mucosa  NECK: supple, no jugular venous distention, no palpable lymph nodes  CHEST/LUNGS: symmetrical chest expansion, good air entry, clear breath sounds  HEART: adynamic precordium, good S1 S2, no S3, regular rhythm, no murmurs  ABDOMEN: flat, bowel sounds appreciated, soft, non-tender  EXTREMITIES: (+) right AKA stump, (+) absence of hair on LLE, (+) healing wound with scab on lateral surface of distal third of left leg with surrounding hyperpigmentation, (+) LLE cool to touch, pale nailbeds, no edema, full and equal pulses, no calf tenderness   NEUROLOGICAL EXAM: The patient is awake, alert and oriented x3, able to answer questions fairly appropriately, able to follow 1 and 2 step commands.  Able to tell time from the wall clock.  Cranial nerves II-XII are grossly intact.  No gross sensory deficit.  Motor strength is 4+/5 on BUE, 4/5 on the RLE, 4 to 4+/5 on the LLE.     Incision(s): covered, clean, dry, and intact       CONDITION ON DISCHARGE: Stable. DISPOSITION: Patient clinically improved and was discharged to home with home health physical therapy, occupational therapy and skilled nursing. The patient is temporarily homebound secondary to functional deficits after undergoing a Right above-the-knee amputation (4/11/2021 - Dr. Fabby Holman). The patient can ambulate using a rolling walker (see above). The patient would benefit from continued skilled physical therapy in order to improve independent functional mobility within the home with use of least restrictive device. The patient would also benefit from continued skilled occupational therapy in order to improve self care and functional mobility within the home with use of least restrictive device. Short-term skilled nursing is needed for medication reconciliation, disease education and removal of staples on 5/19/2021 if not yet taken out by Vascular Surgery.       FOLLOW-UP RECOMMENDATIONS:   Follow-up Information     Follow up With Specialties Details Why Contact Info    [address] [facility]  Physical Therapy, Occupational Therapy, and Skilled Nurse [address] [facility] [address] [facility],  [phone] [facility] [place] Physician Assistant On 5/13/2021 Patient has an appointment scheduled with PCP Dr. Ilana Gatica on May 13, 2021 @ 10:00am.  Please bring photo ID, List of lMedications, and wear face mask. [facility]  [address] [facility] 10      Roslyn Dee MD Cardiology In 2 weeks follow up for Cardiomyopathy; Chronic systolic heart failure; Grade I diastolic dysfunction [facility]      Ani Holloway MD Psychiatry In 2 weeks follow up for Major depressive disorder [address] [facility],[address], [address] [place] [address]      Nicolás Martinez MD Vascular Surgery, General Surgery On 5/7/2021 Patient has an appointment scheduled with Vascular Dr. Jon Lu. Anel on May 7, 2021@ 11:45am. [address] [facility]  [phone]            OTHER INSTRUCTIONS:  1. Diet.    > Specifications: Cardiac, diabetic, consistent carb 1800 kcal   > Solids (consistency): Regular    > Liquids (consistency): Thin    > Fluid restriction: None  2. Activity. As tolerated. 3. Safety / fall precautions. TIME SPENT ON DISCHARGE ACTIVITIES: 33 minutes.       Signed:  Surjit Eddy MD    4/29/2021

## 2021-04-29 NOTE — FACE TO FACE
[facility] PHYSICAL REHABILITATION  [address] [place] [address], [address]    [address] [facility]    Name: Amber Allen Age / Sex: 60 y.o. / male   CSN: [id number] MRN: [id number]   [facility] Date: 4/15/2021 Discharge Date: 4/29/2021     Primary Care Provider: TERRANCE Melgar      Primary Rehab Diagnosis  1. Impaired Mobility and ADLs  2. S/P Right above-the-knee amputation (4/11/2021  Dr. Jim Hawkins)  3. S/P Exploration of right knee disarticulation and control of hemorrhage (4/8/2021  Dr. Victor M Scherer)  4. S/P Washout of right below-knee amputation; Right knee disarticulation (4/7/2021  Dr. Victor M Scherer)  5. History of infection and ischemia of right BKA stump (4/7/2021)  6. S/P Right below-the-knee amputation (3/22/2021 - Dr. Harlan Guerra)  7. History of critical ischemia of the right lower extremity  8.  History of right lower extremity arterial embolectomy (3/5/2021 - Dr. Harlan Guerra)    Comorbidities  Patient Active Problem List   Diagnosis Code    History of right below knee amputation ([facility] Z89.511    Impaired mobility and ADLs Z74.09, Z78.9    Critical ischemia of lower extremity ([facility] I70.229    Peripheral artery disease ([facility] I73.9    Coronary artery disease involving native coronary artery of native heart I25.10    Type 2 diabetes mellitus, without long-term current use of insulin (HCC) E11.9    History of epilepsy Z86.69    Factor V Leiden mutation ([facility] D68.51    Migraine headache G43.909    Wears glasses C45.8    Umbilical hernia H48.0    Obstructive sleep apnea G47.33    ICD (implantable cardioverter-defibrillator) in place Z95.810    Mitral valve prolapse I34.1    History of head injury Z87.828    Hypertensive heart disease with chronic systolic congestive heart failure (HCC) I11.0, I50.22    History of myocardial infarction I25.2    Long term current use of aspirin Z79.82    On statin therapy due to risk of future cardiovascular event Z79.899    History of deep venous thrombosis (DVT) of distal vein of right lower extremity Z86.718    Anticoagulated by anticoagulation treatment Z79.01    Mixed hyperlipidemia E78.2    History of embolectomy Z98.890    COVID-19 ruled out by laboratory testing Z20.822    Acute blood loss as cause of postoperative anemia D62    Cardiomyopathy ([facility] I42.9    Chronic systolic heart failure (HCC) I50.22    History of noncompliance with medical treatment Z91.19    Major depressive disorder F32.9    Grade I diastolic dysfunction O36.8    Constipation K59.00    Ischemia of right BKA site (HCC) T87.89, I99.8    Infection of right below knee amputation (HCC) T87.43    Status post above-knee amputation of right lower extremity ([facility] Z89.611        History of the Present Illness: The patient is a 53-year-old White male with multiple medical comorbidities who was admitted to [facility] on 3/22/2021 for an elective right below-the-knee amputation.      WBC count (3/9/2021) = 14.0  Hgb/Hct (3/9/2021) = 11.7/37.5  BUN/Creatinine (3/9/2021) = 17/1.6      On 3/18/2021, the patient was seen at the office of Vascular Surgery (Dr. Mitchell Ferris) for a wound check after undergoing a right lower extremity arterial embolectomy last 3/5/2021. The right foot was noted to be ischemic and Dr. Gema Horne recommended a right below-the-knee amputation.     On 3/22/2021, the patient was admitted to [facility] under the service of Vascular Surgery (Dr. Mitchell Ferris).     On 3/22/2021, the patient underwent a Right below-the-knee amputation done by Dr. Mitchell Ferris. The patient tolerated the procedure well with no intraoperative complications.  The patient developed acute postoperative blood loss anemia but no blood transfusion was given.     Rivaroxaban was used for DVT prophylaxis.     Internal Medicine consult (Dr. Pollo Esquivel) was called on 3/22/2021 for medical management.     WBC count (3/23/2021) = 10.7  Hgb/Hct (3/23/2021) = 10.7/34.2  BUN/Creatinine (3/23/2021) = 16/1.2      Physical Medicine and Rehabilitation consult (Dr. Kasey Jarrell) was called on 3/23/2021 for evaluation of rehabilitation needs. Dr. Daksha Danielle recommended IPR but since insurance does not contract with [facility], she recommended to consider Mari[facility] Matter IPR.     Cardiology consult (Dr. Tamara Corona) was called on 3/24/2021 for evaluation and comanagement.     2D echocardiogram (3/24/2021) showed EF 37%; global hypokinesis of left ventricle; grade I diastolic dysfunction     Psychiatry consult (Dr. William Fountain) was called on 3/24/2021 for evaluation and comanagement. Patient was diagnosed with major depressive disorder, severe, recurrent, without psychotic features and Caregiver burnout. The patient was determined to have the capacity to make decisions on his own. Patient was started on Duloxetine 30 mg PO once daily.     WBC count (3/26/2021) = 13.2  Hgb/Hct (3/26/2021) = 10.1/31.3  BUN/Creatinine (3/26/2021) = 16/1.1      Pain was controlled with Morphine IV PRN and Oxycodone PO PRN.     The patient had remained hemodynamically stable but due to the above events, the patient was noted to be generally weak and with impaired mobility and ADLs. Patient was felt to be a good candidate for acute inpatient rehabilitation. Upon evaluation by Physical Therapy and Occupational Therapy, the patient was recommended for acute inpatient rehabilitation.  The patient was discharged and was subsequently admitted to the [facility] for Physical Rehabilitation for intensive rehabilitation to help recover strength, function and mobility.     -     On 3/26/2021, the patient was admitted to the [facility] for Physical Rehabilitation for inpatient rehabilitation.     On 4/5/2021, patient had completed the recommended 10-day treatment course of Amoxicillin-Clavulanate PO.     On 4/6/2021, pain in the postoperative area had became steady. Patient was noted to have a low-grade fever and some serosanguinous discharge from the postoperative site. Cultures were sent. Patient was empirically started on Piperacillin-Tazobactam IV and Vancomycin IV.      Vascular Surgery consult (Dr. Narinder Louis) was called on 4/6/2021 for evaluation and comanagement. Dr. Génesis Redmond recommended right below amputation washout and debridement with possible wound VAC placement.     On 4/7/2021, the patient was discharged from the [facility] for Physical Rehabilitation and the patient was brought to the operating room.     -     S/P Washout of right below-knee amputation; Right knee disarticulation (4/7/2021  Dr. Linh Garcia)     On 4/7/2021, the patient was admitted under the service of the [address] [place], [place] [address] (Dr. Cherry Batista).     On 4/8/2021, Hgb/Hct was noted to be trending downwards (6.5/21.5 at 4:02 PM). Patient was transfused 2 units pRBC.     S/P Exploration of right knee disarticulation and control of hemorrhage (4/8/2021  Dr. Linh Garcia)     On 4/10/2021, Hgb/Hct was noted to be trending downwards (6.3/20.0 at 6:40 AM). Patient was transfused 2 units pRBC.     S/P Right above-the-knee amputation (4/11/2021  Dr. Tasia Guzman)     Infectious Disease (Dr. Florian Gary) was called on 4/13/2021 for evaluation and comanagement. Piperacillin-Tazobactam IV was discontinued.     Excerpt [facility] HOSPITAL Course) from the Discharge Summary by Dr. Goins Bold:  \"Mr. Pastora Fabian is a 38-year-old gentleman with a past medical history of factor V Leiden mutation, diastolic dysfunction, prior head injury with seizure disorder, chronic systolic heart failure with ejection fraction of 35%, insulin-dependent diabetes who was admitted to ARU on 3/25 following right BKA at [facility] by Dr. Mahad Cabello on 3/22/21. He was overall doing well kin ARU but developed fevers and increased pain to his right stump yesterday. He had recently completed course of Augmentin post-operatively. Vascular surgery was consulted to evaluate drainage from the stump incision. He has undergone I&D and washout of the right stump on 4/7 with right knee disarticulation.   Patient was transferred to [facility] and was admitted under the hospitalist service was started on empiric antibiotics and vascular surgery on board.  Postoperatively patient developed blood loss anemia requiring 2 units of blood transfusion.  Patient was taken to the OR on 4/8/2021 and exploration of right knee articulation and hemorrhage control was done by vascular surgery.  Patient was continued on empiric antibiotics and is hemoglobin hematocrit was monitored closely. Rutmaria del rosario Horseman was held during the hospital course.  After 48 hours after surgery and hemorrhage control patient was resumed back on Xarelto once cleared by vascular surgery.  His multiple hematocrit remained stable, did not have any bleeding after starting Xarelto.  ID was also consulted.  Patient's white count and fever resolved.  Patient had a difficult pain control but slowly improved.  ID saw the patient and recommended no need for antibiotics if the wound looks good.  Vascular surgery followed up on the patient, dressing was removed and wound looked clean so vascular agreed with no more antibiotics.  Antibiotics were discontinued.  Vascular cleared the patient for discharge.  PT OT was seen recommended acute rehab. Roseline Gibbons has been currently accepted to acute rehab, will transfer today.     Patient blood pressure was on the lower side in the hospital, his hypertensive medications are being resumed at a lower dose.  Patient was euvolemic, his Lasix has been held in the hospital. [facility] resume Lasix when needed.  Patient's blood pressure medicine needs to be adjusted in the rehab. \"         CBC    04/14/21  0423 04/13/21  0640 04/12/21  0539 04/11/21  1027 04/11/21  0650 04/10/21  1915 04/10/21  0640 04/09/21  0453 04/08/21  1602 04/08/21  0940 04/08/21  0210 04/07/21  0905 04/06/21  0850   WBC 12.2 14.3* 17.2*  --  16.5*  --  14.1* 18.6*  --   --  17.2* 22.0* 23.6*   HGB 7.2* 7.3* 7.3* 7.6* 7.9* 7.7* 6.3* 7.4* 6.5* 7.0* 7.8* 9.5* 10.8*   HCT 23.1* 23.2* 23.0*  --  24.4* 23.8* 20.0* 22.7* 21.5* 22.7* 25.4* 30.4* 34.2*   * 506* 461*  --  461*  --  404 431*  --   --  507* 546* 661*         Electrolytes    04/13/21 0640 04/12/21  0539 04/11/21  0650 04/10/21  0640 04/09/21  0453 04/08/21  0210 04/07/21  0905 04/06/21  0850 04/05/21  0427    137 140 139 136 138 136 130* 131*   K 3.9 4.9 4.6 4.4 4.4 5.3 4.6 4.6 5.0    108 110 109 106 105 103 96* 99*   CA 8.7 8.1* 8.2* 7.9* 8.4* 8.6 8.8 9.6 9.4   PHOS 3.4  --   --   --   --   --   --   --   --    MG 1.8  --   --   --   --   --   --   --   --          Renal Function    04/13/21 0640 04/12/21  0539 04/11/21  0650 04/10/21  0640 04/09/21  0453 04/08/21  0210 04/07/21  0905 04/06/21  0850 04/05/21  0427   BUN 9 9 9 15 22* 17 18 20* 17   CREA 1.04 1.12 1.08 1.16 1.35* 1.12 1.16 1.26 0.98   CO2 25 24 23 23 22 26 25 22 23         Liver Function    04/08/21  0210   TBILI 0.6   TP 5.9*   ALB 1.9*   GLOB 4.0   ALT 43   AST 37   *         The patient had remained hemodynamically stable but due to the above events, the patient was noted to be generally weak and with impaired mobility and ADLs. Patient was felt to be a good candidate for acute inpatient rehabilitation. Upon evaluation by Physical Therapy and Occupational Therapy, the patient was recommended for acute inpatient rehabilitation. The patient was discharged and was subsequently admitted to the [facility] for Physical Rehabilitation for intensive rehabilitation to help recover strength, function and mobility.       Past Medical History:  Past Medical History:   Diagnosis Date    Acute blood loss as cause of postoperative anemia 3/22/2021    Anticoagulated by anticoagulation treatment     On Rivaroxaban    Cardiomyopathy ([facility] 12/17/2015    2D echocardiogram (3/24/2021) showed EF 37%; global hypokinesis of left ventricle; grade I diastolic dysfunction; 2D echocardiogram (12/17/2015) showed EF 35%; generalized hypokinesis more focally severe of the inferior and posterior segments; mild mitral regurgitation; trace tricuspud regurgitation; normal pulmonary artery pressure    Chronic systolic heart failure (HCC)     2D echocardiogram (3/24/2021) showed EF 37%; global hypokinesis of left ventricle; grade I diastolic dysfunction; 2D echocardiogram (12/17/2015) showed EF 35%; generalized hypokinesis more focally severe of the inferior and posterior segments; mild mitral regurgitation; trace tricuspud regurgitation; normal pulmonary artery pressure    Coronary artery disease involving native coronary artery of native heart     COVID-19 ruled out by laboratory testing 3/22/2021    SARS-CoV-2 (Dr. TATTOFF m2000, [facility]) (3/22/2021):  Not detected     Critical ischemia of lower extremity ([facility] 3/22/2021    Right    Factor V Leiden mutation ([facility]     Grade I diastolic dysfunction 73/61/7841    2D echocardiogram (3/24/2021) showed EF 37%; global hypokinesis of left ventricle; grade I diastolic dysfunction    History of deep venous thrombosis (DVT) of distal vein of right lower extremity     History of epilepsy     History of head injury     History of myocardial infarction     History of noncompliance with medical treatment 3/18/2021    Hypertensive heart disease with chronic systolic congestive heart failure ([facility]     2D echocardiogram (3/24/2021) showed EF 37%; global hypokinesis of left ventricle; grade I diastolic dysfunction; 2D echocardiogram (12/17/2015) showed EF 35%; genealized hypokinesis more focally severe of the inferior and posterior segments; mild mitral regurgitation; trace tricuspud regurgitation; normal pulmonary artery pressure    Infection of right below knee amputation ([facility] 4/7/2021    S/P Washout of right below-knee amputation; Right knee disarticulation (4/7/2021  Dr. Peter Courser); S/P Exploration of right knee disarticulation and control of hemorrhage (4/8/2021  Dr. Peter Courser)   [address] term current use of aspirin     Major depressive disorder 03/24/2021    On Duloxetine    Migraine headache     Mitral valve prolapse     Mixed hyperlipidemia     Lipid profile (7/25/2018) showed , , HDL 27,     Obstructive sleep apnea     On statin therapy due to risk of future cardiovascular event     On Atorvastatin    Peripheral artery disease ([facility]     Type 2 diabetes mellitus, without long-term current use of insulin ([facility])     HbA1c (4/8/2021) = 9.8; HbA1c (3/6/2021) = 27.2    Umbilical hernia     Wears glasses        Past Surgical History:  Past Surgical History:   Procedure Laterality Date    HX ABOVE KNEE AMPUTATION Right 04/11/2021    S/P Right above-the-knee amputation (4/11/2021 - Dr. Gabbie Hearn)    HX BELOW KNEE AMPUTATION Right 03/22/2021    S/P Right below-the-knee amputation (3/22/2021 - Dr. Mitchell Ferris)    HX EMBOLECTOMY Right 03/05/2021    S/P Right lower extremity arterial embolectomy (3/5/2021 - Dr. Mitchell Ferris)    HX IMPLANTABLE CARDIOVERTER DEFIBRILLATOR  2013    HX OTHER SURGICAL Right 04/08/2021    S/P Exploration of right knee disarticulation and control of hemorrhage (4/8/2021 - Dr. Peter Courser)    HX OTHER SURGICAL Right 04/08/2021    S/P Washout of right below-knee amputation; Right knee disarticulation (4/7/2021 - Dr. Peter Courser)       Medications on Discharge:    Current Discharge Medication List      START taking these medications    Details   acetaminophen (TYLENOL) 325 mg tablet Take 2 Tabs by mouth every four (4) hours as needed (for fever or pain level 4/10 or lesser). Indications: fever, pain  Qty: 60 Tab, Refills: 0    Associated Diagnoses: Status post above-knee amputation of right lower extremity ([facility])      ascorbic acid, vitamin C, (VITAMIN C) 250 mg tablet Take 1 Tab by mouth daily (with breakfast). Indications: to enhance absorption of iron supplement  Qty: 30 Tab, Refills: 0    Associated Diagnoses: Acute blood loss as cause of postoperative anemia      aspirin 81 mg chewable tablet Take 1 Tab by mouth daily (with breakfast). Indications: treatment to prevent peripheral artery thromboembolism  Qty: 30 Tab, Refills: 0    Associated Diagnoses: Long term current use of aspirin      atorvastatin (LIPITOR) 80 mg tablet Take 1 Tab by mouth daily. Indications: high cholesterol and high triglycerides  Qty: 30 Tab, Refills: 0    Associated Diagnoses: Mixed hyperlipidemia; On statin therapy due to risk of future cardiovascular event      ezetimibe (ZETIA) 10 mg tablet Take 1 Tab by mouth daily. Indications: high cholesterol and high triglycerides  Qty: 30 Tab, Refills: 0    Associated Diagnoses: Mixed hyperlipidemia      ferrous gluconate 324 mg (37.5 mg iron) tablet Take 1 Tab by mouth daily (with breakfast). Indications: anemia from inadequate iron  Qty: 30 Tab, Refills: 0    Associated Diagnoses: Acute blood loss as cause of postoperative anemia      metFORMIN (GLUCOPHAGE) 850 mg tablet Take 1 Tab by mouth two (2) times daily (with meals). Indications: type 2 diabetes mellitus  Qty: 60 Tab, Refills: 0    Associated Diagnoses: Type 2 diabetes mellitus without complication, without long-term current use of insulin ([facility])      methocarbamoL (ROBAXIN) 500 mg tablet Take 1 Tab by mouth three (3) times daily for 7 days. Indications: muscle spasm  Qty: 21 Tab, Refills: 0    Associated Diagnoses: Status post above-knee amputation of right lower extremity ([facility])      rivaroxaban (XARELTO) 20 mg tab tablet Take 1 Tab by mouth daily.  Indications: blood clot in a deep vein of the extremities  Qty: 30 Tab, Refills: 0    Associated Diagnoses: Factor V Leiden mutation ([facility] Anticoagulated by anticoagulation treatment; History of deep venous thrombosis (DVT) of distal vein of right lower extremity      sertraline (ZOLOFT) 25 mg tablet Take 1 Tab by mouth daily. Indications: major depressive disorder  Qty: 30 Tab, Refills: 0    Associated Diagnoses: Current severe episode of major depressive disorder without psychotic features without prior episode ([facility]      oxyCODONE-acetaminophen (Percocet) 7.5-325 mg per tablet Take 1 Tab by mouth every six (6) hours as needed for Pain for up to 5 days. Max Daily Amount: 4 Tabs. Indications: pain  Qty: 20 Tab, Refills: 0    Associated Diagnoses: Status post above-knee amputation of right lower extremity ([facility]         CONTINUE these medications which have CHANGED    Details   gabapentin (NEURONTIN) 100 mg capsule Take by mouth three (3) times daily. [Take 1 Cap at 8AM and 2PM. Take 2 Caps at 8PM]  Indications: neuropathic pain, acute pain following an operation  Qty: 120 Cap, Refills: 0    Associated Diagnoses: Status post above-knee amputation of right lower extremity (HCC)         STOP taking these medications       amoxicillin-clavulanate (AUGMENTIN) 875-125 mg per tablet Comments:   Reason for Stopping:         carvediloL (COREG) 6.25 mg tablet Comments:   Reason for Stopping:         furosemide (LASIX) 40 mg tablet Comments:   Reason for Stopping:         insulin glargine (LANTUS) 100 unit/mL injection Comments:   Reason for Stopping:         lisinopriL (PRINIVIL, ZESTRIL) 5 mg tablet Comments:   Reason for Stopping:               Condition on Discharge: Stable.     Ambulation Gait  Amount of Assistance: 4 (Contact guard assistance)  Distance (ft): 75 Feet (ft)(x2)  Assistive Device: Walker, rolling, Gait belt     Wheelchair Mobility Wheelchair Mobility/Management  Able to Propel (ft): 200 feet  Functional Level: 5  Curbs/Ramps Assist Required (FIM Score): 5 (Supervision)  Wheelchair Setup Assist Required : 6 (Modified independent)  Wheelchair Management: Manages left brake, Manages right brake         Disposition: Patient clinically improved and was discharged to home with home health physical therapy, occupational therapy and skilled nursing. The patient is temporarily homebound secondary to functional deficits after undergoing a Right above-the-knee amputation (4/11/2021 - Dr. Gordon Billy). The patient can ambulate using a rolling walker (see above). The patient would benefit from continued skilled physical therapy in order to improve independent functional mobility within the home with use of least restrictive device. The patient would also benefit from continued skilled occupational therapy in order to improve self care and functional mobility within the home with use of least restrictive device. Short-term skilled nursing is needed for medication reconciliation, disease education and removal of staples on 5/19/2021 if not yet taken out by Vascular Surgery. Due to the abovementioned data, I certify that the patient needs intermittent Skilled Nursing, Physical Therapy and Occupational Therapy. I will NOT be following this patient in the Community and Dr. Ilda Carroll / TERRANCE White will be responsible for signing the [facility] 133 of Care. In compliance with the Affordable Care Act, I certify that this patient was managed by me during this hospitalization and that I had a Face-to-Face Encounter that meets the physician Face-to-Face Encounter requirements.       Signed:    Jamaal Hargrove MD    April 28, 2021

## 2021-04-30 ENCOUNTER — PATIENT OUTREACH (OUTPATIENT)
Dept: CASE MANAGEMENT | Age: 56
End: 2021-04-30

## 2021-04-30 NOTE — PROGRESS NOTES
Patient contacted regarding Chance Mack. Discussed COVID-19 related testing which was available at this time. Test results were negative. Patient informed of results, if available? yes     Care Transition Nurse contacted the patient by telephone to perform post discharge assessment. Call within 2 business days of discharge: Yes-from rehab discharge 2021. Verified name and  with patient as identifiers. Provided introduction to self, and explanation of the CTN/ACM role, and reason for call due to risk factors for infection and/or exposure to COVID-19. Symptoms reviewed with patient who verbalized the following symptoms: no new symptoms and no worsening symptoms      Due to no new or worsening symptoms encounter was not routed to provider for escalation. Discussed follow-up appointments. If no appointment was previously scheduled, appointment scheduling offered:  yes   [address] follow up appointment(s):   Future Appointments   Date Time Provider Almita Griffin   2021 11:45 AM Tram Waldron MD [facility] BS [facility]     Non-BS follow up appointment(s): n/a     Advance Care Planning:   Does patient have an Advance Directive:  decision maker updated. Patient has following risk factors of: diabetes and PAD, Factor V Leiden, CHF, ^Lipids, major depressive disorder, . CTN reviewed discharge instructions, medical action plan and red flags such as increased shortness of breath, increasing fever and signs of decompensation with patient who verbalized understanding. Discussed exposure protocols and quarantine with CDC Guidelines What to do if you are sick with coronavirus disease .  Patient was given an opportunity for questions and concerns. The patient agrees to contact the [facility] exposure line [phone], local [facility] department [address]  ([phone] and PCP office for questions related to their healthcare. CTN provided contact information for future needs.     Reviewed and educated patient on any new and changed medications related to discharge diagnosis     Was patient discharged with a pulse oximeter? no Discussed and confirmed pulse oximeter discharge instructions and when to notify provider or seek emergency care. Patient/family/caregiver given information for [employer] and agrees to enroll no  Patient's preferred e-mail: declined   Patient's preferred phone number: declined  Based on Loop alert triggers, patient will be contacted by nurse care manager for worsening symptoms. Plan for follow-up call in 5-7 days based on severity of symptoms and risk factors. Patient stated that he was doing well since being home from inpatient rehab. He has all meds and it taking as directed. He stated that he fell this am and paramedics came to check on him and assist him up- no injuries noted. Patient stated that he appreciated the call to check on him. CTN contact information given if any further assistance needed.

## 2021-05-02 ENCOUNTER — PATIENT OUTREACH (OUTPATIENT)
Dept: CASE MANAGEMENT | Age: 56
End: 2021-05-02

## 2021-05-02 NOTE — PROGRESS NOTES
Patient resolved from [address] Transitions episode on 5/2/2021. Discussed COVID-19 related testing which was available at this time. Test results were negative. Patient informed of results, if available? yes     Patient/family has been provided the following resources and education related to COVID-19:                         Signs, symptoms and red flags related to COVID-19            [facility] exposure and quarantine guidelines            [facility] exposure contact - [phone]            Contact for their local Department of Health                 Patient currently reports that the following symptoms have improved:  no new symptoms and no worsening symptoms. Having Located within [facility] and recovering from recent above knee amputation. .    No further outreach scheduled with this CTN/ACM/LPN/HC/ MA. Episode of Care resolved. Patient has this CTN/ACM/LPN/HC/MA contact information if future needs arise.

## 2021-05-10 NOTE — PROGRESS NOTES
Sahil spoke with pt's  to advise of pt's dc to home on 4/29. No further needs are noted at this time.

## 2021-05-13 ENCOUNTER — OFFICE VISIT (OUTPATIENT)
Dept: VASCULAR SURGERY | Age: 56
End: 2021-05-13
Payer: MEDICAID

## 2021-05-13 VITALS
HEART RATE: 99 BPM | OXYGEN SATURATION: 98 % | SYSTOLIC BLOOD PRESSURE: 108 MMHG | DIASTOLIC BLOOD PRESSURE: 78 MMHG | RESPIRATION RATE: 20 BRPM

## 2021-05-13 DIAGNOSIS — Z89.611 STATUS POST ABOVE-KNEE AMPUTATION OF RIGHT LOWER EXTREMITY (HCC): Primary | ICD-10-CM

## 2021-05-13 DIAGNOSIS — I99.8 ISCHEMIA OF RIGHT BKA SITE (HCC): ICD-10-CM

## 2021-05-13 DIAGNOSIS — T87.43 INFECTION OF RIGHT BELOW KNEE AMPUTATION (HCC): ICD-10-CM

## 2021-05-13 DIAGNOSIS — T87.89 ISCHEMIA OF RIGHT BKA SITE (HCC): ICD-10-CM

## 2021-05-13 LAB — SARS-COV-2, COV2NT: NOT DETECTED

## 2021-05-13 PROCEDURE — 99024 POSTOP FOLLOW-UP VISIT: CPT | Performed by: SURGERY

## 2021-05-13 NOTE — PROGRESS NOTES
Cuba Munson    Chief Complaint   Patient presents with    Other     hospital discharge    Surgical Follow-up       History and Physical    Cuba Munson is a 60 y.o. male with history of right lower extremity PAD now status post right above-knee amputation on 4/11/2021 due to right BKA infection. He returns in follow-up. Today he states he is doing well. Denies pain, fevers or chills. He does endorse occasional phantom pain. He is currently working with home PT and has significantly increased his mobility. He is awaiting a . Past Medical History:   Diagnosis Date    Acute blood loss as cause of postoperative anemia 3/22/2021    Anticoagulated by anticoagulation treatment     On Rivaroxaban    Cardiomyopathy ([facility] [address] 12/17/2015    2D echocardiogram (3/24/2021) showed EF 37%; global hypokinesis of left ventricle; grade I diastolic dysfunction; 2D echocardiogram (12/17/2015) showed EF 35%; generalized hypokinesis more focally severe of the inferior and posterior segments; mild mitral regurgitation; trace tricuspud regurgitation; normal pulmonary artery pressure    Chronic systolic heart failure (HCC)     2D echocardiogram (3/24/2021) showed EF 37%; global hypokinesis of left ventricle; grade I diastolic dysfunction; 2D echocardiogram (12/17/2015) showed EF 35%; generalized hypokinesis more focally severe of the inferior and posterior segments; mild mitral regurgitation; trace tricuspud regurgitation; normal pulmonary artery pressure    Coronary artery disease involving native coronary artery of native heart     COVID-19 ruled out by laboratory testing 3/22/2021    SARS-CoV-2 ([facility]) (3/22/2021):  Not detected     Critical ischemia of lower extremity ([facility] [address] 3/22/2021    Right    Factor V Leiden mutation ([facility] [address]     Grade I diastolic dysfunction 55/22/4668    2D echocardiogram (3/24/2021) showed EF 37%; global hypokinesis of left ventricle; grade I diastolic dysfunction    History of deep venous thrombosis (DVT) of distal vein of right lower extremity     History of epilepsy     History of head injury     History of myocardial infarction     History of noncompliance with medical treatment 3/18/2021    Hypertensive heart disease with chronic systolic congestive heart failure ([facility]     2D echocardiogram (3/24/2021) showed EF 37%; global hypokinesis of left ventricle; grade I diastolic dysfunction; 2D echocardiogram (12/17/2015) showed EF 35%; genealized hypokinesis more focally severe of the inferior and posterior segments; mild mitral regurgitation; trace tricuspud regurgitation; normal pulmonary artery pressure    Infection of right below knee amputation ([facility] 4/7/2021    S/P Washout of right below-knee amputation; Right knee disarticulation (4/7/2021 - Dr. Prudence Vázquez); S/P Exploration of right knee disarticulation and control of hemorrhage (4/8/2021 - Dr. Prudence Vázquez)   [facility] term current use of aspirin     Major depressive disorder 03/24/2021    On Duloxetine    Migraine headache     Mitral valve prolapse     Mixed hyperlipidemia     Lipid profile (7/25/2018) showed , , HDL 27,     Obstructive sleep apnea     On statin therapy due to risk of future cardiovascular event     On Atorvastatin    Peripheral artery disease ([facility]     Type 2 diabetes mellitus, without long-term current use of insulin ([facility])     HbA1c (4/8/2021) = 9.8; HbA1c (3/6/2021) = 49.9    Umbilical hernia     Wears glasses      Past Surgical History:   Procedure Laterality Date    HX ABOVE KNEE AMPUTATION Right 04/11/2021    S/P Right above-the-knee amputation (4/11/2021 - Dr. Christy Mcduffie)    HX BELOW KNEE AMPUTATION Right 03/22/2021    S/P Right below-the-knee amputation (3/22/2021 - Dr. Solo Mark)    HX EMBOLECTOMY Right 03/05/2021    S/P Right lower extremity arterial embolectomy (3/5/2021 - Dr. Solo Mark)    HX IMPLANTABLE CARDIOVERTER DEFIBRILLATOR  2013    HX OTHER SURGICAL Right 04/08/2021    S/P Exploration of right knee disarticulation and control of hemorrhage (4/8/2021 - Dr. Linh Garcia)    HX OTHER SURGICAL Right 04/08/2021    S/P Washout of right below-knee amputation; Right knee disarticulation (4/7/2021 - Dr. Linh Garcia)     Patient Active Problem List   Diagnosis Code    History of right below knee amputation ([facility] Z89.511    Impaired mobility and ADLs Z74.09, Z78.9    Critical ischemia of lower extremity ([facility] I70.229    Peripheral artery disease ([facility] I73.9    Coronary artery disease involving native coronary artery of native heart I25.10    Type 2 diabetes mellitus, without long-term current use of insulin (HCC) E11.9    History of epilepsy Z86.69    Factor V Leiden mutation ([facility] D68.51    Migraine headache G43.909    Wears glasses Z54.1    Umbilical hernia T35.5    Obstructive sleep apnea G47.33    ICD (implantable cardioverter-defibrillator) in place Z95.810    Mitral valve prolapse I34.1    History of head injury Z87.828    Hypertensive heart disease with chronic systolic congestive heart failure (HCC) I11.0, I50.22    History of myocardial infarction I25.2    Long term current use of aspirin Z79.82    On statin therapy due to risk of future cardiovascular event Z79.899    History of deep venous thrombosis (DVT) of distal vein of right lower extremity Z86.718    Anticoagulated by anticoagulation treatment Z79.01    Mixed hyperlipidemia E78.2    History of embolectomy Z98.890    COVID-19 ruled out by laboratory testing Z20.822    Acute blood loss as cause of postoperative anemia D62    Cardiomyopathy ([facility] I42.9    Chronic systolic heart failure (HCC) I50.22    History of noncompliance with medical treatment Z91.19    Major depressive disorder F32.9    Grade I diastolic dysfunction A05.1    Constipation K59.00    Ischemia of right BKA site (HCC) T87.89, I99.8    Infection of right below knee amputation ([facility] T87.43    Status post above-knee amputation of right lower extremity ([facility]) Z89.611     Current Outpatient Medications   Medication Sig Dispense Refill    gabapentin (NEURONTIN) 100 mg capsule Take by mouth three (3) times daily. [Take 1 Cap at 8AM and 2PM. Take 2 Caps at 8PM]  Indications: neuropathic pain, acute pain following an operation 120 Cap 0    acetaminophen (TYLENOL) 325 mg tablet Take 2 Tabs by mouth every four (4) hours as needed (for fever or pain level 4/10 or lesser). Indications: fever, pain 60 Tab 0    ascorbic acid, vitamin C, (VITAMIN C) 250 mg tablet Take 1 Tab by mouth daily (with breakfast). Indications: to enhance absorption of iron supplement 30 Tab 0    aspirin 81 mg chewable tablet Take 1 Tab by mouth daily (with breakfast). Indications: treatment to prevent peripheral artery thromboembolism 30 Tab 0    atorvastatin (LIPITOR) 80 mg tablet Take 1 Tab by mouth daily. Indications: high cholesterol and high triglycerides 30 Tab 0    ezetimibe (ZETIA) 10 mg tablet Take 1 Tab by mouth daily. Indications: high cholesterol and high triglycerides 30 Tab 0    ferrous gluconate 324 mg (37.5 mg iron) tablet Take 1 Tab by mouth daily (with breakfast). Indications: anemia from inadequate iron 30 Tab 0    metFORMIN (GLUCOPHAGE) 850 mg tablet Take 1 Tab by mouth two (2) times daily (with meals). Indications: type 2 diabetes mellitus 60 Tab 0    rivaroxaban (XARELTO) 20 mg tab tablet Take 1 Tab by mouth daily. Indications: blood clot in a deep vein of the extremities 30 Tab 0    sertraline (ZOLOFT) 25 mg tablet Take 1 Tab by mouth daily.  Indications: major depressive disorder 30 Tab 0     Allergies   Allergen Reactions    Nitroglycerin Other (comments)      BP drops rapidly when he takes SL Nitro          Gardiner Diarrhea and Rash    Cat Dander Cough    Codeine Rash    [place] Rash     Social History     Socioeconomic History    Marital status: SINGLE     Spouse name: Not on file    Number of children: Not on file    Years of education: Not on file    Highest education level: Not on file   Occupational History    Not on file   Social Needs    Financial resource strain: Not on file    Food insecurity     Worry: Not on file     Inability: Not on file    Transportation needs     Medical: Not on file     Non-medical: Not on file   Tobacco Use    Smoking status: Never Smoker    Smokeless tobacco: Never Used   Substance and Sexual Activity    Alcohol use: Not Currently    Drug use: Never    Sexual activity: Not on file   Lifestyle    Physical activity     Days per week: Not on file     Minutes per session: Not on file    Stress: Not on file   Relationships    Social connections     Talks on phone: Not on file     Gets together: Not on file     Attends Sikhism service: Not on file     Active member of club or organization: Not on file     Attends meetings of clubs or organizations: Not on file     Relationship status: Not on file    Intimate partner violence     Fear of current or ex partner: Not on file     Emotionally abused: Not on file     Physically abused: Not on file     Forced sexual activity: Not on file   Other Topics Concern    Not on file   Social History Narrative    Not on file      Family History   Problem Relation Age of Onset    Bleeding Prob Mother         Factor V Leiden mutation    Diabetes Mother     Stroke Mother     Hypertension Mother     Dementia Mother     Seizures Mother        Physical Exam:    Visit Vitals  /78 (BP 1 Location: Left upper arm, BP Patient Position: Sitting, BP Cuff Size: Adult)   Pulse 99   Resp 20   SpO2 98%        Constitutional:  Patient is well developed, well nourished, and not distressed. HEENT: atraumatic, normocephalic, wearing a mask. Eyes:   Cunjunctivae clear, no scleral icterus  Neck:   No JVD present. Extremities: RLE AKA stump. Incision c/d/i and essentially healed.  Staples in place  Neurological:  he  is alert and oriented x3 . Gait abnormal.   Psych: Appropriate mood and affect. Skin:  Skin is warm and dry. No rash noted. No erythema. No ulcers. Impression and Plan:  Mil Silva is a 60 y.o. male with history of severe right lower extremity critical limb ischemia, now status post right BKA. Postoperative course complicated by severe infection necessitating above-knee amputation. He is doing well and the incision has essentially healed. Staples were removed in the office today and Steri-Strips applied. He will return in 4 weeks in follow-up. We reviewed the plan with the patient and the patient understands. Byron Sinclair MD    PLEASE NOTE:  This document has been produced using voice recognition software. Unrecognized errors in transcription may be present.

## 2021-06-30 ENCOUNTER — OFFICE VISIT (OUTPATIENT)
Dept: VASCULAR SURGERY | Age: 56
End: 2021-06-30
Payer: MEDICAID

## 2021-06-30 VITALS
OXYGEN SATURATION: 98 % | RESPIRATION RATE: 20 BRPM | DIASTOLIC BLOOD PRESSURE: 70 MMHG | HEART RATE: 98 BPM | SYSTOLIC BLOOD PRESSURE: 112 MMHG

## 2021-06-30 DIAGNOSIS — E11.51 TYPE 2 DIABETES MELLITUS WITH PERIPHERAL VASCULAR DISEASE (HCC): ICD-10-CM

## 2021-06-30 DIAGNOSIS — Z89.611 STATUS POST ABOVE-KNEE AMPUTATION OF RIGHT LOWER EXTREMITY (HCC): Primary | ICD-10-CM

## 2021-06-30 PROCEDURE — 99024 POSTOP FOLLOW-UP VISIT: CPT | Performed by: SURGERY

## 2021-06-30 NOTE — PROGRESS NOTES
1. Have you been to an emergency room or urgent care clinic since your last visit? No     Hospitalized since your last visit? If yes, where, when, and reason for visit? No     2. Have you seen or consulted any other health care providers outside of the [facility] since your last visit including any procedures, health maintenance items. If yes, where, when and reason for visit?  No

## 2021-06-30 NOTE — PROGRESS NOTES
No c/o. No phantom pain  AKA wound clean and dry. Tiny eschar over lateralmost aspect of incision  Overall doing well.   Ready for prosthesis  Fu 6 mo w/ DEMARCUS

## 2021-12-30 DIAGNOSIS — E11.51 TYPE 2 DIABETES MELLITUS WITH PERIPHERAL VASCULAR DISEASE (HCC): ICD-10-CM

## 2022-06-22 NOTE — PROGRESS NOTES
Problem: Self Care Deficits Care Plan (Adult)  Goal: *Therapy Goal (Edit Goal, Insert Text)  Description: Occupational Therapy Goals   Long Term Goals  Initiated  and to be accomplished within 2 week(s)  1. Pt will perform self-feeding with mod I.  2. Pt will perform grooming with mod I.  3. Pt will perform UB bathing with mod I.  4. Pt will perform LB bathing with mod I.  5. Pt will perform tub/shower transfer with mod I.   6. Pt will perform UB dressing with mod I.  7. Pt will perform LB dressing with mod I.  8. Pt will perform toileting task with mod I.  9. Pt will perform toilet transfer with mod I. Short Term Goals   Initiated 3/27/2021 and to be accomplished within 7 day(s)  1. Pt will perform UB bathing with CGA. 2. Pt will perform LB bathing with CGA. 3. Pt will perform tub/shower transfer with CGA. 4. Pt will perform LB dressing with CGA. 5. Pt will perform toileting task with CGA. 6. Pt will perform toilet transfer with CGA. Outcome: Progressing Towards Goal   Occupational Therapy TREATMENT    Patient: Benigno Hilario   60 y.o. Patient identified with name and : yes    Date: 2021    First Tx Session  Time In: 18  Time Out[de-identified] 1500    Diagnosis: Status post below knee amputation of right lower extremity ([facility] [Z89.511]   Precautions: Fall  Chart, occupational therapy assessment, plan of care, and goals were reviewed. Pain:  Pt reports 6/10 pain or discomfort prior to treatment. Pt reports 6/10 pain or discomfort post treatment. Intervention Provided: Pt c/o phantom pain in right LE      SUBJECTIVE:   Patient stated My mother had a fall where she is for rehab.     OBJECTIVE DATA SUMMARY:     THERAPEUTIC ACTIVITY Daily Assessment    Pt participated in FM/dexterity activity for carryover to self-care tasks. Pt used therapy putty to manipulate bimanually to find and retrieve small beads.  Pt was able to locate all beads and using pincer grasp with dominant left hand, picked up and placed in small container. Pt utilized ROM ARC to facilitate increased shoulder strength/mobility for carryover to functional tasks. Pt moved rings in 2 directions using supination/pronation to perform ARC movement. For increased challenge, 2# weights were attached to wrist.    Pt participated in reaching activity to challenge sitting balance and to increase core strength. Pt played the game Sorry, having to reach forward, outside DARRYL, to draw/discard cards and to reach around board to move game pieces. For increased challenge, 2# weights were attached to wrist.     THERAPEUTIC EXERCISE Daily Assessment    Pt participated in B hand strengthening exercises for carryover to functional transfers. Pt used therapy putty to perform squeezes, palm rolls, thumb/digit pinches, lateral pinch, 3-jaw akrl pinch and finger spreads. Pt performed cardio endurance exercise to facilitate increased activity tolerance. Pt used arm cycle for 15 minutes without requiring rest break. IADL Daily Assessment    Pt performed home management activity seated w/c level in room. Pt folded laundry that he had washed in previous session and maneuvered to closet to place on shelving. MOBILITY/TRANSFERS Daily Assessment     Squat pivot transfer w/c to EOB SBA. ASSESSMENT:  Pt making progress with activity tolerance and with BUE strength. Progression toward goals:  [x]          Improving appropriately and progressing toward goals  []          Improving slowly and progressing toward goals  []          Not making progress toward goals and plan of care will be adjusted     PLAN:  Patient continues to benefit from skilled intervention to address the above impairments. Continue treatment per established plan of care.   Discharge Recommendations:  Home Health  Further Equipment Recommendations for Discharge:  bedside commode and transfer bench     Activity Tolerance:  Fair+      Estimated LOS:4/7/2021    Please refer to the flowsheet for vital signs taken during this treatment. After treatment:   []  Patient left in no apparent distress sitting up in chair   [x]  Patient left in no apparent distress in bed  [x]  Call bell left within reach  []  Nursing notified  []  Caregiver present  []  Bed alarm activated    COMMUNICATION/EDUCATION:   [] Home safety education was provided and the patient/caregiver indicated understanding. [] Patient/family have participated as able in goal setting and plan of care. [x] Patient/family agree to work toward stated goals and plan of care. [] Patient understands intent and goals of therapy, but is neutral about his/her participation. [] Patient is unable to participate in goal setting and plan of care.       SEBASTIEN Lujan/DOMINIC independent
